# Patient Record
Sex: FEMALE | Race: WHITE | Employment: UNEMPLOYED | URBAN - METROPOLITAN AREA
[De-identification: names, ages, dates, MRNs, and addresses within clinical notes are randomized per-mention and may not be internally consistent; named-entity substitution may affect disease eponyms.]

---

## 2017-03-13 ENCOUNTER — TELEPHONE (OUTPATIENT)
Dept: CARDIOLOGY CLINIC | Age: 82
End: 2017-03-13

## 2017-03-13 DIAGNOSIS — R94.31 ABNORMAL EKG: Primary | ICD-10-CM

## 2017-03-14 ENCOUNTER — PROCEDURE VISIT (OUTPATIENT)
Dept: CARDIOLOGY CLINIC | Age: 82
End: 2017-03-14

## 2017-03-14 DIAGNOSIS — R94.31 ABNORMAL EKG: ICD-10-CM

## 2017-03-14 LAB
LV EF: 75 %
LVEF MODALITY: NORMAL

## 2017-03-14 PROCEDURE — 78452 HT MUSCLE IMAGE SPECT MULT: CPT | Performed by: INTERNAL MEDICINE

## 2017-03-14 PROCEDURE — A9500 TC99M SESTAMIBI: HCPCS | Performed by: INTERNAL MEDICINE

## 2017-03-14 PROCEDURE — 93015 CV STRESS TEST SUPVJ I&R: CPT | Performed by: INTERNAL MEDICINE

## 2017-03-15 ENCOUNTER — TELEPHONE (OUTPATIENT)
Dept: CARDIOLOGY CLINIC | Age: 82
End: 2017-03-15

## 2017-05-15 ENCOUNTER — HOSPITAL ENCOUNTER (OUTPATIENT)
Dept: INFUSION THERAPY | Age: 82
Discharge: HOME | End: 2017-05-15
Attending: FAMILY MEDICINE | Admitting: FAMILY MEDICINE

## 2017-05-15 VITALS
WEIGHT: 134 LBS | BODY MASS INDEX: 25.3 KG/M2 | OXYGEN SATURATION: 99 % | HEIGHT: 61 IN | RESPIRATION RATE: 16 BRPM | DIASTOLIC BLOOD PRESSURE: 61 MMHG | HEART RATE: 52 BPM | SYSTOLIC BLOOD PRESSURE: 139 MMHG

## 2017-05-15 LAB
CREAT SERPL-MCNC: 0.7 MG/DL (ref 0.6–1.1)
GFR AFRICAN AMERICAN: >60 ML/MIN/1.73M2
GFR NON-AFRICAN AMERICAN: >60 ML/MIN/1.73M2

## 2017-05-15 RX ORDER — ZOLEDRONIC ACID 5 MG/100ML
5 INJECTION, SOLUTION INTRAVENOUS ONCE
Status: COMPLETED | OUTPATIENT
Start: 2017-05-15 | End: 2017-05-15

## 2017-05-15 RX ORDER — SODIUM CHLORIDE 0.9 % (FLUSH) 0.9 %
10 SYRINGE (ML) INJECTION PRN
Status: DISCONTINUED | OUTPATIENT
Start: 2017-05-15 | End: 2017-05-16 | Stop reason: HOSPADM

## 2017-05-15 RX ADMIN — Medication 10 ML: at 10:45

## 2017-05-15 RX ADMIN — Medication 10 ML: at 11:36

## 2017-05-15 RX ADMIN — ZOLEDRONIC ACID 5 MG: 5 INJECTION, SOLUTION INTRAVENOUS at 11:22

## 2017-06-19 ENCOUNTER — OFFICE VISIT (OUTPATIENT)
Dept: CARDIOLOGY CLINIC | Age: 82
End: 2017-06-19

## 2017-06-19 VITALS
SYSTOLIC BLOOD PRESSURE: 116 MMHG | DIASTOLIC BLOOD PRESSURE: 74 MMHG | HEART RATE: 46 BPM | BODY MASS INDEX: 27 KG/M2 | HEIGHT: 61 IN | WEIGHT: 143 LBS

## 2017-06-19 DIAGNOSIS — R07.2 PRECORDIAL PAIN: Primary | ICD-10-CM

## 2017-06-19 PROCEDURE — 99214 OFFICE O/P EST MOD 30 MIN: CPT | Performed by: INTERNAL MEDICINE

## 2017-06-29 ENCOUNTER — PAT TELEPHONE (OUTPATIENT)
Dept: SURGERY | Age: 82
End: 2017-06-29

## 2017-06-29 VITALS — HEIGHT: 61 IN | WEIGHT: 134 LBS | BODY MASS INDEX: 25.3 KG/M2

## 2017-06-29 RX ORDER — LEVOCETIRIZINE DIHYDROCHLORIDE 5 MG/1
5 TABLET, FILM COATED ORAL NIGHTLY
COMMUNITY
End: 2018-09-24

## 2017-07-03 ENCOUNTER — HOSPITAL ENCOUNTER (OUTPATIENT)
Dept: SURGERY | Age: 82
Discharge: HOME | End: 2017-07-03
Attending: SPECIALIST | Admitting: SPECIALIST

## 2017-07-03 VITALS
BODY MASS INDEX: 26.62 KG/M2 | HEART RATE: 52 BPM | OXYGEN SATURATION: 98 % | SYSTOLIC BLOOD PRESSURE: 137 MMHG | DIASTOLIC BLOOD PRESSURE: 53 MMHG | HEIGHT: 61 IN | WEIGHT: 141 LBS | RESPIRATION RATE: 16 BRPM | TEMPERATURE: 98.2 F

## 2017-07-03 RX ORDER — SUCRALFATE 1 G/1
1 TABLET ORAL 4 TIMES DAILY
Qty: 120 TABLET | Refills: 3 | Status: SHIPPED | OUTPATIENT
Start: 2017-07-03 | End: 2018-12-12 | Stop reason: SDUPTHER

## 2017-07-03 RX ORDER — SODIUM CHLORIDE, SODIUM LACTATE, POTASSIUM CHLORIDE, CALCIUM CHLORIDE 600; 310; 30; 20 MG/100ML; MG/100ML; MG/100ML; MG/100ML
INJECTION, SOLUTION INTRAVENOUS CONTINUOUS
Status: DISCONTINUED | OUTPATIENT
Start: 2017-07-03 | End: 2017-07-04 | Stop reason: HOSPADM

## 2017-07-03 RX ADMIN — SODIUM CHLORIDE, SODIUM LACTATE, POTASSIUM CHLORIDE, CALCIUM CHLORIDE: 600; 310; 30; 20 INJECTION, SOLUTION INTRAVENOUS at 14:18

## 2017-07-03 ASSESSMENT — PAIN SCALES - GENERAL
PAINLEVEL_OUTOF10: 0
PAINLEVEL_OUTOF10: 0

## 2017-07-03 ASSESSMENT — PAIN - FUNCTIONAL ASSESSMENT: PAIN_FUNCTIONAL_ASSESSMENT: 0-10

## 2017-08-31 RX ORDER — LISINOPRIL 5 MG/1
5 TABLET ORAL DAILY
Qty: 90 TABLET | Refills: 3 | Status: SHIPPED | OUTPATIENT
Start: 2017-08-31 | End: 2018-08-06 | Stop reason: SDUPTHER

## 2017-09-12 ENCOUNTER — TELEPHONE (OUTPATIENT)
Dept: CARDIOLOGY CLINIC | Age: 82
End: 2017-09-12

## 2017-09-28 RX ORDER — LISINOPRIL 5 MG/1
5 TABLET ORAL DAILY
Qty: 90 TABLET | Refills: 3 | OUTPATIENT
Start: 2017-09-28

## 2017-10-04 ENCOUNTER — HOSPITAL ENCOUNTER (OUTPATIENT)
Dept: OTHER | Age: 82
Discharge: HOME | End: 2017-10-04
Attending: ORTHOPAEDIC SURGERY | Admitting: ORTHOPAEDIC SURGERY

## 2017-10-17 ENCOUNTER — HOSPITAL ENCOUNTER (OUTPATIENT)
Dept: ULTRASOUND IMAGING | Age: 82
Discharge: HOME | End: 2017-10-17
Attending: ORTHOPAEDIC SURGERY | Admitting: ORTHOPAEDIC SURGERY

## 2017-10-17 DIAGNOSIS — M19.012 PRIMARY OSTEOARTHRITIS OF LEFT SHOULDER: ICD-10-CM

## 2017-10-17 DIAGNOSIS — M19.012 ARTHRITIS OF LEFT SHOULDER REGION: ICD-10-CM

## 2017-10-24 LAB — TSH SERPL DL<=0.05 MIU/L-ACNC: 5.39 UIU/ML

## 2017-12-04 ENCOUNTER — OFFICE VISIT (OUTPATIENT)
Dept: CARDIOLOGY CLINIC | Age: 82
End: 2017-12-04

## 2017-12-04 VITALS
HEIGHT: 61 IN | HEART RATE: 62 BPM | BODY MASS INDEX: 25.26 KG/M2 | WEIGHT: 133.8 LBS | SYSTOLIC BLOOD PRESSURE: 114 MMHG | DIASTOLIC BLOOD PRESSURE: 70 MMHG

## 2017-12-04 DIAGNOSIS — R07.2 PRECORDIAL PAIN: Primary | ICD-10-CM

## 2017-12-04 PROCEDURE — 99213 OFFICE O/P EST LOW 20 MIN: CPT | Performed by: INTERNAL MEDICINE

## 2017-12-04 NOTE — PROGRESS NOTES
No current facility-administered medications for this visit. Allergies: Morphine; Bactrim [sulfamethoxazole-trimethoprim]; Influenza vaccines; Lactose intolerance (gi); Phenergan [promethazine hcl]; Stadol [butorphanol tartrate]; and Tape [adhesive tape]  Past Medical History:   Diagnosis Date    Arthritis     generalized    Asthma     Blood transfusion 2004    No reaction    Chronic bronchitis (HCC)     COPD (chronic obstructive pulmonary disease) ([facility] [address]     summer 2014    Fatigue     H/O cardiac catheterization 06/15/2017    mild lad and cx disease    H/O Doppler ultrasound 4/7/2016 3/19/12    carotid-4/16 WNL 3/12right mild less than 50%and left wnl    H/O Doppler ultrasound 6/14/017    carotid - mod disease EILEEN, mild disease LICA    H/O echocardiogram 7/23/10    H/O echocardiogram 4/15/2016    EF 60% sclerotic AV mildly stenosed-recommend yearly echo    H/O echocardiogram 06/13/2017    EF>55% mild-mod AS, triavial AR    H/O exercise stress test 06/14/2017    abnormal    History of nuclear stress test 03/14/2017    EF 75%. Normal study.     Holter monitor, abnormal 2/22/11    infrequent APC are seen    Normal cardiac stress test 7/23/10    EF 70%, no ischemia    On home O2     only uses as needed, nightly prn    Syncope and collapse     Tachycardia     HX of tachycardia - had a cardioablation    Thyroid disease      Past Surgical History:   Procedure Laterality Date    BREAST SURGERY  2009    bilat    CARDIAC CATHETERIZATION  3/02/11    mild to moderate disease of diagonal and proximal RCA   [address]    ablation    CARPAL TUNNEL RELEASE  1990's    bilat    CHOLECYSTECTOMY  1961    open choley    COLONOSCOPY      DILATATION, ESOPHAGUS      ENDOSCOPY, COLON, DIAGNOSTIC  07/03/2017    s/p gastric bypass otherwise normal    FINGER SURGERY      right middle, thumb and index finger (screw and pin in right index finger)    GASTRIC RESTRICTION SURGERY  1984    stapled  HERNIA REPAIR  unknown    Inc hernia hernia repair    HIP FRACTURE SURGERY Left 11/04/2015    Left hip nail    HYSTERECTOMY  1966    Luke w/ BSO    JOINT REPLACEMENT  2004    right knee    LIPECTOMY  1's    OTHER SURGICAL HISTORY  5-1-2012    Gastrojejunostomy/partial gastrectomy    TOE SURGERY  Early 2000's    hammer toes and bunions     Family History   Problem Relation Age of Onset    Diabetes Mother     Other Mother      thyroid    Heart Disease Father     Arthritis Father     High Blood Pressure Father     High Cholesterol Father     Other Father      glaucoma    Other Sister      thyroid, glaucoma    Diabetes Brother     Other Sister     Vision Loss Sister      lung problems, smoker    Cancer Sister      throat cancer    Other Son      HX of clots    Early Death Son      Hit by car and killed.  High Blood Pressure Daughter     Stroke Son      No residual    Other Son      HX myocarditis     Social History   Substance Use Topics    Smoking status: Former Smoker     Packs/day: 3.00     Years: 5.00     Quit date: 1/1/1962    Smokeless tobacco: Never Used    Alcohol use No          Review of systems:  HEENT: Neg  Card:neg   GI;Neg  : Neg  Neuro: Neg  Psych: Neg  Derm: Neg  MS; Neg  All: Documented  Constitutional: Neg    Objective:      Physical Exam:  /70   Pulse 62   Ht 5' 1\" (1.549 m)   Wt 133 lb 12.8 oz (60.7 kg)   BMI 25.28 kg/m²   Wt Readings from Last 3 Encounters:   12/04/17 133 lb 12.8 oz (60.7 kg)   07/03/17 141 lb (64 kg)   06/29/17 134 lb (60.8 kg)     Body mass index is 25.28 kg/m². GENERAL - Alert, oriented, pleasant, in no apparent distress. Head unremarkable  Eyes  Not injected conjunctiva  ENT  normal mucosa  Neck - Supple. No jugular venous distention noted. No carotid bruits. Cardiovascular  Normal S1 and S2 with 2/6 YELENA  Extremities - No cyanosis, clubbing, or significant edema. Pulmonary  No respiratory distress. No wheezes or rales. Pulses: Bilateral radial and pedal pulses normal  Abdomen  no tenderness  Musculoskeletal  normal strength  Neurologic    There are  no gross focal neurologic abnormalities. Skin-  No rash  Affect; normal mood    DATA:  Lab Results   Component Value Date    CKTOTAL 79 06/14/2017    TROPONINI 0.007 05/23/2014     BNP:    Lab Results   Component Value Date    BNP 57 05/23/2014     PT/INR:  No results found for: PTINR  No results found for: LABA1C  Lab Results   Component Value Date    CHOL 197 06/13/2017    TRIG 116 06/13/2017    HDL 97 06/13/2017    LDLCALC 62 02/05/2016    LDLDIRECT 91 06/13/2017     Lab Results   Component Value Date    ALT 15 06/14/2017    AST 20 06/14/2017     TSH:    Lab Results   Component Value Date    TSH 5.39 10/24/2017         QUALITY MEASURES:  CAD:  No   CHOL LOWERING:  No- if No Why  ANTIPLATELET:  No - if No why  BETA BLOCKER    no  IF  NO WHY  SMOKING HISTORY no COUNSELLED no  ATRIAL FIBRILLATIONno ANTICOAG: no    Assessment/ Plan:     Patient seen , interviewed and examined      -  Hypertension: Patients blood pressure is normal. Patient is advised about low sodium diet. Present medical regimen will not be changed.        - SP  Shoulder surgery    - mild AS murmur yearly echo

## 2018-06-20 ENCOUNTER — HOSPITAL ENCOUNTER (OUTPATIENT)
Dept: GENERAL RADIOLOGY | Age: 83
Discharge: HOME | End: 2018-06-20
Attending: ORTHOPAEDIC SURGERY | Admitting: ORTHOPAEDIC SURGERY

## 2018-06-20 DIAGNOSIS — Z96.612 PRESENCE OF LEFT ARTIFICIAL SHOULDER JOINT: ICD-10-CM

## 2018-06-20 DIAGNOSIS — Z96.612 S/P SHOULDER REPLACEMENT, LEFT: ICD-10-CM

## 2018-08-07 RX ORDER — LISINOPRIL 5 MG/1
TABLET ORAL
Qty: 90 TABLET | Refills: 3 | Status: ON HOLD | OUTPATIENT
Start: 2018-08-07 | End: 2019-09-21 | Stop reason: HOSPADM

## 2018-08-13 ENCOUNTER — TELEPHONE (OUTPATIENT)
Dept: CARDIOLOGY CLINIC | Age: 83
End: 2018-08-13

## 2018-09-01 PROBLEM — R42 DIZZINESS: Status: ACTIVE | Noted: 2018-09-01

## 2018-09-01 PROBLEM — D64.9 ANEMIA: Status: ACTIVE | Noted: 2018-09-01

## 2018-09-14 ENCOUNTER — PROCEDURE VISIT (OUTPATIENT)
Dept: CARDIOLOGY CLINIC | Age: 83
End: 2018-09-14

## 2018-09-14 DIAGNOSIS — R55 SYNCOPE AND COLLAPSE: ICD-10-CM

## 2018-09-14 DIAGNOSIS — R42 DIZZINESS: Primary | ICD-10-CM

## 2018-09-14 LAB
LV EF: 58 %
LVEF MODALITY: NORMAL

## 2018-09-14 PROCEDURE — 93306 TTE W/DOPPLER COMPLETE: CPT | Performed by: INTERNAL MEDICINE

## 2018-09-18 ENCOUNTER — TELEPHONE (OUTPATIENT)
Dept: CARDIOLOGY CLINIC | Age: 83
End: 2018-09-18

## 2018-09-24 ENCOUNTER — OFFICE VISIT (OUTPATIENT)
Dept: FAMILY MEDICINE CLINIC | Age: 83
End: 2018-09-24
Payer: MEDICARE

## 2018-09-24 VITALS
SYSTOLIC BLOOD PRESSURE: 132 MMHG | WEIGHT: 137.8 LBS | OXYGEN SATURATION: 98 % | DIASTOLIC BLOOD PRESSURE: 80 MMHG | BODY MASS INDEX: 26.01 KG/M2 | HEIGHT: 61 IN | TEMPERATURE: 96.8 F | HEART RATE: 54 BPM

## 2018-09-24 DIAGNOSIS — J44.9 CHRONIC OBSTRUCTIVE PULMONARY DISEASE, UNSPECIFIED COPD TYPE (HCC): ICD-10-CM

## 2018-09-24 DIAGNOSIS — E03.9 ACQUIRED HYPOTHYROIDISM: ICD-10-CM

## 2018-09-24 DIAGNOSIS — R42 DIZZINESS: ICD-10-CM

## 2018-09-24 DIAGNOSIS — E77.8 HYPOPROTEINEMIA (HCC): ICD-10-CM

## 2018-09-24 DIAGNOSIS — D64.9 ANEMIA, UNSPECIFIED TYPE: Primary | ICD-10-CM

## 2018-09-24 DIAGNOSIS — Z96.612 S/P SHOULDER REPLACEMENT, LEFT: ICD-10-CM

## 2018-09-24 DIAGNOSIS — R01.1 MURMUR: ICD-10-CM

## 2018-09-24 DIAGNOSIS — Z76.89 ESTABLISHING CARE WITH NEW DOCTOR, ENCOUNTER FOR: ICD-10-CM

## 2018-09-24 DIAGNOSIS — M81.0 AGE-RELATED OSTEOPOROSIS WITHOUT CURRENT PATHOLOGICAL FRACTURE: ICD-10-CM

## 2018-09-24 DIAGNOSIS — K14.3 BLACK TONGUE: ICD-10-CM

## 2018-09-24 LAB
A/G RATIO: 2.2 (ref 1.1–2.2)
ALBUMIN SERPL-MCNC: 4.2 G/DL (ref 3.4–5)
ALP BLD-CCNC: 52 U/L (ref 40–129)
ALT SERPL-CCNC: 12 U/L (ref 10–40)
ANION GAP SERPL CALCULATED.3IONS-SCNC: 16 MMOL/L (ref 3–16)
AST SERPL-CCNC: 17 U/L (ref 15–37)
BASOPHILS ABSOLUTE: 0 K/UL (ref 0–0.2)
BASOPHILS RELATIVE PERCENT: 0.8 %
BILIRUB SERPL-MCNC: 0.4 MG/DL (ref 0–1)
BUN BLDV-MCNC: 10 MG/DL (ref 7–20)
CALCIUM SERPL-MCNC: 8.9 MG/DL (ref 8.3–10.6)
CHLORIDE BLD-SCNC: 106 MMOL/L (ref 99–110)
CO2: 22 MMOL/L (ref 21–32)
CREAT SERPL-MCNC: <0.5 MG/DL (ref 0.6–1.2)
EOSINOPHILS ABSOLUTE: 0.6 K/UL (ref 0–0.6)
EOSINOPHILS RELATIVE PERCENT: 11.4 %
GFR AFRICAN AMERICAN: >60
GFR NON-AFRICAN AMERICAN: >60
GLOBULIN: 1.9 G/DL
GLUCOSE BLD-MCNC: 80 MG/DL (ref 70–99)
HCT VFR BLD CALC: 33 % (ref 36–48)
HEMOGLOBIN: 11.1 G/DL (ref 12–16)
LYMPHOCYTES ABSOLUTE: 1.7 K/UL (ref 1–5.1)
LYMPHOCYTES RELATIVE PERCENT: 35.6 %
MCH RBC QN AUTO: 31.3 PG (ref 26–34)
MCHC RBC AUTO-ENTMCNC: 33.6 G/DL (ref 31–36)
MCV RBC AUTO: 93.2 FL (ref 80–100)
MONOCYTES ABSOLUTE: 0.4 K/UL (ref 0–1.3)
MONOCYTES RELATIVE PERCENT: 8.9 %
NEUTROPHILS ABSOLUTE: 2.1 K/UL (ref 1.7–7.7)
NEUTROPHILS RELATIVE PERCENT: 43.3 %
PDW BLD-RTO: 20.2 % (ref 12.4–15.4)
PLATELET # BLD: 279 K/UL (ref 135–450)
PMV BLD AUTO: 7.8 FL (ref 5–10.5)
POTASSIUM SERPL-SCNC: 4.3 MMOL/L (ref 3.5–5.1)
RBC # BLD: 3.54 M/UL (ref 4–5.2)
SODIUM BLD-SCNC: 144 MMOL/L (ref 136–145)
TOTAL PROTEIN: 6.1 G/DL (ref 6.4–8.2)
WBC # BLD: 4.9 K/UL (ref 4–11)

## 2018-09-24 PROCEDURE — 36415 COLL VENOUS BLD VENIPUNCTURE: CPT | Performed by: FAMILY MEDICINE

## 2018-09-24 PROCEDURE — 99204 OFFICE O/P NEW MOD 45 MIN: CPT | Performed by: FAMILY MEDICINE

## 2018-09-24 RX ORDER — METHOCARBAMOL 500 MG/1
500 TABLET, FILM COATED ORAL 4 TIMES DAILY PRN
Status: SHIPPED | COMMUNITY
Start: 2018-09-24 | End: 2019-05-06 | Stop reason: SDUPTHER

## 2018-09-24 RX ORDER — LEVOTHYROXINE SODIUM 0.1 MG/1
100 TABLET ORAL DAILY
COMMUNITY
End: 2019-02-07

## 2018-09-24 RX ORDER — METHOCARBAMOL 500 MG/1
500 TABLET, FILM COATED ORAL 4 TIMES DAILY
COMMUNITY
End: 2018-09-24 | Stop reason: DRUGHIGH

## 2018-09-24 ASSESSMENT — ENCOUNTER SYMPTOMS
COUGH: 0
ABDOMINAL PAIN: 0
SHORTNESS OF BREATH: 1

## 2018-09-24 ASSESSMENT — PATIENT HEALTH QUESTIONNAIRE - PHQ9
SUM OF ALL RESPONSES TO PHQ QUESTIONS 1-9: 0
SUM OF ALL RESPONSES TO PHQ9 QUESTIONS 1 & 2: 0
1. LITTLE INTEREST OR PLEASURE IN DOING THINGS: 0
2. FEELING DOWN, DEPRESSED OR HOPELESS: 0
SUM OF ALL RESPONSES TO PHQ QUESTIONS 1-9: 0

## 2018-09-24 NOTE — PROGRESS NOTES
Subjective:      Patient ID: Axel Culver is a 80 y.o. female. [facility] is here to est care. She has hypothyroidism, COPD, osteoporosis, black tongue, anemia after shoulder surgery. Hypothyroidism  Sees Endocrine (Dr. Colette Celestin). She takes synthroid. Tolerates med well. No side effects. COPD  Former smoker. Uses inhalers daily and mucinex at night. She sees pulmonology as well. Osteoporosis  She was on Forteo, but stopped because of dizziness. She was on Reclast in the past as well. Black tongue   This started after her surgery while she was on pain meds. 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 That is why she left. Since stopping the pain meds the tongue has improved but not resolved. Anemia  Had a large axillary hematoma after left shoulder surgery. The beginning of Sept her Hgb was as low as 8.8 and was 9.2 when she left the hospital. It has not been checked since then. She has noticed being more short of breath with activity in the past month. She had a recent ECHO watching her heart valves which has worsened some, she is following up with the cardiologist for that. Her B12, folate and iron studies were Ok at the beginning of Sept.       Review of Systems   Constitutional: Negative for fever and unexpected weight change. HENT: Negative for hearing loss. Eyes: Negative for visual disturbance. Respiratory: Positive for shortness of breath. Negative for cough. Cardiovascular: Negative for chest pain, palpitations and leg swelling. Gastrointestinal: Negative for abdominal pain. Skin: Negative for rash and wound. Neurological: Positive for dizziness. Negative for weakness, light-headedness, numbness and headaches. Hematological: Negative for adenopathy. Does not bruise/bleed easily. Psychiatric/Behavioral: Negative for sleep disturbance.      Past Medical History:   Diagnosis Date    Arthritis     generalized    Asthma     Blood transfusion Gastrojejunostomy/partial gastrectomy    TOE SURGERY  Early 2000's    hammer toes and bunions     Social History     Social History    Marital status:      Spouse name: N/A    Number of children: N/A    Years of education: N/A     Occupational History    Not on file. Social History Main Topics    Smoking status: Former Smoker     Packs/day: 3.00     Years: 5.00     Quit date: 1/1/1962    Smokeless tobacco: Never Used    Alcohol use No    Drug use: No    Sexual activity: Not on file     Other Topics Concern    Not on file     Social History Narrative    No narrative on file       Current Outpatient Prescriptions:     levothyroxine (SYNTHROID) 100 MCG tablet, Take 100 mcg by mouth Daily, Disp: , Rfl:     methocarbamol (ROBAXIN) 500 MG tablet, Take 1 tablet by mouth 4 times daily as needed, Disp: , Rfl:     timolol (BETIMOL) 0.5 % ophthalmic solution, 1 drop 2 times daily, Disp: , Rfl:     polyethylene glycol (GLYCOLAX) packet, Take 17 g by mouth daily, Disp: 527 g, Rfl: 1    L-Arginine 500 MG TABS, Take 1 tablet by mouth 2 times daily, Disp: , Rfl:     Calcium-Magnesium-Zinc 167-83-8 MG TABS, Take 1 tablet by mouth daily, Disp: , Rfl:     Cyanocobalamin (B-12) 2500 MCG TABS, Take 1 tablet by mouth daily, Disp: , Rfl:     lisinopril (PRINIVIL;ZESTRIL) 5 MG tablet, TAKE ONE TABLET BY MOUTH ONCE DAILY, Disp: 90 tablet, Rfl: 3    sucralfate (CARAFATE) 1 GM tablet, Take 1 tablet by mouth 4 times daily, Disp: 120 tablet, Rfl: 3    Glycopyrrolate-Formoterol (BEVESPI AEROSPHERE) 9-4.8 MCG/ACT AERO, Inhale into the lungs, Disp: , Rfl:     nitroGLYCERIN (NITROSTAT) 0.4 MG SL tablet, up to max of 3 total doses.  If no relief after 1 dose, call 911., Disp: 25 tablet, Rfl: 3    ondansetron (ZOFRAN) 4 MG tablet, every 8 hours as needed , Disp: , Rfl:     OXYGEN, Inhale 2 L into the lungs nightly as needed (uses as needed nightly) , Disp: , Rfl:     timolol (TIMOPTIC-XE) 0.25 % ophthalmic gel-forming, , Disp: , Rfl:     pantoprazole (PROTONIX) 40 MG tablet, Take 40 mg by mouth nightly , Disp: , Rfl:     guaiFENesin (MUCINEX) 600 MG SR tablet, Take 1,200 mg by mouth 2 times daily, Disp: , Rfl:     magnesium (MAGNESIUM-OXIDE) 250 MG TABS tablet, Take 250 mg by mouth nightly , Disp: , Rfl:     Cholecalciferol (VITAMIN D3) 2000 UNITS CAPS, Take 1 capsule by mouth nightly , Disp: , Rfl:     albuterol (PROVENTIL HFA;VENTOLIN HFA) 108 (90 BASE) MCG/ACT inhaler, Inhale 2 puffs into the lungs every 6 hours as needed for Wheezing., Disp: , Rfl:       Objective:   Physical Exam   Constitutional: She is oriented to person, place, and time. She appears well-developed and well-nourished. No distress. HENT:   Head: Normocephalic and atraumatic. Right Ear: External ear normal.   Left Ear: External ear normal.   Mouth/Throat: Oropharynx is clear and moist.   Neck: Neck supple. No thyromegaly present. Cardiovascular: Normal rate and regular rhythm. No murmur heard. Pulmonary/Chest: Effort normal and breath sounds normal. She has no wheezes. She has no rales. Abdominal: Soft. Bowel sounds are normal. She exhibits no mass. There is no tenderness. Musculoskeletal: She exhibits no edema. Left arm in post surgical sling   Lymphadenopathy:     She has no cervical adenopathy. Neurological: She is alert and oriented to person, place, and time. Psychiatric: She has a normal mood and affect. Nursing note and vitals reviewed. Assessment:       Diagnosis Orders   1. Anemia, unspecified type  CBC WITH AUTO DIFFERENTIAL   2. Chronic obstructive pulmonary disease, unspecified COPD type ([facility] [address]     3. Dizziness  COMPREHENSIVE METABOLIC PANEL   4. Hypoproteinemia (HCC)  COMPREHENSIVE METABOLIC PANEL   5. Acquired hypothyroidism     6. Murmur     7. Age-related osteoporosis without current pathological fracture     8. S/P shoulder replacement, left     9. Black tongue     10.  Establishing care with new

## 2018-09-26 ENCOUNTER — TELEPHONE (OUTPATIENT)
Dept: FAMILY MEDICINE CLINIC | Age: 83
End: 2018-09-26

## 2018-09-26 NOTE — TELEPHONE ENCOUNTER
Pt stated the film on her tongue is back and would like some Niastatin called in to see if that will help.

## 2018-09-28 ENCOUNTER — OFFICE VISIT (OUTPATIENT)
Dept: FAMILY MEDICINE CLINIC | Age: 83
End: 2018-09-28
Payer: MEDICARE

## 2018-09-28 VITALS
HEART RATE: 55 BPM | WEIGHT: 136.4 LBS | OXYGEN SATURATION: 98 % | TEMPERATURE: 97 F | DIASTOLIC BLOOD PRESSURE: 80 MMHG | SYSTOLIC BLOOD PRESSURE: 132 MMHG | BODY MASS INDEX: 25.77 KG/M2

## 2018-09-28 DIAGNOSIS — J44.1 COPD WITH ACUTE EXACERBATION (HCC): Primary | ICD-10-CM

## 2018-09-28 PROCEDURE — 96372 THER/PROPH/DIAG INJ SC/IM: CPT | Performed by: FAMILY MEDICINE

## 2018-09-28 PROCEDURE — 99213 OFFICE O/P EST LOW 20 MIN: CPT | Performed by: FAMILY MEDICINE

## 2018-09-28 RX ORDER — AZITHROMYCIN 250 MG/1
TABLET, FILM COATED ORAL
Qty: 1 PACKET | Refills: 0 | Status: SHIPPED | OUTPATIENT
Start: 2018-09-28 | End: 2018-10-02

## 2018-09-28 RX ORDER — TRIAMCINOLONE ACETONIDE 40 MG/ML
40 INJECTION, SUSPENSION INTRA-ARTICULAR; INTRAMUSCULAR ONCE
Status: COMPLETED | OUTPATIENT
Start: 2018-09-28 | End: 2018-09-28

## 2018-09-28 RX ADMIN — TRIAMCINOLONE ACETONIDE 40 MG: 40 INJECTION, SUSPENSION INTRA-ARTICULAR; INTRAMUSCULAR at 11:15

## 2018-09-28 ASSESSMENT — ENCOUNTER SYMPTOMS
WHEEZING: 1
SORE THROAT: 0
SHORTNESS OF BREATH: 1
RHINORRHEA: 1
COUGH: 1

## 2018-09-28 NOTE — PROGRESS NOTES
0    nystatin (MYCOSTATIN) 525239 UNIT/ML suspension, Take 5 mLs by mouth 4 times daily Swish and spit, Disp: 473 mL, Rfl: 0    levothyroxine (SYNTHROID) 100 MCG tablet, Take 100 mcg by mouth Daily, Disp: , Rfl:     methocarbamol (ROBAXIN) 500 MG tablet, Take 1 tablet by mouth 4 times daily as needed, Disp: , Rfl:     timolol (BETIMOL) 0.5 % ophthalmic solution, 1 drop 2 times daily, Disp: , Rfl:     polyethylene glycol (GLYCOLAX) packet, Take 17 g by mouth daily, Disp: 527 g, Rfl: 1    L-Arginine 500 MG TABS, Take 1 tablet by mouth 2 times daily, Disp: , Rfl:     Calcium-Magnesium-Zinc 167-83-8 MG TABS, Take 1 tablet by mouth daily, Disp: , Rfl:     Cyanocobalamin (B-12) 2500 MCG TABS, Take 1 tablet by mouth daily, Disp: , Rfl:     lisinopril (PRINIVIL;ZESTRIL) 5 MG tablet, TAKE ONE TABLET BY MOUTH ONCE DAILY, Disp: 90 tablet, Rfl: 3    sucralfate (CARAFATE) 1 GM tablet, Take 1 tablet by mouth 4 times daily, Disp: 120 tablet, Rfl: 3    Glycopyrrolate-Formoterol (BEVESPI AEROSPHERE) 9-4.8 MCG/ACT AERO, Inhale into the lungs, Disp: , Rfl:     nitroGLYCERIN (NITROSTAT) 0.4 MG SL tablet, up to max of 3 total doses.  If no relief after 1 dose, call 911., Disp: 25 tablet, Rfl: 3    ondansetron (ZOFRAN) 4 MG tablet, every 8 hours as needed , Disp: , Rfl:     OXYGEN, Inhale 2 L into the lungs nightly as needed (uses as needed nightly) , Disp: , Rfl:     timolol (TIMOPTIC-XE) 0.25 % ophthalmic gel-forming, , Disp: , Rfl:     pantoprazole (PROTONIX) 40 MG tablet, Take 40 mg by mouth nightly , Disp: , Rfl:     guaiFENesin (MUCINEX) 600 MG SR tablet, Take 1,200 mg by mouth 2 times daily, Disp: , Rfl:     magnesium (MAGNESIUM-OXIDE) 250 MG TABS tablet, Take 250 mg by mouth nightly , Disp: , Rfl:     Cholecalciferol (VITAMIN D3) 2000 UNITS CAPS, Take 1 capsule by mouth nightly , Disp: , Rfl:     albuterol (PROVENTIL HFA;VENTOLIN HFA) 108 (90 BASE) MCG/ACT inhaler, Inhale 2 puffs into the lungs every 6 hours as

## 2018-10-26 LAB
T3 FREE: 3.1
T4 FREE: 1.89
TSH SERPL DL<=0.05 MIU/L-ACNC: 0.01 UIU/ML

## 2018-10-31 ENCOUNTER — OFFICE VISIT (OUTPATIENT)
Dept: FAMILY MEDICINE CLINIC | Age: 83
End: 2018-10-31
Payer: MEDICARE

## 2018-10-31 VITALS
TEMPERATURE: 97.5 F | DIASTOLIC BLOOD PRESSURE: 82 MMHG | SYSTOLIC BLOOD PRESSURE: 118 MMHG | HEART RATE: 54 BPM | WEIGHT: 135.2 LBS | OXYGEN SATURATION: 97 % | BODY MASS INDEX: 25.55 KG/M2

## 2018-10-31 DIAGNOSIS — K14.3 BLACK TONGUE: ICD-10-CM

## 2018-10-31 DIAGNOSIS — J44.9 CHRONIC OBSTRUCTIVE PULMONARY DISEASE, UNSPECIFIED COPD TYPE (HCC): Primary | ICD-10-CM

## 2018-10-31 PROCEDURE — 99213 OFFICE O/P EST LOW 20 MIN: CPT | Performed by: FAMILY MEDICINE

## 2018-10-31 RX ORDER — PREDNISONE 20 MG/1
40 TABLET ORAL DAILY
Qty: 10 TABLET | Refills: 0 | Status: SHIPPED | OUTPATIENT
Start: 2018-10-31 | End: 2018-11-05

## 2018-10-31 ASSESSMENT — ENCOUNTER SYMPTOMS: SHORTNESS OF BREATH: 1

## 2018-10-31 NOTE — PROGRESS NOTES
Subjective:      Patient ID: Bekah Caldwell is a 80 y.o. female. Lona Curling is here with shortness of breath and black tongue. Shortness of Breath   This is a chronic problem. The problem occurs daily. The problem has been gradually worsening. Pertinent negatives include no chest pain. The symptoms are aggravated by any activity. Associated symptoms comments: VELAZQUEZ, cough. She has tried beta agonist inhalers and ipratropium inhalers for the symptoms. The treatment provided mild relief. Black Tongue  This has returned. She is doing nothing new. She is convinced it is the inhaler. Review of Records shows a PFT in 2015 with moderate COPD. No bronchodilator response. Review of Systems   Respiratory: Positive for shortness of breath. Cardiovascular: Negative for chest pain. Past Medical History:   Diagnosis Date    Arthritis     generalized    Asthma     Blood transfusion 2004    No reaction    Chronic bronchitis (HCC)     COPD (chronic obstructive pulmonary disease) ([facility] [address]     summer 2014    Fatigue     H/O cardiac catheterization 06/15/2017    mild lad and cx disease    H/O Doppler ultrasound 4/7/2016 3/19/12    carotid-4/16 WNL 3/12right mild less than 50%and left wnl    H/O Doppler ultrasound 6/14/017    carotid - mod disease EILEEN, mild disease LICA    H/O echocardiogram 7/23/10    H/O echocardiogram 4/15/2016    EF 60% sclerotic AV mildly stenosed-recommend yearly echo    H/O echocardiogram 06/13/2017    EF>55% mild-mod AS, triavial AR    H/O echocardiogram 09/14/2018    EF55-60% mod AS- mean pressure gradient increased from 13-31    H/O exercise stress test 06/14/2017    abnormal    History of nuclear stress test 03/14/2017    EF 75%. Normal study.     Holter monitor, abnormal 2/22/11    infrequent APC are seen    Normal cardiac stress test 7/23/10    EF 70%, no ischemia    On home O2     only uses as needed, nightly prn    Syncope and collapse     Tachycardia     HX of Neurological: She is alert and oriented to person, place, and time. Psychiatric: She has a normal mood and affect. Nursing note and vitals reviewed. Assessment:       Diagnosis Orders   1. Chronic obstructive pulmonary disease, unspecified COPD type ([address]  Full PFT Study With Bronchodilator   2. Black tongue             Plan:      1. Steroid burst to control current flare up. Continue current meds. PFT in 3 weeks. She no longer sees pulm, and hasn't fro a couple years - doesn't want to. Thinks PCP should take care of it.      2. Should resolve with steroid burst.     Follow up prmisty Yarbrough MD

## 2018-11-15 ENCOUNTER — HOSPITAL ENCOUNTER (OUTPATIENT)
Dept: PULMONOLOGY | Age: 83
Discharge: HOME | End: 2018-11-15
Payer: MEDICARE

## 2018-11-15 DIAGNOSIS — J44.9 CHRONIC OBSTRUCTIVE PULMONARY DISEASE, UNSPECIFIED COPD TYPE (HCC): ICD-10-CM

## 2018-11-15 LAB
DLCO %PRED: 109 %
DLCO PRED: NORMAL ML/MIN/MMHG
DLCO/VA %PRED: NORMAL %
DLCO/VA PRED: NORMAL ML/MIN/MMHG
DLCO/VA: NORMAL ML/MIN/MMHG
DLCO: NORMAL ML/MIN/MMHG
EXPIRATORY TIME: NORMAL SEC
FEF 25-75% %PRED-PRE: NORMAL L/SEC
FEF 25-75% PRED: NORMAL L/SEC
FEF 25-75%-PRE: NORMAL L/SEC
FEV1 %PRED-PRE: 91 %
FEV1 PRED: NORMAL L
FEV1/FVC %PRED-PRE: NORMAL %
FEV1/FVC PRED: NORMAL %
FEV1/FVC: 60 %
FEV1: NORMAL L
FVC %PRED-PRE: NORMAL %
FVC PRED: NORMAL L
FVC: NORMAL L
GAW %PRED: NORMAL %
GAW PRED: NORMAL L/S/CMH2O
GAW: NORMAL L/S/CMH2O
IC %PRED: NORMAL %
IC PRED: NORMAL L
IC: NORMAL L
MVV %PRED-PRE: NORMAL %
MVV PRED: NORMAL L/MIN
MVV-PRE: NORMAL L/MIN
PEF %PRED-PRE: NORMAL L/SEC
PEF PRED: NORMAL L/SEC
PEF-PRE: NORMAL L/SEC
RAW %PRED: NORMAL %
RAW PRED: NORMAL CMH2O/L/S
RAW: NORMAL CMH2O/L/S
RV %PRED: NORMAL %
RV PRED: NORMAL L
RV: NORMAL L
SVC %PRED: NORMAL %
SVC PRED: NORMAL L
SVC: NORMAL L
TLC %PRED: 123 %
TLC PRED: NORMAL L
TLC: NORMAL L
VA %PRED: NORMAL %
VA PRED: NORMAL L
VA: NORMAL L
VTG %PRED: NORMAL %
VTG PRED: NORMAL L
VTG: NORMAL L

## 2018-11-15 PROCEDURE — 94060 EVALUATION OF WHEEZING: CPT

## 2018-11-15 PROCEDURE — 94729 DIFFUSING CAPACITY: CPT

## 2018-11-15 PROCEDURE — 94726 PLETHYSMOGRAPHY LUNG VOLUMES: CPT

## 2018-11-15 ASSESSMENT — PULMONARY FUNCTION TESTS
FEV1_PERCENT_PREDICTED_PRE: 91
FEV1/FVC: 60

## 2018-12-04 ENCOUNTER — OFFICE VISIT (OUTPATIENT)
Dept: CARDIOLOGY CLINIC | Age: 83
End: 2018-12-04
Payer: MEDICARE

## 2018-12-04 VITALS
WEIGHT: 133.6 LBS | HEIGHT: 61 IN | DIASTOLIC BLOOD PRESSURE: 62 MMHG | BODY MASS INDEX: 25.22 KG/M2 | SYSTOLIC BLOOD PRESSURE: 138 MMHG | HEART RATE: 80 BPM

## 2018-12-04 DIAGNOSIS — I38 VHD (VALVULAR HEART DISEASE): Primary | ICD-10-CM

## 2018-12-04 PROCEDURE — 99213 OFFICE O/P EST LOW 20 MIN: CPT | Performed by: INTERNAL MEDICINE

## 2018-12-04 NOTE — PROGRESS NOTES
CARDIOLOGY NOTE      12/4/2018    RE: Shawna Cabot  (1935)                               TO:  Dr. Bala Jin MD            C/ Mickey [address] 81 is a 80 y.o. female who was seen today for management of  htn                                    HPI:   Patient is here for    - Hypertension,is  well controlled, pt is  compliant with medicines  - VHD no SOB, CP                  The patient does not have cardiac complaints    Shawna Cabot has the following history recorded in care path:  Patient Active Problem List    Diagnosis Date Noted    Gait disturbance 11/09/2015     Priority: Low    Closed intertrochanteric fracture of left femur ([facility] [address] 11/04/2015     Priority: Low    Chronic obstructive pulmonary disease ([facility] [address] 10/31/2018    Acquired hypothyroidism 09/24/2018    Murmur 09/24/2018    Age-related osteoporosis without current pathological fracture 09/24/2018    Black tongue 09/24/2018    Anemia 09/01/2018    Dizziness 09/01/2018     Current Outpatient Prescriptions   Medication Sig Dispense Refill    glycopyrrolate-formoterol (BEVESPI AEROSPHERE) 9-4.8 MCG/ACT AERO Inhale 2 puffs into the lungs 2 times daily 3 Inhaler 3    nystatin (MYCOSTATIN) 988682 UNIT/ML suspension Take 5 mLs by mouth 4 times daily Swish and spit 473 mL 0    levothyroxine (SYNTHROID) 100 MCG tablet Take 100 mcg by mouth Daily      methocarbamol (ROBAXIN) 500 MG tablet Take 1 tablet by mouth 4 times daily as needed      timolol (BETIMOL) 0.5 % ophthalmic solution 1 drop 2 times daily      L-Arginine 500 MG TABS Take 1 tablet by mouth 2 times daily      Calcium-Magnesium-Zinc 167-83-8 MG TABS Take 1 tablet by mouth daily      Cyanocobalamin (B-12) 2500 MCG TABS Take 1 tablet by mouth daily      lisinopril (PRINIVIL;ZESTRIL) 5 MG tablet TAKE ONE TABLET BY MOUTH ONCE DAILY 90 tablet 3    sucralfate (CARAFATE) 1 GM tablet Take 1 tablet by mouth 4 times daily 120 tablet 3    nitroGLYCERIN (NITROSTAT) 0.4

## 2019-01-01 LAB
T3 FREE: 2.3
T4 FREE: 1.37
TSH SERPL DL<=0.05 MIU/L-ACNC: 0.04 UIU/ML

## 2019-01-11 RX ORDER — PANTOPRAZOLE SODIUM 40 MG/1
40 TABLET, DELAYED RELEASE ORAL NIGHTLY
Qty: 90 TABLET | Refills: 0 | Status: SHIPPED | OUTPATIENT
Start: 2019-01-11 | End: 2019-03-04 | Stop reason: SDUPTHER

## 2019-01-22 ENCOUNTER — TELEPHONE (OUTPATIENT)
Dept: FAMILY MEDICINE CLINIC | Age: 84
End: 2019-01-22

## 2019-01-22 ENCOUNTER — OFFICE VISIT (OUTPATIENT)
Dept: FAMILY MEDICINE CLINIC | Age: 84
End: 2019-01-22
Payer: MEDICARE

## 2019-01-22 VITALS
HEART RATE: 60 BPM | TEMPERATURE: 98.4 F | WEIGHT: 131.6 LBS | SYSTOLIC BLOOD PRESSURE: 118 MMHG | BODY MASS INDEX: 24.87 KG/M2 | DIASTOLIC BLOOD PRESSURE: 66 MMHG | OXYGEN SATURATION: 97 %

## 2019-01-22 DIAGNOSIS — R09.81 SINUS CONGESTION: ICD-10-CM

## 2019-01-22 DIAGNOSIS — R53.83 FATIGUE, UNSPECIFIED TYPE: Primary | ICD-10-CM

## 2019-01-22 DIAGNOSIS — I35.0 AORTIC VALVE STENOSIS, ETIOLOGY OF CARDIAC VALVE DISEASE UNSPECIFIED: ICD-10-CM

## 2019-01-22 PROBLEM — E55.9 VITAMIN D DEFICIENCY: Status: ACTIVE | Noted: 2019-01-22

## 2019-01-22 PROBLEM — I10 HTN (HYPERTENSION): Status: ACTIVE | Noted: 2019-01-22

## 2019-01-22 PROBLEM — E53.8 VITAMIN B12 DEFICIENCY: Status: ACTIVE | Noted: 2019-01-22

## 2019-01-22 LAB
A/G RATIO: 2 (ref 1.1–2.2)
ALBUMIN SERPL-MCNC: 4.3 G/DL (ref 3.4–5)
ALP BLD-CCNC: 56 U/L (ref 40–129)
ALT SERPL-CCNC: 14 U/L (ref 10–40)
ANION GAP SERPL CALCULATED.3IONS-SCNC: 16 MMOL/L (ref 3–16)
AST SERPL-CCNC: 19 U/L (ref 15–37)
BASOPHILS ABSOLUTE: 0 K/UL (ref 0–0.2)
BASOPHILS RELATIVE PERCENT: 0.4 %
BILIRUB SERPL-MCNC: 0.6 MG/DL (ref 0–1)
BILIRUBIN, POC: NORMAL
BLOOD URINE, POC: NORMAL
BUN BLDV-MCNC: 16 MG/DL (ref 7–20)
CALCIUM SERPL-MCNC: 9.3 MG/DL (ref 8.3–10.6)
CHLORIDE BLD-SCNC: 102 MMOL/L (ref 99–110)
CLARITY, POC: CLEAR
CO2: 22 MMOL/L (ref 21–32)
COLOR, POC: YELLOW
CREAT SERPL-MCNC: 0.6 MG/DL (ref 0.6–1.2)
EOSINOPHILS ABSOLUTE: 0.1 K/UL (ref 0–0.6)
EOSINOPHILS RELATIVE PERCENT: 1.1 %
GFR AFRICAN AMERICAN: >60
GFR NON-AFRICAN AMERICAN: >60
GLOBULIN: 2.1 G/DL
GLUCOSE BLD-MCNC: 95 MG/DL (ref 70–99)
GLUCOSE URINE, POC: NORMAL
HCT VFR BLD CALC: 40.7 % (ref 36–48)
HEMOGLOBIN: 13.7 G/DL (ref 12–16)
KETONES, POC: NORMAL
LEUKOCYTE EST, POC: NORMAL
LYMPHOCYTES ABSOLUTE: 2.8 K/UL (ref 1–5.1)
LYMPHOCYTES RELATIVE PERCENT: 31.7 %
MCH RBC QN AUTO: 32.2 PG (ref 26–34)
MCHC RBC AUTO-ENTMCNC: 33.6 G/DL (ref 31–36)
MCV RBC AUTO: 95.9 FL (ref 80–100)
MONOCYTES ABSOLUTE: 0.6 K/UL (ref 0–1.3)
MONOCYTES RELATIVE PERCENT: 6.9 %
NEUTROPHILS ABSOLUTE: 5.2 K/UL (ref 1.7–7.7)
NEUTROPHILS RELATIVE PERCENT: 59.9 %
NITRITE, POC: NORMAL
PDW BLD-RTO: 15.4 % (ref 12.4–15.4)
PH, POC: 5.5
PLATELET # BLD: 322 K/UL (ref 135–450)
PMV BLD AUTO: 8.9 FL (ref 5–10.5)
POTASSIUM SERPL-SCNC: 4.9 MMOL/L (ref 3.5–5.1)
PROTEIN, POC: NORMAL
RBC # BLD: 4.25 M/UL (ref 4–5.2)
SODIUM BLD-SCNC: 140 MMOL/L (ref 136–145)
SPECIFIC GRAVITY, POC: 1.01
TOTAL PROTEIN: 6.4 G/DL (ref 6.4–8.2)
UROBILINOGEN, POC: 0.2
WBC # BLD: 8.8 K/UL (ref 4–11)

## 2019-01-22 PROCEDURE — 99214 OFFICE O/P EST MOD 30 MIN: CPT | Performed by: FAMILY MEDICINE

## 2019-01-22 PROCEDURE — 81002 URINALYSIS NONAUTO W/O SCOPE: CPT | Performed by: FAMILY MEDICINE

## 2019-01-22 RX ORDER — GUAIFENESIN 600 MG/1
600 TABLET, EXTENDED RELEASE ORAL 2 TIMES DAILY
Qty: 60 TABLET | Refills: 2 | Status: SHIPPED | OUTPATIENT
Start: 2019-01-22 | End: 2019-05-06

## 2019-01-22 ASSESSMENT — ENCOUNTER SYMPTOMS
SORE THROAT: 0
COUGH: 0
VOMITING: 0
NAUSEA: 0

## 2019-02-07 ENCOUNTER — OFFICE VISIT (OUTPATIENT)
Dept: FAMILY MEDICINE CLINIC | Age: 84
End: 2019-02-07
Payer: MEDICARE

## 2019-02-07 VITALS
OXYGEN SATURATION: 96 % | SYSTOLIC BLOOD PRESSURE: 140 MMHG | WEIGHT: 134.2 LBS | DIASTOLIC BLOOD PRESSURE: 58 MMHG | TEMPERATURE: 98.7 F | HEART RATE: 63 BPM | BODY MASS INDEX: 25.34 KG/M2 | HEIGHT: 61 IN

## 2019-02-07 DIAGNOSIS — R42 DIZZINESS: ICD-10-CM

## 2019-02-07 DIAGNOSIS — K14.3 BLACK TONGUE: ICD-10-CM

## 2019-02-07 DIAGNOSIS — K14.9 TONGUE IRRITATION: Primary | ICD-10-CM

## 2019-02-07 DIAGNOSIS — H53.9 VISUAL DISTURBANCE: ICD-10-CM

## 2019-02-07 PROCEDURE — G8510 SCR DEP NEG, NO PLAN REQD: HCPCS | Performed by: NURSE PRACTITIONER

## 2019-02-07 PROCEDURE — 99213 OFFICE O/P EST LOW 20 MIN: CPT | Performed by: NURSE PRACTITIONER

## 2019-02-07 RX ORDER — AMOXICILLIN 250 MG
1 CAPSULE ORAL DAILY
COMMUNITY
End: 2022-06-15

## 2019-02-07 RX ORDER — LANOLIN ALCOHOL/MO/W.PET/CERES
1000 CREAM (GRAM) TOPICAL DAILY
COMMUNITY

## 2019-02-07 RX ORDER — LEVOTHYROXINE SODIUM 0.07 MG/1
1 TABLET ORAL DAILY
Status: ON HOLD | COMMUNITY
Start: 2019-01-02 | End: 2019-09-21 | Stop reason: HOSPADM

## 2019-02-07 ASSESSMENT — ENCOUNTER SYMPTOMS
COUGH: 0
SHORTNESS OF BREATH: 0
SORE THROAT: 0
WHEEZING: 0
NAUSEA: 0
ABDOMINAL PAIN: 0
SWOLLEN GLANDS: 0
DIARRHEA: 1

## 2019-02-07 ASSESSMENT — PATIENT HEALTH QUESTIONNAIRE - PHQ9
SUM OF ALL RESPONSES TO PHQ9 QUESTIONS 1 & 2: 0
2. FEELING DOWN, DEPRESSED OR HOPELESS: 0
SUM OF ALL RESPONSES TO PHQ QUESTIONS 1-9: 0
1. LITTLE INTEREST OR PLEASURE IN DOING THINGS: 0
SUM OF ALL RESPONSES TO PHQ QUESTIONS 1-9: 0

## 2019-02-11 ENCOUNTER — OFFICE VISIT (OUTPATIENT)
Dept: CARDIOLOGY CLINIC | Age: 84
End: 2019-02-11
Payer: MEDICARE

## 2019-02-11 VITALS
DIASTOLIC BLOOD PRESSURE: 70 MMHG | HEART RATE: 60 BPM | SYSTOLIC BLOOD PRESSURE: 120 MMHG | BODY MASS INDEX: 25.75 KG/M2 | HEIGHT: 61 IN | WEIGHT: 136.4 LBS

## 2019-02-11 DIAGNOSIS — I10 ESSENTIAL HYPERTENSION: ICD-10-CM

## 2019-02-11 DIAGNOSIS — R01.1 MURMUR: Primary | ICD-10-CM

## 2019-02-11 PROCEDURE — 99213 OFFICE O/P EST LOW 20 MIN: CPT | Performed by: INTERNAL MEDICINE

## 2019-02-13 ENCOUNTER — TELEPHONE (OUTPATIENT)
Dept: CARDIOLOGY CLINIC | Age: 84
End: 2019-02-13

## 2019-03-04 ENCOUNTER — OFFICE VISIT (OUTPATIENT)
Dept: FAMILY MEDICINE CLINIC | Age: 84
End: 2019-03-04
Payer: MEDICARE

## 2019-03-04 VITALS
SYSTOLIC BLOOD PRESSURE: 128 MMHG | WEIGHT: 136 LBS | BODY MASS INDEX: 25.7 KG/M2 | HEART RATE: 81 BPM | DIASTOLIC BLOOD PRESSURE: 82 MMHG | TEMPERATURE: 97.1 F | OXYGEN SATURATION: 96 %

## 2019-03-04 DIAGNOSIS — M81.0 AGE-RELATED OSTEOPOROSIS WITHOUT CURRENT PATHOLOGICAL FRACTURE: ICD-10-CM

## 2019-03-04 DIAGNOSIS — K21.9 GASTROESOPHAGEAL REFLUX DISEASE, ESOPHAGITIS PRESENCE NOT SPECIFIED: ICD-10-CM

## 2019-03-04 DIAGNOSIS — D64.9 ANEMIA, UNSPECIFIED TYPE: ICD-10-CM

## 2019-03-04 DIAGNOSIS — E03.9 ACQUIRED HYPOTHYROIDISM: ICD-10-CM

## 2019-03-04 DIAGNOSIS — J44.9 CHRONIC OBSTRUCTIVE PULMONARY DISEASE, UNSPECIFIED COPD TYPE (HCC): Primary | ICD-10-CM

## 2019-03-04 DIAGNOSIS — E53.8 VITAMIN B12 DEFICIENCY: ICD-10-CM

## 2019-03-04 DIAGNOSIS — E55.9 VITAMIN D DEFICIENCY: ICD-10-CM

## 2019-03-04 PROCEDURE — 36415 COLL VENOUS BLD VENIPUNCTURE: CPT | Performed by: FAMILY MEDICINE

## 2019-03-04 PROCEDURE — 99214 OFFICE O/P EST MOD 30 MIN: CPT | Performed by: FAMILY MEDICINE

## 2019-03-04 RX ORDER — PANTOPRAZOLE SODIUM 40 MG/1
40 TABLET, DELAYED RELEASE ORAL NIGHTLY
Qty: 90 TABLET | Refills: 3 | Status: ON HOLD | OUTPATIENT
Start: 2019-03-04 | End: 2019-09-21 | Stop reason: HOSPADM

## 2019-03-04 ASSESSMENT — ENCOUNTER SYMPTOMS
COUGH: 1
SHORTNESS OF BREATH: 1

## 2019-03-04 ASSESSMENT — COPD QUESTIONNAIRES: COPD: 1

## 2019-03-05 LAB — VITAMIN D 25-HYDROXY: 39 NG/ML

## 2019-03-14 ENCOUNTER — TELEPHONE (OUTPATIENT)
Dept: FAMILY MEDICINE CLINIC | Age: 84
End: 2019-03-14

## 2019-03-15 RX ORDER — ONDANSETRON 4 MG/1
4 TABLET, FILM COATED ORAL EVERY 8 HOURS PRN
Qty: 30 TABLET | Refills: 0 | Status: SHIPPED | OUTPATIENT
Start: 2019-03-15 | End: 2019-06-05 | Stop reason: SDUPTHER

## 2019-03-18 ENCOUNTER — HOSPITAL ENCOUNTER (OUTPATIENT)
Age: 84
Setting detail: SPECIMEN
Discharge: HOME | End: 2019-03-18

## 2019-03-18 LAB
ABO/RH: NORMAL
ANTIBODY SCREEN: NEGATIVE
COMMENT: NORMAL

## 2019-03-18 PROCEDURE — 86900 BLOOD TYPING SEROLOGIC ABO: CPT

## 2019-03-18 PROCEDURE — 86850 RBC ANTIBODY SCREEN: CPT

## 2019-03-18 PROCEDURE — 86901 BLOOD TYPING SEROLOGIC RH(D): CPT

## 2019-04-03 ENCOUNTER — OFFICE VISIT (OUTPATIENT)
Dept: FAMILY MEDICINE CLINIC | Age: 84
End: 2019-04-03
Payer: MEDICARE

## 2019-04-03 VITALS
TEMPERATURE: 96.8 F | SYSTOLIC BLOOD PRESSURE: 110 MMHG | DIASTOLIC BLOOD PRESSURE: 60 MMHG | OXYGEN SATURATION: 96 % | WEIGHT: 132.4 LBS | BODY MASS INDEX: 25.02 KG/M2 | HEART RATE: 80 BPM

## 2019-04-03 DIAGNOSIS — T14.8XXA HEMATOMA: ICD-10-CM

## 2019-04-03 DIAGNOSIS — R68.89 DOES NOT FEEL RIGHT: ICD-10-CM

## 2019-04-03 DIAGNOSIS — Z96.611 S/P SHOULDER REPLACEMENT, RIGHT: ICD-10-CM

## 2019-04-03 DIAGNOSIS — Z86.2 HISTORY OF ANEMIA: ICD-10-CM

## 2019-04-03 DIAGNOSIS — R42 DIZZINESS: Primary | ICD-10-CM

## 2019-04-03 LAB
BILIRUBIN, POC: NORMAL
BLOOD URINE, POC: NORMAL
CLARITY, POC: CLEAR
COLOR, POC: YELLOW
GLUCOSE URINE, POC: NORMAL
KETONES, POC: NORMAL
LEUKOCYTE EST, POC: NORMAL
NITRITE, POC: NORMAL
PH, POC: 5.5
PROTEIN, POC: NORMAL
SPECIFIC GRAVITY, POC: 1.02
UROBILINOGEN, POC: 4

## 2019-04-03 PROCEDURE — 99214 OFFICE O/P EST MOD 30 MIN: CPT | Performed by: FAMILY MEDICINE

## 2019-04-03 PROCEDURE — 36415 COLL VENOUS BLD VENIPUNCTURE: CPT | Performed by: FAMILY MEDICINE

## 2019-04-03 PROCEDURE — 81002 URINALYSIS NONAUTO W/O SCOPE: CPT | Performed by: FAMILY MEDICINE

## 2019-04-03 ASSESSMENT — ENCOUNTER SYMPTOMS
VOMITING: 0
COUGH: 0
DIARRHEA: 0
SHORTNESS OF BREATH: 0

## 2019-04-03 NOTE — PROGRESS NOTES
Subjective:      Patient ID: Oniel Vilchis is a 80 y.o. female. January Tamayo is here with not feeling well and being dizzy since her right shoulder replacement. Yesterday, she noticed increased swelling and warmth at the surgical site as well. Dizzy/not feeling well  When she went to have her staples out after her surgery and have her shoulder x-rayed, she got lightheaded and flushed. Since that time she's not felt well. The PDA of her orthopedic surgeon suggested she get her hemoglobin checked. That was on the Monday. Her surgery was 2 weeks ago. She does have a history of anemia after surgeries. Her symptoms are present most of the time. Nothing necessarily makes them better or worse. She denies fevers, chills, cough, shortness of breath, dysuria, hematuria, vomiting, diarrhea or any other signs of infection. Surgical site changes  Yesterday she noticed increased swelling and warmth of her surgical site. She's had significant bruising there is since the time of surgery but this seems to be more. There is no drainage. It is mildly tender to touch. She talk to her daughter who is a nurse who recommended she call her orthopedic surgeon. She is yet to do that. Review of Systems   Constitutional: Negative for chills and fever. Respiratory: Negative for cough and shortness of breath. Gastrointestinal: Negative for diarrhea and vomiting. Neurological: Positive for dizziness and light-headedness.      Past Medical History:   Diagnosis Date    Arthritis     generalized    Asthma     Blood transfusion 2004    No reaction    Chronic bronchitis (HCC)     [facility]     [facility]    Fatigue     H/O cardiac catheterization 06/15/2017    mild lad and cx disease    H/O Doppler ultrasound 4/7/2016 3/19/12    carotid-4/16 WNL 3/12right mild less than 50%and left wnl    H/O Doppler ultrasound 6/14/017    carotid - mod disease EILEEN, mild disease LICA    H/O echocardiogram 7/23/10    H/O echocardiogram 4/15/2016    EF 60% sclerotic AV mildly stenosed-recommend yearly echo    H/O echocardiogram 06/13/2017    EF>55% mild-mod AS, triavial AR    H/O echocardiogram 09/14/2018    EF55-60% mod AS- mean pressure gradient increased from 13-31    H/O exercise stress test 06/14/2017    abnormal    History of nuclear stress test 03/14/2017    EF 75%. Normal study.     Holter monitor, abnormal 2/22/11    infrequent APC are seen    Normal cardiac stress test 7/23/10    EF 70%, no ischemia    On home O2     only uses as needed, nightly prn    Syncope and collapse     Tachycardia     HX of tachycardia - had a cardioablation    Thyroid disease      Past Surgical History:   Procedure Laterality Date    BREAST SURGERY  2009    bilat    CARDIAC CATHETERIZATION  3/02/11    mild to moderate disease of diagonal and proximal RCA   [address]    ablation    CARPAL TUNNEL RELEASE  1990's    bilat   [facility]    open choley    COLONOSCOPY      DILATATION, ESOPHAGUS      ENDOSCOPY, COLON, DIAGNOSTIC  07/03/2017    s/p gastric bypass otherwise normal    FINGER SURGERY      right middle, thumb and index finger (screw and pin in right index finger)    GASTRIC RESTRICTION SURGERY  1984    stapled    HERNIA REPAIR  unknown    Inc hernia hernia repair    HIP FRACTURE SURGERY Left 11/04/2015    Left hip nail    HYSTERECTOMY  1966    Luke w/ BSO    JOINT REPLACEMENT  2004    right knee    LIPECTOMY  1990's    OTHER SURGICAL HISTORY  5-1-2012    Gastrojejunostomy/partial gastrectomy    SHOULDER ARTHROPLASTY Left 10/2017    TOE SURGERY  Early 2000's    hammer toes and bunions     Social History     Socioeconomic History    Marital status:      Spouse name: Not on file    Number of children: Not on file    Years of education: Not on file    Highest education level: Not on file   Occupational History    Not on file   Social Needs    Financial resource strain: Not on file    Food insecurity:     Worry: Not on file     Inability: Not on file    Transportation needs:     Medical: Not on file     Non-medical: Not on file   Tobacco Use    Smoking status: Former Smoker     Packs/day: 3.00     Years: 5.00     Pack years: 15.00     Last attempt to quit: 1962     Years since quittin.2    Smokeless tobacco: Never Used   Substance and Sexual Activity    Alcohol use: No    Drug use: No    Sexual activity: Not on file   Lifestyle    Physical activity:     Days per week: Not on file     Minutes per session: Not on file    Stress: Not on file   Relationships    Social connections:     Talks on phone: Not on file     Gets together: Not on file     Attends Baptism service: Not on file     Active member of club or organization: Not on file     Attends meetings of clubs or organizations: Not on file     Relationship status: Not on file    Intimate partner violence:     Fear of current or ex partner: Not on file     Emotionally abused: Not on file     Physically abused: Not on file     Forced sexual activity: Not on file   Other Topics Concern    Not on file   Social History Narrative    Not on file     Family History   Problem Relation Age of Onset    Diabetes Mother    [facility] Other Mother         thyroid    Heart Disease Father     Arthritis Father     High Blood Pressure Father     High Cholesterol Father     Other Father         glaucoma    Other Sister         thyroid, glaucoma    Diabetes Brother     Other Sister     Vision Loss Sister         lung problems, smoker    Cancer Sister         throat cancer    Other Son         HX of clots    Early Death Son         Hit by car and killed.  High Blood Pressure Daughter     Stroke Son         No residual    Other Son         HX myocarditis         Objective:   Physical Exam   Constitutional: She is oriented to person, place, and time. She appears well-developed and well-nourished.  No

## 2019-04-04 LAB
BASOPHILS ABSOLUTE: 0 K/UL (ref 0–0.2)
BASOPHILS RELATIVE PERCENT: 0.7 %
EOSINOPHILS ABSOLUTE: 0.3 K/UL (ref 0–0.6)
EOSINOPHILS RELATIVE PERCENT: 4.3 %
HCT VFR BLD CALC: 35 % (ref 36–48)
HEMOGLOBIN: 11.6 G/DL (ref 12–16)
LYMPHOCYTES ABSOLUTE: 2.1 K/UL (ref 1–5.1)
LYMPHOCYTES RELATIVE PERCENT: 30.4 %
MCH RBC QN AUTO: 32.5 PG (ref 26–34)
MCHC RBC AUTO-ENTMCNC: 33.1 G/DL (ref 31–36)
MCV RBC AUTO: 98.1 FL (ref 80–100)
MONOCYTES ABSOLUTE: 0.6 K/UL (ref 0–1.3)
MONOCYTES RELATIVE PERCENT: 8.2 %
NEUTROPHILS ABSOLUTE: 3.9 K/UL (ref 1.7–7.7)
NEUTROPHILS RELATIVE PERCENT: 56.4 %
PDW BLD-RTO: 14.9 % (ref 12.4–15.4)
PLATELET # BLD: 490 K/UL (ref 135–450)
PMV BLD AUTO: 7.7 FL (ref 5–10.5)
RBC # BLD: 3.57 M/UL (ref 4–5.2)
WBC # BLD: 7 K/UL (ref 4–11)

## 2019-04-10 ENCOUNTER — TELEPHONE (OUTPATIENT)
Dept: PHARMACY | Facility: CLINIC | Age: 84
End: 2019-04-10

## 2019-04-10 DIAGNOSIS — Z79.899 ENCOUNTER FOR MEDICATION REVIEW: Primary | ICD-10-CM

## 2019-04-10 PROCEDURE — 1111F DSCHRG MED/CURRENT MED MERGE: CPT | Performed by: FAMILY MEDICINE

## 2019-04-11 RX ORDER — TRAMADOL HYDROCHLORIDE 50 MG/1
1 TABLET ORAL 4 TIMES DAILY PRN
COMMUNITY
Start: 2019-04-10 | End: 2019-06-05

## 2019-04-11 NOTE — TELEPHONE ENCOUNTER
CLINICAL PHARMACY NOTE  Post-Discharge Transitions of Care (JIN)    Subjective/Objective:  Patient outreach to review discharge medications and provide medication review and management. Spoke with patient. States hematoma slowly improving. Allergies   Allergen Reactions    Morphine Anaphylaxis     Dilaudid OK    Bactrim [Sulfamethoxazole-Trimethoprim]     Butorphanol      Other reaction(s): Other - comment required  \"out of body experience\"    Influenza Vaccines      Ended in hospital stay for 3 days told by md not to get it anymore     Lactose Intolerance (Gi) Nausea Only    Phenergan [Promethazine Hcl] Other (See Comments)     Makes me jerk all over. Zofran OK    Promethazine     Stadol [Butorphanol Tartrate] Other (See Comments)     Out of body experience. Was told it was a near death experience and was placed in ICU. Dilaudid OK    Tape Rubina Barry Tape] Other (See Comments)     Plastic cause itching and rash. Paper tape causes my skin to blister.  (Tega-derm and Coban OK to use.)     Current Outpatient Medications   Medication Sig Dispense Refill    ondansetron (ZOFRAN) 4 MG tablet Take 1 tablet by mouth every 8 hours as needed for Nausea 30 tablet 0    pantoprazole (PROTONIX) 40 MG tablet Take 1 tablet by mouth nightly 90 tablet 3    levothyroxine (SYNTHROID) 75 MCG tablet Take 1 tablet by mouth daily      vitamin B-12 (CYANOCOBALAMIN) 1000 MCG tablet Take 1,000 mcg by mouth daily      senna-docusate (PERICOLACE) 8.6-50 MG per tablet Take 1 tablet by mouth daily      guaiFENesin (MUCINEX) 600 MG extended release tablet Take 1 tablet by mouth 2 times daily 60 tablet 2    sucralfate (CARAFATE) 1 GM tablet Take 1 tablet by mouth 4 times daily 120 tablet 5    glycopyrrolate-formoterol (BEVESPI AEROSPHERE) 9-4.8 MCG/ACT AERO Inhale 2 puffs into the lungs 2 times daily 3 Inhaler 3    methocarbamol (ROBAXIN) 500 MG tablet Take 1 tablet by mouth 4 times daily as needed      timolol (BETIMOL)

## 2019-05-06 ENCOUNTER — OFFICE VISIT (OUTPATIENT)
Dept: FAMILY MEDICINE CLINIC | Age: 84
End: 2019-05-06
Payer: MEDICARE

## 2019-05-06 VITALS
TEMPERATURE: 98.5 F | SYSTOLIC BLOOD PRESSURE: 130 MMHG | OXYGEN SATURATION: 96 % | BODY MASS INDEX: 24.6 KG/M2 | HEART RATE: 63 BPM | DIASTOLIC BLOOD PRESSURE: 72 MMHG | WEIGHT: 130.2 LBS

## 2019-05-06 DIAGNOSIS — R45.89 DEPRESSED MOOD: Primary | ICD-10-CM

## 2019-05-06 PROCEDURE — 99213 OFFICE O/P EST LOW 20 MIN: CPT | Performed by: FAMILY MEDICINE

## 2019-05-06 RX ORDER — METHOCARBAMOL 500 MG/1
500 TABLET, FILM COATED ORAL 4 TIMES DAILY PRN
Qty: 60 TABLET | Refills: 0 | Status: SHIPPED | OUTPATIENT
Start: 2019-05-06 | End: 2019-06-05 | Stop reason: SDUPTHER

## 2019-05-06 RX ORDER — ESCITALOPRAM OXALATE 10 MG/1
10 TABLET ORAL DAILY
Qty: 30 TABLET | Refills: 0 | Status: SHIPPED | OUTPATIENT
Start: 2019-05-06 | End: 2019-06-05 | Stop reason: SDUPTHER

## 2019-05-06 NOTE — PROGRESS NOTES
use: No    Sexual activity: Not on file   Lifestyle    Physical activity:     Days per week: Not on file     Minutes per session: Not on file    Stress: Not on file   Relationships    Social connections:     Talks on phone: Not on file     Gets together: Not on file     Attends Worship service: Not on file     Active member of club or organization: Not on file     Attends meetings of clubs or organizations: Not on file     Relationship status: Not on file    Intimate partner violence:     Fear of current or ex partner: Not on file     Emotionally abused: Not on file     Physically abused: Not on file     Forced sexual activity: Not on file   Other Topics Concern    Not on file   Social History Narrative    Not on file         Objective:   Physical Exam   Constitutional: She is oriented to person, place, and time. She appears well-developed and well-nourished. No distress. Neurological: She is alert and oriented to person, place, and time. Psychiatric: Her speech is normal and behavior is normal. She exhibits a depressed mood. Nursing note and vitals reviewed. Assessment:       Diagnosis Orders   1. Depressed mood             Plan:      1. Start Lexapro 10 mg daily. She was advised to allow for 6 weeks to reach maximal benefit. Rest, take care of herself. Refill baclofen for her leg pains/spasm    Follow-up one month: Depression/anxiety      Current Outpatient Medications:     methocarbamol (ROBAXIN) 500 MG tablet, Take 1 tablet by mouth 4 times daily as needed (muscle spasm), Disp: 60 tablet, Rfl: 0    escitalopram (LEXAPRO) 10 MG tablet, Take 1 tablet by mouth daily, Disp: 30 tablet, Rfl: 0    traMADol (ULTRAM) 50 MG tablet, Take 1 tablet by mouth 4 times daily as needed. , Disp: , Rfl:     ondansetron (ZOFRAN) 4 MG tablet, Take 1 tablet by mouth every 8 hours as needed for Nausea, Disp: 30 tablet, Rfl: 0    pantoprazole (PROTONIX) 40 MG tablet, Take 1 tablet by mouth nightly, Disp: 90 tablet, Rfl: 3    levothyroxine (SYNTHROID) 75 MCG tablet, Take 1 tablet by mouth daily, Disp: , Rfl:     vitamin B-12 (CYANOCOBALAMIN) 1000 MCG tablet, Take 1,000 mcg by mouth daily, Disp: , Rfl:     senna-docusate (PERICOLACE) 8.6-50 MG per tablet, Take 1 tablet by mouth daily as needed , Disp: , Rfl:     sucralfate (CARAFATE) 1 GM tablet, Take 1 tablet by mouth 4 times daily, Disp: 120 tablet, Rfl: 5    glycopyrrolate-formoterol (BEVESPI AEROSPHERE) 9-4.8 MCG/ACT AERO, Inhale 2 puffs into the lungs 2 times daily, Disp: 3 Inhaler, Rfl: 3    timolol (BETIMOL) 0.5 % ophthalmic solution, 1 drop 2 times daily, Disp: , Rfl:     L-Arginine 500 MG TABS, Take 1 tablet by mouth 2 times daily, Disp: , Rfl:     Calcium-Magnesium-Zinc 167-83-8 MG TABS, Take 1 tablet by mouth daily, Disp: , Rfl:     lisinopril (PRINIVIL;ZESTRIL) 5 MG tablet, TAKE ONE TABLET BY MOUTH ONCE DAILY, Disp: 90 tablet, Rfl: 3    OXYGEN, Inhale 2 L into the lungs nightly as needed (uses as needed nightly) , Disp: , Rfl:     Cholecalciferol (VITAMIN D3) 2000 UNITS CAPS, Take 1 capsule by mouth nightly , Disp: , Rfl:     albuterol (PROVENTIL HFA;VENTOLIN HFA) 108 (90 BASE) MCG/ACT inhaler, Inhale 2 puffs into the lungs every 6 hours as needed for Wheezing., Disp: , Rfl:           James Montes MD

## 2019-06-05 ENCOUNTER — OFFICE VISIT (OUTPATIENT)
Dept: FAMILY MEDICINE CLINIC | Age: 84
End: 2019-06-05
Payer: MEDICARE

## 2019-06-05 VITALS
OXYGEN SATURATION: 98 % | HEART RATE: 54 BPM | BODY MASS INDEX: 25.47 KG/M2 | TEMPERATURE: 96.2 F | SYSTOLIC BLOOD PRESSURE: 118 MMHG | DIASTOLIC BLOOD PRESSURE: 68 MMHG | WEIGHT: 134.8 LBS

## 2019-06-05 DIAGNOSIS — R45.89 DEPRESSED MOOD: Primary | ICD-10-CM

## 2019-06-05 PROCEDURE — 99213 OFFICE O/P EST LOW 20 MIN: CPT | Performed by: FAMILY MEDICINE

## 2019-06-05 RX ORDER — ESCITALOPRAM OXALATE 10 MG/1
10 TABLET ORAL DAILY
Qty: 90 TABLET | Refills: 0 | Status: SHIPPED | OUTPATIENT
Start: 2019-06-05 | End: 2019-08-23 | Stop reason: SDUPTHER

## 2019-06-05 RX ORDER — METHOCARBAMOL 500 MG/1
500 TABLET, FILM COATED ORAL 4 TIMES DAILY PRN
Qty: 60 TABLET | Refills: 2 | Status: SHIPPED | OUTPATIENT
Start: 2019-06-05 | End: 2019-11-05 | Stop reason: SDUPTHER

## 2019-06-05 RX ORDER — ONDANSETRON 4 MG/1
4 TABLET, FILM COATED ORAL EVERY 8 HOURS PRN
Qty: 30 TABLET | Refills: 0 | Status: SHIPPED | OUTPATIENT
Start: 2019-06-05 | End: 2019-11-05

## 2019-06-05 ASSESSMENT — ENCOUNTER SYMPTOMS: SHORTNESS OF BREATH: 0

## 2019-06-05 NOTE — PROGRESS NOTES
Subjective:      Patient ID: Gypsy Conte is a 80 y.o. female. Olga Dunbar is here to follow up on her D&A. D&A  She was started on Lexapro at last visit. She has tolerated this quite well without side effect. She reports having significant improvement since her last visit. She feels that medication is doing its job and is at the right dose. Her life stressors mostly her 's health has not significantly changed. Review of Systems   Respiratory: Negative for shortness of breath. Cardiovascular: Negative for chest pain. Past Medical History:   Diagnosis Date    Arthritis     generalized    Asthma     Blood transfusion 2004    No reaction    Chronic bronchitis (HCC)     COPD (chronic obstructive pulmonary disease) ([facility] [address]     summer 2014    Fatigue     H/O cardiac catheterization 06/15/2017    mild lad and cx disease    H/O Doppler ultrasound 4/7/2016 3/19/12    carotid-4/16 WNL 3/12right mild less than 50%and left wnl    H/O Doppler ultrasound 6/14/017    carotid - mod disease EILEEN, mild disease LICA    H/O echocardiogram 7/23/10    H/O echocardiogram 4/15/2016    EF 60% sclerotic AV mildly stenosed-recommend yearly echo    H/O echocardiogram 06/13/2017    EF>55% mild-mod AS, triavial AR    H/O echocardiogram 09/14/2018    EF55-60% mod AS- mean pressure gradient increased from 13-31    H/O exercise stress test 06/14/2017    abnormal    History of nuclear stress test 03/14/2017    EF 75%. Normal study.     Holter monitor, abnormal 2/22/11    infrequent APC are seen    Normal cardiac stress test 7/23/10    EF 70%, no ischemia    On home O2     only uses as needed, nightly prn    Syncope and collapse     Tachycardia     HX of tachycardia - had a cardioablation    Thyroid disease      Past Surgical History:   Procedure Laterality Date    BREAST SURGERY  2009    bilat    CARDIAC CATHETERIZATION  3/02/11    mild to moderate disease of diagonal and proximal RCA    CARDIAC SURGERY     ablation    CARPAL TUNNEL RELEASE  1's    bilat    CHOLECYSTECTOMY      open choley    COLONOSCOPY      DILATATION, ESOPHAGUS      ENDOSCOPY, COLON, DIAGNOSTIC  2017    s/p gastric bypass otherwise normal    FINGER SURGERY      right middle, thumb and index finger (screw and pin in right index finger)    GASTRIC RESTRICTION SURGERY  1984    stapled    HERNIA REPAIR  unknown    Inc hernia hernia repair    HIP FRACTURE SURGERY Left 2015    Left hip nail    HYSTERECTOMY  1966    Luke w/ BSO    JOINT REPLACEMENT  2004    right knee    LIPECTOMY  's    OTHER SURGICAL HISTORY  2012    Gastrojejunostomy/partial gastrectomy    SHOULDER ARTHROPLASTY Left 10/2017    TOE SURGERY  Early 's    hammer toes and bunions     Social History     Socioeconomic History    Marital status:      Spouse name: Not on file    Number of children: Not on file    Years of education: Not on file    Highest education level: Not on file   Occupational History    Not on file   Social Needs    Financial resource strain: Not on file    Food insecurity:     Worry: Not on file     Inability: Not on file    Transportation needs:     Medical: Not on file     Non-medical: Not on file   Tobacco Use    Smoking status: Former Smoker     Packs/day: 3.00     Years: 5.00     Pack years: 15.00     Last attempt to quit: 1962     Years since quittin.4    Smokeless tobacco: Never Used   Substance and Sexual Activity    Alcohol use: No    Drug use: No    Sexual activity: Not on file   Lifestyle    Physical activity:     Days per week: Not on file     Minutes per session: Not on file    Stress: Not on file   Relationships    Social connections:     Talks on phone: Not on file     Gets together: Not on file     Attends Protestant service: Not on file     Active member of club or organization: Not on file     Attends meetings of clubs or organizations: Not on file     Relationship status: Not on file    Intimate partner violence:     Fear of current or ex partner: Not on file     Emotionally abused: Not on file     Physically abused: Not on file     Forced sexual activity: Not on file   Other Topics Concern    Not on file   Social History Narrative    Not on file     Family History   Problem Relation Age of Onset    Diabetes Mother     Other Mother         thyroid    Heart Disease Father     Arthritis Father     High Blood Pressure Father     High Cholesterol Father     Other Father         glaucoma    Other Sister         thyroid, glaucoma    Diabetes Brother     Other Sister     Vision Loss Sister         lung problems, smoker    Cancer Sister         throat cancer    Other Son         HX of clots    Early Death Son         Hit by car and killed.  High Blood Pressure Daughter     Stroke Son         No residual    Other Son         HX myocarditis         Objective:   Physical Exam   Constitutional: She is oriented to person, place, and time. She appears well-developed and well-nourished. No distress. Cardiovascular: Normal rate and regular rhythm. Murmur heard. Pulmonary/Chest: Effort normal and breath sounds normal. She has no wheezes. She has no rales. Neurological: She is alert and oriented to person, place, and time. Psychiatric: She has a normal mood and affect. Nursing note and vitals reviewed. Assessment:       Diagnosis Orders   1. Depressed mood             Plan:      1. Good response to medication. Continue current dose. Reevaluate in 2 months to see if we need to continue therapy or not. Call with any problems or side effects.     Follow-up 2 months: Depression and anxiety      Current Outpatient Medications:     ondansetron (ZOFRAN) 4 MG tablet, Take 1 tablet by mouth every 8 hours as needed for Nausea, Disp: 30 tablet, Rfl: 0    methocarbamol (ROBAXIN) 500 MG tablet, Take 1 tablet by mouth 4 times daily as needed (muscle spasm), Disp: 60 tablet, Rfl: 2    escitalopram (LEXAPRO) 10 MG tablet, Take 1 tablet by mouth daily, Disp: 90 tablet, Rfl: 0    pantoprazole (PROTONIX) 40 MG tablet, Take 1 tablet by mouth nightly, Disp: 90 tablet, Rfl: 3    levothyroxine (SYNTHROID) 75 MCG tablet, Take 1 tablet by mouth daily, Disp: , Rfl:     vitamin B-12 (CYANOCOBALAMIN) 1000 MCG tablet, Take 1,000 mcg by mouth daily, Disp: , Rfl:     senna-docusate (PERICOLACE) 8.6-50 MG per tablet, Take 1 tablet by mouth daily as needed , Disp: , Rfl:     sucralfate (CARAFATE) 1 GM tablet, Take 1 tablet by mouth 4 times daily, Disp: 120 tablet, Rfl: 5    timolol (BETIMOL) 0.5 % ophthalmic solution, 1 drop 2 times daily, Disp: , Rfl:     Calcium-Magnesium-Zinc 167-83-8 MG TABS, Take 1 tablet by mouth daily, Disp: , Rfl:     lisinopril (PRINIVIL;ZESTRIL) 5 MG tablet, TAKE ONE TABLET BY MOUTH ONCE DAILY, Disp: 90 tablet, Rfl: 3    OXYGEN, Inhale 2 L into the lungs nightly as needed (uses as needed nightly) , Disp: , Rfl:     Cholecalciferol (VITAMIN D3) 2000 UNITS CAPS, Take 1 capsule by mouth nightly , Disp: , Rfl:     albuterol (PROVENTIL HFA;VENTOLIN HFA) 108 (90 BASE) MCG/ACT inhaler, Inhale 2 puffs into the lungs every 6 hours as needed for Wheezing., Disp: , Rfl:     glycopyrrolate-formoterol (BEVESPI AEROSPHERE) 9-4.8 MCG/ACT AERO, Inhale 2 puffs into the lungs 2 times daily, Disp: 3 Inhaler, Rfl: 3    L-Arginine 500 MG TABS, Take 1 tablet by mouth 2 times daily, Disp: , Rfl:           Jessica Eisenberg MD

## 2019-07-23 LAB
ANION GAP SERPL CALCULATED.3IONS-SCNC: NORMAL MMOL/L
CHLORIDE BLD-SCNC: 106 MMOL/L
CO2: 25 MMOL/L
HEMOGLOBIN: 10.3 G/DL (ref 12–16)
POTASSIUM SERPL-SCNC: 4.9 MMOL/L
SODIUM BLD-SCNC: 143 MMOL/L

## 2019-08-05 ENCOUNTER — OFFICE VISIT (OUTPATIENT)
Dept: FAMILY MEDICINE CLINIC | Age: 84
End: 2019-08-05
Payer: MEDICARE

## 2019-08-05 VITALS
HEART RATE: 52 BPM | WEIGHT: 130 LBS | OXYGEN SATURATION: 97 % | DIASTOLIC BLOOD PRESSURE: 66 MMHG | BODY MASS INDEX: 24.56 KG/M2 | SYSTOLIC BLOOD PRESSURE: 128 MMHG | TEMPERATURE: 96.9 F

## 2019-08-05 DIAGNOSIS — R45.89 DEPRESSED MOOD: ICD-10-CM

## 2019-08-05 DIAGNOSIS — R68.83 CHILLS: ICD-10-CM

## 2019-08-05 DIAGNOSIS — R01.1 MURMUR: ICD-10-CM

## 2019-08-05 DIAGNOSIS — R53.83 FATIGUE, UNSPECIFIED TYPE: Primary | ICD-10-CM

## 2019-08-05 PROCEDURE — 36415 COLL VENOUS BLD VENIPUNCTURE: CPT | Performed by: FAMILY MEDICINE

## 2019-08-05 PROCEDURE — 99214 OFFICE O/P EST MOD 30 MIN: CPT | Performed by: FAMILY MEDICINE

## 2019-08-05 ASSESSMENT — ENCOUNTER SYMPTOMS: SHORTNESS OF BREATH: 1

## 2019-08-05 NOTE — PROGRESS NOTES
Subjective:      Patient ID: Garth Campbell is a 80 y.o. female. Oriana Diego is here to follow up on her D&A. She is also concerned with chills and fatigue/not feeling right. D&A  Lexapro is working well. No side effects. She would like to stay on the medication at this point. Chills  Started over the past 1-2 weeks. No fevers noted. She is cold all the time as well. She developed what she thought was a blood blister in her left buccal membrane - gets these from time to time. But she couldn't pop it like normal. So she looked at it and appeared to be puss filled. It popped on its own after 5 days. Not sure it is related to the chills or nor. Fatigue/not feeling right  Reports feeling tired all the time. She admits that her activity is less than in the past, but she also just doesn't feel well. Review of Systems   Respiratory: Positive for shortness of breath (at baseline). Cardiovascular: Negative for chest pain. Past Medical History:   Diagnosis Date    Arthritis     generalized    Asthma     Blood transfusion 2004    No reaction    Chronic bronchitis (HCC)     COPD (chronic obstructive pulmonary disease) ([facility] [address]     summer 2014    Fatigue     H/O cardiac catheterization 06/15/2017    mild lad and cx disease    H/O Doppler ultrasound 4/7/2016 3/19/12    carotid-4/16 WNL 3/12right mild less than 50%and left wnl    H/O Doppler ultrasound 6/14/017    carotid - mod disease EILEEN, mild disease LICA    H/O echocardiogram 7/23/10    H/O echocardiogram 4/15/2016    EF 60% sclerotic AV mildly stenosed-recommend yearly echo    H/O echocardiogram 06/13/2017    EF>55% mild-mod AS, triavial AR    H/O echocardiogram 09/14/2018    EF55-60% mod AS- mean pressure gradient increased from 13-31    H/O exercise stress test 06/14/2017    abnormal    History of nuclear stress test 03/14/2017    EF 75%. Normal study.     Holter monitor, abnormal 2/22/11    infrequent APC are seen    Normal cardiac stress test Physical activity:     Days per week: Not on file     Minutes per session: Not on file    Stress: Not on file   Relationships    Social connections:     Talks on phone: Not on file     Gets together: Not on file     Attends Mandaeism service: Not on file     Active member of club or organization: Not on file     Attends meetings of clubs or organizations: Not on file     Relationship status: Not on file    Intimate partner violence:     Fear of current or ex partner: Not on file     Emotionally abused: Not on file     Physically abused: Not on file     Forced sexual activity: Not on file   Other Topics Concern    Not on file   Social History Narrative    Not on file     Family History   Problem Relation Age of Onset    Diabetes Mother    Isabel Other Mother         thyroid    Heart Disease Father     Arthritis Father     High Blood Pressure Father     High Cholesterol Father     Other Father         glaucoma    Other Sister         thyroid, glaucoma    Diabetes Brother     Other Sister     Vision Loss Sister         lung problems, smoker    Cancer Sister         throat cancer    Other Son         HX of clots    Early Death Son         Hit by car and killed.  High Blood Pressure Daughter     Stroke Son         No residual    Other Son         HX myocarditis         Objective:   Physical Exam   Constitutional: She is oriented to person, place, and time. She appears well-developed and well-nourished. No distress. Cardiovascular: Normal rate and regular rhythm. Murmur (3/6) heard. Pulmonary/Chest: Effort normal and breath sounds normal. She has no wheezes. She has no rales. Abdominal: Bowel sounds are normal. She exhibits no mass. There is no tenderness. Musculoskeletal: She exhibits no edema. Neurological: She is alert and oriented to person, place, and time. Psychiatric: She has a normal mood and affect. Nursing note and vitals reviewed. Assessment:       Diagnosis Orders   1.

## 2019-08-06 LAB
BASOPHILS ABSOLUTE: 0 K/UL (ref 0–0.2)
BASOPHILS RELATIVE PERCENT: 0.5 %
EOSINOPHILS ABSOLUTE: 0.1 K/UL (ref 0–0.6)
EOSINOPHILS RELATIVE PERCENT: 2 %
HCT VFR BLD CALC: 36.4 % (ref 36–48)
HEMOGLOBIN: 11.9 G/DL (ref 12–16)
LYMPHOCYTES ABSOLUTE: 2.3 K/UL (ref 1–5.1)
LYMPHOCYTES RELATIVE PERCENT: 37.4 %
MCH RBC QN AUTO: 29.6 PG (ref 26–34)
MCHC RBC AUTO-ENTMCNC: 32.6 G/DL (ref 31–36)
MCV RBC AUTO: 90.7 FL (ref 80–100)
MONOCYTES ABSOLUTE: 0.4 K/UL (ref 0–1.3)
MONOCYTES RELATIVE PERCENT: 7.3 %
NEUTROPHILS ABSOLUTE: 3.2 K/UL (ref 1.7–7.7)
NEUTROPHILS RELATIVE PERCENT: 52.8 %
PDW BLD-RTO: 16.9 % (ref 12.4–15.4)
PLATELET # BLD: 296 K/UL (ref 135–450)
PMV BLD AUTO: 8.1 FL (ref 5–10.5)
RBC # BLD: 4.01 M/UL (ref 4–5.2)
WBC # BLD: 6.1 K/UL (ref 4–11)

## 2019-08-22 ENCOUNTER — OFFICE VISIT (OUTPATIENT)
Dept: CARDIOLOGY CLINIC | Age: 84
End: 2019-08-22
Payer: MEDICARE

## 2019-08-22 ENCOUNTER — PROCEDURE VISIT (OUTPATIENT)
Dept: CARDIOLOGY CLINIC | Age: 84
End: 2019-08-22
Payer: MEDICARE

## 2019-08-22 VITALS
HEIGHT: 61 IN | HEART RATE: 56 BPM | BODY MASS INDEX: 24.24 KG/M2 | SYSTOLIC BLOOD PRESSURE: 122 MMHG | DIASTOLIC BLOOD PRESSURE: 56 MMHG | WEIGHT: 128.4 LBS

## 2019-08-22 DIAGNOSIS — R07.9 CHEST PAIN, UNSPECIFIED TYPE: Primary | ICD-10-CM

## 2019-08-22 LAB
LV EF: 58 %
LVEF MODALITY: NORMAL

## 2019-08-22 PROCEDURE — 93306 TTE W/DOPPLER COMPLETE: CPT | Performed by: INTERNAL MEDICINE

## 2019-08-22 PROCEDURE — 99214 OFFICE O/P EST MOD 30 MIN: CPT | Performed by: INTERNAL MEDICINE

## 2019-08-22 PROCEDURE — 93000 ELECTROCARDIOGRAM COMPLETE: CPT | Performed by: INTERNAL MEDICINE

## 2019-08-22 NOTE — LETTER
[facility]     Dr. Castellano WITH POSSIBLE PERCUTANEOUS CORONARY INTERVENTION     Patient Name: Kanika Murillo   : 1935   MRN# [id number]    Date of Procedure: 19 Time: 12pm Arrival Time: 10am    The catheterization and angiogram are usually outpatient procedures, however if stenting is needed you will stay overnight. You will need to be at the hospital two hours before the procedure. You will need to arrange for someone to drive you home. You will go to registration in the main lobby. HOSPITAL:  [facility])  Call to Pre-Doyle at: [phone] 1-2 days before your procedure. Please have blood work and chest-x-ray done 1 to 2 days before procedure at    [facility]. X Please do not have anything by mouth after midnight prior to or 8 hours before the procedure. X You may take your medications with a sip of water in the morning before your procedure or  take them with you. .          Patient Signature:  _________________________ Staff Signature:  [facility]         Staff Given Instructions:_______________________________                                            [facility]    Dr. Raman Dempsey     Patient Name: Kanika Murillo   : 1935   MRN# [id number]    Date of Procedure: 19 Time: 12pm     DIAGNOSIS:   Z01.810    LEFT HEART CATHETERIZATION & RIGHT HEART CATHETERIZATION WITH POSSIBLE PERCUTANEOUS CORONARY INTERVENTION       X Chest x-Ray PA & Lateral View        X Type & Screen     X CBC  X BMP  X PT  X PTT            ? PLEASE CALL ABNORMAL RESULTS TO THE  PHYSICIAN? ATTENTION PATIENT: Pretesting is to be done before the cath. You do not have to fast for the lab work. You must go to the [facility] behind [facility] at [address] Karne Mcdonough. to have this lab work done. Full-code and receive CPR, intubation, chest compressions, medications, and/or other life saving measures, if I have a cardiac or respiratory arrest.    ___ I WANT to keep my DNR in effect during my procedure(s) and immediate post-operative recovery period through Phase 2 recovery. (Complete separate refusal form)     This form has been fully explained to me. I understand its contents. Patients Signature: ___________________________Date: ________  Time: ________    If patient unable to sign, has engaged the [address] [place] [address], is a minor, or has a court-appointed Guardian:  [facility] Representative Name (Print):  ____________________________________      Relationship (Cahto one):    Guardian   Parent    Spouse    HCPOA   Child   Sibling  Next-of-Kin Friend    Patients Representative Signature: _______________________________________              Date: ______________  Time: __________    An  was used.  name/ID: _________________________________      [facility] Witness________________________  Date: ________   Time: _________    Physician/Practitioner _______________________  Date: ________   Time: _________           Revision 2017                                              [facility]    Dr. Rich Lynn    PROCEDURE TO SCHEDULE:    LEFT HEART CATHETERIZATION & RIGHT HEART CATHETERIZATION WITH POSSIBLE PERCUTANEOUS CORONARY INTERVENTION     Patient Name: Vanessa Acosta   : 1935   MRN# [id number]    Home Phone Number: [phone]   Weight:    Wt Readings from Last 3 Encounters:   19 128 lb 6.4 oz (58.2 kg)   19 130 lb (59 kg)   19 134 lb 12.8 oz (61.1 kg)        Insurance: Payor: [employer] / Plan: [employer] PPO / Product Type: Medicare /     Date of Procedure: 19 Time: 12pm Arrival Time: 10am    Diagnosis:        Allergies:    Allergies   Allergen Reactions    Morphine Anaphylaxis     Dilaudid OK  Bactrim [Sulfamethoxazole-Trimethoprim]     Butorphanol      Other reaction(s): Other - comment required  \"out of body experience\"    Influenza Vaccines      Ended in hospital stay for 3 days told by md not to get it anymore     Lactose Intolerance (Gi) Nausea Only    Phenergan [Promethazine Hcl] Other (See Comments)     Makes me jerk all over. Zofran OK    Promethazine     Stadol [Butorphanol Tartrate] Other (See Comments)     Out of body experience. Was told it was a near death experience and was placed in ICU. Dilaudid OK    Tape [place] Tape] Other (See Comments)     Plastic cause itching and rash. Paper tape causes my skin to blister.  (Tega-derm and Coban OK to use.)        1) Call [address] [place] [address] scheduling ([phone]) or [address] [facility]     PHONE OR   INSTANT MESSAGE  2) PREAUTHORIZATION NUMBER:    Spoke to:      From date:     expiration date:        [employer]

## 2019-08-23 RX ORDER — ESCITALOPRAM OXALATE 10 MG/1
10 TABLET ORAL DAILY
Qty: 90 TABLET | Refills: 0 | Status: SHIPPED | OUTPATIENT
Start: 2019-08-23 | End: 2019-11-06 | Stop reason: SDUPTHER

## 2019-08-26 ENCOUNTER — HOSPITAL ENCOUNTER (OUTPATIENT)
Dept: GENERAL RADIOLOGY | Age: 84
Discharge: HOME | End: 2019-08-26
Payer: MEDICARE

## 2019-08-26 ENCOUNTER — HOSPITAL ENCOUNTER (OUTPATIENT)
Age: 84
Discharge: HOME | End: 2019-08-26
Payer: MEDICARE

## 2019-08-26 ENCOUNTER — TELEPHONE (OUTPATIENT)
Dept: CARDIOLOGY CLINIC | Age: 84
End: 2019-08-26

## 2019-08-26 DIAGNOSIS — Z01.811 PRE-OP CHEST EXAM: ICD-10-CM

## 2019-08-26 LAB
ABO/RH: NORMAL
ANION GAP SERPL CALCULATED.3IONS-SCNC: 10 MMOL/L (ref 4–16)
ANTIBODY SCREEN: NEGATIVE
APTT: 29.7 SECONDS (ref 21.2–33)
BASOPHILS ABSOLUTE: 0 K/CU MM
BASOPHILS RELATIVE PERCENT: 0.8 % (ref 0–1)
BUN BLDV-MCNC: 10 MG/DL (ref 6–23)
CALCIUM SERPL-MCNC: 9.4 MG/DL (ref 8.3–10.6)
CHLORIDE BLD-SCNC: 102 MMOL/L (ref 99–110)
CO2: 29 MMOL/L (ref 21–32)
COMMENT: NORMAL
CREAT SERPL-MCNC: 0.7 MG/DL (ref 0.6–1.1)
DIFFERENTIAL TYPE: ABNORMAL
EOSINOPHILS ABSOLUTE: 0.2 K/CU MM
EOSINOPHILS RELATIVE PERCENT: 4.1 % (ref 0–3)
GFR AFRICAN AMERICAN: >60 ML/MIN/1.73M2
GFR NON-AFRICAN AMERICAN: >60 ML/MIN/1.73M2
GLUCOSE BLD-MCNC: 94 MG/DL (ref 70–99)
HCT VFR BLD CALC: 37.9 % (ref 37–47)
HEMOGLOBIN: 11.5 GM/DL (ref 12.5–16)
IMMATURE NEUTROPHIL %: 0.2 % (ref 0–0.43)
INR BLD: 0.95 INDEX
LYMPHOCYTES ABSOLUTE: 2.3 K/CU MM
LYMPHOCYTES RELATIVE PERCENT: 44.4 % (ref 24–44)
MCH RBC QN AUTO: 28.5 PG (ref 27–31)
MCHC RBC AUTO-ENTMCNC: 30.3 % (ref 32–36)
MCV RBC AUTO: 93.8 FL (ref 78–100)
MONOCYTES ABSOLUTE: 0.5 K/CU MM
MONOCYTES RELATIVE PERCENT: 8.9 % (ref 0–4)
NUCLEATED RBC %: 0 %
PDW BLD-RTO: 15.9 % (ref 11.7–14.9)
PLATELET # BLD: 285 K/CU MM (ref 140–440)
PMV BLD AUTO: 9.9 FL (ref 7.5–11.1)
POTASSIUM SERPL-SCNC: 5 MMOL/L (ref 3.5–5.1)
PROTHROMBIN TIME: 10.8 SECONDS (ref 9.12–12.5)
RBC # BLD: 4.04 M/CU MM (ref 4.2–5.4)
SEGMENTED NEUTROPHILS ABSOLUTE COUNT: 2.1 K/CU MM
SEGMENTED NEUTROPHILS RELATIVE PERCENT: 41.6 % (ref 36–66)
SODIUM BLD-SCNC: 141 MMOL/L (ref 135–145)
TOTAL IMMATURE NEUTOROPHIL: 0.01 K/CU MM
TOTAL NUCLEATED RBC: 0 K/CU MM
WBC # BLD: 5.1 K/CU MM (ref 4–10.5)

## 2019-08-26 PROCEDURE — 85025 COMPLETE CBC W/AUTO DIFF WBC: CPT

## 2019-08-26 PROCEDURE — 85730 THROMBOPLASTIN TIME PARTIAL: CPT

## 2019-08-26 PROCEDURE — 71046 X-RAY EXAM CHEST 2 VIEWS: CPT

## 2019-08-26 PROCEDURE — 80048 BASIC METABOLIC PNL TOTAL CA: CPT

## 2019-08-26 PROCEDURE — 36415 COLL VENOUS BLD VENIPUNCTURE: CPT

## 2019-08-26 PROCEDURE — 86900 BLOOD TYPING SEROLOGIC ABO: CPT

## 2019-08-26 PROCEDURE — 85610 PROTHROMBIN TIME: CPT

## 2019-08-26 PROCEDURE — 86901 BLOOD TYPING SEROLOGIC RH(D): CPT

## 2019-08-26 PROCEDURE — 86850 RBC ANTIBODY SCREEN: CPT

## 2019-08-27 ENCOUNTER — TELEPHONE (OUTPATIENT)
Dept: CARDIOLOGY CLINIC | Age: 84
End: 2019-08-27

## 2019-08-28 ENCOUNTER — HOSPITAL ENCOUNTER (OUTPATIENT)
Dept: CARDIAC CATH/INVASIVE PROCEDURES | Age: 84
Discharge: HOME | End: 2019-08-29
Attending: INTERNAL MEDICINE | Admitting: INTERNAL MEDICINE
Payer: MEDICARE

## 2019-08-28 ENCOUNTER — APPOINTMENT (OUTPATIENT)
Dept: GENERAL RADIOLOGY | Age: 84
End: 2019-08-28
Attending: INTERNAL MEDICINE
Payer: MEDICARE

## 2019-08-28 PROBLEM — I35.0 AORTIC STENOSIS, SEVERE: Status: ACTIVE | Noted: 2019-08-28

## 2019-08-28 LAB
BASE EXCESS MIXED: ABNORMAL (ref 0–2.3)
CARBON MONOXIDE, BLOOD: 2 % (ref 0–5)
CO2 CONTENT: 30.5 MMOL/L (ref 19–24)
COMMENT: ABNORMAL
ESTIMATED AVERAGE GLUCOSE: 105 MG/DL
HBA1C MFR BLD: 5.3 % (ref 4.2–6.3)
HCO3 ARTERIAL: 29.1 MMOL/L (ref 18–23)
METHEMOGLOBIN ARTERIAL: 1 %
O2 SATURATION: 92.9 % (ref 96–97)
PCO2 ARTERIAL: 47 MMHG (ref 32–45)
PH BLOOD: 7.4 (ref 7.34–7.45)
PO2 ARTERIAL: 68 MMHG (ref 75–100)

## 2019-08-28 PROCEDURE — 6370000000 HC RX 637 (ALT 250 FOR IP): Performed by: INTERNAL MEDICINE

## 2019-08-28 PROCEDURE — 83036 HEMOGLOBIN GLYCOSYLATED A1C: CPT

## 2019-08-28 PROCEDURE — 2709999900 HC NON-CHARGEABLE SUPPLY

## 2019-08-28 PROCEDURE — C1894 INTRO/SHEATH, NON-LASER: HCPCS

## 2019-08-28 PROCEDURE — 93460 R&L HRT ART/VENTRICLE ANGIO: CPT | Performed by: INTERNAL MEDICINE

## 2019-08-28 PROCEDURE — 82803 BLOOD GASES ANY COMBINATION: CPT

## 2019-08-28 PROCEDURE — 6360000004 HC RX CONTRAST MEDICATION

## 2019-08-28 PROCEDURE — 6360000002 HC RX W HCPCS

## 2019-08-28 PROCEDURE — 71045 X-RAY EXAM CHEST 1 VIEW: CPT

## 2019-08-28 PROCEDURE — C1769 GUIDE WIRE: HCPCS

## 2019-08-28 PROCEDURE — 2500000003 HC RX 250 WO HCPCS

## 2019-08-28 PROCEDURE — 93460 R&L HRT ART/VENTRICLE ANGIO: CPT

## 2019-08-28 PROCEDURE — C1751 CATH, INF, PER/CENT/MIDLINE: HCPCS

## 2019-08-28 PROCEDURE — 94640 AIRWAY INHALATION TREATMENT: CPT

## 2019-08-28 RX ORDER — LANOLIN ALCOHOL/MO/W.PET/CERES
1000 CREAM (GRAM) TOPICAL DAILY
Status: DISCONTINUED | OUTPATIENT
Start: 2019-08-28 | End: 2019-08-29 | Stop reason: HOSPADM

## 2019-08-28 RX ORDER — ACETAMINOPHEN 325 MG/1
650 TABLET ORAL EVERY 4 HOURS PRN
Status: DISCONTINUED | OUTPATIENT
Start: 2019-08-28 | End: 2019-08-29 | Stop reason: HOSPADM

## 2019-08-28 RX ORDER — DIAZEPAM 5 MG/1
5 TABLET ORAL ONCE
Status: DISCONTINUED | OUTPATIENT
Start: 2019-08-28 | End: 2019-08-29 | Stop reason: HOSPADM

## 2019-08-28 RX ORDER — SODIUM CHLORIDE 0.9 % (FLUSH) 0.9 %
10 SYRINGE (ML) INJECTION PRN
Status: DISCONTINUED | OUTPATIENT
Start: 2019-08-28 | End: 2019-08-29 | Stop reason: HOSPADM

## 2019-08-28 RX ORDER — LEVOTHYROXINE SODIUM 0.07 MG/1
75 TABLET ORAL DAILY
Status: DISCONTINUED | OUTPATIENT
Start: 2019-08-28 | End: 2019-08-29 | Stop reason: HOSPADM

## 2019-08-28 RX ORDER — METHOCARBAMOL 500 MG/1
500 TABLET, FILM COATED ORAL 4 TIMES DAILY PRN
Status: DISCONTINUED | OUTPATIENT
Start: 2019-08-28 | End: 2019-08-29 | Stop reason: HOSPADM

## 2019-08-28 RX ORDER — IBUPROFEN 400 MG/1
400 TABLET ORAL EVERY 6 HOURS PRN
Status: DISCONTINUED | OUTPATIENT
Start: 2019-08-28 | End: 2019-08-29 | Stop reason: HOSPADM

## 2019-08-28 RX ORDER — LISINOPRIL 5 MG/1
5 TABLET ORAL DAILY
Status: DISCONTINUED | OUTPATIENT
Start: 2019-08-28 | End: 2019-08-29 | Stop reason: HOSPADM

## 2019-08-28 RX ORDER — DIPHENHYDRAMINE HCL 25 MG
25 TABLET ORAL ONCE
Status: DISCONTINUED | OUTPATIENT
Start: 2019-08-28 | End: 2019-08-29 | Stop reason: HOSPADM

## 2019-08-28 RX ORDER — PANTOPRAZOLE SODIUM 40 MG/1
40 TABLET, DELAYED RELEASE ORAL NIGHTLY
Status: DISCONTINUED | OUTPATIENT
Start: 2019-08-28 | End: 2019-08-29 | Stop reason: HOSPADM

## 2019-08-28 RX ORDER — SODIUM CHLORIDE 0.9 % (FLUSH) 0.9 %
10 SYRINGE (ML) INJECTION EVERY 12 HOURS SCHEDULED
Status: DISCONTINUED | OUTPATIENT
Start: 2019-08-28 | End: 2019-08-29 | Stop reason: HOSPADM

## 2019-08-28 RX ORDER — ESCITALOPRAM OXALATE 10 MG/1
10 TABLET ORAL DAILY
Status: DISCONTINUED | OUTPATIENT
Start: 2019-08-28 | End: 2019-08-29 | Stop reason: HOSPADM

## 2019-08-28 RX ORDER — ONDANSETRON 4 MG/1
4 TABLET, ORALLY DISINTEGRATING ORAL EVERY 8 HOURS PRN
Status: DISCONTINUED | OUTPATIENT
Start: 2019-08-28 | End: 2019-08-29 | Stop reason: HOSPADM

## 2019-08-28 RX ORDER — SUCRALFATE 1 G/1
1 TABLET ORAL 4 TIMES DAILY
Status: DISCONTINUED | OUTPATIENT
Start: 2019-08-28 | End: 2019-08-29 | Stop reason: HOSPADM

## 2019-08-28 RX ORDER — SODIUM CHLORIDE 9 MG/ML
INJECTION, SOLUTION INTRAVENOUS CONTINUOUS
Status: DISCONTINUED | OUTPATIENT
Start: 2019-08-28 | End: 2019-08-29 | Stop reason: HOSPADM

## 2019-08-28 RX ORDER — ALBUTEROL SULFATE 90 UG/1
2 AEROSOL, METERED RESPIRATORY (INHALATION) EVERY 6 HOURS PRN
Status: DISCONTINUED | OUTPATIENT
Start: 2019-08-28 | End: 2019-08-29 | Stop reason: HOSPADM

## 2019-08-28 RX ORDER — TIMOLOL MALEATE 5 MG/ML
1 SOLUTION/ DROPS OPHTHALMIC 2 TIMES DAILY
Status: DISCONTINUED | OUTPATIENT
Start: 2019-08-28 | End: 2019-08-29 | Stop reason: HOSPADM

## 2019-08-28 RX ADMIN — GLYCOPYRROLATE AND FORMOTEROL FUMARATE 2 PUFF: 9; 4.8 AEROSOL, METERED RESPIRATORY (INHALATION) at 22:18

## 2019-08-28 RX ADMIN — Medication 1000 UNITS: at 21:03

## 2019-08-28 RX ADMIN — SUCRALFATE 1 G: 1 TABLET ORAL at 17:57

## 2019-08-28 RX ADMIN — SUCRALFATE 1 G: 1 TABLET ORAL at 21:03

## 2019-08-28 RX ADMIN — PANTOPRAZOLE SODIUM 40 MG: 40 TABLET, DELAYED RELEASE ORAL at 21:03

## 2019-08-28 RX ADMIN — ACETAMINOPHEN 650 MG: 325 TABLET ORAL at 21:03

## 2019-08-28 ASSESSMENT — PAIN SCALES - GENERAL: PAINLEVEL_OUTOF10: 4

## 2019-08-28 NOTE — CONSULTS
[address]     Hospitalist Consult Note      Name:  Karsten Horvath /Age/Sex: 1935  (80 y.o. female)   MRN & CSN:  [id number] & [id number] Admission Date/Time: 2019 10:00 AM   Location:  [address] [place] [address] PCP: Jael Angel MD       Hospital Day: 1    Assessment and Plan:   Karsten Horvath is a 80 y.o.  female with past medical history of aortic stenosis, COPD, hypothyroidism, arthritis who was admitted by cardiology group for observation after a cardiac cath and the Hospitalist Team consulted for: Medical Management     # Chest pain -pleuritic     Associated with deep breathing, no shortness of breath at this point-not requiring any oxygen  And febrile, no cough or phlegm production  Likely noncardiac chest pain  We will continue to observe, if chest pain worsens we will do chest CT without contrast    #Severe aortic stenosis    Had ischemia work-up with left heart cath and right heart cath  Cardiology planning for TAVR    Chronic conditions  COPD with no exacerbation  Hypothyroidism  Arthritis    Diet DIET CARDIAC;   DVT Prophylaxis [] Lovenox, []  Heparin, [] SCDs, [] No VTE prophylaxis, patient ambulating   GI Prophylaxis [] PPI, [] H2 Blocker, [] No GI prophylaxis, patient is receiving diet/Tube Feeds   Code Status Full Code   Disposition Patient required Consultation due to Medical management for chest pain   MDM [] Low, [x] Moderate,[]  High  Patient's risk as above due to      History of Present Illness:     Patient seen and examined at the bedside    Patient is an 80-year-old lady with past medical history of aortic stenosis, COPD, hypothyroidism, arthritis who was admitted by cardiology group for observation after a cardiac cath and the Hospitalist Team consulted for: Medical Management. Chest pain is associated with deep breathing and located right posterior chest, which has been decreased in intensity since her admission from the Cath Lab. She denies any shortness of breath.   She has no cough or

## 2019-08-29 ENCOUNTER — TELEPHONE (OUTPATIENT)
Dept: CARDIOLOGY CLINIC | Age: 84
End: 2019-08-29

## 2019-08-29 ENCOUNTER — OFFICE VISIT (OUTPATIENT)
Dept: CARDIOLOGY CLINIC | Age: 84
End: 2019-08-29
Payer: MEDICARE

## 2019-08-29 ENCOUNTER — TELEPHONE (OUTPATIENT)
Dept: FAMILY MEDICINE CLINIC | Age: 84
End: 2019-08-29

## 2019-08-29 VITALS
SYSTOLIC BLOOD PRESSURE: 129 MMHG | HEART RATE: 62 BPM | OXYGEN SATURATION: 96 % | BODY MASS INDEX: 24.17 KG/M2 | RESPIRATION RATE: 21 BRPM | DIASTOLIC BLOOD PRESSURE: 66 MMHG | TEMPERATURE: 97.6 F | WEIGHT: 128 LBS | HEIGHT: 61 IN

## 2019-08-29 VITALS
WEIGHT: 130 LBS | SYSTOLIC BLOOD PRESSURE: 140 MMHG | HEIGHT: 61 IN | DIASTOLIC BLOOD PRESSURE: 60 MMHG | BODY MASS INDEX: 24.55 KG/M2 | HEART RATE: 60 BPM

## 2019-08-29 DIAGNOSIS — R42 DIZZINESS: Primary | ICD-10-CM

## 2019-08-29 PROCEDURE — 99024 POSTOP FOLLOW-UP VISIT: CPT | Performed by: INTERNAL MEDICINE

## 2019-08-29 PROCEDURE — 6370000000 HC RX 637 (ALT 250 FOR IP): Performed by: INTERNAL MEDICINE

## 2019-08-29 PROCEDURE — 2580000003 HC RX 258: Performed by: INTERNAL MEDICINE

## 2019-08-29 PROCEDURE — 99214 OFFICE O/P EST MOD 30 MIN: CPT | Performed by: INTERNAL MEDICINE

## 2019-08-29 RX ADMIN — ESCITALOPRAM OXALATE 10 MG: 10 TABLET ORAL at 08:31

## 2019-08-29 RX ADMIN — CYANOCOBALAMIN TAB 1000 MCG 1000 MCG: 1000 TAB at 08:31

## 2019-08-29 RX ADMIN — LISINOPRIL 5 MG: 5 TABLET ORAL at 08:31

## 2019-08-29 RX ADMIN — GLYCOPYRROLATE AND FORMOTEROL FUMARATE 2 PUFF: 9; 4.8 AEROSOL, METERED RESPIRATORY (INHALATION) at 08:25

## 2019-08-29 RX ADMIN — LEVOTHYROXINE SODIUM 75 MCG: 75 TABLET ORAL at 08:31

## 2019-08-29 RX ADMIN — SODIUM CHLORIDE: 9 INJECTION, SOLUTION INTRAVENOUS at 00:29

## 2019-08-29 RX ADMIN — SUCRALFATE 1 G: 1 TABLET ORAL at 08:31

## 2019-08-29 RX ADMIN — Medication 10 ML: at 08:31

## 2019-08-29 NOTE — PROGRESS NOTES
Chronic obstructive pulmonary disease (HCC) J44.9    HTN (hypertension) I10    Vitamin D deficiency E55.9    Vitamin B12 deficiency E53.8    Gastroesophageal reflux disease K21.9    VHD (valvular heart disease) I38    Aortic stenosis, severe I35.0       Assessment & Plan:    - dizzy: LLE weakness; Gte CT head  - Severe AS for TAVR        Aristides Pyle MA  [facility] [place]

## 2019-08-29 NOTE — PLAN OF CARE
Problem: Pain:  Goal: Pain level will decrease  Outcome: Ongoing  Goal: Control of acute pain  Outcome: Ongoing  Goal: Control of chronic pain  Outcome: Ongoing
No

## 2019-08-29 NOTE — PROGRESS NOTES
PT HAD [facility] AND [facility]  WAS IN HOLDING AREA   HAS [address] CP  ADMIT FOR OBSERVATION
Pt c/o sharp pain when she takes a deep breath. Dr Aury Tovar notified, verbal orders received for chest x-ray.
without appreciable murmurs, rubs, or gallops. Peripheral pulses equal bilaterally and palpable. No peripheral edema. GI Abdomen is soft without significant tenderness, masses, or guarding. Bowel sounds are normoactive. Rectal exam deferred.  Macedo catheter is not present. MSK No gross joint deformities. Spontaneous movement of all extremities  SKIN Normal coloration, warm, dry. NEURO Cranial nerves appear grossly intact, normal speech, no lateralizing weakness. PSYCH Awake, alert, oriented x 4. Affect appropriate.     Medications:   Medications:    diphenhydrAMINE  25 mg Oral Once    diazepam  5 mg Oral Once    sodium chloride flush  10 mL Intravenous 2 times per day    vitamin D   Oral Nightly    lisinopril  5 mg Oral Daily    L-Arginine  1 tablet Oral BID    Calcium-Magnesium-Zinc  1 tablet Oral Daily    timolol  1 drop Both Eyes BID    glycopyrrolate-formoterol  2 puff Inhalation BID    sucralfate  1 g Oral 4x Daily    levothyroxine  75 mcg Oral Daily    vitamin B-12  1,000 mcg Oral Daily    pantoprazole  40 mg Oral Nightly    escitalopram  10 mg Oral Daily      Infusions:    sodium chloride 75 mL/hr at 08/29/19 0029    sodium chloride 75 mL/hr at 08/28/19 1345     PRN Meds:   sodium chloride flush 10 mL PRN   acetaminophen 650 mg Q4H PRN   magnesium hydroxide 30 mL Daily PRN   albuterol sulfate HFA 2 puff Q6H PRN   ondansetron 4 mg Q8H PRN   methocarbamol 500 mg 4x Daily PRN   ibuprofen 400 mg Q6H PRN         Electronically signed by Nilda Hernandez MD on 8/29/2019 at 10:57 AM

## 2019-08-29 NOTE — TELEPHONE ENCOUNTER
The patient called in she just had a heart cath done and she got up to walk and she is having problems with her left leg and she is light headed and dizzy too .  Per doctor Marie Scott bring pt in for a b/p check

## 2019-08-30 ENCOUNTER — TELEPHONE (OUTPATIENT)
Dept: CARDIOLOGY CLINIC | Age: 84
End: 2019-08-30

## 2019-08-30 NOTE — TELEPHONE ENCOUNTER
Called patient to let her know that the CT Dr Cheri Garcia ordered is still pending with the insurance in a Physician review. Advised patient once we hear from insurance we will let her know. Also asked patient how she is feeling today. Patient states she has not been dizzy today and is actually having a great day. Patient states she did feel short of breath last night, so much so that she had to use her oxygen over night. Advised patient to call with any concerns or go to the ED if our office is closed. Patient voiced understanding.

## 2019-09-03 ENCOUNTER — HOSPITAL ENCOUNTER (OUTPATIENT)
Dept: CT IMAGING | Age: 84
Discharge: HOME | DRG: 190 | End: 2019-09-03
Payer: MEDICARE

## 2019-09-03 PROCEDURE — 6360000004 HC RX CONTRAST MEDICATION: Performed by: INTERNAL MEDICINE

## 2019-09-03 PROCEDURE — 74174 CTA ABD&PLVS W/CONTRAST: CPT

## 2019-09-03 PROCEDURE — 2580000003 HC RX 258: Performed by: INTERNAL MEDICINE

## 2019-09-03 RX ORDER — 0.9 % SODIUM CHLORIDE 0.9 %
10 VIAL (ML) INJECTION
Status: COMPLETED | OUTPATIENT
Start: 2019-09-03 | End: 2019-09-03

## 2019-09-03 RX ADMIN — Medication 10 ML: at 13:19

## 2019-09-03 RX ADMIN — IOPAMIDOL 120 ML: 755 INJECTION, SOLUTION INTRAVENOUS at 13:18

## 2019-09-05 ENCOUNTER — HOSPITAL ENCOUNTER (OUTPATIENT)
Dept: ULTRASOUND IMAGING | Age: 84
Discharge: HOME | DRG: 190 | End: 2019-09-05
Payer: MEDICARE

## 2019-09-05 ENCOUNTER — APPOINTMENT (OUTPATIENT)
Dept: GENERAL RADIOLOGY | Age: 84
DRG: 190 | End: 2019-09-05
Attending: INTERNAL MEDICINE
Payer: MEDICARE

## 2019-09-05 ENCOUNTER — HOSPITAL ENCOUNTER (OUTPATIENT)
Dept: PULMONOLOGY | Age: 84
Discharge: HOME | DRG: 190 | End: 2019-09-05
Payer: MEDICARE

## 2019-09-05 ENCOUNTER — ANESTHESIA EVENT (OUTPATIENT)
Dept: CARDIAC CATH/INVASIVE PROCEDURES | Age: 84
End: 2019-09-05

## 2019-09-05 PROCEDURE — 93880 EXTRACRANIAL BILAT STUDY: CPT

## 2019-09-05 PROCEDURE — 94010 BREATHING CAPACITY TEST: CPT

## 2019-09-05 PROCEDURE — 71048 X-RAY EXAM CHEST 4+ VIEWS: CPT

## 2019-09-06 ENCOUNTER — APPOINTMENT (OUTPATIENT)
Dept: GENERAL RADIOLOGY | Age: 84
DRG: 190 | End: 2019-09-06
Payer: MEDICARE

## 2019-09-06 ENCOUNTER — HOSPITAL ENCOUNTER (INPATIENT)
Age: 84
LOS: 3 days | Discharge: HOME | DRG: 190 | End: 2019-09-09
Attending: EMERGENCY MEDICINE | Admitting: INTERNAL MEDICINE
Payer: MEDICARE

## 2019-09-06 ENCOUNTER — TELEPHONE (OUTPATIENT)
Dept: FAMILY MEDICINE CLINIC | Age: 84
End: 2019-09-06

## 2019-09-06 ENCOUNTER — APPOINTMENT (OUTPATIENT)
Dept: CT IMAGING | Age: 84
DRG: 190 | End: 2019-09-06
Payer: MEDICARE

## 2019-09-06 DIAGNOSIS — R91.1 PULMONARY NODULE: ICD-10-CM

## 2019-09-06 DIAGNOSIS — R06.03 ACUTE RESPIRATORY DISTRESS: ICD-10-CM

## 2019-09-06 DIAGNOSIS — J44.1 COPD EXACERBATION (HCC): Primary | ICD-10-CM

## 2019-09-06 DIAGNOSIS — J18.9 PNEUMONIA DUE TO ORGANISM: ICD-10-CM

## 2019-09-06 LAB
ALBUMIN SERPL-MCNC: 3.5 GM/DL (ref 3.4–5)
ALP BLD-CCNC: 47 IU/L (ref 40–128)
ALT SERPL-CCNC: 15 U/L (ref 10–40)
ANION GAP SERPL CALCULATED.3IONS-SCNC: 12 MMOL/L (ref 4–16)
AST SERPL-CCNC: 23 IU/L (ref 15–37)
BASE EXCESS MIXED: ABNORMAL (ref 0–2.3)
BASOPHILS ABSOLUTE: 0 K/CU MM
BASOPHILS RELATIVE PERCENT: 0.2 % (ref 0–1)
BILIRUB SERPL-MCNC: 0.7 MG/DL (ref 0–1)
BUN BLDV-MCNC: 7 MG/DL (ref 6–23)
CALCIUM SERPL-MCNC: 8.6 MG/DL (ref 8.3–10.6)
CHLORIDE BLD-SCNC: 99 MMOL/L (ref 99–110)
CO2: 22 MMOL/L (ref 21–32)
COMMENT: ABNORMAL
CREAT SERPL-MCNC: 0.5 MG/DL (ref 0.6–1.1)
DIFFERENTIAL TYPE: ABNORMAL
EKG ATRIAL RATE: 75 BPM
EKG DIAGNOSIS: NORMAL
EKG P AXIS: 88 DEGREES
EKG P-R INTERVAL: 174 MS
EKG Q-T INTERVAL: 382 MS
EKG QRS DURATION: 74 MS
EKG QTC CALCULATION (BAZETT): 426 MS
EKG R AXIS: 64 DEGREES
EKG T AXIS: 79 DEGREES
EKG VENTRICULAR RATE: 75 BPM
EOSINOPHILS ABSOLUTE: 0.1 K/CU MM
EOSINOPHILS RELATIVE PERCENT: 1 % (ref 0–3)
GFR AFRICAN AMERICAN: >60 ML/MIN/1.73M2
GFR NON-AFRICAN AMERICAN: >60 ML/MIN/1.73M2
GLUCOSE BLD-MCNC: 113 MG/DL (ref 70–99)
HCO3 VENOUS: 24.7 MMOL/L (ref 19–25)
HCT VFR BLD CALC: 37.3 % (ref 37–47)
HEMOGLOBIN: 11.6 GM/DL (ref 12.5–16)
IMMATURE NEUTROPHIL %: 0.3 % (ref 0–0.43)
LACTATE: 1.5 MMOL/L (ref 0.4–2)
LYMPHOCYTES ABSOLUTE: 1.5 K/CU MM
LYMPHOCYTES RELATIVE PERCENT: 13.6 % (ref 24–44)
MCH RBC QN AUTO: 29 PG (ref 27–31)
MCHC RBC AUTO-ENTMCNC: 31.1 % (ref 32–36)
MCV RBC AUTO: 93.3 FL (ref 78–100)
MONOCYTES ABSOLUTE: 0.6 K/CU MM
MONOCYTES RELATIVE PERCENT: 5.3 % (ref 0–4)
NUCLEATED RBC %: 0 %
O2 SAT, VEN: 90.7 % (ref 50–70)
PCO2, VEN: 34 MMHG (ref 38–52)
PDW BLD-RTO: 16.1 % (ref 11.7–14.9)
PH VENOUS: 7.47 (ref 7.32–7.42)
PLATELET # BLD: 284 K/CU MM (ref 140–440)
PMV BLD AUTO: 9.5 FL (ref 7.5–11.1)
PO2, VEN: 158 MMHG (ref 28–48)
POTASSIUM SERPL-SCNC: 3.9 MMOL/L (ref 3.5–5.1)
PRO-BNP: 779 PG/ML
PROCALCITONIN: 0.02
RBC # BLD: 4 M/CU MM (ref 4.2–5.4)
SEGMENTED NEUTROPHILS ABSOLUTE COUNT: 8.6 K/CU MM
SEGMENTED NEUTROPHILS RELATIVE PERCENT: 79.6 % (ref 36–66)
SODIUM BLD-SCNC: 133 MMOL/L (ref 135–145)
TOTAL IMMATURE NEUTOROPHIL: 0.03 K/CU MM
TOTAL NUCLEATED RBC: 0 K/CU MM
TOTAL PROTEIN: 6.1 GM/DL (ref 6.4–8.2)
TROPONIN T: <0.01 NG/ML
WBC # BLD: 10.8 K/CU MM (ref 4–10.5)

## 2019-09-06 PROCEDURE — 93005 ELECTROCARDIOGRAM TRACING: CPT | Performed by: EMERGENCY MEDICINE

## 2019-09-06 PROCEDURE — 36415 COLL VENOUS BLD VENIPUNCTURE: CPT

## 2019-09-06 PROCEDURE — 80053 COMPREHEN METABOLIC PANEL: CPT

## 2019-09-06 PROCEDURE — 94640 AIRWAY INHALATION TREATMENT: CPT

## 2019-09-06 PROCEDURE — 99285 EMERGENCY DEPT VISIT HI MDM: CPT

## 2019-09-06 PROCEDURE — 96367 TX/PROPH/DG ADDL SEQ IV INF: CPT

## 2019-09-06 PROCEDURE — 87449 NOS EACH ORGANISM AG IA: CPT

## 2019-09-06 PROCEDURE — 6370000000 HC RX 637 (ALT 250 FOR IP): Performed by: EMERGENCY MEDICINE

## 2019-09-06 PROCEDURE — 96375 TX/PRO/DX INJ NEW DRUG ADDON: CPT

## 2019-09-06 PROCEDURE — 83880 ASSAY OF NATRIURETIC PEPTIDE: CPT

## 2019-09-06 PROCEDURE — 96365 THER/PROPH/DIAG IV INF INIT: CPT

## 2019-09-06 PROCEDURE — 82805 BLOOD GASES W/O2 SATURATION: CPT

## 2019-09-06 PROCEDURE — 6360000002 HC RX W HCPCS: Performed by: EMERGENCY MEDICINE

## 2019-09-06 PROCEDURE — 83605 ASSAY OF LACTIC ACID: CPT

## 2019-09-06 PROCEDURE — 87899 AGENT NOS ASSAY W/OPTIC: CPT

## 2019-09-06 PROCEDURE — 6360000002 HC RX W HCPCS: Performed by: NURSE PRACTITIONER

## 2019-09-06 PROCEDURE — 2580000003 HC RX 258: Performed by: NURSE PRACTITIONER

## 2019-09-06 PROCEDURE — 71275 CT ANGIOGRAPHY CHEST: CPT

## 2019-09-06 PROCEDURE — 2580000003 HC RX 258: Performed by: EMERGENCY MEDICINE

## 2019-09-06 PROCEDURE — 71045 X-RAY EXAM CHEST 1 VIEW: CPT

## 2019-09-06 PROCEDURE — 2140000000 HC CCU INTERMEDIATE R&B

## 2019-09-06 PROCEDURE — 6360000004 HC RX CONTRAST MEDICATION: Performed by: EMERGENCY MEDICINE

## 2019-09-06 PROCEDURE — 6370000000 HC RX 637 (ALT 250 FOR IP): Performed by: NURSE PRACTITIONER

## 2019-09-06 PROCEDURE — 85025 COMPLETE CBC W/AUTO DIFF WBC: CPT

## 2019-09-06 PROCEDURE — 99222 1ST HOSP IP/OBS MODERATE 55: CPT | Performed by: INTERNAL MEDICINE

## 2019-09-06 PROCEDURE — 96366 THER/PROPH/DIAG IV INF ADDON: CPT

## 2019-09-06 PROCEDURE — 84484 ASSAY OF TROPONIN QUANT: CPT

## 2019-09-06 PROCEDURE — 84145 PROCALCITONIN (PCT): CPT

## 2019-09-06 PROCEDURE — 87040 BLOOD CULTURE FOR BACTERIA: CPT

## 2019-09-06 PROCEDURE — 93010 ELECTROCARDIOGRAM REPORT: CPT | Performed by: INTERNAL MEDICINE

## 2019-09-06 PROCEDURE — C9113 INJ PANTOPRAZOLE SODIUM, VIA: HCPCS | Performed by: EMERGENCY MEDICINE

## 2019-09-06 RX ORDER — PANTOPRAZOLE SODIUM 40 MG/10ML
40 INJECTION, POWDER, LYOPHILIZED, FOR SOLUTION INTRAVENOUS ONCE
Status: COMPLETED | OUTPATIENT
Start: 2019-09-06 | End: 2019-09-06

## 2019-09-06 RX ORDER — ALBUTEROL SULFATE 90 UG/1
2 AEROSOL, METERED RESPIRATORY (INHALATION) EVERY 6 HOURS PRN
Status: DISCONTINUED | OUTPATIENT
Start: 2019-09-06 | End: 2019-09-09 | Stop reason: HOSPADM

## 2019-09-06 RX ORDER — LISINOPRIL 5 MG/1
5 TABLET ORAL DAILY
Status: DISCONTINUED | OUTPATIENT
Start: 2019-09-07 | End: 2019-09-09 | Stop reason: HOSPADM

## 2019-09-06 RX ORDER — IPRATROPIUM BROMIDE AND ALBUTEROL SULFATE 2.5; .5 MG/3ML; MG/3ML
1 SOLUTION RESPIRATORY (INHALATION)
Status: DISCONTINUED | OUTPATIENT
Start: 2019-09-06 | End: 2019-09-09 | Stop reason: HOSPADM

## 2019-09-06 RX ORDER — PREDNISONE 20 MG/1
40 TABLET ORAL DAILY
Status: DISCONTINUED | OUTPATIENT
Start: 2019-09-09 | End: 2019-09-07

## 2019-09-06 RX ORDER — TRAMADOL HYDROCHLORIDE 50 MG/1
50 TABLET ORAL ONCE
Status: COMPLETED | OUTPATIENT
Start: 2019-09-06 | End: 2019-09-06

## 2019-09-06 RX ORDER — ESCITALOPRAM OXALATE 10 MG/1
10 TABLET ORAL DAILY
Status: DISCONTINUED | OUTPATIENT
Start: 2019-09-07 | End: 2019-09-09 | Stop reason: HOSPADM

## 2019-09-06 RX ORDER — METHYLPREDNISOLONE SODIUM SUCCINATE 40 MG/ML
40 INJECTION, POWDER, LYOPHILIZED, FOR SOLUTION INTRAMUSCULAR; INTRAVENOUS EVERY 6 HOURS
Status: DISCONTINUED | OUTPATIENT
Start: 2019-09-06 | End: 2019-09-07

## 2019-09-06 RX ORDER — LANOLIN ALCOHOL/MO/W.PET/CERES
1000 CREAM (GRAM) TOPICAL DAILY
Status: DISCONTINUED | OUTPATIENT
Start: 2019-09-07 | End: 2019-09-09 | Stop reason: HOSPADM

## 2019-09-06 RX ORDER — SODIUM CHLORIDE 0.9 % (FLUSH) 0.9 %
10 SYRINGE (ML) INJECTION EVERY 12 HOURS SCHEDULED
Status: DISCONTINUED | OUTPATIENT
Start: 2019-09-06 | End: 2019-09-09 | Stop reason: HOSPADM

## 2019-09-06 RX ORDER — IPRATROPIUM BROMIDE AND ALBUTEROL SULFATE 2.5; .5 MG/3ML; MG/3ML
1 SOLUTION RESPIRATORY (INHALATION) ONCE
Status: COMPLETED | OUTPATIENT
Start: 2019-09-06 | End: 2019-09-06

## 2019-09-06 RX ORDER — ONDANSETRON 2 MG/ML
4 INJECTION INTRAMUSCULAR; INTRAVENOUS EVERY 6 HOURS PRN
Status: DISCONTINUED | OUTPATIENT
Start: 2019-09-06 | End: 2019-09-09 | Stop reason: HOSPADM

## 2019-09-06 RX ORDER — PANTOPRAZOLE SODIUM 40 MG/1
40 TABLET, DELAYED RELEASE ORAL NIGHTLY
Status: DISCONTINUED | OUTPATIENT
Start: 2019-09-06 | End: 2019-09-09 | Stop reason: HOSPADM

## 2019-09-06 RX ORDER — SENNA AND DOCUSATE SODIUM 50; 8.6 MG/1; MG/1
1 TABLET, FILM COATED ORAL DAILY PRN
Status: DISCONTINUED | OUTPATIENT
Start: 2019-09-06 | End: 2019-09-08

## 2019-09-06 RX ORDER — MAGNESIUM SULFATE IN WATER 40 MG/ML
2 INJECTION, SOLUTION INTRAVENOUS ONCE
Status: COMPLETED | OUTPATIENT
Start: 2019-09-06 | End: 2019-09-06

## 2019-09-06 RX ORDER — TIMOLOL MALEATE 5 MG/ML
1 SOLUTION/ DROPS OPHTHALMIC 2 TIMES DAILY
Status: DISCONTINUED | OUTPATIENT
Start: 2019-09-06 | End: 2019-09-09 | Stop reason: HOSPADM

## 2019-09-06 RX ORDER — LEVOTHYROXINE SODIUM 0.07 MG/1
75 TABLET ORAL
Status: DISCONTINUED | OUTPATIENT
Start: 2019-09-07 | End: 2019-09-09 | Stop reason: HOSPADM

## 2019-09-06 RX ORDER — SODIUM CHLORIDE 0.9 % (FLUSH) 0.9 %
10 SYRINGE (ML) INJECTION PRN
Status: DISCONTINUED | OUTPATIENT
Start: 2019-09-06 | End: 2019-09-09 | Stop reason: HOSPADM

## 2019-09-06 RX ORDER — SODIUM CHLORIDE 0.9 % (FLUSH) 0.9 %
10 SYRINGE (ML) INJECTION
Status: COMPLETED | OUTPATIENT
Start: 2019-09-06 | End: 2019-09-06

## 2019-09-06 RX ORDER — SUCRALFATE 1 G/1
1 TABLET ORAL
Status: DISCONTINUED | OUTPATIENT
Start: 2019-09-06 | End: 2019-09-09 | Stop reason: HOSPADM

## 2019-09-06 RX ORDER — METHYLPREDNISOLONE SODIUM SUCCINATE 125 MG/2ML
125 INJECTION, POWDER, LYOPHILIZED, FOR SOLUTION INTRAMUSCULAR; INTRAVENOUS ONCE
Status: COMPLETED | OUTPATIENT
Start: 2019-09-06 | End: 2019-09-06

## 2019-09-06 RX ORDER — METHOCARBAMOL 500 MG/1
500 TABLET, FILM COATED ORAL 4 TIMES DAILY PRN
Status: DISCONTINUED | OUTPATIENT
Start: 2019-09-06 | End: 2019-09-09 | Stop reason: HOSPADM

## 2019-09-06 RX ORDER — ACETAMINOPHEN 500 MG
1000 TABLET ORAL ONCE
Status: COMPLETED | OUTPATIENT
Start: 2019-09-06 | End: 2019-09-06

## 2019-09-06 RX ADMIN — Medication 10 ML: at 15:08

## 2019-09-06 RX ADMIN — ENOXAPARIN SODIUM 40 MG: 40 INJECTION SUBCUTANEOUS at 20:49

## 2019-09-06 RX ADMIN — PANTOPRAZOLE SODIUM 40 MG: 40 TABLET, DELAYED RELEASE ORAL at 20:49

## 2019-09-06 RX ADMIN — Medication 10 ML: at 20:49

## 2019-09-06 RX ADMIN — IPRATROPIUM BROMIDE AND ALBUTEROL SULFATE 1 AMPULE: .5; 3 SOLUTION RESPIRATORY (INHALATION) at 16:02

## 2019-09-06 RX ADMIN — ACETAMINOPHEN 1000 MG: 500 TABLET ORAL at 16:22

## 2019-09-06 RX ADMIN — SUCRALFATE 1 G: 1 TABLET ORAL at 20:49

## 2019-09-06 RX ADMIN — AZITHROMYCIN MONOHYDRATE 500 MG: 500 INJECTION, POWDER, LYOPHILIZED, FOR SOLUTION INTRAVENOUS at 17:09

## 2019-09-06 RX ADMIN — TRAMADOL HYDROCHLORIDE 50 MG: 50 TABLET, FILM COATED ORAL at 13:30

## 2019-09-06 RX ADMIN — Medication 2000 UNITS: at 20:48

## 2019-09-06 RX ADMIN — CEFTRIAXONE SODIUM 1 G: 1 INJECTION, POWDER, FOR SOLUTION INTRAMUSCULAR; INTRAVENOUS at 16:21

## 2019-09-06 RX ADMIN — IOPAMIDOL 75 ML: 755 INJECTION, SOLUTION INTRAVENOUS at 15:08

## 2019-09-06 RX ADMIN — IPRATROPIUM BROMIDE AND ALBUTEROL SULFATE 1 AMPULE: .5; 3 SOLUTION RESPIRATORY (INHALATION) at 19:27

## 2019-09-06 RX ADMIN — METHYLPREDNISOLONE SODIUM SUCCINATE 40 MG: 40 INJECTION, POWDER, FOR SOLUTION INTRAMUSCULAR; INTRAVENOUS at 18:52

## 2019-09-06 RX ADMIN — PANTOPRAZOLE SODIUM 40 MG: 40 INJECTION, POWDER, FOR SOLUTION INTRAVENOUS at 16:22

## 2019-09-06 RX ADMIN — IPRATROPIUM BROMIDE AND ALBUTEROL SULFATE 1 AMPULE: .5; 3 SOLUTION RESPIRATORY (INHALATION) at 12:27

## 2019-09-06 RX ADMIN — METHYLPREDNISOLONE SODIUM SUCCINATE 125 MG: 125 INJECTION, POWDER, LYOPHILIZED, FOR SOLUTION INTRAMUSCULAR; INTRAVENOUS at 13:07

## 2019-09-06 RX ADMIN — MAGNESIUM SULFATE HEPTAHYDRATE 2 G: 40 INJECTION, SOLUTION INTRAVENOUS at 13:57

## 2019-09-06 RX ADMIN — IPRATROPIUM BROMIDE AND ALBUTEROL SULFATE 1 AMPULE: .5; 3 SOLUTION RESPIRATORY (INHALATION) at 14:17

## 2019-09-06 ASSESSMENT — PAIN SCALES - GENERAL
PAINLEVEL_OUTOF10: 3
PAINLEVEL_OUTOF10: 0
PAINLEVEL_OUTOF10: 5
PAINLEVEL_OUTOF10: 4
PAINLEVEL_OUTOF10: 5

## 2019-09-06 ASSESSMENT — PAIN DESCRIPTION - LOCATION
LOCATION: GENERALIZED
LOCATION: GENERALIZED

## 2019-09-06 ASSESSMENT — PAIN DESCRIPTION - PAIN TYPE
TYPE: ACUTE PAIN
TYPE: ACUTE PAIN

## 2019-09-06 ASSESSMENT — PAIN DESCRIPTION - DESCRIPTORS
DESCRIPTORS: ACHING
DESCRIPTORS: ACHING

## 2019-09-06 NOTE — ED NOTES
Patient family member at desk. Patient informed Nelli Dixon RN that she fell like she was going to die. Nelli Dixno RN notified this nurse. This nurse and Anthony RN to bedside, along with Dr Justin Hatch. Patient repositioned to facilitate breathing. Patient denies being able to tolerate a mask. RT called for heated high flow oxygen. [facility]  09/06/19 112

## 2019-09-06 NOTE — ED NOTES
Patient showing frequent PVC's on the monitor. Patient complains of chest pain \"a little bit\". Repeat EKG obtained and Dr Trish Worthington updated. Yoly Jiménez RN  09/06/19 [id number]

## 2019-09-06 NOTE — PROGRESS NOTES
Medication History  [facility]    Patient Name: Lazaro Barrientos 1935     Medication history has been completed by: Nancy Zamarripa CPhT    Source(s) of information: Patient and Insurance claims    Primary Care Physician: Kari Rodriguez MD     Pharmacy: [facility]    Allergies as of 09/06/2019 - Review Complete 09/06/2019   Allergen Reaction Noted    Morphine Anaphylaxis 04/03/2012    Bactrim [sulfamethoxazole-trimethoprim]  06/29/2017    Butorphanol  01/01/2012    Influenza vaccines  03/15/2015    Lactose intolerance (gi) Nausea Only 11/17/2015    Phenergan [promethazine hcl] Other (See Comments) 04/03/2012    Promethazine  05/29/2015    Stadol [butorphanol tartrate] Other (See Comments) 04/25/2012    Tape [adhesive tape] Other (See Comments) 04/03/2012        Prior to Admission medications    Medication Sig Start Date End Date Taking?  Authorizing Provider   escitalopram (LEXAPRO) 10 MG tablet Take 1 tablet by mouth daily 8/23/19   Kari Rodriguez MD   COLLAGEN PO Take by mouth    Historical Provider, MD   ondansetron [facility]) 4 MG tablet Take 1 tablet by mouth every 8 hours as needed for Nausea 6/5/19   Kari Rodriguez MD   methocarbamol (ROBAXIN) 500 MG tablet Take 1 tablet by mouth 4 times daily as needed (muscle spasm) 6/5/19   Kari Rodriguez MD   pantoprazole (PROTONIX) 40 MG tablet Take 1 tablet by mouth nightly 3/4/19   Kari Rodriguez MD   levothyroxine (SYNTHROID) 75 MCG tablet Take 1 tablet by mouth daily 1/2/19   Historical Provider, MD   vitamin B-12 (CYANOCOBALAMIN) 1000 MCG tablet Take 1,000 mcg by mouth daily    Historical Provider, MD   senna-docusate (PERICOLACE) 8.6-50 MG per tablet Take 1 tablet by mouth daily as needed     Historical Provider, MD   glycopyrrolate-formoterol (BEVESPI AEROSPHERE) 9-4.8 MCG/ACT AERO Inhale 2 puffs into the lungs 2 times daily 11/20/18   Kari Rodriguez MD   timolol (BETIMOL) 0.5 % ophthalmic solution 1 drop 2

## 2019-09-06 NOTE — ED PROVIDER NOTES
EF 70%, no ischemia    On home O2     only uses as needed, nightly prn    Syncope and collapse     Tachycardia     HX of tachycardia - had a cardioablation    Thyroid disease      Past Surgical History:   Procedure Laterality Date    BREAST SURGERY  2009    bilat    CARDIAC CATHETERIZATION  3/02/11    mild to moderate disease of diagonal and proximal RCA    CARDIAC SURGERY  1989    ablation    CARPAL TUNNEL RELEASE  1990's    bilat   [facility]    open choley    COLONOSCOPY      DILATATION, ESOPHAGUS      ENDOSCOPY, COLON, DIAGNOSTIC  07/03/2017    s/p gastric bypass otherwise normal    FINGER SURGERY      right middle, thumb and index finger (screw and pin in right index finger)    GASTRIC RESTRICTION SURGERY  1984    stapled    HERNIA REPAIR  unknown    Inc hernia hernia repair    HIP FRACTURE SURGERY Left 11/04/2015    Left hip nail    HYSTERECTOMY  1966    Luke w/ BSO    JOINT REPLACEMENT  2004    right knee    LIPECTOMY  1's    OTHER SURGICAL HISTORY  5-1-2012    Gastrojejunostomy/partial gastrectomy    SHOULDER ARTHROPLASTY Left 10/2017    TOE SURGERY  Early 2000's    hammer toes and bunions     Family History   Problem Relation Age of Onset    Diabetes Mother     Other Mother         thyroid    Heart Disease Father     Arthritis Father     High Blood Pressure Father     High Cholesterol Father     Other Father         glaucoma    Other Sister         thyroid, glaucoma    Diabetes Brother     Other Sister     Vision Loss Sister         lung problems, smoker    Cancer Sister         throat cancer    Other Son         HX of clots    Early Death Son         Hit by car and killed.     High Blood Pressure Daughter     Stroke Son         No residual    Other Son         HX myocarditis     Social History     Socioeconomic History    Marital status:      Spouse name: Not on file    Number of children: Not on file    Years of education: Not on file   [employer] Highest education level: Not on file   Occupational History    Not on file   Social Needs    Financial resource strain: Not on file    Food insecurity:     Worry: Not on file     Inability: Not on file    Transportation needs:     Medical: Not on file     Non-medical: Not on file   Tobacco Use    Smoking status: Former Smoker     Packs/day: 3.00     Years: 5.00     Pack years: 15.00     Last attempt to quit: 1962     Years since quittin.7    Smokeless tobacco: Never Used   Substance and Sexual Activity    Alcohol use: No    Drug use: No    Sexual activity: Not on file   Lifestyle    Physical activity:     Days per week: Not on file     Minutes per session: Not on file    Stress: Not on file   Relationships    Social connections:     Talks on phone: Not on file     Gets together: Not on file     Attends [employer] service: Not on file     Active member of club or organization: Not on file     Attends meetings of clubs or organizations: Not on file     Relationship status: Not on file    Intimate partner violence:     Fear of current or ex partner: Not on file     Emotionally abused: Not on file     Physically abused: Not on file     Forced sexual activity: Not on file   Other Topics Concern    Not on file   Social History Narrative    Not on file     Current Facility-Administered Medications   Medication Dose Route Frequency Provider Last Rate Last Dose    magnesium sulfate 2 g in 50 mL IVPB premix  2 g Intravenous Once Redia Brunner, MD 25 mL/hr at 19 1357 2 g at 19 1357    pantoprazole (PROTONIX) injection 40 mg  40 mg Intravenous Once Redia Brunner, MD        cefTRIAXone (ROCEPHIN) 1 g IVPB in 50 mL D5W minibag  1 g Intravenous Once Redia Brunner, MD        And    azithromycin (ZITHROMAX) 500 mg in dextrose 5 % 250 mL IVPB  500 mg Intravenous Once Redia Brunner, MD         Current Outpatient Medications   Medication Sig Dispense Refill    glycopyrrolate (ROBINUL) 0.2 Comments)     Makes me jerk all over. Zofran OK    Promethazine     Stadol [Butorphanol Tartrate] Other (See Comments)     Out of body experience. Was told it was a near death experience and was placed in ICU. Dilaudid OK    Tape Harvis Cornfield Tape] Other (See Comments)     Plastic cause itching and rash. Paper tape causes my skin to blister. (Tega-derm and Coban OK to use.)     Nursing Notes Reviewed    ROS:  At least 10 systems reviewed and otherwise negative except as in the [address] Physical Exam:  ED Triage Vitals   Enc Vitals Group      BP 09/06/19 1153 127/62      Pulse 09/06/19 1153 72      Resp 09/06/19 1153 18      Temp 09/06/19 1153 98.3 °F (36.8 °C)      Temp src --       SpO2 09/06/19 1153 96 %      Weight 09/06/19 1149 127 lb (57.6 kg)      Height 09/06/19 1149 5' 1\" (1.549 m)      Head Circumference --       Peak Flow --       Pain Score --       Pain Loc --       Pain Edu? --       Excl. in [address]? --      My pulse oximetry interpretation is which is within the normal range    GENERAL APPEARANCE: Awake and alert. Cooperative. Moderate respiratory distress. HEAD: Normocephalic. Atraumatic. EYES: EOM's grossly intact. Sclera anicteric. ENT: Mucous membranes are moist. Tolerates saliva. No trismus. NECK: Supple. No meningismus. Trachea midline. HEART: RRR. Radial pulses 2+. LUNGS: Respirations labored. Expiratory wheezing bilaterally. ABDOMEN: Soft. Non-tender. No guarding or rebound. EXTREMITIES: No acute deformities. No pitting edema. SKIN: Warm and dry. NEUROLOGICAL: No gross facial drooping. Moves all 4 extremities spontaneously. PSYCHIATRIC: Normal mood.     I have reviewed and interpreted all of the currently available lab results from this visit (if applicable):  Results for orders placed or performed during the hospital encounter of 09/06/19   CBC with Auto Diff   Result Value Ref Range    WBC 10.8 (H) 4.0 - 10.5 K/CU MM    RBC 4.00 (L) 4.2 - 5.4 M/CU MM    Hemoglobin 11.6 (L) 12.5 - 16.0 GM/DL    Hematocrit 37.3 37 - 47 %    MCV 93.3 78 - 100 FL    MCH 29.0 27 - 31 PG    MCHC 31.1 (L) 32.0 - 36.0 %    RDW 16.1 (H) 11.7 - 14.9 %    Platelets 718 342 - 300 K/CU MM    MPV 9.5 7.5 - 11.1 FL    Differential Type AUTOMATED DIFFERENTIAL     Segs Relative 79.6 (H) 36 - 66 %    Lymphocytes % 13.6 (L) 24 - 44 %    Monocytes % 5.3 (H) 0 - 4 %    Eosinophils % 1.0 0 - 3 %    Basophils % 0.2 0 - 1 %    Segs Absolute 8.6 K/CU MM    Lymphocytes Absolute 1.5 K/CU MM    Monocytes Absolute 0.6 K/CU MM    Eosinophils Absolute 0.1 K/CU MM    Basophils Absolute 0.0 K/CU MM    Nucleated RBC % 0.0 %    Total Nucleated RBC 0.0 K/CU MM    Total Immature Neutrophil 0.03 K/CU MM    Immature Neutrophil % 0.3 0 - 0.43 %   CMP   Result Value Ref Range    Sodium 133 (L) 135 - 145 MMOL/L    Potassium 3.9 3.5 - 5.1 MMOL/L    Chloride 99 99 - 110 mMol/L    CO2 22 21 - 32 MMOL/L    BUN 7 6 - 23 MG/DL    CREATININE 0.5 (L) 0.6 - 1.1 MG/DL    Glucose 113 (H) 70 - 99 MG/DL    Calcium 8.6 8.3 - 10.6 MG/DL    Alb 3.5 3.4 - 5.0 GM/DL    Total Protein 6.1 (L) 6.4 - 8.2 GM/DL    Total Bilirubin 0.7 0.0 - 1.0 MG/DL    ALT 15 10 - 40 U/L    AST 23 15 - 37 IU/L    Alkaline Phosphatase 47 40 - 128 IU/L    GFR Non-African American >60 >60 mL/min/1.73m2    GFR African American >60 >60 mL/min/1.73m2    Anion Gap 12 4 - 16   Brain Natriuretic Peptide   Result Value Ref Range    Pro-.0 (H) <300 PG/ML   Troponin   Result Value Ref Range    Troponin T <0.010 <0.01 NG/ML   Blood Gas, Venous   Result Value Ref Range    pH, Hank 7.47 (H) 7.32 - 7.42    pCO2, Hank 34 (L) 38 - 52 mmHG    pO2, Hank 158 (H) 28 - 48 mmHG    Base Exc, Mixed 1.5  PLUS   0 - 2.3    HCO3, Venous 24.7 19 - 25 MMOL/L    O2 Sat, Hank 90.7 (H) 50 - 70 %    Comment VBG    Lactic Acid, Plasma   Result Value Ref Range    Lactate 1.5 0.4 - 2.0 mMOL/L   EKG 12 Lead   Result Value Ref Range    Ventricular Rate 75 BPM    Atrial Rate 75 BPM    P-R Interval 174 ms    QRS Duration 74 ms

## 2019-09-06 NOTE — CONSULTS
Name:  Edil Martinez /Age/Sex: 1935  (80 y.o. female)   MRN & CSN:  [id number] & [id number] Admission Date/Time: 2019 11:48 AM   Location:  ED16/ED-16 PCP: Danielle Fernandez MD       Hospital Day: 1          Referring physician:  No admitting provider for patient encounter. Reason for consultation:  SOB        Thanks for referral.    Information source: patient    CC;  SOB      HPI:   Thank you for involving me in taking  care of dEil Martinez who  is a 80 y. o.year  Old female  Presents with  Worsening severe SOB with severe wheezing started demetra was seen in valve clinic yesterday was OK, says was in [place] ? Has allergic reaction to perfuse. Feels better after bronchodil treatment                     Past medical history:    has a past medical history of Arthritis, Asthma, Blood transfusion, Chronic bronchitis ([address] COPD (chronic obstructive pulmonary disease) ([address] Fatigue, H/O cardiac catheterization, H/O cardiovascular stress test, H/O Doppler ultrasound, H/O Doppler ultrasound, H/O echocardiogram, H/O echocardiogram, H/O echocardiogram, H/O echocardiogram, H/O echocardiogram, H/O exercise stress test, History of nuclear stress test, Holter monitor, abnormal, Normal cardiac stress test, On home O2, Syncope and collapse, Tachycardia, and Thyroid disease. Past surgical history:   has a past surgical history that includes joint replacement (); Hysterectomy (); Cholecystectomy (); Breast surgery (); Toe Surgery (Early s); Carpal tunnel release (s); Finger surgery; Gastric restriction surgery (); Cardiac surgery (); hernia repair (unknown); lipectomy (s); Dilatation, esophagus; other surgical history (2012); Cardiac catheterization (3/02/11); Hip fracture surgery (Left, 2015); Colonoscopy; Endoscopy, colon, diagnostic (2017); and Total shoulder arthroplasty (Left, 10/2017).   Social History:   reports that she quit

## 2019-09-06 NOTE — H&P
History and Physical  DERIK Michel-BC   Internal Medicine Hospitalist      Name:  Janina Mao /Age/Sex: 1935  (80 y.o. female)   MRN & CSN:  [id number] & [id number] Admission Date/Time: 2019 11:48 AM   Location:  ED16/ED-16 PCP: Tammi Phillips MD       Hospital Day: 1      Supervising Physician: Dr. Zacarias Stanley     Chief Complaint: Shortness of Breath     Assessment and Plan:   Janina Mao is a 80 y.o.  female who presents with Acute respiratory distress     Acute respiratory distress, 2/2 COPD exacerbation vs Suspected CAP vs lung nodule.  Acute COPD exacerbation - could be triggered by CAP   Suspected, Community acquired pneumonia of left lower lobe of lung - as per CTA.  Nodule of lower lobe of right lung - as per CTA Pulmo. - admit inpatient, telemetry monitoring          - consult Pulmonology         - ABx: Rocephin, Zithromax         - duoneb/HHN         - Mucinex         - Solumedrol         - continuous pulse oximetry monitoring         - pending Urine Legionella, Strep antigen, Resp PCR         - check lab works in AM          - pending cultures, Procalcitonin           H/o VHD - Severe AS per cardiac cath last 2019.         - cardiology, Dr. Jessica Brink consulted in ED         - plan to do TAVR next week      Thyroid disease - c/w synthroid.  HTN - BP stable, c/w Lisinopril, monitor BP trends. Current diagnosis and plan of management discussed with the patient and family at the time of admission in lay language who agree to the above plan and disposition of admission for further care. All concerns and questions addressed.       Patient assessment and plan in conjunction with supervising physician - Dr. John Villarreal Cardiac   DVT Prophylaxis [x] Lovenox, []  Heparin, [] SCDs, [] Ambulation  [] Long term AC   GI Prophylaxis [x] PPI,  [] H2 Blocker,  [x] Carafate,  [] Diet,  [] No GI prophylaxis, N/A: patient is not under significant

## 2019-09-07 LAB
ADENOVIRUS DETECTION BY PCR: NOT DETECTED
ANION GAP SERPL CALCULATED.3IONS-SCNC: 13 MMOL/L (ref 4–16)
BASOPHILS ABSOLUTE: 0 K/CU MM
BASOPHILS RELATIVE PERCENT: 0.1 % (ref 0–1)
BORDETELLA PERTUSSIS PCR: NOT DETECTED
BUN BLDV-MCNC: 8 MG/DL (ref 6–23)
CALCIUM SERPL-MCNC: 8.8 MG/DL (ref 8.3–10.6)
CHLAMYDOPHILA PNEUMONIA PCR: NOT DETECTED
CHLORIDE BLD-SCNC: 97 MMOL/L (ref 99–110)
CO2: 23 MMOL/L (ref 21–32)
CORONAVIRUS 229E PCR: NOT DETECTED
CORONAVIRUS HKU1 PCR: NOT DETECTED
CORONAVIRUS NL63 PCR: NOT DETECTED
CORONAVIRUS OC43 PCR: NOT DETECTED
CREAT SERPL-MCNC: 0.6 MG/DL (ref 0.6–1.1)
DIFFERENTIAL TYPE: ABNORMAL
EKG ATRIAL RATE: 88 BPM
EKG DIAGNOSIS: NORMAL
EKG P-R INTERVAL: 220 MS
EKG Q-T INTERVAL: 366 MS
EKG QRS DURATION: 74 MS
EKG QTC CALCULATION (BAZETT): 442 MS
EKG R AXIS: 51 DEGREES
EKG T AXIS: 78 DEGREES
EKG VENTRICULAR RATE: 88 BPM
EOSINOPHILS ABSOLUTE: 0 K/CU MM
EOSINOPHILS RELATIVE PERCENT: 0 % (ref 0–3)
GFR AFRICAN AMERICAN: >60 ML/MIN/1.73M2
GFR NON-AFRICAN AMERICAN: >60 ML/MIN/1.73M2
GLUCOSE BLD-MCNC: 187 MG/DL (ref 70–99)
HCT VFR BLD CALC: 33.7 % (ref 37–47)
HEMOGLOBIN: 10.5 GM/DL (ref 12.5–16)
HUMAN METAPNEUMOVIRUS PCR: NOT DETECTED
IMMATURE NEUTROPHIL %: 0.6 % (ref 0–0.43)
INFLUENZA A BY PCR: NOT DETECTED
INFLUENZA A H1 (2009) PCR: NOT DETECTED
INFLUENZA A H1 PANDEMIC PCR: NOT DETECTED
INFLUENZA A H3 PCR: NOT DETECTED
INFLUENZA B BY PCR: NOT DETECTED
LEGIONELLA URINARY AG: NEGATIVE
LYMPHOCYTES ABSOLUTE: 1.5 K/CU MM
LYMPHOCYTES RELATIVE PERCENT: 10.2 % (ref 24–44)
MCH RBC QN AUTO: 28.8 PG (ref 27–31)
MCHC RBC AUTO-ENTMCNC: 31.2 % (ref 32–36)
MCV RBC AUTO: 92.3 FL (ref 78–100)
MONOCYTES ABSOLUTE: 0.3 K/CU MM
MONOCYTES RELATIVE PERCENT: 1.9 % (ref 0–4)
MYCOPLASMA PNEUMONIAE PCR: NOT DETECTED
NUCLEATED RBC %: 0 %
PARAINFLUENZA 2 PCR: NOT DETECTED
PARAINFLUENZA 3 PCR: NOT DETECTED
PARAINFLUENZA 4 PCR: NOT DETECTED
PDW BLD-RTO: 15.9 % (ref 11.7–14.9)
PLATELET # BLD: 268 K/CU MM (ref 140–440)
PMV BLD AUTO: 9.9 FL (ref 7.5–11.1)
POTASSIUM SERPL-SCNC: 4 MMOL/L (ref 3.5–5.1)
PRO-BNP: 1032 PG/ML
RBC # BLD: 3.65 M/CU MM (ref 4.2–5.4)
RHINOVIRUS ENTEROVIRUS PCR: ABNORMAL
RSV PCR: NOT DETECTED
SEGMENTED NEUTROPHILS ABSOLUTE COUNT: 12.5 K/CU MM
SEGMENTED NEUTROPHILS RELATIVE PERCENT: 87.2 % (ref 36–66)
SODIUM BLD-SCNC: 133 MMOL/L (ref 135–145)
STREP PNEUMONIAE ANTIGEN: NORMAL
TOTAL IMMATURE NEUTOROPHIL: 0.09 K/CU MM
TOTAL NUCLEATED RBC: 0 K/CU MM
WBC # BLD: 14.3 K/CU MM (ref 4–10.5)

## 2019-09-07 PROCEDURE — 87486 CHLMYD PNEUM DNA AMP PROBE: CPT

## 2019-09-07 PROCEDURE — 6360000002 HC RX W HCPCS: Performed by: NURSE PRACTITIONER

## 2019-09-07 PROCEDURE — 2700000000 HC OXYGEN THERAPY PER DAY

## 2019-09-07 PROCEDURE — 6360000002 HC RX W HCPCS: Performed by: INTERNAL MEDICINE

## 2019-09-07 PROCEDURE — 83880 ASSAY OF NATRIURETIC PEPTIDE: CPT

## 2019-09-07 PROCEDURE — 80048 BASIC METABOLIC PNL TOTAL CA: CPT

## 2019-09-07 PROCEDURE — 87581 M.PNEUMON DNA AMP PROBE: CPT

## 2019-09-07 PROCEDURE — 6370000000 HC RX 637 (ALT 250 FOR IP): Performed by: NURSE PRACTITIONER

## 2019-09-07 PROCEDURE — 94640 AIRWAY INHALATION TREATMENT: CPT

## 2019-09-07 PROCEDURE — 87798 DETECT AGENT NOS DNA AMP: CPT

## 2019-09-07 PROCEDURE — 36415 COLL VENOUS BLD VENIPUNCTURE: CPT

## 2019-09-07 PROCEDURE — 85025 COMPLETE CBC W/AUTO DIFF WBC: CPT

## 2019-09-07 PROCEDURE — 94761 N-INVAS EAR/PLS OXIMETRY MLT: CPT

## 2019-09-07 PROCEDURE — 2140000000 HC CCU INTERMEDIATE R&B

## 2019-09-07 PROCEDURE — 6370000000 HC RX 637 (ALT 250 FOR IP): Performed by: PHYSICIAN ASSISTANT

## 2019-09-07 PROCEDURE — 93010 ELECTROCARDIOGRAM REPORT: CPT | Performed by: INTERNAL MEDICINE

## 2019-09-07 PROCEDURE — 99233 SBSQ HOSP IP/OBS HIGH 50: CPT | Performed by: INTERNAL MEDICINE

## 2019-09-07 PROCEDURE — 99221 1ST HOSP IP/OBS SF/LOW 40: CPT | Performed by: INTERNAL MEDICINE

## 2019-09-07 PROCEDURE — 2580000003 HC RX 258: Performed by: NURSE PRACTITIONER

## 2019-09-07 PROCEDURE — 87633 RESP VIRUS 12-25 TARGETS: CPT

## 2019-09-07 RX ORDER — METHYLPREDNISOLONE SODIUM SUCCINATE 40 MG/ML
40 INJECTION, POWDER, LYOPHILIZED, FOR SOLUTION INTRAMUSCULAR; INTRAVENOUS EVERY 12 HOURS
Status: DISCONTINUED | OUTPATIENT
Start: 2019-09-07 | End: 2019-09-09 | Stop reason: HOSPADM

## 2019-09-07 RX ORDER — METHYLPREDNISOLONE SODIUM SUCCINATE 40 MG/ML
40 INJECTION, POWDER, LYOPHILIZED, FOR SOLUTION INTRAMUSCULAR; INTRAVENOUS EVERY 12 HOURS
Status: DISCONTINUED | OUTPATIENT
Start: 2019-09-07 | End: 2019-09-07

## 2019-09-07 RX ORDER — DIPHENHYDRAMINE HCL 25 MG
50 TABLET ORAL NIGHTLY PRN
Status: DISCONTINUED | OUTPATIENT
Start: 2019-09-07 | End: 2019-09-09 | Stop reason: HOSPADM

## 2019-09-07 RX ORDER — LANOLIN ALCOHOL/MO/W.PET/CERES
3 CREAM (GRAM) TOPICAL NIGHTLY PRN
Status: DISCONTINUED | OUTPATIENT
Start: 2019-09-07 | End: 2019-09-09 | Stop reason: HOSPADM

## 2019-09-07 RX ORDER — FUROSEMIDE 10 MG/ML
40 INJECTION INTRAMUSCULAR; INTRAVENOUS ONCE
Status: COMPLETED | OUTPATIENT
Start: 2019-09-07 | End: 2019-09-07

## 2019-09-07 RX ADMIN — TIMOLOL MALEATE 1 DROP: 5 SOLUTION OPHTHALMIC at 08:47

## 2019-09-07 RX ADMIN — METHOCARBAMOL TABLETS 500 MG: 500 TABLET, COATED ORAL at 23:16

## 2019-09-07 RX ADMIN — IPRATROPIUM BROMIDE AND ALBUTEROL SULFATE 1 AMPULE: .5; 3 SOLUTION RESPIRATORY (INHALATION) at 19:13

## 2019-09-07 RX ADMIN — LISINOPRIL 5 MG: 5 TABLET ORAL at 08:43

## 2019-09-07 RX ADMIN — METHOCARBAMOL TABLETS 500 MG: 500 TABLET, COATED ORAL at 02:59

## 2019-09-07 RX ADMIN — DIPHENHYDRAMINE HYDROCHLORIDE 50 MG: 25 TABLET ORAL at 21:17

## 2019-09-07 RX ADMIN — METHYLPREDNISOLONE SODIUM SUCCINATE 40 MG: 40 INJECTION, POWDER, FOR SOLUTION INTRAMUSCULAR; INTRAVENOUS at 06:12

## 2019-09-07 RX ADMIN — AZITHROMYCIN MONOHYDRATE 500 MG: 500 INJECTION, POWDER, LYOPHILIZED, FOR SOLUTION INTRAVENOUS at 15:16

## 2019-09-07 RX ADMIN — ESCITALOPRAM OXALATE 10 MG: 10 TABLET ORAL at 08:43

## 2019-09-07 RX ADMIN — CEFTRIAXONE 1 G: 1 INJECTION, POWDER, FOR SOLUTION INTRAMUSCULAR; INTRAVENOUS at 14:29

## 2019-09-07 RX ADMIN — Medication 2000 UNITS: at 21:12

## 2019-09-07 RX ADMIN — Medication 10 ML: at 21:13

## 2019-09-07 RX ADMIN — SUCRALFATE 1 G: 1 TABLET ORAL at 18:22

## 2019-09-07 RX ADMIN — LEVOTHYROXINE SODIUM 75 MCG: 75 TABLET ORAL at 06:12

## 2019-09-07 RX ADMIN — ENOXAPARIN SODIUM 40 MG: 40 INJECTION SUBCUTANEOUS at 21:18

## 2019-09-07 RX ADMIN — IPRATROPIUM BROMIDE AND ALBUTEROL SULFATE 1 AMPULE: .5; 3 SOLUTION RESPIRATORY (INHALATION) at 14:44

## 2019-09-07 RX ADMIN — SENNOSIDES, DOCUSATE SODIUM 1 TABLET: 50; 8.6 TABLET, FILM COATED ORAL at 23:17

## 2019-09-07 RX ADMIN — CYANOCOBALAMIN TAB 1000 MCG 1000 MCG: 1000 TAB at 08:43

## 2019-09-07 RX ADMIN — IPRATROPIUM BROMIDE AND ALBUTEROL SULFATE 1 AMPULE: .5; 3 SOLUTION RESPIRATORY (INHALATION) at 00:07

## 2019-09-07 RX ADMIN — SUCRALFATE 1 G: 1 TABLET ORAL at 06:12

## 2019-09-07 RX ADMIN — IPRATROPIUM BROMIDE AND ALBUTEROL SULFATE 1 AMPULE: .5; 3 SOLUTION RESPIRATORY (INHALATION) at 10:46

## 2019-09-07 RX ADMIN — METHYLPREDNISOLONE SODIUM SUCCINATE 40 MG: 40 INJECTION, POWDER, FOR SOLUTION INTRAMUSCULAR; INTRAVENOUS at 21:13

## 2019-09-07 RX ADMIN — METHYLPREDNISOLONE SODIUM SUCCINATE 40 MG: 40 INJECTION, POWDER, FOR SOLUTION INTRAMUSCULAR; INTRAVENOUS at 00:23

## 2019-09-07 RX ADMIN — SENNOSIDES, DOCUSATE SODIUM 1 TABLET: 50; 8.6 TABLET, FILM COATED ORAL at 14:28

## 2019-09-07 RX ADMIN — SUCRALFATE 1 G: 1 TABLET ORAL at 12:06

## 2019-09-07 RX ADMIN — IPRATROPIUM BROMIDE AND ALBUTEROL SULFATE 1 AMPULE: .5; 3 SOLUTION RESPIRATORY (INHALATION) at 07:15

## 2019-09-07 RX ADMIN — SUCRALFATE 1 G: 1 TABLET ORAL at 21:13

## 2019-09-07 RX ADMIN — DIPHENHYDRAMINE HYDROCHLORIDE 50 MG: 25 TABLET ORAL at 01:38

## 2019-09-07 RX ADMIN — METHYLPREDNISOLONE SODIUM SUCCINATE 40 MG: 40 INJECTION, POWDER, FOR SOLUTION INTRAMUSCULAR; INTRAVENOUS at 12:06

## 2019-09-07 RX ADMIN — FUROSEMIDE 40 MG: 10 INJECTION, SOLUTION INTRAMUSCULAR; INTRAVENOUS at 18:22

## 2019-09-07 RX ADMIN — PANTOPRAZOLE SODIUM 40 MG: 40 TABLET, DELAYED RELEASE ORAL at 21:13

## 2019-09-07 ASSESSMENT — PAIN SCALES - GENERAL
PAINLEVEL_OUTOF10: 0

## 2019-09-07 NOTE — CONSULTS
Normal apical impulse, regular rate and rhythm, normal S1 and S2, no S3 or S4, and no murmur noted  CHEST/BREASTS:  symmetric  ABDOMEN: soft nt nd, no pulsitile masses  MUSCULOSKELETAL:  There is no redness, warmth, or swelling of the joints. Full range of motion noted. Motor strength is 5 out of 5 all extremities bilaterally. Tone is normal.  NEUROLOGIC:  Awake, alert, oriented to name, place and time. Cranial nerves II-XII are grossly intact. Motor is 5 out of 5 bilaterally. SKIN:  no bruising or bleeding and normal skin color, texture, turgor  VASC: 1+ femoral pulses        Data Review  CBC:   Lab Results   Component Value Date    WBC 14.3 09/07/2019    RBC 3.65 09/07/2019     BMP:   Lab Results   Component Value Date    GLUCOSE 187 09/07/2019    CO2 23 09/07/2019    BUN 8 09/07/2019    CREATININE 0.6 09/07/2019    CALCIUM 8.8 09/07/2019     Coagulation:   Lab Results   Component Value Date    INR 0.95 08/26/2019    APTT 29.7 08/26/2019       ECG: normal sinus rhythm, no blocks or conduction defects, no ischemic changes    CT chest reviewed, no significant pneumonia      Assessment:     Probable COPD exacerbation    Plan:     Patient has been improving and requiring less oxygen. She is currently being treated with both antibiotics and steroids. We will continue to follow and hope she is stable for surgery Wednesday.

## 2019-09-07 NOTE — CONSULTS
cardiovascular stress test 08/22/2019    H/O Doppler ultrasound 4/7/2016 3/19/12    carotid-4/16 WNL 3/12right mild less than 50%and left wnl    H/O Doppler ultrasound 6/14/017    carotid - mod disease EILEEN, mild disease LICA    H/O echocardiogram 7/23/10    H/O echocardiogram 4/15/2016    EF 60% sclerotic AV mildly stenosed-recommend yearly echo    H/O echocardiogram 06/13/2017    EF>55% mild-mod AS, triavial AR    H/O echocardiogram 09/14/2018    EF55-60% mod AS- mean pressure gradient increased from 13-31    H/O echocardiogram 08/22/2019    EF 55-60%, Minimal concentric left ventricular hypertrophy, left atrium is mildly dilated, severe aortic stenosis, Mitral annular calcification is present, Mild AR, Mild-Mod MR, Mild TR, No pericardial effusion     H/O exercise stress test 06/14/2017    abnormal    History of nuclear stress test 03/14/2017    EF 75%. Normal study.     Holter monitor, abnormal 2/22/11    infrequent APC are seen    Normal cardiac stress test 7/23/10    EF 70%, no ischemia    On home O2     only uses as needed, nightly prn    Syncope and collapse     Tachycardia     HX of tachycardia - had a cardioablation    Thyroid disease       Past Surgical History:        Procedure Laterality Date    BREAST SURGERY  2009    bilat    CARDIAC CATHETERIZATION  3/02/11    mild to moderate disease of diagonal and proximal RCA   [address]    ablation    CARPAL TUNNEL RELEASE  1990's    bilat    CHOLECYSTECTOMY  1961    open choley    COLONOSCOPY      DILATATION, ESOPHAGUS      ENDOSCOPY, COLON, DIAGNOSTIC  07/03/2017    s/p gastric bypass otherwise normal    FINGER SURGERY      right middle, thumb and index finger (screw and pin in right index finger)    GASTRIC RESTRICTION SURGERY  1984    stapled    HERNIA REPAIR  unknown    Inc hernia hernia repair    HIP FRACTURE SURGERY Left 11/04/2015    Left hip nail    HYSTERECTOMY  1966    Luke w/ BSO    JOINT REPLACEMENT  2004 negative for fevers, chills, diaphoresis, activity change, appetite change, fatigue, night sweats and unexpected weight change. EYES:  negative for blurred vision, eye discharge, visual disturbance and icterus  HEENT:  negative for hearing loss, tinnitus, ear drainage, sinus pressure, nasal congestion, epistaxis and snoring  RESPIRATORY:  See HPI  CARDIOVASCULAR:  negative for chest pain, palpitations, exertional chest pressure/discomfort, edema, syncope  GASTROINTESTINAL:  negative for nausea, vomiting, diarrhea, constipation, blood in stool and abdominal pain  GENITOURINARY:  negative for frequency, dysuria, urinary incontinence, decreased urine volume, and hematuria  HEMATOLOGIC/LYMPHATIC:  negative for easy bruising, bleeding and lymphadenopathy  ALLERGIC/IMMUNOLOGIC:  negative for recurrent infections, angioedema, anaphylaxis and drug reactions  ENDOCRINE:  negative for weight changes and diabetic symptoms including polyuria, polydipsia and polyphagia  MUSCULOSKELETAL:  negative for  pain, joint swelling, decreased range of motion and muscle weakness  NEUROLOGICAL:  negative for headaches, slurred speech, unilateral weakness  PSYCHIATRIC/BEHAVIORAL: negative for hallucinations, behavioral problems, confusion and agitation.      Objective:   PHYSICAL EXAM:      VITALS:  BP (!) 107/43   Pulse 61   Temp 98.1 °F (36.7 °C) (Oral)   Resp 17   Ht 5' 1\" (1.549 m)   Wt 127 lb (57.6 kg)   SpO2 96% Comment: placed on 2 lpm  BMI 24.00 kg/m²   24HR INTAKE/OUTPUT:      Intake/Output Summary (Last 24 hours) at 2019 1243  Last data filed at 2019 1206  Gross per 24 hour   Intake 370 ml   Output 400 ml   Net -30 ml     CURRENT PULSE OXIMETRY:  SpO2: 96 %(placed on 2 lpm)  24HR PULSE OXIMETRY RANGE:  SpO2  Av.3 %  Min: 91 %  Max: 96 %    CONSTITUTIONAL:  awake, alert, cooperative, no apparent distress, and appears stated age  NECK:  Supple, symmetrical, trachea midline, no adenopathy, thyroid symmetric, not

## 2019-09-07 NOTE — PROGRESS NOTES
mg in dextrose 5 % 250 mL IVPB  500 mg Intravenous Q24H Vicky Lyndon APRN - CNP        And    cefTRIAXone (ROCEPHIN) 1 g in dextrose 5 % 50 mL IVPB  1 g Intravenous Q24H Sonido Memos, APRN -  mL/hr at 09/07/19 1429 1 g at 09/07/19 1429    albuterol sulfate  (90 Base) MCG/ACT inhaler 2 puff  2 puff Inhalation Q6H PRN Sonido Memos, APRN - CNP        sucralfate (CARAFATE) tablet 1 g  1 g Oral 4x Daily AC & HS Sonido Memos, APRN - CNP   1 g at 09/07/19 1206    Calcium-Magnesium-Zinc 167-83-8 MG TABS 1 tablet  1 tablet Oral Daily Sonido Memos, APRN - CNP   Stopped at 09/07/19 0850    vitamin D CAPS 2,000 Units  2,000 Units Oral Nightly Sonido Memos, APRN - CNP   2,000 Units at 09/06/19 2048    Collagen CAPS 1 tablet  1 tablet Oral Daily Sonido Memos, APRN - CNP   Stopped at 09/07/19 0850    escitalopram (LEXAPRO) tablet 10 mg  10 mg Oral Daily Sonido Memos, APRN - CNP   10 mg at 09/07/19 0843    L-Arginine TABS 1 tablet  1 tablet Oral BID Sonido Memos, APRN - CNP   Stopped at 09/07/19 0849    levothyroxine (SYNTHROID) tablet 75 mcg  75 mcg Oral QAM AC Sonido Memos, APRN - CNP   75 mcg at 09/07/19 0612    lisinopril (PRINIVIL;ZESTRIL) tablet 5 mg  5 mg Oral Daily Sonido Memos, APRN - CNP   5 mg at 09/07/19 0843    methocarbamol (ROBAXIN) tablet 500 mg  500 mg Oral 4x Daily PRN Sonido Memos, APRN - CNP   500 mg at 09/07/19 0259    pantoprazole (PROTONIX) tablet 40 mg  40 mg Oral Nightly Vicky Haney, APRN - CNP   40 mg at 09/06/19 2049    sennosides-docusate sodium (SENOKOT-S) 8.6-50 MG tablet 1 tablet  1 tablet Oral Daily PRN Sonido Memos, APRN - CNP   1 tablet at 09/07/19 1428    timolol (TIMOPTIC) 0.5 % ophthalmic solution 1 drop  1 drop Both Eyes BID Sonido Cortez, APRN - CNP   1 drop at 09/07/19 0847    vitamin B-12 (CYANOCOBALAMIN) tablet 1,000 mcg  1,000 mcg Oral Daily Sonido Cortez, APRN - CNP   1,000 mcg at 09/07/19 0843       Subjective:     Patient 04/03/2019    CLARITYU CLEAR 02/27/2014    SPECGRAV 1.020 04/03/2019    SPECGRAV 1.009 02/27/2014    LEUKOCYTESUR neg 04/03/2019    LEUKOCYTESUR NEGATIVE 02/27/2014    BLOODU neg 04/03/2019    BLOODU NEGATIVE 02/27/2014    GLUCOSEU neg 04/03/2019       Xr Chest Standard (2 Vw)    Result Date: 8/26/2019  EXAMINATION: TWO XRAY VIEWS OF THE CHEST 8/26/2019 11:00 am COMPARISON: Priors dating back to at least 2017 HISTORY: [address]: Pre-op chest exam TECHNOLOGIST PROVIDED HISTORY: Reason for Exam: Pre-op for heart cath, pt states trouble with heart valves, prior heart caths. as well. FINDINGS: Bilateral shoulder prostheses are noted. Lungs are clear. No pneumothorax or consolidation. Cardiac silhouette is unremarkable. Mild hyperinflation which may represent good inspiratory effort versus air trapping physiology. No acute findings. As above. Xr Chest Pa Lateral W Both Oblique Projections    Result Date: 9/5/2019  EXAMINATION: CHEST XRAY SPECIAL VIEWS 9/5/2019 10:46 am COMPARISON: 08/28/2019. HISTORY: ORDERING SYSTEM PROVIDED HISTORY: pre op TECHNOLOGIST PROVIDED HISTORY: Wheezing and SOB noted with assessment Reason for exam:->pre op Reason for Exam: SOB and wheezing noticed on asessment Acuity: Unknown Type of Exam: Unknown Initial exam. FINDINGS: The cardiac silhouette mediastinal contours are normal.  The lungs are clear. No focal lung infiltrate. No pleural effusion or pneumothorax. Degenerative changes are noted along the spine. Shoulder arthroplasties are noted bilaterally. 1. No acute cardiopulmonary disease.      Xr Chest Portable    Result Date: 9/6/2019  EXAMINATION: ONE XRAY VIEW OF THE CHEST 9/6/2019 12:18 pm COMPARISON: 09/05/2019 HISTORY: ORDERING SYSTEM PROVIDED HISTORY: chest pain TECHNOLOGIST PROVIDED HISTORY: Reason for exam:->chest pain Reason for Exam: chest pain Acuity: Unknown Type of Exam: Unknown Relevant Medical/Surgical History: asthma & chronic

## 2019-09-07 NOTE — PROGRESS NOTES
Recent Labs     09/06/19  1224 09/07/19  0439   WBC 10.8* 14.3*   HGB 11.6* 10.5*   HCT 37.3 33.7*   MCV 93.3 92.3    268     BMP:   Recent Labs     09/06/19  1224 09/07/19  0439   * 133*   K 3.9 4.0   CL 99 97*   CO2 22 23   BUN 7 8   CREATININE 0.5* 0.6     PT/INR: No results for input(s): PROTIME, INR in the last 72 hours. BNP:    Recent Labs     09/05/19  0910 09/06/19  1224   PROBNP 700.0* 779.0*     TROPONIN:   Recent Labs     09/06/19  1224   TROPONINT <0.010        ECHO :   echocardiogram    Assessment:  80 y. o.year old who is admitted for  Chief Complaint   Patient presents with    Shortness of Breath    , active issues as noted below:  Impression:  Principal Problem:    Acute respiratory distress  Active Problems:    COPD exacerbation (Vincent June)    COPD with exacerbation (Vincent June)    Community acquired pneumonia of left lower lobe of lung ([facility]incent June)    Nodule of lower lobe of right lung  Resolved Problems:    * No resolved hospital problems. *            All labs, medications and tests reviewed by myself , continue all other medications of all above medical condition listed as is except for changes mentioned above. Thank you very much for consult , please call with questions.     Eleanor Capone MD 9/7/2019 5:13 PM

## 2019-09-08 ENCOUNTER — APPOINTMENT (OUTPATIENT)
Dept: GENERAL RADIOLOGY | Age: 84
DRG: 190 | End: 2019-09-08
Payer: MEDICARE

## 2019-09-08 PROCEDURE — 6370000000 HC RX 637 (ALT 250 FOR IP): Performed by: INTERNAL MEDICINE

## 2019-09-08 PROCEDURE — 94640 AIRWAY INHALATION TREATMENT: CPT

## 2019-09-08 PROCEDURE — 2700000000 HC OXYGEN THERAPY PER DAY

## 2019-09-08 PROCEDURE — 6370000000 HC RX 637 (ALT 250 FOR IP): Performed by: PHYSICIAN ASSISTANT

## 2019-09-08 PROCEDURE — 6370000000 HC RX 637 (ALT 250 FOR IP): Performed by: NURSE PRACTITIONER

## 2019-09-08 PROCEDURE — 2140000000 HC CCU INTERMEDIATE R&B

## 2019-09-08 PROCEDURE — 6360000002 HC RX W HCPCS: Performed by: INTERNAL MEDICINE

## 2019-09-08 PROCEDURE — 99233 SBSQ HOSP IP/OBS HIGH 50: CPT | Performed by: INTERNAL MEDICINE

## 2019-09-08 PROCEDURE — 94761 N-INVAS EAR/PLS OXIMETRY MLT: CPT

## 2019-09-08 PROCEDURE — 99232 SBSQ HOSP IP/OBS MODERATE 35: CPT | Performed by: INTERNAL MEDICINE

## 2019-09-08 PROCEDURE — 6360000002 HC RX W HCPCS: Performed by: NURSE PRACTITIONER

## 2019-09-08 PROCEDURE — 71045 X-RAY EXAM CHEST 1 VIEW: CPT

## 2019-09-08 PROCEDURE — 2580000003 HC RX 258: Performed by: NURSE PRACTITIONER

## 2019-09-08 RX ORDER — SENNA AND DOCUSATE SODIUM 50; 8.6 MG/1; MG/1
1 TABLET, FILM COATED ORAL 2 TIMES DAILY PRN
Status: DISCONTINUED | OUTPATIENT
Start: 2019-09-08 | End: 2019-09-09 | Stop reason: HOSPADM

## 2019-09-08 RX ORDER — FUROSEMIDE 20 MG/1
20 TABLET ORAL DAILY
Status: DISCONTINUED | OUTPATIENT
Start: 2019-09-08 | End: 2019-09-09 | Stop reason: HOSPADM

## 2019-09-08 RX ADMIN — SUCRALFATE 1 G: 1 TABLET ORAL at 21:42

## 2019-09-08 RX ADMIN — SUCRALFATE 1 G: 1 TABLET ORAL at 05:05

## 2019-09-08 RX ADMIN — SUCRALFATE 1 G: 1 TABLET ORAL at 10:03

## 2019-09-08 RX ADMIN — FUROSEMIDE 20 MG: 20 TABLET ORAL at 14:05

## 2019-09-08 RX ADMIN — IPRATROPIUM BROMIDE AND ALBUTEROL SULFATE 1 AMPULE: .5; 3 SOLUTION RESPIRATORY (INHALATION) at 14:33

## 2019-09-08 RX ADMIN — IPRATROPIUM BROMIDE AND ALBUTEROL SULFATE 1 AMPULE: .5; 3 SOLUTION RESPIRATORY (INHALATION) at 10:45

## 2019-09-08 RX ADMIN — TIMOLOL MALEATE 1 DROP: 5 SOLUTION OPHTHALMIC at 10:03

## 2019-09-08 RX ADMIN — ENOXAPARIN SODIUM 40 MG: 40 INJECTION SUBCUTANEOUS at 21:43

## 2019-09-08 RX ADMIN — Medication 10 ML: at 21:43

## 2019-09-08 RX ADMIN — CYANOCOBALAMIN TAB 1000 MCG 1000 MCG: 1000 TAB at 10:03

## 2019-09-08 RX ADMIN — LISINOPRIL 5 MG: 5 TABLET ORAL at 10:03

## 2019-09-08 RX ADMIN — IPRATROPIUM BROMIDE AND ALBUTEROL SULFATE 1 AMPULE: .5; 3 SOLUTION RESPIRATORY (INHALATION) at 20:13

## 2019-09-08 RX ADMIN — METHYLPREDNISOLONE SODIUM SUCCINATE 40 MG: 40 INJECTION, POWDER, FOR SOLUTION INTRAMUSCULAR; INTRAVENOUS at 22:43

## 2019-09-08 RX ADMIN — DIPHENHYDRAMINE HYDROCHLORIDE 50 MG: 25 TABLET ORAL at 21:41

## 2019-09-08 RX ADMIN — LEVOTHYROXINE SODIUM 75 MCG: 75 TABLET ORAL at 05:05

## 2019-09-08 RX ADMIN — Medication 2000 UNITS: at 21:42

## 2019-09-08 RX ADMIN — CEFTRIAXONE 1 G: 1 INJECTION, POWDER, FOR SOLUTION INTRAMUSCULAR; INTRAVENOUS at 14:05

## 2019-09-08 RX ADMIN — SENNOSIDES, DOCUSATE SODIUM 1 TABLET: 50; 8.6 TABLET, FILM COATED ORAL at 10:02

## 2019-09-08 RX ADMIN — METHYLPREDNISOLONE SODIUM SUCCINATE 40 MG: 40 INJECTION, POWDER, FOR SOLUTION INTRAMUSCULAR; INTRAVENOUS at 10:02

## 2019-09-08 RX ADMIN — PANTOPRAZOLE SODIUM 40 MG: 40 TABLET, DELAYED RELEASE ORAL at 21:42

## 2019-09-08 RX ADMIN — Medication 10 ML: at 10:02

## 2019-09-08 RX ADMIN — ESCITALOPRAM OXALATE 10 MG: 10 TABLET ORAL at 10:03

## 2019-09-08 RX ADMIN — SENNOSIDES, DOCUSATE SODIUM 1 TABLET: 50; 8.6 TABLET, FILM COATED ORAL at 21:42

## 2019-09-08 RX ADMIN — IPRATROPIUM BROMIDE AND ALBUTEROL SULFATE 1 AMPULE: .5; 3 SOLUTION RESPIRATORY (INHALATION) at 07:18

## 2019-09-08 RX ADMIN — METHOCARBAMOL TABLETS 500 MG: 500 TABLET, COATED ORAL at 21:41

## 2019-09-08 RX ADMIN — SUCRALFATE 1 G: 1 TABLET ORAL at 17:55

## 2019-09-08 RX ADMIN — AZITHROMYCIN MONOHYDRATE 500 MG: 500 INJECTION, POWDER, LYOPHILIZED, FOR SOLUTION INTRAVENOUS at 14:05

## 2019-09-08 ASSESSMENT — PAIN SCALES - GENERAL
PAINLEVEL_OUTOF10: 0
PAINLEVEL_OUTOF10: 0

## 2019-09-08 NOTE — PROGRESS NOTES
disturbance    Anemia    Dizziness    Acquired hypothyroidism    Murmur    Age-related osteoporosis without current pathological fracture    Black tongue    COPD exacerbation (HCC)    HTN (hypertension)    Vitamin D deficiency    Vitamin B12 deficiency    Gastroesophageal reflux disease    VHD (valvular heart disease)    Aortic stenosis, severe    Acute respiratory distress    COPD with exacerbation [address] [address]    Community acquired pneumonia of left lower lobe of lung (HCC)    Nodule of lower lobe of right lung       Pt is back to 2 L NC which is her home dose    Ok for discharge when able to ambulate and feeling stable. Pt is discussing wanting to stay until procedure.     Plan for TAVR Wednesday  Electronically signed by Bennett Jett MD on 9/8/2019 at 10:44 AM

## 2019-09-08 NOTE — PROGRESS NOTES
ophthalmic solution 1 drop  1 drop Both Eyes BID DERIK Negron CNP   1 drop at 09/08/19 1003    vitamin B-12 (CYANOCOBALAMIN) tablet 1,000 mcg  1,000 mcg Oral Daily DERIK Negron CNP   1,000 mcg at 09/08/19 1003       Subjective:     Patient states shortness of breath is better. No acute events since admission. Objective: Intake/Output Summary (Last 24 hours) at 9/8/2019 1331  Last data filed at 9/8/2019 1227  Gross per 24 hour   Intake 1140 ml   Output 4500 ml   Net -3360 ml      Vitals:   Vitals:    09/08/19 1047   BP:    Pulse:    Resp:    Temp:    SpO2: 100%     Physical Exam:  General Appearance:    Alert, cooperative, no distress  Head:      Normocephalic, without obvious abnormality, atraumatic  Eyes:       Conjunctiva/corneas clear, EOM's intact  Lungs:  Stable B/L intermittent wheezing+  Heart:                Regular rate and rhythm, S1 and loud S2.  ESM+  Abdomen:     Soft, non-tender, bowel sounds active, no masses, no organomegaly  Extremities:   Extremities normal, atraumatic, no cyanosis or edema  Neurological:   Grossly Intact. Significant Diagnostic Studies:   DATA:    CBC   Recent Labs     09/06/19  1224 09/07/19  0439   WBC 10.8* 14.3*   HGB 11.6* 10.5*   HCT 37.3 33.7*    268      BMP   Recent Labs     09/06/19  1224 09/07/19  0439   * 133*   K 3.9 4.0   CL 99 97*   CO2 22 23   BUN 7 8   CREATININE 0.5* 0.6     LFT'S   Recent Labs     09/06/19  1224   AST 23   ALT 15   BILITOT 0.7   ALKPHOS 47     COAG No results for input(s): INR in the last 72 hours. POC:   Lab Results   Component Value Date    POCGLU 145 11/17/2015    POCGLU 75 11/16/2015     [id number]:  Lab Results   Component Value Date    LABA1C 5.3 08/28/2019     CARDIAC ENZYMES  No results for input(s): CKTOTAL, CKMB, CKMBINDEX, TROPONINI in the last 72 hours.   Troponin:   Recent Labs     09/06/19  1224   TROPONINT <0.010     BNP:   Recent Labs     09/06/19  1224 09/07/19  1758   PROBNP 779.0* 1,032*     U/A:    Lab Results   Component Value Date    NITRITE neg 04/03/2019    COLORU yellow 04/03/2019    COLORU YELLOW 02/27/2014    WBCUA <1 02/27/2014    RBCUA 3 02/27/2014    MUCUS NEGATIVE 02/27/2014    BACTERIA NEGATIVE 02/27/2014    CLARITYU clear 04/03/2019    CLARITYU CLEAR 02/27/2014    SPECGRAV 1.020 04/03/2019    SPECGRAV 1.009 02/27/2014    LEUKOCYTESUR neg 04/03/2019    LEUKOCYTESUR NEGATIVE 02/27/2014    BLOODU neg 04/03/2019    BLOODU NEGATIVE 02/27/2014    GLUCOSEU neg 04/03/2019       Xr Chest Standard (2 Vw)    Result Date: 8/26/2019  EXAMINATION: TWO XRAY VIEWS OF THE CHEST 8/26/2019 11:00 am COMPARISON: Priors dating back to at least 2017 HISTORY: [address] [facility]: Pre-op chest exam TECHNOLOGIST PROVIDED HISTORY: Reason for Exam: Pre-op for heart cath, pt states trouble with heart valves, prior heart caths. as well. FINDINGS: Bilateral shoulder prostheses are noted. Lungs are clear. No pneumothorax or consolidation. Cardiac silhouette is unremarkable. Mild hyperinflation which may represent good inspiratory effort versus air trapping physiology. No acute findings. As above. Xr Chest Pa Lateral W Both Oblique Projections    Result Date: 9/5/2019  EXAMINATION: CHEST XRAY SPECIAL VIEWS 9/5/2019 10:46 am COMPARISON: 08/28/2019. HISTORY: ORDERING SYSTEM PROVIDED HISTORY: pre op TECHNOLOGIST PROVIDED HISTORY: Wheezing and SOB noted with assessment Reason for exam:->pre op Reason for Exam: SOB and wheezing noticed on asessment Acuity: Unknown Type of Exam: Unknown Initial exam. FINDINGS: The cardiac silhouette mediastinal contours are normal.  The lungs are clear. No focal lung infiltrate. No pleural effusion or pneumothorax. Degenerative changes are noted along the spine. Shoulder arthroplasties are noted bilaterally. 1. No acute cardiopulmonary disease.      Xr Chest Portable    Result Date: 9/6/2019  EXAMINATION: ONE XRAY VIEW OF THE CHEST 9/6/2019 12:18 thoracic adenopathy. No suspicious pulmonary nodule. Pleural surfaces are clear. Central airways are patent. Postoperative changes from bilateral shoulder arthroplasty. No lytic or blastic osseous lesion. CTA ABDOMEN: Moderate focal stenosis at the celiac trunk origin. Moderate stenosis at the SMA origin. Mild stenosis of the left renal artery origin. No abdominal aortic aneurysm or dissection. The liver, spleen, pancreas, adrenal glands and kidneys demonstrate no acute abnormality. Status post cholecystectomy. Status post bariatric surgery with Kathy-en-Y gastric bypass. No dilated or inflamed loops of bowel. No abdominal adenopathy. No ascites. CTA PELVIS: No pelvic mass, adenopathy or fluid collection. Postoperative changes in the left hip. No osseous abnormality. TAVR MEASUREMENTS: Aortic sinus measurement:  30 mm. Sinotubular junction measurement:  22 mm. Ascending thoracic aorta measurement at the right pulmonary artery:  30 x 31 mm. Proximal aortic arch measurement:  26 mm. Distal arch measurement:  22 mm. Descending thoracic aorta measurement at right pulmonary artery:  23 x 23 mm. Aortic measurements at diaphragmatic hiatus:  21 x 22 mm. Minimal thoracic aortic measurement: 21 x 22 mm. Maximum thoracic aortic measurement: 30 mm. Minimum right subclavian artery measurement: 6 mm. Minimum left subclavian artery measurement: 7 mm. Maximum abdominal aortic measurement: 16 mm. Minimal abdominal aortic measurement: 14 mm. Minimum left common iliac artery measurement: 10 mm. Minimum left external iliac measurement: 6 mm. Minimal left femoral artery measurement: 6 mm. Minimum right common iliac artery measurement: 8 mm. Minimum right external iliac measurement: 6 mm. Minimal right femoral artery measurement: 6 mm. 1. Preoperative planning CTA of the chest, abdomen and pelvis. 2. Circumferential wall thickening in the distal esophagus. Findings could relate to esophagitis or neoplasm.   Endoscopy can

## 2019-09-09 VITALS
RESPIRATION RATE: 18 BRPM | DIASTOLIC BLOOD PRESSURE: 64 MMHG | TEMPERATURE: 98.6 F | BODY MASS INDEX: 23.56 KG/M2 | OXYGEN SATURATION: 100 % | HEART RATE: 55 BPM | HEIGHT: 61 IN | SYSTOLIC BLOOD PRESSURE: 113 MMHG | WEIGHT: 124.8 LBS

## 2019-09-09 PROCEDURE — 94761 N-INVAS EAR/PLS OXIMETRY MLT: CPT

## 2019-09-09 PROCEDURE — 6370000000 HC RX 637 (ALT 250 FOR IP): Performed by: INTERNAL MEDICINE

## 2019-09-09 PROCEDURE — 6370000000 HC RX 637 (ALT 250 FOR IP): Performed by: NURSE PRACTITIONER

## 2019-09-09 PROCEDURE — 94640 AIRWAY INHALATION TREATMENT: CPT

## 2019-09-09 PROCEDURE — 2580000003 HC RX 258: Performed by: NURSE PRACTITIONER

## 2019-09-09 PROCEDURE — 99232 SBSQ HOSP IP/OBS MODERATE 35: CPT | Performed by: INTERNAL MEDICINE

## 2019-09-09 RX ORDER — METHYLPREDNISOLONE 4 MG/1
TABLET ORAL
Qty: 1 KIT | Refills: 0 | Status: SHIPPED | OUTPATIENT
Start: 2019-09-09 | End: 2019-11-05

## 2019-09-09 RX ORDER — FUROSEMIDE 20 MG/1
20 TABLET ORAL DAILY
Qty: 10 TABLET | Refills: 0 | Status: SHIPPED | OUTPATIENT
Start: 2019-09-10 | End: 2020-02-03

## 2019-09-09 RX ORDER — AMOXICILLIN AND CLAVULANATE POTASSIUM 875; 125 MG/1; MG/1
1 TABLET, FILM COATED ORAL 2 TIMES DAILY
Qty: 8 TABLET | Refills: 0 | Status: SHIPPED | OUTPATIENT
Start: 2019-09-09 | End: 2019-09-13

## 2019-09-09 RX ADMIN — FUROSEMIDE 20 MG: 20 TABLET ORAL at 09:34

## 2019-09-09 RX ADMIN — ESCITALOPRAM OXALATE 10 MG: 10 TABLET ORAL at 09:33

## 2019-09-09 RX ADMIN — LEVOTHYROXINE SODIUM 75 MCG: 75 TABLET ORAL at 05:55

## 2019-09-09 RX ADMIN — SUCRALFATE 1 G: 1 TABLET ORAL at 05:55

## 2019-09-09 RX ADMIN — SUCRALFATE 1 G: 1 TABLET ORAL at 09:33

## 2019-09-09 RX ADMIN — LISINOPRIL 5 MG: 5 TABLET ORAL at 09:33

## 2019-09-09 RX ADMIN — IPRATROPIUM BROMIDE AND ALBUTEROL SULFATE 1 AMPULE: .5; 3 SOLUTION RESPIRATORY (INHALATION) at 08:49

## 2019-09-09 RX ADMIN — Medication 10 ML: at 09:34

## 2019-09-09 RX ADMIN — CYANOCOBALAMIN TAB 1000 MCG 1000 MCG: 1000 TAB at 09:33

## 2019-09-09 ASSESSMENT — PAIN SCALES - GENERAL: PAINLEVEL_OUTOF10: 0

## 2019-09-09 NOTE — DISCHARGE SUMMARY
albuterol (PROVENTIL HFA;VENTOLIN HFA) 108 (90 BASE) MCG/ACT inhaler  Inhale 2 puffs into the lungs every 6 hours as needed for Wheezing. amoxicillin-clavulanate (AUGMENTIN) 875-125 MG per tablet  Take 1 tablet by mouth 2 times daily for 4 days             Calcium-Magnesium-Zinc 167-83-8 MG TABS  Take 1 tablet by mouth daily             Cholecalciferol (VITAMIN D3) 2000 UNITS CAPS  Take 1 capsule by mouth nightly              COLLAGEN PO  Take by mouth             escitalopram (LEXAPRO) 10 MG tablet  Take 1 tablet by mouth daily             furosemide (LASIX) 20 MG tablet  Take 1 tablet by mouth daily for 10 days             glycopyrrolate-formoterol (BEVESPI AEROSPHERE) 9-4.8 MCG/ACT AERO  Inhale 2 puffs into the lungs 2 times daily             L-Arginine 500 MG TABS  Take 1 tablet by mouth 2 times daily             levothyroxine (SYNTHROID) 75 MCG tablet  Take 1 tablet by mouth daily             lisinopril (PRINIVIL;ZESTRIL) 5 MG tablet  TAKE ONE TABLET BY MOUTH ONCE DAILY             methocarbamol (ROBAXIN) 500 MG tablet  Take 1 tablet by mouth 4 times daily as needed (muscle spasm)             methylPREDNISolone (MEDROL DOSEPACK) 4 MG tablet  Take by mouth. ondansetron (ZOFRAN) 4 MG tablet  Take 1 tablet by mouth every 8 hours as needed for Nausea             OXYGEN  Inhale 2 L into the lungs nightly as needed (uses as needed nightly)              pantoprazole (PROTONIX) 40 MG tablet  Take 1 tablet by mouth nightly             senna-docusate (PERICOLACE) 8.6-50 MG per tablet  Take 1 tablet by mouth 2 times daily              sucralfate (CARAFATE) 1 GM tablet  Take 1 tablet by mouth 4 times daily             timolol (BETIMOL) 0.5 % ophthalmic solution  Place 1 drop into both eyes daily              vitamin B-12 (CYANOCOBALAMIN) 1000 MCG tablet  Take 1,000 mcg by mouth daily                   Diet:  DIET CARDIAC;     Activity:   activity as tolerated     Discharge Condition: good    Disposition:   home    Follow-up    Roel Brown MD  Schedule an appointment as soon as possible for a visit  Medical Management  Repeat CT for pulmonary nodule  [address] [facility]   Devaughn Raya MD  Go on 9/11/2019  TAVR procedure in hospital on Wednesday  [address] [address]  [phone]          Discharge Physician Signed: Shawn White

## 2019-09-09 NOTE — DISCHARGE INSTR - COC
Risk of Unplanned Readmission:        15           Discharging to Facility/ Agency   · Name:   · Address:  · Phone:  · Fax:    Dialysis Facility (if applicable)   · Name:  · Address:  · Dialysis Schedule:  · Phone:  · Fax:    / signature: {Esignature:[id number]}    PHYSICIAN SECTION    Prognosis: {Prognosis:4245932392}    Condition at Discharge: Manuel Hope Patient Condition:[id number]}    Rehab Potential (if transferring to Rehab): {Prognosis:4129806118}    Recommended Labs or Other Treatments After Discharge: ***    Physician Certification: I certify the above information and transfer of Royer Stallings  is necessary for the continuing treatment of the diagnosis listed and that she requires {Admit to Appropriate Level of Care:30801} for {GREATER/LESS:977959940} 30 days.      Update Admission H&P: {CHP DME Changes in PLQQD:421919973}    PHYSICIAN SIGNATURE:  {Esignature:[id number]}

## 2019-09-09 NOTE — PROGRESS NOTES
(5-1-2012); Cardiac catheterization (3/02/11); Hip fracture surgery (Left, 11/04/2015); Colonoscopy; Endoscopy, colon, diagnostic (07/03/2017); and Total shoulder arthroplasty (Left, 10/2017). Social History:   reports that she quit smoking about 57 years ago. She has a 15.00 pack-year smoking history. She has never used smokeless tobacco. She reports that she does not drink alcohol or use drugs. Family history:  family history includes Arthritis in her father; Cancer in her sister; Diabetes in her brother and mother; Early Death in her son; Heart Disease in her father; High Blood Pressure in her daughter and father; High Cholesterol in her father; Other in her father, mother, sister, sister, son, and son; Stroke in her son; Vision Loss in her sister. Allergies   Allergen Reactions    Morphine Anaphylaxis     Dilaudid OK    Bactrim [Sulfamethoxazole-Trimethoprim]     Butorphanol      Other reaction(s): Other - comment required  \"out of body experience\"    Influenza Vaccines      Ended in hospital stay for 3 days told by md not to get it anymore     Lactose Intolerance (Gi) Nausea Only    Phenergan [Promethazine Hcl] Other (See Comments)     Makes me jerk all over. Zofran OK    Promethazine     Stadol [Butorphanol Tartrate] Other (See Comments)     Out of body experience. Was told it was a near death experience and was placed in ICU. Dilaudid OK    Tape Arun Ditto Tape] Other (See Comments)     Plastic cause itching and rash. Paper tape causes my skin to blister.  (Tega-derm and Coban OK to use.)       Review of Systems:    All 14 systems were reviewed and are negative  Except for the positive findings  which as documented     /64   Pulse 55   Temp 98.6 °F (37 °C) (Oral)   Resp 18   Ht 5' 1\" (1.549 m)   Wt 124 lb 12.8 oz (56.6 kg)   SpO2 100%   BMI 23.58 kg/m²       Intake/Output Summary (Last 24 hours) at 9/9/2019 1114  Last data filed at 9/9/2019 0805  Gross per 24 hour   Intake 480 ml

## 2019-09-10 ENCOUNTER — CARE COORDINATION (OUTPATIENT)
Dept: CASE MANAGEMENT | Age: 84
End: 2019-09-10

## 2019-09-10 DIAGNOSIS — J18.9 COMMUNITY ACQUIRED PNEUMONIA OF LEFT LOWER LOBE OF LUNG: Primary | ICD-10-CM

## 2019-09-10 LAB
CULTURE: ABNORMAL
CULTURE: ABNORMAL
Lab: ABNORMAL
SPECIMEN: ABNORMAL

## 2019-09-10 PROCEDURE — 1111F DSCHRG MED/CURRENT MED MERGE: CPT | Performed by: INTERNAL MEDICINE

## 2019-09-10 NOTE — ANESTHESIA PRE PROCEDURE
Department of Anesthesiology  Preprocedure Note       Name:  Charlie Balderas   Age:  80 y.o.  :  1935                                          MRN:  [id number]         Date:  9/10/2019      Surgeon: * Surgery not found *    Procedure: TAVR W/ ANESTHESIA    Medications prior to admission:   Prior to Admission medications    Medication Sig Start Date End Date Taking? Authorizing Provider   furosemide (LASIX) 20 MG tablet Take 1 tablet by mouth daily for 10 days 9/10/19 9/20/19  Nathan Jacinto MD   methylPREDNISolone (MEDROL DOSEPACK) 4 MG tablet Take by mouth.  19   Nathan Jacinto MD   amoxicillin-clavulanate (AUGMENTIN) 638-636 MG per tablet Take 1 tablet by mouth 2 times daily for 4 days 19  Nathan Jacinto MD   escitalopram (LEXAPRO) 10 MG tablet Take 1 tablet by mouth daily 19   Hugo Moreland MD   COLLAGEN PO Take by mouth    Historical Provider, MD   ondansetron [facility]) 4 MG tablet Take 1 tablet by mouth every 8 hours as needed for Nausea 19   Hugo Moreland MD   methocarbamol (ROBAXIN) 500 MG tablet Take 1 tablet by mouth 4 times daily as needed (muscle spasm) 19   Hugo Moreland MD   pantoprazole (PROTONIX) 40 MG tablet Take 1 tablet by mouth nightly 3/4/19   Hugo Moreland MD   levothyroxine (SYNTHROID) 75 MCG tablet Take 1 tablet by mouth daily 19   Historical Provider, MD   vitamin B-12 (CYANOCOBALAMIN) 1000 MCG tablet Take 1,000 mcg by mouth daily    Historical Provider, MD   senna-docusate (PERICOLACE) 8.6-50 MG per tablet Take 1 tablet by mouth 2 times daily     Historical Provider, MD   sucralfate (CARAFATE) 1 GM tablet Take 1 tablet by mouth 4 times daily 18   Fartun Alejandre MD   glycopyrrolate-formoterol (BEVESPI AEROSPHERE) 9-4.8 MCG/ACT AERO Inhale 2 puffs into the lungs 2 times daily 18   Hugo Moreland MD   timolol (BETIMOL) 0.5 % ophthalmic solution Place 1 drop into both eyes daily     Historical Provider, MD   L-Arginine ophthalmic solution Place 1 drop into both eyes daily       L-Arginine 500 MG TABS Take 1 tablet by mouth 2 times daily      Calcium-Magnesium-Zinc 167-83-8 MG TABS Take 1 tablet by mouth daily      lisinopril (PRINIVIL;ZESTRIL) 5 MG tablet TAKE ONE TABLET BY MOUTH ONCE DAILY 90 tablet 3    OXYGEN Inhale 2 L into the lungs nightly as needed (uses as needed nightly)       Cholecalciferol (VITAMIN D3) 2000 UNITS CAPS Take 1 capsule by mouth nightly       albuterol (PROVENTIL HFA;VENTOLIN HFA) 108 (90 BASE) MCG/ACT inhaler Inhale 2 puffs into the lungs every 6 hours as needed for Wheezing. No current facility-administered medications for this encounter. Allergies: Allergies   Allergen Reactions    Morphine Anaphylaxis     Dilaudid OK    Bactrim [Sulfamethoxazole-Trimethoprim]     Butorphanol      Other reaction(s): Other - comment required  \"out of body experience\"    Influenza Vaccines      Ended in hospital stay for 3 days told by md not to get it anymore     Lactose Intolerance (Gi) Nausea Only    Phenergan [Promethazine Hcl] Other (See Comments)     Makes me jerk all over. Zofran OK    Promethazine     Stadol [Butorphanol Tartrate] Other (See Comments)     Out of body experience. Was told it was a near death experience and was placed in ICU. Dilaudid OK    Tape [place] Rise Tape] Other (See Comments)     Plastic cause itching and rash. Paper tape causes my skin to blister.  (Tega-derm and Coban OK to use.)       Problem List:    Patient Active Problem List   Diagnosis Code    Closed intertrochanteric fracture of left femur ([facility] [address] S72.142A    Gait disturbance R26.9    Anemia D64.9    Dizziness R42    Acquired hypothyroidism E03.9    Murmur R01.1    Age-related osteoporosis without current pathological fracture M81.0    Black tongue K14.3    COPD exacerbation ([address] J44.1    HTN (hypertension) I10    Vitamin D deficiency E55.9    Vitamin B12 deficiency E53.8    Gastroesophageal reflux disease K21.9    VHD (valvular heart disease) I38    Aortic stenosis, severe I35.0    Acute respiratory distress R06.03    COPD with exacerbation ([facility] [address] J44.1    Community acquired pneumonia of left lower lobe of lung ([facility] [address] J18.1    Nodule of lower lobe of right lung R91.1       Past Medical History:        Diagnosis Date    Arthritis     generalized    Asthma     Blood transfusion 2004    No reaction    Chronic bronchitis (HCC)     COPD (chronic obstructive pulmonary disease) ([facility] [address]     summer 2014    Fatigue     H/O cardiac catheterization 06/15/2017    mild lad and cx disease    H/O cardiovascular stress test 08/22/2019    H/O Doppler ultrasound 4/7/2016 3/19/12    carotid-4/16 WNL 3/12right mild less than 50%and left wnl    H/O Doppler ultrasound 6/14/017    carotid - mod disease EILEEN, mild disease LICA    H/O echocardiogram 7/23/10    H/O echocardiogram 4/15/2016    EF 60% sclerotic AV mildly stenosed-recommend yearly echo    H/O echocardiogram 06/13/2017    EF>55% mild-mod AS, triavial AR    H/O echocardiogram 09/14/2018    EF55-60% mod AS- mean pressure gradient increased from 13-31    H/O echocardiogram 08/22/2019    EF 55-60%, Minimal concentric left ventricular hypertrophy, left atrium is mildly dilated, severe aortic stenosis, Mitral annular calcification is present, Mild AR, Mild-Mod MR, Mild TR, No pericardial effusion     H/O exercise stress test 06/14/2017    abnormal    History of nuclear stress test 03/14/2017    EF 75%. Normal study.     Holter monitor, abnormal 2/22/11    infrequent APC are seen    Normal cardiac stress test 7/23/10    EF 70%, no ischemia    On home O2     only uses as needed, nightly prn    Syncope and collapse     Tachycardia     HX of tachycardia - had a cardioablation    Thyroid disease        Past Surgical History:        Procedure Laterality Date    BREAST SURGERY  2009    bilat    CARDIAC CATHETERIZATION  3/02/11    mild to moderate

## 2019-09-11 ENCOUNTER — ANESTHESIA (OUTPATIENT)
Dept: CARDIAC CATH/INVASIVE PROCEDURES | Age: 84
End: 2019-09-11

## 2019-09-11 ENCOUNTER — HOSPITAL ENCOUNTER (OUTPATIENT)
Dept: CARDIAC CATH/INVASIVE PROCEDURES | Age: 84
Discharge: HOME | End: 2019-09-11
Attending: INTERNAL MEDICINE | Admitting: SURGERY
Payer: MEDICARE

## 2019-09-11 ENCOUNTER — APPOINTMENT (OUTPATIENT)
Dept: GENERAL RADIOLOGY | Age: 84
End: 2019-09-11
Attending: INTERNAL MEDICINE
Payer: MEDICARE

## 2019-09-11 VITALS
OXYGEN SATURATION: 98 % | DIASTOLIC BLOOD PRESSURE: 65 MMHG | HEIGHT: 61 IN | SYSTOLIC BLOOD PRESSURE: 175 MMHG | WEIGHT: 124 LBS | HEART RATE: 56 BPM | RESPIRATION RATE: 15 BRPM | BODY MASS INDEX: 23.41 KG/M2 | TEMPERATURE: 98.6 F

## 2019-09-11 LAB
APTT: 27.6 SECONDS (ref 21.2–33)
CULTURE: NORMAL
EKG ATRIAL RATE: 52 BPM
EKG DIAGNOSIS: NORMAL
EKG P AXIS: 39 DEGREES
EKG P-R INTERVAL: 158 MS
EKG Q-T INTERVAL: 464 MS
EKG QRS DURATION: 70 MS
EKG QTC CALCULATION (BAZETT): 431 MS
EKG R AXIS: 40 DEGREES
EKG T AXIS: 66 DEGREES
EKG VENTRICULAR RATE: 52 BPM
HCT VFR BLD CALC: 36.2 % (ref 37–47)
HEMOGLOBIN: 11.5 GM/DL (ref 12.5–16)
INR BLD: 0.86 INDEX
Lab: NORMAL
MCH RBC QN AUTO: 28.8 PG (ref 27–31)
MCHC RBC AUTO-ENTMCNC: 31.8 % (ref 32–36)
MCV RBC AUTO: 90.5 FL (ref 78–100)
PDW BLD-RTO: 15.8 % (ref 11.7–14.9)
PLATELET # BLD: 348 K/CU MM (ref 140–440)
PMV BLD AUTO: 9.7 FL (ref 7.5–11.1)
PROTHROMBIN TIME: 9.8 SECONDS (ref 9.12–12.5)
RBC # BLD: 4 M/CU MM (ref 4.2–5.4)
REASON FOR REJECTION: NORMAL
REASON FOR REJECTION: NORMAL
REJECTED TEST: NORMAL
SPECIMEN: NORMAL
WBC # BLD: 8.1 K/CU MM (ref 4–10.5)

## 2019-09-11 PROCEDURE — 2500000003 HC RX 250 WO HCPCS

## 2019-09-11 PROCEDURE — 93010 ELECTROCARDIOGRAM REPORT: CPT | Performed by: INTERNAL MEDICINE

## 2019-09-11 PROCEDURE — 6360000004 HC RX CONTRAST MEDICATION

## 2019-09-11 PROCEDURE — 2709999900 HC NON-CHARGEABLE SUPPLY

## 2019-09-11 PROCEDURE — C1769 GUIDE WIRE: HCPCS

## 2019-09-11 PROCEDURE — C1894 INTRO/SHEATH, NON-LASER: HCPCS

## 2019-09-11 PROCEDURE — 85027 COMPLETE CBC AUTOMATED: CPT

## 2019-09-11 PROCEDURE — 85730 THROMBOPLASTIN TIME PARTIAL: CPT

## 2019-09-11 PROCEDURE — C1760 CLOSURE DEV, VASC: HCPCS

## 2019-09-11 PROCEDURE — 6360000002 HC RX W HCPCS

## 2019-09-11 PROCEDURE — 85610 PROTHROMBIN TIME: CPT

## 2019-09-11 PROCEDURE — 71045 X-RAY EXAM CHEST 1 VIEW: CPT

## 2019-09-11 PROCEDURE — 93005 ELECTROCARDIOGRAM TRACING: CPT | Performed by: INTERNAL MEDICINE

## 2019-09-11 RX ORDER — 0.9 % SODIUM CHLORIDE 0.9 %
250 INTRAVENOUS SOLUTION INTRAVENOUS ONCE
Status: DISCONTINUED | OUTPATIENT
Start: 2019-09-11 | End: 2019-09-11 | Stop reason: HOSPADM

## 2019-09-11 RX ORDER — CEFAZOLIN SODIUM 1 G/50ML
1 INJECTION, SOLUTION INTRAVENOUS ONCE
Status: DISCONTINUED | OUTPATIENT
Start: 2019-09-11 | End: 2019-09-11 | Stop reason: HOSPADM

## 2019-09-12 ENCOUNTER — ANESTHESIA EVENT (OUTPATIENT)
Dept: CARDIAC CATH/INVASIVE PROCEDURES | Age: 84
DRG: 267 | End: 2019-09-12
Payer: MEDICARE

## 2019-09-13 ENCOUNTER — CARE COORDINATION (OUTPATIENT)
Dept: CASE MANAGEMENT | Age: 84
End: 2019-09-13

## 2019-09-13 NOTE — CARE COORDINATION
Roni 45 Transitions Follow Up Call    2019    Patient: Mango Regan  Patient : 1935   MRN: [id number]  Reason for Admission:   COPD  Discharge Date: 19 RARS: Readmission Risk Score: 12         Spoke with:   patient    Care Transitions Subsequent and Final Call    Schedule Follow Up Appointment with PCP:  Declined  Subsequent and Final Calls  Have your medications changed?:  No  Do you have any questions related to your medications?:  No  Do you currently have any active services?:  No  Do you have any needs or concerns that I can assist you with?:  No  Identified Barriers:  None  Care Transitions Interventions  Other Interventions: Follow Up:  Spoke with patient. Reports that she is feeling better. Coughing less; dry tickle to her throat. Confirms that she is drinking plenty of fluids and her appetite is good. Patient is using her oxygen as needed and is taking her medications as directed. Reports that her symptoms are well managed at this time. Reviewed COPD zone management and s/s to report to MD.  Patient verbalized understanding. Confirmed patient procedure . Patient denies any questions. No patient needs voiced. Agreeable to continued Care Transition. CTN contact information given. Encouraged call back if needs arise.    Future Appointments   Date Time Provider Wero Rowe   2019 11:00 AM Jade Martinez 39 [facility]   2019  7:00 AM [facility]   2019 11:00 AM Jade Martinez 39 [facility]   10/17/2019 12:00 PM 2837 Jose BarnesMartin A 1200 [facility]   2020 11:00 AM Rick Jacobson MD [facility]   10/26/2020 11:00 AM [address]Manish BarnesMartin A [address] [place] [address]       Valentin Blake RN

## 2019-09-17 ENCOUNTER — HOSPITAL ENCOUNTER (OUTPATIENT)
Age: 84
Discharge: HOME | DRG: 267 | End: 2019-09-17
Payer: MEDICARE

## 2019-09-17 ENCOUNTER — HOSPITAL ENCOUNTER (OUTPATIENT)
Dept: GENERAL RADIOLOGY | Age: 84
Discharge: HOME | DRG: 267 | End: 2019-09-17
Payer: MEDICARE

## 2019-09-17 DIAGNOSIS — J18.9 UNRESOLVED PNEUMONIA: ICD-10-CM

## 2019-09-17 LAB
BASOPHILS ABSOLUTE: 0 K/CU MM
BASOPHILS RELATIVE PERCENT: 0.4 % (ref 0–1)
DIFFERENTIAL TYPE: ABNORMAL
EOSINOPHILS ABSOLUTE: 0.4 K/CU MM
EOSINOPHILS RELATIVE PERCENT: 5.2 % (ref 0–3)
HCT VFR BLD CALC: 42.4 % (ref 37–47)
HEMOGLOBIN: 13.4 GM/DL (ref 12.5–16)
IMMATURE NEUTROPHIL %: 0.5 % (ref 0–0.43)
LYMPHOCYTES ABSOLUTE: 3.1 K/CU MM
LYMPHOCYTES RELATIVE PERCENT: 37 % (ref 24–44)
MCH RBC QN AUTO: 28.6 PG (ref 27–31)
MCHC RBC AUTO-ENTMCNC: 31.6 % (ref 32–36)
MCV RBC AUTO: 90.4 FL (ref 78–100)
MONOCYTES ABSOLUTE: 0.7 K/CU MM
MONOCYTES RELATIVE PERCENT: 8.2 % (ref 0–4)
NUCLEATED RBC %: 0 %
PDW BLD-RTO: 15.6 % (ref 11.7–14.9)
PLATELET # BLD: 351 K/CU MM (ref 140–440)
PMV BLD AUTO: 9 FL (ref 7.5–11.1)
RBC # BLD: 4.69 M/CU MM (ref 4.2–5.4)
SEGMENTED NEUTROPHILS ABSOLUTE COUNT: 4 K/CU MM
SEGMENTED NEUTROPHILS RELATIVE PERCENT: 48.7 % (ref 36–66)
TOTAL IMMATURE NEUTOROPHIL: 0.04 K/CU MM
TOTAL NUCLEATED RBC: 0 K/CU MM
WBC # BLD: 8.3 K/CU MM (ref 4–10.5)

## 2019-09-17 PROCEDURE — 71048 X-RAY EXAM CHEST 4+ VIEWS: CPT

## 2019-09-17 PROCEDURE — 85025 COMPLETE CBC W/AUTO DIFF WBC: CPT

## 2019-09-17 PROCEDURE — 36415 COLL VENOUS BLD VENIPUNCTURE: CPT

## 2019-09-18 ENCOUNTER — CARE COORDINATION (OUTPATIENT)
Dept: CASE MANAGEMENT | Age: 84
End: 2019-09-18

## 2019-09-19 ENCOUNTER — HOSPITAL ENCOUNTER (INPATIENT)
Dept: CARDIAC CATH/INVASIVE PROCEDURES | Age: 84
LOS: 2 days | Discharge: HOME | DRG: 267 | End: 2019-09-21
Attending: INTERNAL MEDICINE | Admitting: INTERNAL MEDICINE
Payer: MEDICARE

## 2019-09-19 ENCOUNTER — ANESTHESIA (OUTPATIENT)
Dept: CARDIAC CATH/INVASIVE PROCEDURES | Age: 84
DRG: 267 | End: 2019-09-19
Payer: MEDICARE

## 2019-09-19 ENCOUNTER — APPOINTMENT (OUTPATIENT)
Dept: ULTRASOUND IMAGING | Age: 84
DRG: 267 | End: 2019-09-19
Attending: INTERNAL MEDICINE
Payer: MEDICARE

## 2019-09-19 VITALS
RESPIRATION RATE: 1 BRPM | DIASTOLIC BLOOD PRESSURE: 61 MMHG | TEMPERATURE: 99 F | OXYGEN SATURATION: 100 % | SYSTOLIC BLOOD PRESSURE: 172 MMHG

## 2019-09-19 DIAGNOSIS — Z01.811 PRE-OP CHEST EXAM: ICD-10-CM

## 2019-09-19 LAB
ACTIVATED CLOTTING TIME, LOW RANGE: 133 SEC
ACTIVATED CLOTTING TIME, LOW RANGE: 245 SEC
ACTIVATED CLOTTING TIME, LOW RANGE: 260 SEC
ANION GAP SERPL CALCULATED.3IONS-SCNC: 9 MMOL/L (ref 4–16)
ANION GAP SERPL CALCULATED.3IONS-SCNC: 9 MMOL/L (ref 4–16)
APTT: 28.7 SECONDS (ref 21.2–33)
BASE EXCESS MIXED: 5.4 (ref 0–2.3)
BASE EXCESS: ABNORMAL (ref 0–2.4)
BUN BLDV-MCNC: 10 MG/DL (ref 6–23)
BUN BLDV-MCNC: 9 MG/DL (ref 6–23)
CALCIUM SERPL-MCNC: 7.3 MG/DL (ref 8.3–10.6)
CALCIUM SERPL-MCNC: 8 MG/DL (ref 8.3–10.6)
CHLORIDE BLD-SCNC: 101 MMOL/L (ref 99–110)
CHLORIDE BLD-SCNC: 107 MMOL/L (ref 99–110)
CO2: 26 MMOL/L (ref 21–32)
CO2: 29 MMOL/L (ref 21–32)
CO2: 30 MMOL/L (ref 21–32)
CREAT SERPL-MCNC: 0.5 MG/DL (ref 0.6–1.1)
CREAT SERPL-MCNC: 0.7 MG/DL (ref 0.6–1.1)
EKG ATRIAL RATE: 51 BPM
EKG DIAGNOSIS: NORMAL
EKG P AXIS: 89 DEGREES
EKG P-R INTERVAL: 192 MS
EKG Q-T INTERVAL: 482 MS
EKG QRS DURATION: 74 MS
EKG QTC CALCULATION (BAZETT): 444 MS
EKG R AXIS: 37 DEGREES
EKG T AXIS: 72 DEGREES
EKG VENTRICULAR RATE: 51 BPM
GFR AFRICAN AMERICAN: >60 ML/MIN/1.73M2
GFR AFRICAN AMERICAN: >60 ML/MIN/1.73M2
GFR NON-AFRICAN AMERICAN: >60 ML/MIN/1.73M2
GFR NON-AFRICAN AMERICAN: >60 ML/MIN/1.73M2
GLUCOSE BLD-MCNC: 138 MG/DL (ref 70–99)
GLUCOSE BLD-MCNC: 143 MG/DL (ref 70–99)
GLUCOSE BLD-MCNC: 94 MG/DL (ref 70–99)
HCO3 ARTERIAL: 28.8 MMOL/L (ref 18–23)
HCT VFR BLD CALC: 32 % (ref 37–47)
HCT VFR BLD CALC: 36.7 % (ref 37–47)
HEMOGLOBIN: 10.7 GM/DL (ref 12.5–16)
HEMOGLOBIN: 11.7 GM/DL (ref 12.5–16)
INR BLD: 0.89 INDEX
LV EF: 53 %
LVEF MODALITY: NORMAL
MCH RBC QN AUTO: 29 PG (ref 27–31)
MCHC RBC AUTO-ENTMCNC: 31.9 % (ref 32–36)
MCV RBC AUTO: 90.8 FL (ref 78–100)
O2 SATURATION: 100 % (ref 96–97)
PCO2 ARTERIAL: 37.1 MMHG (ref 32–45)
PDW BLD-RTO: 15.8 % (ref 11.7–14.9)
PH BLOOD: 7.5 (ref 7.34–7.45)
PLATELET # BLD: 306 K/CU MM (ref 140–440)
PMV BLD AUTO: 9.4 FL (ref 7.5–11.1)
PO2 ARTERIAL: 591 MMHG (ref 75–100)
POC CALCIUM: 1.07 MMOL/L (ref 1.12–1.32)
POC CREATININE: 0.8 MG/DL (ref 0.6–1.1)
POTASSIUM SERPL-SCNC: 3.2 MMOL/L (ref 3.5–4.5)
POTASSIUM SERPL-SCNC: 3.5 MMOL/L (ref 3.5–5.1)
POTASSIUM SERPL-SCNC: 3.7 MMOL/L (ref 3.5–5.1)
PROTHROMBIN TIME: 10.1 SECONDS (ref 9.12–12.5)
RBC # BLD: 4.04 M/CU MM (ref 4.2–5.4)
SODIUM BLD-SCNC: 139 MMOL/L (ref 135–145)
SODIUM BLD-SCNC: 142 MMOL/L (ref 135–145)
SODIUM BLD-SCNC: 143 MMOL/L (ref 138–146)
SOURCE, BLOOD GAS: ABNORMAL
WBC # BLD: 6.6 K/CU MM (ref 4–10.5)

## 2019-09-19 PROCEDURE — 6360000002 HC RX W HCPCS: Performed by: INTERNAL MEDICINE

## 2019-09-19 PROCEDURE — P9045 ALBUMIN (HUMAN), 5%, 250 ML: HCPCS

## 2019-09-19 PROCEDURE — 6370000000 HC RX 637 (ALT 250 FOR IP): Performed by: INTERNAL MEDICINE

## 2019-09-19 PROCEDURE — 85610 PROTHROMBIN TIME: CPT

## 2019-09-19 PROCEDURE — 94640 AIRWAY INHALATION TREATMENT: CPT

## 2019-09-19 PROCEDURE — 86850 RBC ANTIBODY SCREEN: CPT

## 2019-09-19 PROCEDURE — B24BZZ4 ULTRASONOGRAPHY OF HEART WITH AORTA, TRANSESOPHAGEAL: ICD-10-PCS | Performed by: SURGERY

## 2019-09-19 PROCEDURE — 2709999900 HC NON-CHARGEABLE SUPPLY

## 2019-09-19 PROCEDURE — 2100000000 HC CCU R&B

## 2019-09-19 PROCEDURE — 80048 BASIC METABOLIC PNL TOTAL CA: CPT

## 2019-09-19 PROCEDURE — 85027 COMPLETE CBC AUTOMATED: CPT

## 2019-09-19 PROCEDURE — 86900 BLOOD TYPING SEROLOGIC ABO: CPT

## 2019-09-19 PROCEDURE — 93926 LOWER EXTREMITY STUDY: CPT

## 2019-09-19 PROCEDURE — 6370000000 HC RX 637 (ALT 250 FOR IP): Performed by: ANESTHESIOLOGY

## 2019-09-19 PROCEDURE — 6360000002 HC RX W HCPCS: Performed by: NURSE ANESTHETIST, CERTIFIED REGISTERED

## 2019-09-19 PROCEDURE — 2500000003 HC RX 250 WO HCPCS

## 2019-09-19 PROCEDURE — 93312 ECHO TRANSESOPHAGEAL: CPT

## 2019-09-19 PROCEDURE — C1894 INTRO/SHEATH, NON-LASER: HCPCS

## 2019-09-19 PROCEDURE — 2500000003 HC RX 250 WO HCPCS: Performed by: NURSE ANESTHETIST, CERTIFIED REGISTERED

## 2019-09-19 PROCEDURE — 86922 COMPATIBILITY TEST ANTIGLOB: CPT

## 2019-09-19 PROCEDURE — P9016 RBC LEUKOCYTES REDUCED: HCPCS

## 2019-09-19 PROCEDURE — 6360000002 HC RX W HCPCS: Performed by: SURGERY

## 2019-09-19 PROCEDURE — 02RF38Z REPLACEMENT OF AORTIC VALVE WITH ZOOPLASTIC TISSUE, PERCUTANEOUS APPROACH: ICD-10-PCS | Performed by: SURGERY

## 2019-09-19 PROCEDURE — 6360000002 HC RX W HCPCS

## 2019-09-19 PROCEDURE — 2580000003 HC RX 258: Performed by: INTERNAL MEDICINE

## 2019-09-19 PROCEDURE — 2580000003 HC RX 258: Performed by: NURSE ANESTHETIST, CERTIFIED REGISTERED

## 2019-09-19 PROCEDURE — 2780000006 HC MISC HEART VALVE

## 2019-09-19 PROCEDURE — 93005 ELECTROCARDIOGRAM TRACING: CPT | Performed by: INTERNAL MEDICINE

## 2019-09-19 PROCEDURE — 33361 REPLACE AORTIC VALVE PERQ: CPT | Performed by: INTERNAL MEDICINE

## 2019-09-19 PROCEDURE — 85730 THROMBOPLASTIN TIME PARTIAL: CPT

## 2019-09-19 PROCEDURE — C1760 CLOSURE DEV, VASC: HCPCS

## 2019-09-19 PROCEDURE — 2580000003 HC RX 258

## 2019-09-19 PROCEDURE — 93010 ELECTROCARDIOGRAM REPORT: CPT | Performed by: INTERNAL MEDICINE

## 2019-09-19 PROCEDURE — 33361 REPLACE AORTIC VALVE PERQ: CPT

## 2019-09-19 PROCEDURE — C1769 GUIDE WIRE: HCPCS

## 2019-09-19 PROCEDURE — 3700000001 HC ADD 15 MINUTES (ANESTHESIA)

## 2019-09-19 PROCEDURE — 85347 COAGULATION TIME ACTIVATED: CPT

## 2019-09-19 PROCEDURE — 3700000000 HC ANESTHESIA ATTENDED CARE

## 2019-09-19 PROCEDURE — 86901 BLOOD TYPING SEROLOGIC RH(D): CPT

## 2019-09-19 PROCEDURE — C2628 CATHETER, OCCLUSION: HCPCS

## 2019-09-19 PROCEDURE — 6360000004 HC RX CONTRAST MEDICATION

## 2019-09-19 PROCEDURE — P9045 ALBUMIN (HUMAN), 5%, 250 ML: HCPCS | Performed by: NURSE ANESTHETIST, CERTIFIED REGISTERED

## 2019-09-19 RX ORDER — SENNA AND DOCUSATE SODIUM 50; 8.6 MG/1; MG/1
1 TABLET, FILM COATED ORAL 2 TIMES DAILY
Status: DISCONTINUED | OUTPATIENT
Start: 2019-09-19 | End: 2019-09-21 | Stop reason: HOSPADM

## 2019-09-19 RX ORDER — METHOCARBAMOL 500 MG/1
500 TABLET, FILM COATED ORAL 4 TIMES DAILY PRN
Status: DISCONTINUED | OUTPATIENT
Start: 2019-09-19 | End: 2019-09-21 | Stop reason: HOSPADM

## 2019-09-19 RX ORDER — ESCITALOPRAM OXALATE 10 MG/1
10 TABLET ORAL DAILY
Status: DISCONTINUED | OUTPATIENT
Start: 2019-09-20 | End: 2019-09-21 | Stop reason: HOSPADM

## 2019-09-19 RX ORDER — SODIUM CHLORIDE 0.9 % (FLUSH) 0.9 %
10 SYRINGE (ML) INJECTION PRN
Status: DISCONTINUED | OUTPATIENT
Start: 2019-09-19 | End: 2019-09-21 | Stop reason: HOSPADM

## 2019-09-19 RX ORDER — LANOLIN ALCOHOL/MO/W.PET/CERES
1000 CREAM (GRAM) TOPICAL DAILY
Status: DISCONTINUED | OUTPATIENT
Start: 2019-09-19 | End: 2019-09-20 | Stop reason: ALTCHOICE

## 2019-09-19 RX ORDER — KETOROLAC TROMETHAMINE 30 MG/ML
15 INJECTION, SOLUTION INTRAMUSCULAR; INTRAVENOUS ONCE
Status: COMPLETED | OUTPATIENT
Start: 2019-09-19 | End: 2019-09-19

## 2019-09-19 RX ORDER — LEVOTHYROXINE SODIUM 0.07 MG/1
75 TABLET ORAL DAILY
Status: DISCONTINUED | OUTPATIENT
Start: 2019-09-20 | End: 2019-09-21 | Stop reason: HOSPADM

## 2019-09-19 RX ORDER — ALBUTEROL SULFATE 90 UG/1
2 AEROSOL, METERED RESPIRATORY (INHALATION) EVERY 6 HOURS PRN
Status: DISCONTINUED | OUTPATIENT
Start: 2019-09-19 | End: 2019-09-21 | Stop reason: HOSPADM

## 2019-09-19 RX ORDER — ASPIRIN 81 MG/1
81 TABLET ORAL DAILY
Status: DISCONTINUED | OUTPATIENT
Start: 2019-09-20 | End: 2019-09-21 | Stop reason: HOSPADM

## 2019-09-19 RX ORDER — ALBUMIN, HUMAN INJ 5% 5 %
SOLUTION INTRAVENOUS PRN
Status: DISCONTINUED | OUTPATIENT
Start: 2019-09-19 | End: 2019-09-19 | Stop reason: SDUPTHER

## 2019-09-19 RX ORDER — ONDANSETRON 2 MG/ML
INJECTION INTRAMUSCULAR; INTRAVENOUS PRN
Status: DISCONTINUED | OUTPATIENT
Start: 2019-09-19 | End: 2019-09-19 | Stop reason: SDUPTHER

## 2019-09-19 RX ORDER — FENTANYL CITRATE 50 UG/ML
INJECTION, SOLUTION INTRAMUSCULAR; INTRAVENOUS PRN
Status: DISCONTINUED | OUTPATIENT
Start: 2019-09-19 | End: 2019-09-19 | Stop reason: SDUPTHER

## 2019-09-19 RX ORDER — DEXAMETHASONE SODIUM PHOSPHATE 4 MG/ML
INJECTION, SOLUTION INTRA-ARTICULAR; INTRALESIONAL; INTRAMUSCULAR; INTRAVENOUS; SOFT TISSUE PRN
Status: DISCONTINUED | OUTPATIENT
Start: 2019-09-19 | End: 2019-09-19 | Stop reason: SDUPTHER

## 2019-09-19 RX ORDER — ONDANSETRON 4 MG/1
4 TABLET, ORALLY DISINTEGRATING ORAL EVERY 8 HOURS PRN
Status: DISCONTINUED | OUTPATIENT
Start: 2019-09-19 | End: 2019-09-21 | Stop reason: HOSPADM

## 2019-09-19 RX ORDER — DOCUSATE SODIUM 100 MG/1
100 CAPSULE, LIQUID FILLED ORAL 2 TIMES DAILY
Status: DISCONTINUED | OUTPATIENT
Start: 2019-09-19 | End: 2019-09-19 | Stop reason: SDUPTHER

## 2019-09-19 RX ORDER — SCOLOPAMINE TRANSDERMAL SYSTEM 1 MG/1
1 PATCH, EXTENDED RELEASE TRANSDERMAL ONCE
Status: DISCONTINUED | OUTPATIENT
Start: 2019-09-19 | End: 2019-09-21 | Stop reason: HOSPADM

## 2019-09-19 RX ORDER — SUCCINYLCHOLINE/SOD CL,ISO/PF 100 MG/5ML
SYRINGE (ML) INTRAVENOUS PRN
Status: DISCONTINUED | OUTPATIENT
Start: 2019-09-19 | End: 2019-09-19 | Stop reason: SDUPTHER

## 2019-09-19 RX ORDER — PROPOFOL 10 MG/ML
INJECTION, EMULSION INTRAVENOUS PRN
Status: DISCONTINUED | OUTPATIENT
Start: 2019-09-19 | End: 2019-09-19 | Stop reason: SDUPTHER

## 2019-09-19 RX ORDER — 0.9 % SODIUM CHLORIDE 0.9 %
250 INTRAVENOUS SOLUTION INTRAVENOUS ONCE
Status: DISCONTINUED | OUTPATIENT
Start: 2019-09-19 | End: 2019-09-21 | Stop reason: HOSPADM

## 2019-09-19 RX ORDER — SCOLOPAMINE TRANSDERMAL SYSTEM 1 MG/1
1 PATCH, EXTENDED RELEASE TRANSDERMAL
Status: DISCONTINUED | OUTPATIENT
Start: 2019-09-22 | End: 2019-09-21 | Stop reason: HOSPADM

## 2019-09-19 RX ORDER — CEFAZOLIN SODIUM 2 G/100ML
2 INJECTION, SOLUTION INTRAVENOUS ONCE
Status: COMPLETED | OUTPATIENT
Start: 2019-09-19 | End: 2019-09-19

## 2019-09-19 RX ORDER — LISINOPRIL 5 MG/1
5 TABLET ORAL DAILY
Status: DISCONTINUED | OUTPATIENT
Start: 2019-09-19 | End: 2019-09-21 | Stop reason: HOSPADM

## 2019-09-19 RX ORDER — ROCURONIUM BROMIDE 10 MG/ML
INJECTION, SOLUTION INTRAVENOUS PRN
Status: DISCONTINUED | OUTPATIENT
Start: 2019-09-19 | End: 2019-09-19 | Stop reason: SDUPTHER

## 2019-09-19 RX ORDER — PANTOPRAZOLE SODIUM 40 MG/1
40 TABLET, DELAYED RELEASE ORAL NIGHTLY
Status: DISCONTINUED | OUTPATIENT
Start: 2019-09-19 | End: 2019-09-21 | Stop reason: HOSPADM

## 2019-09-19 RX ORDER — SODIUM CHLORIDE, SODIUM LACTATE, POTASSIUM CHLORIDE, CALCIUM CHLORIDE 600; 310; 30; 20 MG/100ML; MG/100ML; MG/100ML; MG/100ML
INJECTION, SOLUTION INTRAVENOUS CONTINUOUS PRN
Status: DISCONTINUED | OUTPATIENT
Start: 2019-09-19 | End: 2019-09-19 | Stop reason: SDUPTHER

## 2019-09-19 RX ORDER — HEPARIN SODIUM 1000 [USP'U]/ML
INJECTION, SOLUTION INTRAVENOUS; SUBCUTANEOUS PRN
Status: DISCONTINUED | OUTPATIENT
Start: 2019-09-19 | End: 2019-09-19 | Stop reason: SDUPTHER

## 2019-09-19 RX ORDER — PROTAMINE SULFATE 10 MG/ML
INJECTION, SOLUTION INTRAVENOUS PRN
Status: DISCONTINUED | OUTPATIENT
Start: 2019-09-19 | End: 2019-09-19 | Stop reason: SDUPTHER

## 2019-09-19 RX ORDER — SIMVASTATIN 20 MG
20 TABLET ORAL NIGHTLY
Status: DISCONTINUED | OUTPATIENT
Start: 2019-09-19 | End: 2019-09-21 | Stop reason: HOSPADM

## 2019-09-19 RX ORDER — SODIUM CHLORIDE 9 MG/ML
INJECTION, SOLUTION INTRAVENOUS CONTINUOUS PRN
Status: DISCONTINUED | OUTPATIENT
Start: 2019-09-19 | End: 2019-09-19 | Stop reason: SDUPTHER

## 2019-09-19 RX ORDER — SODIUM CHLORIDE 0.9 % (FLUSH) 0.9 %
10 SYRINGE (ML) INJECTION EVERY 12 HOURS SCHEDULED
Status: DISCONTINUED | OUTPATIENT
Start: 2019-09-19 | End: 2019-09-21 | Stop reason: HOSPADM

## 2019-09-19 RX ORDER — CLOPIDOGREL BISULFATE 75 MG/1
75 TABLET ORAL DAILY
Status: DISCONTINUED | OUTPATIENT
Start: 2019-09-20 | End: 2019-09-21 | Stop reason: HOSPADM

## 2019-09-19 RX ORDER — BISACODYL 10 MG
10 SUPPOSITORY, RECTAL RECTAL DAILY PRN
Status: DISCONTINUED | OUTPATIENT
Start: 2019-09-19 | End: 2019-09-21 | Stop reason: HOSPADM

## 2019-09-19 RX ORDER — CEFAZOLIN SODIUM 2 G/100ML
2 INJECTION, SOLUTION INTRAVENOUS EVERY 8 HOURS
Status: COMPLETED | OUTPATIENT
Start: 2019-09-19 | End: 2019-09-20

## 2019-09-19 RX ORDER — MIDAZOLAM HYDROCHLORIDE 1 MG/ML
INJECTION INTRAMUSCULAR; INTRAVENOUS PRN
Status: DISCONTINUED | OUTPATIENT
Start: 2019-09-19 | End: 2019-09-19 | Stop reason: SDUPTHER

## 2019-09-19 RX ORDER — ACETAMINOPHEN 325 MG/1
650 TABLET ORAL EVERY 4 HOURS PRN
Status: DISCONTINUED | OUTPATIENT
Start: 2019-09-19 | End: 2019-09-21 | Stop reason: HOSPADM

## 2019-09-19 RX ORDER — EPHEDRINE SULFATE 50 MG/ML
INJECTION INTRAVENOUS PRN
Status: DISCONTINUED | OUTPATIENT
Start: 2019-09-19 | End: 2019-09-19 | Stop reason: SDUPTHER

## 2019-09-19 RX ORDER — TIMOLOL MALEATE 5 MG/ML
1 SOLUTION/ DROPS OPHTHALMIC DAILY
Status: DISCONTINUED | OUTPATIENT
Start: 2019-09-19 | End: 2019-09-21 | Stop reason: HOSPADM

## 2019-09-19 RX ORDER — FUROSEMIDE 20 MG/1
20 TABLET ORAL DAILY
Status: DISCONTINUED | OUTPATIENT
Start: 2019-09-20 | End: 2019-09-21 | Stop reason: HOSPADM

## 2019-09-19 RX ORDER — SUCRALFATE 1 G/1
1 TABLET ORAL 4 TIMES DAILY
Status: DISCONTINUED | OUTPATIENT
Start: 2019-09-19 | End: 2019-09-21 | Stop reason: HOSPADM

## 2019-09-19 RX ORDER — ONDANSETRON 2 MG/ML
4 INJECTION INTRAMUSCULAR; INTRAVENOUS EVERY 6 HOURS PRN
Status: DISCONTINUED | OUTPATIENT
Start: 2019-09-19 | End: 2019-09-21 | Stop reason: HOSPADM

## 2019-09-19 RX ORDER — HYDRALAZINE HYDROCHLORIDE 20 MG/ML
10 INJECTION INTRAMUSCULAR; INTRAVENOUS EVERY 10 MIN PRN
Status: DISCONTINUED | OUTPATIENT
Start: 2019-09-19 | End: 2019-09-21 | Stop reason: HOSPADM

## 2019-09-19 RX ADMIN — PANTOPRAZOLE SODIUM 40 MG: 40 TABLET, DELAYED RELEASE ORAL at 21:15

## 2019-09-19 RX ADMIN — Medication 10 ML: at 17:35

## 2019-09-19 RX ADMIN — PROPOFOL 20 MG: 10 INJECTION, EMULSION INTRAVENOUS at 08:40

## 2019-09-19 RX ADMIN — MIDAZOLAM HYDROCHLORIDE 1 MG: 1 INJECTION, SOLUTION INTRAMUSCULAR; INTRAVENOUS at 07:36

## 2019-09-19 RX ADMIN — DEXAMETHASONE SODIUM PHOSPHATE 8 MG: 4 INJECTION, SOLUTION INTRAMUSCULAR; INTRAVENOUS at 09:15

## 2019-09-19 RX ADMIN — PROPOFOL 20 MG: 10 INJECTION, EMULSION INTRAVENOUS at 07:58

## 2019-09-19 RX ADMIN — FENTANYL CITRATE 100 MCG: 50 INJECTION INTRAMUSCULAR; INTRAVENOUS at 07:56

## 2019-09-19 RX ADMIN — Medication 2000 UNITS: at 21:15

## 2019-09-19 RX ADMIN — EPHEDRINE SULFATE 10 MG: 50 INJECTION, SOLUTION INTRAVENOUS at 08:19

## 2019-09-19 RX ADMIN — SENNOSIDES, DOCUSATE SODIUM 1 TABLET: 50; 8.6 TABLET, FILM COATED ORAL at 21:15

## 2019-09-19 RX ADMIN — FENTANYL CITRATE 100 MCG: 50 INJECTION INTRAMUSCULAR; INTRAVENOUS at 07:59

## 2019-09-19 RX ADMIN — SUGAMMADEX 200 MG: 100 INJECTION, SOLUTION INTRAVENOUS at 09:35

## 2019-09-19 RX ADMIN — SODIUM CHLORIDE: 9 INJECTION, SOLUTION INTRAVENOUS at 08:15

## 2019-09-19 RX ADMIN — EPHEDRINE SULFATE 10 MG: 50 INJECTION, SOLUTION INTRAVENOUS at 08:07

## 2019-09-19 RX ADMIN — Medication 100 MG: at 07:59

## 2019-09-19 RX ADMIN — HEPARIN SODIUM 1000 UNITS: 1000 INJECTION, SOLUTION INTRAVENOUS; SUBCUTANEOUS at 08:51

## 2019-09-19 RX ADMIN — Medication 10 ML: at 21:15

## 2019-09-19 RX ADMIN — ACETAMINOPHEN 650 MG: 325 TABLET ORAL at 21:58

## 2019-09-19 RX ADMIN — SUCRALFATE 1 G: 1 TABLET ORAL at 21:15

## 2019-09-19 RX ADMIN — SODIUM CHLORIDE, POTASSIUM CHLORIDE, SODIUM LACTATE AND CALCIUM CHLORIDE: 600; 310; 30; 20 INJECTION, SOLUTION INTRAVENOUS at 07:38

## 2019-09-19 RX ADMIN — SUCRALFATE 1 G: 1 TABLET ORAL at 17:39

## 2019-09-19 RX ADMIN — ALBUMIN (HUMAN) 250 ML: 12.5 INJECTION, SOLUTION INTRAVENOUS at 08:09

## 2019-09-19 RX ADMIN — ROCURONIUM BROMIDE 5 MG: 10 INJECTION INTRAVENOUS at 07:56

## 2019-09-19 RX ADMIN — FENTANYL CITRATE 50 MCG: 50 INJECTION INTRAMUSCULAR; INTRAVENOUS at 10:15

## 2019-09-19 RX ADMIN — EPHEDRINE SULFATE 5 MG: 50 INJECTION, SOLUTION INTRAVENOUS at 08:59

## 2019-09-19 RX ADMIN — ONDANSETRON 4 MG: 2 INJECTION INTRAMUSCULAR; INTRAVENOUS at 17:35

## 2019-09-19 RX ADMIN — CEFAZOLIN SODIUM 2 G: 2 INJECTION, SOLUTION INTRAVENOUS at 07:59

## 2019-09-19 RX ADMIN — EPHEDRINE SULFATE 10 MG: 50 INJECTION, SOLUTION INTRAVENOUS at 09:03

## 2019-09-19 RX ADMIN — PROPOFOL 15 MG: 10 INJECTION, EMULSION INTRAVENOUS at 08:25

## 2019-09-19 RX ADMIN — KETOROLAC TROMETHAMINE 15 MG: 30 INJECTION, SOLUTION INTRAMUSCULAR; INTRAVENOUS at 14:43

## 2019-09-19 RX ADMIN — EPHEDRINE SULFATE 15 MG: 50 INJECTION, SOLUTION INTRAVENOUS at 07:57

## 2019-09-19 RX ADMIN — METHOCARBAMOL TABLETS 500 MG: 500 TABLET, COATED ORAL at 11:34

## 2019-09-19 RX ADMIN — ONDANSETRON 4 MG: 2 INJECTION INTRAMUSCULAR; INTRAVENOUS at 09:15

## 2019-09-19 RX ADMIN — MIDAZOLAM HYDROCHLORIDE 1 MG: 1 INJECTION, SOLUTION INTRAMUSCULAR; INTRAVENOUS at 07:53

## 2019-09-19 RX ADMIN — ROCURONIUM BROMIDE 45 MG: 10 INJECTION INTRAVENOUS at 08:07

## 2019-09-19 RX ADMIN — GLYCOPYRROLATE AND FORMOTEROL FUMARATE 2 PUFF: 9; 4.8 AEROSOL, METERED RESPIRATORY (INHALATION) at 21:23

## 2019-09-19 RX ADMIN — PROPOFOL 20 MG: 10 INJECTION, EMULSION INTRAVENOUS at 07:56

## 2019-09-19 RX ADMIN — SIMVASTATIN 20 MG: 20 TABLET, FILM COATED ORAL at 21:15

## 2019-09-19 RX ADMIN — CEFAZOLIN SODIUM 2 G: 2 INJECTION, SOLUTION INTRAVENOUS at 17:39

## 2019-09-19 RX ADMIN — ACETAMINOPHEN 650 MG: 325 TABLET ORAL at 11:20

## 2019-09-19 RX ADMIN — PROTAMINE SULFATE 50 MG: 10 INJECTION, SOLUTION INTRAVENOUS at 09:23

## 2019-09-19 RX ADMIN — HEPARIN SODIUM 5000 UNITS: 1000 INJECTION, SOLUTION INTRAVENOUS; SUBCUTANEOUS at 08:37

## 2019-09-19 ASSESSMENT — PULMONARY FUNCTION TESTS
PIF_VALUE: 13
PIF_VALUE: 15
PIF_VALUE: 14
PIF_VALUE: 15
PIF_VALUE: 14
PIF_VALUE: 11
PIF_VALUE: 16
PIF_VALUE: 16
PIF_VALUE: 14
PIF_VALUE: 15
PIF_VALUE: 0
PIF_VALUE: 15
PIF_VALUE: 14
PIF_VALUE: 21
PIF_VALUE: 14
PIF_VALUE: 15
PIF_VALUE: 15
PIF_VALUE: 13
PIF_VALUE: 1
PIF_VALUE: 15
PIF_VALUE: 14
PIF_VALUE: 14
PIF_VALUE: 1
PIF_VALUE: 15
PIF_VALUE: 1
PIF_VALUE: 0
PIF_VALUE: 14
PIF_VALUE: 14
PIF_VALUE: 15
PIF_VALUE: 15
PIF_VALUE: 1
PIF_VALUE: 13
PIF_VALUE: 8
PIF_VALUE: 15
PIF_VALUE: 15
PIF_VALUE: 13
PIF_VALUE: 15
PIF_VALUE: 0
PIF_VALUE: 1
PIF_VALUE: 15
PIF_VALUE: 15
PIF_VALUE: 14
PIF_VALUE: 13
PIF_VALUE: 15
PIF_VALUE: 15
PIF_VALUE: 1
PIF_VALUE: 15
PIF_VALUE: 14
PIF_VALUE: 10
PIF_VALUE: 15
PIF_VALUE: 14
PIF_VALUE: 14
PIF_VALUE: 8
PIF_VALUE: 2
PIF_VALUE: 2
PIF_VALUE: 14
PIF_VALUE: 15
PIF_VALUE: 14
PIF_VALUE: 15
PIF_VALUE: 1
PIF_VALUE: 14
PIF_VALUE: 14
PIF_VALUE: 15
PIF_VALUE: 2
PIF_VALUE: 14
PIF_VALUE: 15
PIF_VALUE: 18
PIF_VALUE: 1
PIF_VALUE: 14
PIF_VALUE: 1
PIF_VALUE: 15
PIF_VALUE: 14
PIF_VALUE: 15
PIF_VALUE: 14
PIF_VALUE: 1
PIF_VALUE: 14
PIF_VALUE: 1
PIF_VALUE: 14
PIF_VALUE: 14
PIF_VALUE: 15
PIF_VALUE: 15
PIF_VALUE: 1
PIF_VALUE: 15
PIF_VALUE: 2
PIF_VALUE: 14
PIF_VALUE: 14
PIF_VALUE: 6
PIF_VALUE: 15
PIF_VALUE: 14
PIF_VALUE: 14
PIF_VALUE: 15
PIF_VALUE: 5
PIF_VALUE: 1
PIF_VALUE: 15
PIF_VALUE: 14
PIF_VALUE: 2
PIF_VALUE: 14
PIF_VALUE: 15
PIF_VALUE: 1
PIF_VALUE: 14
PIF_VALUE: 1
PIF_VALUE: 15
PIF_VALUE: 1
PIF_VALUE: 1
PIF_VALUE: 15
PIF_VALUE: 15
PIF_VALUE: 14
PIF_VALUE: 5
PIF_VALUE: 15
PIF_VALUE: 15
PIF_VALUE: 14
PIF_VALUE: 1
PIF_VALUE: 15

## 2019-09-19 ASSESSMENT — PAIN DESCRIPTION - ORIENTATION
ORIENTATION: RIGHT
ORIENTATION: MID
ORIENTATION: RIGHT

## 2019-09-19 ASSESSMENT — PAIN SCALES - GENERAL
PAINLEVEL_OUTOF10: 0
PAINLEVEL_OUTOF10: 0
PAINLEVEL_OUTOF10: 8
PAINLEVEL_OUTOF10: 2
PAINLEVEL_OUTOF10: 10
PAINLEVEL_OUTOF10: 5
PAINLEVEL_OUTOF10: 0
PAINLEVEL_OUTOF10: 3
PAINLEVEL_OUTOF10: 0
PAINLEVEL_OUTOF10: 6
PAINLEVEL_OUTOF10: 5
PAINLEVEL_OUTOF10: 8
PAINLEVEL_OUTOF10: 5
PAINLEVEL_OUTOF10: 0
PAINLEVEL_OUTOF10: 0

## 2019-09-19 ASSESSMENT — PAIN DESCRIPTION - PROGRESSION
CLINICAL_PROGRESSION: GRADUALLY WORSENING
CLINICAL_PROGRESSION: GRADUALLY WORSENING
CLINICAL_PROGRESSION: NOT CHANGED
CLINICAL_PROGRESSION: GRADUALLY WORSENING
CLINICAL_PROGRESSION: NOT CHANGED
CLINICAL_PROGRESSION: GRADUALLY WORSENING
CLINICAL_PROGRESSION: GRADUALLY WORSENING
CLINICAL_PROGRESSION: NOT CHANGED

## 2019-09-19 ASSESSMENT — PAIN DESCRIPTION - ONSET
ONSET: ON-GOING
ONSET: ON-GOING
ONSET: GRADUAL
ONSET: ON-GOING
ONSET: GRADUAL
ONSET: ON-GOING

## 2019-09-19 ASSESSMENT — PAIN DESCRIPTION - LOCATION
LOCATION: NECK
LOCATION: CHEST
LOCATION: NECK
LOCATION: NECK;SHOULDER
LOCATION: NECK
LOCATION: NECK

## 2019-09-19 ASSESSMENT — PAIN - FUNCTIONAL ASSESSMENT
PAIN_FUNCTIONAL_ASSESSMENT: PREVENTS OR INTERFERES WITH ALL ACTIVE AND SOME PASSIVE ACTIVITIES
PAIN_FUNCTIONAL_ASSESSMENT: ACTIVITIES ARE NOT PREVENTED
PAIN_FUNCTIONAL_ASSESSMENT: PREVENTS OR INTERFERES WITH ALL ACTIVE AND SOME PASSIVE ACTIVITIES
PAIN_FUNCTIONAL_ASSESSMENT: PREVENTS OR INTERFERES WITH ALL ACTIVE AND SOME PASSIVE ACTIVITIES
PAIN_FUNCTIONAL_ASSESSMENT: PREVENTS OR INTERFERES WITH MANY ACTIVE NOT PASSIVE ACTIVITIES
PAIN_FUNCTIONAL_ASSESSMENT: PREVENTS OR INTERFERES SOME ACTIVE ACTIVITIES AND ADLS

## 2019-09-19 ASSESSMENT — PAIN DESCRIPTION - DIRECTION
RADIATING_TOWARDS: BACK

## 2019-09-19 ASSESSMENT — PAIN DESCRIPTION - PAIN TYPE
TYPE: ACUTE PAIN

## 2019-09-19 ASSESSMENT — PAIN DESCRIPTION - FREQUENCY
FREQUENCY: CONTINUOUS
FREQUENCY: INTERMITTENT
FREQUENCY: CONTINUOUS
FREQUENCY: CONTINUOUS
FREQUENCY: INTERMITTENT

## 2019-09-19 ASSESSMENT — PAIN DESCRIPTION - DESCRIPTORS
DESCRIPTORS: DISCOMFORT
DESCRIPTORS: ACHING
DESCRIPTORS: DISCOMFORT
DESCRIPTORS: ACHING;SORE
DESCRIPTORS: DISCOMFORT
DESCRIPTORS: DISCOMFORT;SPASM;SORE

## 2019-09-20 ENCOUNTER — APPOINTMENT (OUTPATIENT)
Dept: GENERAL RADIOLOGY | Age: 84
DRG: 267 | End: 2019-09-20
Attending: INTERNAL MEDICINE
Payer: MEDICARE

## 2019-09-20 LAB
ANION GAP SERPL CALCULATED.3IONS-SCNC: 9 MMOL/L (ref 4–16)
BUN BLDV-MCNC: 9 MG/DL (ref 6–23)
CALCIUM SERPL-MCNC: 7.5 MG/DL (ref 8.3–10.6)
CHLORIDE BLD-SCNC: 101 MMOL/L (ref 99–110)
CO2: 26 MMOL/L (ref 21–32)
CREAT SERPL-MCNC: 0.6 MG/DL (ref 0.6–1.1)
EKG ATRIAL RATE: 65 BPM
EKG DIAGNOSIS: NORMAL
EKG P AXIS: 90 DEGREES
EKG P-R INTERVAL: 166 MS
EKG Q-T INTERVAL: 454 MS
EKG QRS DURATION: 86 MS
EKG QTC CALCULATION (BAZETT): 472 MS
EKG R AXIS: 57 DEGREES
EKG T AXIS: 76 DEGREES
EKG VENTRICULAR RATE: 65 BPM
GFR AFRICAN AMERICAN: >60 ML/MIN/1.73M2
GFR NON-AFRICAN AMERICAN: >60 ML/MIN/1.73M2
GLUCOSE BLD-MCNC: 95 MG/DL (ref 70–99)
HCT VFR BLD CALC: 25.6 % (ref 37–47)
HCT VFR BLD CALC: 27.4 % (ref 37–47)
HCT VFR BLD CALC: 28.3 % (ref 37–47)
HEMOGLOBIN: 8.4 GM/DL (ref 12.5–16)
HEMOGLOBIN: 8.5 GM/DL (ref 12.5–16)
HEMOGLOBIN: 8.7 GM/DL (ref 12.5–16)
LV EF: 58 %
LVEF MODALITY: NORMAL
MCH RBC QN AUTO: 28.8 PG (ref 27–31)
MCH RBC QN AUTO: 29 PG (ref 27–31)
MCH RBC QN AUTO: 29.7 PG (ref 27–31)
MCHC RBC AUTO-ENTMCNC: 30.7 % (ref 32–36)
MCHC RBC AUTO-ENTMCNC: 31 % (ref 32–36)
MCHC RBC AUTO-ENTMCNC: 32.8 % (ref 32–36)
MCV RBC AUTO: 90.5 FL (ref 78–100)
MCV RBC AUTO: 92.9 FL (ref 78–100)
MCV RBC AUTO: 94.3 FL (ref 78–100)
PDW BLD-RTO: 15.9 % (ref 11.7–14.9)
PDW BLD-RTO: 15.9 % (ref 11.7–14.9)
PDW BLD-RTO: 16 % (ref 11.7–14.9)
PLATELET # BLD: 202 K/CU MM (ref 140–440)
PLATELET # BLD: 218 K/CU MM (ref 140–440)
PLATELET # BLD: 225 K/CU MM (ref 140–440)
PMV BLD AUTO: 9.6 FL (ref 7.5–11.1)
PMV BLD AUTO: 9.7 FL (ref 7.5–11.1)
PMV BLD AUTO: 9.7 FL (ref 7.5–11.1)
POTASSIUM SERPL-SCNC: 3.7 MMOL/L (ref 3.5–5.1)
RBC # BLD: 2.83 M/CU MM (ref 4.2–5.4)
RBC # BLD: 2.95 M/CU MM (ref 4.2–5.4)
RBC # BLD: 3 M/CU MM (ref 4.2–5.4)
SODIUM BLD-SCNC: 136 MMOL/L (ref 135–145)
WBC # BLD: 12.3 K/CU MM (ref 4–10.5)
WBC # BLD: 12.4 K/CU MM (ref 4–10.5)
WBC # BLD: 9.2 K/CU MM (ref 4–10.5)

## 2019-09-20 PROCEDURE — 36415 COLL VENOUS BLD VENIPUNCTURE: CPT

## 2019-09-20 PROCEDURE — 6370000000 HC RX 637 (ALT 250 FOR IP): Performed by: INTERNAL MEDICINE

## 2019-09-20 PROCEDURE — 80048 BASIC METABOLIC PNL TOTAL CA: CPT

## 2019-09-20 PROCEDURE — 71045 X-RAY EXAM CHEST 1 VIEW: CPT

## 2019-09-20 PROCEDURE — 6360000002 HC RX W HCPCS: Performed by: INTERNAL MEDICINE

## 2019-09-20 PROCEDURE — 99232 SBSQ HOSP IP/OBS MODERATE 35: CPT | Performed by: INTERNAL MEDICINE

## 2019-09-20 PROCEDURE — 37799 UNLISTED PX VASCULAR SURGERY: CPT

## 2019-09-20 PROCEDURE — 94761 N-INVAS EAR/PLS OXIMETRY MLT: CPT

## 2019-09-20 PROCEDURE — 2100000000 HC CCU R&B

## 2019-09-20 PROCEDURE — 94640 AIRWAY INHALATION TREATMENT: CPT

## 2019-09-20 PROCEDURE — 6370000000 HC RX 637 (ALT 250 FOR IP): Performed by: SURGERY

## 2019-09-20 PROCEDURE — 2580000003 HC RX 258: Performed by: INTERNAL MEDICINE

## 2019-09-20 PROCEDURE — 93005 ELECTROCARDIOGRAM TRACING: CPT | Performed by: INTERNAL MEDICINE

## 2019-09-20 PROCEDURE — 93306 TTE W/DOPPLER COMPLETE: CPT

## 2019-09-20 PROCEDURE — 93010 ELECTROCARDIOGRAM REPORT: CPT | Performed by: INTERNAL MEDICINE

## 2019-09-20 PROCEDURE — 85027 COMPLETE CBC AUTOMATED: CPT

## 2019-09-20 RX ORDER — LANOLIN ALCOHOL/MO/W.PET/CERES
1000 CREAM (GRAM) TOPICAL DAILY
Status: DISCONTINUED | OUTPATIENT
Start: 2019-09-20 | End: 2019-09-21 | Stop reason: HOSPADM

## 2019-09-20 RX ADMIN — FUROSEMIDE 20 MG: 20 TABLET ORAL at 09:32

## 2019-09-20 RX ADMIN — Medication 2000 UNITS: at 20:30

## 2019-09-20 RX ADMIN — CEFAZOLIN SODIUM 2 G: 2 INJECTION, SOLUTION INTRAVENOUS at 00:04

## 2019-09-20 RX ADMIN — CYANOCOBALAMIN TAB 1000 MCG 1000 MCG: 1000 TAB at 14:28

## 2019-09-20 RX ADMIN — METHOCARBAMOL TABLETS 500 MG: 500 TABLET, COATED ORAL at 00:04

## 2019-09-20 RX ADMIN — ACETAMINOPHEN 650 MG: 325 TABLET ORAL at 22:08

## 2019-09-20 RX ADMIN — SUCRALFATE 1 G: 1 TABLET ORAL at 17:23

## 2019-09-20 RX ADMIN — GLYCOPYRROLATE AND FORMOTEROL FUMARATE 2 PUFF: 9; 4.8 AEROSOL, METERED RESPIRATORY (INHALATION) at 19:30

## 2019-09-20 RX ADMIN — ACETAMINOPHEN 650 MG: 325 TABLET ORAL at 04:27

## 2019-09-20 RX ADMIN — ESCITALOPRAM OXALATE 10 MG: 10 TABLET ORAL at 10:11

## 2019-09-20 RX ADMIN — Medication 10 ML: at 20:30

## 2019-09-20 RX ADMIN — Medication 2000 UNITS: at 14:27

## 2019-09-20 RX ADMIN — CEFAZOLIN SODIUM 2 G: 2 INJECTION, SOLUTION INTRAVENOUS at 08:00

## 2019-09-20 RX ADMIN — SENNOSIDES, DOCUSATE SODIUM 1 TABLET: 50; 8.6 TABLET, FILM COATED ORAL at 09:32

## 2019-09-20 RX ADMIN — Medication 10 ML: at 14:29

## 2019-09-20 RX ADMIN — PANTOPRAZOLE SODIUM 40 MG: 40 TABLET, DELAYED RELEASE ORAL at 20:30

## 2019-09-20 RX ADMIN — ONDANSETRON 4 MG: 2 INJECTION INTRAMUSCULAR; INTRAVENOUS at 10:09

## 2019-09-20 RX ADMIN — SUCRALFATE 1 G: 1 TABLET ORAL at 09:32

## 2019-09-20 RX ADMIN — LEVOTHYROXINE SODIUM 75 MCG: 75 TABLET ORAL at 09:32

## 2019-09-20 RX ADMIN — METHOCARBAMOL TABLETS 500 MG: 500 TABLET, COATED ORAL at 22:08

## 2019-09-20 RX ADMIN — SUCRALFATE 1 G: 1 TABLET ORAL at 20:30

## 2019-09-20 RX ADMIN — ASPIRIN 81 MG: 81 TABLET, COATED ORAL at 09:55

## 2019-09-20 RX ADMIN — SUCRALFATE 1 G: 1 TABLET ORAL at 14:27

## 2019-09-20 RX ADMIN — GLYCOPYRROLATE AND FORMOTEROL FUMARATE 2 PUFF: 9; 4.8 AEROSOL, METERED RESPIRATORY (INHALATION) at 08:04

## 2019-09-20 RX ADMIN — TIMOLOL MALEATE 1 DROP: 5 SOLUTION OPHTHALMIC at 11:42

## 2019-09-20 RX ADMIN — CLOPIDOGREL BISULFATE 75 MG: 75 TABLET ORAL at 09:55

## 2019-09-20 RX ADMIN — ACETAMINOPHEN 650 MG: 325 TABLET ORAL at 09:32

## 2019-09-20 RX ADMIN — SIMVASTATIN 20 MG: 20 TABLET, FILM COATED ORAL at 20:30

## 2019-09-20 RX ADMIN — SENNOSIDES, DOCUSATE SODIUM 1 TABLET: 50; 8.6 TABLET, FILM COATED ORAL at 20:30

## 2019-09-20 ASSESSMENT — PAIN - FUNCTIONAL ASSESSMENT
PAIN_FUNCTIONAL_ASSESSMENT: ACTIVITIES ARE NOT PREVENTED

## 2019-09-20 ASSESSMENT — PAIN SCALES - GENERAL
PAINLEVEL_OUTOF10: 0
PAINLEVEL_OUTOF10: 2
PAINLEVEL_OUTOF10: 0
PAINLEVEL_OUTOF10: 0
PAINLEVEL_OUTOF10: 1
PAINLEVEL_OUTOF10: 0
PAINLEVEL_OUTOF10: 4
PAINLEVEL_OUTOF10: 1
PAINLEVEL_OUTOF10: 0
PAINLEVEL_OUTOF10: 1
PAINLEVEL_OUTOF10: 1
PAINLEVEL_OUTOF10: 3
PAINLEVEL_OUTOF10: 6
PAINLEVEL_OUTOF10: 0

## 2019-09-20 ASSESSMENT — PAIN DESCRIPTION - DESCRIPTORS
DESCRIPTORS: DISCOMFORT
DESCRIPTORS: DISCOMFORT
DESCRIPTORS: SORE
DESCRIPTORS: DISCOMFORT

## 2019-09-20 ASSESSMENT — PAIN DESCRIPTION - FREQUENCY
FREQUENCY: CONTINUOUS
FREQUENCY: INTERMITTENT
FREQUENCY: INTERMITTENT
FREQUENCY: CONTINUOUS

## 2019-09-20 ASSESSMENT — PAIN DESCRIPTION - PAIN TYPE
TYPE: ACUTE PAIN
TYPE: SURGICAL PAIN

## 2019-09-20 ASSESSMENT — PAIN DESCRIPTION - ORIENTATION
ORIENTATION: RIGHT;OTHER (COMMENT)
ORIENTATION: LOWER
ORIENTATION: LOWER

## 2019-09-20 ASSESSMENT — PAIN DESCRIPTION - ONSET
ONSET: GRADUAL
ONSET: ON-GOING

## 2019-09-20 ASSESSMENT — PAIN DESCRIPTION - LOCATION
LOCATION: ABDOMEN
LOCATION: OTHER (COMMENT)
LOCATION: THROAT
LOCATION: ABDOMEN

## 2019-09-20 ASSESSMENT — PAIN DESCRIPTION - PROGRESSION
CLINICAL_PROGRESSION: GRADUALLY WORSENING

## 2019-09-21 ENCOUNTER — APPOINTMENT (OUTPATIENT)
Dept: ULTRASOUND IMAGING | Age: 84
DRG: 267 | End: 2019-09-21
Attending: INTERNAL MEDICINE
Payer: MEDICARE

## 2019-09-21 VITALS
DIASTOLIC BLOOD PRESSURE: 55 MMHG | RESPIRATION RATE: 14 BRPM | WEIGHT: 138.01 LBS | HEIGHT: 61 IN | TEMPERATURE: 98.2 F | SYSTOLIC BLOOD PRESSURE: 104 MMHG | BODY MASS INDEX: 26.06 KG/M2 | OXYGEN SATURATION: 97 % | HEART RATE: 59 BPM

## 2019-09-21 LAB
ANION GAP SERPL CALCULATED.3IONS-SCNC: 6 MMOL/L (ref 4–16)
BUN BLDV-MCNC: 7 MG/DL (ref 6–23)
CALCIUM SERPL-MCNC: 8 MG/DL (ref 8.3–10.6)
CHLORIDE BLD-SCNC: 104 MMOL/L (ref 99–110)
CO2: 33 MMOL/L (ref 21–32)
CREAT SERPL-MCNC: 0.7 MG/DL (ref 0.6–1.1)
GFR AFRICAN AMERICAN: >60 ML/MIN/1.73M2
GFR NON-AFRICAN AMERICAN: >60 ML/MIN/1.73M2
GLUCOSE BLD-MCNC: 95 MG/DL (ref 70–99)
HCT VFR BLD CALC: 27.2 % (ref 37–47)
HEMOGLOBIN: 8.5 GM/DL (ref 12.5–16)
IRON: 23 UG/DL (ref 37–145)
MCH RBC QN AUTO: 28.8 PG (ref 27–31)
MCHC RBC AUTO-ENTMCNC: 31.3 % (ref 32–36)
MCV RBC AUTO: 92.2 FL (ref 78–100)
PCT TRANSFERRIN: 8 % (ref 10–44)
PDW BLD-RTO: 16.2 % (ref 11.7–14.9)
PLATELET # BLD: 190 K/CU MM (ref 140–440)
PMV BLD AUTO: 9.6 FL (ref 7.5–11.1)
POTASSIUM SERPL-SCNC: 3.7 MMOL/L (ref 3.5–5.1)
RBC # BLD: 2.95 M/CU MM (ref 4.2–5.4)
SODIUM BLD-SCNC: 143 MMOL/L (ref 135–145)
TOTAL IRON BINDING CAPACITY: 284 UG/DL (ref 250–450)
UNSATURATED IRON BINDING CAPACITY: 261 UG/DL (ref 110–370)
WBC # BLD: 8.1 K/CU MM (ref 4–10.5)

## 2019-09-21 PROCEDURE — 94761 N-INVAS EAR/PLS OXIMETRY MLT: CPT

## 2019-09-21 PROCEDURE — 83550 IRON BINDING TEST: CPT

## 2019-09-21 PROCEDURE — 80048 BASIC METABOLIC PNL TOTAL CA: CPT

## 2019-09-21 PROCEDURE — 99239 HOSP IP/OBS DSCHRG MGMT >30: CPT | Performed by: INTERNAL MEDICINE

## 2019-09-21 PROCEDURE — 85027 COMPLETE CBC AUTOMATED: CPT

## 2019-09-21 PROCEDURE — 93925 LOWER EXTREMITY STUDY: CPT

## 2019-09-21 PROCEDURE — 2580000003 HC RX 258: Performed by: INTERNAL MEDICINE

## 2019-09-21 PROCEDURE — 6370000000 HC RX 637 (ALT 250 FOR IP): Performed by: SURGERY

## 2019-09-21 PROCEDURE — 6370000000 HC RX 637 (ALT 250 FOR IP): Performed by: INTERNAL MEDICINE

## 2019-09-21 PROCEDURE — 94640 AIRWAY INHALATION TREATMENT: CPT

## 2019-09-21 PROCEDURE — 83540 ASSAY OF IRON: CPT

## 2019-09-21 RX ORDER — ASPIRIN 81 MG/1
81 TABLET ORAL DAILY
Qty: 30 TABLET | Refills: 3 | Status: SHIPPED | OUTPATIENT
Start: 2019-09-22 | End: 2020-04-22

## 2019-09-21 RX ORDER — CLOPIDOGREL BISULFATE 75 MG/1
75 TABLET ORAL DAILY
Qty: 30 TABLET | Refills: 3 | Status: SHIPPED | OUTPATIENT
Start: 2019-09-22 | End: 2020-01-28

## 2019-09-21 RX ORDER — LEVOTHYROXINE SODIUM 0.07 MG/1
75 TABLET ORAL DAILY
Qty: 30 TABLET | Refills: 3 | Status: SHIPPED | OUTPATIENT
Start: 2019-09-22 | End: 2020-08-10 | Stop reason: SDUPTHER

## 2019-09-21 RX ORDER — TIMOLOL MALEATE 5 MG/ML
1 SOLUTION/ DROPS OPHTHALMIC DAILY
Qty: 1 BOTTLE | Refills: 0 | Status: SHIPPED | OUTPATIENT
Start: 2019-09-22 | End: 2019-10-22

## 2019-09-21 RX ORDER — SIMVASTATIN 20 MG
20 TABLET ORAL NIGHTLY
Qty: 30 TABLET | Refills: 3 | Status: SHIPPED | OUTPATIENT
Start: 2019-09-21 | End: 2020-01-28

## 2019-09-21 RX ORDER — PANTOPRAZOLE SODIUM 40 MG/1
40 TABLET, DELAYED RELEASE ORAL NIGHTLY
Qty: 30 TABLET | Refills: 3 | Status: SHIPPED | OUTPATIENT
Start: 2019-09-21 | End: 2020-03-13

## 2019-09-21 RX ADMIN — FUROSEMIDE 20 MG: 20 TABLET ORAL at 08:39

## 2019-09-21 RX ADMIN — SUCRALFATE 1 G: 1 TABLET ORAL at 08:39

## 2019-09-21 RX ADMIN — CYANOCOBALAMIN TAB 1000 MCG 1000 MCG: 1000 TAB at 08:40

## 2019-09-21 RX ADMIN — LEVOTHYROXINE SODIUM 75 MCG: 75 TABLET ORAL at 08:39

## 2019-09-21 RX ADMIN — Medication 10 ML: at 08:39

## 2019-09-21 RX ADMIN — BISACODYL 10 MG: 10 SUPPOSITORY RECTAL at 11:05

## 2019-09-21 RX ADMIN — ESCITALOPRAM OXALATE 10 MG: 10 TABLET ORAL at 08:39

## 2019-09-21 RX ADMIN — SENNOSIDES, DOCUSATE SODIUM 1 TABLET: 50; 8.6 TABLET, FILM COATED ORAL at 08:38

## 2019-09-21 RX ADMIN — CLOPIDOGREL BISULFATE 75 MG: 75 TABLET ORAL at 08:38

## 2019-09-21 RX ADMIN — TIMOLOL MALEATE 1 DROP: 5 SOLUTION OPHTHALMIC at 08:40

## 2019-09-21 RX ADMIN — GLYCOPYRROLATE AND FORMOTEROL FUMARATE 2 PUFF: 9; 4.8 AEROSOL, METERED RESPIRATORY (INHALATION) at 07:52

## 2019-09-21 RX ADMIN — ASPIRIN 81 MG: 81 TABLET, COATED ORAL at 08:38

## 2019-09-21 ASSESSMENT — PAIN SCALES - GENERAL: PAINLEVEL_OUTOF10: 0

## 2019-09-22 ENCOUNTER — CARE COORDINATION (OUTPATIENT)
Dept: CASE MANAGEMENT | Age: 84
End: 2019-09-22

## 2019-09-22 DIAGNOSIS — I38 VHD (VALVULAR HEART DISEASE): Primary | ICD-10-CM

## 2019-09-22 LAB
ABO/RH: NORMAL
ANTIBODY SCREEN: NEGATIVE
COMPONENT: NORMAL
CROSSMATCH RESULT: NORMAL
STATUS: NORMAL
TRANSFUSION STATUS: NORMAL
UNIT DIVISION: 0
UNIT NUMBER: NORMAL

## 2019-09-22 PROCEDURE — 1111F DSCHRG MED/CURRENT MED MERGE: CPT | Performed by: FAMILY MEDICINE

## 2019-09-23 ENCOUNTER — HOSPITAL ENCOUNTER (OUTPATIENT)
Dept: CT IMAGING | Age: 84
Discharge: HOME | End: 2019-09-23
Payer: MEDICARE

## 2019-09-23 ENCOUNTER — NURSE ONLY (OUTPATIENT)
Dept: CARDIOLOGY CLINIC | Age: 84
End: 2019-09-23
Payer: MEDICARE

## 2019-09-23 ENCOUNTER — HOSPITAL ENCOUNTER (OUTPATIENT)
Age: 84
Discharge: HOME | End: 2019-09-23
Payer: MEDICARE

## 2019-09-23 ENCOUNTER — PROCEDURE VISIT (OUTPATIENT)
Dept: CARDIOLOGY CLINIC | Age: 84
End: 2019-09-23
Payer: MEDICARE

## 2019-09-23 ENCOUNTER — TELEPHONE (OUTPATIENT)
Dept: CARDIOLOGY CLINIC | Age: 84
End: 2019-09-23

## 2019-09-23 ENCOUNTER — NURSE ONLY (OUTPATIENT)
Dept: CARDIOLOGY CLINIC | Age: 84
End: 2019-09-23

## 2019-09-23 VITALS
DIASTOLIC BLOOD PRESSURE: 78 MMHG | SYSTOLIC BLOOD PRESSURE: 122 MMHG | HEIGHT: 61 IN | HEART RATE: 61 BPM | BODY MASS INDEX: 23.71 KG/M2 | WEIGHT: 125.6 LBS

## 2019-09-23 DIAGNOSIS — Z95.2 S/P TAVR (TRANSCATHETER AORTIC VALVE REPLACEMENT): Primary | ICD-10-CM

## 2019-09-23 DIAGNOSIS — R42 DIZZINESS: ICD-10-CM

## 2019-09-23 DIAGNOSIS — R07.9 CHEST PAIN, UNSPECIFIED TYPE: ICD-10-CM

## 2019-09-23 DIAGNOSIS — I48.91 ATRIAL FIBRILLATION, UNSPECIFIED TYPE (HCC): Primary | ICD-10-CM

## 2019-09-23 DIAGNOSIS — G45.9 TIA (TRANSIENT ISCHEMIC ATTACK): ICD-10-CM

## 2019-09-23 DIAGNOSIS — H53.8 BLURRED VISION: ICD-10-CM

## 2019-09-23 DIAGNOSIS — R42 DIZZINESS: Primary | ICD-10-CM

## 2019-09-23 DIAGNOSIS — I38 VHD (VALVULAR HEART DISEASE): ICD-10-CM

## 2019-09-23 DIAGNOSIS — Z95.3 STATUS POST TRANSCATHETER AORTIC VALVE REPLACEMENT (TAVR) USING BIOPROSTHESIS: ICD-10-CM

## 2019-09-23 DIAGNOSIS — I35.0 AORTIC STENOSIS, SEVERE: ICD-10-CM

## 2019-09-23 LAB
HCT VFR BLD CALC: 28.6 % (ref 37–47)
HEMOGLOBIN: 9 GM/DL (ref 12.5–16)
LV EF: 58 %
LVEF MODALITY: NORMAL
MCH RBC QN AUTO: 29 PG (ref 27–31)
MCHC RBC AUTO-ENTMCNC: 31.5 % (ref 32–36)
MCV RBC AUTO: 92.3 FL (ref 78–100)
PDW BLD-RTO: 16.3 % (ref 11.7–14.9)
PLATELET # BLD: 241 K/CU MM (ref 140–440)
PMV BLD AUTO: 9.9 FL (ref 7.5–11.1)
RBC # BLD: 3.1 M/CU MM (ref 4.2–5.4)
WBC # BLD: 6.5 K/CU MM (ref 4–10.5)

## 2019-09-23 PROCEDURE — 70450 CT HEAD/BRAIN W/O DYE: CPT

## 2019-09-23 PROCEDURE — 99214 OFFICE O/P EST MOD 30 MIN: CPT | Performed by: INTERNAL MEDICINE

## 2019-09-23 PROCEDURE — 36415 COLL VENOUS BLD VENIPUNCTURE: CPT

## 2019-09-23 PROCEDURE — 93308 TTE F-UP OR LMTD: CPT | Performed by: INTERNAL MEDICINE

## 2019-09-23 PROCEDURE — 93000 ELECTROCARDIOGRAM COMPLETE: CPT | Performed by: INTERNAL MEDICINE

## 2019-09-23 PROCEDURE — 85027 COMPLETE CBC AUTOMATED: CPT

## 2019-09-23 NOTE — PROGRESS NOTES
 Other Son         HX of clots    Early Death Son         Hit by car and killed.  High Blood Pressure Daughter     Stroke Son         No residual    Other Son         HX myocarditis     Social History     Tobacco Use    Smoking status: Former Smoker     Packs/day: 3.00     Years: 5.00     Pack years: 15.00     Last attempt to quit: 1962     Years since quittin.7    Smokeless tobacco: Never Used   Substance Use Topics    Alcohol use: No        Review of Systems:   · Constitutional: No Fever or Weight Loss   · Eyes: No Decreased Vision  · ENT: No Headaches, Hearing Loss or Vertigo  · Cardiovascular: as per note above   · Respiratory: No cough or wheezing and as per note above. · Gastrointestinal: No abdominal pain, appetite loss, blood in stools, constipation, diarrhea or heartburn  · Genitourinary: No dysuria, trouble voiding, or hematuria  · Musculoskeletal:  denies any new  joint aches , swelling  or pain   · Integumentary: No rash or pruritis  · Neurological: No TIA or stroke symptoms  · Psychiatric: No anxiety or depression  · Endocrine: No malaise, fatigue or temperature intolerance  · Hematologic/Lymphatic: No bleeding problems, blood clots or swollen lymph nodes  · Allergic/Immunologic: No nasal congestion or hives    Objective:      Physical Exam:  /78   Pulse 61   Ht 5' 1\" (1.549 m)   Wt 125 lb 9.6 oz (57 kg)   BMI 23.73 kg/m²   Wt Readings from Last 3 Encounters:   19 125 lb 9.6 oz (57 kg)   19 138 lb 0.1 oz (62.6 kg)   19 124 lb (56.2 kg)     Body mass index is 23.73 kg/m². Vitals:    19 1109   BP: 122/78   Pulse: 61        General Appearance:  No distress, conversant  Constitutional:  Well developed, Well nourished, No acute distress, Non-toxic appearance.    HENT:  Normocephalic, Atraumatic, Bilateral external ears normal, Oropharynx moist, No oral exudates, Nose normal. Neck- Normal range of motion, No tenderness, Supple, No stridor,no

## 2019-09-23 NOTE — PROGRESS NOTES
48 hour holter monitor applied [email or web address] for atrial fib Serial # [id number] . Instructed patient on monitor and proper use. Instructed on diary. When to remove and bring it back. Must leave the holter monitor on  without removing for the duration of time ordered. Answered all questions the patient had. Instructed patient to call [facility] at [phone] with any questions or concerns with the monitor.

## 2019-09-24 ENCOUNTER — TELEPHONE (OUTPATIENT)
Dept: CARDIOLOGY CLINIC | Age: 84
End: 2019-09-24

## 2019-09-26 ENCOUNTER — HOSPITAL ENCOUNTER (OUTPATIENT)
Age: 84
Discharge: HOME | End: 2019-09-26
Attending: INTERNAL MEDICINE
Payer: MEDICARE

## 2019-09-26 ENCOUNTER — HOSPITAL ENCOUNTER (OUTPATIENT)
Dept: CARDIAC CATH/INVASIVE PROCEDURES | Age: 84
Discharge: HOME | End: 2019-09-26
Attending: INTERNAL MEDICINE | Admitting: SURGERY
Payer: MEDICARE

## 2019-09-26 VITALS — HEART RATE: 50 BPM | DIASTOLIC BLOOD PRESSURE: 62 MMHG | RESPIRATION RATE: 14 BRPM | SYSTOLIC BLOOD PRESSURE: 156 MMHG

## 2019-09-26 LAB
ALBUMIN SERPL-MCNC: 4 GM/DL (ref 3.4–5)
ALP BLD-CCNC: 49 IU/L (ref 40–128)
ALT SERPL-CCNC: 13 U/L (ref 10–40)
ANION GAP SERPL CALCULATED.3IONS-SCNC: 10 MMOL/L (ref 4–16)
APTT: 32.4 SECONDS (ref 21.2–33)
AST SERPL-CCNC: 22 IU/L (ref 15–37)
BILIRUB SERPL-MCNC: 0.6 MG/DL (ref 0–1)
BUN BLDV-MCNC: 11 MG/DL (ref 6–23)
CALCIUM SERPL-MCNC: 8.7 MG/DL (ref 8.3–10.6)
CHLORIDE BLD-SCNC: 104 MMOL/L (ref 99–110)
CO2: 26 MMOL/L (ref 21–32)
CREAT SERPL-MCNC: 0.6 MG/DL (ref 0.6–1.1)
GFR AFRICAN AMERICAN: >60 ML/MIN/1.73M2
GFR NON-AFRICAN AMERICAN: >60 ML/MIN/1.73M2
GLUCOSE BLD-MCNC: 93 MG/DL (ref 70–99)
HCT VFR BLD CALC: 30.9 % (ref 37–47)
HEMOGLOBIN: 9.5 GM/DL (ref 12.5–16)
INR BLD: 0.93 INDEX
MCH RBC QN AUTO: 29.4 PG (ref 27–31)
MCHC RBC AUTO-ENTMCNC: 30.7 % (ref 32–36)
MCV RBC AUTO: 95.7 FL (ref 78–100)
PDW BLD-RTO: 16.9 % (ref 11.7–14.9)
PLATELET # BLD: 281 K/CU MM (ref 140–440)
PMV BLD AUTO: 9.9 FL (ref 7.5–11.1)
POTASSIUM SERPL-SCNC: 4.6 MMOL/L (ref 3.5–5.1)
PRO-BNP: 854.2 PG/ML
PROTHROMBIN TIME: 10.6 SECONDS (ref 9.12–12.5)
RBC # BLD: 3.23 M/CU MM (ref 4.2–5.4)
SODIUM BLD-SCNC: 140 MMOL/L (ref 135–145)
TOTAL PROTEIN: 5.8 GM/DL (ref 6.4–8.2)
WBC # BLD: 5.8 K/CU MM (ref 4–10.5)

## 2019-09-26 PROCEDURE — 80053 COMPREHEN METABOLIC PANEL: CPT

## 2019-09-26 PROCEDURE — 85027 COMPLETE CBC AUTOMATED: CPT

## 2019-09-26 PROCEDURE — 93010 ELECTROCARDIOGRAM REPORT: CPT | Performed by: INTERNAL MEDICINE

## 2019-09-26 PROCEDURE — 99213 OFFICE O/P EST LOW 20 MIN: CPT | Performed by: INTERNAL MEDICINE

## 2019-09-26 PROCEDURE — 85730 THROMBOPLASTIN TIME PARTIAL: CPT

## 2019-09-26 PROCEDURE — 85610 PROTHROMBIN TIME: CPT

## 2019-09-26 PROCEDURE — 93005 ELECTROCARDIOGRAM TRACING: CPT | Performed by: INTERNAL MEDICINE

## 2019-09-26 PROCEDURE — 83880 ASSAY OF NATRIURETIC PEPTIDE: CPT

## 2019-09-26 PROCEDURE — 36415 COLL VENOUS BLD VENIPUNCTURE: CPT

## 2019-09-26 RX ORDER — FUROSEMIDE 20 MG/1
20 TABLET ORAL PRN
Status: DISCONTINUED | OUTPATIENT
Start: 2019-09-26 | End: 2019-09-26

## 2019-09-26 RX ORDER — FUROSEMIDE 20 MG/1
20 TABLET ORAL PRN
Status: DISCONTINUED | OUTPATIENT
Start: 2019-09-26 | End: 2019-09-26 | Stop reason: HOSPADM

## 2019-09-26 NOTE — PROGRESS NOTES
Dr. Harley Roche will call Lasix 20 mg tablets into the [facility]. Continue to monitor your weight, if you gain any more weight, or notice any increase of shortness of breathing, Call the Clinic at [phone] and take lasix 20 mg once a day. Let us know that you are starting the medication.

## 2019-09-26 NOTE — ADDENDUM NOTE
Addendum  created 09/26/19 0944 by DERIK Martinez - CRNA    Child order released for a procedure order, Order Canceled from Note

## 2019-09-26 NOTE — PROGRESS NOTES
CARDIOLOGY NOTE      9/26/2019    RE: Jose Alberto Owens  (1935)                               TO:  Dr. Joy Sanchez MD            Sherri Julien is a 80 y.o. female who was seen today for management of  Post TAVR                                    HPI:   Patient is here for  Fu post TAVR, had visual issues and weakness, had CT head and CBC done, both symptoms resolvede now. 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 09/23/2019    Pre-op chest exam     COPD with exacerbation ([address] 09/06/2019    Community acquired pneumonia of left lower lobe of lung ([address] 09/06/2019    Nodule of lower lobe of right lung 09/06/2019    Acute respiratory distress     Aortic stenosis, severe 08/28/2019    VHD (valvular heart disease)     Gastroesophageal reflux disease 03/04/2019    HTN (hypertension) 01/22/2019    Vitamin D deficiency 01/22/2019    Vitamin B12 deficiency 01/22/2019    COPD exacerbation ([address] 10/31/2018    Acquired hypothyroidism 09/24/2018    Murmur 09/24/2018    Age-related osteoporosis without current pathological fracture 09/24/2018    Black tongue 09/24/2018    Anemia 09/01/2018    Dizziness 09/01/2018     Current Facility-Administered Medications   Medication Dose Route Frequency Provider Last Rate Last Dose    furosemide (LASIX) tablet 20 mg  20 mg Oral PRN Tj Coleman MD         Allergies: Morphine; Bactrim [sulfamethoxazole-trimethoprim]; Butorphanol; Influenza vaccines; Lactose intolerance (gi); Phenergan [promethazine hcl];  Promethazine; Stadol [butorphanol tartrate]; and Tape [adhesive tape]  Past Medical History:   Diagnosis Date    Arthritis     generalized    Asthma     Blood transfusion 2004    No reaction    Chronic bronchitis (HCC)     COPD (chronic obstructive pulmonary disease) [facility]     summer 2014    Fatigue     H/O cardiac catheterization 06/15/2017    mild lad and cx disease    H/O cardiovascular stress test 08/22/2019    H/O Doppler ultrasound 4/7/2016 3/19/12    carotid-4/16 WNL 3/12right mild less than 50%and left wnl    H/O Doppler ultrasound 6/14/017    carotid - mod disease EILEEN, mild disease LICA    H/O echocardiogram 7/23/10    H/O echocardiogram 4/15/2016    EF 60% sclerotic AV mildly stenosed-recommend yearly echo    H/O echocardiogram 06/13/2017    EF>55% mild-mod AS, triavial AR    H/O echocardiogram 09/14/2018    EF55-60% mod AS- mean pressure gradient increased from 13-31    H/O echocardiogram 08/22/2019    EF 55-60%, Minimal concentric left ventricular hypertrophy, left atrium is mildly dilated, severe aortic stenosis, Mitral annular calcification is present, Mild AR, Mild-Mod MR, Mild TR, No pericardial effusion     H/O echocardiogram 09/23/2019    H/O exercise stress test 06/14/2017    abnormal    History of cardiac cath 08/28/2019    Severe AS,   TAVR??  History of nuclear stress test 03/14/2017    EF 75%. Normal study.     Holter monitor, abnormal 2/22/11    infrequent APC are seen    Normal cardiac stress test 7/23/10    EF 70%, no ischemia    On home O2     only uses as needed, nightly prn    Syncope and collapse     Tachycardia     HX of tachycardia - had a cardioablation    Thyroid disease      Past Surgical History:   Procedure Laterality Date    BREAST SURGERY  2009    bilat    CARDIAC CATHETERIZATION  3/02/11    mild to moderate disease of diagonal and proximal RCA   [address]    ablation    CARPAL TUNNEL RELEASE  1990's    bilat    CHOLECYSTECTOMY  1961    open choley    COLONOSCOPY      DILATATION, ESOPHAGUS      ENDOSCOPY, COLON, DIAGNOSTIC  07/03/2017    s/p

## 2019-09-26 NOTE — CONSULTS
Department of Cardiovascular & Thoracic Surgery   Consult Note        Chief Complaint:  S/P TAVR  Date of Consult: 09/26/19      History Obtained From:  patient, family member - daughter    HISTORY OF PRESENT ILLNESS:    The patient is a  80 y.o. female with AS. Patient denies any dizziness or diplopia. She's getting around a little more. Admits that she still gets some dyspnea with exertion. No edema. Her daily weight has been slowy creeping up. Past Medical History:        Diagnosis Date    Arthritis     generalized    Asthma     Blood transfusion 2004    No reaction    Chronic bronchitis (HCC)     COPD (chronic obstructive pulmonary disease) ([address]     summer 2014    Fatigue     H/O cardiac catheterization 06/15/2017    mild lad and cx disease    H/O cardiovascular stress test 08/22/2019    H/O Doppler ultrasound 4/7/2016 3/19/12    carotid-4/16 WNL 3/12right mild less than 50%and left wnl    H/O Doppler ultrasound 6/14/017    carotid - mod disease EILEEN, mild disease LICA    H/O echocardiogram 7/23/10    H/O echocardiogram 4/15/2016    EF 60% sclerotic AV mildly stenosed-recommend yearly echo    H/O echocardiogram 06/13/2017    EF>55% mild-mod AS, triavial AR    H/O echocardiogram 09/14/2018    EF55-60% mod AS- mean pressure gradient increased from 13-31    H/O echocardiogram 08/22/2019    EF 55-60%, Minimal concentric left ventricular hypertrophy, left atrium is mildly dilated, severe aortic stenosis, Mitral annular calcification is present, Mild AR, Mild-Mod MR, Mild TR, No pericardial effusion     H/O echocardiogram 09/23/2019    H/O exercise stress test 06/14/2017    abnormal    History of cardiac cath 08/28/2019    Severe AS,   TAVR??  History of nuclear stress test 03/14/2017    EF 75%. Normal study.     Holter monitor, abnormal 2/22/11    infrequent APC are seen    Normal cardiac stress test 7/23/10    EF 70%, no ischemia    On home O2     only uses as needed, nightly prn   

## 2019-09-27 ENCOUNTER — CARE COORDINATION (OUTPATIENT)
Dept: CASE MANAGEMENT | Age: 84
End: 2019-09-27

## 2019-09-27 RX ORDER — FUROSEMIDE 20 MG/1
20 TABLET ORAL DAILY PRN
Qty: 30 TABLET | Refills: 1 | Status: SHIPPED | OUTPATIENT
Start: 2019-09-27 | End: 2019-11-05

## 2019-09-27 NOTE — CARE COORDINATION
Roni 45 Transitions Follow Up Call    2019    Patient: Philomena Humphrey  Patient : 1935   MRN: [id number]  Reason for Admission: Copd; Pna; RSV Infection; S/P TAVR on 19  Discharge Date: 19 RARS: Readmission Risk Score: 25    Spoke with: Patient    Care Transitions Subsequent and Final Call    Schedule Follow Up Appointment with PCP:  Declined  Subsequent and Final Calls  Do you have any ongoing symptoms?:  Yes  Onset of Patient-reported symptoms: In the past 7 days  Patient-reported symptoms:  Fatigue, Shortness of Breath, Other  Interventions for patient-reported symptoms:  Other  Have your medications changed?:  Yes  Patient Reports:  + lasix prn  Do you have any questions related to your medications?:  No  Do you currently have any active services?:  No  Do you have any needs or concerns that I can assist you with?:  No  Identified Barriers: Other  Care Transitions Interventions  No Identified Needs  Other Interventions: Follow Up: Spoke w/ Patient for Care Transition. Reports she went w/  and DIL to appt and lunch today, now feeling fatigued and resting. Reports mild sob on exertion which resolves quickly w/ rest and deep breathing. Denies acute distress, chest pain, palpitations, dizziness, diaphoresis, cough, wheezing. Reports post op visual disturbance and weakness now completely resolved. Reports incision sites bruised but improving; denies open areas, drainage, erythema, knots, edema. Maintaining post-op restrictions. Noted to have weight gain past week; Dr Daryl Martinez office phoned in Lasix 20 mg prn 2+ wt gain. Awaiting call from pharmacy for rx . Patient denies edema, is weighing daily and keeping log. Todays wt 127.4# and 125# as of 19 per Dr Coffey Portal. Denies acute distress or need for PCP notification. Instructed Patient to contact PCP/Dr Cottrell/[facility]  should she experience above sx or change in condition.  Confirmed she has

## 2019-10-01 LAB
EKG ATRIAL RATE: 53 BPM
EKG DIAGNOSIS: NORMAL
EKG P AXIS: 72 DEGREES
EKG P-R INTERVAL: 188 MS
EKG Q-T INTERVAL: 450 MS
EKG QRS DURATION: 84 MS
EKG QTC CALCULATION (BAZETT): 422 MS
EKG R AXIS: 49 DEGREES
EKG T AXIS: 69 DEGREES
EKG VENTRICULAR RATE: 53 BPM

## 2019-10-02 ENCOUNTER — CARE COORDINATION (OUTPATIENT)
Dept: CASE MANAGEMENT | Age: 84
End: 2019-10-02

## 2019-10-08 ENCOUNTER — CARE COORDINATION (OUTPATIENT)
Dept: CASE MANAGEMENT | Age: 84
End: 2019-10-08

## 2019-10-17 ENCOUNTER — TELEPHONE (OUTPATIENT)
Dept: CARDIOLOGY CLINIC | Age: 84
End: 2019-10-17

## 2019-10-17 ENCOUNTER — HOSPITAL ENCOUNTER (OUTPATIENT)
Age: 84
Discharge: HOME | End: 2019-10-17
Payer: MEDICARE

## 2019-10-17 ENCOUNTER — HOSPITAL ENCOUNTER (OUTPATIENT)
Dept: CARDIAC CATH/INVASIVE PROCEDURES | Age: 84
Discharge: HOME | End: 2019-10-17
Payer: MEDICARE

## 2019-10-17 VITALS
BODY MASS INDEX: 24.17 KG/M2 | DIASTOLIC BLOOD PRESSURE: 45 MMHG | HEART RATE: 48 BPM | SYSTOLIC BLOOD PRESSURE: 173 MMHG | RESPIRATION RATE: 14 BRPM | HEIGHT: 61 IN | WEIGHT: 128 LBS

## 2019-10-17 PROBLEM — Z95.2 S/P TAVR (TRANSCATHETER AORTIC VALVE REPLACEMENT): Status: ACTIVE | Noted: 2019-09-23

## 2019-10-17 LAB
ALBUMIN SERPL-MCNC: 4.2 GM/DL (ref 3.4–5)
ALP BLD-CCNC: 50 IU/L (ref 40–128)
ALT SERPL-CCNC: 15 U/L (ref 10–40)
ANION GAP SERPL CALCULATED.3IONS-SCNC: 14 MMOL/L (ref 4–16)
APTT: 29.3 SECONDS (ref 21.2–33)
AST SERPL-CCNC: 21 IU/L (ref 15–37)
BILIRUB SERPL-MCNC: 0.4 MG/DL (ref 0–1)
BUN BLDV-MCNC: 12 MG/DL (ref 6–23)
CALCIUM SERPL-MCNC: 9.1 MG/DL (ref 8.3–10.6)
CHLORIDE BLD-SCNC: 103 MMOL/L (ref 99–110)
CO2: 25 MMOL/L (ref 21–32)
CREAT SERPL-MCNC: 0.7 MG/DL (ref 0.6–1.1)
EKG ATRIAL RATE: 48 BPM
EKG DIAGNOSIS: NORMAL
EKG P AXIS: 104 DEGREES
EKG P-R INTERVAL: 180 MS
EKG Q-T INTERVAL: 502 MS
EKG QRS DURATION: 82 MS
EKG QTC CALCULATION (BAZETT): 448 MS
EKG R AXIS: 56 DEGREES
EKG T AXIS: 62 DEGREES
EKG VENTRICULAR RATE: 48 BPM
GFR AFRICAN AMERICAN: >60 ML/MIN/1.73M2
GFR NON-AFRICAN AMERICAN: >60 ML/MIN/1.73M2
GLUCOSE BLD-MCNC: 126 MG/DL (ref 70–99)
HCT VFR BLD CALC: 32.2 % (ref 37–47)
HEMOGLOBIN: 9.6 GM/DL (ref 12.5–16)
INR BLD: 0.95 INDEX
LV EF: 53 %
LVEF MODALITY: NORMAL
MCH RBC QN AUTO: 28.4 PG (ref 27–31)
MCHC RBC AUTO-ENTMCNC: 29.8 % (ref 32–36)
MCV RBC AUTO: 95.3 FL (ref 78–100)
PDW BLD-RTO: 15.9 % (ref 11.7–14.9)
PLATELET # BLD: 276 K/CU MM (ref 140–440)
PMV BLD AUTO: 9.9 FL (ref 7.5–11.1)
POTASSIUM SERPL-SCNC: 3.9 MMOL/L (ref 3.5–5.1)
PRO-BNP: 1327 PG/ML
PROTHROMBIN TIME: 10.8 SECONDS (ref 11.7–14.5)
RBC # BLD: 3.38 M/CU MM (ref 4.2–5.4)
SODIUM BLD-SCNC: 142 MMOL/L (ref 135–145)
TOTAL PROTEIN: 6.1 GM/DL (ref 6.4–8.2)
WBC # BLD: 8.7 K/CU MM (ref 4–10.5)

## 2019-10-17 PROCEDURE — 36415 COLL VENOUS BLD VENIPUNCTURE: CPT

## 2019-10-17 PROCEDURE — 85610 PROTHROMBIN TIME: CPT

## 2019-10-17 PROCEDURE — 85730 THROMBOPLASTIN TIME PARTIAL: CPT

## 2019-10-17 PROCEDURE — 99213 OFFICE O/P EST LOW 20 MIN: CPT | Performed by: INTERNAL MEDICINE

## 2019-10-17 PROCEDURE — 93005 ELECTROCARDIOGRAM TRACING: CPT | Performed by: INTERNAL MEDICINE

## 2019-10-17 PROCEDURE — 80053 COMPREHEN METABOLIC PANEL: CPT

## 2019-10-17 PROCEDURE — 93010 ELECTROCARDIOGRAM REPORT: CPT | Performed by: INTERNAL MEDICINE

## 2019-10-17 PROCEDURE — 85027 COMPLETE CBC AUTOMATED: CPT

## 2019-10-17 PROCEDURE — 93306 TTE W/DOPPLER COMPLETE: CPT

## 2019-10-17 PROCEDURE — 83880 ASSAY OF NATRIURETIC PEPTIDE: CPT

## 2019-11-05 ENCOUNTER — OFFICE VISIT (OUTPATIENT)
Dept: FAMILY MEDICINE CLINIC | Age: 84
End: 2019-11-05
Payer: MEDICARE

## 2019-11-05 VITALS
SYSTOLIC BLOOD PRESSURE: 128 MMHG | TEMPERATURE: 97.2 F | DIASTOLIC BLOOD PRESSURE: 64 MMHG | OXYGEN SATURATION: 99 % | WEIGHT: 132.8 LBS | BODY MASS INDEX: 25.09 KG/M2 | HEART RATE: 67 BPM

## 2019-11-05 DIAGNOSIS — Z95.2 S/P TAVR (TRANSCATHETER AORTIC VALVE REPLACEMENT): Primary | ICD-10-CM

## 2019-11-05 PROBLEM — J18.9 COMMUNITY ACQUIRED PNEUMONIA OF LEFT LOWER LOBE OF LUNG: Status: RESOLVED | Noted: 2019-09-06 | Resolved: 2019-11-05

## 2019-11-05 PROCEDURE — 99213 OFFICE O/P EST LOW 20 MIN: CPT | Performed by: FAMILY MEDICINE

## 2019-11-05 RX ORDER — METHOCARBAMOL 500 MG/1
500 TABLET, FILM COATED ORAL 4 TIMES DAILY PRN
Qty: 60 TABLET | Refills: 2 | Status: SHIPPED | OUTPATIENT
Start: 2019-11-05 | End: 2020-04-27 | Stop reason: SDUPTHER

## 2019-11-05 ASSESSMENT — ENCOUNTER SYMPTOMS: SHORTNESS OF BREATH: 0

## 2019-11-06 RX ORDER — ESCITALOPRAM OXALATE 10 MG/1
10 TABLET ORAL DAILY
Qty: 90 TABLET | Refills: 0 | Status: SHIPPED | OUTPATIENT
Start: 2019-11-06 | End: 2020-02-03 | Stop reason: SDUPTHER

## 2019-12-13 ENCOUNTER — HOSPITAL ENCOUNTER (OUTPATIENT)
Dept: GENERAL RADIOLOGY | Age: 84
Discharge: HOME | End: 2019-12-13
Payer: MEDICARE

## 2019-12-13 DIAGNOSIS — M75.51 ACUTE BURSITIS OF RIGHT SHOULDER: ICD-10-CM

## 2019-12-13 PROCEDURE — 73040 CONTRAST X-RAY OF SHOULDER: CPT

## 2019-12-13 PROCEDURE — 6360000004 HC RX CONTRAST MEDICATION: Performed by: ORTHOPAEDIC SURGERY

## 2019-12-13 PROCEDURE — 23350 INJECTION FOR SHOULDER X-RAY: CPT

## 2019-12-13 RX ADMIN — IOPAMIDOL 10 ML: 755 INJECTION, SOLUTION INTRAVENOUS at 12:40

## 2020-01-28 RX ORDER — SIMVASTATIN 20 MG
20 TABLET ORAL NIGHTLY
Qty: 30 TABLET | Refills: 5 | Status: SHIPPED | OUTPATIENT
Start: 2020-01-28 | End: 2020-02-20 | Stop reason: ALTCHOICE

## 2020-01-28 RX ORDER — CLOPIDOGREL BISULFATE 75 MG/1
75 TABLET ORAL DAILY
Qty: 90 TABLET | Refills: 3 | Status: SHIPPED | OUTPATIENT
Start: 2020-01-28 | End: 2020-02-03

## 2020-01-28 RX ORDER — SIMVASTATIN 20 MG
20 TABLET ORAL NIGHTLY
Qty: 90 TABLET | Refills: 3 | Status: SHIPPED | OUTPATIENT
Start: 2020-01-28 | End: 2020-02-03

## 2020-01-28 RX ORDER — CLOPIDOGREL BISULFATE 75 MG/1
TABLET ORAL
Qty: 30 TABLET | Refills: 5 | Status: SHIPPED | OUTPATIENT
Start: 2020-01-28 | End: 2020-02-20 | Stop reason: ALTCHOICE

## 2020-01-30 ENCOUNTER — OFFICE VISIT (OUTPATIENT)
Dept: CARDIOLOGY CLINIC | Age: 85
End: 2020-01-30
Payer: MEDICARE

## 2020-01-30 VITALS
SYSTOLIC BLOOD PRESSURE: 120 MMHG | DIASTOLIC BLOOD PRESSURE: 70 MMHG | HEIGHT: 61 IN | HEART RATE: 60 BPM | WEIGHT: 133.8 LBS | BODY MASS INDEX: 25.26 KG/M2

## 2020-01-30 PROCEDURE — 99213 OFFICE O/P EST LOW 20 MIN: CPT | Performed by: INTERNAL MEDICINE

## 2020-01-30 NOTE — LETTER
[id number]  1935  [id number]    Have you had any Chest Pain - No    Have you had any Shortness of Breath - Yes  If Yes - When on exertion    Have you had any dizziness - No    Have you had any palpitations - No    Do you have any edema - No    Ask patient if they want to sign up for MyChart if they are not already signed up    Check to see if we have an E-MAIL on file for the patient    Check medication list thoroughly!!!  BE SURE TO ASK PATIENT IF THEY NEED MEDICATION REFILLS

## 2020-01-30 NOTE — PROGRESS NOTES
CARDIOLOGY NOTE      1/30/2020    RE: Lashonda Harden  (1935)                               TO:  Dr. Omayra Tristan MD            Yumiko Morton is a 80 y.o. female who was seen today for management of  dizzy                Here post TAVR                    HPI:   Patient is here for      -  VHD  SP  TAVR                The patient does not have  cardiac complaints     Lashonda Harden has the following history recorded in care path:  Patient Active Problem List    Diagnosis Date Noted    Gait disturbance 11/09/2015     Priority: Low    Closed intertrochanteric fracture of left femur ([address] 11/04/2015     Priority: Low    S/P TAVR (transcatheter aortic valve replacement) 09/23/2019    COPD with exacerbation ([address] 09/06/2019    Nodule of lower lobe of right lung 09/06/2019    Acute respiratory distress     Aortic stenosis, severe 08/28/2019    VHD (valvular heart disease)     Gastroesophageal reflux disease 03/04/2019    HTN (hypertension) 01/22/2019    Vitamin D deficiency 01/22/2019    Vitamin B12 deficiency 01/22/2019    COPD exacerbation ([facility] 10/31/2018    Acquired hypothyroidism 09/24/2018    Murmur 09/24/2018    Age-related osteoporosis without current pathological fracture 09/24/2018    Black tongue 09/24/2018    Anemia 09/01/2018    Dizziness 09/01/2018     Current Outpatient Medications   Medication Sig Dispense Refill    simvastatin (ZOCOR) 20 MG tablet TAKE 1 TABLET BY MOUTH NIGHTLY 30 tablet 5    clopidogrel (PLAVIX) 75 MG tablet TAKE 1 TABLET BY MOUTH ONCE DAILY 30 tablet 5    clopidogrel (PLAVIX) 75 MG tablet Take 1 tablet by mouth daily 90 tablet 3    simvastatin (ZOCOR) 20 MG tablet Take 1 tablet by mouth nightly 90 tablet 3    escitalopram (LEXAPRO) 10 MG tablet Take 1 tablet by mouth daily 90 tablet 0    methocarbamol (ROBAXIN) 500 MG tablet Take 1 tablet by mouth 4 times daily as needed (muscle spasm) 60 tablet 2    aspirin 81 MG EC tablet Take 1 SHOULDER ARTHROPLASTY Left 10/2017    TOE SURGERY  Early     hammer toes and bunions      As reviewed   Family History   Problem Relation Age of Onset    Diabetes Mother     Other Mother         thyroid    Heart Disease Father     Arthritis Father     High Blood Pressure Father     High Cholesterol Father     Other Father         glaucoma    Other Sister         thyroid, glaucoma    Diabetes Brother     Other Sister     Vision Loss Sister         lung problems, smoker    Cancer Sister         throat cancer    Other Son         HX of clots    Early Death Son         Hit by car and killed.  High Blood Pressure Daughter     Stroke Son         No residual    Other Son         HX myocarditis     Social History     Tobacco Use    Smoking status: Former Smoker     Packs/day: 3.00     Years: 5.00     Pack years: 15.00     Last attempt to quit: 1962     Years since quittin.1    Smokeless tobacco: Never Used   Substance Use Topics    Alcohol use: No      Review of Systems:    Constitutional: Negative for diaphoresis and fatigue  Psychological:Negative for anxiety or depression  HENT: Negative for headaches, nasal congestion, sinus pain or vertigo  Eyes: Negative for visual disturbance.    Endocrine: Negative for polydipsia/polyuria  Respiratory: Negative for shortness of breath  Cardiovascular: Negative for chest pain, dyspnea on exertion, claudication, edema, irregular heartbeat, murmur, palpitations or shortness of breath  Gastrointestinal: Negative for abdominal pain or heartburn  Genito-Urinary: Negative for urinary frequency/urgency  Musculoskeletal: Negative for muscle pain, muscular weakness, negative for pain in arm and leg or swelling in foot and leg  Neurological: no dizzy  Dermatological: Negative for rash    Objective:  /70 (Site: Left Upper Arm, Position: Standing, Cuff Size: Medium Adult)   Pulse 60   Ht 5' 1\" (1.549 m)   Wt 133 lb 12.8 oz (60.7 kg)   BMI 25.28

## 2020-02-03 ENCOUNTER — OFFICE VISIT (OUTPATIENT)
Dept: FAMILY MEDICINE CLINIC | Age: 85
End: 2020-02-03
Payer: MEDICARE

## 2020-02-03 VITALS
WEIGHT: 131.8 LBS | SYSTOLIC BLOOD PRESSURE: 122 MMHG | TEMPERATURE: 97 F | HEART RATE: 52 BPM | OXYGEN SATURATION: 97 % | BODY MASS INDEX: 24.9 KG/M2 | DIASTOLIC BLOOD PRESSURE: 64 MMHG

## 2020-02-03 PROCEDURE — 99214 OFFICE O/P EST MOD 30 MIN: CPT | Performed by: FAMILY MEDICINE

## 2020-02-03 RX ORDER — ESCITALOPRAM OXALATE 10 MG/1
10 TABLET ORAL DAILY
Qty: 90 TABLET | Refills: 1 | Status: SHIPPED | OUTPATIENT
Start: 2020-02-03 | End: 2020-08-10 | Stop reason: SDUPTHER

## 2020-02-03 ASSESSMENT — ENCOUNTER SYMPTOMS
SHORTNESS OF BREATH: 1
COUGH: 1

## 2020-02-03 ASSESSMENT — COPD QUESTIONNAIRES: COPD: 1

## 2020-02-03 NOTE — PROGRESS NOTES
pantoprazole (PROTONIX) 40 MG tablet, Take 1 tablet by mouth nightly, Disp: 30 tablet, Rfl: 3    vitamin B-12 (CYANOCOBALAMIN) 1000 MCG tablet, Take 1,000 mcg by mouth daily, Disp: , Rfl:     senna-docusate (PERICOLACE) 8.6-50 MG per tablet, Take 1 tablet by mouth 2 times daily , Disp: , Rfl:     sucralfate (CARAFATE) 1 GM tablet, Take 1 tablet by mouth 4 times daily, Disp: 120 tablet, Rfl: 5    glycopyrrolate-formoterol (BEVESPI AEROSPHERE) 9-4.8 MCG/ACT AERO, Inhale 2 puffs into the lungs 2 times daily, Disp: 3 Inhaler, Rfl: 3    L-Arginine 500 MG TABS, Take 1 tablet by mouth 2 times daily, Disp: , Rfl:     Calcium-Magnesium-Zinc 167-83-8 MG TABS, Take 1 tablet by mouth daily, Disp: , Rfl:     OXYGEN, Inhale 2 L into the lungs nightly as needed (uses as needed nightly) , Disp: , Rfl:     Cholecalciferol (VITAMIN D3) 2000 UNITS CAPS, Take 1 capsule by mouth nightly , Disp: , Rfl:     albuterol (PROVENTIL HFA;VENTOLIN HFA) 108 (90 BASE) MCG/ACT inhaler, Inhale 2 puffs into the lungs every 6 hours as needed for Wheezing., Disp: , Rfl:          Josue Eli MD

## 2020-02-07 LAB
BASOPHILS ABSOLUTE: 0 /ΜL
BASOPHILS RELATIVE PERCENT: 0 %
BUN BLDV-MCNC: 8 MG/DL
CALCIUM SERPL-MCNC: 9.3 MG/DL
CHLORIDE BLD-SCNC: 9.3 MMOL/L
CO2: 26 MMOL/L
CREAT SERPL-MCNC: 0.69 MG/DL
EOSINOPHILS ABSOLUTE: 0.7 /ΜL
EOSINOPHILS RELATIVE PERCENT: 14 %
FOLATE: NORMAL
GFR CALCULATED: 80
GLUCOSE BLD-MCNC: 81 MG/DL
HCT VFR BLD CALC: 36.2 % (ref 36–46)
HEMOGLOBIN: 11.9 G/DL (ref 12–16)
LYMPHOCYTES ABSOLUTE: 1.8 /ΜL
LYMPHOCYTES RELATIVE PERCENT: 38 %
MCH RBC QN AUTO: 30 PG
MCHC RBC AUTO-ENTMCNC: 32.9 G/DL
MCV RBC AUTO: 91 FL
MONOCYTES ABSOLUTE: 0.6 /ΜL
MONOCYTES RELATIVE PERCENT: 12 %
NEUTROPHILS ABSOLUTE: 1.7 /ΜL
NEUTROPHILS RELATIVE PERCENT: 36 %
PDW BLD-RTO: ABNORMAL %
PLATELET # BLD: 280 K/ΜL
PMV BLD AUTO: ABNORMAL FL
POTASSIUM SERPL-SCNC: 4.4 MMOL/L
RBC # BLD: 3.97 10^6/ΜL
SODIUM BLD-SCNC: 143 MMOL/L
T4 FREE: 1.41
TSH SERPL DL<=0.05 MIU/L-ACNC: 0.06 UIU/ML
VITAMIN B-12: 1624
VITAMIN D 25-HYDROXY: 43.1
VITAMIN D2, 25 HYDROXY: NORMAL
VITAMIN D3,25 HYDROXY: NORMAL
WBC # BLD: 4.7 10^3/ML

## 2020-02-13 ENCOUNTER — OFFICE VISIT (OUTPATIENT)
Dept: FAMILY MEDICINE CLINIC | Age: 85
End: 2020-02-13
Payer: MEDICARE

## 2020-02-13 VITALS
DIASTOLIC BLOOD PRESSURE: 72 MMHG | WEIGHT: 133.4 LBS | OXYGEN SATURATION: 97 % | TEMPERATURE: 98.3 F | SYSTOLIC BLOOD PRESSURE: 160 MMHG | HEART RATE: 56 BPM | BODY MASS INDEX: 25.21 KG/M2

## 2020-02-13 PROCEDURE — G8510 SCR DEP NEG, NO PLAN REQD: HCPCS | Performed by: FAMILY MEDICINE

## 2020-02-13 PROCEDURE — 99213 OFFICE O/P EST LOW 20 MIN: CPT | Performed by: FAMILY MEDICINE

## 2020-02-13 PROCEDURE — 3288F FALL RISK ASSESSMENT DOCD: CPT | Performed by: FAMILY MEDICINE

## 2020-02-13 RX ORDER — PREDNISONE 20 MG/1
40 TABLET ORAL DAILY
Qty: 10 TABLET | Refills: 0 | Status: SHIPPED | OUTPATIENT
Start: 2020-02-13 | End: 2020-02-26 | Stop reason: ALTCHOICE

## 2020-02-13 RX ORDER — CLINDAMYCIN HYDROCHLORIDE 300 MG/1
300 CAPSULE ORAL 3 TIMES DAILY
Qty: 21 CAPSULE | Refills: 0 | Status: SHIPPED | OUTPATIENT
Start: 2020-02-13 | End: 2020-02-20

## 2020-02-13 ASSESSMENT — PATIENT HEALTH QUESTIONNAIRE - PHQ9
SUM OF ALL RESPONSES TO PHQ9 QUESTIONS 1 & 2: 0
2. FEELING DOWN, DEPRESSED OR HOPELESS: 0
SUM OF ALL RESPONSES TO PHQ QUESTIONS 1-9: 0
SUM OF ALL RESPONSES TO PHQ QUESTIONS 1-9: 0
1. LITTLE INTEREST OR PLEASURE IN DOING THINGS: 0

## 2020-02-13 NOTE — PATIENT INSTRUCTIONS
Patient Education        Chronic Obstructive Pulmonary Disease (COPD) Flare-Ups: Care Instructions  Your Care Instructions    Chronic obstructive pulmonary disease (COPD) is a lung disease that makes it hard to breathe. It is caused by damage to the lungs over many years, usually from smoking. COPD is often a mix of two diseases:  · Chronic bronchitis: The airways that carry air to the lungs (bronchial tubes) get inflamed and make a lot of mucus. This can narrow or block the airways. · Emphysema: In a healthy person, the tiny air sacs in the lungs are like balloons. As you breathe in and out, they get bigger and smaller to move air through your lungs. But with emphysema, these air sacs are damaged and lose their stretch. Less air gets in and out of the lungs. Many people with COPD have attacks called flare-ups or exacerbations. This is when your usual symptoms quickly get worse and stay worse. The doctor has checked you carefully. But problems can develop later. If you notice any problems or new symptoms, get medical treatment right away. Follow-up care is a key part of your treatment and safety. Be sure to make and go to all appointments, and call your doctor if you are having problems. It's also a good idea to know your test results and keep a list of the medicines you take. How can you care for yourself at home? · Be safe with medicines. Take your medicines exactly as prescribed. Call your doctor if you think you are having a problem with your medicine. You may be taking medicines such as:  ? Bronchodilators. These help open your airways and make breathing easier. ? Corticosteroids. These reduce airway inflammation. They may be given as pills, in a vein, or in an inhaled form. You may go home with pills in addition to an inhaler that you already use. · A spacer may help you get more inhaled medicine to your lungs. Ask your doctor or pharmacist if a spacer is right for you.  If it is, ask how to use it properly. · If your doctor prescribed antibiotics, take them as directed. Do not stop taking them just because you feel better. You need to take the full course of antibiotics. · If your doctor prescribed oxygen, use the flow rate your doctor has recommended. Do not change it without talking to your doctor first.  · Do not smoke. Smoking makes COPD worse. If you need help quitting, talk to your doctor about stop-smoking programs and medicines. These can increase your chances of quitting for good. When should you call for help? Call 911 anytime you think you may need emergency care. For example, call if:    · You have severe trouble breathing.    Call your doctor now or seek immediate medical care if:    · You have new or worse trouble breathing.     · Your coughing or wheezing gets worse.     · You cough up dark brown or bloody mucus (sputum).     · You have a new or higher fever.    Watch closely for changes in your health, and be sure to contact your doctor if:    · You notice more mucus or a change in the color of your mucus.     · You need to use your antibiotic or steroid pills.     · You do not get better as expected. Where can you learn more? Go to https://[employer]. org and sign in to your [employer] account. Enter M404 in the [employer] box to learn more about \"Chronic Obstructive Pulmonary Disease (COPD) Flare-Ups: Care Instructions. \"     If you do not have an account, please click on the \"Sign Up Now\" link. Current as of: June 9, 2019  Content Version: 12.3  © 1819-1388 [employer]. Care instructions adapted under license by [facility] ([facility]). If you have questions about a medical condition or this instruction, always ask your healthcare professional. [place] [address] any warranty or liability for your use of this information.

## 2020-02-13 NOTE — PROGRESS NOTES
Tommy Drake is a 80y.o. year old female who complains of moderate chills, headache, nasal blockage, yellow nasal discharge, post nasal drip, productive cough, sinus and nasal congestion and wheezing for 14 days. Symptoms show no change over that time. She denies a history of fevers and chest pain and has a history of COPD. She has taken nothing for this. ROS: No TIA's or dysphagia. No prolonged cough. No dyspnea or chest pain on exertion. No abdominal pain, change in bowel habits, black or bloody stools. No urinary tract symptoms. She is post hysterectomy. No abnormal vaginal bleeding, discharge or unexpected pelvic pain. No new breast lumps, breast pain or nipple discharge.         Social History     Tobacco Use    Smoking status: Former Smoker     Packs/day: 3.00     Years: 5.00     Pack years: 15.00     Last attempt to quit: 1962     Years since quittin.1    Smokeless tobacco: Never Used   Substance Use Topics    Alcohol use: No        Current Outpatient Medications   Medication Sig Dispense Refill    escitalopram (LEXAPRO) 10 MG tablet Take 1 tablet by mouth daily 90 tablet 1    simvastatin (ZOCOR) 20 MG tablet TAKE 1 TABLET BY MOUTH NIGHTLY 30 tablet 5    clopidogrel (PLAVIX) 75 MG tablet TAKE 1 TABLET BY MOUTH ONCE DAILY 30 tablet 5    methocarbamol (ROBAXIN) 500 MG tablet Take 1 tablet by mouth 4 times daily as needed (muscle spasm) 60 tablet 2    aspirin 81 MG EC tablet Take 1 tablet by mouth daily 30 tablet 3    levothyroxine (SYNTHROID) 75 MCG tablet Take 1 tablet by mouth daily 30 tablet 3    pantoprazole (PROTONIX) 40 MG tablet Take 1 tablet by mouth nightly 30 tablet 3    vitamin B-12 (CYANOCOBALAMIN) 1000 MCG tablet Take 1,000 mcg by mouth daily      senna-docusate (PERICOLACE) 8.6-50 MG per tablet Take 1 tablet by mouth 2 times daily       sucralfate (CARAFATE) 1 GM tablet Take 1 tablet by mouth 4 times daily 120 tablet 5    glycopyrrolate-formoterol (BEVESPI No retractions, or use of accessory muscles of respiration noted. Assessment:       Diagnosis Orders   1. COPD exacerbation (HCC)  predniSONE (DELTASONE) 20 MG tablet    clindamycin (CLEOCIN) 300 MG capsule            Plan:       per orders  Symptomatic therapy also suggested: push fluids, rest and ROV prn if symptoms persist or worsen. Call or return to office prn if these symptoms worsen or fail to improve.

## 2020-02-18 ENCOUNTER — TELEPHONE (OUTPATIENT)
Dept: FAMILY MEDICINE CLINIC | Age: 85
End: 2020-02-18

## 2020-02-18 NOTE — TELEPHONE ENCOUNTER
Patient states that the medication was given prednisone and clindamycin didn't seemed to help the patient. Patient is out of the anabiotic and still feel sick. Please Advise what the next step is for the patient.

## 2020-02-20 ENCOUNTER — TELEPHONE (OUTPATIENT)
Dept: CARDIOLOGY CLINIC | Age: 85
End: 2020-02-20

## 2020-02-20 NOTE — TELEPHONE ENCOUNTER
Patient states she was started on simvastatin and plavix at time of tavr. She thinks she is suppose to stop them now but not sure.  I will check with Dr Alta Mccain

## 2020-02-20 NOTE — TELEPHONE ENCOUNTER
Patient called with questions regarding medications: Dr. Rick Severs had told her to quit taking Plavix and she wants to know if she is also supposed to quit simvastatin. Please return her call at ph# [phone].

## 2020-02-26 ENCOUNTER — APPOINTMENT (OUTPATIENT)
Dept: GENERAL RADIOLOGY | Age: 85
DRG: 190 | End: 2020-02-26
Payer: MEDICARE

## 2020-02-26 ENCOUNTER — HOSPITAL ENCOUNTER (INPATIENT)
Age: 85
LOS: 3 days | Discharge: HOME | DRG: 190 | End: 2020-02-29
Attending: EMERGENCY MEDICINE | Admitting: HOSPITALIST
Payer: MEDICARE

## 2020-02-26 PROBLEM — I10 HYPERTENSION, UNCONTROLLED: Status: ACTIVE | Noted: 2020-02-26

## 2020-02-26 LAB
ALBUMIN SERPL-MCNC: 4.1 GM/DL (ref 3.4–5)
ALP BLD-CCNC: 52 IU/L (ref 40–129)
ALT SERPL-CCNC: 27 U/L (ref 10–40)
ANION GAP SERPL CALCULATED.3IONS-SCNC: 11 MMOL/L (ref 4–16)
AST SERPL-CCNC: 29 IU/L (ref 15–37)
BASE EXCESS MIXED: ABNORMAL (ref 0–2.3)
BASOPHILS ABSOLUTE: 0 K/CU MM
BASOPHILS RELATIVE PERCENT: 0.3 % (ref 0–1)
BILIRUB SERPL-MCNC: 0.7 MG/DL (ref 0–1)
BUN BLDV-MCNC: 10 MG/DL (ref 6–23)
CALCIUM SERPL-MCNC: 8.9 MG/DL (ref 8.3–10.6)
CARBON MONOXIDE, BLOOD: 2.2 % (ref 0–5)
CHLORIDE BLD-SCNC: 99 MMOL/L (ref 99–110)
CO2 CONTENT: 28.7 MMOL/L (ref 19–24)
CO2: 26 MMOL/L (ref 21–32)
COMMENT: ABNORMAL
CREAT SERPL-MCNC: 0.7 MG/DL (ref 0.6–1.1)
DIFFERENTIAL TYPE: ABNORMAL
EOSINOPHILS ABSOLUTE: 0.7 K/CU MM
EOSINOPHILS RELATIVE PERCENT: 5.8 % (ref 0–3)
GFR AFRICAN AMERICAN: >60 ML/MIN/1.73M2
GFR NON-AFRICAN AMERICAN: >60 ML/MIN/1.73M2
GLUCOSE BLD-MCNC: 98 MG/DL (ref 70–99)
HCO3 ARTERIAL: 27.6 MMOL/L (ref 18–23)
HCT VFR BLD CALC: 42.7 % (ref 37–47)
HEMOGLOBIN: 13.2 GM/DL (ref 12.5–16)
IMMATURE NEUTROPHIL %: 0.2 % (ref 0–0.43)
LACTATE: 1 MMOL/L (ref 0.4–2)
LYMPHOCYTES ABSOLUTE: 2.3 K/CU MM
LYMPHOCYTES RELATIVE PERCENT: 18.7 % (ref 24–44)
MCH RBC QN AUTO: 30.7 PG (ref 27–31)
MCHC RBC AUTO-ENTMCNC: 30.9 % (ref 32–36)
MCV RBC AUTO: 99.3 FL (ref 78–100)
METHEMOGLOBIN ARTERIAL: 0.8 %
MONOCYTES ABSOLUTE: 1 K/CU MM
MONOCYTES RELATIVE PERCENT: 8 % (ref 0–4)
NUCLEATED RBC %: 0 %
O2 SATURATION: 92.2 % (ref 96–97)
PCO2 ARTERIAL: 37 MMHG (ref 32–45)
PDW BLD-RTO: 15.2 % (ref 11.7–14.9)
PH BLOOD: 7.48 (ref 7.34–7.45)
PLATELET # BLD: 251 K/CU MM (ref 140–440)
PMV BLD AUTO: 9.8 FL (ref 7.5–11.1)
PO2 ARTERIAL: 59 MMHG (ref 75–100)
POTASSIUM SERPL-SCNC: 4 MMOL/L (ref 3.5–5.1)
RAPID INFLUENZA  B AGN: NEGATIVE
RAPID INFLUENZA A AGN: NEGATIVE
RBC # BLD: 4.3 M/CU MM (ref 4.2–5.4)
SEGMENTED NEUTROPHILS ABSOLUTE COUNT: 8.2 K/CU MM
SEGMENTED NEUTROPHILS RELATIVE PERCENT: 67 % (ref 36–66)
SODIUM BLD-SCNC: 136 MMOL/L (ref 135–145)
TOTAL IMMATURE NEUTOROPHIL: 0.03 K/CU MM
TOTAL NUCLEATED RBC: 0 K/CU MM
TOTAL PROTEIN: 6.6 GM/DL (ref 6.4–8.2)
WBC # BLD: 12.2 K/CU MM (ref 4–10.5)

## 2020-02-26 PROCEDURE — 2700000000 HC OXYGEN THERAPY PER DAY

## 2020-02-26 PROCEDURE — 36600 WITHDRAWAL OF ARTERIAL BLOOD: CPT

## 2020-02-26 PROCEDURE — 99285 EMERGENCY DEPT VISIT HI MDM: CPT

## 2020-02-26 PROCEDURE — 71046 X-RAY EXAM CHEST 2 VIEWS: CPT

## 2020-02-26 PROCEDURE — 6370000000 HC RX 637 (ALT 250 FOR IP): Performed by: NURSE PRACTITIONER

## 2020-02-26 PROCEDURE — 6360000002 HC RX W HCPCS: Performed by: EMERGENCY MEDICINE

## 2020-02-26 PROCEDURE — 96376 TX/PRO/DX INJ SAME DRUG ADON: CPT

## 2020-02-26 PROCEDURE — 87633 RESP VIRUS 12-25 TARGETS: CPT

## 2020-02-26 PROCEDURE — G0378 HOSPITAL OBSERVATION PER HR: HCPCS

## 2020-02-26 PROCEDURE — 93005 ELECTROCARDIOGRAM TRACING: CPT | Performed by: EMERGENCY MEDICINE

## 2020-02-26 PROCEDURE — 87040 BLOOD CULTURE FOR BACTERIA: CPT

## 2020-02-26 PROCEDURE — 6360000002 HC RX W HCPCS: Performed by: NURSE PRACTITIONER

## 2020-02-26 PROCEDURE — 87581 M.PNEUMON DNA AMP PROBE: CPT

## 2020-02-26 PROCEDURE — 82803 BLOOD GASES ANY COMBINATION: CPT

## 2020-02-26 PROCEDURE — 87804 INFLUENZA ASSAY W/OPTIC: CPT

## 2020-02-26 PROCEDURE — 94761 N-INVAS EAR/PLS OXIMETRY MLT: CPT

## 2020-02-26 PROCEDURE — 36415 COLL VENOUS BLD VENIPUNCTURE: CPT

## 2020-02-26 PROCEDURE — 85025 COMPLETE CBC W/AUTO DIFF WBC: CPT

## 2020-02-26 PROCEDURE — 96372 THER/PROPH/DIAG INJ SC/IM: CPT

## 2020-02-26 PROCEDURE — 80053 COMPREHEN METABOLIC PANEL: CPT

## 2020-02-26 PROCEDURE — 2580000003 HC RX 258: Performed by: NURSE PRACTITIONER

## 2020-02-26 PROCEDURE — 6370000000 HC RX 637 (ALT 250 FOR IP): Performed by: PHYSICIAN ASSISTANT

## 2020-02-26 PROCEDURE — 94640 AIRWAY INHALATION TREATMENT: CPT

## 2020-02-26 PROCEDURE — 87486 CHLMYD PNEUM DNA AMP PROBE: CPT

## 2020-02-26 PROCEDURE — 1200000000 HC SEMI PRIVATE

## 2020-02-26 PROCEDURE — 96374 THER/PROPH/DIAG INJ IV PUSH: CPT

## 2020-02-26 PROCEDURE — 87798 DETECT AGENT NOS DNA AMP: CPT

## 2020-02-26 PROCEDURE — 83605 ASSAY OF LACTIC ACID: CPT

## 2020-02-26 PROCEDURE — 96365 THER/PROPH/DIAG IV INF INIT: CPT

## 2020-02-26 RX ORDER — ASPIRIN 81 MG/1
81 TABLET ORAL DAILY
Status: DISCONTINUED | OUTPATIENT
Start: 2020-02-27 | End: 2020-02-29 | Stop reason: HOSPADM

## 2020-02-26 RX ORDER — SODIUM CHLORIDE 0.9 % (FLUSH) 0.9 %
10 SYRINGE (ML) INJECTION EVERY 12 HOURS SCHEDULED
Status: DISCONTINUED | OUTPATIENT
Start: 2020-02-26 | End: 2020-02-29 | Stop reason: HOSPADM

## 2020-02-26 RX ORDER — LEVOTHYROXINE SODIUM 0.07 MG/1
75 TABLET ORAL
Status: DISCONTINUED | OUTPATIENT
Start: 2020-02-27 | End: 2020-02-29 | Stop reason: HOSPADM

## 2020-02-26 RX ORDER — IPRATROPIUM BROMIDE AND ALBUTEROL SULFATE 2.5; .5 MG/3ML; MG/3ML
2 SOLUTION RESPIRATORY (INHALATION) ONCE
Status: COMPLETED | OUTPATIENT
Start: 2020-02-26 | End: 2020-02-26

## 2020-02-26 RX ORDER — IPRATROPIUM BROMIDE AND ALBUTEROL SULFATE 2.5; .5 MG/3ML; MG/3ML
1 SOLUTION RESPIRATORY (INHALATION)
Status: DISCONTINUED | OUTPATIENT
Start: 2020-02-27 | End: 2020-02-29 | Stop reason: HOSPADM

## 2020-02-26 RX ORDER — LANOLIN ALCOHOL/MO/W.PET/CERES
1000 CREAM (GRAM) TOPICAL DAILY
Status: DISCONTINUED | OUTPATIENT
Start: 2020-02-27 | End: 2020-02-29 | Stop reason: HOSPADM

## 2020-02-26 RX ORDER — SENNA AND DOCUSATE SODIUM 50; 8.6 MG/1; MG/1
1 TABLET, FILM COATED ORAL 2 TIMES DAILY
Status: DISCONTINUED | OUTPATIENT
Start: 2020-02-26 | End: 2020-02-29 | Stop reason: HOSPADM

## 2020-02-26 RX ORDER — TIMOLOL MALEATE 5 MG/ML
1 SOLUTION OPHTHALMIC DAILY
Status: DISCONTINUED | OUTPATIENT
Start: 2020-02-27 | End: 2020-02-29 | Stop reason: HOSPADM

## 2020-02-26 RX ORDER — ONDANSETRON 2 MG/ML
4 INJECTION INTRAMUSCULAR; INTRAVENOUS EVERY 6 HOURS PRN
Status: DISCONTINUED | OUTPATIENT
Start: 2020-02-26 | End: 2020-02-29 | Stop reason: HOSPADM

## 2020-02-26 RX ORDER — PANTOPRAZOLE SODIUM 40 MG/1
40 TABLET, DELAYED RELEASE ORAL NIGHTLY
Status: DISCONTINUED | OUTPATIENT
Start: 2020-02-26 | End: 2020-02-29 | Stop reason: HOSPADM

## 2020-02-26 RX ORDER — TIMOLOL MALEATE 6.8 MG/ML
1 SOLUTION/ DROPS OPHTHALMIC DAILY
COMMUNITY

## 2020-02-26 RX ORDER — SODIUM CHLORIDE 0.9 % (FLUSH) 0.9 %
10 SYRINGE (ML) INJECTION PRN
Status: DISCONTINUED | OUTPATIENT
Start: 2020-02-26 | End: 2020-02-29 | Stop reason: HOSPADM

## 2020-02-26 RX ORDER — ACETAMINOPHEN 650 MG/1
650 SUPPOSITORY RECTAL EVERY 6 HOURS PRN
Status: DISCONTINUED | OUTPATIENT
Start: 2020-02-26 | End: 2020-02-29 | Stop reason: HOSPADM

## 2020-02-26 RX ORDER — METHYLPREDNISOLONE SODIUM SUCCINATE 125 MG/2ML
125 INJECTION, POWDER, LYOPHILIZED, FOR SOLUTION INTRAMUSCULAR; INTRAVENOUS ONCE
Status: COMPLETED | OUTPATIENT
Start: 2020-02-26 | End: 2020-02-26

## 2020-02-26 RX ORDER — PREDNISONE 20 MG/1
40 TABLET ORAL DAILY
Status: DISCONTINUED | OUTPATIENT
Start: 2020-02-29 | End: 2020-02-29 | Stop reason: HOSPADM

## 2020-02-26 RX ORDER — METHOCARBAMOL 500 MG/1
500 TABLET, FILM COATED ORAL 4 TIMES DAILY PRN
Status: DISCONTINUED | OUTPATIENT
Start: 2020-02-26 | End: 2020-02-29 | Stop reason: HOSPADM

## 2020-02-26 RX ORDER — PREDNISONE 20 MG/1
60 TABLET ORAL ONCE
Status: DISCONTINUED | OUTPATIENT
Start: 2020-02-26 | End: 2020-02-26

## 2020-02-26 RX ORDER — ACETAMINOPHEN 325 MG/1
650 TABLET ORAL EVERY 6 HOURS PRN
Status: DISCONTINUED | OUTPATIENT
Start: 2020-02-26 | End: 2020-02-29 | Stop reason: HOSPADM

## 2020-02-26 RX ORDER — SUCRALFATE 1 G/1
1 TABLET ORAL DAILY PRN
Status: DISCONTINUED | OUTPATIENT
Start: 2020-02-26 | End: 2020-02-29 | Stop reason: HOSPADM

## 2020-02-26 RX ORDER — AMLODIPINE BESYLATE 5 MG/1
5 TABLET ORAL NIGHTLY
Status: DISCONTINUED | OUTPATIENT
Start: 2020-02-26 | End: 2020-02-29 | Stop reason: HOSPADM

## 2020-02-26 RX ORDER — POLYETHYLENE GLYCOL 3350 17 G/17G
17 POWDER, FOR SOLUTION ORAL DAILY PRN
Status: DISCONTINUED | OUTPATIENT
Start: 2020-02-26 | End: 2020-02-29 | Stop reason: HOSPADM

## 2020-02-26 RX ORDER — METHYLPREDNISOLONE SODIUM SUCCINATE 40 MG/ML
40 INJECTION, POWDER, LYOPHILIZED, FOR SOLUTION INTRAMUSCULAR; INTRAVENOUS EVERY 6 HOURS
Status: COMPLETED | OUTPATIENT
Start: 2020-02-26 | End: 2020-02-28

## 2020-02-26 RX ORDER — ESCITALOPRAM OXALATE 10 MG/1
10 TABLET ORAL DAILY
Status: DISCONTINUED | OUTPATIENT
Start: 2020-02-27 | End: 2020-02-29 | Stop reason: HOSPADM

## 2020-02-26 RX ADMIN — METHOCARBAMOL TABLETS 500 MG: 500 TABLET, COATED ORAL at 20:44

## 2020-02-26 RX ADMIN — METHYLPREDNISOLONE SODIUM SUCCINATE 40 MG: 40 INJECTION, POWDER, FOR SOLUTION INTRAMUSCULAR; INTRAVENOUS at 20:44

## 2020-02-26 RX ADMIN — METHYLPREDNISOLONE SODIUM SUCCINATE 125 MG: 125 INJECTION, POWDER, FOR SOLUTION INTRAMUSCULAR; INTRAVENOUS at 16:30

## 2020-02-26 RX ADMIN — SODIUM CHLORIDE, PRESERVATIVE FREE 10 ML: 5 INJECTION INTRAVENOUS at 20:45

## 2020-02-26 RX ADMIN — AMLODIPINE BESYLATE 5 MG: 5 TABLET ORAL at 20:44

## 2020-02-26 RX ADMIN — AZITHROMYCIN MONOHYDRATE 500 MG: 500 INJECTION, POWDER, LYOPHILIZED, FOR SOLUTION INTRAVENOUS at 21:00

## 2020-02-26 RX ADMIN — IPRATROPIUM BROMIDE AND ALBUTEROL SULFATE 2 AMPULE: .5; 3 SOLUTION RESPIRATORY (INHALATION) at 15:44

## 2020-02-26 RX ADMIN — PANTOPRAZOLE SODIUM 40 MG: 40 TABLET, DELAYED RELEASE ORAL at 20:44

## 2020-02-26 RX ADMIN — ENOXAPARIN SODIUM 40 MG: 40 INJECTION SUBCUTANEOUS at 20:44

## 2020-02-26 RX ADMIN — Medication 2000 UNITS: at 20:44

## 2020-02-26 RX ADMIN — IPRATROPIUM BROMIDE AND ALBUTEROL SULFATE 1 AMPULE: .5; 3 SOLUTION RESPIRATORY (INHALATION) at 22:10

## 2020-02-26 ASSESSMENT — PAIN SCALES - GENERAL: PAINLEVEL_OUTOF10: 0

## 2020-02-26 NOTE — ED NOTES
[place],lactic acid,venous ph and first set was drawn     [facility] GROVER Jasso  02/26/20 3926

## 2020-02-26 NOTE — ED PROVIDER NOTES
eMERGENCY dEPARTMENT eNCOUnter      CHIEF COMPLAINT:   Shortness of breath  Cough    HPI: Marino Ravi is a 80 y.o. female with a past medical history of COPD who presents to the Emergency Department complaining of increased shortness of breath, wheezing and a cough. The patient states that the cough started a few days ago. It is productive for yellow sputum. It is associated with increased shortness of breath and wheezing that started yesterday. Shortness of breath has been constant. The cough is intermittent. She denies any associated chest pain, palpitations or hemoptysis. The symptoms are worse with exertion and better with rest. The patient denies current tobacco use. She uses 2 L of oxygen at home as needed, but has been needing to wear it all day and all night for the past few days. There is no known history of DVT or PE. The patient denies leg pain or leg swelling. The patient denies fevers, chills, neck pain, chest pain, hemoptysis, abdominal pain, nausea, vomiting, numbness, tingling, weakness, or any other complaints. REVIEW OF SYSTEMS:  CONSTITUTIONAL:  Denies fever, chills, weight loss or weakness  EYES:  Denies photophobia or discharge  ENT:  Denies sore throat or ear pain  CARDIOVASCULAR: Denies chest pain or palpitations  RESPIRATORY:  Complains of cough and shortness of breath  GI:  Denies abdominal pain, nausea, vomiting, or diarrhea  MUSCULOSKELETAL:  Denies back pain  SKIN:  No rash  NEUROLOGIC:  Denies headache, focal weakness or sensory changes  All systems negative except as marked. \"Remaining review of systems reviewed and negative. I have reviewed the nursing triage documentation and agree unless otherwise noted below. \"      PAST MEDICAL HISTORY:   Past Medical History:   Diagnosis Date    Arthritis     generalized    Asthma     Blood transfusion 2004    No reaction    Chronic bronchitis (HCC)     COPD (chronic obstructive pulmonary disease) ([facility] [address]     summer 2014    Fatigue     H/O cardiac catheterization 06/15/2017    mild lad and cx disease    H/O cardiovascular stress test 08/22/2019    H/O Doppler ultrasound 4/7/2016 3/19/12    carotid-4/16 WNL 3/12right mild less than 50%and left wnl    H/O Doppler ultrasound 6/14/017    carotid - mod disease EILEEN, mild disease LICA    H/O echocardiogram 7/23/10    H/O echocardiogram 4/15/2016    EF 60% sclerotic AV mildly stenosed-recommend yearly echo    H/O echocardiogram 06/13/2017    EF>55% mild-mod AS, triavial AR    H/O echocardiogram 09/14/2018    EF55-60% mod AS- mean pressure gradient increased from 13-31    H/O echocardiogram 08/22/2019    EF 55-60%, Minimal concentric left ventricular hypertrophy, left atrium is mildly dilated, severe aortic stenosis, Mitral annular calcification is present, Mild AR, Mild-Mod MR, Mild TR, No pericardial effusion     H/O echocardiogram 09/23/2019    H/O exercise stress test 06/14/2017    abnormal    History of cardiac cath 08/28/2019    Severe AS,   TAVR??  History of nuclear stress test 03/14/2017    EF 75%. Normal study.  Holter monitor, abnormal 2/22/11    infrequent APC are seen    Normal cardiac stress test 7/23/10    EF 70%, no ischemia    On home O2     only uses as needed, nightly prn    Syncope and collapse     Tachycardia     HX of tachycardia - had a cardioablation    Thyroid disease        CURRENT MEDICATIONS:   Home medications reviewed.     SURGICAL HISTORY:   Past Surgical History:   Procedure Laterality Date    BREAST SURGERY  2009    bilat    CARDIAC CATHETERIZATION  3/02/11    mild to moderate disease of diagonal and proximal RCA    CARDIAC SURGERY  1989    ablation    CARPAL TUNNEL RELEASE  1990's    bilat    CHOLECYSTECTOMY  1961    open choley    COLONOSCOPY      DILATATION, ESOPHAGUS      ENDOSCOPY, COLON, DIAGNOSTIC  07/03/2017    s/p gastric bypass otherwise normal    FINGER SURGERY      right middle, thumb and index finger (screw and pin in right index finger)    GASTRIC RESTRICTION SURGERY  1984    stapled    HERNIA REPAIR  unknown    Inc hernia hernia repair    HIP FRACTURE SURGERY Left 2015    Left hip nail    HYSTERECTOMY  1966    Luke w/ BSO    JOINT REPLACEMENT  2004    right knee    LIPECTOMY  1's    OTHER SURGICAL HISTORY  2012    Gastrojejunostomy/partial gastrectomy    SHOULDER ARTHROPLASTY Left 10/2017    TOE SURGERY  Early 's    hammer toes and bunions       FAMILY HISTORY:   Family History   Problem Relation Age of Onset    Diabetes Mother     Other Mother         thyroid    Heart Disease Father     Arthritis Father     High Blood Pressure Father     High Cholesterol Father     Other Father         glaucoma    Other Sister         thyroid, glaucoma    Diabetes Brother     Other Sister     Vision Loss Sister         lung problems, smoker    Cancer Sister         throat cancer    Other Son         HX of clots    Early Death Son         Hit by car and killed.     High Blood Pressure Daughter     Stroke Son         No residual    Other Son         HX myocarditis       SOCIAL HISTORY:   Social History     Socioeconomic History    Marital status:      Spouse name: Not on file    Number of children: Not on file    Years of education: Not on file    Highest education level: Not on file   Occupational History    Not on file   Social Needs    Financial resource strain: Not on file    Food insecurity:     Worry: Not on file     Inability: Not on file    Transportation needs:     Medical: Not on file     Non-medical: Not on file   Tobacco Use    Smoking status: Former Smoker     Packs/day: 3.00     Years: 5.00     Pack years: 15.00     Last attempt to quit: 1962     Years since quittin.1    Smokeless tobacco: Never Used   Substance and Sexual Activity    Alcohol use: No    Drug use: No    Sexual activity: Not on file   Lifestyle    Physical activity:     Days per week: Not on file     Minutes per session: Not on file    Stress: Not on file   Relationships    Social connections:     Talks on phone: Not on file     Gets together: Not on file     Attends Rastafari service: Not on file     Active member of club or organization: Not on file     Attends meetings of clubs or organizations: Not on file     Relationship status: Not on file    Intimate partner violence:     Fear of current or ex partner: Not on file     Emotionally abused: Not on file     Physically abused: Not on file     Forced sexual activity: Not on file   Other Topics Concern    Not on file   Social History Narrative    Not on file       ALLERGIES: Morphine; Bactrim [sulfamethoxazole-trimethoprim]; Butorphanol; Influenza vaccines; Lactose intolerance (gi); Phenergan [promethazine hcl]; Promethazine; Stadol [butorphanol tartrate]; and Tape [adhesive tape]    PHYSICAL EXAM:  VITAL SIGNS:   ED Triage Vitals [02/26/20 1427]   Enc Vitals Group      BP (!) 197/60      Pulse 65      Resp 19      Temp 98 °F (36.7 °C)      Temp Source Oral      SpO2 94 %      Weight 130 lb (59 kg)      Height 5' 1\" (1.549 m)      Head Circumference       Peak Flow       Pain Score       Pain Loc       Pain Edu? Excl. in [address]? Constitutional:  Non-toxic appearance  HENT: Normocephalic, Atraumatic, Bilateral external ears normal, Oropharynx moist, No oral exudates, Nose normal.  Eyes:  PERRL, Conjunctiva normal, No discharge. Neck: Normal range of motion, No tenderness, Supple, No stridor, No lymphadenopathy. Cardiovascular:  Normal heart rate, Normal rhythm  Pulmonary/Chest: Dyspneic, mild respiratory distress with diffuse wheezing bilaterally  Abdomen:   Bowel sounds normal, Soft, No tenderness, No masses, No pulsatile masses  Back:  No tenderness, No CVA tenderness  Extremities:  Normal range of motion, Intact distal pulses, No edema, No tenderness  Skin:  Warm, Dry, No erythema, No rash    EKG Interpretation  Interpreted by me  Compared to

## 2020-02-26 NOTE — ED NOTES
[facility] NP with [facility] returned call      Stefano Deshpande  02/26/20 1828

## 2020-02-26 NOTE — H&P
History and Physical  DERIK Atkins-BC   Internal Medicine Hospitalist      Name:  Tanesha Gutierrez /Age/Sex: 1935  (80 y.o. female)   MRN & CSN:  [id number] & [id number] Admission Date/Time: 2020  2:52 PM   Location:  ED22/ED-22 PCP: Anastasia Prince MD       Hospital Day: 1      Supervising Physician: Dr. Gabbi Eid     Chief Complaint: Shortness of Breath (reports sob and productive cough starting yesterda) and Cough     Assessment and Plan:   Tanesha Gutierrez is a 80 y.o.  female who presents with Acute respiratory distress     Acute respiratory distress, 2/2 COPD exacerbation - placed on 3 L oxygen therapy with improvement.  Acute COPD exacerbation - could be triggered by weather, dry cough, exp wheezing throughout.         - admit inpatient, telemetry monitoring          - ABx: Zithromax         - duoneb/HHN         - Mucinex         - Solumedrol         - continuous pulse oximetry monitoring         - pending Resp PCR         - check lab works in AM          - pending cx, ABG                  Hypertension, uncontrolled - initial BP in /60.         - not on any BP meds per pt         - start Norvasc + prn Hydralazine         - monitor BP trends.  Thyroid disease - c/w synthroid.     Current diagnosis and plan of management discussed with the patient at the time of admission in lay language who agree to the above plan and disposition of admission for further care. All concerns and questions addressed.       Patient assessment and plan in conjunction with supervising physician - Dr. Abimael Turner    DVT Prophylaxis [x] Lovenox, []  Heparin, [] SCDs, [] Ambulation  [] Long term AC   GI Prophylaxis [x] PPI,  [] H2 Blocker,  [x] Carafate,  [] Diet,  [] No GI prophylaxis, N/A: patient is not under significant medical stress, non-ICU or is receiving a diet/tube feeds   Code Status  Full CODE   Disposition Patient requires continued admission due to Acute respiratory distress. Discharge Plan: Patient plans to return home upon discharge. MDM [] Low, [x] Moderate,[]  High  Patient's risk as above due to:      [x] One or more chronic illnesses with mild exacerbation progression      [] Two or more stable chronic illnesses      [] Undiagnosed new problem with uncertain prognosis      [] Elective major surgery      []Prescription drug management     History of Present Illness:     Principal Problem: Acute respiratory distress  Ramón Michaels is a 80 y.o. female who presents to the ED with complaint of worsening shortness of breath and nonproductive cough. Patient has PMH of COPD/Asthma, chronic bronchitis, and thyroid disease. Patient denied history of hypertension but with elevated BP in ED. Patient reports that she usually wear 2 L oxygen at home only as needed but recently wearing oxygen all day for the past couple of days. Stated that she has been having dry cough for several day, then worsening shortness of breath that started 2 days ago. Patient denies CP, palpitation, fever, chills or diaphoresis. She also denies any other symptoms including HA, paresthesias, abdominal pain, N/V/D, constipation, changes in bowels, dysuria, hematuria, frequency or urgency, and B/L weakness. Upon interview, the patient provided the history as above. ED Course: Discussed case with ED physician prior to admission. ROS: Ten point ROS reviewed and negative, unless as noted above per HPI. Objective:   No intake or output data in the 24 hours ending 02/26/20 1857     Vitals:   Vitals:    02/26/20 1427 02/26/20 1431 02/26/20 1544   BP: (!) 197/60 (!) 183/62    Pulse: 65     Resp: 19  18   Temp: 98 °F (36.7 °C)     TempSrc: Oral     SpO2: 94%  95%   Weight: 130 lb (59 kg)     Height: 5' 1\" (1.549 m)       Physical Exam: 02/26/20    GEN  -Awake, alert, appearing Frail, elderly female, lying in bed, cooperative, able to give adequate history. Appears given age.   EYES -Pupils are equally round. No vision changes. No scleral erythema, discharge, or conjunctivitis. HENT -MM are moist. Oral pharynx without exudates, no evidence of thrush. NECK -Supple, no apparent thyromegaly or masses. RESP -LS CTA equal bilat, no wheezes, rales or rhonchi. Symmetric chest movement. No respiratory distress noted. C/V  -S1/S2 auscultated. RRR without appreciable M/R/G. No JVD or carotid bruits. Peripheral pulses equal bilaterally and palpable. Peripheral pulses equal bilaterally and palpable. No peripheral edema. GI  -Abdomen is soft, round, non-distended, no significant tenderness. No masses or guarding. + BS in all quadrants. Rectal exam deferred.   -Macedo catheter is not present. LYMPH  -No palpable cervical lymphadenopathy and no hepatosplenomegaly. No petechiae or ecchymoses. MS  -B/L extremities moderate muscles strength. Full movements. No gross joint deformities. No swelling, intact sensation symmetrical.   SKIN  -Normal coloration, warm, dry. No open wounds or ulcers. NEURO  -CN 2-12 appear grossly intact, normal speech, no lateralizing weakness. PSYC  -Awake, alert, oriented x 4. Appropriate affect.      Past Medical History:      Past Medical History:   Diagnosis Date    Arthritis     generalized    Asthma     Blood transfusion 2004    No reaction    Chronic bronchitis (HCC)     COPD (chronic obstructive pulmonary disease) ([facility] [address]     summer 2014    Fatigue     H/O cardiac catheterization 06/15/2017    mild lad and cx disease    H/O cardiovascular stress test 08/22/2019    H/O Doppler ultrasound 4/7/2016 3/19/12    carotid-4/16 WNL 3/12right mild less than 50%and left wnl    H/O Doppler ultrasound 6/14/017    carotid - mod disease EILEEN, mild disease LICA    H/O echocardiogram 7/23/10    H/O echocardiogram 4/15/2016    EF 60% sclerotic AV mildly stenosed-recommend yearly echo    H/O echocardiogram 06/13/2017    EF>55% mild-mod AS, triavial AR    H/O echocardiogram education: None    Highest education level: None   Occupational History    None   Social Needs    Financial resource strain: None    Food insecurity:     Worry: None     Inability: None    Transportation needs:     Medical: None     Non-medical: None   Tobacco Use    Smoking status: Former Smoker     Packs/day: 3.00     Years: 5.00     Pack years: 15.00     Last attempt to quit: 1962     Years since quittin.1    Smokeless tobacco: Never Used   Substance and Sexual Activity    Alcohol use: No    Drug use: No    Sexual activity: None   Lifestyle    Physical activity:     Days per week: None     Minutes per session: None    Stress: None   Relationships    Social connections:     Talks on phone: None     Gets together: None     Attends Yazidism service: None     Active member of club or organization: None     Attends meetings of clubs or organizations: None     Relationship status: None    Intimate partner violence:     Fear of current or ex partner: None     Emotionally abused: None     Physically abused: None     Forced sexual activity: None   Other Topics Concern    None   Social History Narrative    None     TOBACCO:   reports that she quit smoking about 58 years ago. She has a 15.00 pack-year smoking history. She has never used smokeless tobacco.  ETOH:   reports no history of alcohol use. Drugs:  reports no history of drug use. Allergies: Allergies   Allergen Reactions    Morphine Anaphylaxis     Dilaudid OK    Bactrim [Sulfamethoxazole-Trimethoprim]     Butorphanol      Other reaction(s): Other - comment required  \"out of body experience\"    Influenza Vaccines      Ended in hospital stay for 3 days told by md not to get it anymore     Lactose Intolerance (Gi) Nausea Only    Phenergan [Promethazine Hcl] Other (See Comments)     Makes me jerk all over. Zofran OK    Promethazine     Stadol [Butorphanol Tartrate] Other (See Comments)     Out of body experience.  Was told it was a near death experience and was placed in ICU. Dilaudid OK    Tape Edith Bury Tape] Other (See Comments)     Plastic cause itching and rash. Paper tape causes my skin to blister. (Tega-derm and Coban OK to use.)     Medications:   Medications:    Infusions:   PRN Meds:   Prior to Admission Meds:  Prior to Admission medications    Medication Sig Start Date End Date Taking?  Authorizing Provider   Timolol (TIMOPTIC) 0.5 % (DAILY) SOLN ophthalmic solution Place 1 drop into both eyes daily   Yes Historical Provider, MD   escitalopram (LEXAPRO) 10 MG tablet Take 1 tablet by mouth daily 2/3/20  Yes Abiola Mario MD   methocarbamol (ROBAXIN) 500 MG tablet Take 1 tablet by mouth 4 times daily as needed (muscle spasm)  Patient taking differently: Take 500 mg by mouth nightly  11/5/19  Yes Abiola Mario MD   aspirin 81 MG EC tablet Take 1 tablet by mouth daily 9/22/19  Yes Yamile Mariscal MD   levothyroxine (SYNTHROID) 75 MCG tablet Take 1 tablet by mouth daily 9/22/19  Yes Yamile Mariscal MD   pantoprazole (PROTONIX) 40 MG tablet Take 1 tablet by mouth nightly 9/21/19  Yes Yamile Mariscal MD   vitamin B-12 (CYANOCOBALAMIN) 1000 MCG tablet Take 1,000 mcg by mouth daily OTC   Yes Historical Provider, MD   sucralfate (CARAFATE) 1 GM tablet Take 1 tablet by mouth 4 times daily  Patient taking differently: Take 1 g by mouth daily as needed 02/26/2020 Patient states she only takes this if needed 12/12/18  Yes Gali Hernandez MD   glycopyrrolate-formoterol (BEVESPI AEROSPHERE) 9-4.8 MCG/ACT AERO Inhale 2 puffs into the lungs 2 times daily 11/20/18  Yes Abiola Mario MD   L-Arginine 500 MG TABS Take 1 tablet by mouth 2 times daily OTC   Yes Historical Provider, MD   Calcium-Magnesium-Zinc 434-14-9 MG TABS Take 1 tablet by mouth daily OTC   Yes Historical Provider, MD   OXYGEN Inhale 2 L into the lungs nightly as needed (uses as needed nightly)    Yes Historical Provider, MD   Cholecalciferol (VITAMIN D3) RIGO   [facility]  2/26/2020 6:57 PM      Electronically signed by DERIK Fried CNP on 2/26/2020 at 6:57 PM

## 2020-02-27 LAB
ADENOVIRUS DETECTION BY PCR: NOT DETECTED
ANION GAP SERPL CALCULATED.3IONS-SCNC: 15 MMOL/L (ref 4–16)
BASOPHILS ABSOLUTE: 0 K/CU MM
BASOPHILS RELATIVE PERCENT: 0.1 % (ref 0–1)
BORDETELLA PERTUSSIS PCR: NOT DETECTED
BUN BLDV-MCNC: 7 MG/DL (ref 6–23)
CALCIUM SERPL-MCNC: 9 MG/DL (ref 8.3–10.6)
CHLAMYDOPHILA PNEUMONIA PCR: NOT DETECTED
CHLORIDE BLD-SCNC: 99 MMOL/L (ref 99–110)
CO2: 24 MMOL/L (ref 21–32)
CORONAVIRUS 229E PCR: NOT DETECTED
CORONAVIRUS HKU1 PCR: NOT DETECTED
CORONAVIRUS NL63 PCR: NOT DETECTED
CORONAVIRUS OC43 PCR: NOT DETECTED
CREAT SERPL-MCNC: 0.6 MG/DL (ref 0.6–1.1)
DIFFERENTIAL TYPE: ABNORMAL
EOSINOPHILS ABSOLUTE: 0 K/CU MM
EOSINOPHILS RELATIVE PERCENT: 0 % (ref 0–3)
GFR AFRICAN AMERICAN: >60 ML/MIN/1.73M2
GFR NON-AFRICAN AMERICAN: >60 ML/MIN/1.73M2
GLUCOSE BLD-MCNC: 167 MG/DL (ref 70–99)
HCT VFR BLD CALC: 37.9 % (ref 37–47)
HEMOGLOBIN: 12.3 GM/DL (ref 12.5–16)
HUMAN METAPNEUMOVIRUS PCR: NOT DETECTED
IMMATURE NEUTROPHIL %: 0.7 % (ref 0–0.43)
INFLUENZA A BY PCR: NOT DETECTED
INFLUENZA A H1 (2009) PCR: NOT DETECTED
INFLUENZA A H1 PANDEMIC PCR: NOT DETECTED
INFLUENZA A H3 PCR: NOT DETECTED
INFLUENZA B BY PCR: NOT DETECTED
LYMPHOCYTES ABSOLUTE: 2 K/CU MM
LYMPHOCYTES RELATIVE PERCENT: 17.8 % (ref 24–44)
MCH RBC QN AUTO: 30.9 PG (ref 27–31)
MCHC RBC AUTO-ENTMCNC: 32.5 % (ref 32–36)
MCV RBC AUTO: 95.2 FL (ref 78–100)
MONOCYTES ABSOLUTE: 0.2 K/CU MM
MONOCYTES RELATIVE PERCENT: 1.3 % (ref 0–4)
MYCOPLASMA PNEUMONIAE PCR: NOT DETECTED
NUCLEATED RBC %: 0 %
PARAINFLUENZA 1 PCR: NOT DETECTED
PARAINFLUENZA 2 PCR: NOT DETECTED
PARAINFLUENZA 3 PCR: NOT DETECTED
PARAINFLUENZA 4 PCR: NOT DETECTED
PDW BLD-RTO: 14.4 % (ref 11.7–14.9)
PLATELET # BLD: 267 K/CU MM (ref 140–440)
PMV BLD AUTO: 10.1 FL (ref 7.5–11.1)
POTASSIUM SERPL-SCNC: 4.2 MMOL/L (ref 3.5–5.1)
RBC # BLD: 3.98 M/CU MM (ref 4.2–5.4)
RHINOVIRUS ENTEROVIRUS PCR: NOT DETECTED
RSV PCR: NOT DETECTED
SEGMENTED NEUTROPHILS ABSOLUTE COUNT: 9.1 K/CU MM
SEGMENTED NEUTROPHILS RELATIVE PERCENT: 80.1 % (ref 36–66)
SODIUM BLD-SCNC: 138 MMOL/L (ref 135–145)
T4 FREE: 1.27
TOTAL IMMATURE NEUTOROPHIL: 0.08 K/CU MM
TOTAL NUCLEATED RBC: 0 K/CU MM
TSH SERPL DL<=0.05 MIU/L-ACNC: 0.13 UIU/ML
WBC # BLD: 11.3 K/CU MM (ref 4–10.5)

## 2020-02-27 PROCEDURE — 99221 1ST HOSP IP/OBS SF/LOW 40: CPT | Performed by: INTERNAL MEDICINE

## 2020-02-27 PROCEDURE — 6370000000 HC RX 637 (ALT 250 FOR IP): Performed by: NURSE PRACTITIONER

## 2020-02-27 PROCEDURE — G0378 HOSPITAL OBSERVATION PER HR: HCPCS

## 2020-02-27 PROCEDURE — 96376 TX/PRO/DX INJ SAME DRUG ADON: CPT

## 2020-02-27 PROCEDURE — 6360000002 HC RX W HCPCS: Performed by: NURSE PRACTITIONER

## 2020-02-27 PROCEDURE — 80048 BASIC METABOLIC PNL TOTAL CA: CPT

## 2020-02-27 PROCEDURE — 36415 COLL VENOUS BLD VENIPUNCTURE: CPT

## 2020-02-27 PROCEDURE — 96375 TX/PRO/DX INJ NEW DRUG ADDON: CPT

## 2020-02-27 PROCEDURE — 2700000000 HC OXYGEN THERAPY PER DAY

## 2020-02-27 PROCEDURE — 93010 ELECTROCARDIOGRAM REPORT: CPT | Performed by: INTERNAL MEDICINE

## 2020-02-27 PROCEDURE — 85025 COMPLETE CBC W/AUTO DIFF WBC: CPT

## 2020-02-27 PROCEDURE — 96366 THER/PROPH/DIAG IV INF ADDON: CPT

## 2020-02-27 PROCEDURE — 94761 N-INVAS EAR/PLS OXIMETRY MLT: CPT

## 2020-02-27 PROCEDURE — 96372 THER/PROPH/DIAG INJ SC/IM: CPT

## 2020-02-27 PROCEDURE — 94640 AIRWAY INHALATION TREATMENT: CPT

## 2020-02-27 PROCEDURE — 2580000003 HC RX 258: Performed by: NURSE PRACTITIONER

## 2020-02-27 PROCEDURE — 1200000000 HC SEMI PRIVATE

## 2020-02-27 RX ADMIN — METHYLPREDNISOLONE SODIUM SUCCINATE 40 MG: 40 INJECTION, POWDER, FOR SOLUTION INTRAMUSCULAR; INTRAVENOUS at 14:29

## 2020-02-27 RX ADMIN — AMLODIPINE BESYLATE 5 MG: 5 TABLET ORAL at 20:26

## 2020-02-27 RX ADMIN — SODIUM CHLORIDE, PRESERVATIVE FREE 10 ML: 5 INJECTION INTRAVENOUS at 20:19

## 2020-02-27 RX ADMIN — IPRATROPIUM BROMIDE AND ALBUTEROL SULFATE 1 AMPULE: .5; 3 SOLUTION RESPIRATORY (INHALATION) at 11:10

## 2020-02-27 RX ADMIN — ASPIRIN 81 MG: 81 TABLET, COATED ORAL at 09:45

## 2020-02-27 RX ADMIN — Medication 2000 UNITS: at 20:19

## 2020-02-27 RX ADMIN — METHYLPREDNISOLONE SODIUM SUCCINATE 40 MG: 40 INJECTION, POWDER, FOR SOLUTION INTRAMUSCULAR; INTRAVENOUS at 20:19

## 2020-02-27 RX ADMIN — AZITHROMYCIN MONOHYDRATE 500 MG: 500 INJECTION, POWDER, LYOPHILIZED, FOR SOLUTION INTRAVENOUS at 20:21

## 2020-02-27 RX ADMIN — SODIUM CHLORIDE, PRESERVATIVE FREE 10 ML: 5 INJECTION INTRAVENOUS at 09:48

## 2020-02-27 RX ADMIN — ACETAMINOPHEN 650 MG: 325 TABLET ORAL at 07:51

## 2020-02-27 RX ADMIN — ONDANSETRON 4 MG: 2 INJECTION INTRAMUSCULAR; INTRAVENOUS at 18:38

## 2020-02-27 RX ADMIN — ACETAMINOPHEN 650 MG: 325 TABLET ORAL at 17:55

## 2020-02-27 RX ADMIN — ESCITALOPRAM OXALATE 10 MG: 10 TABLET ORAL at 09:44

## 2020-02-27 RX ADMIN — PANTOPRAZOLE SODIUM 40 MG: 40 TABLET, DELAYED RELEASE ORAL at 20:19

## 2020-02-27 RX ADMIN — SENNOSIDES, DOCUSATE SODIUM 1 TABLET: 50; 8.6 TABLET, FILM COATED ORAL at 20:19

## 2020-02-27 RX ADMIN — CYANOCOBALAMIN TAB 1000 MCG 1000 MCG: 1000 TAB at 09:44

## 2020-02-27 RX ADMIN — IPRATROPIUM BROMIDE AND ALBUTEROL SULFATE 1 AMPULE: .5; 3 SOLUTION RESPIRATORY (INHALATION) at 19:14

## 2020-02-27 RX ADMIN — METHYLPREDNISOLONE SODIUM SUCCINATE 40 MG: 40 INJECTION, POWDER, FOR SOLUTION INTRAMUSCULAR; INTRAVENOUS at 09:45

## 2020-02-27 RX ADMIN — ENOXAPARIN SODIUM 40 MG: 40 INJECTION SUBCUTANEOUS at 20:18

## 2020-02-27 RX ADMIN — TIMOLOL MALEATE 1 DROP: 5 SOLUTION, GEL FORMING, EXTENDED RELEASE OPHTHALMIC at 09:56

## 2020-02-27 RX ADMIN — LEVOTHYROXINE SODIUM 75 MCG: 0.07 TABLET ORAL at 05:48

## 2020-02-27 RX ADMIN — IPRATROPIUM BROMIDE AND ALBUTEROL SULFATE 1 AMPULE: .5; 3 SOLUTION RESPIRATORY (INHALATION) at 07:01

## 2020-02-27 RX ADMIN — METHYLPREDNISOLONE SODIUM SUCCINATE 40 MG: 40 INJECTION, POWDER, FOR SOLUTION INTRAMUSCULAR; INTRAVENOUS at 02:35

## 2020-02-27 RX ADMIN — IPRATROPIUM BROMIDE AND ALBUTEROL SULFATE 1 AMPULE: .5; 3 SOLUTION RESPIRATORY (INHALATION) at 15:07

## 2020-02-27 RX ADMIN — SENNOSIDES, DOCUSATE SODIUM 1 TABLET: 50; 8.6 TABLET, FILM COATED ORAL at 09:45

## 2020-02-27 ASSESSMENT — PAIN SCALES - GENERAL
PAINLEVEL_OUTOF10: 0
PAINLEVEL_OUTOF10: 3
PAINLEVEL_OUTOF10: 2
PAINLEVEL_OUTOF10: 3
PAINLEVEL_OUTOF10: 0

## 2020-02-27 ASSESSMENT — PAIN DESCRIPTION - LOCATION
LOCATION: HEAD
LOCATION: HEAD

## 2020-02-27 ASSESSMENT — PAIN DESCRIPTION - PAIN TYPE
TYPE: ACUTE PAIN
TYPE: ACUTE PAIN

## 2020-02-27 NOTE — PROGRESS NOTES
My name is Steve Morales,  I am a student nurse from [employer], I will be caring for Alyssa Reyal today.

## 2020-02-27 NOTE — PROGRESS NOTES
Hospitalist Progress Note      Name:  Owen Hussein /Age/Sex: 1935  (80 y.o. female)   MRN & CSN:  [id number] & [id number] Admission Date/Time: 2020  2:52 PM   Location:  [address] [place] [address] PCP: Venkat Perales MD         Hospital Day: 2    Assessment and Plan:   Owen Hussein is a 80 y.o.  female  who presents with the following. Subjective: Seen and examined, reports  no further complaints. Asessment:   COPD exacerbation  Hypertension uncontrolled  Hypothyroidism    Plan:  Continue with steroids treatment, consult pulmonary service, continue with duo nebs, monitor blood pressure and adjust medications as needed, adjust blood pressure medications as needed which better controlled today, continue with Synthroid treatment    Diet DIET CARDIAC;   DVT Prophylaxis [] Lovenox, []  Heparin, [] SCDs, [] Ambulation   GI Prophylaxis [] PPI,  [] H2 Blocker,  [] Carafate,  [] Diet/Tube Feeds   Code Status Full Code       Objective: Intake/Output Summary (Last 24 hours) at 2020 1137  Last data filed at 2020 1537  Gross per 24 hour   Intake 970 ml   Output --   Net 970 ml      Vitals:   Vitals:    20 0822   BP:    Pulse: 72   Resp: 19   Temp:    SpO2:      Physical Exam:   GEN Awake female, sitting upright in bed in no apparent distress. Appears given age. EYES Pupils are equally round. No scleral erythema, discharge, or conjunctivitis. HENT Mucous membranes are moist. Oral pharynx without exudates, no evidence of thrush. NECK Supple, no apparent thyromegaly or masses. RESP Clear to auscultation, no wheezes, rales or rhonchi. Symmetric chest movement while on room air. CARDIO/VASC S1/S2 auscultated. Regular rate without appreciable murmurs, rubs, or gallops. No JVD or carotid bruits. Peripheral pulses equal bilaterally and palpable. No peripheral edema. GI Abdomen is soft without significant tenderness, masses, or guarding. Bowel sounds are normoactive. Rectal exam deferred.

## 2020-02-27 NOTE — CONSULTS
 Other Son         HX myocarditis       REVIEW OF SYSTEMS:    CONSTITUTIONAL:  negative for fevers, chills, diaphoresis, activity change, appetite change, fatigue, night sweats and unexpected weight change. EYES:  negative for blurred vision, eye discharge, visual disturbance and icterus  HEENT:  negative for hearing loss, tinnitus, ear drainage, sinus pressure, nasal congestion, epistaxis and snoring  RESPIRATORY:  See HPI  CARDIOVASCULAR:  negative for chest pain, palpitations, exertional chest pressure/discomfort, edema, syncope  GASTROINTESTINAL:  negative for nausea, vomiting, diarrhea, constipation, blood in stool and abdominal pain  GENITOURINARY:  negative for frequency, dysuria, urinary incontinence, decreased urine volume, and hematuria  HEMATOLOGIC/LYMPHATIC:  negative for easy bruising, bleeding and lymphadenopathy  ALLERGIC/IMMUNOLOGIC:  negative for recurrent infections, angioedema, anaphylaxis and drug reactions  ENDOCRINE:  negative for weight changes and diabetic symptoms including polyuria, polydipsia and polyphagia  MUSCULOSKELETAL:  negative for  pain, joint swelling, decreased range of motion and muscle weakness  NEUROLOGICAL:  negative for headaches, slurred speech, unilateral weakness  PSYCHIATRIC/BEHAVIORAL: negative for hallucinations, behavioral problems, confusion and agitation.      Objective:   PHYSICAL EXAM:      VITALS:  BP (!) 142/61   Pulse 72   Temp 98.3 °F (36.8 °C) (Oral)   Resp 19   Ht 5' 1\" (1.549 m)   Wt 129 lb 12.8 oz (58.9 kg)   SpO2 94%   BMI 24.53 kg/m²   24HR INTAKE/OUTPUT:      Intake/Output Summary (Last 24 hours) at 2020 1228  Last data filed at 2020 5736  Gross per 24 hour   Intake 970 ml   Output --   Net 970 ml     CURRENT PULSE OXIMETRY:  SpO2: 94 %  24HR PULSE OXIMETRY RANGE:  SpO2  Av.5 %  Min: 94 %  Max: 96 %    CONSTITUTIONAL:  awake, alert, cooperative, no apparent distress, and appears stated age  NECK:  Supple, symmetrical, trachea Assessment/Plan       Patient Active Problem List    Diagnosis Date Noted    Hypertension, uncontrolled 02/26/2020    S/P TAVR (transcatheter aortic valve replacement) 09/23/2019    COPD with exacerbation ([facility] 09/06/2019    Nodule of lower lobe of right lung 09/06/2019    Acute respiratory distress     Aortic stenosis, severe 08/28/2019    VHD (valvular heart disease)     Gastroesophageal reflux disease 03/04/2019    HTN (hypertension) 01/22/2019    Vitamin D deficiency 01/22/2019    Vitamin B12 deficiency 01/22/2019    COPD exacerbation ([facility] 10/31/2018    Acquired hypothyroidism 09/24/2018    Murmur 09/24/2018    Age-related osteoporosis without current pathological fracture 09/24/2018     Overview Note:     DEXA 8/2017 T scores  Lumbar spine -2.1  Right Femoral Neck -3.1  Right Radius -3.3    FRAX score : 10 year major Fx 25.7%. Hip Fx 11.2%      Black tongue 09/24/2018    Anemia 09/01/2018    Dizziness 09/01/2018    Gait disturbance 11/09/2015    Closed intertrochanteric fracture of left femur ([facility] 11/04/2015     AECOPD  Acute on chronic hypoxic resp failure  ? RLL Pulmonary nodule  Debility  Hypothyrodism      PLAN  1. Abx  2. F/u C&S  3. Nebs  4. Keep sats > 92%  5. Wean Steroids  6.  DVT and GI prophylaxis  7. c/w present management        Electronically signed by Paul Gonzalez MD on 2/27/2020 at 12:28 PM

## 2020-02-28 PROCEDURE — 94761 N-INVAS EAR/PLS OXIMETRY MLT: CPT

## 2020-02-28 PROCEDURE — 6360000002 HC RX W HCPCS: Performed by: NURSE PRACTITIONER

## 2020-02-28 PROCEDURE — 94640 AIRWAY INHALATION TREATMENT: CPT

## 2020-02-28 PROCEDURE — 1200000000 HC SEMI PRIVATE

## 2020-02-28 PROCEDURE — 96376 TX/PRO/DX INJ SAME DRUG ADON: CPT

## 2020-02-28 PROCEDURE — 6370000000 HC RX 637 (ALT 250 FOR IP): Performed by: NURSE PRACTITIONER

## 2020-02-28 PROCEDURE — G0378 HOSPITAL OBSERVATION PER HR: HCPCS

## 2020-02-28 PROCEDURE — 96366 THER/PROPH/DIAG IV INF ADDON: CPT

## 2020-02-28 PROCEDURE — 2580000003 HC RX 258: Performed by: NURSE PRACTITIONER

## 2020-02-28 PROCEDURE — 96372 THER/PROPH/DIAG INJ SC/IM: CPT

## 2020-02-28 PROCEDURE — 99232 SBSQ HOSP IP/OBS MODERATE 35: CPT | Performed by: INTERNAL MEDICINE

## 2020-02-28 RX ORDER — LANOLIN ALCOHOL/MO/W.PET/CERES
3 CREAM (GRAM) TOPICAL NIGHTLY PRN
Status: DISCONTINUED | OUTPATIENT
Start: 2020-02-28 | End: 2020-02-28

## 2020-02-28 RX ORDER — LANOLIN ALCOHOL/MO/W.PET/CERES
3 CREAM (GRAM) TOPICAL NIGHTLY PRN
Status: DISCONTINUED | OUTPATIENT
Start: 2020-02-28 | End: 2020-02-29 | Stop reason: HOSPADM

## 2020-02-28 RX ADMIN — ASPIRIN 81 MG: 81 TABLET, COATED ORAL at 08:15

## 2020-02-28 RX ADMIN — METHYLPREDNISOLONE SODIUM SUCCINATE 40 MG: 40 INJECTION, POWDER, FOR SOLUTION INTRAMUSCULAR; INTRAVENOUS at 14:34

## 2020-02-28 RX ADMIN — MELATONIN 3 MG: at 03:06

## 2020-02-28 RX ADMIN — ESCITALOPRAM OXALATE 10 MG: 10 TABLET ORAL at 08:15

## 2020-02-28 RX ADMIN — IPRATROPIUM BROMIDE AND ALBUTEROL SULFATE 1 AMPULE: .5; 3 SOLUTION RESPIRATORY (INHALATION) at 07:30

## 2020-02-28 RX ADMIN — Medication 2000 UNITS: at 20:00

## 2020-02-28 RX ADMIN — PANTOPRAZOLE SODIUM 40 MG: 40 TABLET, DELAYED RELEASE ORAL at 20:00

## 2020-02-28 RX ADMIN — SODIUM CHLORIDE, PRESERVATIVE FREE 10 ML: 5 INJECTION INTRAVENOUS at 08:15

## 2020-02-28 RX ADMIN — TIMOLOL MALEATE 1 DROP: 5 SOLUTION, GEL FORMING, EXTENDED RELEASE OPHTHALMIC at 08:16

## 2020-02-28 RX ADMIN — IPRATROPIUM BROMIDE AND ALBUTEROL SULFATE 1 AMPULE: .5; 3 SOLUTION RESPIRATORY (INHALATION) at 15:36

## 2020-02-28 RX ADMIN — SENNOSIDES, DOCUSATE SODIUM 1 TABLET: 50; 8.6 TABLET, FILM COATED ORAL at 08:15

## 2020-02-28 RX ADMIN — CYANOCOBALAMIN TAB 1000 MCG 1000 MCG: 1000 TAB at 08:15

## 2020-02-28 RX ADMIN — ACETAMINOPHEN 650 MG: 325 TABLET ORAL at 23:40

## 2020-02-28 RX ADMIN — METHYLPREDNISOLONE SODIUM SUCCINATE 40 MG: 40 INJECTION, POWDER, FOR SOLUTION INTRAMUSCULAR; INTRAVENOUS at 08:15

## 2020-02-28 RX ADMIN — IPRATROPIUM BROMIDE AND ALBUTEROL SULFATE 1 AMPULE: .5; 3 SOLUTION RESPIRATORY (INHALATION) at 20:24

## 2020-02-28 RX ADMIN — LEVOTHYROXINE SODIUM 75 MCG: 0.07 TABLET ORAL at 05:16

## 2020-02-28 RX ADMIN — SODIUM CHLORIDE, PRESERVATIVE FREE 10 ML: 5 INJECTION INTRAVENOUS at 20:00

## 2020-02-28 RX ADMIN — AZITHROMYCIN MONOHYDRATE 500 MG: 500 INJECTION, POWDER, LYOPHILIZED, FOR SOLUTION INTRAVENOUS at 20:00

## 2020-02-28 RX ADMIN — MELATONIN 3 MG: at 23:40

## 2020-02-28 RX ADMIN — ENOXAPARIN SODIUM 40 MG: 40 INJECTION SUBCUTANEOUS at 20:00

## 2020-02-28 RX ADMIN — IPRATROPIUM BROMIDE AND ALBUTEROL SULFATE 1 AMPULE: .5; 3 SOLUTION RESPIRATORY (INHALATION) at 11:37

## 2020-02-28 RX ADMIN — METHYLPREDNISOLONE SODIUM SUCCINATE 40 MG: 40 INJECTION, POWDER, FOR SOLUTION INTRAMUSCULAR; INTRAVENOUS at 02:30

## 2020-02-28 RX ADMIN — AMLODIPINE BESYLATE 5 MG: 5 TABLET ORAL at 20:00

## 2020-02-28 RX ADMIN — SENNOSIDES, DOCUSATE SODIUM 1 TABLET: 50; 8.6 TABLET, FILM COATED ORAL at 20:00

## 2020-02-28 ASSESSMENT — PAIN SCALES - GENERAL
PAINLEVEL_OUTOF10: 0
PAINLEVEL_OUTOF10: 3
PAINLEVEL_OUTOF10: 0

## 2020-02-28 NOTE — CARE COORDINATION
Chart reviewed, in to see pt for dc planning. Introduced self and board updated. Pt was admitted for SOB and cough. States she lives at home with her /Bulmaro and uses home O2 per [place] in addition to a walker, cane, shower chair and BSC. States she spoke with [place]/public benefit counselor and is hopeful her cost will decrease. Explained she follows with PCP, has insurance with affordable RX co-pays and reliable transportation per herself or her . Discussed HHC and pt declined [facility] need nor any other discharge needs. States her daughter/Yvonne is a nurse and checks in on them daily. CM remains available if needs arise.
[] Assisted Living    [] [address] [facility]               [] [address]       [] Homeless/Homeless Shelter              Pulmonary Physician:    Consulted:  [] Sabi Hanson     [x] Yes   [] Laura     [] No  [] Javier  [x] Edu Andrade  [] Other:     Pharmacy:      Meds to Beds:  [] [facility]                               [] Yes   [] [address]       [x] No   [] [facility]   [] [employer]      [] [facility]    [] [facility]   [] Micki   [x] [facility]   [] Other:    Home Care:       SNF:  [] Yes, Name:                  [] Yes, Name:  [x] No                     [x] No     Do you have            DME Company:  [x] Home O2      [] CM DME    [] BIPAP/CPAP          [] [address]  [] Nebulizer      [x] [place]  [] None of the above     [] Other    Home COPD Medications:  Inhalers: Albuterol, Bevespi       Nebulizers:  N/A       Other: oxygen HS only    Do you have all your home medications? Can you pay for medication?   [x] Yes         [x] Yes   []  No [] No     What time do you prefer your appointments:  Do you have transportation:    [x] AM                                                                          [x] Yes  [] PM                                                                          [] No  [] [employer]

## 2020-02-28 NOTE — PROGRESS NOTES
Pulmonary and Critical Care  Progress Note      VITALS:  BP (!) 145/64   Pulse 75   Temp 97.1 °F (36.2 °C) (Oral)   Resp 19   Ht 5' 1\" (1.549 m)   Wt 129 lb 4.8 oz (58.7 kg)   SpO2 96%   BMI 24.43 kg/m²     Subjective:   CHIEF COMPLAINT :SOB and COUGH     HPI:                The patient is a 80 y.o. female with significant past medical history of COPD, Hypothyrodism, asthma, home O2 2 L/min  presents with complaints of SOB x and cough x 2 days. She has no fever, phlegm - clear to yellow,no sick exposure,no recent travel , no chest pain, no headache, no n/v, no abdominal pain, no diarrhea, no dysuria. Her Resp PCR is negative and her CXR is normal. She is being treated with the Abx, Nebs and Steroids. At this time she is lying in the bed. She is in mild resp distress    Objective:   PHYSICAL EXAM:    LUNGS:Occasional basal crackles  Abd-soft, BS+, NT  Ext- no pedal edema  CVS-s1s2, no murmurs      DATA:    CBC:  Recent Labs     02/26/20  1507 02/27/20  0743   WBC 12.2* 11.3*   RBC 4.30 3.98*   HGB 13.2 12.3*   HCT 42.7 37.9    267   MCV 99.3 95.2   MCH 30.7 30.9   MCHC 30.9* 32.5   RDW 15.2* 14.4   SEGSPCT 67.0* 80.1*      BMP:  Recent Labs     02/26/20  1507 02/27/20  0743    138   K 4.0 4.2   CL 99 99   CO2 26 24   BUN 10 7   CREATININE 0.7 0.6   CALCIUM 8.9 9.0   GLUCOSE 98 167*      ABG:  Recent Labs     02/26/20 2015   PH 7.48*   PO2ART 59*   IFC8GSC 37.0   O2SAT 92.2*     BNP  Lab Results   Component Value Date    BNP 57 05/23/2014      D-Dimer:  No results found for: DDIMER   1.  Radiology: None      Assessment/Plan     Patient Active Problem List    Diagnosis Date Noted    Hypertension, uncontrolled 02/26/2020    S/P TAVR (transcatheter aortic valve replacement) 09/23/2019    COPD with exacerbation ([facility] 09/06/2019    Nodule of lower lobe of right lung 09/06/2019    Acute respiratory distress     Aortic stenosis, severe 08/28/2019    VHD (valvular heart disease)     Gastroesophageal reflux disease 03/04/2019    HTN (hypertension) 01/22/2019    Vitamin D deficiency 01/22/2019    Vitamin B12 deficiency 01/22/2019    COPD exacerbation ([address] 10/31/2018    Acquired hypothyroidism 09/24/2018    Murmur 09/24/2018    Age-related osteoporosis without current pathological fracture 09/24/2018     Overview Note:     DEXA 8/2017 T scores  Lumbar spine -2.1  Right Femoral Neck -3.1  Right Radius -3.3    FRAX score : 10 year major Fx 25.7%. Hip Fx 11.2%      Black tongue 09/24/2018    Anemia 09/01/2018    Dizziness 09/01/2018    Gait disturbance 11/09/2015    Closed intertrochanteric fracture of left femur ([facility] 11/04/2015   Patient is sitting in the bed. She is  In mild reps distress    AECOPD- improving  Acute on chronic hypoxic resp failure  ? RLL Pulmonary nodule  Debility  Hypothyrodism       1. PO Abx  2. Nebs  3. F/u C&S  4. Wean off Steroids  5. Await placement  6. C/w DVT and GI prophylaxis  No follow-ups on file.     Electronically signed by Brandon Prado MD on 2/28/2020 at 9:03 AM

## 2020-02-28 NOTE — PROGRESS NOTES
Normal appearing external genitalia. Macedo catheter is not present. HEME/LYMPH No palpable cervical lymphadenopathy and no hepatosplenomegaly. No petechiae or ecchymoses. MSK No gross joint deformities. SKIN Normal coloration, warm, dry. NEURO Cranial nerves appear grossly intact, normal speech, no lateralizing weakness. PSYCH Awake, alert, oriented x 4. Affect appropriate.     Medications:   Medications:    sodium chloride flush  10 mL Intravenous 2 times per day    enoxaparin  40 mg Subcutaneous Nightly    ipratropium-albuterol  1 ampule Inhalation Q4H WA    methylPREDNISolone  40 mg Intravenous Q6H    Followed by   Rex Oiler ON 2/29/2020] predniSONE  40 mg Oral Daily    azithromycin  500 mg Intravenous Q24H    aspirin  81 mg Oral Daily    Calcium-Magnesium-Zinc  1 tablet Oral Daily    vitamin D  2,000 Units Oral Nightly    escitalopram  10 mg Oral Daily    levothyroxine  75 mcg Oral QAM AC    pantoprazole  40 mg Oral Nightly    sennosides-docusate sodium  1 tablet Oral BID    vitamin B-12  1,000 mcg Oral Daily    amLODIPine  5 mg Oral Nightly    timolol  1 drop Both Eyes Daily      Infusions:   PRN Meds: melatonin, 3 mg, Nightly PRN  sodium chloride flush, 10 mL, PRN  acetaminophen, 650 mg, Q6H PRN  acetaminophen, 650 mg, Q6H PRN  polyethylene glycol, 17 g, Daily PRN  ondansetron, 4 mg, Q6H PRN  methocarbamol, 500 mg, 4x Daily PRN  sucralfate, 1 g, Daily PRN  hydrALAZINE (APRESOLINE) ivpb, 10 mg, Q4H PRN          Electronically signed by Sergio Jarrett MD on 2/28/2020 at 12:19 PM

## 2020-02-29 VITALS
SYSTOLIC BLOOD PRESSURE: 144 MMHG | BODY MASS INDEX: 24.84 KG/M2 | WEIGHT: 131.6 LBS | OXYGEN SATURATION: 98 % | HEART RATE: 80 BPM | RESPIRATION RATE: 23 BRPM | HEIGHT: 61 IN | DIASTOLIC BLOOD PRESSURE: 73 MMHG | TEMPERATURE: 97.2 F

## 2020-02-29 PROCEDURE — G0378 HOSPITAL OBSERVATION PER HR: HCPCS

## 2020-02-29 PROCEDURE — 94640 AIRWAY INHALATION TREATMENT: CPT

## 2020-02-29 PROCEDURE — 6370000000 HC RX 637 (ALT 250 FOR IP): Performed by: NURSE PRACTITIONER

## 2020-02-29 PROCEDURE — 94761 N-INVAS EAR/PLS OXIMETRY MLT: CPT

## 2020-02-29 PROCEDURE — 2700000000 HC OXYGEN THERAPY PER DAY

## 2020-02-29 PROCEDURE — 2580000003 HC RX 258: Performed by: NURSE PRACTITIONER

## 2020-02-29 PROCEDURE — 99232 SBSQ HOSP IP/OBS MODERATE 35: CPT | Performed by: INTERNAL MEDICINE

## 2020-02-29 RX ORDER — AZITHROMYCIN 250 MG/1
250 TABLET, FILM COATED ORAL DAILY
Qty: 4 TABLET | Refills: 0 | Status: SHIPPED | OUTPATIENT
Start: 2020-02-29 | End: 2020-03-04

## 2020-02-29 RX ORDER — BUDESONIDE AND FORMOTEROL FUMARATE DIHYDRATE 160; 4.5 UG/1; UG/1
2 AEROSOL RESPIRATORY (INHALATION) 2 TIMES DAILY
Qty: 3 INHALER | Refills: 0 | Status: SHIPPED | OUTPATIENT
Start: 2020-02-29 | End: 2020-03-09

## 2020-02-29 RX ORDER — PREDNISONE 20 MG/1
TABLET ORAL
Qty: 7 TABLET | Refills: 0 | Status: SHIPPED | OUTPATIENT
Start: 2020-03-01 | End: 2020-03-07

## 2020-02-29 RX ORDER — IPRATROPIUM BROMIDE AND ALBUTEROL SULFATE 2.5; .5 MG/3ML; MG/3ML
3 SOLUTION RESPIRATORY (INHALATION)
Qty: 360 ML | Refills: 1 | Status: SHIPPED | OUTPATIENT
Start: 2020-02-29 | End: 2020-08-10 | Stop reason: SDUPTHER

## 2020-02-29 RX ORDER — AMLODIPINE BESYLATE 5 MG/1
5 TABLET ORAL NIGHTLY
Qty: 30 TABLET | Refills: 3 | Status: SHIPPED | OUTPATIENT
Start: 2020-02-29 | End: 2020-06-29 | Stop reason: SDUPTHER

## 2020-02-29 RX ADMIN — IPRATROPIUM BROMIDE AND ALBUTEROL SULFATE 1 AMPULE: .5; 3 SOLUTION RESPIRATORY (INHALATION) at 08:05

## 2020-02-29 RX ADMIN — TIMOLOL MALEATE 1 DROP: 5 SOLUTION, GEL FORMING, EXTENDED RELEASE OPHTHALMIC at 08:06

## 2020-02-29 RX ADMIN — SENNOSIDES, DOCUSATE SODIUM 1 TABLET: 50; 8.6 TABLET, FILM COATED ORAL at 08:07

## 2020-02-29 RX ADMIN — PREDNISONE 40 MG: 20 TABLET ORAL at 08:07

## 2020-02-29 RX ADMIN — ESCITALOPRAM OXALATE 10 MG: 10 TABLET ORAL at 08:06

## 2020-02-29 RX ADMIN — LEVOTHYROXINE SODIUM 75 MCG: 0.07 TABLET ORAL at 05:22

## 2020-02-29 RX ADMIN — SODIUM CHLORIDE, PRESERVATIVE FREE 10 ML: 5 INJECTION INTRAVENOUS at 08:07

## 2020-02-29 RX ADMIN — ASPIRIN 81 MG: 81 TABLET, COATED ORAL at 08:07

## 2020-02-29 RX ADMIN — CYANOCOBALAMIN TAB 1000 MCG 1000 MCG: 1000 TAB at 08:07

## 2020-02-29 ASSESSMENT — PAIN SCALES - GENERAL
PAINLEVEL_OUTOF10: 0
PAINLEVEL_OUTOF10: 0

## 2020-02-29 NOTE — DISCHARGE SUMMARY
Discharge Summary    Name:  Frantz Chavez /Age/Sex: 1935  (80 y.o. female)   MRN & CSN:  [id number] & [id number] Admission Date/Time: 2020  2:52 PM   Attending:  Yeny Stephen MD Discharging Physician: Florentino Moeller MD     Hospital Course: Frantz Chavez is a 80 y.o.  female  who presents     Asessment:   COPD exacerbation  Hypertension uncontrolled  Hypothyroidism     Plan:  Continue with steroids treatment, consult pulmonary service, continue with duo nebs, monitor blood pressure and adjust medications as needed, adjust blood pressure medications as needed which better controlled today, continue with Synthroid treatment, DC today    The patient expressed appropriate understanding of and agreement with the discharge recommendations, medications, and plan. Discharge Instruction:     Primary care physician:  within 2-3 days    Disposition: Discharged to:   []Home, [][facility], []SNF, []Acute Rehab, []Hospice   Condition on discharge: Stable    Discharge Medications:      Maya Moreno Medication Instructions IQS:[id number]    Printed on:20 1977   Medication Information                      albuterol (PROVENTIL HFA;VENTOLIN HFA) 108 (90 BASE) MCG/ACT inhaler  Inhale 2 puffs into the lungs every 6 hours as needed for Wheezing.              amLODIPine (NORVASC) 5 MG tablet  Take 1 tablet by mouth nightly             aspirin 81 MG EC tablet  Take 1 tablet by mouth daily             azithromycin (ZITHROMAX) 250 MG tablet  Take 1 tablet by mouth daily for 4 days             Calcium-Magnesium-Zinc 167-83-8 MG TABS  Take 1 tablet by mouth daily OTC             Cholecalciferol (VITAMIN D3) 2000 UNITS CAPS  Take 1 capsule by mouth nightly OTC             escitalopram (LEXAPRO) 10 MG tablet  Take 1 tablet by mouth daily             ipratropium-albuterol (DUONEB) 0.5-2.5 (3) MG/3ML SOLN nebulizer solution  Inhale 3 mLs into the lungs every 4 hours (while awake)             levothyroxine

## 2020-02-29 NOTE — PROGRESS NOTES
Gastroesophageal reflux disease 03/04/2019    HTN (hypertension) 01/22/2019    Vitamin D deficiency 01/22/2019    Vitamin B12 deficiency 01/22/2019    COPD exacerbation ([facility] 10/31/2018    Acquired hypothyroidism 09/24/2018    Murmur 09/24/2018    Age-related osteoporosis without current pathological fracture 09/24/2018     Overview Note:     DEXA 8/2017 T scores  Lumbar spine -2.1  Right Femoral Neck -3.1  Right Radius -3.3    FRAX score : 10 year major Fx 25.7%. Hip Fx 11.2%      Black tongue 09/24/2018    Anemia 09/01/2018    Dizziness 09/01/2018    Gait disturbance 11/09/2015    Closed intertrochanteric fracture of left femur ([facility] 11/04/2015   Patient is sitting in the chair. She is not in acute reps distress    AECOPD- improving  Acute on chronic hypoxic resp failure  ? RLL Pulmonary nodule  Debility  Hypothyrodism       1. Abx  2. F/u C&s  3. Nebs  4. Wean off steroids  5. DVT and GI prophylaxis  6. Await placement  7. C/w present management  No follow-ups on file.     Electronically signed by Faisal Enciso MD on 2/29/2020 at 11:46 AM

## 2020-02-29 NOTE — PLAN OF CARE
Problem: Falls - Risk of:  Goal: Will remain free from falls  Description  Will remain free from falls  2/29/2020 1139 by Bernardo Molina RN  Outcome: Ongoing  2/28/2020 2357 by Gisell Torres RN  Outcome: Ongoing  2/28/2020 2357 by Gisell Torres RN  Outcome: Ongoing  Goal: Absence of physical injury  Description  Absence of physical injury  2/29/2020 1139 by Bernardo Molina RN  Outcome: Ongoing  2/28/2020 2357 by Gisell Torres RN  Outcome: Ongoing  2/28/2020 2357 by Gisell Torres RN  Outcome: Ongoing     Problem: Discharge Planning:  Goal: Discharged to appropriate level of care  Description  Discharged to appropriate level of care  2/29/2020 1139 by Bernardo Molina RN  Outcome: Ongoing  2/28/2020 2357 by Gisell Torres RN  Outcome: Ongoing  2/28/2020 2357 by Gisell Torres RN  Outcome: Ongoing     Problem:  Activity Intolerance:  Goal: Ability to tolerate increased activity will improve  Description  Ability to tolerate increased activity will improve  2/29/2020 1139 by Bernardo Molina RN  Outcome: Ongoing  2/28/2020 2357 by Gisell Torres RN  Outcome: Ongoing  2/28/2020 2357 by Gisell Torres RN  Outcome: Ongoing     Problem: Breathing Pattern - Ineffective:  Goal: Ability to achieve and maintain a regular respiratory rate will improve  Description  Ability to achieve and maintain a regular respiratory rate will improve  2/29/2020 1139 by Bernardo Molina RN  Outcome: Ongoing  2/28/2020 2357 by Gisell Torres RN  Outcome: Ongoing  2/28/2020 2357 by Gisell Torres RN  Outcome: Ongoing     Problem: Gas Exchange - Impaired:  Goal: Levels of oxygenation will improve  Description  Levels of oxygenation will improve  2/29/2020 1139 by Bernardo Molina RN  Outcome: Ongoing  2/28/2020 2357 by Gisell Torres RN  Outcome: Ongoing  2/28/2020 2357 by Gisell Torres RN  Outcome: Ongoing     Problem: Pain:  Goal: Pain level will decrease  Description  Pain level will decrease  2/29/2020 1139 by Sunny Romo RN  Outcome: Ongoing  2/28/2020 2357 by Vangie Knutson RN  Outcome: Ongoing  2/28/2020 2357 by Vangie Knutson RN  Outcome: Ongoing  Goal: Control of acute pain  Description  Control of acute pain  2/29/2020 1139 by Sunny Romo RN  Outcome: Ongoing  2/28/2020 2357 by Vangie Knutson RN  Outcome: Ongoing  2/28/2020 2357 by Vangie Knutson RN  Outcome: Ongoing  Goal: Control of chronic pain  Description  Control of chronic pain  2/29/2020 1139 by Sunny Romo RN  Outcome: Ongoing  2/28/2020 2357 by Vangie Knutson RN  Outcome: Ongoing  2/28/2020 2357 by Vangie Knutson RN  Outcome: Ongoing

## 2020-03-02 ENCOUNTER — CARE COORDINATION (OUTPATIENT)
Dept: CASE MANAGEMENT | Age: 85
End: 2020-03-02

## 2020-03-02 ENCOUNTER — TELEPHONE (OUTPATIENT)
Dept: PHARMACY | Facility: CLINIC | Age: 85
End: 2020-03-02

## 2020-03-02 LAB
CULTURE: NORMAL
CULTURE: NORMAL
Lab: NORMAL
Lab: NORMAL
SPECIMEN: NORMAL
SPECIMEN: NORMAL

## 2020-03-02 PROCEDURE — 1111F DSCHRG MED/CURRENT MED MERGE: CPT | Performed by: FAMILY MEDICINE

## 2020-03-02 NOTE — CARE COORDINATION
[address] Transitions Interview     3/2/2020    Patient: Maurilio Parikh Patient : 1935   MRN: [id number]  Reason for Admission:   SOB  RARS: Readmission Risk Score: 23         Spoke with:   patient      Readmission Risk  Patient Active Problem List   Diagnosis    Closed intertrochanteric fracture of left femur ([address]    Gait disturbance    Anemia    Dizziness    Acquired hypothyroidism    Murmur    Age-related osteoporosis without current pathological fracture    Black tongue    COPD exacerbation (HCC)    HTN (hypertension)    Vitamin D deficiency    Vitamin B12 deficiency    Gastroesophageal reflux disease    VHD (valvular heart disease)    Aortic stenosis, severe    Acute respiratory distress    COPD with exacerbation (HCC)    Nodule of lower lobe of right lung    S/P TAVR (transcatheter aortic valve replacement)    Hypertension, uncontrolled       Inpatient Assessment  Care Transitions Summary    Care Transitions Inpatient Review  Medication Review  Do you have all of your prescriptions and are they filled?:  Yes   Housing Review  Social Support  Durable Medical Equipment  Patient DME:  Marleny machado, Shower chair, Other  Other Patient DME:  [facility]  Functional Review  Ability to seek help/take action for Emergent/Urgent situations i.e. fire, crime, inclement weather or health crisis. :  Independent  Ability handle personal hygiene needs (bathing/dressing/grooming): Independent  Ability to manage medications: Independent  Ability to prepare food:  Independent  Ability to maintain home (clean home, laundry): Independent  Ability to drive and/or has transportation:  Independent  Ability to do shopping:  Independent  Ability to manage finances: Independent  Is patient able to live independently?:  Yes  Hearing and Vision  Visual Impairment:  Reading glasses  Hearing Impairment:  None  Care Transitions Interventions                                 Follow Up:   Attempted

## 2020-03-02 NOTE — TELEPHONE ENCOUNTER
CLINICAL PHARMACY NOTE  Post-Discharge Transitions of Care (JIN)    Patient was discharged from [facility] on 2/29/20. Attempted to reach patient to review medications; left message on home # asking for return call. Will continue to attempt to contact as appropriate.       Ge Alcantar PharmD, [address]  Direct: [phone]  Department, toll free: [phone], option 7    =======================================================   For Pharmacy Admin Tracking Only    TCM Call Made?: Yes

## 2020-03-03 LAB
EKG ATRIAL RATE: 64 BPM
EKG DIAGNOSIS: NORMAL
EKG P AXIS: 98 DEGREES
EKG P-R INTERVAL: 176 MS
EKG Q-T INTERVAL: 418 MS
EKG QRS DURATION: 64 MS
EKG QTC CALCULATION (BAZETT): 431 MS
EKG R AXIS: 55 DEGREES
EKG T AXIS: 76 DEGREES
EKG VENTRICULAR RATE: 64 BPM

## 2020-03-04 ENCOUNTER — CARE COORDINATION (OUTPATIENT)
Dept: CASE MANAGEMENT | Age: 85
End: 2020-03-04

## 2020-03-04 NOTE — CARE COORDINATION
[facility] Transitions Follow Up Call    3/4/2020    Patient: Migue Osorio  Patient : 1935   MRN: [id number]  Reason for Admission:   SOB  Discharge Date: 20 RARS: Readmission Risk Score: 23         Spoke with:   patient    Care Transitions Subsequent and Final Call    Subsequent and Final Calls  Have your medications changed?:  No  Do you have any questions related to your medications?:  No  Do you currently have any active services?:  No  Do you have any needs or concerns that I can assist you with?:  No  Identified Barriers:  None  Care Transitions Interventions                          Other Interventions: Follow Up:  Spoke with patient. Reports that she is feeling well. Denies increased SOB, cough, mucous , wheezing ,or CP. Reviewed COPD zone management tool and s/s to report to MD.    Patient reports that she is taking her medications as directed and her symptoms are well managed at this time. Confirms that she is independent with ADL's and denies the need for HHC. Denies any questions, equipment or resource needs. Patient is agreeable to continued Care Transition. Confirmed CTN contact information. Encouraged call back if needs arise.   Future Appointments   Date Time Provider Wero Rowe   3/9/2020  8:30 AM Ibrahima Justice MD [place] [facility]   3/23/2020  1:30 PM Jay Oliver [facility] [place] [facility]   8/3/2020 10:15 AM Ibrahima Justice MD [facility] [place] [facility]   10/22/2020  3:40 PM Sofie Antunez MD Counts include 234 beds at the [facility]   10/26/2020 11:00 AM [address] [facility]       Cleve Polanco RN

## 2020-03-09 ENCOUNTER — OFFICE VISIT (OUTPATIENT)
Dept: FAMILY MEDICINE CLINIC | Age: 85
End: 2020-03-09
Payer: MEDICARE

## 2020-03-09 VITALS
SYSTOLIC BLOOD PRESSURE: 120 MMHG | BODY MASS INDEX: 24.98 KG/M2 | WEIGHT: 132.2 LBS | TEMPERATURE: 96.8 F | DIASTOLIC BLOOD PRESSURE: 68 MMHG | HEART RATE: 51 BPM | OXYGEN SATURATION: 94 %

## 2020-03-09 PROCEDURE — 1111F DSCHRG MED/CURRENT MED MERGE: CPT | Performed by: FAMILY MEDICINE

## 2020-03-09 PROCEDURE — 99495 TRANSJ CARE MGMT MOD F2F 14D: CPT | Performed by: FAMILY MEDICINE

## 2020-03-09 NOTE — PROGRESS NOTES
edema  Musculoskeletal: normal range of motion, no joint swelling, deformity or tenderness  Neurologic: reflexes normal and symmetric, no cranial nerve deficit, gait, coordination and speech normal    Assessment:   Diagnosis Orders   1. COPD with exacerbation ([address]  OR DISCHARGE MEDS RECONCILED W/ CURRENT OUTPATIENT MED LIST   2. S/P TAVR (transcatheter aortic valve replacement)  OR DISCHARGE MEDS RECONCILED W/ CURRENT OUTPATIENT MED LIST   3. Essential hypertension  OR DISCHARGE MEDS RECONCILED W/ CURRENT OUTPATIENT MED LIST   4. Acquired hypothyroidism  OR DISCHARGE MEDS RECONCILED W/ CURRENT OUTPATIENT MED LIST        Plan:  1. Resume Bevespi since symbicort is too expensive, and she tolerated and did well on Bevespi. Use albuterol prn.     2. Care per cardiology. Doing well. 3. BP is well controlled. Continue norvasc. 4. Stable. Continue meds.      Follow up prn  Medical Decision Making: moderate complexity

## 2020-03-10 ENCOUNTER — CARE COORDINATION (OUTPATIENT)
Dept: CASE MANAGEMENT | Age: 85
End: 2020-03-10

## 2020-03-10 NOTE — CARE COORDINATION
[facility] Transitions Follow Up Call    3/10/2020    Patient: Hanna Dowling  Patient : 1935   MRN: [id number]  Reason for Admission: SOB  Discharge Date: 20 RARS: Readmission Risk Score: 19             Follow Up: Attempted to reach patient for Care Transition follow up. No answer to phone. Message left with CTN contact information and request for call back.    Future Appointments   Date Time Provider Wero Rowe   3/23/2020  1:30 PM Calvin [facility] [place] [facility]   8/3/2020 10:15 AM Abiola Mario MD [facility] [facility]   10/22/2020  3:40 PM Heidi Wells MD [facility]   10/26/2020 11:00 AM [address] [facility]       Shari Baez RN

## 2020-03-11 ENCOUNTER — CARE COORDINATION (OUTPATIENT)
Dept: CASE MANAGEMENT | Age: 85
End: 2020-03-11

## 2020-03-11 NOTE — CARE COORDINATION
[facility] Transitions Follow Up Call    3/11/2020    Patient: Doris George  Patient : 1935   MRN: [id number]  Reason for Admission:   COPD  Discharge Date: 20 RARS: Readmission Risk Score: 23         Spoke with:   patient    Care Transitions Subsequent and Final Call    Schedule Follow Up Appointment with PCP:  Declined  Subsequent and Final Calls  Have your medications changed?:  No  Do you have any questions related to your medications?:  No  Do you currently have any active services?:  No  Do you have any needs or concerns that I can assist you with?:  No  Identified Barriers:  None  Care Transitions Interventions  Other Interventions: Follow Up:  Spoke with patient. Reports that she is feeling ok. Denies increased SOB, CP, wheezing, cough or mucous. Reviewed COPD zone management tool and s/s to report to MD.    Patient reports that she was at a  yesterday and was around a large crowd of people. Reports that her stomach is \"kind of upset\" and she is [place] some diarrhea\". Denies nausea, vomiting, fever or chills. Patient declined CTN offer to schedule follow up, reporting that \"it is likely her nerves\". Support given. Instructed on the BRAT diet/ worsening s/s to report to MD.    Patient reports that she is taking her medications as directed. Denies any questions, equipment or resource needs. Patient is agreeable to continued Care Transition. Confirmed CTN contact information. Encouraged call back if needs arise.    Future Appointments   Date Time Provider Wero Rowe   3/23/2020  1:30 PM Rolf Willson [facility] [place] [facility]   8/3/2020 10:15 AM Kayy Willson MD [facility] [place] [facility]   10/22/2020  3:40 PM Myrna Grant MD [facility]   10/26/2020 11:00 AM [address] [facility] Oskar Poole RN

## 2020-03-12 ENCOUNTER — CARE COORDINATION (OUTPATIENT)
Dept: CASE MANAGEMENT | Age: 85
End: 2020-03-12

## 2020-03-12 NOTE — CARE COORDINATION
Roni 45 Transitions Follow Up Call    3/12/2020    Patient: Salbador Mtz  Patient : 1935   MRN: [id number]  Reason for Admission:   SOB  Discharge Date: 20 RARS: Readmission Risk Score: 23         Spoke with:   patient    Care Transitions Subsequent and Final Call    Subsequent and Final Calls  Have your medications changed?:  No  Do you have any questions related to your medications?:  No  Do you currently have any active services?:  No  Do you have any needs or concerns that I can assist you with?:  No  Identified Barriers:  None  Care Transitions Interventions  Other Interventions: Follow Up:  Spoke with patient. Reports that she is feeling better. Denies stomach pain, nausea, cramping, fever or chills. Reports that diarrhea has resolved. Instructed on the importance of proper hand hygiene and s/s of infection to report to MD.    Patient denies increased SOB, CP , wheezing, cough, mucous production or fatigue. Reviewed COPD zone management tool and s/s to report to MD.    Patient confirms that she is taking her medications as directed and her symptoms are well managed at this time. Denies any questions, equipment or resource needs. Patient is agreeable to continued Care Transition. Confirmed CTN contact information. Encouraged call back if needs arise.    Future Appointments   Date Time Provider Wero Rowe   3/23/2020  1:30 PM Hernando Peach [facility] [place] [facility]   8/3/2020 10:15 AM Damian Prakash MD [place] [facility]   10/22/2020  3:40 PM Shari Shin MD [facility]   10/26/2020 11:00 [facility] [address] [facility]

## 2020-03-13 RX ORDER — PANTOPRAZOLE SODIUM 40 MG/1
TABLET, DELAYED RELEASE ORAL
Qty: 90 TABLET | Refills: 3 | Status: SHIPPED | OUTPATIENT
Start: 2020-03-13 | End: 2021-01-29 | Stop reason: SDUPTHER

## 2020-03-20 ENCOUNTER — TELEPHONE (OUTPATIENT)
Dept: FAMILY MEDICINE CLINIC | Age: 85
End: 2020-03-20

## 2020-03-20 NOTE — TELEPHONE ENCOUNTER
Called patient to cancel AWV for next week, also got patient signed up for mychart. Patient states that she does not know how to use it, and has no family to show her. I advised patient to ask a staff member when she comes into the office next week to show her. Patient voiced understanding, and was signed up for mychart.

## 2020-03-23 ENCOUNTER — TELEPHONE (OUTPATIENT)
Dept: FAMILY MEDICINE CLINIC | Age: 85
End: 2020-03-23

## 2020-03-30 ENCOUNTER — CARE COORDINATION (OUTPATIENT)
Dept: CASE MANAGEMENT | Age: 85
End: 2020-03-30

## 2020-03-30 NOTE — CARE COORDINATION
Roni 45 Transitions Follow Up Call    3/30/2020    Patient: Dajuan Friend  Patient : 1935   MRN: [id number]  Reason for Admission: COPD Exac  Discharge Date: 20 RARS: Readmission Risk Score: 19    Care Transitions Subsequent and Final Call    Subsequent and Final Calls  Care Transitions Interventions  Other Interventions:          Spoke w/ Patient for final Care Transition call. Reports doing well other than \"dry, raspy throat\". Denies cough, wheezing, sob, fever, malaise. Reviewed the following :    From CDC: Are you at higher risk for severe illness?  Wash your hands often.  Avoid close contact (6 feet, which is about two arm lengths) with people who are sick.  Put distance between yourself and other people if COVID-19 is spreading in your community.  Clean and disinfect frequently touched surfaces.  Avoid all cruise travel and non-essential air travel.  Call your healthcare professional if you have concerns about COVID-19 and your underlying condition or if you are sick. For more information on steps you can take to protect yourself, see CDC's How to Protect Yourself      Patient voices understanding and is adhering to above. Reports taking all meds as directed; denies rx needs. Denies resource needs. Encouraged to contact CTN should resource needs arise; Patient has CTN number. Agreeable to final call. No further CT outreach planned.      Yeny Moya RN

## 2020-04-01 LAB
T4 FREE: 1.25
TSH SERPL DL<=0.05 MIU/L-ACNC: 0.27 UIU/ML

## 2020-04-22 RX ORDER — HYDROGEN PEROXIDE 2.65 ML/100ML
LIQUID ORAL; TOPICAL
Qty: 30 TABLET | Refills: 5 | Status: SHIPPED | OUTPATIENT
Start: 2020-04-22 | End: 2021-08-09

## 2020-04-27 RX ORDER — METHOCARBAMOL 500 MG/1
500 TABLET, FILM COATED ORAL 4 TIMES DAILY PRN
Qty: 60 TABLET | Refills: 2 | Status: SHIPPED | OUTPATIENT
Start: 2020-04-27 | End: 2020-08-10 | Stop reason: SDUPTHER

## 2020-04-27 RX ORDER — ONDANSETRON 4 MG/1
4 TABLET, FILM COATED ORAL EVERY 8 HOURS PRN
Qty: 30 TABLET | Refills: 0 | Status: SHIPPED | OUTPATIENT
Start: 2020-04-27 | End: 2020-08-10 | Stop reason: SDUPTHER

## 2020-05-06 ENCOUNTER — TELEPHONE (OUTPATIENT)
Dept: FAMILY MEDICINE CLINIC | Age: 85
End: 2020-05-06

## 2020-06-15 ENCOUNTER — TELEPHONE (OUTPATIENT)
Dept: FAMILY MEDICINE CLINIC | Age: 85
End: 2020-06-15

## 2020-06-15 NOTE — TELEPHONE ENCOUNTER
Patient scheduled for office visit on 06/16/2020. Patient is refusing virtual visit. Patient state  she fell and hit her heard  in April. dizziness has been there since but has gotten worse in last 2 weeks. she was advised to go to ED but is refusing.   Per Dr. Jorge Jeronimo she needs to be in office

## 2020-06-16 ENCOUNTER — OFFICE VISIT (OUTPATIENT)
Dept: FAMILY MEDICINE CLINIC | Age: 85
End: 2020-06-16
Payer: MEDICARE

## 2020-06-16 VITALS
DIASTOLIC BLOOD PRESSURE: 84 MMHG | BODY MASS INDEX: 24.34 KG/M2 | OXYGEN SATURATION: 96 % | WEIGHT: 128.8 LBS | HEART RATE: 56 BPM | TEMPERATURE: 97.9 F | SYSTOLIC BLOOD PRESSURE: 136 MMHG

## 2020-06-16 PROCEDURE — 99214 OFFICE O/P EST MOD 30 MIN: CPT | Performed by: FAMILY MEDICINE

## 2020-06-16 ASSESSMENT — ENCOUNTER SYMPTOMS
SINUS PRESSURE: 0
RHINORRHEA: 0
SHORTNESS OF BREATH: 0
EYE DISCHARGE: 0

## 2020-06-16 NOTE — PROGRESS NOTES
suggest brain injury. Since her symptoms are progressing without further injury, will check a CT of her brain, and go from there. If she falls again, or starts with N/V, etc, she will call.      Follow up prn      Current Outpatient Medications:     methocarbamol (ROBAXIN) 500 MG tablet, Take 1 tablet by mouth 4 times daily as needed (muscle spasm), Disp: 60 tablet, Rfl: 2    ondansetron (ZOFRAN) 4 MG tablet, Take 1 tablet by mouth every 8 hours as needed for Nausea or Vomiting, Disp: 30 tablet, Rfl: 0    EQ ASPIRIN ADULT LOW DOSE 81 MG EC tablet, Take 1 tablet by mouth once daily, Disp: 30 tablet, Rfl: 5    pantoprazole (PROTONIX) 40 MG tablet, TAKE 1 TABLET NIGHTLY, Disp: 90 tablet, Rfl: 3    amLODIPine (NORVASC) 5 MG tablet, Take 1 tablet by mouth nightly, Disp: 30 tablet, Rfl: 3    Timolol (TIMOPTIC) 0.5 % (DAILY) SOLN ophthalmic solution, Place 1 drop into both eyes daily, Disp: , Rfl:     escitalopram (LEXAPRO) 10 MG tablet, Take 1 tablet by mouth daily, Disp: 90 tablet, Rfl: 1    vitamin B-12 (CYANOCOBALAMIN) 1000 MCG tablet, Take 1,000 mcg by mouth daily OTC, Disp: , Rfl:     senna-docusate (PERICOLACE) 8.6-50 MG per tablet, Take 1 tablet by mouth 2 times daily , Disp: , Rfl:     sucralfate (CARAFATE) 1 GM tablet, Take 1 tablet by mouth 4 times daily (Patient taking differently: Take 1 g by mouth daily as needed 02/26/2020 Patient states she only takes this if needed), Disp: 120 tablet, Rfl: 5    Calcium-Magnesium-Zinc 167-83-8 MG TABS, Take 1 tablet by mouth daily OTC, Disp: , Rfl:     Cholecalciferol (VITAMIN D3) 2000 UNITS CAPS, Take 1 capsule by mouth nightly OTC, Disp: , Rfl:     glycopyrrolate-formoterol (BEVESPI AEROSPHERE) 9-4.8 MCG/ACT AERO, Inhale 2 puffs into the lungs 2 times daily (Patient not taking: Reported on 6/16/2020), Disp: 4 Inhaler, Rfl: 0    ipratropium-albuterol (DUONEB) 0.5-2.5 (3) MG/3ML SOLN nebulizer solution, Inhale 3 mLs into the lungs every 4 hours (while awake)

## 2020-06-22 ENCOUNTER — TELEPHONE (OUTPATIENT)
Dept: FAMILY MEDICINE CLINIC | Age: 85
End: 2020-06-22

## 2020-06-22 NOTE — TELEPHONE ENCOUNTER
CT of head showed no major brain abnormalities. No fluid or blood appreciated. Her symptoms could be post concussive. I recommend trying some brain rest for  Few days to see if that makes a difference. No reading, watching TV, or looking at screens for more than 15 minutes every few hours. Update me at the end of the week on her symptoms.

## 2020-06-29 RX ORDER — AMLODIPINE BESYLATE 5 MG/1
5 TABLET ORAL DAILY
Qty: 90 TABLET | Refills: 3 | Status: SHIPPED | OUTPATIENT
Start: 2020-06-29 | End: 2021-07-01 | Stop reason: SDUPTHER

## 2020-08-10 ENCOUNTER — OFFICE VISIT (OUTPATIENT)
Dept: FAMILY MEDICINE CLINIC | Age: 85
End: 2020-08-10
Payer: MEDICARE

## 2020-08-10 VITALS
WEIGHT: 131.2 LBS | DIASTOLIC BLOOD PRESSURE: 68 MMHG | HEART RATE: 50 BPM | SYSTOLIC BLOOD PRESSURE: 110 MMHG | TEMPERATURE: 97.2 F | BODY MASS INDEX: 24.79 KG/M2 | OXYGEN SATURATION: 98 %

## 2020-08-10 PROBLEM — E77.8 HYPOPROTEINEMIA (HCC): Status: ACTIVE | Noted: 2020-08-10

## 2020-08-10 PROCEDURE — 99214 OFFICE O/P EST MOD 30 MIN: CPT | Performed by: FAMILY MEDICINE

## 2020-08-10 RX ORDER — ALBUTEROL SULFATE 90 UG/1
2 AEROSOL, METERED RESPIRATORY (INHALATION) EVERY 6 HOURS PRN
Qty: 1 INHALER | Refills: 5 | Status: SHIPPED | OUTPATIENT
Start: 2020-08-10

## 2020-08-10 RX ORDER — IPRATROPIUM BROMIDE AND ALBUTEROL SULFATE 2.5; .5 MG/3ML; MG/3ML
3 SOLUTION RESPIRATORY (INHALATION)
Qty: 360 ML | Refills: 1 | Status: SHIPPED | OUTPATIENT
Start: 2020-08-10 | End: 2021-01-29 | Stop reason: SDUPTHER

## 2020-08-10 RX ORDER — METHOCARBAMOL 500 MG/1
500 TABLET, FILM COATED ORAL 4 TIMES DAILY PRN
Qty: 60 TABLET | Refills: 2 | Status: SHIPPED | OUTPATIENT
Start: 2020-08-10 | End: 2021-03-19

## 2020-08-10 RX ORDER — PANTOPRAZOLE SODIUM 40 MG/1
TABLET, DELAYED RELEASE ORAL
Qty: 90 TABLET | Refills: 3 | Status: CANCELLED | OUTPATIENT
Start: 2020-08-10

## 2020-08-10 RX ORDER — LEVOTHYROXINE SODIUM 0.07 MG/1
75 TABLET ORAL DAILY
Qty: 90 TABLET | Refills: 3 | Status: ON HOLD | OUTPATIENT
Start: 2020-08-10 | End: 2021-12-02 | Stop reason: HOSPADM

## 2020-08-10 RX ORDER — ESCITALOPRAM OXALATE 10 MG/1
10 TABLET ORAL DAILY
Qty: 90 TABLET | Refills: 1 | Status: SHIPPED | OUTPATIENT
Start: 2020-08-10 | End: 2021-01-29 | Stop reason: SDUPTHER

## 2020-08-10 RX ORDER — ASPIRIN 81 MG/1
TABLET ORAL
Qty: 30 TABLET | Refills: 5 | Status: CANCELLED | OUTPATIENT
Start: 2020-08-10

## 2020-08-10 RX ORDER — ONDANSETRON 4 MG/1
4 TABLET, FILM COATED ORAL EVERY 8 HOURS PRN
Qty: 30 TABLET | Refills: 0 | Status: SHIPPED | OUTPATIENT
Start: 2020-08-10 | End: 2020-09-21

## 2020-08-10 RX ORDER — AMLODIPINE BESYLATE 5 MG/1
5 TABLET ORAL DAILY
Qty: 90 TABLET | Refills: 3 | Status: CANCELLED | OUTPATIENT
Start: 2020-08-10

## 2020-08-10 ASSESSMENT — ENCOUNTER SYMPTOMS: SHORTNESS OF BREATH: 1

## 2020-08-10 NOTE — PROGRESS NOTES
Subjective:      Patient ID: Hung Hahn is a 80 y.o. female. Heide Azul is here to follow up on her chronic conditions. Hypothyroid  Has been on current dose of  levothyroxine (Synthroid) for several years. Pt reports symptoms have been stable. Reports residual symptoms to include none. Pt denies weight changes. History of high blood pressure? no  History of diabetes? no    Lab Results   Component Value Date    TSH 0.272 04/01/2020    TSH 0.130 02/27/2020    TSH 0.056 02/04/2020     Lab Results   Component Value Date    TSH 0.272 04/01/2020    G6QXWLS 0.73 09/30/2015       No components found for: CHLPL  Lab Results   Component Value Date    TRIG 116 06/13/2017    TRIG 77 02/05/2016     Lab Results   Component Value Date    HDL 97 06/13/2017    HDL 77 (A) 02/05/2016     Lab Results   Component Value Date    LDLCALC 62 02/05/2016     No results found for: LABVLDL  GERD  Chronic  Med: Pantoprazole, Carafate  Specialist: GI  Compliance: takes as prescribed. Side Effects: none. Efficacy: Using carafate a lot. Vit B12  Chronic  Med: Vit B12  Side effects: none. Compliance: takes as prescribed. Lab: 2/20 - 1624  Vit D Def  Chronic. Med: Vit D   Side effects: none. Compliance: takes as directed. Labs: 2/20 - 43  D&A  Chronic. Doing well. Med: Lexapro  Compliance: takes daily  Side effects: None. COPD  Chronic. Meds: albuterol HFA, Bevespi, Duoneb,   Compliance: take as prescribed. Side effects: None. S/P Valvular replacement  Seeing cardiology, doing well. Meds: amlodipine    Review of Systems   Respiratory: Positive for shortness of breath. Cardiovascular: Negative for chest pain.      Past Medical History:   Diagnosis Date    Arthritis     generalized    Asthma     Blood transfusion 2004    No reaction    Chronic bronchitis (HCC)     COPD [facility]chronic obstructive pulmonary disease) ([facility] [address]     summer 2014    Fatigue     H/O cardiac catheterization 06/15/2017    mild lad and cx HERNIA REPAIR  unknown    Inc hernia hernia repair    HIP FRACTURE SURGERY Left 2015    Left hip nail    HYSTERECTOMY  1966    Luke w/ BSO    JOINT REPLACEMENT  2004    right knee    LIPECTOMY  's    OTHER SURGICAL HISTORY  2012    Gastrojejunostomy/partial gastrectomy    SHOULDER ARTHROPLASTY Left 10/2017    TOE SURGERY  Early     hammer toes and bunions     Social History     Socioeconomic History    Marital status:      Spouse name: Not on file    Number of children: Not on file    Years of education: Not on file    Highest education level: Not on file   Occupational History    Not on file   Social Needs    Financial resource strain: Not on file    Food insecurity     Worry: Not on file     Inability: Not on file    Transportation needs     Medical: Not on file     Non-medical: Not on file   Tobacco Use    Smoking status: Former Smoker     Packs/day: 3.00     Years: 5.00     Pack years: 15.00     Last attempt to quit: 1962     Years since quittin.6    Smokeless tobacco: Never Used   Substance and Sexual Activity    Alcohol use: No    Drug use: No    Sexual activity: Not on file   Lifestyle    Physical activity     Days per week: Not on file     Minutes per session: Not on file    Stress: Not on file   Relationships    Social connections     Talks on phone: Not on file     Gets together: Not on file     Attends [employer] service: Not on file     Active member of club or organization: Not on file     Attends meetings of clubs or organizations: Not on file     Relationship status: Not on file    Intimate partner violence     Fear of current or ex partner: Not on file     Emotionally abused: Not on file     Physically abused: Not on file     Forced sexual activity: Not on file   Other Topics Concern    Not on file   Social History Narrative    Not on file     Family History   Problem Relation Age of Onset    Diabetes Mother    Lisa Rutherford Other Mother Continue current care. Labs due in spring. 2. Stable. Continue care with cardiology. 3. Continue vit B12 supplementation - controlled. 4. Controlled on current dose - continue. 5. Continue pantoprazole and prn carafate. Follow up with GI as planned. 6. Stable on lexapro, continue. 7. Stable. Continue current care. Follow up 6 months: Chronic conditions.        Current Outpatient Medications:     escitalopram (LEXAPRO) 10 MG tablet, Take 1 tablet by mouth daily, Disp: 90 tablet, Rfl: 1    glycopyrrolate-formoterol (BEVESPI AEROSPHERE) 9-4.8 MCG/ACT AERO, Inhale 2 puffs into the lungs 2 times daily, Disp: 4 Inhaler, Rfl: 0    ipratropium-albuterol (DUONEB) 0.5-2.5 (3) MG/3ML SOLN nebulizer solution, Inhale 3 mLs into the lungs every 4 hours (while awake), Disp: 360 mL, Rfl: 1    methocarbamol (ROBAXIN) 500 MG tablet, Take 1 tablet by mouth 4 times daily as needed (muscle spasm), Disp: 60 tablet, Rfl: 2    ondansetron (ZOFRAN) 4 MG tablet, Take 1 tablet by mouth every 8 hours as needed for Nausea or Vomiting, Disp: 30 tablet, Rfl: 0    albuterol sulfate  (90 Base) MCG/ACT inhaler, Inhale 2 puffs into the lungs every 6 hours as needed for Wheezing, Disp: 1 Inhaler, Rfl: 5    levothyroxine (SYNTHROID) 75 MCG tablet, Take 1 tablet by mouth daily, Disp: 90 tablet, Rfl: 3    amLODIPine (NORVASC) 5 MG tablet, Take 1 tablet by mouth daily, Disp: 90 tablet, Rfl: 3    EQ ASPIRIN ADULT LOW DOSE 81 MG EC tablet, Take 1 tablet by mouth once daily, Disp: 30 tablet, Rfl: 5    pantoprazole (PROTONIX) 40 MG tablet, TAKE 1 TABLET NIGHTLY, Disp: 90 tablet, Rfl: 3    Timolol (TIMOPTIC) 0.5 % (DAILY) SOLN ophthalmic solution, Place 1 drop into both eyes daily, Disp: , Rfl:     vitamin B-12 (CYANOCOBALAMIN) 1000 MCG tablet, Take 1,000 mcg by mouth daily OTC, Disp: , Rfl:     senna-docusate (PERICOLACE) 8.6-50 MG per tablet, Take 1 tablet by mouth 2 times daily , Disp: , Rfl:     sucralfate (CARAFATE) 1 GM tablet, Take 1 tablet by mouth 4 times daily (Patient taking differently: Take 1 g by mouth daily as needed 02/26/2020 Patient states she only takes this if needed), Disp: 120 tablet, Rfl: 5    Calcium-Magnesium-Zinc 167-83-8 MG TABS, Take 1 tablet by mouth daily OTC, Disp: , Rfl:     OXYGEN, Inhale 2 L into the lungs nightly as needed (uses as needed nightly) , Disp: , Rfl:     Cholecalciferol (VITAMIN D3) 2000 UNITS CAPS, Take 1 capsule by mouth nightly OTC, Disp: , Rfl:          Ezequiel Pepe MD

## 2020-09-21 ENCOUNTER — OFFICE VISIT (OUTPATIENT)
Dept: FAMILY MEDICINE CLINIC | Age: 85
End: 2020-09-21
Payer: MEDICARE

## 2020-09-21 VITALS
SYSTOLIC BLOOD PRESSURE: 160 MMHG | BODY MASS INDEX: 25.77 KG/M2 | DIASTOLIC BLOOD PRESSURE: 80 MMHG | TEMPERATURE: 97.1 F | HEART RATE: 57 BPM | OXYGEN SATURATION: 97 % | WEIGHT: 136.4 LBS

## 2020-09-21 LAB
BASOPHILS ABSOLUTE: 0 K/UL (ref 0–0.2)
BASOPHILS RELATIVE PERCENT: 0.7 %
EOSINOPHILS ABSOLUTE: 0.8 K/UL (ref 0–0.6)
EOSINOPHILS RELATIVE PERCENT: 11.7 %
HCT VFR BLD CALC: 26.8 % (ref 36–48)
HEMOGLOBIN: 9.1 G/DL (ref 12–16)
LYMPHOCYTES ABSOLUTE: 2.5 K/UL (ref 1–5.1)
LYMPHOCYTES RELATIVE PERCENT: 35.3 %
MCH RBC QN AUTO: 33.2 PG (ref 26–34)
MCHC RBC AUTO-ENTMCNC: 34 G/DL (ref 31–36)
MCV RBC AUTO: 97.5 FL (ref 80–100)
MONOCYTES ABSOLUTE: 0.6 K/UL (ref 0–1.3)
MONOCYTES RELATIVE PERCENT: 8.2 %
NEUTROPHILS ABSOLUTE: 3.1 K/UL (ref 1.7–7.7)
NEUTROPHILS RELATIVE PERCENT: 44.1 %
PDW BLD-RTO: 16 % (ref 12.4–15.4)
PLATELET # BLD: 446 K/UL (ref 135–450)
PMV BLD AUTO: 7.7 FL (ref 5–10.5)
RBC # BLD: 2.75 M/UL (ref 4–5.2)
WBC # BLD: 7 K/UL (ref 4–11)

## 2020-09-21 PROCEDURE — 1111F DSCHRG MED/CURRENT MED MERGE: CPT | Performed by: FAMILY MEDICINE

## 2020-09-21 PROCEDURE — 99214 OFFICE O/P EST MOD 30 MIN: CPT | Performed by: FAMILY MEDICINE

## 2020-09-21 RX ORDER — ALBUTEROL SULFATE 90 UG/1
2 AEROSOL, METERED RESPIRATORY (INHALATION) EVERY 6 HOURS PRN
Qty: 3 INHALER | Refills: 1 | Status: SHIPPED | OUTPATIENT
Start: 2020-09-21 | End: 2021-01-29 | Stop reason: SDUPTHER

## 2020-09-21 RX ORDER — MELOXICAM 15 MG/1
15 TABLET ORAL DAILY
COMMUNITY
Start: 2020-09-10 | End: 2020-10-13 | Stop reason: ALTCHOICE

## 2020-09-21 RX ORDER — ROPINIROLE 0.25 MG/1
0.25 TABLET, FILM COATED ORAL NIGHTLY
Qty: 90 TABLET | Refills: 1 | Status: SHIPPED | OUTPATIENT
Start: 2020-09-21 | End: 2020-11-02 | Stop reason: SDUPTHER

## 2020-09-21 NOTE — PROGRESS NOTES
Post-Discharge Transitional Care Management Services or Hospital Follow Up      Caitlin De Souza   YOB: 1935    Date of Office Visit:  9/21/2020  Date of Hospital Admission: 9/10/2020  Date of Hospital Discharge: 9/12/2020  Risk of hospital readmission (high >=14%. Medium >=10%) :Readmission Risk Score: 19      Care management risk score Rising risk (score 2-5) and Complex Care (Scores >=6): 9     Non face to face  following discharge, date last encounter closed (first attempt may have been earlier): *No documented post hospital discharge outreach found in the last 14 days    Call initiated 2 business days of discharge: *No response recorded in the last 14 days    Patient Active Problem List   Diagnosis    Closed intertrochanteric fracture of left femur ([facility] [address]    Gait disturbance    Anemia    Dizziness    Acquired hypothyroidism    Murmur    Age-related osteoporosis without current pathological fracture    Black tongue    COPD exacerbation ([facility] [address]    HTN (hypertension)    Vitamin D deficiency    Vitamin B12 deficiency    Gastroesophageal reflux disease    VHD (valvular heart disease)    Aortic stenosis, severe    Acute respiratory distress    COPD with exacerbation (HCC)    Nodule of lower lobe of right lung    S/P TAVR (transcatheter aortic valve replacement)    Hypertension, uncontrolled       Allergies   Allergen Reactions    Morphine Anaphylaxis     Dilaudid OK    Bactrim [Sulfamethoxazole-Trimethoprim]     Butorphanol      Other reaction(s): Other - comment required  \"out of body experience\"    Influenza Vaccines      Ended in hospital stay for 3 days told by md not to get it anymore     Lactose Intolerance (Gi) Nausea Only    Phenergan [Promethazine Hcl] Other (See Comments)     Makes me jerk all over. Zofran OK    Promethazine     Stadol [Butorphanol Tartrate] Other (See Comments)     Out of body experience.  Was told it was a near death experience and was placed in ICU.  Dilaudid OK    Tape Sandre Starr Tape] Other (See Comments)     Plastic cause itching and rash. Paper tape causes my skin to blister.  (Tega-derm and Coban OK to use.)       Medications listed as ordered at the time of discharge from hospital   Maya Moreno Medication Instructions EVAN:    Printed on:09/21/20 3010   Medication Information                      albuterol sulfate HFA (PROVENTIL HFA) 108 (90 Base) MCG/ACT inhaler  Inhale 2 puffs into the lungs every 6 hours as needed for Wheezing             albuterol sulfate  (90 Base) MCG/ACT inhaler  Inhale 2 puffs into the lungs every 6 hours as needed for Wheezing             amLODIPine (NORVASC) 5 MG tablet  Take 1 tablet by mouth daily             Calcium-Magnesium-Zinc 167-83-8 MG TABS  Take 1 tablet by mouth daily OTC             Cholecalciferol (VITAMIN D3) 2000 UNITS CAPS  Take 1 capsule by mouth nightly OTC             EQ ASPIRIN ADULT LOW DOSE 81 MG EC tablet  Take 1 tablet by mouth once daily             escitalopram (LEXAPRO) 10 MG tablet  Take 1 tablet by mouth daily             glycopyrrolate-formoterol (BEVESPI AEROSPHERE) 9-4.8 MCG/ACT AERO  Inhale 2 puffs into the lungs 2 times daily             ipratropium-albuterol (DUONEB) 0.5-2.5 (3) MG/3ML SOLN nebulizer solution  Inhale 3 mLs into the lungs every 4 hours (while awake)             levothyroxine (SYNTHROID) 75 MCG tablet  Take 1 tablet by mouth daily             meloxicam (MOBIC) 15 MG tablet  Take 15 mg by mouth daily             methocarbamol (ROBAXIN) 500 MG tablet  Take 1 tablet by mouth 4 times daily as needed (muscle spasm)             OXYGEN  Inhale 2 L into the lungs nightly as needed (uses as needed nightly)              pantoprazole (PROTONIX) 40 MG tablet  TAKE 1 TABLET NIGHTLY             rOPINIRole (REQUIP) 0.25 MG tablet  Take 1 tablet by mouth nightly             senna-docusate (PERICOLACE) 8.6-50 MG per tablet  Take 1 tablet by mouth 2 times daily sucralfate (CARAFATE) 1 GM tablet  Take 1 tablet by mouth 4 times daily             Timolol (TIMOPTIC) 0.5 % (DAILY) SOLN ophthalmic solution  Place 1 drop into both eyes daily             vitamin B-12 (CYANOCOBALAMIN) 1000 MCG tablet  Take 1,000 mcg by mouth daily OTC                   Medications marked \"taking\" at this time  Outpatient Medications Marked as Taking for the 9/21/20 encounter (Office Visit) with Kaz Banegas MD   Medication Sig Dispense Refill    meloxicam (MOBIC) 15 MG tablet Take 15 mg by mouth daily      rOPINIRole (REQUIP) 0.25 MG tablet Take 1 tablet by mouth nightly 90 tablet 1    albuterol sulfate HFA (PROVENTIL HFA) 108 (90 Base) MCG/ACT inhaler Inhale 2 puffs into the lungs every 6 hours as needed for Wheezing 3 Inhaler 1        Medications patient taking as of now reconciled against medications ordered at time of hospital discharge: Yes    Chief Complaint   Patient presents with    Follow-Up from Hospital       History of Present illness - Follow up of Hospital diagnosis(es): Left hip pain, left gluteus medius tear status post left gluteal repair with allograft, leukocytosis, anemia, postop urinary retention, hyponatremia, hypertension    Inpatient course: Discharge summary reviewed- see chart. Interval history/Current status: Improved. Patient states she is pain-free. Patient has not been taking her amlodipine per her surgeon to avoid hypotension. Denies any chest pain or shortness of breath. Patient states that she has having terrible restless leg syndrome. Patient was told she could not take Robaxin. A comprehensive review of systems was negative except for what was noted in the HPI. Vitals:    09/21/20 1458   BP: (!) 166/70   Site: Left Upper Arm   Position: Sitting   Cuff Size: Medium Adult   Pulse: 57   Temp: 97.1 °F (36.2 °C)   TempSrc: Infrared   SpO2: 97%   Weight: 136 lb 6.4 oz (61.9 kg)     Body mass index is 25.77 kg/m².    Wt Readings from Last 3 Encounters:   09/21/20 136 lb 6.4 oz (61.9 kg)   08/10/20 131 lb 3.2 oz (59.5 kg)   06/16/20 128 lb 12.8 oz (58.4 kg)     BP Readings from Last 3 Encounters:   09/21/20 (!) 166/70   08/10/20 110/68   06/16/20 136/84        Physical Exam:  General Appearance: alert and oriented to person, place and time, well developed and well- nourished, in no acute distress  Skin: warm and dry, no rash or erythema  Head: normocephalic and atraumatic  Eyes: pupils equal, round, and reactive to light, extraocular eye movements intact, conjunctivae normal  ENT: tympanic membrane, external ear and ear canal normal bilaterally, nose without deformity, nasal mucosa and turbinates normal without polyps  Neck: supple and non-tender without mass, no thyromegaly or thyroid nodules, no cervical lymphadenopathy  Pulmonary/Chest: clear to auscultation bilaterally- no wheezes, rales or rhonchi, normal air movement, no respiratory distress  Cardiovascular: normal rate, regular rhythm, normal S1 and S2, no murmurs, rubs, clicks, or gallops, distal pulses intact, no carotid bruits  Abdomen: soft, non-tender, non-distended, normal bowel sounds, no masses or organomegaly  Extremities: no cyanosis, clubbing or edema  Musculoskeletal: normal range of motion, no joint swelling, deformity or tenderness  Neurologic: reflexes normal and symmetric, no cranial nerve deficit, gait, coordination and speech normal    Assessment/Plan:  1. Tear of left gluteus medius tendon  Resolved    2. Left hip pain  Resolved    3. Essential hypertension  Needs improvement. Patient to restart amlodipine and call back in 2 weeks with blood pressure    4. Anemia, unspecified type    - CBC    5. RLS (restless legs syndrome)  Trial of Requip  - rOPINIRole (REQUIP) 0.25 MG tablet; Take 1 tablet by mouth nightly  Dispense: 90 tablet; Refill: 1    6.  Hospital discharge follow-up          Medical Decision Making: moderate complexity

## 2020-09-21 NOTE — PATIENT INSTRUCTIONS
A serving is 1 slice of bread, 1 ounce of dry cereal, or ½ cup of cooked rice, pasta, or cooked cereal. Try to choose whole-grain products as much as possible. · Limit lean meat, poultry, and fish to 2 servings each day. A serving is 3 ounces, about the size of a deck of cards. · Eat 4 to 5 servings of nuts, seeds, and legumes (cooked dried beans, lentils, and split peas) each week. A serving is 1/3 cup of nuts, 2 tablespoons of seeds, or ½ cup of cooked beans or peas. · Limit fats and oils to 2 to 3 servings each day. A serving is 1 teaspoon of vegetable oil or 2 tablespoons of salad dressing. · Limit sweets and added sugars to 5 servings or less a week. A serving is 1 tablespoon jelly or jam, ½ cup sorbet, or 1 cup of lemonade. · Eat less than 2,300 milligrams (mg) of sodium a day. If you limit your sodium to 1,500 mg a day, you can lower your blood pressure even more. Tips for success  · Start small. Do not try to make dramatic changes to your diet all at once. You might feel that you are missing out on your favorite foods and then be more likely to not follow the plan. Make small changes, and stick with them. Once those changes become habit, add a few more changes. · Try some of the following:  ? Make it a goal to eat a fruit or vegetable at every meal and at snacks. This will make it easy to get the recommended amount of fruits and vegetables each day. ? Try yogurt topped with fruit and nuts for a snack or healthy dessert. ? Add lettuce, tomato, cucumber, and onion to sandwiches. ? Combine a ready-made pizza crust with low-fat mozzarella cheese and lots of vegetable toppings. Try using tomatoes, squash, spinach, broccoli, carrots, cauliflower, and onions. ? Have a variety of cut-up vegetables with a low-fat dip as an appetizer instead of chips and dip. ? Sprinkle sunflower seeds or chopped almonds over salads. Or try adding chopped walnuts or almonds to cooked vegetables.   ? Try some vegetarian meals using beans and peas. Add garbanzo or kidney beans to salads. Make burritos and tacos with mashed sheth beans or black beans. Where can you learn more? Go to [email or web address]://[email or web address]. org and sign in to your DIY account. Enter H121 in the [employer] box to learn more about \"DASH Diet: Care Instructions. \"     If you do not have an account, please click on the \"Sign Up Now\" link. Current as of: December 16, 2019               Content Version: 12.5  © 4994-4063 [employer]. Care instructions adapted under license by [facility] ([facility]). If you have questions about a medical condition or this instruction, always ask your healthcare professional. [address] any warranty or liability for your use of this information.

## 2020-09-24 ENCOUNTER — TELEPHONE (OUTPATIENT)
Dept: FAMILY MEDICINE CLINIC | Age: 85
End: 2020-09-24

## 2020-09-24 NOTE — TELEPHONE ENCOUNTER
Patient would like to know if she can take 2 tablets of the Requip because she doesn't feel like the 1 is helping and she is still having some jerking.

## 2020-10-13 ENCOUNTER — HOSPITAL ENCOUNTER (OUTPATIENT)
Dept: NON INVASIVE DIAGNOSTICS | Age: 85
Discharge: HOME | End: 2020-10-13
Payer: MEDICARE

## 2020-10-13 ENCOUNTER — PROCEDURE VISIT (OUTPATIENT)
Dept: CARDIOLOGY | Age: 85
End: 2020-10-13

## 2020-10-13 ENCOUNTER — HOSPITAL ENCOUNTER (OUTPATIENT)
Dept: CARDIAC CATH/INVASIVE PROCEDURES | Age: 85
Discharge: HOME | End: 2020-10-13
Payer: MEDICARE

## 2020-10-13 LAB
ALBUMIN SERPL-MCNC: 3.9 GM/DL (ref 3.4–5)
ALP BLD-CCNC: 42 IU/L (ref 40–128)
ALT SERPL-CCNC: 10 U/L (ref 10–40)
ANION GAP SERPL CALCULATED.3IONS-SCNC: 8 MMOL/L (ref 4–16)
APTT: 29.1 SECONDS (ref 25.1–37.1)
AST SERPL-CCNC: 19 IU/L (ref 15–37)
BILIRUB SERPL-MCNC: 0.6 MG/DL (ref 0–1)
BUN BLDV-MCNC: 13 MG/DL (ref 6–23)
CALCIUM SERPL-MCNC: 8.6 MG/DL (ref 8.3–10.6)
CHLORIDE BLD-SCNC: 103 MMOL/L (ref 99–110)
CO2: 29 MMOL/L (ref 21–32)
CREAT SERPL-MCNC: 0.6 MG/DL (ref 0.6–1.1)
GFR AFRICAN AMERICAN: >60 ML/MIN/1.73M2
GFR NON-AFRICAN AMERICAN: >60 ML/MIN/1.73M2
GLUCOSE BLD-MCNC: 90 MG/DL (ref 70–99)
HCT VFR BLD CALC: 31.9 % (ref 37–47)
HEMOGLOBIN: 9.7 GM/DL (ref 12.5–16)
INR BLD: 0.9 INDEX
LV EF: 53 %
LVEF MODALITY: NORMAL
MCH RBC QN AUTO: 29.8 PG (ref 27–31)
MCHC RBC AUTO-ENTMCNC: 30.4 % (ref 32–36)
MCV RBC AUTO: 97.9 FL (ref 78–100)
PDW BLD-RTO: 14.5 % (ref 11.7–14.9)
PLATELET # BLD: 290 K/CU MM (ref 140–440)
PMV BLD AUTO: 9.4 FL (ref 7.5–11.1)
POTASSIUM SERPL-SCNC: 4.5 MMOL/L (ref 3.5–5.1)
PRO-BNP: 550.9 PG/ML
PROTHROMBIN TIME: 10.9 SECONDS (ref 11.7–14.5)
RBC # BLD: 3.26 M/CU MM (ref 4.2–5.4)
SODIUM BLD-SCNC: 140 MMOL/L (ref 135–145)
TOTAL PROTEIN: 5.5 GM/DL (ref 6.4–8.2)
WBC # BLD: 5 K/CU MM (ref 4–10.5)

## 2020-10-13 PROCEDURE — 93005 ELECTROCARDIOGRAM TRACING: CPT | Performed by: INTERNAL MEDICINE

## 2020-10-13 PROCEDURE — 36415 COLL VENOUS BLD VENIPUNCTURE: CPT

## 2020-10-13 PROCEDURE — 93306 TTE W/DOPPLER COMPLETE: CPT

## 2020-10-13 PROCEDURE — 80053 COMPREHEN METABOLIC PANEL: CPT

## 2020-10-13 PROCEDURE — 83880 ASSAY OF NATRIURETIC PEPTIDE: CPT

## 2020-10-13 PROCEDURE — 99214 OFFICE O/P EST MOD 30 MIN: CPT | Performed by: INTERNAL MEDICINE

## 2020-10-13 PROCEDURE — 85610 PROTHROMBIN TIME: CPT

## 2020-10-13 PROCEDURE — 85027 COMPLETE CBC AUTOMATED: CPT

## 2020-10-13 PROCEDURE — 93010 ELECTROCARDIOGRAM REPORT: CPT | Performed by: INTERNAL MEDICINE

## 2020-10-13 PROCEDURE — 85730 THROMBOPLASTIN TIME PARTIAL: CPT

## 2020-10-13 NOTE — PROGRESS NOTES
is mildly dilated, severe aortic stenosis, Mitral annular calcification is present, Mild AR, Mild-Mod MR, Mild TR, No pericardial effusion     H/O echocardiogram 09/23/2019    H/O exercise stress test 06/14/2017    abnormal    History of cardiac cath 08/28/2019    Severe AS,   TAVR??  History of nuclear stress test 03/14/2017    EF 75%. Normal study.     Holter monitor, abnormal 2/22/11    infrequent APC are seen    Normal cardiac stress test 7/23/10    EF 70%, no ischemia    On home O2     only uses as needed, nightly prn    Syncope and collapse     Tachycardia     HX of tachycardia - had a cardioablation    Thyroid disease      Past Surgical History:        Procedure Laterality Date    AORTIC VALVE REPLACEMENT N/A 09/11/2019    TAVR; [employer]    BREAST SURGERY  2009    bilat    CARDIAC CATHETERIZATION  3/02/11    mild to moderate disease of diagonal and proximal RCA   [address]    ablation    CARPAL TUNNEL RELEASE  1990's    bilat    CHOLECYSTECTOMY  1961    open choley    COLONOSCOPY      DILATATION, ESOPHAGUS      ENDOSCOPY, COLON, DIAGNOSTIC  07/03/2017    s/p gastric bypass otherwise normal    FINGER SURGERY      right middle, thumb and index finger (screw and pin in right index finger)    GASTRIC RESTRICTION SURGERY  1984    stapled    HERNIA REPAIR  unknown    Inc hernia hernia repair    HIP FRACTURE SURGERY Left 11/04/2015    Left hip nail    HYSTERECTOMY  1966    Luke w/ BSO    JOINT REPLACEMENT  2004    right knee    LIPECTOMY  1990's    OTHER SURGICAL HISTORY  5-1-2012    Gastrojejunostomy/partial gastrectomy    SHOULDER ARTHROPLASTY Left 10/2017    TOE SURGERY  Early 2000's    hammer toes and bunions     Current Medications:   Current Outpatient Medications on File Prior to Encounter   Medication Sig Dispense Refill    rOPINIRole (REQUIP) 0.25 MG tablet Take 1 tablet by mouth nightly 90 tablet 1    escitalopram (LEXAPRO) 10 MG tablet Take 1 tablet by mouth daily 90 tablet 1    levothyroxine (SYNTHROID) 75 MCG tablet Take 1 tablet by mouth daily 90 tablet 3    amLODIPine (NORVASC) 5 MG tablet Take 1 tablet by mouth daily 90 tablet 3    EQ ASPIRIN ADULT LOW DOSE 81 MG EC tablet Take 1 tablet by mouth once daily 30 tablet 5    pantoprazole (PROTONIX) 40 MG tablet TAKE 1 TABLET NIGHTLY 90 tablet 3    Timolol (TIMOPTIC) 0.5 % (DAILY) SOLN ophthalmic solution Place 1 drop into both eyes daily      vitamin B-12 (CYANOCOBALAMIN) 1000 MCG tablet Take 1,000 mcg by mouth daily OTC      senna-docusate (PERICOLACE) 8.6-50 MG per tablet Take 1 tablet by mouth 2 times daily       Calcium-Magnesium-Zinc 167-83-8 MG TABS Take 1 tablet by mouth daily OTC      OXYGEN Inhale 2 L into the lungs nightly as needed (uses as needed nightly)       Cholecalciferol (VITAMIN D3) 2000 UNITS CAPS Take 1 capsule by mouth nightly OTC      albuterol sulfate HFA (PROVENTIL HFA) 108 (90 Base) MCG/ACT inhaler Inhale 2 puffs into the lungs every 6 hours as needed for Wheezing 3 Inhaler 1    glycopyrrolate-formoterol (BEVESPI AEROSPHERE) 9-4.8 MCG/ACT AERO Inhale 2 puffs into the lungs 2 times daily 4 Inhaler 0    ipratropium-albuterol (DUONEB) 0.5-2.5 (3) MG/3ML SOLN nebulizer solution Inhale 3 mLs into the lungs every 4 hours (while awake) 360 mL 1    methocarbamol (ROBAXIN) 500 MG tablet Take 1 tablet by mouth 4 times daily as needed (muscle spasm) 60 tablet 2    albuterol sulfate  (90 Base) MCG/ACT inhaler Inhale 2 puffs into the lungs every 6 hours as needed for Wheezing 1 Inhaler 5     No current facility-administered medications on file prior to encounter. Allergies: Allergies   Allergen Reactions    Morphine Anaphylaxis     Dilaudid OK    Bactrim [Sulfamethoxazole-Trimethoprim]     Butorphanol      Other reaction(s):  Other - comment required  \"out of body experience\"    Influenza Vaccines      Ended in hospital stay for 3 days told by md not to get it anymore     Lactose Intolerance (Gi) Nausea Only    Phenergan [Promethazine Hcl] Other (See Comments)     Makes me jerk all over. Zofran OK    Promethazine     Stadol [Butorphanol Tartrate] Other (See Comments)     Out of body experience. Was told it was a near death experience and was placed in ICU. Dilaudid OK    Tape Katie [place]leaf Tape] Other (See Comments)     Plastic cause itching and rash. Paper tape causes my skin to blister.  (Tega-derm and Coban OK to use.)         Social History:   Social History     Socioeconomic History    Marital status:      Spouse name: Not on file    Number of children: Not on file    Years of education: Not on file    Highest education level: Not on file   Occupational History    Not on file   Social Needs    Financial resource strain: Not on file    Food insecurity     Worry: Not on file     Inability: Not on file   [employer] needs     Medical: Not on file     Non-medical: Not on file   Tobacco Use    Smoking status: Former Smoker     Packs/day: 3.00     Years: 5.00     Pack years: 15.00     Last attempt to quit: 1962     Years since quittin.8    Smokeless tobacco: Never Used   Substance and Sexual Activity    Alcohol use: No    Drug use: No    Sexual activity: Not on file   Lifestyle    Physical activity     Days per week: Not on file     Minutes per session: Not on file    Stress: Not on file   Relationships    Social connections     Talks on phone: Not on file     Gets together: Not on file     Attends [employer] service: Not on file     Active member of club or organization: Not on file     Attends meetings of clubs or organizations: Not on file     Relationship status: Not on file    Intimate partner violence     Fear of current or ex partner: Not on file     Emotionally abused: Not on file     Physically abused: Not on file     Forced sexual activity: Not on file   Other Topics Concern    Not on file   Social History Narrative    Not on file       Family History:  History reviewed. No pertinent family history. REVIEW OF SYSTEMS:   Complete ROS performed and negative except as noted in HPI. PHYSICAL EXAM:  Physical Exam  VITALS:  55 152/58 98%  24HR INTAKE/OUTPUT:  No intake or output data in the 24 hours ending 10/13/20 0951    General appearance: No apparent distress, appears stated age and cooperative. Skin: unremarkable, warm and dry  HEENT Normocephalic, atraumatic without obvious deformity. Conjunctiva normal, EOMI, hearing intact  Neck: Supple, Trachea midline   Lungs: Good respiratory effort. Clear to auscultation, bilaterally  Heart: Regular rate/ rhythm, -murmur   Abdomen: Soft, non-tender or non-distended   Extremities: noedema warm well perfused, No groin site hematoma is noticed  Neurologic: Alert, grossly intact, no sensory deficit  Psych/Mental status: normal affect        DATA:  BNP--551  Crt 0.6    IMPRESSION  Patient Active Problem List   Diagnosis    Closed intertrochanteric fracture of left femur (HCC)    Gait disturbance    Anemia    Dizziness    Acquired hypothyroidism    Murmur    Age-related osteoporosis without current pathological fracture    Black tongue    COPD exacerbation (HCC)    HTN (hypertension)    Vitamin D deficiency    Vitamin B12 deficiency    Gastroesophageal reflux disease    VHD (valvular heart disease)    Aortic stenosis, severe    Acute respiratory distress    COPD with exacerbation (HCC)    Nodule of lower lobe of right lung    S/P TAVR (transcatheter aortic valve replacement)    Hypertension, uncontrolled             RECOMMENDATIONS:    Doing well S/P TAVR. Continue ASA. Ok to be off Plavix. Will review Echo. Continue follow up with cardiology. F/U PRN. Thank you for the opportunity to assist in the care of this patient.

## 2020-10-13 NOTE — PROGRESS NOTES
CARDIOLOGY TAVR CLINIC  NOTE    Chief Complaint: Status post TAVR    HPI:   Osman Keenan is a 80y.o. year old who has Past medical history as noted below. She is doing very well denies any shortness of breath she feels a little more alarmingly but better including noted improved and a good night to function. Unfortunately she did have a fall leading to left hip injury from which she is still recuperating but walking with a walker. Denies any ankle swelling no shortness of breath.   Denies any admissions for congestive heart failure over the she is in good spirits      Current Outpatient Medications   Medication Sig Dispense Refill    rOPINIRole (REQUIP) 0.25 MG tablet Take 1 tablet by mouth nightly 90 tablet 1    escitalopram (LEXAPRO) 10 MG tablet Take 1 tablet by mouth daily 90 tablet 1    levothyroxine (SYNTHROID) 75 MCG tablet Take 1 tablet by mouth daily 90 tablet 3    amLODIPine (NORVASC) 5 MG tablet Take 1 tablet by mouth daily 90 tablet 3    EQ ASPIRIN ADULT LOW DOSE 81 MG EC tablet Take 1 tablet by mouth once daily 30 tablet 5    pantoprazole (PROTONIX) 40 MG tablet TAKE 1 TABLET NIGHTLY 90 tablet 3    Timolol (TIMOPTIC) 0.5 % (DAILY) SOLN ophthalmic solution Place 1 drop into both eyes daily      vitamin B-12 (CYANOCOBALAMIN) 1000 MCG tablet Take 1,000 mcg by mouth daily OTC      senna-docusate (PERICOLACE) 8.6-50 MG per tablet Take 1 tablet by mouth 2 times daily       Calcium-Magnesium-Zinc 167-83-8 MG TABS Take 1 tablet by mouth daily OTC      OXYGEN Inhale 2 L into the lungs nightly as needed (uses as needed nightly)       Cholecalciferol (VITAMIN D3) 2000 UNITS CAPS Take 1 capsule by mouth nightly OTC      albuterol sulfate HFA (PROVENTIL HFA) 108 (90 Base) MCG/ACT inhaler Inhale 2 puffs into the lungs every 6 hours as needed for Wheezing 3 Inhaler 1    glycopyrrolate-formoterol (BEVESPI AEROSPHERE) 9-4.8 MCG/ACT AERO Inhale 2 puffs into the lungs 2 times daily 4 Inhaler 0    ipratropium-albuterol (DUONEB) 0.5-2.5 (3) MG/3ML SOLN nebulizer solution Inhale 3 mLs into the lungs every 4 hours (while awake) 360 mL 1    methocarbamol (ROBAXIN) 500 MG tablet Take 1 tablet by mouth 4 times daily as needed (muscle spasm) 60 tablet 2    albuterol sulfate  (90 Base) MCG/ACT inhaler Inhale 2 puffs into the lungs every 6 hours as needed for Wheezing 1 Inhaler 5     No current facility-administered medications for this encounter. Allergies:   Morphine; Bactrim [sulfamethoxazole-trimethoprim]; Butorphanol; Influenza vaccines; Lactose intolerance (gi); Phenergan [promethazine hcl];  Promethazine; Stadol [butorphanol tartrate]; and Tape [adhesive tape]    Patient History:  Past Medical History:   Diagnosis Date    Arthritis     generalized    Asthma     Blood transfusion 2004    No reaction    Chronic bronchitis (HCC)     COPD (chronic obstructive pulmonary disease) ([facility] [address]     summer 2014    Fatigue     H/O cardiac catheterization 06/15/2017    mild lad and cx disease    H/O cardiovascular stress test 08/22/2019    H/O Doppler ultrasound 4/7/2016 3/19/12    carotid-4/16 WNL 3/12right mild less than 50%and left wnl    H/O Doppler ultrasound 6/14/017    carotid - mod disease EILEEN, mild disease LICA    H/O echocardiogram 7/23/10    H/O echocardiogram 4/15/2016    EF 60% sclerotic AV mildly stenosed-recommend yearly echo    H/O echocardiogram 06/13/2017    EF>55% mild-mod AS, triavial AR    H/O echocardiogram 09/14/2018    EF55-60% mod AS- mean pressure gradient increased from 13-31    H/O echocardiogram 08/22/2019    EF 55-60%, Minimal concentric left ventricular hypertrophy, left atrium is mildly dilated, severe aortic stenosis, Mitral annular calcification is present, Mild AR, Mild-Mod MR, Mild TR, No pericardial effusion     H/O echocardiogram 09/23/2019    H/O exercise stress test 06/14/2017    abnormal    History of cardiac cath 08/28/2019 Severe AS,   TAVR??  History of nuclear stress test 03/14/2017    EF 75%. Normal study.  Holter monitor, abnormal 2/22/11    infrequent APC are seen    Normal cardiac stress test 7/23/10    EF 70%, no ischemia    On home O2     only uses as needed, nightly prn    Syncope and collapse     Tachycardia     HX of tachycardia - had a cardioablation    Thyroid disease      Past Surgical History:   Procedure Laterality Date    AORTIC VALVE REPLACEMENT N/A 09/11/2019    TAVR; [employer]    BREAST SURGERY  2009    bilat    CARDIAC CATHETERIZATION  3/02/11    mild to moderate disease of diagonal and proximal RCA    CARDIAC SURGERY  1989    ablation    CARPAL TUNNEL RELEASE  1990's    bilat    CHOLECYSTECTOMY  1961    open choley    COLONOSCOPY      DILATATION, ESOPHAGUS      ENDOSCOPY, COLON, DIAGNOSTIC  07/03/2017    s/p gastric bypass otherwise normal    FINGER SURGERY      right middle, thumb and index finger (screw and pin in right index finger)    GASTRIC RESTRICTION SURGERY  1984    stapled    HERNIA REPAIR  unknown    Inc hernia hernia repair    HIP FRACTURE SURGERY Left 11/04/2015    Left hip nail    HYSTERECTOMY  1966    Luke w/ BSO    JOINT REPLACEMENT  2004    right knee    LIPECTOMY  1's    OTHER SURGICAL HISTORY  5-1-2012    Gastrojejunostomy/partial gastrectomy    SHOULDER ARTHROPLASTY Left 10/2017    TOE SURGERY  Early 2000's    hammer toes and bunions     Family History   Problem Relation Age of Onset    Diabetes Mother     Other Mother         thyroid    Heart Disease Father     Arthritis Father     High Blood Pressure Father     High Cholesterol Father     Other Father         glaucoma    Other Sister         thyroid, glaucoma    Diabetes Brother     Other Sister     Vision Loss Sister         lung problems, smoker    Cancer Sister         throat cancer    Other Son         HX of clots    Early Death Son         Hit by car and killed.     High Blood Pressure Daughter     Stroke Son         No residual    Other Son         HX myocarditis     Social History     Tobacco Use    Smoking status: Former Smoker     Packs/day: 3.00     Years: 5.00     Pack years: 15.00     Last attempt to quit: 1962     Years since quittin.8    Smokeless tobacco: Never Used   Substance Use Topics    Alcohol use: No        Review of Systems:   · Constitutional: No Fever or Weight Loss   · Eyes: No Decreased Vision  · ENT: No Headaches, Hearing Loss or Vertigo  · Cardiovascular: as per note above   · Respiratory: No cough or wheezing and as per note above. · Gastrointestinal: No abdominal pain, appetite loss, blood in stools, constipation, diarrhea or heartburn  · Genitourinary: No dysuria, trouble voiding, or hematuria  · Musculoskeletal:  denies any new  joint aches , swelling  or pain   · Integumentary: No rash or pruritis  · Neurological: No TIA or stroke symptoms  · Psychiatric: No anxiety or depression  · Endocrine: No malaise, fatigue or temperature intolerance  · Hematologic/Lymphatic: No bleeding problems, blood clots or swollen lymph nodes  · Allergic/Immunologic: No nasal congestion or hives    Objective:      Physical Exam:  There were no vitals taken for this visit. Wt Readings from Last 3 Encounters:   20 136 lb 6.4 oz (61.9 kg)   08/10/20 131 lb 3.2 oz (59.5 kg)   20 128 lb 12.8 oz (58.4 kg)     There is no height or weight on file to calculate BMI. There were no vitals filed for this visit. General Appearance:  No distress, conversant  Constitutional:  Well developed, Well nourished, No acute distress, Non-toxic appearance. HENT:  Normocephalic, Atraumatic, Bilateral external ears normal, Oropharynx moist, No oral exudates, Nose normal. Neck- Normal range of motion, No tenderness, Supple, No stridor,no apical-carotid delay  Eyes:  PERRL, EOMI, Conjunctiva normal, No discharge.    Respiratory:  Normal breath sounds, No respiratory distress, No wheezing, No chest tenderness. ,no use of accessory muscles, NO crackles  Cardiovascular: (PMI) apex non displaced,no lifts no thrills,S1 and S2 audible, systolic 2 / 6 murmur, No signs of ankle edema, or volume overload, No evidence of JVD, No crackles  GI:  Bowel sounds normal, Soft, No tenderness, No masses, No gross visceromegaly   :  No costovertebral angle tenderness   Musculoskeletal:  No edema, no tenderness, no deformities.  Back- no tenderness  Integument:  Well hydrated, no rash   Lymphatic:  No lymphadenopathy noted   Neurologic:  Alert & oriented x 3, CN 2-12 normal, normal motor function, normal sensory function, no focal deficits noted   Psychiatric:  Speech and behavior appropriate       Medical decision making and Data review:  DATA:  Lab Results   Component Value Date    TROPONINT <0.010 09/06/2019     BNP:    Lab Results   Component Value Date    PROBNP 550.9 (H) 10/13/2020     PT/INR:  No results found for: PTINR  Lab Results   Component Value Date    LABA1C 5.3 08/28/2019     Lab Results   Component Value Date    CHOL 197 06/13/2017    TRIG 116 06/13/2017    HDL 97 06/13/2017    LDLCALC 62 02/05/2016    LDLDIRECT 91 06/13/2017     Lab Results   Component Value Date    ALT 10 10/13/2020    AST 19 10/13/2020     Recent Labs     10/13/20  0832   WBC 5.0   HGB 9.7*   HCT 31.9*   MCV 97.9        TSH:   Lab Results   Component Value Date    TSH 0.272 04/01/2020     Lab Results   Component Value Date    AST 19 10/13/2020    ALT 10 10/13/2020    BILITOT 0.6 10/13/2020    ALKPHOS 42 10/13/2020     Lab Results   Component Value Date    PROBNP 550.9 (H) 10/13/2020    PROBNP 1,327 (H) 10/17/2019    PROBNP 854.2 (H) 09/26/2019     Lab Results   Component Value Date    LABA1C 5.3 08/28/2019     Lab Results   Component Value Date    WBC 5.0 10/13/2020    HGB 9.7 (L) 10/13/2020    HCT 31.9 (L) 10/13/2020     10/13/2020     All labs, medications and tests reviewed by myself including data and history from outside source , patient and available family . Assessment & Plan:      No diagnosis found. No problem-specific Assessment & Plan notes found for this encounter. Severe aortic stenosis status post TAVR  Echo today shows no paravalvular leak she is doing very well  Continue aspirin 81 mg daily  Blood pressures well controlled she is not on diuretics  Follow-up with primary cardiologist      Counseled extensively and medication compliance urged. Various goals were discussed and questions answered. Continue current medications. Appropriate prescriptions are addressed and refills ordered. Questions answered and patient verbalizes understanding. Call for any problems, questions, or concerns. Continue all other medications of all above medical condition listed as is. No follow-ups on file. Please note this report has been partially produced using speech recognition software and may contain errors related to that system including errors in grammar, punctuation, and spelling, as well as words and phrases that may be inappropriate. If there are any questions or concerns please feel free to contact the dictating provider for clarification.

## 2020-10-14 LAB
EKG ATRIAL RATE: 48 BPM
EKG DIAGNOSIS: NORMAL
EKG P AXIS: 92 DEGREES
EKG P-R INTERVAL: 190 MS
EKG Q-T INTERVAL: 478 MS
EKG QRS DURATION: 72 MS
EKG QTC CALCULATION (BAZETT): 427 MS
EKG R AXIS: 60 DEGREES
EKG T AXIS: 66 DEGREES
EKG VENTRICULAR RATE: 48 BPM

## 2020-11-02 ENCOUNTER — TELEPHONE (OUTPATIENT)
Dept: FAMILY MEDICINE CLINIC | Age: 85
End: 2020-11-02

## 2020-11-02 RX ORDER — ROPINIROLE 0.5 MG/1
0.5 TABLET, FILM COATED ORAL NIGHTLY
Qty: 90 TABLET | Refills: 1 | Status: SHIPPED | OUTPATIENT
Start: 2020-11-02 | End: 2021-03-19 | Stop reason: SDUPTHER

## 2020-11-03 PROBLEM — I10 HTN (HYPERTENSION): Status: RESOLVED | Noted: 2019-01-22 | Resolved: 2020-11-03

## 2020-11-20 ENCOUNTER — VIRTUAL VISIT (OUTPATIENT)
Dept: FAMILY MEDICINE CLINIC | Age: 85
End: 2020-11-20
Payer: MEDICARE

## 2020-11-20 ENCOUNTER — TELEPHONE (OUTPATIENT)
Dept: FAMILY MEDICINE CLINIC | Age: 85
End: 2020-11-20

## 2020-11-20 PROCEDURE — G8510 SCR DEP NEG, NO PLAN REQD: HCPCS | Performed by: FAMILY MEDICINE

## 2020-11-20 PROCEDURE — 99214 OFFICE O/P EST MOD 30 MIN: CPT | Performed by: FAMILY MEDICINE

## 2020-11-20 PROCEDURE — 3288F FALL RISK ASSESSMENT DOCD: CPT | Performed by: FAMILY MEDICINE

## 2020-11-20 RX ORDER — DOXYCYCLINE HYCLATE 100 MG
100 TABLET ORAL 2 TIMES DAILY
Qty: 20 TABLET | Refills: 0 | Status: SHIPPED | OUTPATIENT
Start: 2020-11-20 | End: 2020-11-30

## 2020-11-20 ASSESSMENT — PATIENT HEALTH QUESTIONNAIRE - PHQ9
SUM OF ALL RESPONSES TO PHQ9 QUESTIONS 1 & 2: 0
SUM OF ALL RESPONSES TO PHQ QUESTIONS 1-9: 0
SUM OF ALL RESPONSES TO PHQ QUESTIONS 1-9: 0
2. FEELING DOWN, DEPRESSED OR HOPELESS: 0
1. LITTLE INTEREST OR PLEASURE IN DOING THINGS: 0
SUM OF ALL RESPONSES TO PHQ QUESTIONS 1-9: 0

## 2020-11-20 ASSESSMENT — ENCOUNTER SYMPTOMS
ALLERGIC/IMMUNOLOGIC NEGATIVE: 1
SHORTNESS OF BREATH: 1
COUGH: 1
GASTROINTESTINAL NEGATIVE: 1
EYES NEGATIVE: 1

## 2020-11-20 NOTE — TELEPHONE ENCOUNTER
I do not feel that prescribed medication such as steroids without an office visit is appropriate. She needs to be seen to be evaluated to see what the appropriate treatment is. Has she had hip surgery since 9/10/2020?

## 2020-11-20 NOTE — PROGRESS NOTES
daily  Jesus Diggs MD   ipratropium-albuterol (DUONEB) 0.5-2.5 (3) MG/3ML SOLN nebulizer solution Inhale 3 mLs into the lungs every 4 hours (while awake)  Jesus Diggs MD   methocarbamol (ROBAXIN) 500 MG tablet Take 1 tablet by mouth 4 times daily as needed (muscle spasm)  Jesus Diggs MD   albuterol sulfate  (90 Base) MCG/ACT inhaler Inhale 2 puffs into the lungs every 6 hours as needed for Wheezing  Jesus Diggs MD   levothyroxine (SYNTHROID) 75 MCG tablet Take 1 tablet by mouth daily  Jesus Diggs MD   amLODIPine (NORVASC) 5 MG tablet Take 1 tablet by mouth daily  Minna Grimes MD   EQ ASPIRIN ADULT LOW DOSE 81 MG EC tablet Take 1 tablet by mouth once daily  DERIK Howe - CNP   pantoprazole (PROTONIX) 40 MG tablet TAKE 1 TABLET NIGHTLY  Jesus Diggs MD   Timolol (TIMOPTIC) 0.5 % (DAILY) SOLN ophthalmic solution Place 1 drop into both eyes daily  Historical Provider, MD   vitamin B-12 (CYANOCOBALAMIN) 1000 MCG tablet Take 1,000 mcg by mouth daily OTC  Historical Provider, MD   senna-docusate (Maritza Goldmann) 8.6-50 MG per tablet Take 1 tablet by mouth 2 times daily   Historical Provider, MD   Calcium-Magnesium-Zinc 155-75-0 MG TABS Take 1 tablet by mouth daily OTC  Historical Provider, MD   OXYGEN Inhale 2 L into the lungs nightly as needed (uses as needed nightly)   Historical Provider, MD   Cholecalciferol (VITAMIN D3) 2000 UNITS CAPS Take 1 capsule by mouth nightly OTC  Historical Provider, MD       Social History     Tobacco Use    Smoking status: Former Smoker     Packs/day: 3.00     Years: 5.00     Pack years: 15.00     Last attempt to quit: 1962     Years since quittin.9    Smokeless tobacco: Never Used   Substance Use Topics    Alcohol use: No    Drug use: No        Allergies   Allergen Reactions    Morphine Anaphylaxis     Dilaudid OK    Bactrim [Sulfamethoxazole-Trimethoprim]     Butorphanol      Other reaction(s):  Other - comment required  \"out of body experience\"    Influenza Vaccines      Ended in hospital stay for 3 days told by md not to get it anymore     Lactose Intolerance (Gi) Nausea Only    Phenergan [Promethazine Hcl] Other (See Comments)     Makes me jerk all over. Zofran OK    Promethazine     Stadol [Butorphanol Tartrate] Other (See Comments)     Out of body experience. Was told it was a near death experience and was placed in ICU. Dilaudid OK    Tape Lisa Colder Tape] Other (See Comments)     Plastic cause itching and rash. Paper tape causes my skin to blister. (Tega-derm and Coban OK to use.)   ,   Past Medical History:   Diagnosis Date    Arthritis     generalized    Asthma     Blood transfusion 2004    No reaction    Chronic bronchitis (HCC)     COPD (chronic obstructive pulmonary disease) ([facility] [address]     summer 2014    Fatigue     H/O cardiac catheterization 06/15/2017    mild lad and cx disease    H/O cardiovascular stress test 08/22/2019    H/O Doppler ultrasound 4/7/2016 3/19/12    carotid-4/16 WNL 3/12right mild less than 50%and left wnl    H/O Doppler ultrasound 6/14/017    carotid - mod disease EILEEN, mild disease LICA    H/O echocardiogram 7/23/10    H/O echocardiogram 4/15/2016    EF 60% sclerotic AV mildly stenosed-recommend yearly echo    H/O echocardiogram 06/13/2017    EF>55% mild-mod AS, triavial AR    H/O echocardiogram 09/14/2018    EF55-60% mod AS- mean pressure gradient increased from 13-31    H/O echocardiogram 08/22/2019    EF 55-60%, Minimal concentric left ventricular hypertrophy, left atrium is mildly dilated, severe aortic stenosis, Mitral annular calcification is present, Mild AR, Mild-Mod MR, Mild TR, No pericardial effusion     H/O echocardiogram 09/23/2019    H/O exercise stress test 06/14/2017    abnormal    History of cardiac cath 08/28/2019    Severe AS,   TAVR??  History of nuclear stress test 03/14/2017    EF 75%. Normal study.     Holter monitor, abnormal 2/22/11    infrequent APC are seen    Normal cardiac stress test 7/23/10    EF 70%, no ischemia    On home O2     only uses as needed, nightly prn    Syncope and collapse     Tachycardia     HX of tachycardia - had a cardioablation    Thyroid disease    ,   Past Surgical History:   Procedure Laterality Date    AORTIC VALVE REPLACEMENT N/A 2019    TAVR; [employer]    BREAST SURGERY      bilat    CARDIAC CATHETERIZATION  3/02/11    mild to moderate disease of diagonal and proximal RCA   [address]    ablation    CARPAL TUNNEL RELEASE  's    bilat    CHOLECYSTECTOMY      open choley    COLONOSCOPY      DILATATION, ESOPHAGUS      ENDOSCOPY, COLON, DIAGNOSTIC  2017    s/p gastric bypass otherwise normal    FINGER SURGERY      right middle, thumb and index finger (screw and pin in right index finger)    GASTRIC RESTRICTION SURGERY      stapled    HERNIA REPAIR  unknown    Inc hernia hernia repair    HIP FRACTURE SURGERY Left 2015    Left hip nail    HYSTERECTOMY  1966    Luke w/ BSO    JOINT REPLACEMENT  2004    right knee    LIPECTOMY  's    OTHER SURGICAL HISTORY  2012    Gastrojejunostomy/partial gastrectomy    SHOULDER ARTHROPLASTY Left 10/2017    TOE SURGERY  Early 's    hammer toes and bunions   ,   Social History     Tobacco Use    Smoking status: Former Smoker     Packs/day: 3.00     Years: 5.00     Pack years: 15.00     Last attempt to quit: 1962     Years since quittin.9    Smokeless tobacco: Never Used   Substance Use Topics    Alcohol use: No    Drug use: No       PHYSICAL EXAMINATION:  [ INSTRUCTIONS:  \"[x]\" Indicates a positive item  \"[]\" Indicates a negative item  -- DELETE ALL ITEMS NOT EXAMINED]  Vital Signs: (As obtained by patient/caregiver or practitioner observation)    Not availalble    Constitutional: [x] Appears well-developed and well-nourished [x] No apparent distress      [] Abnormal-   Mental status  [x] Alert and awake [x] Oriented to person/place/time [x]Able to follow commands      Eyes:  EOM    [x]  Normal  [] Abnormal-  Sclera  [x]  Normal  [] Abnormal -         Discharge [x]  None visible  [] Abnormal -    HENT:   [x] Normocephalic, atraumatic. [] Abnormal   [x] Mouth/Throat: Mucous membranes are moist.     External Ears [x] Normal  [] Abnormal-     Neck: [x] No visualized mass     Pulmonary/Chest: [x] Respiratory effort normal.  [x] No visualized signs of difficulty breathing or respiratory distress        [] Abnormal-      Musculoskeletal:            [x] Normal range of motion of neck        [] Abnormal-       Neurological:        [x] No Facial Asymmetry (Cranial nerve 7 motor function) (limited exam to video visit)          [x] No gaze palsy        [] Abnormal-         Skin:        [x] No significant exanthematous lesions or discoloration noted on facial skin         [] Abnormal-            Psychiatric:       [x] Normal Affect        [] Abnormal-       ASSESSMENT/PLAN:  1. COPD exacerbation (HCC)  Continue all normal meds  Use albuterol 4 times a day as long as she is on antibiotics  - doxycycline hyclate (VIBRA-TABS) 100 MG tablet; Take 1 tablet by mouth 2 times daily for 10 days  Dispense: 20 tablet; Refill: 0    2. Chronic left hip pain  Follow-up with orthopedic      Return if symptoms worsen or fail to improve, for COPD. Robert Quinonez is a 80 y.o. female being evaluated by a Virtual Visit (video visit) encounter to address concerns as mentioned above. A caregiver was present when appropriate. Due to this being a TeleHealth encounter (During SUEKX-09 public health emergency), evaluation of the following organ systems was limited: Vitals/Constitutional/EENT/Resp/CV/GI//MS/Neuro/Skin/Heme-Lymph-Imm.   Pursuant to the emergency declaration under the [address] [place] [address], [address] [place] [address] and the [employer] and [employer] Act, this Virtual Visit was conducted with patient's (and/or legal guardian's) consent, to reduce the patient's risk of exposure to COVID-19 and provide necessary medical care. The patient (and/or legal guardian) has also been advised to contact this office for worsening conditions or problems, and seek emergency medical treatment and/or call 911 if deemed necessary. Patient identification was verified at the start of the visit: Yes    Total time spent on this encounter: Not billed by time    Services were provided through a video synchronous discussion virtually to substitute for in-person clinic visit. Patient and provider were located at their individual homes. --Vargas Hernandez MD on 11/20/2020 at 2:40 PM    An electronic signature was used to authenticate this note. \

## 2020-11-20 NOTE — TELEPHONE ENCOUNTER
Patient states Juanjose Chakraborty thank you. \" Asked again if patient would like to be evaluated at the [facility] or schedule a virtual. Patient states \"No I will not schedule for a virtual because I do not know how to do it. \" Advised patient to call back if she changes her mind and would like to schedule or would like information for the [facility]. Patient hung up.

## 2020-11-20 NOTE — PATIENT INSTRUCTIONS
Patient Education        Chronic Obstructive Pulmonary Disease (COPD) Flare-Ups: Care Instructions  Your Care Instructions     Chronic obstructive pulmonary disease (COPD) is a lung disease that makes it hard to breathe. It is caused by damage to the lungs over many years, usually from smoking. COPD is often a mix of two diseases:  · Chronic bronchitis: The airways that carry air to the lungs (bronchial tubes) get inflamed and make a lot of mucus. This can narrow or block the airways. · Emphysema: In a healthy person, the tiny air sacs in the lungs are like balloons. As you breathe in and out, they get bigger and smaller to move air through your lungs. But with emphysema, these air sacs are damaged and lose their stretch. Less air gets in and out of the lungs. Many people with COPD have attacks called flare-ups or exacerbations. This is when your usual symptoms quickly get worse and stay worse. The doctor has checked you carefully. But problems can develop later. If you notice any problems or new symptoms, get medical treatment right away. Follow-up care is a key part of your treatment and safety. Be sure to make and go to all appointments, and call your doctor if you are having problems. It's also a good idea to know your test results and keep a list of the medicines you take. How can you care for yourself at home? · Be safe with medicines. Take your medicines exactly as prescribed. Call your doctor if you think you are having a problem with your medicine. You may be taking medicines such as:  ? Bronchodilators. These help open your airways and make breathing easier. ? Corticosteroids. These reduce airway inflammation. They may be given as pills, in a vein, or in an inhaled form. You may go home with pills in addition to an inhaler that you already use. · A spacer may help you get more inhaled medicine to your lungs. Ask your doctor or pharmacist if a spacer is right for you.  If it is, ask how to use it properly. · If your doctor prescribed antibiotics, take them as directed. Do not stop taking them just because you feel better. You need to take the full course of antibiotics. · If your doctor prescribed oxygen, use the flow rate your doctor has recommended. Do not change it without talking to your doctor first.  · Do not smoke. Smoking makes COPD worse. If you need help quitting, talk to your doctor about stop-smoking programs and medicines. These can increase your chances of quitting for good. When should you call for help? Call 911 anytime you think you may need emergency care. For example, call if:    · You have severe trouble breathing. Call your doctor now or seek immediate medical care if:    · You have new or worse trouble breathing.     · Your coughing or wheezing gets worse.     · You cough up dark brown or bloody mucus (sputum).     · You have a new or higher fever. Watch closely for changes in your health, and be sure to contact your doctor if:    · You notice more mucus or a change in the color of your mucus.     · You need to use your antibiotic or steroid pills.     · You do not get better as expected. Where can you learn more? Go to https://GlobalMotionpeDry Lube.[employer]. org and sign in to your LifePics account. Enter F426 in the Idle Gaming box to learn more about \"Chronic Obstructive Pulmonary Disease (COPD) Flare-Ups: Care Instructions. \"     If you do not have an account, please click on the \"Sign Up Now\" link. Current as of: February 24, 2020               Content Version: 12.6  © 2006-2020 [employer]. Care instructions adapted under license by [facility] ([facility]). If you have questions about a medical condition or this instruction, always ask your healthcare professional. [place] [address] any warranty or liability for your use of this information.

## 2020-12-02 ENCOUNTER — TELEPHONE (OUTPATIENT)
Dept: FAMILY MEDICINE CLINIC | Age: 85
End: 2020-12-02

## 2020-12-02 NOTE — TELEPHONE ENCOUNTER
Patient called state she finished up her antibiotic yesterday but she still has a cough. Cough is productive yellow in color she has also had a headache today. Patient wants to know what should she do.   Please advise

## 2020-12-03 NOTE — TELEPHONE ENCOUNTER
I recommend that she continue to hydrate for now. It can still take a while for the symptoms resolve even after the antibiotics have completed. If her symptoms begin to worsen, she should call back.

## 2020-12-03 NOTE — TELEPHONE ENCOUNTER
[facility] calling to report patients heart rate is out of parameters, running around 52-56.      They can be reached at [phone] with any concerns

## 2020-12-08 NOTE — TELEPHONE ENCOUNTER
Continue using inhalers as needed. I do not think antibiotics are appropriate since she just had antibiotics less than 2 weeks ago. Handicap placard prescription written.

## 2020-12-18 ENCOUNTER — TELEPHONE (OUTPATIENT)
Dept: FAMILY MEDICINE CLINIC | Age: 85
End: 2020-12-18

## 2020-12-18 NOTE — TELEPHONE ENCOUNTER
Patient states she requested a handicap placard to be mailed to her over two weeks ago and she has still not received it. Would like to know if you can re-write this script so she can pick it up.

## 2020-12-18 NOTE — TELEPHONE ENCOUNTER
The prescription was written on 12/8/2020. I am not sure where it is at this point. If it can be found, please mail it.   If it was mailed, please inform patient

## 2020-12-29 NOTE — TELEPHONE ENCOUNTER
Patient left message stating she has not received her order for the handicap placard. She would like to know if a new one can be printed and she will pick it up.

## 2021-01-07 ENCOUNTER — TELEPHONE (OUTPATIENT)
Dept: FAMILY MEDICINE CLINIC | Age: 86
End: 2021-01-07

## 2021-01-07 NOTE — TELEPHONE ENCOUNTER
She does not know she does not think it is enough. State she has a lot of drainage and still coughing up stuff and she has been using her oxygen every night.

## 2021-01-07 NOTE — TELEPHONE ENCOUNTER
Patient states for the past 3 days she has been dizzy. Describes that the room is not spinning but when she moves around she is disoriented. She went to bend over to pick something up yesterday and fell backwards into her chair. Would like to know what you recommend she do.

## 2021-01-07 NOTE — TELEPHONE ENCOUNTER
She should increase her water intake to at least 60 ounces daily. That should help with her dizziness.

## 2021-01-14 ENCOUNTER — TELEPHONE (OUTPATIENT)
Dept: FAMILY MEDICINE CLINIC | Age: 86
End: 2021-01-14

## 2021-01-14 NOTE — TELEPHONE ENCOUNTER
Patient state she was administered the influenza vaccine about 12 years ago and had a bad reaction that landed her in the hospital for 3 days. States at that time she was advised to not get anymore vaccinations like that. Patient would like to know if you suggest that she gets the covid vaccine.

## 2021-01-29 ENCOUNTER — OFFICE VISIT (OUTPATIENT)
Dept: FAMILY MEDICINE CLINIC | Age: 86
End: 2021-01-29
Payer: MEDICARE

## 2021-01-29 VITALS
OXYGEN SATURATION: 98 % | WEIGHT: 130.6 LBS | BODY MASS INDEX: 24.68 KG/M2 | SYSTOLIC BLOOD PRESSURE: 138 MMHG | HEART RATE: 60 BPM | DIASTOLIC BLOOD PRESSURE: 60 MMHG | TEMPERATURE: 96.8 F

## 2021-01-29 DIAGNOSIS — J44.9 CHRONIC OBSTRUCTIVE PULMONARY DISEASE, UNSPECIFIED COPD TYPE (HCC): ICD-10-CM

## 2021-01-29 DIAGNOSIS — F32.0 CURRENT MILD EPISODE OF MAJOR DEPRESSIVE DISORDER WITHOUT PRIOR EPISODE (HCC): ICD-10-CM

## 2021-01-29 DIAGNOSIS — K21.9 GASTROESOPHAGEAL REFLUX DISEASE WITHOUT ESOPHAGITIS: ICD-10-CM

## 2021-01-29 DIAGNOSIS — D64.9 ANEMIA, UNSPECIFIED TYPE: ICD-10-CM

## 2021-01-29 DIAGNOSIS — J44.1 COPD EXACERBATION (HCC): Primary | ICD-10-CM

## 2021-01-29 PROCEDURE — 94640 AIRWAY INHALATION TREATMENT: CPT | Performed by: FAMILY MEDICINE

## 2021-01-29 PROCEDURE — 99215 OFFICE O/P EST HI 40 MIN: CPT | Performed by: FAMILY MEDICINE

## 2021-01-29 PROCEDURE — 36415 COLL VENOUS BLD VENIPUNCTURE: CPT | Performed by: FAMILY MEDICINE

## 2021-01-29 RX ORDER — IPRATROPIUM BROMIDE AND ALBUTEROL SULFATE 2.5; .5 MG/3ML; MG/3ML
3 SOLUTION RESPIRATORY (INHALATION)
Qty: 360 ML | Refills: 1 | Status: SHIPPED | OUTPATIENT
Start: 2021-01-29 | End: 2021-08-06 | Stop reason: SDUPTHER

## 2021-01-29 RX ORDER — PANTOPRAZOLE SODIUM 40 MG/1
TABLET, DELAYED RELEASE ORAL
Qty: 90 TABLET | Refills: 1 | Status: SHIPPED | OUTPATIENT
Start: 2021-01-29 | End: 2021-08-06 | Stop reason: SDUPTHER

## 2021-01-29 RX ORDER — PREDNISONE 20 MG/1
40 TABLET ORAL DAILY
Qty: 10 TABLET | Refills: 0 | Status: SHIPPED | OUTPATIENT
Start: 2021-01-29 | End: 2021-03-19

## 2021-01-29 RX ORDER — ALBUTEROL SULFATE 90 UG/1
2 AEROSOL, METERED RESPIRATORY (INHALATION) EVERY 6 HOURS PRN
Qty: 3 INHALER | Refills: 1 | Status: SHIPPED | OUTPATIENT
Start: 2021-01-29 | End: 2021-08-06 | Stop reason: SDUPTHER

## 2021-01-29 RX ORDER — ESCITALOPRAM OXALATE 20 MG/1
20 TABLET ORAL DAILY
Qty: 90 TABLET | Refills: 1 | Status: SHIPPED | OUTPATIENT
Start: 2021-01-29 | End: 2021-08-06 | Stop reason: SDUPTHER

## 2021-01-29 NOTE — PATIENT INSTRUCTIONS
Patient Education        Chronic Obstructive Pulmonary Disease (COPD) Flare-Ups: Care Instructions  Your Care Instructions     Chronic obstructive pulmonary disease (COPD) is a lung disease that makes it hard to breathe. It is caused by damage to the lungs over many years, usually from smoking. COPD is often a mix of two diseases:  · Chronic bronchitis: The airways that carry air to the lungs (bronchial tubes) get inflamed and make a lot of mucus. This can narrow or block the airways. · Emphysema: In a healthy person, the tiny air sacs in the lungs are like balloons. As you breathe in and out, they get bigger and smaller to move air through your lungs. But with emphysema, these air sacs are damaged and lose their stretch. Less air gets in and out of the lungs. Many people with COPD have attacks called flare-ups or exacerbations. This is when your usual symptoms quickly get worse and stay worse. The doctor has checked you carefully. But problems can develop later. If you notice any problems or new symptoms, get medical treatment right away. Follow-up care is a key part of your treatment and safety. Be sure to make and go to all appointments, and call your doctor if you are having problems. It's also a good idea to know your test results and keep a list of the medicines you take. How can you care for yourself at home? · Be safe with medicines. Take your medicines exactly as prescribed. Call your doctor if you think you are having a problem with your medicine. You may be taking medicines such as:  ? Bronchodilators. These help open your airways and make breathing easier. ? Corticosteroids. These reduce airway inflammation. They may be given as pills, in a vein, or in an inhaled form. You may go home with pills in addition to an inhaler that you already use. · A spacer may help you get more inhaled medicine to your lungs. Ask your doctor or pharmacist if a spacer is right for you.  If it is, ask how to use it properly. · If your doctor prescribed antibiotics, take them as directed. Do not stop taking them just because you feel better. You need to take the full course of antibiotics. · If your doctor prescribed oxygen, use the flow rate your doctor has recommended. Do not change it without talking to your doctor first.  · Do not smoke. Smoking makes COPD worse. If you need help quitting, talk to your doctor about stop-smoking programs and medicines. These can increase your chances of quitting for good. When should you call for help? Call 911 anytime you think you may need emergency care. For example, call if:    · You have severe trouble breathing. Call your doctor now or seek immediate medical care if:    · You have new or worse trouble breathing.     · Your coughing or wheezing gets worse.     · You cough up dark brown or bloody mucus (sputum).     · You have a new or higher fever. Watch closely for changes in your health, and be sure to contact your doctor if:    · You notice more mucus or a change in the color of your mucus.     · You need to use your antibiotic or steroid pills.     · You do not get better as expected. Where can you learn more? Go to https://WiseryoupeXcedex.[employer]. org and sign in to your PeerMe account. Enter N471 in the PurpleBricks box to learn more about \"Chronic Obstructive Pulmonary Disease (COPD) Flare-Ups: Care Instructions. \"     If you do not have an account, please click on the \"Sign Up Now\" link. Current as of: February 24, 2020               Content Version: 12.6  © 2006-2020 [employer]. Care instructions adapted under license by [facility] ([facility]). If you have questions about a medical condition or this instruction, always ask your healthcare professional. [place] [address] any warranty or liability for your use of this information.

## 2021-01-29 NOTE — PROGRESS NOTES
COPD: Patient complains of dyspnea. Symptoms began several years ago. Symptoms chronic dyspnea does worsen with exertion. Sputum is clear  in small amounts. Fever has been absent. Patient uses 2 pillows at night. Patient can walk several hundred feet before resting. Patient currently is on oxygen at 2 L/min per nasal cannula. only at night. Respiratory history: COPD. \" I've been getting more short of breath over the past 2-3 weeks. \"   I have been feeling tired all the time. History of normocytic anemia. Last H/H 9.7/31.9 on 10/13/20. Labs have never drawn since that time. Depression: Patient complains of depression. She complains of depressed mood and fatigue. Onset was approximately several years ago, gradually worsening since that time. She denies current suicidal and homicidal plan or intent. Family history significant for no psychiatric illness. Possible organic causes contributing are: none. Risk factors: negative life event Coronavirus pandemic,  with dementia Previous treatment includes Lexapro 10 mg daily . She complains of the following side effects from the treatment: none. O:   Vitals:    01/29/21 0924   BP: 138/60   Pulse:    Temp:    SpO2:      No acute distress. Alert and Oriented x 3  HEENT: Atraumatic. Normocephalic. PERRLA, EOMI, Conjunctiva clear  NECK: without thyromegaly, lymphadenopathy, JVD  LUNGS: End strain expiratory wheezing throughout. CARDIOVASCULAR:  Regular rate and rhythm, no murmurs, rubs, or gallops  EXTREMITY: Full range of motion. No clubbing/cyanosis/edema  NEURO: Cranial nerves II-XII grossly intact. Strength 5/5, DTR 2/4. SKIN: Warm, Dry, No rash. PSYCH: Mood and Affect normal.    A:    Diagnosis Orders   1. COPD exacerbation (HCC)  predniSONE (DELTASONE) 20 MG tablet    76746 - [place] Pressurized/nonpressurized inhalation treatment   2.  Current mild episode of major depressive disorder without prior episode ([address]   Needs improvement escitalopram (LEXAPRO) 20 MG tablet   3. Anemia, unspecified type  CBC   4. Chronic obstructive pulmonary disease, unspecified COPD type ([facility] [address]  glycopyrrolate-formoterol (BEVESPI AEROSPHERE) 9-4.8 MCG/ACT AERO   Usually well controlled albuterol sulfate HFA (PROVENTIL HFA) 108 (90 Base) MCG/ACT inhaler    ipratropium-albuterol (DUONEB) 0.5-2.5 (3) MG/3ML SOLN nebulizer solution   5. Gastroesophageal reflux disease without esophagitis  pantoprazole (PROTONIX) 40 MG tablet   Controlled    P: Lungs improved with nebulizer treatment. We will treat with 5 days of prednisone. If symptoms worsen, patient will call back. We will increase Lexapro to 20 mg daily. Labs ordered.   Follow-up 6 months, sooner if needed    Total time spent on this visit including writing note was 45 minutes

## 2021-01-30 LAB
HCT VFR BLD CALC: 32.2 % (ref 35–46)
HEMOGLOBIN: 10.2 G/DL (ref 12–15.6)
MCH RBC QN AUTO: 27 PG (ref 27–33)
MCHC RBC AUTO-ENTMCNC: 31.8 G/DL (ref 32–36)
MCV RBC AUTO: 85.1 FL (ref 80–100)
PDW BLD-RTO: 18.7 % (ref 9–15)
PLATELET # BLD: 335 K/MM3 (ref 130–400)
RBC # BLD: 3.78 M/MM3 (ref 3.9–5.2)
WBC: 5.2 K/MM3 (ref 3.8–10.8)

## 2021-02-02 DIAGNOSIS — J44.9 CHRONIC OBSTRUCTIVE PULMONARY DISEASE, UNSPECIFIED COPD TYPE (HCC): Primary | ICD-10-CM

## 2021-02-02 RX ORDER — UMECLIDINIUM BROMIDE AND VILANTEROL TRIFENATATE 62.5; 25 UG/1; UG/1
1 POWDER RESPIRATORY (INHALATION) DAILY
Qty: 60 EACH | Refills: 5 | Status: SHIPPED | OUTPATIENT
Start: 2021-02-02 | End: 2021-03-19

## 2021-02-15 ENCOUNTER — OFFICE VISIT (OUTPATIENT)
Dept: FAMILY MEDICINE CLINIC | Age: 86
End: 2021-02-15
Payer: MEDICARE

## 2021-02-15 VITALS
BODY MASS INDEX: 25.09 KG/M2 | TEMPERATURE: 96.6 F | WEIGHT: 132.8 LBS | DIASTOLIC BLOOD PRESSURE: 66 MMHG | OXYGEN SATURATION: 97 % | SYSTOLIC BLOOD PRESSURE: 138 MMHG | HEART RATE: 57 BPM

## 2021-02-15 DIAGNOSIS — M25.559 HIP PAIN: ICD-10-CM

## 2021-02-15 DIAGNOSIS — R42 LIGHTHEADEDNESS: Primary | ICD-10-CM

## 2021-02-15 DIAGNOSIS — R42 ORTHOSTATIC DIZZINESS: ICD-10-CM

## 2021-02-15 DIAGNOSIS — Z91.81 AT HIGH RISK FOR FALLS: ICD-10-CM

## 2021-02-15 PROCEDURE — 99214 OFFICE O/P EST MOD 30 MIN: CPT | Performed by: FAMILY MEDICINE

## 2021-02-15 PROCEDURE — 3288F FALL RISK ASSESSMENT DOCD: CPT | Performed by: FAMILY MEDICINE

## 2021-02-15 NOTE — PROGRESS NOTES
Patient complains of several falls recently. Patient states whenever she bends forward like when she is putting something on the floor or picking something up, she has lost her balance, felt lightheaded/dizzy and fallen. Most recently was 1 week ago where she hit her face on the floor. Denies any loss of consciousness. Patient states she also has dizziness just walking around. Denies any vision changes. Any movement causes symptoms. She states she drinks about 24 ounces of water a day. Patient with continued left hip pain. Patient had surgery in her left hip to repair a gluteal tear several months ago. She still has some pain. O:   Vitals:    02/15/21 0916   BP: 138/66   Pulse:    Temp:    SpO2:      No acute distress. Alert and Oriented x 3  HEENT: Atraumatic. Normocephalic. PERRLA, EOMI, no nystagmus, conjunctiva clear  Oropharynx clear, mucosa moist.  Nasal mucosa normal.  TMs pearly gray. NECK: without thyromegaly, lymphadenopathy, JVD  LUNGS:Clear to ascultation bilaterally. Breathing comfortably  CARDIOVASCULAR:  Regular rate and rhythm, no murmurs, rubs, or gallops  EXTREMITY: Full range of motion. No clubbing/cyanosis/edema  NEURO: Cranial nerves II-XII grossly intact. Strength 5/5, DTR 2/4. Normal gait. Normal finger-to-nose, rapid alternating movements, negative Romberg. SKIN: Warm, Dry, No rash. A:    Diagnosis Orders   1. Lightheadedness     2. Orthostatic dizziness     3. At high risk for falls     4. Hip pain       P: Patient increase water intake to include at least 60 ounces daily of noncaffeinated nonalcoholic beverages. Exercises given for vertigo. Continue all medications as prescribed. A total of 31 minutes spent on this visit. On the basis of positive falls risk screening, assessment and plan is as follows: home safety tips provided.

## 2021-02-15 NOTE — PATIENT INSTRUCTIONS
Patient Education        Dizziness: Care Instructions  Your Care Instructions  Dizziness is the feeling of unsteadiness or fuzziness in your head. It is different than having vertigo, which is a feeling that the room is spinning or that you are moving or falling. It is also different from lightheadedness, which is the feeling that you are about to faint. It can be hard to know what causes dizziness. Some people feel dizzy when they have migraine headaches. Sometimes bouts of flu can make you feel dizzy. Some medical conditions, such as heart problems or high blood pressure, can make you feel dizzy. Many medicines can cause dizziness, including medicines for high blood pressure, pain, or anxiety. If a medicine causes your symptoms, your doctor may recommend that you stop or change the medicine. If it is a problem with your heart, you may need medicine to help your heart work better. If there is no clear reason for your symptoms, your doctor may suggest watching and waiting for a while to see if the dizziness goes away on its own. Follow-up care is a key part of your treatment and safety. Be sure to make and go to all appointments, and call your doctor if you are having problems. It's also a good idea to know your test results and keep a list of the medicines you take. How can you care for yourself at home? · If your doctor recommends or prescribes medicine, take it exactly as directed. Call your doctor if you think you are having a problem with your medicine. · Do not drive while you feel dizzy. · Try to prevent falls. Steps you can take include:  ? Using nonskid mats, adding grab bars near the tub, and using night-lights. ? Clearing your home so that walkways are free of anything you might trip on.  ? Letting family and friends know that you have been feeling dizzy. This will help them know how to help you. When should you call for help? Call 911 anytime you think you may need emergency care.  For example, call if:    · You passed out (lost consciousness).     · You have dizziness along with symptoms of a heart attack. These may include:  ? Chest pain or pressure, or a strange feeling in the chest.  ? Sweating. ? Shortness of breath. ? Nausea or vomiting. ? Pain, pressure, or a strange feeling in the back, neck, jaw, or upper belly or in one or both shoulders or arms. ? Lightheadedness or sudden weakness. ? A fast or irregular heartbeat.     · You have symptoms of a stroke. These may include:  ? Sudden numbness, tingling, weakness, or loss of movement in your face, arm, or leg, especially on only one side of your body. ? Sudden vision changes. ? Sudden trouble speaking. ? Sudden confusion or trouble understanding simple statements. ? Sudden problems with walking or balance. ? A sudden, severe headache that is different from past headaches. Call your doctor now or seek immediate medical care if:    · You feel dizzy and have a fever, headache, or ringing in your ears.     · You have new or increased nausea and vomiting.     · Your dizziness does not go away or comes back. Watch closely for changes in your health, and be sure to contact your doctor if:    · You do not get better as expected. Where can you learn more? Go to [email or web address] and sign in to your [email or web address] account. Enter K297 in the [facility] box to learn more about \"Dizziness: Care Instructions. \"     If you do not have an account, please click on the \"Sign Up Now\" link. Current as of: June 26, 2019               Content Version: 12.6  © 6905-9424 [employer]. Care instructions adapted under license by [facility] ([facility]). If you have questions about a medical condition or this instruction, always ask your healthcare professional. [place] [address] any warranty or liability for your use of this information.

## 2021-02-22 ENCOUNTER — TELEPHONE (OUTPATIENT)
Dept: FAMILY MEDICINE CLINIC | Age: 86
End: 2021-02-22

## 2021-02-22 DIAGNOSIS — J44.9 CHRONIC OBSTRUCTIVE PULMONARY DISEASE, UNSPECIFIED COPD TYPE (HCC): Primary | ICD-10-CM

## 2021-02-22 NOTE — TELEPHONE ENCOUNTER
Patient called state she can not use the Anoro inhaler. State the powder just chokes her. Is there another inhaler that can be called in.   Please advise

## 2021-03-19 ENCOUNTER — OFFICE VISIT (OUTPATIENT)
Dept: FAMILY MEDICINE CLINIC | Age: 86
End: 2021-03-19
Payer: MEDICARE

## 2021-03-19 VITALS
OXYGEN SATURATION: 97 % | BODY MASS INDEX: 24.4 KG/M2 | WEIGHT: 132.6 LBS | DIASTOLIC BLOOD PRESSURE: 60 MMHG | HEART RATE: 68 BPM | SYSTOLIC BLOOD PRESSURE: 138 MMHG | HEIGHT: 62 IN | TEMPERATURE: 98.1 F

## 2021-03-19 DIAGNOSIS — Z00.00 ROUTINE GENERAL MEDICAL EXAMINATION AT A HEALTH CARE FACILITY: ICD-10-CM

## 2021-03-19 DIAGNOSIS — R11.0 NAUSEA: ICD-10-CM

## 2021-03-19 DIAGNOSIS — G25.81 RLS (RESTLESS LEGS SYNDROME): ICD-10-CM

## 2021-03-19 DIAGNOSIS — J44.9 CHRONIC OBSTRUCTIVE PULMONARY DISEASE, UNSPECIFIED COPD TYPE (HCC): Primary | ICD-10-CM

## 2021-03-19 PROCEDURE — G0439 PPPS, SUBSEQ VISIT: HCPCS | Performed by: FAMILY MEDICINE

## 2021-03-19 PROCEDURE — 99214 OFFICE O/P EST MOD 30 MIN: CPT | Performed by: FAMILY MEDICINE

## 2021-03-19 RX ORDER — TIMOLOL MALEATE 5 MG/ML
SOLUTION/ DROPS OPHTHALMIC
Status: ON HOLD | COMMUNITY
Start: 2021-03-06 | End: 2021-11-26 | Stop reason: SDUPTHER

## 2021-03-19 RX ORDER — ROPINIROLE 1 MG/1
1 TABLET, FILM COATED ORAL NIGHTLY
Qty: 90 TABLET | Refills: 1 | Status: SHIPPED | OUTPATIENT
Start: 2021-03-19 | End: 2021-05-18 | Stop reason: SDUPTHER

## 2021-03-19 RX ORDER — ONDANSETRON 4 MG/1
4 TABLET, FILM COATED ORAL EVERY 8 HOURS PRN
Qty: 30 TABLET | Refills: 1 | Status: SHIPPED | OUTPATIENT
Start: 2021-03-19 | End: 2021-08-06 | Stop reason: SDUPTHER

## 2021-03-19 ASSESSMENT — PATIENT HEALTH QUESTIONNAIRE - PHQ9
SUM OF ALL RESPONSES TO PHQ QUESTIONS 1-9: 0
2. FEELING DOWN, DEPRESSED OR HOPELESS: 0
SUM OF ALL RESPONSES TO PHQ QUESTIONS 1-9: 0
SUM OF ALL RESPONSES TO PHQ9 QUESTIONS 1 & 2: 0

## 2021-03-19 ASSESSMENT — VISUAL ACUITY
OD_CC: 20/40
OS_CC: 20/40

## 2021-03-19 ASSESSMENT — LIFESTYLE VARIABLES: HOW OFTEN DO YOU HAVE A DRINK CONTAINING ALCOHOL: 0

## 2021-03-19 NOTE — PROGRESS NOTES
Patient with COPD. Patient is currently using Stiolto Respimat. She states she does not think it is sprain like it did when she first started it. Does not taste the medication anymore. She states that since starting the medication though her breathing is much better. She would like to try a different medication, Breztri. Patient with restless leg syndrome. She states on the Requip 0.5 mg at night she still has some symptoms. She would like something stronger. Patient states she has nausea 3-4 nights per week. She is used Zofran in the past with good control. She would like a refill. O:   Vitals:    03/19/21 1150   BP: 138/60   Pulse:    Temp:    SpO2:      Lungs clear to auscultation bilaterally. Breathing comfortably. Abdomen soft, nontender, nondistended, normal active bowel sounds    A: COPDwell-controlled  Restless leg syndrome needs improvement  Nocturnal nausea    P: Prescription sent for Breztri  Increase Requip to 1 mg nightly  Prescription for Zofran 4 mg as needed sent to the pharmacy. Follow-up as needed.

## 2021-03-19 NOTE — PROGRESS NOTES
Medicare Annual Wellness Visit  Name: Odella Kayser Date: 3/19/2021   MRN: [id number] Sex: Female   Age: 80 y.o. Ethnicity: Non-/Non    : 1935 Race: Rina Shearer is here for Medicare AWV    Screenings for behavioral, psychosocial and functional/safety risks, and cognitive dysfunction are all negative except as indicated below. These results, as well as other patient data from the [address] form, are documented in Flowsheets linked to this Encounter. Allergies   Allergen Reactions    Morphine Anaphylaxis     Dilaudid OK    Bactrim [Sulfamethoxazole-Trimethoprim]     Butorphanol      Other reaction(s): Other - comment required  \"out of body experience\"    Influenza Vaccines      Ended in hospital stay for 3 days told by md not to get it anymore     Lactose Intolerance (Gi) Nausea Only    Phenergan [Promethazine Hcl] Other (See Comments)     Makes me jerk all over. Zofran OK    Promethazine     Stadol [Butorphanol Tartrate] Other (See Comments)     Out of body experience. Was told it was a near death experience and was placed in ICU. Dilaudid OK    Tape Viona Chasten Tape] Other (See Comments)     Plastic cause itching and rash. Paper tape causes my skin to blister. (Tega-derm and Coban OK to use.)       Prior to Visit Medications    Medication Sig Taking?  Authorizing Provider   Budeson-Glycopyrrol-Formoterol 160-9-4.8 MCG/ACT AERO Inhale 2 puffs into the lungs 2 times daily Yes Alexis Hatch MD   Tiotropium Bromide-Olodaterol 2.5-2.5 MCG/ACT AERS Inhale 2 puffs into the lungs daily  Alexis Hatch MD   predniSONE (DELTASONE) 20 MG tablet Take 2 tablets by mouth daily  Alexis Hatch MD   escitalopram (LEXAPRO) 20 MG tablet Take 1 tablet by mouth daily  Alexis Hatch MD   albuterol sulfate HFA (PROVENTIL HFA) 108 (90 Base) MCG/ACT inhaler Inhale 2 puffs into the lungs every 6 hours as needed for Wheezing  Alexis Hatch MD   ipratropium-albuterol (DUONEB) 0.5-2.5 (3) MG/3ML SOLN nebulizer solution Inhale 3 mLs into the lungs every 4 hours (while awake)  Marielena Nino MD   pantoprazole (PROTONIX) 40 MG tablet TAKE 1 TABLET NIGHTLY  Marielena Nino MD   Handicap Placard MISC by Does not apply route Please provide handicap placard for 5 years.     Diagnosis: Respiratory condition  Marielena Nino MD   rOPINIRole (REQUIP) 0.5 MG tablet Take 1 tablet by mouth nightly  Marielena Nino MD   methocarbamol (ROBAXIN) 500 MG tablet Take 1 tablet by mouth 4 times daily as needed (muscle spasm)  Kobe Hagen MD   albuterol sulfate  (90 Base) MCG/ACT inhaler Inhale 2 puffs into the lungs every 6 hours as needed for Wheezing  Kobe Hagen MD   levothyroxine (SYNTHROID) 75 MCG tablet Take 1 tablet by mouth daily  Kobe Hagen MD   amLODIPine (NORVASC) 5 MG tablet Take 1 tablet by mouth daily  Alli Siu MD   EQ ASPIRIN ADULT LOW DOSE 81 MG EC tablet Take 1 tablet by mouth once daily  Milena Mtz, APRN - CNP   Timolol (TIMOPTIC) 0.5 % (DAILY) SOLN ophthalmic solution Place 1 drop into both eyes daily  Historical Provider, MD   vitamin B-12 (CYANOCOBALAMIN) 1000 MCG tablet Take 1,000 mcg by mouth daily OTC  Historical Provider, MD   senna-docusate (Jeraldene Records) 8.6-50 MG per tablet Take 1 tablet by mouth 2 times daily   Historical Provider, MD   Calcium-Magnesium-Zinc 191-83-8 MG TABS Take 1 tablet by mouth daily OTC  Historical Provider, MD   OXYGEN Inhale 2 L into the lungs nightly as needed (uses as needed nightly)   Historical Provider, MD   Cholecalciferol (VITAMIN D3) 2000 UNITS CAPS Take 1 capsule by mouth nightly OTC  Historical Provider, MD       Past Medical History:   Diagnosis Date    Arthritis     generalized    Asthma     Blood transfusion     No reaction    [facility]     COPD (chronic obstructive pulmonary disease) ([facility]     summer     Fatigue     H/O cardiac catheterization 06/15/2017    mild lad and cx disease    H/O cardiovascular stress test 08/22/2019    H/O Doppler ultrasound 4/7/2016 3/19/12    carotid-4/16 WNL 3/12right mild less than 50%and left wnl    H/O Doppler ultrasound 6/14/017    carotid - mod disease EILEEN, mild disease LICA    H/O echocardiogram 7/23/10    H/O echocardiogram 4/15/2016    EF 60% sclerotic AV mildly stenosed-recommend yearly echo    H/O echocardiogram 06/13/2017    EF>55% mild-mod AS, triavial AR    H/O echocardiogram 09/14/2018    EF55-60% mod AS- mean pressure gradient increased from 13-31    H/O echocardiogram 08/22/2019    EF 55-60%, Minimal concentric left ventricular hypertrophy, left atrium is mildly dilated, severe aortic stenosis, Mitral annular calcification is present, Mild AR, Mild-Mod MR, Mild TR, No pericardial effusion     H/O echocardiogram 09/23/2019    H/O exercise stress test 06/14/2017    abnormal    History of cardiac cath 08/28/2019    Severe AS,   TAVR??  History of nuclear stress test 03/14/2017    EF 75%. Normal study.     Holter monitor, abnormal 2/22/11    infrequent APC are seen    Normal cardiac stress test 7/23/10    EF 70%, no ischemia    On home O2     only uses as needed, nightly prn    Syncope and collapse     Tachycardia     HX of tachycardia - had a cardioablation    Thyroid disease        Past Surgical History:   Procedure Laterality Date    AORTIC VALVE REPAIR N/A 09/09/2019    Core Valve EVOLUT 26mm [id number]    AORTIC VALVE REPLACEMENT N/A 09/11/2019    TAVR; [employer] Evlout 26    BREAST SURGERY  2009    bilat    CARDIAC CATHETERIZATION  03/02/2011    mild to moderate disease of diagonal and proximal RCA   [address]    ablation    CARPAL TUNNEL RELEASE  1990's    bilat    CHOLECYSTECTOMY  1961    open choley    COLONOSCOPY      DILATATION, ESOPHAGUS      ENDOSCOPY, COLON, DIAGNOSTIC  07/03/2017    s/p gastric bypass otherwise normal    FINGER SURGERY      right middle, thumb and index finger (screw and pin in right index finger)    GASTRIC RESTRICTION SURGERY  1984    stapled    HERNIA REPAIR  unknown    Inc hernia hernia repair    HIP FRACTURE SURGERY Left 11/04/2015    Left hip nail    HYSTERECTOMY  1966    Luke w/ BSO    JOINT REPLACEMENT  2004    right knee    LIPECTOMY  1990's    OTHER SURGICAL HISTORY  05/01/2012    Gastrojejunostomy/partial gastrectomy    SHOULDER ARTHROPLASTY Left 10/2017    TOE SURGERY  Early 2000's    hammer toes and bunions       Family History   Problem Relation Age of Onset    Diabetes Mother     Other Mother         thyroid    Heart Disease Father     Arthritis Father     High Blood Pressure Father     High Cholesterol Father     Other Father         glaucoma    Other Sister         thyroid, glaucoma    Diabetes Brother     Other Sister     Vision Loss Sister         lung problems, smoker    Cancer Sister         throat cancer    Other Son         HX of clots    Early Death Son         Hit by car and killed.  High Blood Pressure Daughter     Stroke Son         No residual    Other Son         HX myocarditis       CareTeam (Including outside providers/suppliers regularly involved in providing care):   Patient Care Team:  Christina Becerra MD as PCP - General (Family Medicine)  Christina Becerra MD as PCP - [facility] Empaneled Provider  Sheldon De La Cruz MD as Consulting Physician (Cardiology)    Wt Readings from Last 3 Encounters:   03/19/21 132 lb 9.6 oz (60.1 kg)   02/15/21 132 lb 12.8 oz (60.2 kg)   01/29/21 130 lb 9.6 oz (59.2 kg)     Vitals:    03/19/21 1102   BP: (!) 172/60   Site: Left Upper Arm   Position: Sitting   Cuff Size: Medium Adult   Pulse: 68   Temp: 98.1 °F (36.7 °C)   TempSrc: Infrared   SpO2: 97%   Weight: 132 lb 9.6 oz (60.1 kg)   Height: 5' 1.5\" (1.562 m)     Body mass index is 24.65 kg/m². Based upon direct observation of the patient, evaluation of cognition reveals recent and remote memory intact.     General Appearance: alert and oriented to person, place and time, well developed and well- nourished, in no acute distress  Skin: warm and dry, no rash or erythema  Head: normocephalic and atraumatic  Eyes: pupils equal, round, and reactive to light, extraocular eye movements intact, conjunctivae normal  ENT: tympanic membrane, external ear and ear canal normal bilaterally, nose without deformity, nasal mucosa and turbinates normal without polyps  Neck: supple and non-tender without mass, no thyromegaly or thyroid nodules, no cervical lymphadenopathy  Pulmonary/Chest: clear to auscultation bilaterally- no wheezes, rales or rhonchi, normal air movement, no respiratory distress  Cardiovascular: normal rate, regular rhythm, normal S1 and S2, no murmurs, rubs, clicks, or gallops, distal pulses intact, no carotid bruits  Abdomen: soft, non-tender, non-distended, normal bowel sounds, no masses or organomegaly  Extremities: no cyanosis, clubbing or edema  Musculoskeletal: normal range of motion, no joint swelling, deformity or tenderness  Neurologic: reflexes normal and symmetric, no cranial nerve deficit, gait, coordination and speech normal    Patient's complete Health Risk Assessment and screening values have been reviewed and are found in Flowsheets. The following problems were reviewed today and where indicated follow up appointments were made and/or referrals ordered. Positive Risk Factor Screenings with Interventions:     Fall Risk:  2 or more falls in past year?: (!) yes  Fall with injury in past year?: no  Fall Risk Interventions:    · Home safety tips provided        General Health and ACP:  General  In general, how would you say your health is?: Very Good  In the past 7 days, have you experienced any of the following?  New or Increased Pain, New or Increased Fatigue, Loneliness, Social Isolation, Stress or Anger?: (!) Anger  Do you get the social and emotional support that you need?: Yes  Do you have a Living Will?: (!) No  Advance Directives Power of  Living Will ACP-Advance Directive ACP-Power of     Not on File Not on File Not on File Not on File      General Health Risk Interventions:  · Anger: patient declines any further evaluation/treatment for this issue  · No Living Will: Advance Care Planning addressed with patient today     Hearing/Vision:   Hearing Screening    125Hz 250Hz 500Hz 1000Hz 2000Hz 3000Hz 4000Hz 6000Hz 8000Hz   Right ear:            Left ear:               Visual Acuity Screening    Right eye Left eye Both eyes   Without correction:      With correction: 20/40 20/40 20/30     Hearing/Vision  Do you or your family notice any trouble with your hearing that hasn't been managed with hearing aids?: No  Do you have difficulty driving, watching TV, or doing any of your daily activities because of your eyesight?: No  Have you had an eye exam within the past year?: Yes  Hearing/Vision Interventions:  · none      Personalized Preventive Plan   Current Health Maintenance Status  Immunization History   Administered Date(s) Administered    COVID-19, Baez Peter, PF, 30mcg/0.3mL 01/20/2021, 02/11/2021    Pneumococcal Conjugate 13-valent ([id number]) 07/26/2016    Pneumococcal Polysaccharide ([id number]) 04/06/2015, 11/28/2017    Tdap (Boostrix, Adacel) 07/26/2017    Zoster Recombinant (Shingrix) 05/05/2019, 07/06/2019        Health Maintenance   Topic Date Due    Annual Wellness Visit (AWV)  Never done    TSH testing  04/01/2021    DTaP/Tdap/Td vaccine (2 - Td) 07/26/2027    DEXA (modify frequency per FRAX score)  Completed    Shingles Vaccine  Completed    Pneumococcal 65+ years Vaccine  Completed    COVID-19 Vaccine  Completed    Hepatitis A vaccine  Aged Out    Hepatitis B vaccine  Aged Out    Hib vaccine  Aged Out    Meningococcal (ACWY) vaccine  Aged Out     Recommendations for [employer] Due: see orders and patient instructions/AVS.  .   Recommended screening schedule for the next 5-10 years is provided to the patient in written form: see Patient Tatum Cartagena was seen today for medicare awv. Diagnoses and all orders for this visit:    Chronic obstructive pulmonary disease, unspecified [facility] type [facility]  -     Budeson-Glycopyrrol-Formoterol 160-9-4.8 MCG/ACT AERO; Inhale 2 puffs into the lungs 2 times daily    Routine general medical examination at a health care facility                 Advance Care Planning   Advanced Care Planning: Discussed the patients choices for care and treatment in case of a health event that adversely affects decision-making abilities. Also discussed the patients long-term treatment options. Reviewed with the patient the [address] [place] [address] Declaration forms  Reviewed the process of designating a competent adult as an Agent (or -in-fact) that could take make health care decisions for the patient if incompetent. Patient was asked to complete the declaration forms, either acknowledge the forms by a public notary or an eligible witness and provide a signed copy to the practice office.   Time spent (minutes): 5

## 2021-03-19 NOTE — PATIENT INSTRUCTIONS
Advance Directives: Care Instructions  Overview  An advance directive is a legal way to state your wishes at the end of your life. It tells your family and your doctor what to do if you can't say what you want. There are two main types of advance directives. You can change them any time your wishes change. Living will. This form tells your family and your doctor your wishes about life support and other treatment. The form is also called a declaration. Medical power of . This form lets you name a person to make treatment decisions for you when you can't speak for yourself. This person is called a health care agent (health care proxy, health care surrogate). The form is also called a durable power of  for health care. If you do not have an advance directive, decisions about your medical care may be made by a family member, or by a doctor or a  who doesn't know you. It may help to think of an advance directive as a gift to the people who care for you. If you have one, they won't have to make tough decisions by themselves. Follow-up care is a key part of your treatment and safety. Be sure to make and go to all appointments, and call your doctor if you are having problems. It's also a good idea to know your test results and keep a list of the medicines you take. What should you include in an advance directive? Many states have a unique advance directive form. (It may ask you to address specific issues.) Or you might use a universal form that's approved by many states. If your form doesn't tell you what to address, it may be hard to know what to include in your advance directive. Use the questions below to help you get started. · Who do you want to make decisions about your medical care if you are not able to? · What life-support measures do you want if you have a serious illness that gets worse over time or can't be cured? · What are you most afraid of that might happen? (Maybe you're afraid of having pain, losing your independence, or being kept alive by machines.)  · Where would you prefer to die? (Your home? A hospital? A nursing home?)  · Do you want to donate your organs when you die? · Do you want certain Mu-ism practices performed before you die? When should you call for help? Be sure to contact your doctor if you have any questions. Where can you learn more? Go to [email or web address] and sign in to your [employer] account. Enter R264 in the [employer] box to learn more about \"Advance Directives: Care Instructions. \"     If you do not have an account, please click on the \"Sign Up Now\" link. Current as of: July 17, 2020               Content Version: 12.8  © 3351-4243 [employer], [employer]. Care instructions adapted under license by [facility] ([facility]). If you have questions about a medical condition or this instruction, always ask your healthcare professional. [place] [address] any warranty or liability for your use of this information. Personalized Preventive Plan for Xu Jensen - 3/19/2021  Medicare offers a range of preventive health benefits. Some of the tests and screenings are paid in full while other may be subject to a deductible, co-insurance, and/or copay. Some of these benefits include a comprehensive review of your medical history including lifestyle, illnesses that may run in your family, and various assessments and screenings as appropriate. After reviewing your medical record and screening and assessments performed today your provider may have ordered immunizations, labs, imaging, and/or referrals for you. A list of these orders (if applicable) as well as your Preventive Care list are included within your After Visit Summary for your review.     Other Preventive Recommendations:    · A preventive eye exam performed by an eye specialist is recommended every 1-2 years to screen for glaucoma; cataracts, macular degeneration, and other eye disorders. · A preventive dental visit is recommended every 6 months. · Try to get at least 150 minutes of exercise per week or 10,000 steps per day on a pedometer . · Order or download the FREE \"Exercise & Physical Activity: Your Everyday Guide\" from [employer]. Call 0-752.263.6250 or search [employer] online. · You need 0289-3685 mg of calcium and 1418-6383 IU of vitamin D per day. It is possible to meet your calcium requirement with diet alone, but a vitamin D supplement is usually necessary to meet this goal.  · When exposed to the sun, use a sunscreen that protects against both UVA and UVB radiation with an SPF of 30 or greater. Reapply every 2 to 3 hours or after sweating, drying off with a towel, or swimming. · Always wear a seat belt when traveling in a car. Always wear a helmet when riding a bicycle or motorcycle.

## 2021-04-08 ENCOUNTER — OFFICE VISIT (OUTPATIENT)
Dept: CARDIOLOGY CLINIC | Age: 86
End: 2021-04-08
Payer: MEDICARE

## 2021-04-08 VITALS
DIASTOLIC BLOOD PRESSURE: 80 MMHG | HEART RATE: 51 BPM | SYSTOLIC BLOOD PRESSURE: 130 MMHG | BODY MASS INDEX: 24.88 KG/M2 | HEIGHT: 61 IN | OXYGEN SATURATION: 98 % | WEIGHT: 131.8 LBS

## 2021-04-08 DIAGNOSIS — I38 VALVULAR HEART DISEASE: Primary | ICD-10-CM

## 2021-04-08 DIAGNOSIS — I38 VHD (VALVULAR HEART DISEASE): Primary | ICD-10-CM

## 2021-04-08 PROCEDURE — 93000 ELECTROCARDIOGRAM COMPLETE: CPT | Performed by: INTERNAL MEDICINE

## 2021-04-08 PROCEDURE — 99213 OFFICE O/P EST LOW 20 MIN: CPT | Performed by: INTERNAL MEDICINE

## 2021-04-08 NOTE — LETTER
Devota Frankel [address] [place]  1935  [id number]    Have you had any Chest Pain that is not new? - Yes  If Yes DO EKG - How does it feel - Dull Ache   How long does the pain last -  seconds    How long have you been having the pain - Day's      DO EKG IF: Patient has a Heart Rate above 100 or below 40     CAD (Coronary Artery Disease) patient should have one on file every 6 months        Have you had any Shortness of Breath - No      Have you had any dizziness - No       Sitting wait 5 minutes do supine (laying down) wait 5 minutes then do standing - log each in \"vitals\" area in Epic   Be sure to ask what symptoms they are having if they get dizzy while completing ortho stats such as: room spinning, nausea, etc.    Have you had any palpitations that are not new? - No    Do you have any edema - swelling in No        Vein \"LEG PROBLEM Questionnaire\"  1. Do you have prominent leg veins? No   2. Do you have any skin discoloration? No  3. Do you have any healed or active sores? No  4. Do you have swelling of the legs? How Long  5. Do you have a family history of varicose veins? No  6. Does your profession involve pro-longed        standing or heavy lifting? No  7. Have you been fighting overweight problems? No  8. Do you have restless legs? No  9. Do you have any night time cramps? No  10. Do you have any of the following in your legs:        NONE      When did you have your last labs drawn Where did you have them done   What doctor ordered     If we do not have these labs you are retrieve these labs for these providers!     Do you have a surgery or procedure scheduled in the near future - No

## 2021-04-08 NOTE — PROGRESS NOTES
Lucas Dominguez  is a  Established patient  ,80 y.o.   female here for evaluation of the following chief complaint(s):    vhd        SUBJECTIVE/OBJECTIVE:  HPI : h/o  Vhd, TAVR now here  Has been feeling fatigued, has mild mid sternal recurrent CP as well. Review of Systems    fatigue    Vitals:    04/08/21 1131   BP: 130/80   Pulse: 51   SpO2: 98%   Weight: 131 lb 12.8 oz (59.8 kg)   Height: 5' 1\" (1.549 m)     /80   Pulse 51   Ht 5' 1\" (1.549 m)   Wt 131 lb 12.8 oz (59.8 kg)   SpO2 98%   BMI 24.90 kg/m²   No flowsheet data found. Wt Readings from Last 3 Encounters:   04/08/21 131 lb 12.8 oz (59.8 kg)   03/19/21 132 lb 9.6 oz (60.1 kg)   02/15/21 132 lb 12.8 oz (60.2 kg)     Body mass index is 24.9 kg/m². Physical Exam     Neck: JVD      Lungs : clear    Cardio : Si and S2 audilble      Ext: edema      All pertinent data reviewed      Meds : reviewed         Tests ordered    echo    ASSESSMENT/PLAN:    - VHD had TAVR  Check echo    - fatigue check echo for EF    - has a rough year    An electronic signature was used to authenticate this note.     --Cynthia Nur MD

## 2021-04-09 ENCOUNTER — PROCEDURE VISIT (OUTPATIENT)
Dept: CARDIOLOGY CLINIC | Age: 86
End: 2021-04-09
Payer: MEDICARE

## 2021-04-09 DIAGNOSIS — I38 VHD (VALVULAR HEART DISEASE): ICD-10-CM

## 2021-04-09 LAB
LV EF: 58 %
LVEF MODALITY: NORMAL

## 2021-04-09 PROCEDURE — 93306 TTE W/DOPPLER COMPLETE: CPT | Performed by: INTERNAL MEDICINE

## 2021-04-12 ENCOUNTER — TELEPHONE (OUTPATIENT)
Dept: CARDIOLOGY CLINIC | Age: 86
End: 2021-04-12

## 2021-04-12 NOTE — TELEPHONE ENCOUNTER
Summary   Left ventricular function is normal, EF is estimated at 55-60%. No evidence of diastolic dysfunction. No regional wall motion abnormalities were detected. Mildly dilated left atrium. Normally functioning prosthetic valve in aortic position with a mean   gradient of 4 mmHg. Mitral annular calcification is present. Mild mitral stenosis with a mean gradient of 3mmHg. Mild mitral and tricuspid regurgitation is present. RVSP is 29 mmHg. No evidence of pericardial effusion. Signature      ------------------------------------------------------------------   Electronically signed by Jose Beebe MD   (Interpreting physician) on 04/10/2021 at 12:25 PM   ------------------------------------------------------------------       Advised pt of results and she verbalized understanding.

## 2021-05-18 ENCOUNTER — TELEPHONE (OUTPATIENT)
Dept: FAMILY MEDICINE CLINIC | Age: 86
End: 2021-05-18

## 2021-05-18 DIAGNOSIS — G25.81 RLS (RESTLESS LEGS SYNDROME): ICD-10-CM

## 2021-05-18 RX ORDER — ROPINIROLE 0.25 MG/1
0.25 TABLET, FILM COATED ORAL NIGHTLY
Qty: 90 TABLET | Refills: 1 | Status: SHIPPED | OUTPATIENT
Start: 2021-05-18 | End: 2021-08-06 | Stop reason: SDUPTHER

## 2021-05-18 NOTE — TELEPHONE ENCOUNTER
Patient calling state she was previously prescribed 0.25mg of Ropinirole but was still having restless legs. At last appt patients dose was upped to 0.5mg (patient confirmed she has been splitting the 1mg tabs in half) but the dosage is too much for her and she feels ill after taking the medication. Patient would like to know if she is able to return to the 0.25mg dose.  She is still using [facility] [address]

## 2021-06-21 ENCOUNTER — TELEPHONE (OUTPATIENT)
Dept: GASTROENTEROLOGY | Age: 86
End: 2021-06-21

## 2021-06-21 RX ORDER — SUCRALFATE 1 G/1
1 TABLET ORAL 4 TIMES DAILY
Qty: 120 TABLET | Refills: 3 | Status: SHIPPED | OUTPATIENT
Start: 2021-06-21

## 2021-07-01 RX ORDER — AMLODIPINE BESYLATE 5 MG/1
5 TABLET ORAL DAILY
Qty: 90 TABLET | Refills: 3 | Status: ON HOLD | OUTPATIENT
Start: 2021-07-01 | End: 2021-12-02 | Stop reason: HOSPADM

## 2021-08-06 ENCOUNTER — OFFICE VISIT (OUTPATIENT)
Dept: FAMILY MEDICINE CLINIC | Age: 86
End: 2021-08-06
Payer: MEDICARE

## 2021-08-06 VITALS
BODY MASS INDEX: 23.96 KG/M2 | TEMPERATURE: 97.3 F | HEART RATE: 51 BPM | OXYGEN SATURATION: 99 % | SYSTOLIC BLOOD PRESSURE: 112 MMHG | DIASTOLIC BLOOD PRESSURE: 62 MMHG | WEIGHT: 126.8 LBS

## 2021-08-06 DIAGNOSIS — T21.22XA PARTIAL THICKNESS BURN OF ABDOMEN, INITIAL ENCOUNTER: ICD-10-CM

## 2021-08-06 DIAGNOSIS — G25.81 RLS (RESTLESS LEGS SYNDROME): ICD-10-CM

## 2021-08-06 DIAGNOSIS — Z13.0 SCREENING, ANEMIA, DEFICIENCY, IRON: ICD-10-CM

## 2021-08-06 DIAGNOSIS — J44.9 CHRONIC OBSTRUCTIVE PULMONARY DISEASE, UNSPECIFIED COPD TYPE (HCC): ICD-10-CM

## 2021-08-06 DIAGNOSIS — F32.0 CURRENT MILD EPISODE OF MAJOR DEPRESSIVE DISORDER WITHOUT PRIOR EPISODE (HCC): ICD-10-CM

## 2021-08-06 DIAGNOSIS — Z13.1 SCREENING FOR DIABETES MELLITUS: ICD-10-CM

## 2021-08-06 DIAGNOSIS — R53.1 WEAKNESS: ICD-10-CM

## 2021-08-06 DIAGNOSIS — K21.9 GASTROESOPHAGEAL REFLUX DISEASE WITHOUT ESOPHAGITIS: ICD-10-CM

## 2021-08-06 DIAGNOSIS — R53.82 CHRONIC FATIGUE: Primary | ICD-10-CM

## 2021-08-06 DIAGNOSIS — R11.0 NAUSEA: ICD-10-CM

## 2021-08-06 DIAGNOSIS — Z13.6 SCREENING FOR CARDIOVASCULAR CONDITION: ICD-10-CM

## 2021-08-06 LAB
ALBUMIN/GLOBULIN RATIO: 2.2 RATIO (ref 0.8–2.6)
ALBUMIN: 3.9 G/DL (ref 3.5–5.2)
ALP BLD-CCNC: 45 U/L (ref 23–144)
ALT SERPL-CCNC: 29 U/L (ref 0–60)
AST SERPL-CCNC: 26 U/L (ref 0–55)
BILIRUB SERPL-MCNC: 0.9 MG/DL (ref 0–1.2)
BUN BLDV-MCNC: 13 MG/DL (ref 3–29)
BUN/CREAT BLD: 19 (ref 7–25)
CALCIUM SERPL-MCNC: 8.7 MG/DL (ref 8.5–10.5)
CHLORIDE BLD-SCNC: 102 MEQ/L (ref 96–110)
CHOLESTEROL: 180 MG/DL
CO2: 23 MEQ/L (ref 19–32)
CREAT SERPL-MCNC: 0.7 MG/DL (ref 0.5–1.2)
GFR AFRICAN AMERICAN: 92 MLS/MIN/1.73M2
GFR NON-AFRICAN AMERICAN: 79 MLS/MIN/1.73M2
GLOBULIN: 1.8 G/DL (ref 1.9–3.6)
GLUCOSE BLD-MCNC: 86 MG/DL (ref 70–99)
HCT VFR BLD CALC: 38.1 % (ref 34–49)
HDLC SERPL-MCNC: 88 MG/DL
HEMOGLOBIN: 12.8 G/DL (ref 11.2–15.7)
LDL CHOLESTEROL CALCULATED: 77 MG/DL
MCH RBC QN AUTO: 32.7 PG (ref 26–34)
MCHC RBC AUTO-ENTMCNC: 33.6 G/DL (ref 30.7–35.5)
MCV RBC AUTO: 97.4 FL (ref 80–100)
PDW BLD-RTO: 15.6 %
PLATELET # BLD: 282 K/UL (ref 140–400)
PMV BLD AUTO: 9.7 FL (ref 7.2–11.7)
POTASSIUM SERPL-SCNC: 4.5 MEQ/L (ref 3.4–5.3)
RBC # BLD: 3.91 M/UL (ref 3.95–5.26)
SODIUM BLD-SCNC: 136 MEQ/L (ref 135–148)
STATUS: ABNORMAL
TOTAL PROTEIN: 5.7 G/DL (ref 6–8.3)
TRIGL SERPL-MCNC: 74 MG/DL
TSH SERPL DL<=0.05 MIU/L-ACNC: 2.71 MCIU/ML (ref 0.4–4.5)
VITAMIN B-12: 1003 PG/ML (ref 200–1100)
VLDLC SERPL CALC-MCNC: 15 MG/DL (ref 4–38)
WBC: 7.5 K/UL (ref 3.5–10.9)

## 2021-08-06 PROCEDURE — 99215 OFFICE O/P EST HI 40 MIN: CPT | Performed by: FAMILY MEDICINE

## 2021-08-06 RX ORDER — PANTOPRAZOLE SODIUM 40 MG/1
TABLET, DELAYED RELEASE ORAL
Qty: 90 TABLET | Refills: 1 | Status: SHIPPED | OUTPATIENT
Start: 2021-08-06 | End: 2022-02-22 | Stop reason: SDUPTHER

## 2021-08-06 RX ORDER — IPRATROPIUM BROMIDE AND ALBUTEROL SULFATE 2.5; .5 MG/3ML; MG/3ML
3 SOLUTION RESPIRATORY (INHALATION)
Qty: 360 ML | Refills: 1 | Status: SHIPPED | OUTPATIENT
Start: 2021-08-06

## 2021-08-06 RX ORDER — ESCITALOPRAM OXALATE 20 MG/1
20 TABLET ORAL DAILY
Qty: 90 TABLET | Refills: 1 | Status: SHIPPED | OUTPATIENT
Start: 2021-08-06 | End: 2022-02-22 | Stop reason: SDUPTHER

## 2021-08-06 RX ORDER — ROPINIROLE 0.25 MG/1
0.25 TABLET, FILM COATED ORAL NIGHTLY
Qty: 90 TABLET | Refills: 1 | Status: ON HOLD | OUTPATIENT
Start: 2021-08-06 | End: 2021-11-26

## 2021-08-06 RX ORDER — ONDANSETRON 4 MG/1
4 TABLET, FILM COATED ORAL EVERY 8 HOURS PRN
Qty: 30 TABLET | Refills: 5 | Status: SHIPPED | OUTPATIENT
Start: 2021-08-06 | End: 2022-02-22 | Stop reason: SDUPTHER

## 2021-08-06 RX ORDER — ALBUTEROL SULFATE 90 UG/1
2 AEROSOL, METERED RESPIRATORY (INHALATION) EVERY 6 HOURS PRN
Qty: 3 INHALER | Refills: 1 | Status: ON HOLD | OUTPATIENT
Start: 2021-08-06 | End: 2021-12-02 | Stop reason: HOSPADM
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
## 2021-08-10 RX ORDER — ASPIRIN 81 MG/1
81 TABLET ORAL DAILY
Qty: 30 TABLET | Refills: 5 | Status: ON HOLD | OUTPATIENT
Start: 2021-08-10 | End: 2021-12-02 | Stop reason: HOSPADM

## 2021-08-12 ENCOUNTER — TELEPHONE (OUTPATIENT)
Dept: FAMILY MEDICINE CLINIC | Age: 86
End: 2021-08-12

## 2021-08-12 NOTE — TELEPHONE ENCOUNTER
Patient called about her recent lab results. Patient stated she does not use MyChart. Patient was notified the labs were all normal. Patient was wondering why she is so tired.

## 2021-08-12 NOTE — TELEPHONE ENCOUNTER
I am not sure why she is tired. If she is not already taking a multivitamin, she could add a multivitamin.

## 2021-09-16 ENCOUNTER — TELEPHONE (OUTPATIENT)
Dept: GASTROENTEROLOGY | Age: 86
End: 2021-09-16

## 2021-09-16 NOTE — TELEPHONE ENCOUNTER
Taking Protonix at hs- also on cararfate  S/P gastric bypass  Denies taking NSAID's  Suggest:   1) make office appt at next pening   2) change Protonix to ac BID   3) small frequent meals

## 2021-09-16 NOTE — TELEPHONE ENCOUNTER
Patient experiencing heaving after eating and is taking sucralfate. Her back gets really warm after heaving. Feels nauseated for about an hour. What should she do?     Santiago Silverman

## 2021-09-27 ENCOUNTER — OFFICE VISIT (OUTPATIENT)
Dept: FAMILY MEDICINE CLINIC | Age: 86
End: 2021-09-27
Payer: MEDICARE

## 2021-09-27 VITALS
BODY MASS INDEX: 23.35 KG/M2 | WEIGHT: 123.6 LBS | OXYGEN SATURATION: 95 % | TEMPERATURE: 96.4 F | HEART RATE: 52 BPM | DIASTOLIC BLOOD PRESSURE: 60 MMHG | SYSTOLIC BLOOD PRESSURE: 118 MMHG

## 2021-09-27 DIAGNOSIS — L08.9 WOUND INFECTION: Primary | ICD-10-CM

## 2021-09-27 DIAGNOSIS — R11.0 NAUSEA: ICD-10-CM

## 2021-09-27 DIAGNOSIS — T14.8XXA WOUND INFECTION: Primary | ICD-10-CM

## 2021-09-27 PROCEDURE — 3288F FALL RISK ASSESSMENT DOCD: CPT | Performed by: FAMILY MEDICINE

## 2021-09-27 PROCEDURE — 99214 OFFICE O/P EST MOD 30 MIN: CPT | Performed by: FAMILY MEDICINE

## 2021-09-27 RX ORDER — CLINDAMYCIN HYDROCHLORIDE 300 MG/1
300 CAPSULE ORAL 2 TIMES DAILY
Qty: 14 CAPSULE | Refills: 0 | Status: SHIPPED | OUTPATIENT
Start: 2021-09-27 | End: 2021-10-04

## 2021-09-27 ASSESSMENT — PATIENT HEALTH QUESTIONNAIRE - PHQ9
2. FEELING DOWN, DEPRESSED OR HOPELESS: 0
SUM OF ALL RESPONSES TO PHQ QUESTIONS 1-9: 0
1. LITTLE INTEREST OR PLEASURE IN DOING THINGS: 0
SUM OF ALL RESPONSES TO PHQ QUESTIONS 1-9: 0
SUM OF ALL RESPONSES TO PHQ9 QUESTIONS 1 & 2: 0
SUM OF ALL RESPONSES TO PHQ QUESTIONS 1-9: 0

## 2021-09-27 NOTE — PATIENT INSTRUCTIONS
Patient Education        Scrapes (Abrasions): Care Instructions  Your Care Instructions  Scrapes (abrasions) are wounds where your skin has been rubbed or torn off. Most scrapes do not go deep into the skin, but some may remove several layers of skin. Scrapes usually don't bleed much, but they may ooze pinkish fluid. Scrapes on the head or face may appear worse than they are. They may bleed a lot because of the good blood supply to this area. Most scrapes heal well and may not need a bandage. They usually heal within 3 to 7 days. A large, deep scrape may take 1 to 2 weeks or longer to heal. A scab may form on some scrapes. Follow-up care is a key part of your treatment and safety. Be sure to make and go to all appointments, and call your doctor if you are having problems. It's also a good idea to know your test results and keep a list of the medicines you take. How can you care for yourself at home? · If your doctor told you how to care for your wound, follow your doctor's instructions. If you did not get instructions, follow this general advice:  ? Wash the scrape with clean water 2 times a day. Don't use hydrogen peroxide or alcohol, which can slow healing. ? You may cover the scrape with a thin layer of petroleum jelly, such as Vaseline, and a nonstick bandage. ? Apply more petroleum jelly and replace the bandage as needed. · Prop up the injured area on a pillow anytime you sit or lie down during the next 3 days. Try to keep it above the level of your heart. This will help reduce swelling. · Be safe with medicines. Take pain medicines exactly as directed. ? If the doctor gave you a prescription medicine for pain, take it as prescribed. ? If you are not taking a prescription pain medicine, ask your doctor if you can take an over-the-counter medicine. When should you call for help?    Call your doctor now or seek immediate medical care if:    · You have signs of infection, such as:  ? Increased pain, swelling, warmth, or redness around the scrape. ? Red streaks leading from the scrape. ? Pus draining from the scrape. ? A fever.     · The scrape starts to bleed, and blood soaks through the bandage. Oozing small amounts of blood is normal.   Watch closely for changes in your health, and be sure to contact your doctor if the scrape is not getting better each day. Where can you learn more? Go to https://[employer]. org and sign in to your [employer] account. Enter A374 in the [employer] box to learn more about \"Scrapes (Abrasions): Care Instructions. \"     If you do not have an account, please click on the \"Sign Up Now\" link. Current as of: July 1, 2021               Content Version: 13.0  © 9817-8974 Healthwise, Incorporated. Care instructions adapted under license by [facility] ([facility]). If you have questions about a medical condition or this instruction, always ask your healthcare professional. [place] [address] any warranty or liability for your use of this information.

## 2021-09-27 NOTE — PROGRESS NOTES
Left lower leg wound with redness and drainage. Injured while mowing lawn 1 week ago. Denies any fevers or chills. Has been using a topical antibiotic but seems to be getting worse. Daughter is afraid she has cellulitis. Nausea after eating    O:   Vitals:    09/27/21 1123   BP: 118/60   Pulse: 52   Temp: 96.4 °F (35.8 °C)   SpO2: 95%     No acute distress. Alert and Oriented x 3  HEENT: Atraumatic. Normocephalic. PERRLA, EOMI, Conjunctiva clear  NECK: without thyromegaly, lymphadenopathy, JVD  LUNGS:Clear to ascultation bilaterally. Breathing comfortably  CARDIOVASCULAR:  Regular rate and rhythm, no murmurs, rubs, or gallops  EXTREMITY: Full range of motion. No clubbing/cyanosis/edema  SKIN: Warm, Dry, No rash. Left lower leg with abrasion anteriorly with 2 cm of surrounding erythema. Mild warmth. Slight tenderness. A:    Diagnosis Orders   1. Wound infection  clindamycin (CLEOCIN) 300 MG capsule   2. Nausea       P:    Trial of clindamycin for infection. Wound dressed with Telfa, bacitracin, sterile gauze. Patient should take her Zofran for her nausea  Follow-up GI    Follow-up as needed.

## 2021-10-13 ENCOUNTER — OFFICE VISIT (OUTPATIENT)
Dept: CARDIOLOGY CLINIC | Age: 86
End: 2021-10-13
Payer: MEDICARE

## 2021-10-13 VITALS
BODY MASS INDEX: 23.53 KG/M2 | SYSTOLIC BLOOD PRESSURE: 116 MMHG | HEIGHT: 61 IN | HEART RATE: 50 BPM | DIASTOLIC BLOOD PRESSURE: 72 MMHG | WEIGHT: 124.6 LBS

## 2021-10-13 DIAGNOSIS — Z71.89 SURGICAL COUNSELING VISIT: ICD-10-CM

## 2021-10-13 DIAGNOSIS — I10 PRIMARY HYPERTENSION: ICD-10-CM

## 2021-10-13 DIAGNOSIS — I38 VHD (VALVULAR HEART DISEASE): Primary | ICD-10-CM

## 2021-10-13 PROCEDURE — 93000 ELECTROCARDIOGRAM COMPLETE: CPT | Performed by: NURSE PRACTITIONER

## 2021-10-13 PROCEDURE — 99214 OFFICE O/P EST MOD 30 MIN: CPT | Performed by: NURSE PRACTITIONER

## 2021-10-13 ASSESSMENT — ENCOUNTER SYMPTOMS
ORTHOPNEA: 0
SHORTNESS OF BREATH: 0

## 2021-10-13 NOTE — LETTER
Tracie Glasgow  1935  [id number]    Have you had any Chest Pain that is not new? - No       DO EKG IF: Patient has a Heart Rate above 100 or below 40     CAD (Coronary Artery Disease) patient should have one on file every 6 months        Have you had any Shortness of Breath - No      Have you had any dizziness - No       Sitting wait 5 minutes do supine (laying down) wait 5 minutes then do standing - log each in \"vitals\" area in Epic   Be sure to ask what symptoms they are having if they get dizzy while completing ortho stats such as: room spinning, nausea, etc.    Have you had any palpitations that are not new? - No      Is the patient on any of the following medications - No  If Yes DO EKG - Needs done every 6 months    Do you have any edema - swelling in No        Vein \"LEG PROBLEM Questionnaire\"  1. Do you have prominent leg veins? No   2. Do you have any skin discoloration? No  3. Do you have any healed or active sores? Yes  4. Do you have swelling of the legs? No  5. Do you have a family history of varicose veins? No  6. Does your profession involve pro-longed        standing or heavy lifting? No  7. Have you been fighting overweight problems? No  8. Do you have restless legs? Yes  9. Do you have any night time cramps? No  10. Do you have any of the following in your legs:        Weakness     When did you have your last labs drawn 08/06/2021  Where did you have them done   What doctor ordered Tammi Rees    If we do not have these labs you are retrieve these labs for these providers! Do you have a surgery or procedure scheduled in the near future - Yes  If Yes- DO EKG  If Yes - Who is the surgery with?  Tammi Rees  Phone number of physician   Fax number of physician   Type of surgery Arthrogram   GIVE THIS INFORMATION TO SERGEI DE PAZ     Ask patient if they want to sign up for [facility] if they are not already signed up    [address] [facility] to see if we have an E-MAIL on file for the patient     Check medication list thoroughly!!! AND RECONCILE OUTSIDE MEDICATIONS  If dose has changed change the entire order not just the MG  BE SURE TO ASK PATIENT IF THEY NEED MEDICATION REFILLS     At check out add to every patient's \"wrap up\" the following dot phrase AFTERHOURSEDUCATION and ensure we explain this to our patients

## 2021-10-13 NOTE — PATIENT INSTRUCTIONS
**It is YOUR responsibilty to bring medication bottles and/or updated medication list to [address] [place] [address]. This will allow us to better serve you and all your healthcare needs**  Please be informed that if you contact our office outside of normal business hours the physician on call cannot help with any scheduling or rescheduling issues, procedure instruction questions or any type of medication issue. We advise you for any urgent/emergency that you go to the nearest emergency room!     PLEASE CALL OUR OFFICE DURING NORMAL BUSINESS HOURS    Monday - Friday   8 am to 5 pm    [place]: [address]: [phone]    [place]:  [phone]

## 2021-10-13 NOTE — PROGRESS NOTES
10/13/2021  Primary cardiologist: Dr. Adrian Garner  is an established 80 y.o.  female here for follow-up on VHD- Hypertension      SUBJECTIVE/OBJECTIVE:    HPI : Justin Berry is an 79-year-old female patient with a history of valvular heart disease status post TAVR, hypertension, and COPD  In September 2019 she underwent aortic valve replacement (26mm Medtronic Evolut PRO) . Echocardiogram April 2021 demonstrates normal functioning prosthetic aortic valve with a mean gradient of 4 mmHg. Justin Berry reports overall she is feeling fair. She denies chest pain or shortness of breath. There have been no episodes of dizziness, lightheadedness or near syncope. Review of Systems   Constitutional: Negative for diaphoresis and malaise/fatigue. Cardiovascular: Negative for chest pain, claudication, dyspnea on exertion, irregular heartbeat, leg swelling, near-syncope, orthopnea, palpitations and paroxysmal nocturnal dyspnea. Respiratory: Negative for shortness of breath. Musculoskeletal: Positive for arthritis. Neurological: Positive for dizziness and light-headedness. Vitals:    10/13/21 1301   BP: 116/72   Pulse: 50   Weight: 124 lb 9.6 oz (56.5 kg)   Height: 5' 1\" (1.549 m)     No flowsheet data found. Wt Readings from Last 3 Encounters:   10/13/21 124 lb 9.6 oz (56.5 kg)   09/27/21 123 lb 9.6 oz (56.1 kg)   08/06/21 126 lb 12.8 oz (57.5 kg)     Body mass index is 23.54 kg/m². Physical Exam  Constitutional:       Appearance: Normal appearance. HENT:      Head: Normocephalic and atraumatic. Eyes:      Conjunctiva/sclera: Conjunctivae normal.      Pupils: Pupils are equal, round, and reactive to light. Cardiovascular:      Rate and Rhythm: Normal rate and regular rhythm. Pulses: Normal pulses. Heart sounds: Normal heart sounds. Pulmonary:      Effort: Pulmonary effort is normal.      Breath sounds: Normal breath sounds. No rales. Chest:      Chest wall: No tenderness. Abdominal:      General: There is no distension. Palpations: Abdomen is soft. Tenderness: There is no abdominal tenderness. Musculoskeletal:         General: No tenderness. Cervical back: Normal range of motion and neck supple. Right lower leg: No edema. Left lower leg: No edema. Skin:     General: Skin is warm and dry. Capillary Refill: Capillary refill takes less than 2 seconds. Neurological:      General: No focal deficit present. Mental Status: She is alert and oriented to person, place, and time. Psychiatric:         Mood and Affect: Mood normal.         Behavior: Behavior normal.                Current Outpatient Medications   Medication Sig Dispense Refill    aspirin (EQ ASPIRIN ADULT LOW DOSE) 81 MG EC tablet Take 1 tablet by mouth daily 30 tablet 5    silver sulfADIAZINE (SILVADENE) 1 % cream Apply topically daily.  25 g 0    escitalopram (LEXAPRO) 20 MG tablet Take 1 tablet by mouth daily 90 tablet 1    albuterol sulfate HFA (PROVENTIL HFA) 108 (90 Base) MCG/ACT inhaler Inhale 2 puffs into the lungs every 6 hours as needed for Wheezing 3 Inhaler 1    ipratropium-albuterol (DUONEB) 0.5-2.5 (3) MG/3ML SOLN nebulizer solution Inhale 3 mLs into the lungs every 4 hours (while awake) 360 mL 1    pantoprazole (PROTONIX) 40 MG tablet TAKE 1 TABLET NIGHTLY 90 tablet 1    rOPINIRole (REQUIP) 0.25 MG tablet Take 1 tablet by mouth nightly 90 tablet 1    ondansetron (ZOFRAN) 4 MG tablet Take 1 tablet by mouth every 8 hours as needed for Nausea or Vomiting 30 tablet 5    amLODIPine (NORVASC) 5 MG tablet Take 1 tablet by mouth daily 90 tablet 3    sucralfate (CARAFATE) 1 GM tablet Take 1 tablet by mouth 4 times daily 120 tablet 3    Budeson-Glycopyrrol-Formoterol 160-9-4.8 MCG/ACT AERO Inhale 2 puffs into the lungs 2 times daily 1 Inhaler 5    timolol (TIMOPTIC) 0.5 % ophthalmic solution INSTILL 1 DROP INTO AFFECTED EYE(S) ONCE DAILY IN THE MORNING      Tiotropium Bromide-Olodaterol 2.5-2.5 MCG/ACT AERS Inhale 2 puffs into the lungs daily 1 Inhaler 5    Handicap Placard MISC by Does not apply route Please provide handicap placard for 5 years. Diagnosis: Respiratory condition 1 each 0    albuterol sulfate  (90 Base) MCG/ACT inhaler Inhale 2 puffs into the lungs every 6 hours as needed for Wheezing 1 Inhaler 5    levothyroxine (SYNTHROID) 75 MCG tablet Take 1 tablet by mouth daily 90 tablet 3    Timolol (TIMOPTIC) 0.5 % (DAILY) SOLN ophthalmic solution Place 1 drop into both eyes daily      vitamin B-12 (CYANOCOBALAMIN) 1000 MCG tablet Take 1,000 mcg by mouth daily OTC      senna-docusate (PERICOLACE) 8.6-50 MG per tablet Take 1 tablet by mouth 2 times daily       Calcium-Magnesium-Zinc 167-83-8 MG TABS Take 1 tablet by mouth daily OTC      OXYGEN Inhale 2 L into the lungs nightly as needed (uses as needed nightly)       Cholecalciferol (VITAMIN D3) 2000 UNITS CAPS Take 1 capsule by mouth nightly OTC       No current facility-administered medications for this visit. All pertinent data reviewed and discussed with patient  Echocardiogram April 2021  Left ventricular function is normal, EF is estimated at 55-60%. No evidence of diastolic dysfunction. No regional wall motion abnormalities were detected. Mildly dilated left atrium. Normally functioning prosthetic valve in aortic position with a mean   gradient of 4 mmHg. Mitral annular calcification is present. Mild mitral stenosis with a mean gradient of 3mmHg. Mild mitral and tricuspid regurgitation is present. RVSP is 29 mmHg. No evidence of pericardial effusion. ASSESSMENT/PLAN:    D  H/o TAVR  Echocardiogram April 2021 shows normal functioning prosthetic valve in aortic position with a mean gradient of 4 mmHg.   Stable   Continue to follow       Hypertension   Blood pressure is Controlled  Encouraged a low sodium diet  Recommend to continue with amlodipine       Bradycardia  EKG

## 2021-10-25 ENCOUNTER — HOSPITAL ENCOUNTER (OUTPATIENT)
Dept: GENERAL RADIOLOGY | Age: 86
Discharge: HOME | End: 2021-10-25
Payer: MEDICARE

## 2021-10-25 DIAGNOSIS — M75.111 NONTRAUMATIC INCOMPLETE TEAR OF RIGHT ROTATOR CUFF: ICD-10-CM

## 2021-10-25 DIAGNOSIS — Z96.611 PRESENCE OF RIGHT ARTIFICIAL SHOULDER JOINT: ICD-10-CM

## 2021-10-25 DIAGNOSIS — M25.111 FISTULA OF RIGHT SHOULDER: ICD-10-CM

## 2021-10-25 PROCEDURE — 6360000004 HC RX CONTRAST MEDICATION: Performed by: ORTHOPAEDIC SURGERY

## 2021-10-25 PROCEDURE — 77002 NEEDLE LOCALIZATION BY XRAY: CPT

## 2021-10-25 PROCEDURE — 23350 INJECTION FOR SHOULDER X-RAY: CPT

## 2021-10-25 RX ADMIN — IOPAMIDOL 10 ML: 755 INJECTION, SOLUTION INTRAVENOUS at 12:50

## 2021-11-01 ENCOUNTER — OFFICE VISIT (OUTPATIENT)
Dept: GASTROENTEROLOGY | Age: 86
End: 2021-11-01
Payer: MEDICARE

## 2021-11-01 VITALS
WEIGHT: 121.8 LBS | BODY MASS INDEX: 23 KG/M2 | SYSTOLIC BLOOD PRESSURE: 116 MMHG | TEMPERATURE: 97.5 F | DIASTOLIC BLOOD PRESSURE: 60 MMHG | OXYGEN SATURATION: 99 % | HEIGHT: 61 IN | HEART RATE: 46 BPM

## 2021-11-01 DIAGNOSIS — K21.9 GASTROESOPHAGEAL REFLUX DISEASE WITHOUT ESOPHAGITIS: Primary | ICD-10-CM

## 2021-11-01 PROCEDURE — 99213 OFFICE O/P EST LOW 20 MIN: CPT | Performed by: SPECIALIST

## 2021-11-01 NOTE — PROGRESS NOTES
Gastroenterology Office Note            Khalida Rausch. Katia Molina MD      Subjective:      Patient ID:      Nelda Sheriff                 1935    HPI:   Doing well- taking Protonix every morning and Carafate as needed- doing better.  No vomiting or dysphagia      Review of Systems:    Pertinent items included in the HPI      Objective:   PHYSICAL EXAM:    Vitals:  /60 (Site: Left Upper Arm, Position: Sitting, Cuff Size: Small Adult)   Pulse (!) 46   Temp 97.5 °F (36.4 °C) (Infrared)   Ht 5' 1\" (1.549 m)   Wt 121 lb 12.8 oz (55.2 kg)   SpO2 99%   BMI 23.01 kg/m²   CONSTITUTIONAL: alert    LUNGS:  clear to auscultation  CARDIOVASCULAR:  regular rate and rhythm and no murmur noted  ABDOMEN:  normal bowel sounds, non-distended, non-tender and no masses palpated, no hepatosplenomegaly  EXTREMITIES: no clubbing, cyanosis, or edema    Assessment:      1) S/P gastric bypass  2) GERD      Plan:      Continue Protonix every morning and Carafate prn  Return as needed  Call if any problems

## 2021-11-11 ENCOUNTER — HOSPITAL ENCOUNTER (EMERGENCY)
Age: 86
Discharge: HOME | DRG: 871 | End: 2021-11-11
Attending: EMERGENCY MEDICINE
Payer: MEDICARE

## 2021-11-11 VITALS
WEIGHT: 121 LBS | SYSTOLIC BLOOD PRESSURE: 129 MMHG | HEART RATE: 60 BPM | DIASTOLIC BLOOD PRESSURE: 57 MMHG | HEIGHT: 61 IN | BODY MASS INDEX: 22.84 KG/M2 | RESPIRATION RATE: 16 BRPM | TEMPERATURE: 98 F | OXYGEN SATURATION: 98 %

## 2021-11-11 DIAGNOSIS — R42 VERTIGO: Primary | ICD-10-CM

## 2021-11-11 PROCEDURE — 99284 EMERGENCY DEPT VISIT MOD MDM: CPT

## 2021-11-11 PROCEDURE — 96374 THER/PROPH/DIAG INJ IV PUSH: CPT

## 2021-11-11 PROCEDURE — 6360000002 HC RX W HCPCS: Performed by: EMERGENCY MEDICINE

## 2021-11-11 PROCEDURE — 6370000000 HC RX 637 (ALT 250 FOR IP): Performed by: EMERGENCY MEDICINE

## 2021-11-11 RX ORDER — MECLIZINE HYDROCHLORIDE 25 MG/1
25 TABLET ORAL ONCE
Status: COMPLETED | OUTPATIENT
Start: 2021-11-11 | End: 2021-11-11

## 2021-11-11 RX ORDER — MECLIZINE HYDROCHLORIDE 25 MG/1
25 TABLET ORAL 3 TIMES DAILY PRN
Qty: 15 TABLET | Refills: 0 | Status: SHIPPED | OUTPATIENT
Start: 2021-11-11 | End: 2021-11-21

## 2021-11-11 RX ORDER — ONDANSETRON 2 MG/ML
8 INJECTION INTRAMUSCULAR; INTRAVENOUS ONCE
Status: COMPLETED | OUTPATIENT
Start: 2021-11-11 | End: 2021-11-11

## 2021-11-11 RX ADMIN — MECLIZINE HYDROCHLORIDE 25 MG: 25 TABLET ORAL at 04:09

## 2021-11-11 RX ADMIN — ONDANSETRON 8 MG: 2 INJECTION INTRAMUSCULAR; INTRAVENOUS at 04:09

## 2021-11-11 NOTE — ED PROVIDER NOTES
Triage Chief Complaint:   Nausea    [place]:  Han Crawford is a 80 y.o. female that presents with dizziness and nausea. The patient states that she was lying in bed about an hour prior to arrival when she had the onset of room spinning sensation associated with nausea, vomiting and sweating. States that symptoms are worse with head movement and better when she lies still with her eyes closed. States that she has had episodes like this before in the past but this is more severe. Denies any chest pain, difficulty breathing, fevers, cough, abdominal pain, headache, vision changes, motor or sensory deficits. States that she was in her normal state of health until symptoms began. ROS:  At least 10 systems reviewed and otherwise acutely negative except as in the [address]     Past Medical History:   Diagnosis Date    Arthritis     generalized    Asthma     Blood transfusion 2004    No reaction    Chronic bronchitis (HCC)     COPD (chronic obstructive pulmonary disease) ([facility] [address]     summer 2014    Echocardiogram 04/09/2021    EF 55-60%, Mild dilated LA, Mild annular calcification present, Mild mitral stenosis, Mild MR & TR.    Fatigue     H/O cardiac catheterization 06/15/2017    mild lad and cx disease    H/O cardiovascular stress test 08/22/2019    H/O Doppler ultrasound 4/7/2016 3/19/12    carotid-4/16 WNL 3/12right mild less than 50%and left wnl    H/O Doppler ultrasound 6/14/017    carotid - mod disease EILEEN, mild disease LICA    H/O echocardiogram 07/23/2010    H/O echocardiogram 04/15/2016    EF 60% sclerotic AV mildly stenosed-recommend yearly echo    H/O echocardiogram 08/22/2019    EF 55-60%, Minimal concentric left ventricular hypertrophy, left atrium is mildly dilated, severe aortic stenosis, Mitral annular calcification is present, Mild AR, Mild-Mod MR, Mild TR, No pericardial effusion     H/O echocardiogram 09/23/2019    H/O exercise stress test 06/14/2017    abnormal    History of cardiac cath 08/28/2019    Severe AS,   TAVR??  History of nuclear stress test 03/14/2017    EF 75%. Normal study.     Holter monitor, abnormal 02/22/2011    infrequent APC are seen    Normal cardiac stress test 07/23/2010    EF 70%, no ischemia    On home O2     only uses as needed, nightly prn    Syncope and collapse     Tachycardia     HX of tachycardia - had a cardioablation    Thyroid disease      Past Surgical History:   Procedure Laterality Date    AORTIC VALVE REPAIR N/A 09/09/2019    Core Valve EVOLUT 26mm [id number]    AORTIC VALVE REPLACEMENT N/A 09/11/2019    TAVR; Medtronic Evlout 26    BREAST SURGERY  2009    bilat    CARDIAC CATHETERIZATION  03/02/2011    mild to moderate disease of diagonal and proximal RCA   [address]    ablation    CARPAL TUNNEL RELEASE  1990's    bilat    CHOLECYSTECTOMY  1961    open choley    COLONOSCOPY      DILATATION, ESOPHAGUS      ENDOSCOPY, COLON, DIAGNOSTIC  07/03/2017    s/p gastric bypass otherwise normal    FINGER SURGERY      right middle, thumb and index finger (screw and pin in right index finger)    GASTRIC RESTRICTION SURGERY  1984    stapled    HERNIA REPAIR  unknown    Inc hernia hernia repair    HIP FRACTURE SURGERY Left 11/04/2015    Left hip nail    HYSTERECTOMY  1966    Luke w/ BSO    JOINT REPLACEMENT  2004    right knee    LIPECTOMY  1990's    OTHER SURGICAL HISTORY  05/01/2012    Gastrojejunostomy/partial gastrectomy    SHOULDER ARTHROPLASTY Left 10/2017    TOE SURGERY  Early 2000's    hammer toes and bunions     Family History   Problem Relation Age of Onset    Diabetes Mother     Other Mother         thyroid    Heart Disease Father     Arthritis Father     High Blood Pressure Father     High Cholesterol Father     Other Father         glaucoma    Other Sister         thyroid, glaucoma    Diabetes Brother     Other Sister     Vision Loss Sister         lung problems, smoker    Cancer Sister         throat cancer  Other Son         HX of clots    Early Death Son         Hit by car and killed.  High Blood Pressure Daughter     Stroke Son         No residual    Other Son         HX myocarditis     Social History     Socioeconomic History    Marital status:      Spouse name: Not on file    Number of children: Not on file    Years of education: Not on file    Highest education level: Not on file   Occupational History    Not on file   Tobacco Use    Smoking status: Former Smoker     Packs/day: 3.00     Years: 5.00     Pack years: 15.00     Quit date: 1962     Years since quittin.9    Smokeless tobacco: Never Used   Substance and Sexual Activity    Alcohol use: No    Drug use: No    Sexual activity: Not on file   Other Topics Concern    Not on file   Social History Narrative    Not on file     Social Determinants of Health     Financial Resource Strain:     Difficulty of Paying Living Expenses: Not on file   Food Insecurity:     Worried About Running Out of Food in the Last Year: Not on file    Vicki of Food in the Last Year: Not on file   Transportation Needs:     Lack of Transportation (Medical): Not on file    Lack of Transportation (Non-Medical):  Not on file   Physical Activity:     Days of Exercise per Week: Not on file    Minutes of Exercise per Session: Not on file   Stress:     Feeling of Stress : Not on file   Social Connections:     Frequency of Communication with Friends and Family: Not on file    Frequency of Social Gatherings with Friends and Family: Not on file    Attends Spiritism Services: Not on file    Active Member of Clubs or Organizations: Not on file    Attends Club or Organization Meetings: Not on file    Marital Status: Not on file   Intimate Partner Violence:     Fear of Current or Ex-Partner: Not on file    Emotionally Abused: Not on file    Physically Abused: Not on file    Sexually Abused: Not on file   Housing Stability:     Unable to Pay for Housing in the Last Year: Not on file    Number of Places Lived in the Last Year: Not on file    Unstable Housing in the Last Year: Not on file     No current facility-administered medications for this encounter. Current Outpatient Medications   Medication Sig Dispense Refill    meclizine (ANTIVERT) 25 MG tablet Take 1 tablet by mouth 3 times daily as needed for Dizziness 15 tablet 0    UNABLE TO FIND [employer] angel luis mushroom      UNABLE TO FIND Cannabis oil cooked down prn for pain      aspirin (EQ ASPIRIN ADULT LOW DOSE) 81 MG EC tablet Take 1 tablet by mouth daily (Patient not taking: Reported on 11/1/2021) 30 tablet 5    silver sulfADIAZINE (SILVADENE) 1 % cream Apply topically daily.  25 g 0    escitalopram (LEXAPRO) 20 MG tablet Take 1 tablet by mouth daily 90 tablet 1    albuterol sulfate HFA (PROVENTIL HFA) 108 (90 Base) MCG/ACT inhaler Inhale 2 puffs into the lungs every 6 hours as needed for Wheezing 3 Inhaler 1    ipratropium-albuterol (DUONEB) 0.5-2.5 (3) MG/3ML SOLN nebulizer solution Inhale 3 mLs into the lungs every 4 hours (while awake) 360 mL 1    pantoprazole (PROTONIX) 40 MG tablet TAKE 1 TABLET NIGHTLY 90 tablet 1    rOPINIRole (REQUIP) 0.25 MG tablet Take 1 tablet by mouth nightly (Patient not taking: Reported on 11/1/2021) 90 tablet 1    ondansetron (ZOFRAN) 4 MG tablet Take 1 tablet by mouth every 8 hours as needed for Nausea or Vomiting 30 tablet 5    amLODIPine (NORVASC) 5 MG tablet Take 1 tablet by mouth daily 90 tablet 3    sucralfate (CARAFATE) 1 GM tablet Take 1 tablet by mouth 4 times daily 120 tablet 3    Budeson-Glycopyrrol-Formoterol 160-9-4.8 MCG/ACT AERO Inhale 2 puffs into the lungs 2 times daily 1 Inhaler 5    timolol (TIMOPTIC) 0.5 % ophthalmic solution INSTILL 1 DROP INTO AFFECTED EYE(S) ONCE DAILY IN THE MORNING      Tiotropium Bromide-Olodaterol 2.5-2.5 MCG/ACT AERS Inhale 2 puffs into the lungs daily 1 Inhaler 5    Handicap Placard MISC by Does not apply route Please provide handicap placard for 5 years. Diagnosis: Respiratory condition 1 each 0    albuterol sulfate  (90 Base) MCG/ACT inhaler Inhale 2 puffs into the lungs every 6 hours as needed for Wheezing 1 Inhaler 5    levothyroxine (SYNTHROID) 75 MCG tablet Take 1 tablet by mouth daily (Patient taking differently: Take 50 mcg by mouth daily ) 90 tablet 3    Timolol (TIMOPTIC) 0.5 % (DAILY) SOLN ophthalmic solution Place 1 drop into both eyes daily      vitamin B-12 (CYANOCOBALAMIN) 1000 MCG tablet Take 1,000 mcg by mouth daily OTC      senna-docusate (PERICOLACE) 8.6-50 MG per tablet Take 1 tablet by mouth 2 times daily       Calcium-Magnesium-Zinc 167-83-8 MG TABS Take 1 tablet by mouth daily OTC      OXYGEN Inhale 2 L into the lungs nightly as needed (uses as needed nightly)       Cholecalciferol (VITAMIN D3) 2000 UNITS CAPS Take 1 capsule by mouth nightly OTC       Allergies   Allergen Reactions    Morphine Anaphylaxis     Dilaudid OK    Bactrim [Sulfamethoxazole-Trimethoprim]     Butorphanol      Other reaction(s): Other - comment required  \"out of body experience\"    Influenza Vaccines      Ended in hospital stay for 3 days told by md not to get it anymore     Lactose Intolerance (Gi) Nausea Only    Phenergan [Promethazine Hcl] Other (See Comments)     Makes me jerk all over. Zofran OK    Promethazine     Stadol [Butorphanol Tartrate] Other (See Comments)     Out of body experience. Was told it was a near death experience and was placed in ICU. Dilaudid OK    Tape [place] [employer] Tape] Other (See Comments)     Plastic cause itching and rash. Paper tape causes my skin to blister.  (Tega-derm and Coban OK to use.)       Nursing Notes Reviewed    Physical Exam:  ED Triage Vitals   Enc Vitals Group      BP 11/11/21 0308 (!) 182/77      Pulse 11/11/21 0307 61      Resp 11/11/21 0307 16      Temp --       Temp src --       SpO2 11/11/21 0307 98 %      Weight 11/11/21 0305 121 lb (54.9 kg) Height 11/11/21 0305 5' 1\" (1.549 m)      Head Circumference --       Peak Flow --       Pain Score --       Pain Loc --       Pain Edu? --       Excl. in [address]? --      GENERAL APPEARANCE: Awake and alert. Cooperative. No acute distress. HEAD: Normocephalic. Atraumatic. EYES: EOM's grossly intact. Sclera anicteric. ENT: Mucous membranes are moist. Tolerates saliva. No trismus. NECK: Supple. No meningismus. Trachea midline. HEART: RRR. Radial pulses 2+. LUNGS: Respirations unlabored. CTAB  ABDOMEN: Soft. Non-tender. No guarding or rebound. EXTREMITIES: No acute deformities. SKIN: Warm and dry. NEUROLOGICAL: EOMI, PERRL, symmetric eyebrow raise and nasolabial folds, tongue midline, normal sensation in all branches of trigeminal nerve. Full strength and sensation in the bilateral upper and lower extremities. DTRs 2+ in bilateral patella distributions. No dysmetria. No slurred speech. No aphasia. Bilateral horizontal nystagmus  PSYCHIATRIC: Normal mood. I have reviewed and interpreted all of the currently available lab results from this visit (if applicable):  No results found for this visit on 11/11/21. Radiographs (if obtained):  [] The following radiograph was interpreted by myself in the absence of a radiologist:  [] Radiologist's Report Reviewed:    EKG (if obtained): (All EKG's are interpreted by myself in the absence of a cardiologist)    MDM:  Plan of care is discussed thoroughly with the patient and family if present. If performed, all imaging and lab work also discussed with patient. All relevant prior results and chart reviewed if available. Delmy Gomez as above. She presents with recurrent vertiginous symptoms associated with head movement. She has a nonfocal neurologic exam except for some horizontal nystagmus. I believe that given the patient's history and current presentation that this is consistent with peripheral vertigo, particularly BPPV.   Low suspicion for other acute emergent process that requires further advanced imaging at this time. I do not suspect stroke or TIA. The patient is given Zofran and meclizine here with significant improvement in symptoms on reevaluation. She will be discharged with meclizine and encouraged to follow-up closely with her PCP or return if she has any worsening symptoms. Patient is agreeable with this plan of care. Clinical Impression:  1.  Vertigo      (Please note that portions of this note may have been completed with a voice recognition program. Efforts were made to edit the dictations but occasionally words are mis-transcribed.)    MD Leanna Ruggiero MD  11/11/21 3134

## 2021-11-14 ENCOUNTER — APPOINTMENT (OUTPATIENT)
Dept: CT IMAGING | Age: 86
DRG: 871 | End: 2021-11-14
Payer: MEDICARE

## 2021-11-14 ENCOUNTER — APPOINTMENT (OUTPATIENT)
Dept: GENERAL RADIOLOGY | Age: 86
DRG: 871 | End: 2021-11-14
Payer: MEDICARE

## 2021-11-14 ENCOUNTER — HOSPITAL ENCOUNTER (INPATIENT)
Age: 86
LOS: 12 days | Discharge: NURSING, REHAB OR LONG-TERM CARE | DRG: 871 | End: 2021-11-26
Attending: STUDENT IN AN ORGANIZED HEALTH CARE EDUCATION/TRAINING PROGRAM | Admitting: INTERNAL MEDICINE
Payer: MEDICARE

## 2021-11-14 DIAGNOSIS — A41.9 SEPTICEMIA (HCC): ICD-10-CM

## 2021-11-14 DIAGNOSIS — J96.02 ACUTE RESPIRATORY FAILURE WITH HYPOXIA AND HYPERCAPNIA (HCC): Primary | ICD-10-CM

## 2021-11-14 DIAGNOSIS — J96.01 ACUTE RESPIRATORY FAILURE WITH HYPOXIA AND HYPERCAPNIA (HCC): Primary | ICD-10-CM

## 2021-11-14 PROBLEM — J96.00 ACUTE RESPIRATORY FAILURE (HCC): Status: ACTIVE | Noted: 2021-11-14

## 2021-11-14 LAB
ABO/RH: NORMAL
ACETAMINOPHEN LEVEL: <5 UG/ML (ref 15–30)
ALBUMIN SERPL-MCNC: 4.4 GM/DL (ref 3.4–5)
ALCOHOL SCREEN SERUM: <0.01 %WT/VOL
ALP BLD-CCNC: 77 IU/L (ref 40–129)
ALT SERPL-CCNC: 43 U/L (ref 10–40)
AMPHETAMINES: NEGATIVE
ANION GAP SERPL CALCULATED.3IONS-SCNC: 24 MMOL/L (ref 4–16)
ANTIBODY SCREEN: NEGATIVE
AST SERPL-CCNC: 77 IU/L (ref 15–37)
ATYPICAL LYMPHOCYTE ABSOLUTE COUNT: ABNORMAL
BACTERIA: NEGATIVE /HPF
BARBITURATE SCREEN URINE: NEGATIVE
BASE EXCESS MIXED: 1.6 (ref 0–2.3)
BASE EXCESS: 10 (ref 0–2.4)
BASE EXCESS: 17 (ref 0–2.4)
BENZODIAZEPINE SCREEN, URINE: NEGATIVE
BILIRUB SERPL-MCNC: 0.6 MG/DL (ref 0–1)
BILIRUBIN URINE: NEGATIVE MG/DL
BLOOD, URINE: NEGATIVE
BUN BLDV-MCNC: 11 MG/DL (ref 6–23)
CALCIUM SERPL-MCNC: 9.1 MG/DL (ref 8.3–10.6)
CANNABINOID SCREEN URINE: ABNORMAL
CARBON MONOXIDE, BLOOD: 0 % (ref 0–5)
CARBON MONOXIDE, BLOOD: 0.5 % (ref 0–5)
CHLORIDE BLD-SCNC: 98 MMOL/L (ref 99–110)
CLARITY: CLEAR
CO2 CONTENT: 17.3 MMOL/L (ref 19–24)
CO2 CONTENT: 23.2 MMOL/L (ref 19–24)
CO2: 15 MMOL/L (ref 21–32)
COCAINE METABOLITE: NEGATIVE
COLOR: YELLOW
COMMENT: ABNORMAL
CREAT SERPL-MCNC: 0.7 MG/DL (ref 0.6–1.1)
DIFFERENTIAL TYPE: ABNORMAL
DOSE AMOUNT: ABNORMAL
DOSE AMOUNT: ABNORMAL
DOSE TIME: ABNORMAL
DOSE TIME: ABNORMAL
EOSINOPHILS ABSOLUTE: 0.8 K/CU MM
EOSINOPHILS RELATIVE PERCENT: 6 % (ref 0–3)
GFR AFRICAN AMERICAN: >60 ML/MIN/1.73M2
GFR NON-AFRICAN AMERICAN: >60 ML/MIN/1.73M2
GLUCOSE BLD-MCNC: 125 MG/DL (ref 70–99)
GLUCOSE BLD-MCNC: 158 MG/DL (ref 70–99)
GLUCOSE, URINE: NEGATIVE MG/DL
HCO3 ARTERIAL: 16.2 MMOL/L (ref 18–23)
HCO3 ARTERIAL: 22.4 MMOL/L (ref 18–23)
HCO3 VENOUS: 17 MMOL/L (ref 19–25)
HCT VFR BLD CALC: 50.6 % (ref 37–47)
HEMOGLOBIN: 15.6 GM/DL (ref 12.5–16)
INR BLD: 0.82 INDEX
KETONES, URINE: NEGATIVE MG/DL
LACTATE: 13.1 MMOL/L (ref 0.4–2)
LEUKOCYTE ESTERASE, URINE: ABNORMAL
LIPASE: 13 IU/L (ref 13–60)
LIPASE: 45 IU/L (ref 13–60)
LYMPHOCYTES ABSOLUTE: 9.5 K/CU MM
LYMPHOCYTES RELATIVE PERCENT: 71 % (ref 24–44)
MCH RBC QN AUTO: 33.3 PG (ref 27–31)
MCHC RBC AUTO-ENTMCNC: 30.8 % (ref 32–36)
MCV RBC AUTO: 107.9 FL (ref 78–100)
METHEMOGLOBIN ARTERIAL: 0 %
METHEMOGLOBIN ARTERIAL: 0 %
MONOCYTES ABSOLUTE: 0.3 K/CU MM
MONOCYTES RELATIVE PERCENT: 2 % (ref 0–4)
MUCUS: ABNORMAL HPF
NITRITE URINE, QUANTITATIVE: NEGATIVE
O2 SAT, VEN: 49.1 % (ref 50–70)
O2 SATURATION: 95.6 % (ref 96–97)
O2 SATURATION: 98.6 % (ref 96–97)
OPIATES, URINE: NEGATIVE
OXYCODONE: NEGATIVE
PCO2 ARTERIAL: 25 MMHG (ref 32–45)
PCO2 ARTERIAL: 37 MMHG (ref 32–45)
PCO2, VEN: 81 MMHG (ref 38–52)
PDW BLD-RTO: 12.7 % (ref 11.7–14.9)
PH BLOOD: 7.25 (ref 7.34–7.45)
PH BLOOD: 7.56 (ref 7.34–7.45)
PH VENOUS: 6.93 (ref 7.32–7.42)
PH, URINE: 5 (ref 5–8)
PHENCYCLIDINE, URINE: NEGATIVE
PLATELET # BLD: 244 K/CU MM (ref 140–440)
PMV BLD AUTO: 10.2 FL (ref 7.5–11.1)
PO2 ARTERIAL: 170 MMHG (ref 75–100)
PO2 ARTERIAL: 241 MMHG (ref 75–100)
PO2, VEN: 35 MMHG (ref 28–48)
POTASSIUM SERPL-SCNC: 5.6 MMOL/L (ref 3.5–5.1)
PRO-BNP: 888.5 PG/ML
PROTEIN UA: NEGATIVE MG/DL
PROTHROMBIN TIME: 10.5 SECONDS (ref 11.7–14.5)
RBC # BLD: 4.69 M/CU MM (ref 4.2–5.4)
RBC URINE: 2 /HPF (ref 0–6)
SALICYLATE LEVEL: <0.3 MG/DL (ref 15–30)
SARS-COV-2, NAAT: NOT DETECTED
SEGMENTED NEUTROPHILS ABSOLUTE COUNT: 2.8 K/CU MM
SEGMENTED NEUTROPHILS RELATIVE PERCENT: 21 % (ref 36–66)
SODIUM BLD-SCNC: 137 MMOL/L (ref 135–145)
SOURCE: NORMAL
SPECIFIC GRAVITY UA: 1.01 (ref 1–1.03)
SQUAMOUS EPITHELIAL: <1 /HPF
TOTAL CK: 187 IU/L (ref 26–140)
TOTAL PROTEIN: 7 GM/DL (ref 6.4–8.2)
TRICHOMONAS: ABNORMAL /HPF
TROPONIN T: <0.01 NG/ML
UROBILINOGEN, URINE: NEGATIVE MG/DL (ref 0.2–1)
WBC # BLD: 13.4 K/CU MM (ref 4–10.5)
WBC UA: 2 /HPF (ref 0–5)

## 2021-11-14 PROCEDURE — 6360000002 HC RX W HCPCS: Performed by: STUDENT IN AN ORGANIZED HEALTH CARE EDUCATION/TRAINING PROGRAM

## 2021-11-14 PROCEDURE — 71045 X-RAY EXAM CHEST 1 VIEW: CPT

## 2021-11-14 PROCEDURE — 86141 C-REACTIVE PROTEIN HS: CPT

## 2021-11-14 PROCEDURE — G0480 DRUG TEST DEF 1-7 CLASSES: HCPCS

## 2021-11-14 PROCEDURE — 70496 CT ANGIOGRAPHY HEAD: CPT

## 2021-11-14 PROCEDURE — 96375 TX/PRO/DX INJ NEW DRUG ADDON: CPT

## 2021-11-14 PROCEDURE — 83690 ASSAY OF LIPASE: CPT

## 2021-11-14 PROCEDURE — 93005 ELECTROCARDIOGRAM TRACING: CPT | Performed by: STUDENT IN AN ORGANIZED HEALTH CARE EDUCATION/TRAINING PROGRAM

## 2021-11-14 PROCEDURE — 6370000000 HC RX 637 (ALT 250 FOR IP): Performed by: STUDENT IN AN ORGANIZED HEALTH CARE EDUCATION/TRAINING PROGRAM

## 2021-11-14 PROCEDURE — 6360000004 HC RX CONTRAST MEDICATION: Performed by: STUDENT IN AN ORGANIZED HEALTH CARE EDUCATION/TRAINING PROGRAM

## 2021-11-14 PROCEDURE — 82962 GLUCOSE BLOOD TEST: CPT

## 2021-11-14 PROCEDURE — 96365 THER/PROPH/DIAG IV INF INIT: CPT

## 2021-11-14 PROCEDURE — 83605 ASSAY OF LACTIC ACID: CPT

## 2021-11-14 PROCEDURE — 94002 VENT MGMT INPAT INIT DAY: CPT

## 2021-11-14 PROCEDURE — 87040 BLOOD CULTURE FOR BACTERIA: CPT

## 2021-11-14 PROCEDURE — 70450 CT HEAD/BRAIN W/O DYE: CPT

## 2021-11-14 PROCEDURE — 87635 SARS-COV-2 COVID-19 AMP PRB: CPT

## 2021-11-14 PROCEDURE — 82803 BLOOD GASES ANY COMBINATION: CPT

## 2021-11-14 PROCEDURE — 86850 RBC ANTIBODY SCREEN: CPT

## 2021-11-14 PROCEDURE — 74174 CTA ABD&PLVS W/CONTRAST: CPT

## 2021-11-14 PROCEDURE — 31500 INSERT EMERGENCY AIRWAY: CPT

## 2021-11-14 PROCEDURE — 6360000002 HC RX W HCPCS

## 2021-11-14 PROCEDURE — 2580000003 HC RX 258: Performed by: STUDENT IN AN ORGANIZED HEALTH CARE EDUCATION/TRAINING PROGRAM

## 2021-11-14 PROCEDURE — 85610 PROTHROMBIN TIME: CPT

## 2021-11-14 PROCEDURE — 80307 DRUG TEST PRSMV CHEM ANLYZR: CPT

## 2021-11-14 PROCEDURE — 99285 EMERGENCY DEPT VISIT HI MDM: CPT

## 2021-11-14 PROCEDURE — 2500000003 HC RX 250 WO HCPCS: Performed by: STUDENT IN AN ORGANIZED HEALTH CARE EDUCATION/TRAINING PROGRAM

## 2021-11-14 PROCEDURE — 36556 INSERT NON-TUNNEL CV CATH: CPT

## 2021-11-14 PROCEDURE — 2000000000 HC ICU R&B

## 2021-11-14 PROCEDURE — 94761 N-INVAS EAR/PLS OXIMETRY MLT: CPT

## 2021-11-14 PROCEDURE — 2500000003 HC RX 250 WO HCPCS: Performed by: INTERNAL MEDICINE

## 2021-11-14 PROCEDURE — 86900 BLOOD TYPING SEROLOGIC ABO: CPT

## 2021-11-14 PROCEDURE — 6360000002 HC RX W HCPCS: Performed by: INTERNAL MEDICINE

## 2021-11-14 PROCEDURE — 82805 BLOOD GASES W/O2 SATURATION: CPT

## 2021-11-14 PROCEDURE — 36600 WITHDRAWAL OF ARTERIAL BLOOD: CPT

## 2021-11-14 PROCEDURE — 81001 URINALYSIS AUTO W/SCOPE: CPT

## 2021-11-14 PROCEDURE — 2580000003 HC RX 258: Performed by: INTERNAL MEDICINE

## 2021-11-14 PROCEDURE — 83880 ASSAY OF NATRIURETIC PEPTIDE: CPT

## 2021-11-14 PROCEDURE — 94640 AIRWAY INHALATION TREATMENT: CPT

## 2021-11-14 PROCEDURE — 85027 COMPLETE CBC AUTOMATED: CPT

## 2021-11-14 PROCEDURE — 2500000003 HC RX 250 WO HCPCS: Performed by: NURSE PRACTITIONER

## 2021-11-14 PROCEDURE — 84145 PROCALCITONIN (PCT): CPT

## 2021-11-14 PROCEDURE — 96366 THER/PROPH/DIAG IV INF ADDON: CPT

## 2021-11-14 PROCEDURE — 74018 RADEX ABDOMEN 1 VIEW: CPT

## 2021-11-14 PROCEDURE — 80053 COMPREHEN METABOLIC PANEL: CPT

## 2021-11-14 PROCEDURE — 84484 ASSAY OF TROPONIN QUANT: CPT

## 2021-11-14 PROCEDURE — 86901 BLOOD TYPING SEROLOGIC RH(D): CPT

## 2021-11-14 PROCEDURE — 71275 CT ANGIOGRAPHY CHEST: CPT

## 2021-11-14 PROCEDURE — 85007 BL SMEAR W/DIFF WBC COUNT: CPT

## 2021-11-14 PROCEDURE — 2700000000 HC OXYGEN THERAPY PER DAY

## 2021-11-14 PROCEDURE — 82550 ASSAY OF CK (CPK): CPT

## 2021-11-14 RX ORDER — ASPIRIN 81 MG/1
324 TABLET, CHEWABLE ORAL ONCE
Status: COMPLETED | OUTPATIENT
Start: 2021-11-14 | End: 2021-11-14

## 2021-11-14 RX ORDER — ONDANSETRON 2 MG/ML
4 INJECTION INTRAMUSCULAR; INTRAVENOUS EVERY 6 HOURS PRN
Status: DISCONTINUED | OUTPATIENT
Start: 2021-11-14 | End: 2021-11-26 | Stop reason: HOSPADM

## 2021-11-14 RX ORDER — FENTANYL CITRATE 50 UG/ML
INJECTION, SOLUTION INTRAMUSCULAR; INTRAVENOUS
Status: COMPLETED
Start: 2021-11-14 | End: 2021-11-14

## 2021-11-14 RX ORDER — VANCOMYCIN 1.75 G/350ML
25 INJECTION, SOLUTION INTRAVENOUS ONCE
Status: COMPLETED | OUTPATIENT
Start: 2021-11-14 | End: 2021-11-15

## 2021-11-14 RX ORDER — DEXTROSE MONOHYDRATE 50 MG/ML
100 INJECTION, SOLUTION INTRAVENOUS PRN
Status: DISCONTINUED | OUTPATIENT
Start: 2021-11-14 | End: 2021-11-26 | Stop reason: HOSPADM

## 2021-11-14 RX ORDER — DEXTROSE MONOHYDRATE 25 G/50ML
12.5 INJECTION, SOLUTION INTRAVENOUS PRN
Status: DISCONTINUED | OUTPATIENT
Start: 2021-11-14 | End: 2021-11-26 | Stop reason: HOSPADM

## 2021-11-14 RX ORDER — FENTANYL CITRATE 50 UG/ML
25 INJECTION, SOLUTION INTRAMUSCULAR; INTRAVENOUS
Status: DISCONTINUED | OUTPATIENT
Start: 2021-11-14 | End: 2021-11-20

## 2021-11-14 RX ORDER — ACETAMINOPHEN 325 MG/1
650 TABLET ORAL EVERY 6 HOURS PRN
Status: DISCONTINUED | OUTPATIENT
Start: 2021-11-14 | End: 2021-11-26 | Stop reason: HOSPADM

## 2021-11-14 RX ORDER — CALCIUM GLUCONATE 94 MG/ML
1000 INJECTION, SOLUTION INTRAVENOUS ONCE
Status: DISCONTINUED | OUTPATIENT
Start: 2021-11-14 | End: 2021-11-14

## 2021-11-14 RX ORDER — SODIUM CHLORIDE 9 MG/ML
25 INJECTION, SOLUTION INTRAVENOUS PRN
Status: DISCONTINUED | OUTPATIENT
Start: 2021-11-14 | End: 2021-11-26 | Stop reason: HOSPADM

## 2021-11-14 RX ORDER — POLYETHYLENE GLYCOL 3350 17 G/17G
17 POWDER, FOR SOLUTION ORAL DAILY PRN
Status: DISCONTINUED | OUTPATIENT
Start: 2021-11-14 | End: 2021-11-26 | Stop reason: HOSPADM

## 2021-11-14 RX ORDER — METHYLPREDNISOLONE SODIUM SUCCINATE 40 MG/ML
40 INJECTION, POWDER, LYOPHILIZED, FOR SOLUTION INTRAMUSCULAR; INTRAVENOUS EVERY 12 HOURS
Status: DISCONTINUED | OUTPATIENT
Start: 2021-11-14 | End: 2021-11-21

## 2021-11-14 RX ORDER — NICOTINE POLACRILEX 4 MG
15 LOZENGE BUCCAL PRN
Status: DISCONTINUED | OUTPATIENT
Start: 2021-11-14 | End: 2021-11-26 | Stop reason: HOSPADM

## 2021-11-14 RX ORDER — NOREPINEPHRINE BIT/0.9 % NACL 16MG/250ML
2-100 INFUSION BOTTLE (ML) INTRAVENOUS CONTINUOUS
Status: DISCONTINUED | OUTPATIENT
Start: 2021-11-14 | End: 2021-11-18

## 2021-11-14 RX ORDER — ETOMIDATE 2 MG/ML
INJECTION INTRAVENOUS DAILY PRN
Status: COMPLETED | OUTPATIENT
Start: 2021-11-14 | End: 2021-11-14

## 2021-11-14 RX ORDER — TIMOLOL MALEATE 5 MG/ML
1 SOLUTION/ DROPS OPHTHALMIC DAILY
Status: DISCONTINUED | OUTPATIENT
Start: 2021-11-14 | End: 2021-11-26 | Stop reason: HOSPADM

## 2021-11-14 RX ORDER — ACETAMINOPHEN 650 MG/1
650 SUPPOSITORY RECTAL EVERY 6 HOURS PRN
Status: DISCONTINUED | OUTPATIENT
Start: 2021-11-14 | End: 2021-11-26 | Stop reason: HOSPADM

## 2021-11-14 RX ORDER — ESCITALOPRAM OXALATE 10 MG/1
20 TABLET ORAL DAILY
Status: DISCONTINUED | OUTPATIENT
Start: 2021-11-14 | End: 2021-11-26 | Stop reason: HOSPADM

## 2021-11-14 RX ORDER — ONDANSETRON 4 MG/1
4 TABLET, ORALLY DISINTEGRATING ORAL EVERY 8 HOURS PRN
Status: DISCONTINUED | OUTPATIENT
Start: 2021-11-14 | End: 2021-11-26 | Stop reason: HOSPADM

## 2021-11-14 RX ORDER — ASPIRIN 81 MG/1
81 TABLET ORAL DAILY
Status: DISCONTINUED | OUTPATIENT
Start: 2021-11-14 | End: 2021-11-26 | Stop reason: HOSPADM

## 2021-11-14 RX ORDER — MAGNESIUM SULFATE IN WATER 40 MG/ML
2000 INJECTION, SOLUTION INTRAVENOUS PRN
Status: DISCONTINUED | OUTPATIENT
Start: 2021-11-14 | End: 2021-11-26 | Stop reason: HOSPADM

## 2021-11-14 RX ORDER — DEXMEDETOMIDINE HYDROCHLORIDE 4 UG/ML
.2-1.4 INJECTION, SOLUTION INTRAVENOUS CONTINUOUS
Status: DISCONTINUED | OUTPATIENT
Start: 2021-11-14 | End: 2021-11-14 | Stop reason: CLARIF

## 2021-11-14 RX ORDER — SUCRALFATE 1 G/1
1 TABLET ORAL 4 TIMES DAILY
Status: DISCONTINUED | OUTPATIENT
Start: 2021-11-14 | End: 2021-11-26 | Stop reason: HOSPADM

## 2021-11-14 RX ORDER — DEXAMETHASONE SODIUM PHOSPHATE 10 MG/ML
10 INJECTION, SOLUTION INTRAMUSCULAR; INTRAVENOUS ONCE
Status: COMPLETED | OUTPATIENT
Start: 2021-11-14 | End: 2021-11-14

## 2021-11-14 RX ORDER — SODIUM CHLORIDE 0.9 % (FLUSH) 0.9 %
5-40 SYRINGE (ML) INJECTION EVERY 12 HOURS SCHEDULED
Status: DISCONTINUED | OUTPATIENT
Start: 2021-11-14 | End: 2021-11-26 | Stop reason: HOSPADM

## 2021-11-14 RX ORDER — SODIUM CHLORIDE 0.9 % (FLUSH) 0.9 %
5-40 SYRINGE (ML) INJECTION PRN
Status: DISCONTINUED | OUTPATIENT
Start: 2021-11-14 | End: 2021-11-26 | Stop reason: HOSPADM

## 2021-11-14 RX ORDER — CHLORHEXIDINE GLUCONATE 0.12 MG/ML
15 RINSE ORAL 2 TIMES DAILY
Status: DISCONTINUED | OUTPATIENT
Start: 2021-11-14 | End: 2021-11-19

## 2021-11-14 RX ORDER — SODIUM CHLORIDE 9 MG/ML
INJECTION, SOLUTION INTRAVENOUS CONTINUOUS
Status: DISCONTINUED | OUTPATIENT
Start: 2021-11-14 | End: 2021-11-18

## 2021-11-14 RX ORDER — ROCURONIUM BROMIDE 10 MG/ML
INJECTION, SOLUTION INTRAVENOUS DAILY PRN
Status: COMPLETED | OUTPATIENT
Start: 2021-11-14 | End: 2021-11-14

## 2021-11-14 RX ORDER — PROPOFOL 10 MG/ML
5-50 INJECTION, EMULSION INTRAVENOUS
Status: DISCONTINUED | OUTPATIENT
Start: 2021-11-14 | End: 2021-11-16

## 2021-11-14 RX ORDER — ALBUTEROL SULFATE 2.5 MG/3ML
2.5 SOLUTION RESPIRATORY (INHALATION) 4 TIMES DAILY
Status: DISCONTINUED | OUTPATIENT
Start: 2021-11-14 | End: 2021-11-15

## 2021-11-14 RX ORDER — SODIUM CHLORIDE, SODIUM LACTATE, POTASSIUM CHLORIDE, AND CALCIUM CHLORIDE .6; .31; .03; .02 G/100ML; G/100ML; G/100ML; G/100ML
30 INJECTION, SOLUTION INTRAVENOUS ONCE
Status: COMPLETED | OUTPATIENT
Start: 2021-11-14 | End: 2021-11-14

## 2021-11-14 RX ORDER — PANTOPRAZOLE SODIUM 40 MG/1
40 TABLET, DELAYED RELEASE ORAL
Status: DISCONTINUED | OUTPATIENT
Start: 2021-11-15 | End: 2021-11-26 | Stop reason: HOSPADM

## 2021-11-14 RX ORDER — DEXTROSE MONOHYDRATE 25 G/50ML
25 INJECTION, SOLUTION INTRAVENOUS ONCE
Status: DISCONTINUED | OUTPATIENT
Start: 2021-11-14 | End: 2021-11-14

## 2021-11-14 RX ORDER — LEVOTHYROXINE SODIUM 0.03 MG/1
50 TABLET ORAL DAILY
Status: DISCONTINUED | OUTPATIENT
Start: 2021-11-14 | End: 2021-11-26 | Stop reason: HOSPADM

## 2021-11-14 RX ADMIN — FENTANYL CITRATE 25 MCG: 50 INJECTION, SOLUTION INTRAMUSCULAR; INTRAVENOUS at 20:38

## 2021-11-14 RX ADMIN — ETOMIDATE 15 MG: 2 INJECTION, SOLUTION INTRAVENOUS at 14:11

## 2021-11-14 RX ADMIN — Medication 50 MEQ: at 18:35

## 2021-11-14 RX ADMIN — ASPIRIN 324 MG: 81 TABLET, CHEWABLE ORAL at 14:50

## 2021-11-14 RX ADMIN — SODIUM CHLORIDE, POTASSIUM CHLORIDE, SODIUM LACTATE AND CALCIUM CHLORIDE 1647 ML: 600; 310; 30; 20 INJECTION, SOLUTION INTRAVENOUS at 18:35

## 2021-11-14 RX ADMIN — ROCURONIUM BROMIDE 100 MG: 10 SOLUTION INTRAVENOUS at 14:12

## 2021-11-14 RX ADMIN — SODIUM CHLORIDE: 9 INJECTION, SOLUTION INTRAVENOUS at 21:19

## 2021-11-14 RX ADMIN — DEXAMETHASONE SODIUM PHOSPHATE 10 MG: 10 INJECTION INTRAMUSCULAR; INTRAVENOUS at 15:51

## 2021-11-14 RX ADMIN — IOPAMIDOL 65 ML: 755 INJECTION, SOLUTION INTRAVENOUS at 15:18

## 2021-11-14 RX ADMIN — SODIUM CHLORIDE, PRESERVATIVE FREE 10 ML: 5 INJECTION INTRAVENOUS at 21:51

## 2021-11-14 RX ADMIN — METHYLPREDNISOLONE SODIUM SUCCINATE 40 MG: 40 INJECTION, POWDER, FOR SOLUTION INTRAMUSCULAR; INTRAVENOUS at 21:31

## 2021-11-14 RX ADMIN — Medication 2 MCG/MIN: at 21:23

## 2021-11-14 RX ADMIN — ALBUTEROL SULFATE 10 MG: 2.5 SOLUTION RESPIRATORY (INHALATION) at 15:37

## 2021-11-14 RX ADMIN — SODIUM ZIRCONIUM CYCLOSILICATE 10 G: 5 POWDER, FOR SUSPENSION ORAL at 18:35

## 2021-11-14 RX ADMIN — IOPAMIDOL 75 ML: 755 INJECTION, SOLUTION INTRAVENOUS at 14:54

## 2021-11-14 RX ADMIN — DEXMEDETOMIDINE 1 MCG/KG/HR: 100 INJECTION, SOLUTION, CONCENTRATE INTRAVENOUS at 19:13

## 2021-11-14 RX ADMIN — VANCOMYCIN 1250 MG: 1.75 INJECTION, SOLUTION INTRAVENOUS at 23:06

## 2021-11-14 RX ADMIN — DOXYCYCLINE 100 MG: 100 INJECTION, POWDER, LYOPHILIZED, FOR SOLUTION INTRAVENOUS at 21:47

## 2021-11-14 RX ADMIN — CEFEPIME HYDROCHLORIDE 2000 MG: 2 INJECTION, POWDER, FOR SOLUTION INTRAVENOUS at 15:51

## 2021-11-14 RX ADMIN — IOPAMIDOL 70 ML: 755 INJECTION, SOLUTION INTRAVENOUS at 16:44

## 2021-11-14 RX ADMIN — PROPOFOL 10 MCG/KG/MIN: 10 INJECTION, EMULSION INTRAVENOUS at 21:38

## 2021-11-14 ASSESSMENT — PULMONARY FUNCTION TESTS
PIF_VALUE: 16
PIF_VALUE: 0
PIF_VALUE: 16
PIF_VALUE: 17
PIF_VALUE: 16

## 2021-11-14 NOTE — ED NOTES
[address] paged hospitalist     Davi Maria  11/14/21 1724  [address] Dr Марина Massey returned call      Davi Maria  11/14/21 1739

## 2021-11-14 NOTE — ED PROVIDER NOTES
EMERGENCY DEPARTMENT ENCOUNTER      CHIEF COMPLAINT    Chief Complaint   Patient presents with    Other     unresponsive    Irregular Heart Beat     abnormal EKG       HPI    Talita Quintana is a 80 y.o. female with history significant for asthma/COPD, thyroid disease who presents with minimal responsiveness.     Patient was found to be in respiratory distress and unresponsiveness at [facility]'s parking lot apparently but was apparently okay earlier this morning, EMS found her a agonal breathing, attempted to intubate her however was unsuccessful given her muscle retraction therefore LMA was placed and patient arrived here continue to be in agonal breathing, with GCS of 3 no additional history was able to be obtained glucose was normal prior to arrival.       REVIEW OF SYSTEMS unable to obtain due to patient's mental status  Pertinent positives and negatives are delineated in HPI and ROS above, all other systems are reviewed and are negative    PAST MEDICAL HISTORY    Past Medical History:   Diagnosis Date    Arthritis     generalized    Asthma     Blood transfusion 2004    No reaction    [facility] [address]     COPD (chronic obstructive pulmonary disease) ([facility] [address]     summer 2014    Echocardiogram 04/09/2021    EF 55-60%, Mild dilated LA, Mild annular calcification present, Mild mitral stenosis, Mild MR & TR.    Fatigue     H/O cardiac catheterization 06/15/2017    mild lad and cx disease    H/O cardiovascular stress test 08/22/2019    H/O Doppler ultrasound 4/7/2016 3/19/12    carotid-4/16 WNL 3/12right mild less than 50%and left wnl    H/O Doppler ultrasound 6/14/017    carotid - mod disease EILEEN, mild disease LICA    H/O echocardiogram 07/23/2010    H/O echocardiogram 04/15/2016    EF 60% sclerotic AV mildly stenosed-recommend yearly echo    H/O echocardiogram 08/22/2019    EF 55-60%, Minimal concentric left ventricular hypertrophy, left atrium is mildly dilated, severe aortic stenosis, Mitral annular calcification is present, Mild AR, Mild-Mod MR, Mild TR, No pericardial effusion     H/O echocardiogram 09/23/2019    H/O exercise stress test 06/14/2017    abnormal    History of cardiac cath 08/28/2019    Severe AS,   TAVR??  History of nuclear stress test 03/14/2017    EF 75%. Normal study.     Holter monitor, abnormal 02/22/2011    infrequent APC are seen    Normal cardiac stress test 07/23/2010    EF 70%, no ischemia    On home O2     only uses as needed, nightly prn    Syncope and collapse     Tachycardia     HX of tachycardia - had a cardioablation    Thyroid disease      Medical history reviewed and no pertinent past medical history other than the ones mentioned above    SURGICAL HISTORY    Past Surgical History:   Procedure Laterality Date    AORTIC VALVE REPAIR N/A 09/09/2019    Core Valve EVOLUT 26mm [id number]    AORTIC VALVE REPLACEMENT N/A 09/11/2019    TAVR; [employer] Evlout 26    BREAST SURGERY  2009    bilat    CARDIAC CATHETERIZATION  03/02/2011    mild to moderate disease of diagonal and proximal RCA   [address]    ablation    CARPAL TUNNEL RELEASE  1990's    bilat    CHOLECYSTECTOMY  1961    open choley    COLONOSCOPY      DILATATION, ESOPHAGUS      ENDOSCOPY, COLON, DIAGNOSTIC  07/03/2017    s/p gastric bypass otherwise normal    FINGER SURGERY      right middle, thumb and index finger (screw and pin in right index finger)    GASTRIC RESTRICTION SURGERY  1984    stapled    HERNIA REPAIR  unknown    Inc hernia hernia repair    HIP FRACTURE SURGERY Left 11/04/2015    Left hip nail    HYSTERECTOMY  1966    Luke w/ BSO    JOINT REPLACEMENT  2004    right knee    LIPECTOMY  1990's    OTHER SURGICAL HISTORY  05/01/2012    Gastrojejunostomy/partial gastrectomy    SHOULDER ARTHROPLASTY Left 10/2017    TOE SURGERY  Early 2000's    hammer toes and bunions     Surgical history reviewed and no pertinent surgical history other than the ones mentioned above    CURRENT MEDICATIONS    Current Outpatient Rx   Medication Sig Dispense Refill    meclizine (ANTIVERT) 25 MG tablet Take 1 tablet by mouth 3 times daily as needed for Dizziness 15 tablet 0    UNABLE TO FIND Lions angel luis mushroom      UNABLE TO FIND Cannabis oil cooked down prn for pain      aspirin (EQ ASPIRIN ADULT LOW DOSE) 81 MG EC tablet Take 1 tablet by mouth daily (Patient not taking: Reported on 11/1/2021) 30 tablet 5    silver sulfADIAZINE (SILVADENE) 1 % cream Apply topically daily. 25 g 0    escitalopram (LEXAPRO) 20 MG tablet Take 1 tablet by mouth daily 90 tablet 1    albuterol sulfate HFA (PROVENTIL HFA) 108 (90 Base) MCG/ACT inhaler Inhale 2 puffs into the lungs every 6 hours as needed for Wheezing 3 Inhaler 1    ipratropium-albuterol (DUONEB) 0.5-2.5 (3) MG/3ML SOLN nebulizer solution Inhale 3 mLs into the lungs every 4 hours (while awake) 360 mL 1    pantoprazole (PROTONIX) 40 MG tablet TAKE 1 TABLET NIGHTLY 90 tablet 1    rOPINIRole (REQUIP) 0.25 MG tablet Take 1 tablet by mouth nightly (Patient not taking: Reported on 11/1/2021) 90 tablet 1    ondansetron (ZOFRAN) 4 MG tablet Take 1 tablet by mouth every 8 hours as needed for Nausea or Vomiting 30 tablet 5    amLODIPine (NORVASC) 5 MG tablet Take 1 tablet by mouth daily 90 tablet 3    sucralfate (CARAFATE) 1 GM tablet Take 1 tablet by mouth 4 times daily 120 tablet 3    Budeson-Glycopyrrol-Formoterol 160-9-4.8 MCG/ACT AERO Inhale 2 puffs into the lungs 2 times daily 1 Inhaler 5    timolol (TIMOPTIC) 0.5 % ophthalmic solution INSTILL 1 DROP INTO AFFECTED EYE(S) ONCE DAILY IN THE MORNING      Tiotropium Bromide-Olodaterol 2.5-2.5 MCG/ACT AERS Inhale 2 puffs into the lungs daily 1 Inhaler 5    Handicap Placard MISC by Does not apply route Please provide handicap placard for 5 years.     Diagnosis: Respiratory condition 1 each 0    albuterol sulfate  (90 Base) MCG/ACT inhaler Inhale 2 puffs into the lungs every 6 hours as needed for Wheezing 1 Inhaler 5    levothyroxine (SYNTHROID) 75 MCG tablet Take 1 tablet by mouth daily (Patient taking differently: Take 50 mcg by mouth daily ) 90 tablet 3    Timolol (TIMOPTIC) 0.5 % (DAILY) SOLN ophthalmic solution Place 1 drop into both eyes daily      vitamin B-12 (CYANOCOBALAMIN) 1000 MCG tablet Take 1,000 mcg by mouth daily OTC      senna-docusate (PERICOLACE) 8.6-50 MG per tablet Take 1 tablet by mouth 2 times daily       Calcium-Magnesium-Zinc 167-83-8 MG TABS Take 1 tablet by mouth daily OTC      OXYGEN Inhale 2 L into the lungs nightly as needed (uses as needed nightly)       Cholecalciferol (VITAMIN D3) 2000 UNITS CAPS Take 1 capsule by mouth nightly OTC       Medication is reviewed    ALLERGIES    Allergies   Allergen Reactions    Morphine Anaphylaxis     Dilaudid OK    Bactrim [Sulfamethoxazole-Trimethoprim]     Butorphanol      Other reaction(s): Other - comment required  \"out of body experience\"    Influenza Vaccines      Ended in hospital stay for 3 days told by md not to get it anymore     Lactose Intolerance (Gi) Nausea Only    Phenergan [Promethazine Hcl] Other (See Comments)     Makes me jerk all over. Zofran OK    Promethazine     Stadol [Butorphanol Tartrate] Other (See Comments)     Out of body experience. Was told it was a near death experience and was placed in ICU. Dilaudid OK    Tape Marcia Fortis Tape] Other (See Comments)     Plastic cause itching and rash. Paper tape causes my skin to blister.  (Tega-derm and Coban OK to use.)     Allergy is reviewed    FAMILY HISTORY    Family History   Problem Relation Age of Onset    Diabetes Mother     Other Mother         thyroid    Heart Disease Father     Arthritis Father     High Blood Pressure Father     High Cholesterol Father     Other Father         glaucoma    Other Sister         thyroid, glaucoma    Diabetes Brother     Other Sister     Vision Loss Sister         lung problems, smoker    Cancer Sister throat cancer    Other Son         HX of clots    Early Death Son         Hit by car and killed.  High Blood Pressure Daughter     Stroke Son         No residual    Other Son         HX myocarditis     Family history reviewed and no pertinent family history other than the ones mentioned above    SOCIAL HISTORY    Social History     Socioeconomic History    Marital status:      Spouse name: Not on file    Number of children: Not on file    Years of education: Not on file    Highest education level: Not on file   Occupational History    Not on file   Tobacco Use    Smoking status: Former Smoker     Packs/day: 3.00     Years: 5.00     Pack years: 15.00     Quit date: 1962     Years since quittin.9    Smokeless tobacco: Never Used   Substance and Sexual Activity    Alcohol use: No    Drug use: No    Sexual activity: Not on file   Other Topics Concern    Not on file   Social History Narrative    Not on file     Social Determinants of Health     Financial Resource Strain:     Difficulty of Paying Living Expenses: Not on file   Food Insecurity:     Worried About [address] in the Last Year: Not on file    [address] in the Last Year: Not on file   Transportation Needs:     Lack of Transportation (Medical): Not on file    Lack of Transportation (Non-Medical):  Not on file   Physical Activity:     Days of Exercise per Week: Not on file    Minutes of Exercise per Session: Not on file   Stress:     Feeling of Stress : Not on file   Social Connections:     Frequency of Communication with Friends and Family: Not on file    Frequency of Social Gatherings with Friends and Family: Not on file    Attends Hoahaoism Services: Not on file    Active Member of Clubs or Organizations: Not on file    Attends Club or Organization Meetings: Not on file    Marital Status: Not on file   Intimate Partner Violence:     Fear of Current or Ex-Partner: Not on file   [employer] Abused: Not on file    Physically Abused: Not on file    Sexually Abused: Not on file   Housing Stability:     Unable to Pay for Housing in the Last Year: Not on file    Number of Places Lived in the Last Year: Not on file    Unstable Housing in the Last Year: Not on file     Live with daughter  Alcohol and recreational drug use: Unknown  Social history reviewed and no pertinent social history other than the ones mentioned above    PHYSICAL EXAM    Vital Signs:BP 95/61   Pulse 69   Temp 96.6 °F (35.9 °C)   Resp 15   Ht 5' 1\" (1.549 m)   SpO2 100%   BMI 22.86 kg/m²   I have reviewed the triage vital signs. Constitutional: Toxic appearing agonal breathing  Eyes: PERRL, no conjunctival injection  HENT: NCAT, Neck supple without meningismus   CV: Tachycardic, cool extremities  RESP: Agonal breathing normal lung sounds, tight aeration, wheezing  GI: soft, non-distended, nontender  MSK: No gross deformities  Skin: Warm, dry.  No rashes  Neuro: Unresponsive GCS of 3      Labs:   Labs Reviewed   CBC WITH AUTO DIFFERENTIAL - Abnormal; Notable for the following components:       Result Value    WBC 13.4 (*)     Hematocrit 50.6 (*)     .9 (*)     MCH 33.3 (*)     MCHC 30.8 (*)     Segs Relative 21.0 (*)     Eosinophils % 6.0 (*)     Lymphocytes % 71.0 (*)     All other components within normal limits   COMPREHENSIVE METABOLIC PANEL W/ REFLEX TO MG FOR LOW K - Abnormal; Notable for the following components:    Potassium 5.6 (*)     Chloride 98 (*)     CO2 15 (*)     Glucose 158 (*)     ALT 43 (*)     AST 77 (*)     Anion Gap 24 (*)     All other components within normal limits   BRAIN NATRIURETIC PEPTIDE - Abnormal; Notable for the following components:    Pro-.5 (*)     All other components within normal limits   PROTIME-INR - Abnormal; Notable for the following components:    Protime 10.5 (*)     All other components within normal limits   LACTIC ACID, PLASMA - Abnormal; Notable for the following components:    Lactate 13.1 (*)     All other components within normal limits   URINE RT REFLEX TO CULTURE - Abnormal; Notable for the following components:    Leukocyte Esterase, Urine MODERATE (*)     Mucus, UA RARE (*)     All other components within normal limits   BLOOD GAS, VENOUS - Abnormal; Notable for the following components:    pH, Hank 6.93 (*)     pCO2, Hank 81 (*)     Base Excess 17 (*)     HCO3, Venous 17.0 (*)     O2 Sat, Hank 49.1 (*)     All other components within normal limits   SALICYLATE LEVEL - Abnormal; Notable for the following components:    Salicylate Lvl <8.1 (*)     All other components within normal limits   ACETAMINOPHEN LEVEL - Abnormal; Notable for the following components:    Acetaminophen Level <5.0 (*)     All other components within normal limits   BLOOD GAS, ARTERIAL - Abnormal; Notable for the following components:    pH, Bld 7.25 (*)     pO2, Arterial 241 (*)     Base Excess 10 (*)     HCO3, Arterial 16.2 (*)     CO2 Content 17.3 (*)     O2 Sat 95.6 (*)     All other components within normal limits   POCT GLUCOSE - Abnormal; Notable for the following components:    POC Glucose 125 (*)     All other components within normal limits   COVID-19, RAPID   CULTURE, BLOOD 1   CULTURE, BLOOD 2   LIPASE   TROPONIN   ETHANOL   LACTIC ACID, PLASMA   URINE DRUG SCREEN   POCT GLUCOSE   POCT GLUCOSE   TYPE AND SCREEN       Radiology:  XR ABDOMEN FOR NG/OG/NE TUBE PLACEMENT   Final Result   Enteric tube tip projects over the gastric body with the side port in the   region of the gastroesophageal junction. Consider advancement. XR CHEST PORTABLE   Final Result   Endotracheal tube in satisfactory position above the shanique      Enteric catheter courses into the abdomen      No acute cardiopulmonary process         CTA PULMONARY W CONTRAST   Final Result   No evidence of pulmonary embolism or acute pulmonary abnormality. Extensive atherosclerotic disease. Changes of TAVR. Cardiomegaly. Incidental upper abdominal findings as above. CT HEAD WO CONTRAST   Final Result   No acute intracranial abnormality. CTA HEAD NECK W CONTRAST   Final Result   No large vessel occlusion visualized within the head or neck. CTA ABDOMEN PELVIS W CONTRAST    (Results Pending)       I directly reviewed the images and radiology interpretation    EKG interpreted by myself: See below    ED COURSE  Assessment & Medical Decision Making:  Joanne Gan is a 80 y.o. female who presents with altered mental status agonal breathing. Arrival patient was not protecting her airway though oxygen saturation was okay with LMA, patient was subsequently intubated for airway protection and also abnormal breathing patterns. Patient's initial EKG transmitted by EMS with concern for possible STEMI however repeat EKG here in the emergency department was just left bundle branch block pattern and does not have any STEMI equivalents on that, I repeat EKG after was normal, initial potassium was slightly elevated but with return to normal EKG without any intervention, we did not give patient any calcium. Patient was given albuterol given tight aeration's on exam, and Decadron was given as well, patient was seen mixed metabolic and respiratory acidosis, initially given toxic appearance of the patient I did give the patient amp of bicarb, repeat ABG patient has resolution of respiratory acidosis continue to have a metabolic acidosis a lactate of 13 we will treat as sepsis with broad-spectrum antibiotics, CT head CT angio of the head and CT PE was unremarkable CT angio of the abdomen still pending at this point.      Throughout patient's emergency department stay, she has improvement of her mental status, patient was able to blink her eyes and nod yes and no to most of the questions and she denied any intoxication and doing any drugs and she nodded yes to being okay earlier this morning she nodded yes to procedures. Impression:   1. Hypoxic respiratory failure  2. Hypercarbic respiratory failure  3. Arrhythmic breathing  4. Lactic acidosis  5. Metabolic acidosis  6. Hyperkalemia  7. Possible sepsis    Disposition: ICU    This note dictated using Dragon medical voice recognition software. Attempts at proofreading were made, but errors may occasionally still occur. Procedure Note - Intubation:    The consent was not able to be obtained due to emergent nature. Pre-oxygenation was administered and the appropriate equipment and staff were made available at the bedside. Patient was sedated/paralyzed; please see the chart for the drugs and dosages administered. A Poncho 3 laryngoscope blade was used for a grade 1 view and a 7.5mm endotracheal tube was viewed to pass through the cords on the first attempt. The tube was secured at 22cm to the lip. Tracheal position was confirmed using a colorimetric end-tidal CO2 detector, tube condensation and chest auscultation/visualization. Respiratory therapy is at the bedside and is assisting with ventilatory management. CXR looks good. Procedure Note:  [facility]  The consent was not to be obtained. Timeout performed prior to procedure. Patient was prepped and draped in standard bedside fashion. Physical landmarks without ultrasound guidance was used to locate the central vein. 1% Lidocaine was NOT used for local anesthesia. Seldinger technique was used for placement of the CVC in a non-pulsatile vasculature. All ports diya and flushed. The patient tolerated the procedure well and no acute complications occurred. Xray is done no complications. Final type: 20cm triple lumen  Final location: L subclavian vein     Comment: Please note this report has been produced using speech recognition software and may contain errors related to that system including errors in grammar, punctuation, and spelling, as well as words and phrases that may be inappropriate.  If there are any questions or concerns please feel free to contact the dictating provider for clarification.                 Rudolph Serrano MD  11/14/21 [id number]       Rudolph Serrano MD  11/14/21 [id number]

## 2021-11-14 NOTE — H&P
Hospitalist H&P Note:   Date of Service: 11/14/2021   ? CHIEF COMPLAINT:   Found unresponsive with agonal breathing    HISTORY OF PRESENT ILLNESS (HPI):   Ms. Walter Garcia, a 80y.o. year old female who  has a past medical history of Arthritis, Asthma, Blood transfusion, Chronic bronchitis ([facility] [address] COPD (chronic obstructive pulmonary disease) ([facility] Echocardiogram, Fatigue, H/O cardiac catheterization, H/O cardiovascular stress test, H/O Doppler ultrasound, H/O Doppler ultrasound, H/O echocardiogram, H/O echocardiogram, H/O echocardiogram, H/O echocardiogram, H/O exercise stress test, History of cardiac cath, History of nuclear stress test, Holter monitor, abnormal, Normal cardiac stress test, On home O2, Syncope and collapse, Tachycardia, and Thyroid disease. Patient was intubated during my evaluation. Daughter at bedside says that both of them had a lunch at [employer] today afternoon and saw her mother get into the car and she closed the door. Daughter left immediately in her car but not sure her mother left. Later she was found in the car with agonal breathing and unresponsiveness. Eventually brought by EMS to the ED and got intubated. Daughter says that patient is in very good health until 2 days afternoon and no signs of any kind of health issues and in good health. On presentation, Blood pressure 95/61, pulse 69, temperature 96.6 °F (35.9 °C), resp. rate 15, height 5' 1\" (1.549 m), SpO2 100 %.      PAST MEDICAL HISTORY   Past Medical History:   Diagnosis Date    Arthritis     generalized    Asthma     Blood transfusion 2004    No reaction    Chronic bronchitis (HCC)     COPD (chronic obstructive pulmonary disease) ([facility] [address]     summer 2014    Echocardiogram 04/09/2021    EF 55-60%, Mild dilated LA, Mild annular calcification present, Mild mitral stenosis, Mild MR & TR.    Fatigue     H/O cardiac catheterization 06/15/2017    mild lad and cx disease    H/O cardiovascular stress test 08/22/2019  H/O Doppler ultrasound 4/7/2016 3/19/12    carotid-4/16 WNL 3/12right mild less than 50%and left wnl    H/O Doppler ultrasound 6/14/017    carotid - mod disease EILEEN, mild disease LICA    H/O echocardiogram 07/23/2010    H/O echocardiogram 04/15/2016    EF 60% sclerotic AV mildly stenosed-recommend yearly echo    H/O echocardiogram 08/22/2019    EF 55-60%, Minimal concentric left ventricular hypertrophy, left atrium is mildly dilated, severe aortic stenosis, Mitral annular calcification is present, Mild AR, Mild-Mod MR, Mild TR, No pericardial effusion     H/O echocardiogram 09/23/2019    H/O exercise stress test 06/14/2017    abnormal    History of cardiac cath 08/28/2019    Severe AS,   TAVR??  History of nuclear stress test 03/14/2017    EF 75%. Normal study.     Holter monitor, abnormal 02/22/2011    infrequent APC are seen    Normal cardiac stress test 07/23/2010    EF 70%, no ischemia    On home O2     only uses as needed, nightly prn    Syncope and collapse     Tachycardia     HX of tachycardia - had a cardioablation    Thyroid disease           SURGICAL HISTORY:   Past Surgical History:   Procedure Laterality Date    AORTIC VALVE REPAIR N/A 09/09/2019    Core Valve EVOLUT 26mm [id number]    AORTIC VALVE REPLACEMENT N/A 09/11/2019    TAVR; [employer] Evlout 26    BREAST SURGERY  2009    bilat    CARDIAC CATHETERIZATION  03/02/2011    mild to moderate disease of diagonal and proximal RCA   [address]    ablation    CARPAL TUNNEL RELEASE  1990's    bilat    CHOLECYSTECTOMY  1961    open choley    COLONOSCOPY      DILATATION, ESOPHAGUS      ENDOSCOPY, COLON, DIAGNOSTIC  07/03/2017    s/p gastric bypass otherwise normal    FINGER SURGERY      right middle, thumb and index finger (screw and pin in right index finger)    GASTRIC RESTRICTION SURGERY  1984    stapled    HERNIA REPAIR  unknown    Inc hernia hernia repair    HIP FRACTURE SURGERY Left 11/04/2015    Left hip nail mg  1 mg IntraMUSCular PRN Aileen Delgado MD        dextrose 5 % solution  100 mL/hr IntraVENous PRN Aileen Delgado MD        sodium bicarbonate 8.4 % injection 50 mEq  50 mEq IntraVENous Once Aileen Delgado MD        sodium zirconium cyclosilicate (LOKELMA) oral suspension 10 g  10 g Oral Once Aileen Delgado MD         Current Outpatient Medications   Medication Sig Dispense Refill    meclizine (ANTIVERT) 25 MG tablet Take 1 tablet by mouth 3 times daily as needed for Dizziness 15 tablet 0    UNABLE TO FIND [place] hein mushroom      UNABLE TO FIND Cannabis oil cooked down prn for pain      aspirin (EQ ASPIRIN ADULT LOW DOSE) 81 MG EC tablet Take 1 tablet by mouth daily (Patient not taking: Reported on 11/1/2021) 30 tablet 5    silver sulfADIAZINE (SILVADENE) 1 % cream Apply topically daily.  25 g 0    escitalopram (LEXAPRO) 20 MG tablet Take 1 tablet by mouth daily 90 tablet 1    albuterol sulfate HFA (PROVENTIL HFA) 108 (90 Base) MCG/ACT inhaler Inhale 2 puffs into the lungs every 6 hours as needed for Wheezing 3 Inhaler 1    ipratropium-albuterol (DUONEB) 0.5-2.5 (3) MG/3ML SOLN nebulizer solution Inhale 3 mLs into the lungs every 4 hours (while awake) 360 mL 1    pantoprazole (PROTONIX) 40 MG tablet TAKE 1 TABLET NIGHTLY 90 tablet 1    rOPINIRole (REQUIP) 0.25 MG tablet Take 1 tablet by mouth nightly (Patient not taking: Reported on 11/1/2021) 90 tablet 1    ondansetron (ZOFRAN) 4 MG tablet Take 1 tablet by mouth every 8 hours as needed for Nausea or Vomiting 30 tablet 5    amLODIPine (NORVASC) 5 MG tablet Take 1 tablet by mouth daily 90 tablet 3    sucralfate (CARAFATE) 1 GM tablet Take 1 tablet by mouth 4 times daily 120 tablet 3    Budeson-Glycopyrrol-Formoterol 160-9-4.8 MCG/ACT AERO Inhale 2 puffs into the lungs 2 times daily 1 Inhaler 5    timolol (TIMOPTIC) 0.5 % ophthalmic solution INSTILL 1 DROP INTO AFFECTED EYE(S) ONCE DAILY IN THE MORNING      Tiotropium Bromide-Olodaterol 2.5-2.5 MCG/ACT AERS Inhale 2 puffs into the lungs daily 1 Inhaler 5    Handicap Placard MISC by Does not apply route Please provide handicap placard for 5 years. Diagnosis: Respiratory condition 1 each 0    albuterol sulfate  (90 Base) MCG/ACT inhaler Inhale 2 puffs into the lungs every 6 hours as needed for Wheezing 1 Inhaler 5    levothyroxine (SYNTHROID) 75 MCG tablet Take 1 tablet by mouth daily (Patient taking differently: Take 50 mcg by mouth daily ) 90 tablet 3    Timolol (TIMOPTIC) 0.5 % (DAILY) SOLN ophthalmic solution Place 1 drop into both eyes daily      vitamin B-12 (CYANOCOBALAMIN) 1000 MCG tablet Take 1,000 mcg by mouth daily OTC      senna-docusate (PERICOLACE) 8.6-50 MG per tablet Take 1 tablet by mouth 2 times daily       Calcium-Magnesium-Zinc 167-83-8 MG TABS Take 1 tablet by mouth daily OTC      OXYGEN Inhale 2 L into the lungs nightly as needed (uses as needed nightly)       Cholecalciferol (VITAMIN D3) 2000 UNITS CAPS Take 1 capsule by mouth nightly OTC        ?   ? REVIEW OF SYSTEMS: Unable to obtain while intubated    PHYSICAL EXAM:   Blood pressure 95/61, pulse 69, temperature 96.6 °F (35.9 °C), resp. rate 15, height 5' 1\" (1.549 m), SpO2 100 %. . Body mass index is 22.86 kg/m². CONSTITUTIONAL: Intubated and sedated  HENT: NC/AT Ear: normal, patent without effusion Nose: no deformities, nares patent   EYES:Conjunctiva normal. No discharge. NECK: Neck supple,No JVD /Thyromegaly/LAD   RESP: B/L diminished BS with occasional wheeze +  CVS: Regular rate and rhythm. S1 and S2 normal, no M/R/G  GI: Soft, ND/NT,No guarding/rebound/mass/organomegaly.  Bowel sounds are normal.    MUSCULAR/EXT:  no pedal edema, no clubbing or cyanosis,Pulses 2+ B/L  CNS: Unable to assess while on sedation  MOOD/PSYCH: Unable to assess  SKIN: Warm and dry,No rashes    Lab results:   Results for orders placed or performed during the hospital encounter of 11/14/21   COVID-19, Rapid    Specimen: Nasopharyngeal Result Value Ref Range    Source THROAT     SARS-CoV-2, NAAT NOT DETECTED NOT DETECTED   CBC Auto Differential   Result Value Ref Range    WBC 13.4 (H) 4.0 - 10.5 K/CU MM    RBC 4.69 4.2 - 5.4 M/CU MM    Hemoglobin 15.6 12.5 - 16.0 GM/DL    Hematocrit 50.6 (H) 37 - 47 %    .9 (H) 78 - 100 FL    MCH 33.3 (H) 27 - 31 PG    MCHC 30.8 (L) 32.0 - 36.0 %    RDW 12.7 11.7 - 14.9 %    Platelets 697 355 - 295 K/CU MM    MPV 10.2 7.5 - 11.1 FL    Segs Relative 21.0 (L) 36 - 66 %    Eosinophils % 6.0 (H) 0 - 3 %    Lymphocytes % 71.0 (H) 24 - 44 %    Monocytes % 2.0 0 - 4 %    Segs Absolute 2.8 K/CU MM    Eosinophils Absolute 0.8 K/CU MM    Lymphocytes Absolute 9.5 K/CU MM    Monocytes Absolute 0.3 K/CU MM    Differential Type MANUAL DIFFERENTIAL     Atypical Lymphocytes Absolute 1+    Comprehensive Metabolic Panel w/ Reflex to MG   Result Value Ref Range    Sodium 137 135 - 145 MMOL/L    Potassium 5.6 (HH) 3.5 - 5.1 MMOL/L    Chloride 98 (L) 99 - 110 mMol/L    CO2 15 (L) 21 - 32 MMOL/L    BUN 11 6 - 23 MG/DL    CREATININE 0.7 0.6 - 1.1 MG/DL    Glucose 158 (H) 70 - 99 MG/DL    Calcium 9.1 8.3 - 10.6 MG/DL    Albumin 4.4 3.4 - 5.0 GM/DL    Total Protein 7.0 6.4 - 8.2 GM/DL    Total Bilirubin 0.6 0.0 - 1.0 MG/DL    ALT 43 (H) 10 - 40 U/L    AST 77 (H) 15 - 37 IU/L    Alkaline Phosphatase 77 40 - 129 IU/L    GFR Non-African American >60 >60 mL/min/1.73m2    GFR African American >60 >60 mL/min/1.73m2    Anion Gap 24 (H) 4 - 16   Lipase   Result Value Ref Range    Lipase 45 13 - 60 IU/L   Troponin   Result Value Ref Range    Troponin T <0.010 <0.01 NG/ML   Brain Natriuretic Peptide   Result Value Ref Range    Pro-.5 (H) <300 PG/ML   Protime-INR   Result Value Ref Range    Protime 10.5 (L) 11.7 - 14.5 SECONDS    INR 0.82 INDEX   Lactic Acid, Plasma   Result Value Ref Range    Lactate 13.1 (HH) 0.4 - 2.0 mMOL/L   Urinalysis Reflex to Culture    Specimen: Urine   Result Value Ref Range    Color, UA YELLOW YELLOW Clarity, UA CLEAR CLEAR    Glucose, Urine NEGATIVE NEGATIVE MG/DL    Bilirubin Urine NEGATIVE NEGATIVE MG/DL    Ketones, Urine NEGATIVE NEGATIVE MG/DL    Specific Gravity, UA 1.013 1.001 - 1.035    Blood, Urine NEGATIVE NEGATIVE    pH, Urine 5.0 5.0 - 8.0    Protein, UA NEGATIVE NEGATIVE MG/DL    Urobilinogen, Urine NEGATIVE 0.2 - 1.0 MG/DL    Nitrite Urine, Quantitative NEGATIVE NEGATIVE    Leukocyte Esterase, Urine MODERATE (A) NEGATIVE    RBC, UA 2 0 - 6 /HPF    WBC, UA 2 0 - 5 /HPF    Bacteria, UA NEGATIVE NEGATIVE /HPF    Squam Epithel, UA <1 /HPF    Mucus, UA RARE (A) NEGATIVE HPF    Trichomonas, UA NONE SEEN NONE SEEN /HPF   Blood Gas, Venous   Result Value Ref Range    pH, Hank 6.93 (L) 7.32 - 7.42    pCO2, Hank 81 (H) 38 - 52 mmHG    pO2, Hank 35 28 - 48 mmHG    Base Excess 17 (H) 0 - 2.4    HCO3, Venous 17.0 (L) 19 - 25 MMOL/L    O2 Sat, Hank 49.1 (L) 50 - 70 %    Comment VBG    ETOH Blood   Result Value Ref Range    Alcohol Scrn <0.01 <3.69 %WT/VOL   Salicylate   Result Value Ref Range    Salicylate Lvl <5.8 (L) 15 - 30 MG/DL    DOSE AMOUNT DOSE AMT. GIVEN - UNKNOWN     DOSE TIME DOSE TIME GIVEN - UNKNOWN    Acetaminophen Level   Result Value Ref Range    Acetaminophen Level <5.0 (L) 15 - 30 ug/ml    DOSE AMOUNT DOSE AMT.  GIVEN - UNKNOWN     DOSE TIME DOSE TIME GIVEN - UNKNOWN    Blood gas, arterial   Result Value Ref Range    pH, Bld 7.25 (L) 7.34 - 7.45    pCO2, Arterial 37.0 32 - 45 MMHG    pO2, Arterial 241 (H) 75 - 100 MMHG    Base Excess 10 (H) 0 - 2.4    HCO3, Arterial 16.2 (L) 18 - 23 MMOL/L    CO2 Content 17.3 (L) 19 - 24 MMOL/L    O2 Sat 95.6 (L) 96 - 97 %    Carbon Monoxide, Blood 0.0 0 - 5 %    Methemoglobin, Arterial 0.0 <1.5 %    Comment AC 16 450 55% +5    POCT Glucose   Result Value Ref Range    POC Glucose 125 (H) 70 - 99 MG/DL   EKG 12 Lead   Result Value Ref Range    Ventricular Rate 131 BPM    Atrial Rate 120 BPM    QRS Duration 122 ms    Q-T Interval 348 ms    QTc Calculation (Bazett) 513 acute hypoxic respiratory failure. Again no evidence of infection. Started on gentle IVF while keeping NPO. Continue on home Protonix, sucralfate. DVT prophylaxis: Lovenox  Diet n.p.o. status  CODE STATUS full code    No family available bedside. Patient is critically ill with acute hypoxic and hypercapnic respiratory failure. Additional recommendations as per hospital course. Total critical care time spent >34 minutes.     MD Max   Hospitalist at [facility]

## 2021-11-15 LAB
ANION GAP SERPL CALCULATED.3IONS-SCNC: 14 MMOL/L (ref 4–16)
BASE EXCESS MIXED: 0.4 (ref 0–2.3)
BASOPHILS ABSOLUTE: 0 K/CU MM
BASOPHILS RELATIVE PERCENT: 0.1 % (ref 0–1)
BUN BLDV-MCNC: 14 MG/DL (ref 6–23)
CALCIUM SERPL-MCNC: 8.1 MG/DL (ref 8.3–10.6)
CARBON MONOXIDE, BLOOD: 0.2 % (ref 0–5)
CHLORIDE BLD-SCNC: 103 MMOL/L (ref 99–110)
CO2 CONTENT: 23.5 MMOL/L (ref 19–24)
CO2: 20 MMOL/L (ref 21–32)
COMMENT: ABNORMAL
CREAT SERPL-MCNC: 0.6 MG/DL (ref 0.6–1.1)
DIFFERENTIAL TYPE: ABNORMAL
EOSINOPHILS ABSOLUTE: 0 K/CU MM
EOSINOPHILS RELATIVE PERCENT: 0.1 % (ref 0–3)
GFR AFRICAN AMERICAN: >60 ML/MIN/1.73M2
GFR NON-AFRICAN AMERICAN: >60 ML/MIN/1.73M2
GLUCOSE BLD-MCNC: 168 MG/DL (ref 70–99)
HCO3 ARTERIAL: 22.6 MMOL/L (ref 18–23)
HCT VFR BLD CALC: 42.1 % (ref 37–47)
HEMOGLOBIN: 13.8 GM/DL (ref 12.5–16)
HIGH SENSITIVE C-REACTIVE PROTEIN: 0.7 MG/L
IMMATURE NEUTROPHIL %: 0.3 % (ref 0–0.43)
LACTATE: 2.2 MMOL/L (ref 0.4–2)
LACTATE: 2.7 MMOL/L (ref 0.4–2)
LYMPHOCYTES ABSOLUTE: 2.8 K/CU MM
LYMPHOCYTES RELATIVE PERCENT: 19.8 % (ref 24–44)
MAGNESIUM: 2.1 MG/DL (ref 1.8–2.4)
MCH RBC QN AUTO: 33.2 PG (ref 27–31)
MCHC RBC AUTO-ENTMCNC: 32.8 % (ref 32–36)
MCV RBC AUTO: 101.2 FL (ref 78–100)
METHEMOGLOBIN ARTERIAL: 0 %
MONOCYTES ABSOLUTE: 0.7 K/CU MM
MONOCYTES RELATIVE PERCENT: 4.7 % (ref 0–4)
NUCLEATED RBC %: 0 %
O2 SATURATION: 98.5 % (ref 96–97)
PCO2 ARTERIAL: 29 MMHG (ref 32–45)
PDW BLD-RTO: 12.6 % (ref 11.7–14.9)
PH BLOOD: 7.5 (ref 7.34–7.45)
PLATELET # BLD: 214 K/CU MM (ref 140–440)
PMV BLD AUTO: 10.1 FL (ref 7.5–11.1)
PO2 ARTERIAL: 137 MMHG (ref 75–100)
POTASSIUM SERPL-SCNC: 4 MMOL/L (ref 3.5–5.1)
PROCALCITONIN: 0.12
RBC # BLD: 4.16 M/CU MM (ref 4.2–5.4)
SEGMENTED NEUTROPHILS ABSOLUTE COUNT: 10.7 K/CU MM
SEGMENTED NEUTROPHILS RELATIVE PERCENT: 75 % (ref 36–66)
SODIUM BLD-SCNC: 137 MMOL/L (ref 135–145)
TOTAL CK: 236 IU/L (ref 26–140)
TOTAL IMMATURE NEUTOROPHIL: 0.04 K/CU MM
TOTAL NUCLEATED RBC: 0 K/CU MM
TROPONIN T: 0.39 NG/ML
TROPONIN T: 0.44 NG/ML
TSH HIGH SENSITIVITY: 0.76 UIU/ML (ref 0.27–4.2)
WBC # BLD: 14.2 K/CU MM (ref 4–10.5)

## 2021-11-15 PROCEDURE — 5A1945Z RESPIRATORY VENTILATION, 24-96 CONSECUTIVE HOURS: ICD-10-PCS | Performed by: STUDENT IN AN ORGANIZED HEALTH CARE EDUCATION/TRAINING PROGRAM

## 2021-11-15 PROCEDURE — 94761 N-INVAS EAR/PLS OXIMETRY MLT: CPT

## 2021-11-15 PROCEDURE — 36415 COLL VENOUS BLD VENIPUNCTURE: CPT

## 2021-11-15 PROCEDURE — 99222 1ST HOSP IP/OBS MODERATE 55: CPT | Performed by: INTERNAL MEDICINE

## 2021-11-15 PROCEDURE — 31720 CLEARANCE OF AIRWAYS: CPT

## 2021-11-15 PROCEDURE — 02HV33Z INSERTION OF INFUSION DEVICE INTO SUPERIOR VENA CAVA, PERCUTANEOUS APPROACH: ICD-10-PCS | Performed by: STUDENT IN AN ORGANIZED HEALTH CARE EDUCATION/TRAINING PROGRAM

## 2021-11-15 PROCEDURE — 2000000000 HC ICU R&B

## 2021-11-15 PROCEDURE — 6370000000 HC RX 637 (ALT 250 FOR IP): Performed by: INTERNAL MEDICINE

## 2021-11-15 PROCEDURE — 2500000003 HC RX 250 WO HCPCS: Performed by: INTERNAL MEDICINE

## 2021-11-15 PROCEDURE — 6360000002 HC RX W HCPCS: Performed by: INTERNAL MEDICINE

## 2021-11-15 PROCEDURE — 2580000003 HC RX 258: Performed by: INTERNAL MEDICINE

## 2021-11-15 PROCEDURE — 84443 ASSAY THYROID STIM HORMONE: CPT

## 2021-11-15 PROCEDURE — 82803 BLOOD GASES ANY COMBINATION: CPT

## 2021-11-15 PROCEDURE — 84484 ASSAY OF TROPONIN QUANT: CPT

## 2021-11-15 PROCEDURE — 89220 SPUTUM SPECIMEN COLLECTION: CPT

## 2021-11-15 PROCEDURE — 2700000000 HC OXYGEN THERAPY PER DAY

## 2021-11-15 PROCEDURE — 80048 BASIC METABOLIC PNL TOTAL CA: CPT

## 2021-11-15 PROCEDURE — 0BH17EZ INSERTION OF ENDOTRACHEAL AIRWAY INTO TRACHEA, VIA NATURAL OR ARTIFICIAL OPENING: ICD-10-PCS | Performed by: STUDENT IN AN ORGANIZED HEALTH CARE EDUCATION/TRAINING PROGRAM

## 2021-11-15 PROCEDURE — 82550 ASSAY OF CK (CPK): CPT

## 2021-11-15 PROCEDURE — 99223 1ST HOSP IP/OBS HIGH 75: CPT | Performed by: PSYCHIATRY & NEUROLOGY

## 2021-11-15 PROCEDURE — 94003 VENT MGMT INPAT SUBQ DAY: CPT

## 2021-11-15 PROCEDURE — 95822 EEG COMA OR SLEEP ONLY: CPT | Performed by: STUDENT IN AN ORGANIZED HEALTH CARE EDUCATION/TRAINING PROGRAM

## 2021-11-15 PROCEDURE — 94640 AIRWAY INHALATION TREATMENT: CPT

## 2021-11-15 PROCEDURE — 36600 WITHDRAWAL OF ARTERIAL BLOOD: CPT

## 2021-11-15 PROCEDURE — 83735 ASSAY OF MAGNESIUM: CPT

## 2021-11-15 PROCEDURE — 6360000002 HC RX W HCPCS: Performed by: NURSE PRACTITIONER

## 2021-11-15 PROCEDURE — 83605 ASSAY OF LACTIC ACID: CPT

## 2021-11-15 PROCEDURE — 85025 COMPLETE CBC W/AUTO DIFF WBC: CPT

## 2021-11-15 PROCEDURE — 95819 EEG AWAKE AND ASLEEP: CPT

## 2021-11-15 RX ORDER — BUDESONIDE AND FORMOTEROL FUMARATE DIHYDRATE 160; 4.5 UG/1; UG/1
2 AEROSOL RESPIRATORY (INHALATION) 2 TIMES DAILY
Status: DISCONTINUED | OUTPATIENT
Start: 2021-11-15 | End: 2021-11-26 | Stop reason: HOSPADM

## 2021-11-15 RX ORDER — ALBUTEROL SULFATE 2.5 MG/3ML
2.5 SOLUTION RESPIRATORY (INHALATION) EVERY 4 HOURS PRN
Status: DISCONTINUED | OUTPATIENT
Start: 2021-11-15 | End: 2021-11-26 | Stop reason: HOSPADM

## 2021-11-15 RX ORDER — MONTELUKAST SODIUM 10 MG/1
10 TABLET ORAL NIGHTLY
Status: DISCONTINUED | OUTPATIENT
Start: 2021-11-15 | End: 2021-11-26 | Stop reason: HOSPADM

## 2021-11-15 RX ORDER — ALBUTEROL SULFATE 90 UG/1
4 AEROSOL, METERED RESPIRATORY (INHALATION) EVERY 4 HOURS
Status: DISCONTINUED | OUTPATIENT
Start: 2021-11-15 | End: 2021-11-18

## 2021-11-15 RX ORDER — ALBUTEROL SULFATE 90 UG/1
2 AEROSOL, METERED RESPIRATORY (INHALATION) 4 TIMES DAILY
Status: DISCONTINUED | OUTPATIENT
Start: 2021-11-15 | End: 2021-11-15

## 2021-11-15 RX ADMIN — ALBUTEROL SULFATE 4 PUFF: 90 AEROSOL, METERED RESPIRATORY (INHALATION) at 23:37

## 2021-11-15 RX ADMIN — ENOXAPARIN SODIUM 50 MG: 60 INJECTION SUBCUTANEOUS at 08:19

## 2021-11-15 RX ADMIN — MONTELUKAST 10 MG: 10 TABLET, FILM COATED ORAL at 20:20

## 2021-11-15 RX ADMIN — BUDESONIDE AND FORMOTEROL FUMARATE DIHYDRATE 2 PUFF: 160; 4.5 AEROSOL RESPIRATORY (INHALATION) at 20:36

## 2021-11-15 RX ADMIN — METHYLPREDNISOLONE SODIUM SUCCINATE 40 MG: 40 INJECTION, POWDER, FOR SOLUTION INTRAMUSCULAR; INTRAVENOUS at 20:20

## 2021-11-15 RX ADMIN — ENOXAPARIN SODIUM 50 MG: 60 INJECTION SUBCUTANEOUS at 20:19

## 2021-11-15 RX ADMIN — SODIUM CHLORIDE, PRESERVATIVE FREE 10 ML: 5 INJECTION INTRAVENOUS at 08:19

## 2021-11-15 RX ADMIN — DEXMEDETOMIDINE 0.8 MCG/KG/HR: 100 INJECTION, SOLUTION, CONCENTRATE INTRAVENOUS at 02:13

## 2021-11-15 RX ADMIN — CEFEPIME HYDROCHLORIDE 1000 MG: 1 INJECTION, POWDER, FOR SOLUTION INTRAMUSCULAR; INTRAVENOUS at 11:20

## 2021-11-15 RX ADMIN — ALBUTEROL SULFATE 4 PUFF: 90 AEROSOL, METERED RESPIRATORY (INHALATION) at 20:35

## 2021-11-15 RX ADMIN — ACETAMINOPHEN 650 MG: 325 TABLET ORAL at 22:58

## 2021-11-15 RX ADMIN — 0.12% CHLORHEXIDINE GLUCONATE 15 ML: 1.2 RINSE ORAL at 08:05

## 2021-11-15 RX ADMIN — PROPOFOL 20 MCG/KG/MIN: 10 INJECTION, EMULSION INTRAVENOUS at 13:23

## 2021-11-15 RX ADMIN — ESCITALOPRAM OXALATE 20 MG: 10 TABLET ORAL at 08:18

## 2021-11-15 RX ADMIN — DEXMEDETOMIDINE 0.8 MCG/KG/HR: 100 INJECTION, SOLUTION, CONCENTRATE INTRAVENOUS at 14:15

## 2021-11-15 RX ADMIN — BUDESONIDE AND FORMOTEROL FUMARATE DIHYDRATE 2 PUFF: 160; 4.5 AEROSOL RESPIRATORY (INHALATION) at 07:32

## 2021-11-15 RX ADMIN — SODIUM CHLORIDE, PRESERVATIVE FREE 10 ML: 5 INJECTION INTRAVENOUS at 20:20

## 2021-11-15 RX ADMIN — ALBUTEROL SULFATE 4 PUFF: 90 AEROSOL, METERED RESPIRATORY (INHALATION) at 15:40

## 2021-11-15 RX ADMIN — DOXYCYCLINE 100 MG: 100 INJECTION, POWDER, LYOPHILIZED, FOR SOLUTION INTRAVENOUS at 20:21

## 2021-11-15 RX ADMIN — PROPOFOL 35 MCG/KG/MIN: 10 INJECTION, EMULSION INTRAVENOUS at 20:25

## 2021-11-15 RX ADMIN — METHYLPREDNISOLONE SODIUM SUCCINATE 40 MG: 40 INJECTION, POWDER, FOR SOLUTION INTRAMUSCULAR; INTRAVENOUS at 08:18

## 2021-11-15 RX ADMIN — DOXYCYCLINE 100 MG: 100 INJECTION, POWDER, LYOPHILIZED, FOR SOLUTION INTRAVENOUS at 08:27

## 2021-11-15 RX ADMIN — ALBUTEROL SULFATE 4 PUFF: 90 AEROSOL, METERED RESPIRATORY (INHALATION) at 11:32

## 2021-11-15 RX ADMIN — SUCRALFATE 1 G: 1 TABLET ORAL at 16:52

## 2021-11-15 RX ADMIN — DEXMEDETOMIDINE 0.9 MCG/KG/HR: 100 INJECTION, SOLUTION, CONCENTRATE INTRAVENOUS at 23:19

## 2021-11-15 RX ADMIN — ALBUTEROL SULFATE 2 PUFF: 90 AEROSOL, METERED RESPIRATORY (INHALATION) at 07:31

## 2021-11-15 RX ADMIN — ACETAMINOPHEN 650 MG: 325 TABLET ORAL at 16:52

## 2021-11-15 RX ADMIN — ACETAMINOPHEN 650 MG: 325 TABLET ORAL at 08:18

## 2021-11-15 RX ADMIN — SUCRALFATE 1 G: 1 TABLET ORAL at 08:19

## 2021-11-15 RX ADMIN — CEFEPIME HYDROCHLORIDE 1000 MG: 1 INJECTION, POWDER, FOR SOLUTION INTRAMUSCULAR; INTRAVENOUS at 22:51

## 2021-11-15 RX ADMIN — SODIUM CHLORIDE: 9 INJECTION, SOLUTION INTRAVENOUS at 11:18

## 2021-11-15 RX ADMIN — 0.12% CHLORHEXIDINE GLUCONATE 15 ML: 1.2 RINSE ORAL at 20:19

## 2021-11-15 RX ADMIN — SUCRALFATE 1 G: 1 TABLET ORAL at 13:37

## 2021-11-15 RX ADMIN — SUCRALFATE 1 G: 1 TABLET ORAL at 20:19

## 2021-11-15 RX ADMIN — LEVOTHYROXINE SODIUM 50 MCG: 25 TABLET ORAL at 08:18

## 2021-11-15 ASSESSMENT — PULMONARY FUNCTION TESTS
PIF_VALUE: 15
PIF_VALUE: 16
PIF_VALUE: 15
PIF_VALUE: 15
PIF_VALUE: 17
PIF_VALUE: 16
PIF_VALUE: 15
PIF_VALUE: 14

## 2021-11-15 ASSESSMENT — PAIN SCALES - GENERAL
PAINLEVEL_OUTOF10: 0

## 2021-11-15 NOTE — PROGRESS NOTES
Received call from Dr. Diana Cordero, hold off on SAT/SBT in the AM until he rounds to decide to extubate pt.

## 2021-11-15 NOTE — CONSULTS
tolerate EN initiation within the next 24 hr       Nutrition Monitoring and Evaluation:   Behavioral-Environmental Outcomes:  None Identified   Food/Nutrient Intake Outcomes:  Enteral Nutrition Intake/Tolerance  Physical Signs/Symptoms Outcomes:  Biochemical Data, GI Status, Hemodynamic Status, Fluid Status or Edema, Weight     Discharge Planning:     Too soon to determine     Electronically signed by Marlo Fitzgerald MS, RD, LD on 11/15/21 at 3:07 PM EST    Contact: [phone]

## 2021-11-15 NOTE — PLAN OF CARE
Nutrition Problem #1: Inadequate oral intake  Intervention: Food and/or Nutrient Delivery: Start Tube Feeding  Nutritional Goals: Pt will tolerate EN initiation within the next 24 hr

## 2021-11-15 NOTE — CONSULTS
[address] [place] [address], [address] [place] [address]                                  CONSULTATION    PATIENT NAME: Vu Abarca                     :        1935  MED REC NO:   [id number]                          ROOM:  ACCOUNT NO:   [de-identified]                           ADMIT DATE: 2021  PROVIDER:     Marisol Prince MD    CONSULT DATE:  11/15/2021    HISTORY OF PRESENT ILLNESS:  The patient is an 43-year-old lady with  multiple medical problems including COPD, chronic bronchitis, and  chronic respiratory failure on home oxygen, who was brought to the  emergency room after she was found in the car unresponsive with agonal  respirations. The patient was acidotic on venous blood gases. She was  intubated and was subsequently admitted to the intensive care unit. At  the present time, the patient is sedated on the vent, in no acute  respiratory distress. The patient had been in good health two days  prior to admission. There was no history of hemoptysis, hematemesis,  nausea, or vomiting. No history of chest pains. She had a CT of the  chest which showed no evidence of PE. She had CT of the head which  showed no acute intracranial abnormality. PAST MEDICAL HISTORY:  Significant for COPD, chronic respiratory failure  on home oxygen, bronchial asthma, thyroid disease, and arthritis. PAST SURGICAL HISTORY:  Remarkable for aortic valve repair,  cholecystectomy, colonoscopy, dilatation of the esophagus, hernia  repair, and hysterectomy. FAMILY HISTORY:  Reveals that her mother had diabetes. Father had  hypercholesterolemia, heart disease, arthritis, and hypertension. SOCIAL HISTORY:  Reveals that she quit smoking in , but used to  smoke three packs per day for 15 years prior to that. No history of  alcohol or drug abuse. MEDICATIONS:  Reviewed.     ALLERGIES:  She is allergic to MORPHINE, BACTRIM, PROMETHAZINE,  PHENERGAN, STADOL, and ADHESIVE TAPE. REVIEW OF SYSTEMS:  Unobtainable at this time. PHYSICAL EXAMINATION:  GENERAL:  The patient is sedated on the vent, in no acute respiratory  distress. VITAL SIGNS:  Her blood pressure is 127/89 mmHg, pulse of 58 per minute,  and respiratory rate of 12 per minute. Her temperature is 102 degrees  Fahrenheit. Her saturation is 100% on 40% FiO2. HEENT:  Exam reveals presence of orotracheal tube. There is no JVD, no  lymphadenopathy. NECK:  Supple. LUNGS:  Exam revealed diminished breath sounds. HEART:  Exam showed normal S1, S2. There was no S3, S4 noted. ABDOMEN:  Exam is benign. There is no evidence of any organomegaly. The bowel sounds are present. NEUROLOGIC:  Exam reveals that she is sedated on the vent. LABORATORY DATA:  Her electrolytes were unremarkable. Her CBC showed a  white count of 14.2, hemoglobin 13.8, hematocrit 42.1. Her initial  venous blood gases showed a pH of 6.93, pCO2 of 81, pO2 of 35 with 49%  saturation. Her ABGs this morning showed a pH of 7.50, pCO2 of 29, pO2  of 137 with 98% saturation. Her drug screen was positive for  cannabinoids. Her chest x-ray showed ET tube in good place, no acute  infiltrate. Her COVID-19 test was negative. IMPRESSION:  1. Acute-on-chronic respiratory failure secondary to acute exacerbation  of COPD. 2.  Acute exacerbation of COPD. 3.  Sepsis syndrome. 4.  Rule out UTI. PLAN:  1. Continue broad-spectrum antibiotic coverage. 2.  Continue present bronchodilator regimen. 3.  Sputum for culture and sensitivity. 4.  Solu-Medrol 40 every 12 hours. 5.  Blood cultures. 6.  Check mag and phos. 7.  Discussed with RN and RT. 8.  As per orders.         Luisana Ram MD    D: 11/15/2021 10:31:02       T: 11/15/2021 10:34:46     /[email or web address]  Job#: [id number]     Doc#: [id number]    CC:

## 2021-11-15 NOTE — ED NOTES
Patient blinking eyes awake but not moving any extremities. Dr. Eugenio Harley notified.       Nehal Shaw RN  11/14/21 1925

## 2021-11-15 NOTE — ED NOTES
Patient awake and answering yes and no questions by shaking head. Per Dr. Eugenio Harley still does not want to start sedation. Patient able to move arms and hands.       Nehal Shaw RN  11/14/21 1926

## 2021-11-15 NOTE — CONSULTS
Neurology Service Consult Note  [facility]  Patient Name: Joie Caballero  : 1935        Subjective:   Reason for consult: Possible seizure  Patient seen and examined. Chart reviewed in detail. 80 y.o. -female with PMH COPD, asthma, and thyroid disease presenting to [facility] for respiratory distress and unresponsiveness. Patient was found in car in restaurant parking lot by EMS. Apparently, patient had just finished eating with granddaughter, in which the granddaughter left after seeing patient get into her vehicle. Information obtained per chart review and nursing staff. No family at bedside at time of exam.      Patient withdrawals to pain in all extremities. Per nursing staff, patient attempt to pull at tubes,and moves all extremities without difficulty, did mouth to ask for a drink of water this am per nursing staff, however this was prior to sedation being increased. On presentation, LA 13.1, K+ 5.6, WBC 13.4, UDS pos for cannabis, and     Full neurological examination limited due to patient being on sedation, Recommendations to decrease sedation as this will provide opportunity to further examine patient's current neurological status.            Past Medical History:   Diagnosis Date    Arthritis     generalized    Asthma     Blood transfusion     No reaction    Chronic bronchitis (HCC)     COPD (chronic obstructive pulmonary disease) ([facility] [place] [address]     summer 2014    Echocardiogram 2021    EF 55-60%, Mild dilated LA, Mild annular calcification present, Mild mitral stenosis, Mild MR & TR.    Fatigue     H/O cardiac catheterization 06/15/2017    mild lad and cx disease    H/O cardiovascular stress test 2019    H/O Doppler ultrasound 2016 3/19/12    carotid- WNL 3/12right mild less than 50%and left wnl    H/O Doppler ultrasound     carotid - mod disease EILEEN, mild disease LICA    H/O echocardiogram 2010  H/O echocardiogram 04/15/2016    EF 60% sclerotic AV mildly stenosed-recommend yearly echo    H/O echocardiogram 08/22/2019    EF 55-60%, Minimal concentric left ventricular hypertrophy, left atrium is mildly dilated, severe aortic stenosis, Mitral annular calcification is present, Mild AR, Mild-Mod MR, Mild TR, No pericardial effusion     H/O echocardiogram 09/23/2019    H/O exercise stress test 06/14/2017    abnormal    History of cardiac cath 08/28/2019    Severe AS,   TAVR??  History of nuclear stress test 03/14/2017    EF 75%. Normal study.     Holter monitor, abnormal 02/22/2011    infrequent APC are seen    Normal cardiac stress test 07/23/2010    EF 70%, no ischemia    On home O2     only uses as needed, nightly prn    Syncope and collapse     Tachycardia     HX of tachycardia - had a cardioablation    Thyroid disease     :   Past Surgical History:   Procedure Laterality Date    AORTIC VALVE REPAIR N/A 09/09/2019    Core Valve EVOLUT 26mm [id number]    AORTIC VALVE REPLACEMENT N/A 09/11/2019    TAVR; [employer] Evlout 26    BREAST SURGERY  2009    bilat    CARDIAC CATHETERIZATION  03/02/2011    mild to moderate disease of diagonal and proximal RCA   [address]    ablation    CARPAL TUNNEL RELEASE  1990's    bilat    CHOLECYSTECTOMY  1961    open choley    COLONOSCOPY      DILATATION, ESOPHAGUS      ENDOSCOPY, COLON, DIAGNOSTIC  07/03/2017    s/p gastric bypass otherwise normal    FINGER SURGERY      right middle, thumb and index finger (screw and pin in right index finger)    GASTRIC RESTRICTION SURGERY  1984    stapled    HERNIA REPAIR  unknown    Inc hernia hernia repair    HIP FRACTURE SURGERY Left 11/04/2015    Left hip nail    HYSTERECTOMY  1966    Luke w/ BSO    JOINT REPLACEMENT  2004    right knee    LIPECTOMY  1990's    OTHER SURGICAL HISTORY  05/01/2012    Gastrojejunostomy/partial gastrectomy    SHOULDER ARTHROPLASTY Left 10/2017    TOE SURGERY  Early 2000's    hammer toes and bunions     Medications:  Scheduled Meds:   enoxaparin  1 mg/kg SubCUTAneous BID    budesonide-formoterol  2 puff Inhalation BID    And    tiotropium  2 puff Inhalation Daily    albuterol sulfate HFA  2 puff Inhalation 4x daily    aspirin  81 mg Oral Daily    escitalopram  20 mg Oral Daily    levothyroxine  50 mcg Oral Daily    sucralfate  1 g Oral 4x Daily    pantoprazole  40 mg Oral QAM AC    timolol  1 drop Both Eyes Daily    chlorhexidine  15 mL Mouth/Throat BID    sodium chloride flush  5-40 mL IntraVENous 2 times per day    methylPREDNISolone  40 mg IntraVENous Q12H    doxycycline (VIBRAMYCIN) IV  100 mg IntraVENous Q12H     Continuous Infusions:   propofol 25 mcg/kg/min (11/15/21 0800)    dextrose      sodium chloride      sodium chloride Stopped (11/14/21 2120)    dexmedetomidine (PRECEDEX) IV infusion 0.8 mcg/kg/hr (11/15/21 0213)    norepinephrine Stopped (11/15/21 0831)     PRN Meds:.albuterol, glucose, dextrose, glucagon (rDNA), dextrose, sodium chloride flush, sodium chloride, ondansetron **OR** ondansetron, polyethylene glycol, acetaminophen **OR** acetaminophen, magnesium sulfate, fentanNYL    Allergies   Allergen Reactions    Morphine Anaphylaxis     Dilaudid OK    Bactrim [Sulfamethoxazole-Trimethoprim]     Butorphanol      Other reaction(s): Other - comment required  \"out of body experience\"    Influenza Vaccines      Ended in hospital stay for 3 days told by md not to get it anymore     Lactose Intolerance (Gi) Nausea Only    Phenergan [Promethazine Hcl] Other (See Comments)     Makes me jerk all over. Zofran OK    Promethazine     Stadol [Butorphanol Tartrate] Other (See Comments)     Out of body experience. Was told it was a near death experience and was placed in ICU. Dilaudid OK    Tape Karene Vijay Tape] Other (See Comments)     Plastic cause itching and rash. Paper tape causes my skin to blister.  (Tega-derm and Coban OK to use.) Social History     Socioeconomic History    Marital status:      Spouse name: Not on file    Number of children: Not on file    Years of education: Not on file    Highest education level: Not on file   Occupational History    Not on file   Tobacco Use    Smoking status: Former Smoker     Packs/day: 3.00     Years: 5.00     Pack years: 15.00     Quit date: 1962     Years since quittin.9    Smokeless tobacco: Never Used   Substance and Sexual Activity    Alcohol use: No    Drug use: No    Sexual activity: Not on file   Other Topics Concern    Not on file   Social History Narrative    Not on file     Social Determinants of Health     Financial Resource Strain:     Difficulty of Paying Living Expenses: Not on file   Food Insecurity:     Worried About Running Out of Food in the Last Year: Not on file    Vicki of Food in the Last Year: Not on file   Transportation Needs:     Lack of Transportation (Medical): Not on file    Lack of Transportation (Non-Medical):  Not on file   Physical Activity:     Days of Exercise per Week: Not on file    Minutes of Exercise per Session: Not on file   Stress:     Feeling of Stress : Not on file   Social Connections:     Frequency of Communication with Friends and Family: Not on file    Frequency of Social Gatherings with Friends and Family: Not on file    Attends Mandaeism Services: Not on file    Active Member of [address] [place] [address] or Organizations: Not on file    Attends Club or Organization Meetings: Not on file    Marital Status: Not on file   Intimate Partner Violence:     Fear of Current or Ex-Partner: Not on file    Emotionally Abused: Not on file    Physically Abused: Not on file    Sexually Abused: Not on file   Housing Stability:     Unable to Pay for Housing in the Last Year: Not on file    Number of [place] in the Last Year: Not on file    Unstable Housing in the Last Year: Not on file      Family History   Problem Relation Age of Onset    Diabetes Mother    Ghazala Coral Other Mother         thyroid    Heart Disease Father     Arthritis Father     High Blood Pressure Father     High Cholesterol Father     Other Father         glaucoma    Other Sister         thyroid, glaucoma    Diabetes Brother     Other Sister     Vision Loss Sister         lung problems, smoker    Cancer Sister         throat cancer    Other Son         HX of clots    Early Death Son         Hit by car and killed.  High Blood Pressure Daughter     Stroke Son         No residual    Other Son         HX myocarditis         ROS (10 systems)  Unable to ask ROS d/t patient being sedated on vent at time of examination. Physical Exam:       Vitals:    11/15/21 0645 11/15/21 0700 11/15/21 0734 11/15/21 0745   BP: (!) 80/56 82/61  138/83   Pulse: 60 64 62 60   Resp: 12 12 12 12   Temp:    102 °F (38.9 °C)   TempSrc:    Bladder   SpO2:   100% 100%   Height:           Wt Readings from Last 3 Encounters:   11/11/21 121 lb (54.9 kg)   11/01/21 121 lb 12.8 oz (55.2 kg)   10/13/21 124 lb 9.6 oz (56.5 kg)     Temp Readings from Last 3 Encounters:   11/15/21 102 °F (38.9 °C) (Bladder)   11/11/21 98 °F (36.7 °C)   11/01/21 97.5 °F (36.4 °C) (Infrared)     BP Readings from Last 3 Encounters:   11/15/21 138/83   11/11/21 (!) 129/57   11/01/21 116/60     Pulse Readings from Last 3 Encounters:   11/15/21 60   11/11/21 60   11/01/21 (!) 46        Gen: Sedated on vent, withdrawals to painful stimuli  HEENT: NC/AT, EOMI, PERRL, mmm, neck supple, no meningeal signs; Heart: SR on monitor  Lungs: Respirations Unlabored  Ext: no edema, no calf tenderness b/l  Psych: normal mood and affect  Skin: no rashes or lesions    NEUROLOGIC EXAM:    Mental Status: Sedated on vent, withdrawls to pain.    Cranial Nerve Exam:   CN II-XII: PERRL, sluggish, pinpoint, no nystagmus, no gaze paresis;face is symmetrical, tongue midline against Et tube,   Motor Exam:       Strength: patient sedated at time of exam, however reportedly  movings all extremities  Tone and bulk normal   LUIS ANTONIO pronator drift    Deep Tendon Reflexes: 1/4 biceps, triceps, brachioradialis, 1/4 patellar, and 0/4 achilles b/l; flexor plantar responses b/l    Sensation:  patient sedated at time of exam, however reportedly  movings all extremities      Coordination/Cerebellum:       Tremors--none      Rapidly alternating movements: LUIS ANTONIO        Heel-to-Shin: LUIS ANTONIO    Finger-to-Nose: LUIS ANTONIO  Gait and stance:      Gait: deferred      LABS:        CBC:   Recent Labs     11/15/21  0259   WBC 14.2*   RBC 4.16*   HGB 13.8   HCT 42.1      .2*     BMP:    Recent Labs     11/15/21  0259      K 4.0      CO2 20*   BUN 14   CREATININE 0.6   GLUCOSE 168*   CALCIUM 8.1*       IMAGING:    CT of head  Impression   No acute intracranial abnormality. CTA of head and neck  Impression   No large vessel occlusion visualized within the head or neck. Above imaging reviewed in detail. ASSESSMENT/PLAN:   80 y.o. -female with PMH COPD, asthma, and thyroid disease presenting with respiratory distress and unresponsiveness. Found in car by EMS. Neurology being consulted for possible seizure. Patient intubated and sedated at this time. 1. Unresponsiveness possibly due to metabolic acidosis vs seizure vs acute intracranial ischemia. On presentation, LA 13.1, K+ 5.6, WBC 13.4, UDS pos for cannabis, and . Neurodiagnostics  -CT HEAD- non acute  -CTA of head and neck- no LVO  -MRI ordered  -EEG ordered    Patient discussed with attending physician Dr. Jose Benavides    Thank you for allowing us to participate in the care of your patient. If there are any questions regarding evaluation please feel free to contact us. DERIK Acharya - CNP, 11/15/2021      ------------------------------------    Attending Note:  I have rounded on this patient with Kervin Covington NP. I have reviewed the chart and we have discussed this case in detail.

## 2021-11-15 NOTE — PROGRESS NOTES
HOD#2 acute resp failure, resp arrest    This pt arrived to the unit hemodynamically stable with the support of low dose levo which has since been stopped and appropriate MAP persists. She is vent compliant on minimal settings with dex and propofol titrated accordingly for a rass goal -2.    pulm consult placed. Spoke with physician. See new orders. Reported to RT with rate change on the vent. Daughter at bedside for 10min -- all questions answered and medications discussed. No EKG changes seen on the monitor since admission. sb-sr. No adventitious breath sounds auscultated bilat throughout. Skin is intact. Patent pan draining yellow/clear urine - adequate output.     Will continue to monitor closely

## 2021-11-15 NOTE — ED NOTES
Patient awake and moving all 4 extremities. Dr. Meenu Francois at bedside wanting to start Precedex. Will start once up from pharmacy. Patient aware of what is going on. Daughter and [place] at bedside. No questions at this time.       Peg Santos RN  11/14/21 1926

## 2021-11-15 NOTE — CONSULTS
CARDIOLOGY CONSULT NOTE   Reason for consultation:  Elevated trop    Referring physician:  No admitting provider for patient encounter. Primary care physician: Farida Brooks MD      Dear   Thanks for the consult. History of present Daniel Bronson is a 80 y. o.year old who was found unresponsive and agonally breathing, was intubated in the field by EMS and brought to hosptial, her altered mental condition improved after mechanical ventilations, she is sedated in ICU on ventilator, she also had [facility], her blood pressrue has been labile, is on pressors and sedation, cardiology consult is called for elevated trop, she had TAVR 2 yrs ago and at that time her cath showed mild CAD, she has history of asthma and COPD also. Chief Complaint   Patient presents with    Other     unresponsive    Irregular Heart Beat     abnormal EKG         Past medical history:    has a past medical history of Arthritis, Asthma, Blood transfusion, Chronic bronchitis ([address] COPD (chronic obstructive pulmonary disease) ([facility] [address] Echocardiogram, Fatigue, H/O cardiac catheterization, H/O cardiovascular stress test, H/O Doppler ultrasound, H/O Doppler ultrasound, H/O echocardiogram, H/O echocardiogram, H/O echocardiogram, H/O echocardiogram, H/O exercise stress test, History of cardiac cath, History of nuclear stress test, Holter monitor, abnormal, Normal cardiac stress test, On home O2, Syncope and collapse, Tachycardia, and Thyroid disease. Past surgical history:   has a past surgical history that includes joint replacement (2004); Hysterectomy (1966); Cholecystectomy (1961); Breast surgery (2009); Toe Surgery (Early 2000's); Carpal tunnel release (1990's); Finger surgery; Gastric restriction surgery (1984); Cardiac surgery (1989); hernia repair (unknown); lipectomy (1990's); Dilatation, esophagus; other surgical history (05/01/2012); Cardiac catheterization (03/02/2011); Hip fracture surgery (Left, 11/04/2015);  Colonoscopy; Endoscopy, colon, diagnostic (07/03/2017); Total shoulder arthroplasty (Left, 10/2017); Aortic valve replacement (N/A, 09/11/2019); and Aortic valve repair (N/A, 09/09/2019). Social History:   reports that she quit smoking about 59 years ago. She has a 15.00 pack-year smoking history. She has never used smokeless tobacco. She reports that she does not drink alcohol and does not use drugs. Family history:   no family history of CAD, STROKE of DM    Allergies   Allergen Reactions    Morphine Anaphylaxis     Dilaudid OK    Bactrim [Sulfamethoxazole-Trimethoprim]     Butorphanol      Other reaction(s): Other - comment required  \"out of body experience\"    Influenza Vaccines      Ended in hospital stay for 3 days told by md not to get it anymore     Lactose Intolerance (Gi) Nausea Only    Phenergan [Promethazine Hcl] Other (See Comments)     Makes me jerk all over. Zofran OK    Promethazine     Stadol [Butorphanol Tartrate] Other (See Comments)     Out of body experience. Was told it was a near death experience and was placed in ICU. Dilaudid OK    Tape Jeaneen [employer] Tape] Other (See Comments)     Plastic cause itching and rash. Paper tape causes my skin to blister.  (Tega-derm and Coban OK to use.)       enoxaparin (LOVENOX) injection 50 mg, BID  budesonide-formoterol (SYMBICORT) 160-4.5 MCG/ACT inhaler 2 puff, BID   And  tiotropium (SPIRIVA RESPIMAT) 2.5 MCG/ACT inhaler 2 puff, Daily  albuterol (PROVENTIL) nebulizer solution 2.5 mg, Q4H PRN  albuterol sulfate  (90 Base) MCG/ACT inhaler 4 puff, Q4H  montelukast (SINGULAIR) tablet 10 mg, Nightly  cefepime (MAXIPIME) 1000 mg IVPB minibag, Q12H  propofol injection, Titrated  glucose (GLUTOSE) 40 % oral gel 15 g, PRN  dextrose 50 % IV solution, PRN  glucagon (rDNA) injection 1 mg, PRN  dextrose 5 % solution, PRN  aspirin EC tablet 81 mg, Daily  escitalopram (LEXAPRO) tablet 20 mg, Daily  levothyroxine (SYNTHROID) tablet 50 mcg, Daily  sucralfate (CARAFATE) tablet 1 g, 4x Daily  pantoprazole (PROTONIX) tablet 40 mg, QAM AC  timolol (TIMOPTIC) 0.5 % ophthalmic solution 1 drop, Daily  chlorhexidine (PERIDEX) 0.12 % solution 15 mL, BID  sodium chloride flush 0.9 % injection 5-40 mL, 2 times per day  sodium chloride flush 0.9 % injection 5-40 mL, PRN  0.9 % sodium chloride infusion, PRN  ondansetron (ZOFRAN-ODT) disintegrating tablet 4 mg, Q8H PRN   Or  ondansetron (ZOFRAN) injection 4 mg, Q6H PRN  polyethylene glycol (GLYCOLAX) packet 17 g, Daily PRN  acetaminophen (TYLENOL) tablet 650 mg, Q6H PRN   Or  acetaminophen (TYLENOL) suppository 650 mg, Q6H PRN  magnesium sulfate 2000 mg in 50 mL IVPB premix, PRN  fentaNYL (SUBLIMAZE) injection 25 mcg, Q1H PRN  methylPREDNISolone sodium (SOLU-MEDROL) injection 40 mg, Q12H  doxycycline (VIBRAMYCIN) 100 mg in dextrose 5 % 100 mL IVPB, Q12H  0.9 % sodium chloride infusion, Continuous  dexmedetomidine (PRECEDEX) 400 mcg in sodium chloride 0.9 % 100 mL infusion, Continuous  norepinephrine (LEVOPHED) 16 mg in sodium chloride 0.9 % 250 mL infusion, Continuous      Current Facility-Administered Medications   Medication Dose Route Frequency Provider Last Rate Last Admin    enoxaparin (LOVENOX) injection 50 mg  1 mg/kg SubCUTAneous BID Prema Stage, APRN - NP   50 mg at 11/15/21 0819    budesonide-formoterol (SYMBICORT) 160-4.5 MCG/ACT inhaler 2 puff  2 puff Inhalation BID Tia Davey MD   2 puff at 11/15/21 0732    And    tiotropium (SPIRIVA RESPIMAT) 2.5 MCG/ACT inhaler 2 puff  2 puff Inhalation Daily Tia Davey MD        albuterol (PROVENTIL) nebulizer solution 2.5 mg  2.5 mg Nebulization Q4H PRN Tia Davey MD        albuterol sulfate  (90 Base) MCG/ACT inhaler 4 puff  4 puff Inhalation Q4H Shawn Edgar MD        montelukast (SINGULAIR) tablet 10 mg  10 mg Per NG tube Nightly Shawn Waltersa, MD        cefepime (MAXIPIME) 1000 mg IVPB minibag  1,000 mg IntraVENous Q12H Ryan Ferris MD  propofol injection  5-50 mcg/kg/min IntraVENous Titrated Shawn Edgar MD 6.6 mL/hr at 11/15/21 1025 20 mcg/kg/min at 11/15/21 1025    glucose (GLUTOSE) 40 % oral gel 15 g  15 g Oral PRN Alejandro Ramos MD        dextrose 50 % IV solution  12.5 g IntraVENous PRN Alejandro Ramos MD        glucagon (rDNA) injection 1 mg  1 mg IntraMUSCular PRN Alejandro Ramos MD        dextrose 5 % solution  100 mL/hr IntraVENous PRN Alejandro Ramos MD        aspirin EC tablet 81 mg  81 mg Oral Daily Patrick Peterson MD        escitalopram (LEXAPRO) tablet 20 mg  20 mg Oral Daily Patrick Peterson MD   20 mg at 11/15/21 0818    levothyroxine (SYNTHROID) tablet 50 mcg  50 mcg Oral Daily Patrick Peterson MD   50 mcg at 11/15/21 0818    sucralfate (CARAFATE) tablet 1 g  1 g Oral 4x Daily Patrick Peterson MD   1 g at 11/15/21 0819    pantoprazole (PROTONIX) tablet 40 mg  40 mg Oral QAM AC Patrick Peterson MD        timolol (TIMOPTIC) 0.5 % ophthalmic solution 1 drop  1 drop Both Eyes Daily Patrick Peterson MD        chlorhexidine (PERIDEX) 0.12 % solution 15 mL  15 mL Mouth/Throat BID Patrick Peterson MD   15 mL at 11/15/21 0805    sodium chloride flush 0.9 % injection 5-40 mL  5-40 mL IntraVENous 2 times per day Patrick Peterson MD   10 mL at 11/15/21 0819    sodium chloride flush 0.9 % injection 5-40 mL  5-40 mL IntraVENous PRN Patrick Peterson MD        0.9 % sodium chloride infusion  25 mL IntraVENous PRN Patrick Peterson MD        ondansetron (ZOFRAN-ODT) disintegrating tablet 4 mg  4 mg Oral Q8H PRN Patrick Peterson MD        Or    ondansetron [facility]) injection 4 mg  4 mg IntraVENous Q6H PRN Patrick Peterson MD        polyethylene glycol Anita Rile) packet 17 g  17 g Oral Daily PRN Patrick Peterson MD        acetaminophen (TYLENOL) tablet 650 mg  650 mg Oral Q6H PRN Patrick Peterson MD   650 mg at 11/15/21 0818    Or    acetaminophen (TYLENOL) suppository 650 mg  650 mg Rectal Q6H PRN MD Alison Garcia magnesium sulfate 2000 mg in 50 mL IVPB premix  2,000 mg IntraVENous PRN Patrick Peterson MD        fentaNYL (SUBLIMAZE) injection 25 mcg  25 mcg IntraVENous Q1H PRN Patrick Peterson MD   25 mcg at 11/14/21 2038    methylPREDNISolone sodium (SOLU-MEDROL) injection 40 mg  40 mg IntraVENous Q12H Patrick Peterson MD   40 mg at 11/15/21 0818    doxycycline (VIBRAMYCIN) 100 mg in dextrose 5 % 100 mL IVPB  100 mg IntraVENous Q12H Patrick Peterson MD   Stopped at 11/15/21 0928    0.9 % sodium chloride infusion   IntraVENous Continuous Patrick Peterson MD   Paused at 11/14/21 2120    dexmedetomidine (PRECEDEX) 400 mcg in sodium chloride 0.9 % 100 mL infusion  0.2-1.4 mcg/kg/hr IntraVENous Continuous Patrick Peterson MD 11 mL/hr at 11/15/21 0213 0.8 mcg/kg/hr at 11/15/21 0213    norepinephrine (LEVOPHED) 16 mg in sodium chloride 0.9 % 250 mL infusion  2-100 mcg/min IntraVENous Continuous Adiel Adas, APRN - NP 3.8 mL/hr at 11/15/21 0947 4 mcg/min at 11/15/21 0947     Review of Systems:   · Constitutional: No Fever or Weight Loss   · Eyes: No Decreased Vision  · ENT: No Headaches, Hearing Loss or Vertigo  · Cardiovascular: No chest pain, dyspnea on exertion, palpitations or loss of consciousness  · Respiratory: No cough or wheezing    · Gastrointestinal: No abdominal pain, appetite loss, blood in stools, constipation, diarrhea or heartburn  · Genitourinary: No dysuria, trouble voiding, or hematuria  · Musculoskeletal:  No gait disturbance, weakness or joint complaints  · Integumentary: No rash or pruritis  · Neurological: No TIA or stroke symptoms  · Psychiatric: No anxiety or depression  · Endocrine: No malaise, fatigue or temperature intolerance  · Hematologic/Lymphatic: No bleeding problems, blood clots or swollen lymph nodes  · Allergic/Immunologic: No nasal congestion or hives  All systems negative except as marked.      ·   ·      Physical Examination:    Vitals:    11/15/21 1000   BP: 127/89   Pulse: 58   Resp: hours. Lab Results   Component Value Date    BNP 57 05/23/2014    BNP 65 11/13/2012     Lab Results   Component Value Date    INR 0.82 11/14/2021    PROTIME 10.5 (L) 11/14/2021         EKG: WIDE COMPLEX TACHYCADIA    Chest Xray:nad    ECHO:pending  Labs, echo, meds reviewed  Assessment: 80 y. o.year old with PMH of  has a past medical history of Arthritis, Asthma, Blood transfusion, Chronic bronchitis ([facility] [address] COPD (chronic obstructive pulmonary disease) ([facility] [address] Echocardiogram, Fatigue, H/O cardiac catheterization, H/O cardiovascular stress test, H/O Doppler ultrasound, H/O Doppler ultrasound, H/O echocardiogram, H/O echocardiogram, H/O echocardiogram, H/O echocardiogram, H/O exercise stress test, History of cardiac cath, History of nuclear stress test, Holter monitor, abnormal, Normal cardiac stress test, On home O2, Syncope and collapse, Tachycardia, and Thyroid disease. Recommendations:    1. Respiratory arrest: could be ashthma and COPD related, intubated and sedated, however, mental condition appears intact, will recommend to wean as per protocol. 2. Elevated trop:its trending up, once extubated, will get ischemia work up, for now add full dose lovenox, echo ordered, cath films from 2019 reviewed( not much siginificant CAD NOTED), ADD ASPIRIN  3. WIDE COMPLEX TACHYCARDIA: Could be vtach, its resolved, Mg is normal, will get echo and if she started PVCs or NSVT, will add amiodarone drip  4. H/O TAVR: echo ordered  All labs, medications and tests reviewed, continue all other medications of all above medical condition listed as is.          Hollie Huynh MD, 11/15/2021 10:35 AM

## 2021-11-15 NOTE — PROGRESS NOTES
11/15/21 0416   Vent Information   Vent Type 980   Vent Mode AC/VC   Vt Ordered 500 mL   Rate Set 12 bmp   Peak Flow 50 L/min   Pressure Support 0 cmH20   FiO2  40 %   Sensitivity 3   PEEP/CPAP 5   I Time/ I Time % 0 s   Humidification Source HME   Vent Patient Data   High Peep/I Pressure 0   Peak Inspiratory Pressure 15 cmH2O   Mean Airway Pressure 7.3 cmH20   Rate Measured 12 br/min   Vt Exhaled 500 mL   Minute Volume 6.03 Liters   I:E Ratio 1:3.50   Cough/Sputum   Sputum How Obtained Endotracheal; Suctioned   $Obtained Sample $Induced Sputum   Cough Productive   Sputum Amount Small   Sputum Color Cloudy   Tenacity Thick   Spontaneous Breathing Trial (SBT) RT Doc   Pulse 56   Additional Respiratory  Assessments   Resp 15   Alarm Settings   High Pressure Alarm 40 cmH2O   Delay Alarm 20 sec(s)   Low Minute Volume Alarm 2.5 L/min   Apnea (secs) 20 secs   High Respiratory Rate 40 br/min   Low Exhaled Vt  250 mL   Non-Surgical Airway 11/14/21 Endo Tracheal Tube   Placement Date/Time: 11/14/21 1413   Timeout: Patient; Procedure  Mask Ventilation: Ventilated by mask (1)  Placed By: In ED  Inserted by: Dr. Sb Phillips  Insertion attempts: 1  Airway Device: Endo Tracheal Tube  Size: 7.5  Placement Verified By[de-identified] Chest X-ra. ..    Secured at 23 cm   Measured From [address] [employer] tube [employer]   Site Condition Dry   Cuff Pressure   (minimal leak)

## 2021-11-15 NOTE — PROGRESS NOTES
Hospitalist Progress Note      Name:  Gokul Smith /Age/Sex: 1935  (80 y.o. female)   MRN & CSN:  [id number] & [id number] Admission Date/Time: 2021  2:06 PM   Location:  -A PCP: Lilian Sanchez MD         Hospital Day: 2  Discharge barrier/Reason for continued hospitalization: Intubated, mechanically ventilated    Assessment and Plan:   Gokul Smith is a 80 y.o.   female with a past medical history of aortic stenosis/P TAVR, hypertension, COPD, nonobstructive CAD, gastric bypass, who presented to the ED on 2021 with altered mental status and unresponsiveness and was tearful intubated in the ED for Acute respiratory failure ([address] Patient was reportedly found in her car minimally responsive. Blood work was consistent with acute metabolic acidosis. 1.  Unresponsiveness: Reason for hospital admission and intubation  Undetermined etiology at this time. Remains sedated, intubated and mechanically ventilated  Ventilator management pulmonologist.  Urine tox positive for cannabis  CT head negative, CTA head and neck with no LVO  CT chest with cardiomegaly CAD and TAVR  CT abdomen and pelvis with gastric bypass and high-grade celiac artery origin stenosis    2. Suspected seizure vs PRES: Found in her car in the parking lot of [employer]. Blood pressure 200/145 on arrival.  Severe lactic acidosis on arrival, altered mental status and unresponsiveness on arrival  MRI brain with and without contrast, EEG    3. Acute toxic metabolic encephalopathy: Toxic due to sedatives or ventilator management and metabolic due to #1 above. Supportive management    4. Distributive shock: Hypertensive crisis on arrival, then blood pressure bottomed out. Sepsis unlikely. Now on Levophed. 5.  Unintentional weight loss: Weight is down to 121 lbs from 132 lbs earlier in the year. 6.  COPD: Exacerbation is unlikely at this time  Continue Singulair.   Defer steroid and antibiotic management to pulmonologist  7. Valvular heart disease s/p TAVR  8. Hypothyroidism: Check TSH  Continue Synthroid  9. Anxiety depression: Continue Lexapro  Diet Diet NPO   DVT Prophylaxis [x] Lovenox, []  Heparin, [] SCDs, [] Ambulation   GI Prophylaxis [x] PPI,  [] H2 Blocker,  [] Carafate,  [] Diet/Tube Feeds   Code Status Full Code   MDM [] Low, [] Moderate,[x]  High       History of Present Illness:     Chief Complaint: Acute respiratory failure ([address]     Tim Gomez is a 80 y.o.  female  who presents with unresponsiveness and was found to be in acute hypoxic respiratory failure required intubation and mechanical ventilation. 11/15/2021: Patient is seen and examined, intubated, mechanically ventilated, sedated. 10 point review of systems unable to be done due to patient's current status. Objective: Intake/Output Summary (Last 24 hours) at 11/15/2021 1349  Last data filed at 11/15/2021 0923  Gross per 24 hour   Intake 286.76 ml   Output 1270 ml   Net -983.24 ml        Vitals:   Vitals:    11/15/21 1100 11/15/21 1134 11/15/21 1200 11/15/21 1300   BP: 95/68  111/87 139/78   Pulse: 63 66 58 57   Resp: 12 12 12 12   Temp:   102.2 °F (39 °C)    TempSrc:   Bladder    SpO2: 100% 99% 98% 98%   Height:            Physical Exam:   GEN: Elderly female, sedated, in no apparent distress. Appears given age. HEENT: Normal excep oral intubated  RESP: Clear lung fields bilaterally. Symmetric chest movement while on Mech vent  CVS: RRR, S1, S2  GI/: Abdomen is soft, nontender, no organomegaly. . Bowel sounds normal, rectal exam deferred. No CVA tenderness. MSK: No gross joint deformities. No tenderness  SKIN: Normal coloration, warm, dry.   NEURO: Encephalopathic    Medications:   Medications:    enoxaparin  1 mg/kg SubCUTAneous BID    budesonide-formoterol  2 puff Inhalation BID    And    tiotropium  2 puff Inhalation Daily    albuterol sulfate HFA  4 puff Inhalation Q4H    montelukast  10 mg Per NG tube Nightly  cefepime  1,000 mg IntraVENous Q12H    aspirin  81 mg Oral Daily    escitalopram  20 mg Oral Daily    levothyroxine  50 mcg Oral Daily    sucralfate  1 g Oral 4x Daily    pantoprazole  40 mg Oral QAM AC    timolol  1 drop Both Eyes Daily    chlorhexidine  15 mL Mouth/Throat BID    sodium chloride flush  5-40 mL IntraVENous 2 times per day    methylPREDNISolone  40 mg IntraVENous Q12H    doxycycline (VIBRAMYCIN) IV  100 mg IntraVENous Q12H      Infusions:    propofol 20 mcg/kg/min (11/15/21 1323)    dextrose      sodium chloride      sodium chloride 75 mL/hr at 11/15/21 1118    dexmedetomidine (PRECEDEX) IV infusion 0.8 mcg/kg/hr (11/15/21 0213)    norepinephrine 4 mcg/min (11/15/21 0947)     PRN Meds: albuterol, 2.5 mg, Q4H PRN  glucose, 15 g, PRN  dextrose, 12.5 g, PRN  glucagon (rDNA), 1 mg, PRN  dextrose, 100 mL/hr, PRN  sodium chloride flush, 5-40 mL, PRN  sodium chloride, 25 mL, PRN  ondansetron, 4 mg, Q8H PRN   Or  ondansetron, 4 mg, Q6H PRN  polyethylene glycol, 17 g, Daily PRN  acetaminophen, 650 mg, Q6H PRN   Or  acetaminophen, 650 mg, Q6H PRN  magnesium sulfate, 2,000 mg, PRN  fentanNYL, 25 mcg, Q1H PRN          Electronically signed by Filemon Shoemaker MD on 11/15/2021 at 1:49 PM

## 2021-11-15 NOTE — PROGRESS NOTES
Spoke to Dr. Willard Prakash, sedation orders to different RASS parameters. Verbal order to change propofol to RASS -1 to -2.  Titrate propofol off first.

## 2021-11-16 ENCOUNTER — APPOINTMENT (OUTPATIENT)
Dept: GENERAL RADIOLOGY | Age: 86
DRG: 871 | End: 2021-11-16
Payer: MEDICARE

## 2021-11-16 LAB
ADENOVIRUS DETECTION BY PCR: NOT DETECTED
ALBUMIN SERPL-MCNC: 3.2 GM/DL (ref 3.4–5)
ALP BLD-CCNC: 62 IU/L (ref 40–128)
ALT SERPL-CCNC: 86 U/L (ref 10–40)
ANION GAP SERPL CALCULATED.3IONS-SCNC: 12 MMOL/L (ref 4–16)
AST SERPL-CCNC: 87 IU/L (ref 15–37)
BASE EXCESS: 0 (ref 0–2.4)
BASOPHILS ABSOLUTE: 0 K/CU MM
BASOPHILS RELATIVE PERCENT: 0.1 % (ref 0–1)
BILIRUB SERPL-MCNC: 0.3 MG/DL (ref 0–1)
BORDETELLA PARAPERTUSSIS BY PCR: NOT DETECTED
BORDETELLA PERTUSSIS PCR: NOT DETECTED
BUN BLDV-MCNC: 13 MG/DL (ref 6–23)
CALCIUM SERPL-MCNC: 7.3 MG/DL (ref 8.3–10.6)
CARBON MONOXIDE, BLOOD: 0 % (ref 0–5)
CHLAMYDOPHILA PNEUMONIA PCR: NOT DETECTED
CHLORIDE BLD-SCNC: 105 MMOL/L (ref 99–110)
CO2 CONTENT: 22.3 MMOL/L (ref 19–24)
CO2: 21 MMOL/L (ref 21–32)
COMMENT: ABNORMAL
CORONAVIRUS 229E PCR: NOT DETECTED
CORONAVIRUS HKU1 PCR: NOT DETECTED
CORONAVIRUS NL63 PCR: NOT DETECTED
CORONAVIRUS OC43 PCR: NOT DETECTED
CREAT SERPL-MCNC: 0.5 MG/DL (ref 0.6–1.1)
DIFFERENTIAL TYPE: ABNORMAL
EKG ATRIAL RATE: 120 BPM
EKG ATRIAL RATE: 79 BPM
EKG DIAGNOSIS: NORMAL
EKG DIAGNOSIS: NORMAL
EKG P AXIS: 75 DEGREES
EKG P-R INTERVAL: 214 MS
EKG Q-T INTERVAL: 348 MS
EKG Q-T INTERVAL: 438 MS
EKG QRS DURATION: 122 MS
EKG QRS DURATION: 74 MS
EKG QTC CALCULATION (BAZETT): 502 MS
EKG QTC CALCULATION (BAZETT): 513 MS
EKG R AXIS: -45 DEGREES
EKG R AXIS: 68 DEGREES
EKG T AXIS: 103 DEGREES
EKG T AXIS: 90 DEGREES
EKG VENTRICULAR RATE: 131 BPM
EKG VENTRICULAR RATE: 79 BPM
EOSINOPHILS ABSOLUTE: 0 K/CU MM
EOSINOPHILS RELATIVE PERCENT: 0 % (ref 0–3)
GFR AFRICAN AMERICAN: >60 ML/MIN/1.73M2
GFR NON-AFRICAN AMERICAN: >60 ML/MIN/1.73M2
GLUCOSE BLD-MCNC: 145 MG/DL (ref 70–99)
HCO3 ARTERIAL: 21.5 MMOL/L (ref 18–23)
HCT VFR BLD CALC: 37.2 % (ref 37–47)
HEMOGLOBIN: 12.5 GM/DL (ref 12.5–16)
HUMAN METAPNEUMOVIRUS PCR: NOT DETECTED
IMMATURE NEUTROPHIL %: 0.4 % (ref 0–0.43)
INFLUENZA A BY PCR: NOT DETECTED
INFLUENZA A H1 (2009) PCR: NOT DETECTED
INFLUENZA A H1 PANDEMIC PCR: NOT DETECTED
INFLUENZA A H3 PCR: NOT DETECTED
INFLUENZA B BY PCR: NOT DETECTED
LV EF: 28 %
LVEF MODALITY: NORMAL
LYMPHOCYTES ABSOLUTE: 2.2 K/CU MM
LYMPHOCYTES RELATIVE PERCENT: 18.3 % (ref 24–44)
MAGNESIUM: 2 MG/DL (ref 1.8–2.4)
MCH RBC QN AUTO: 33.1 PG (ref 27–31)
MCHC RBC AUTO-ENTMCNC: 33.6 % (ref 32–36)
MCV RBC AUTO: 98.4 FL (ref 78–100)
METHEMOGLOBIN ARTERIAL: 0 %
MONOCYTES ABSOLUTE: 0.5 K/CU MM
MONOCYTES RELATIVE PERCENT: 4.2 % (ref 0–4)
MYCOPLASMA PNEUMONIAE PCR: NOT DETECTED
NUCLEATED RBC %: 0 %
O2 SATURATION: 95.3 % (ref 96–97)
PARAINFLUENZA 1 PCR: NOT DETECTED
PARAINFLUENZA 2 PCR: NOT DETECTED
PARAINFLUENZA 3 PCR: NOT DETECTED
PARAINFLUENZA 4 PCR: NOT DETECTED
PCO2 ARTERIAL: 27 MMHG (ref 32–45)
PDW BLD-RTO: 13 % (ref 11.7–14.9)
PH BLOOD: 7.51 (ref 7.34–7.45)
PHOSPHORUS: 2.9 MG/DL (ref 2.5–4.9)
PLATELET # BLD: 209 K/CU MM (ref 140–440)
PMV BLD AUTO: 10.7 FL (ref 7.5–11.1)
PO2 ARTERIAL: 152 MMHG (ref 75–100)
POTASSIUM SERPL-SCNC: 3.5 MMOL/L (ref 3.5–5.1)
PRO-BNP: 7287 PG/ML
PROCALCITONIN: 0.12
RBC # BLD: 3.78 M/CU MM (ref 4.2–5.4)
RHINOVIRUS ENTEROVIRUS PCR: NOT DETECTED
RSV PCR: NOT DETECTED
SARS-COV-2: NOT DETECTED
SEGMENTED NEUTROPHILS ABSOLUTE COUNT: 9.4 K/CU MM
SEGMENTED NEUTROPHILS RELATIVE PERCENT: 77 % (ref 36–66)
SODIUM BLD-SCNC: 138 MMOL/L (ref 135–145)
TOTAL CK: 390 IU/L (ref 26–140)
TOTAL IMMATURE NEUTOROPHIL: 0.05 K/CU MM
TOTAL NUCLEATED RBC: 0 K/CU MM
TOTAL PROTEIN: 5.1 GM/DL (ref 6.4–8.2)
TROPONIN T: 0.57 NG/ML
WBC # BLD: 12.2 K/CU MM (ref 4–10.5)

## 2021-11-16 PROCEDURE — 2000000000 HC ICU R&B

## 2021-11-16 PROCEDURE — 94003 VENT MGMT INPAT SUBQ DAY: CPT

## 2021-11-16 PROCEDURE — 36200 PLACE CATHETER IN AORTA: CPT

## 2021-11-16 PROCEDURE — 93306 TTE W/DOPPLER COMPLETE: CPT

## 2021-11-16 PROCEDURE — 83735 ASSAY OF MAGNESIUM: CPT

## 2021-11-16 PROCEDURE — 93010 ELECTROCARDIOGRAM REPORT: CPT | Performed by: INTERNAL MEDICINE

## 2021-11-16 PROCEDURE — 6360000002 HC RX W HCPCS: Performed by: NURSE PRACTITIONER

## 2021-11-16 PROCEDURE — 2580000003 HC RX 258: Performed by: INTERNAL MEDICINE

## 2021-11-16 PROCEDURE — 2709999900 HC NON-CHARGEABLE SUPPLY

## 2021-11-16 PROCEDURE — 6360000002 HC RX W HCPCS: Performed by: INTERNAL MEDICINE

## 2021-11-16 PROCEDURE — 0202U NFCT DS 22 TRGT SARS-COV-2: CPT

## 2021-11-16 PROCEDURE — 82803 BLOOD GASES ANY COMBINATION: CPT

## 2021-11-16 PROCEDURE — 93005 ELECTROCARDIOGRAM TRACING: CPT | Performed by: STUDENT IN AN ORGANIZED HEALTH CARE EDUCATION/TRAINING PROGRAM

## 2021-11-16 PROCEDURE — 93454 CORONARY ARTERY ANGIO S&I: CPT | Performed by: INTERNAL MEDICINE

## 2021-11-16 PROCEDURE — 84484 ASSAY OF TROPONIN QUANT: CPT

## 2021-11-16 PROCEDURE — 94761 N-INVAS EAR/PLS OXIMETRY MLT: CPT

## 2021-11-16 PROCEDURE — 6370000000 HC RX 637 (ALT 250 FOR IP): Performed by: INTERNAL MEDICINE

## 2021-11-16 PROCEDURE — 6360000002 HC RX W HCPCS

## 2021-11-16 PROCEDURE — 83880 ASSAY OF NATRIURETIC PEPTIDE: CPT

## 2021-11-16 PROCEDURE — 2700000000 HC OXYGEN THERAPY PER DAY

## 2021-11-16 PROCEDURE — 99233 SBSQ HOSP IP/OBS HIGH 50: CPT

## 2021-11-16 PROCEDURE — 2500000003 HC RX 250 WO HCPCS: Performed by: INTERNAL MEDICINE

## 2021-11-16 PROCEDURE — 85025 COMPLETE CBC W/AUTO DIFF WBC: CPT

## 2021-11-16 PROCEDURE — 99024 POSTOP FOLLOW-UP VISIT: CPT | Performed by: INTERNAL MEDICINE

## 2021-11-16 PROCEDURE — 2500000003 HC RX 250 WO HCPCS

## 2021-11-16 PROCEDURE — 2580000003 HC RX 258: Performed by: NURSE PRACTITIONER

## 2021-11-16 PROCEDURE — 80048 BASIC METABOLIC PNL TOTAL CA: CPT

## 2021-11-16 PROCEDURE — 84100 ASSAY OF PHOSPHORUS: CPT

## 2021-11-16 PROCEDURE — 36600 WITHDRAWAL OF ARTERIAL BLOOD: CPT

## 2021-11-16 PROCEDURE — 94640 AIRWAY INHALATION TREATMENT: CPT

## 2021-11-16 PROCEDURE — 89220 SPUTUM SPECIMEN COLLECTION: CPT

## 2021-11-16 PROCEDURE — 84145 PROCALCITONIN (PCT): CPT

## 2021-11-16 PROCEDURE — 6360000004 HC RX CONTRAST MEDICATION

## 2021-11-16 PROCEDURE — 75605 CONTRAST EXAM THORACIC AORTA: CPT

## 2021-11-16 PROCEDURE — 80053 COMPREHEN METABOLIC PANEL: CPT

## 2021-11-16 PROCEDURE — C1769 GUIDE WIRE: HCPCS

## 2021-11-16 PROCEDURE — C1894 INTRO/SHEATH, NON-LASER: HCPCS

## 2021-11-16 PROCEDURE — 71045 X-RAY EXAM CHEST 1 VIEW: CPT

## 2021-11-16 PROCEDURE — 82550 ASSAY OF CK (CPK): CPT

## 2021-11-16 RX ORDER — SODIUM CHLORIDE 0.9 % (FLUSH) 0.9 %
5-40 SYRINGE (ML) INJECTION EVERY 12 HOURS SCHEDULED
Status: DISCONTINUED | OUTPATIENT
Start: 2021-11-16 | End: 2021-11-26 | Stop reason: HOSPADM

## 2021-11-16 RX ORDER — SODIUM CHLORIDE 0.9 % (FLUSH) 0.9 %
5-40 SYRINGE (ML) INJECTION PRN
Status: DISCONTINUED | OUTPATIENT
Start: 2021-11-16 | End: 2021-11-26 | Stop reason: HOSPADM

## 2021-11-16 RX ORDER — SODIUM CHLORIDE 9 MG/ML
25 INJECTION, SOLUTION INTRAVENOUS PRN
Status: DISCONTINUED | OUTPATIENT
Start: 2021-11-16 | End: 2021-11-26 | Stop reason: HOSPADM

## 2021-11-16 RX ORDER — ACETAMINOPHEN 325 MG/1
650 TABLET ORAL EVERY 4 HOURS PRN
Status: DISCONTINUED | OUTPATIENT
Start: 2021-11-16 | End: 2021-11-26 | Stop reason: HOSPADM

## 2021-11-16 RX ADMIN — SODIUM CHLORIDE: 9 INJECTION, SOLUTION INTRAVENOUS at 18:10

## 2021-11-16 RX ADMIN — 0.12% CHLORHEXIDINE GLUCONATE 15 ML: 1.2 RINSE ORAL at 20:11

## 2021-11-16 RX ADMIN — ASPIRIN 81 MG: 81 TABLET, COATED ORAL at 08:10

## 2021-11-16 RX ADMIN — SODIUM CHLORIDE, PRESERVATIVE FREE 10 ML: 5 INJECTION INTRAVENOUS at 20:12

## 2021-11-16 RX ADMIN — ESCITALOPRAM OXALATE 20 MG: 10 TABLET ORAL at 08:06

## 2021-11-16 RX ADMIN — SUCRALFATE 1 G: 1 TABLET ORAL at 08:06

## 2021-11-16 RX ADMIN — SODIUM CHLORIDE, PRESERVATIVE FREE 10 ML: 5 INJECTION INTRAVENOUS at 21:15

## 2021-11-16 RX ADMIN — ALBUTEROL SULFATE 4 PUFF: 90 AEROSOL, METERED RESPIRATORY (INHALATION) at 23:28

## 2021-11-16 RX ADMIN — ALBUTEROL SULFATE 4 PUFF: 90 AEROSOL, METERED RESPIRATORY (INHALATION) at 07:33

## 2021-11-16 RX ADMIN — MIDAZOLAM 8 MG/HR: 5 INJECTION INTRAMUSCULAR; INTRAVENOUS at 18:09

## 2021-11-16 RX ADMIN — ENOXAPARIN SODIUM 60 MG: 100 INJECTION SUBCUTANEOUS at 21:15

## 2021-11-16 RX ADMIN — Medication 25 MCG/HR: at 11:10

## 2021-11-16 RX ADMIN — LEVOTHYROXINE SODIUM 50 MCG: 25 TABLET ORAL at 06:26

## 2021-11-16 RX ADMIN — SUCRALFATE 1 G: 1 TABLET ORAL at 14:36

## 2021-11-16 RX ADMIN — MIDAZOLAM 1 MG/HR: 5 INJECTION INTRAMUSCULAR; INTRAVENOUS at 06:10

## 2021-11-16 RX ADMIN — PROPOFOL 45 MCG/KG/MIN: 10 INJECTION, EMULSION INTRAVENOUS at 03:58

## 2021-11-16 RX ADMIN — SUCRALFATE 1 G: 1 TABLET ORAL at 18:39

## 2021-11-16 RX ADMIN — DOXYCYCLINE 100 MG: 100 INJECTION, POWDER, LYOPHILIZED, FOR SOLUTION INTRAVENOUS at 08:09

## 2021-11-16 RX ADMIN — ALBUTEROL SULFATE 4 PUFF: 90 AEROSOL, METERED RESPIRATORY (INHALATION) at 03:53

## 2021-11-16 RX ADMIN — 0.12% CHLORHEXIDINE GLUCONATE 15 ML: 1.2 RINSE ORAL at 08:05

## 2021-11-16 RX ADMIN — METHYLPREDNISOLONE SODIUM SUCCINATE 40 MG: 40 INJECTION, POWDER, FOR SOLUTION INTRAMUSCULAR; INTRAVENOUS at 08:06

## 2021-11-16 RX ADMIN — CEFEPIME HYDROCHLORIDE 1000 MG: 1 INJECTION, POWDER, FOR SOLUTION INTRAMUSCULAR; INTRAVENOUS at 23:10

## 2021-11-16 RX ADMIN — DOXYCYCLINE 100 MG: 100 INJECTION, POWDER, LYOPHILIZED, FOR SOLUTION INTRAVENOUS at 22:06

## 2021-11-16 RX ADMIN — BUDESONIDE AND FORMOTEROL FUMARATE DIHYDRATE 2 PUFF: 160; 4.5 AEROSOL RESPIRATORY (INHALATION) at 20:11

## 2021-11-16 RX ADMIN — SUCRALFATE 1 G: 1 TABLET ORAL at 20:11

## 2021-11-16 RX ADMIN — BUDESONIDE AND FORMOTEROL FUMARATE DIHYDRATE 2 PUFF: 160; 4.5 AEROSOL RESPIRATORY (INHALATION) at 07:34

## 2021-11-16 RX ADMIN — MONTELUKAST 10 MG: 10 TABLET, FILM COATED ORAL at 20:11

## 2021-11-16 RX ADMIN — CEFEPIME HYDROCHLORIDE 1000 MG: 1 INJECTION, POWDER, FOR SOLUTION INTRAMUSCULAR; INTRAVENOUS at 10:53

## 2021-11-16 RX ADMIN — SODIUM CHLORIDE: 9 INJECTION, SOLUTION INTRAVENOUS at 03:01

## 2021-11-16 RX ADMIN — METHYLPREDNISOLONE SODIUM SUCCINATE 40 MG: 40 INJECTION, POWDER, FOR SOLUTION INTRAMUSCULAR; INTRAVENOUS at 20:11

## 2021-11-16 RX ADMIN — ALBUTEROL SULFATE 4 PUFF: 90 AEROSOL, METERED RESPIRATORY (INHALATION) at 20:10

## 2021-11-16 RX ADMIN — ALBUTEROL SULFATE 4 PUFF: 90 AEROSOL, METERED RESPIRATORY (INHALATION) at 10:39

## 2021-11-16 RX ADMIN — ALBUTEROL SULFATE 4 PUFF: 90 AEROSOL, METERED RESPIRATORY (INHALATION) at 14:55

## 2021-11-16 RX ADMIN — SODIUM CHLORIDE, PRESERVATIVE FREE 10 ML: 5 INJECTION INTRAVENOUS at 08:19

## 2021-11-16 ASSESSMENT — PULMONARY FUNCTION TESTS
PIF_VALUE: 8
PIF_VALUE: 15
PIF_VALUE: 16
PIF_VALUE: 16
PIF_VALUE: 15
PIF_VALUE: 8
PIF_VALUE: 15
PIF_VALUE: 16
PIF_VALUE: 16
PIF_VALUE: 15
PIF_VALUE: 15
PIF_VALUE: 16
PIF_VALUE: 15
PIF_VALUE: 17
PIF_VALUE: 15
PIF_VALUE: 19
PIF_VALUE: 15
PIF_VALUE: 15
PIF_VALUE: 14
PIF_VALUE: 15
PIF_VALUE: 15
PIF_VALUE: 8
PIF_VALUE: 16
PIF_VALUE: 15
PIF_VALUE: 8
PIF_VALUE: 15
PIF_VALUE: 15
PIF_VALUE: 17
PIF_VALUE: 16
PIF_VALUE: 15
PIF_VALUE: 15
PIF_VALUE: 14
PIF_VALUE: 15
PIF_VALUE: 16
PIF_VALUE: 15
PIF_VALUE: 15
PIF_VALUE: 8
PIF_VALUE: 15

## 2021-11-16 ASSESSMENT — PAIN SCALES - GENERAL
PAINLEVEL_OUTOF10: 0

## 2021-11-16 NOTE — PLAN OF CARE
EKG changes noted on the monitor @ approximately 0330, EKG obtained, results called to internal medicine. Discussed patients ST depression and prolonged QT, what medications patient is presently on and changes to be made. Ordered to stop precedex drip at this time and will continue to monitor patient.

## 2021-11-16 NOTE — PROGRESS NOTES
Hospitalist Progress Note      Name:  Adrien West /Age/Sex: 1935  (80 y.o. female)   MRN & CSN:  [id number] & [id number] Admission Date/Time: 2021  2:06 PM   Location:  Cath Lab/NONE PCP: Farida Brooks MD       Hospital Day: 3    Assessment and Plan:     Acute respiratory failure/near respiratory arrest  -Continue mechanical ventilation  -SBT in am    COPD Exac  -IV Solumedrol    Sepsis  -102.2 fever noted at noon 11/15  -cefepime  -doxycycline  -check respiratory molecular panel  -follow up blood cx results  -check biomarkers    Shock  -on norepinephrine    Elevated troponin  -concern for [place] CMP - follow up cath and Echo reports    Wide complex tachycardia  -appreciate cardiology consult    Unresponsive upon admission  -EEG done, neurology will not start AED at this time  -MRI ordered    Unintentional weight loss: Weight is down to 121 lbs from 132 lbs earlier in the year  -tube feeds ordered      History of TAVR  History of nonobstructive COPD per last catheterization  History of gastric bypass  Hypertension    Hypothyroidism   -Synthroid - TSH is normal range    Mood disorder - Lexapro    Please see hospitalist noted from 11/15 for more details    Subjective:       She was sedated and intubated when I saw her    Had bradycardia last night, and propofol and Precedex were stopped    Patient presented to the ED on  with unresponsiveness and an abnormal EKG. -She was found to be in respiratory distress found unresponsive at [facility] parking lot. She had apparently been okay earlier that morning per history from the ED. EMS noted agonal breathing. Intubation by EMS in the field was unsuccessful. LMA was placed. She was transported to ED and continued to have agonal breathing. She was intubated in the ED    Per admission history and physical she has a COPD exacerbation    Cardiology saw her on 11/15. She had ventricular tachycardia and her blood pressure was labile.   She was on pressors and on sedation. She had a TAVR 2 years ago. At that time cath showed mild CAD per cardiology. Troponin was elevated. On admission on 11/14 troponin was less than 1.01. Later that day it went up to 0.39 and the following day it was 0.44 and then today, on 1116, it was 0.57    Left heart cath ordered 11/16 because of abnormal echo and apical ballooning    History  -per bedside nurse 11/16 pt mows her won lawn at home        Objective:      Intake/Output Summary (Last 24 hours) at 11/16/2021 1250  Last data filed at 11/16/2021 0819  Gross per 24 hour   Intake 2100.4 ml   Output 1260 ml   Net 840.4 ml      Vitals:   Vitals:    11/16/21 1041   BP:    Pulse: 55   Resp: 12   Temp:    SpO2: 100%         Labs:   CBC:   Lab Results   Component Value Date    WBC 12.2 11/16/2021    HGB 12.5 11/16/2021    HCT 37.2 11/16/2021    MCV 98.4 11/16/2021     11/16/2021     BMP:   Lab Results   Component Value Date     11/16/2021    K 3.5 11/16/2021     11/16/2021    CO2 21 11/16/2021    PHOS 2.9 11/16/2021    BUN 13 11/16/2021    CREATININE 0.5 11/16/2021    CALCIUM 7.3 11/16/2021       Physical Exam:      CV: RRR  Lungs: ventilated, clear  Abd: Soft  Neuro: sedated  Skin: no jaundice    1 pm    Versed 10 mg per hour  Fentanyl 50 mcg  Norepi 1 mcg    IVF 75    Vent settings: AC 12   FiO2 28% PEEP 5    Not on tube feeds when I saw her    HR 46 on monitor when I was in the room - RN - messaged cardiology (patient on lots of sedation)    Temp was 102.2 last night    Blanketrol II is on    [place] with relatively small amount light merlyn urine    ETT noted    NG right nostril placed    Medications:   Medications:    [Held by provider] enoxaparin  1 mg/kg SubCUTAneous BID    budesonide-formoterol  2 puff Inhalation BID    albuterol sulfate HFA  4 puff Inhalation Q4H    montelukast  10 mg Per NG tube Nightly    cefepime  1,000 mg IntraVENous Q12H    aspirin  81 mg Oral Daily    escitalopram  20 mg Oral Daily    levothyroxine  50 mcg Oral Daily    sucralfate  1 g Oral 4x Daily    pantoprazole  40 mg Oral QAM AC    timolol  1 drop Both Eyes Daily    chlorhexidine  15 mL Mouth/Throat BID    sodium chloride flush  5-40 mL IntraVENous 2 times per day    methylPREDNISolone  40 mg IntraVENous Q12H    doxycycline (VIBRAMYCIN) IV  100 mg IntraVENous Q12H      Infusions:    midazolam 10 mg/hr (11/16/21 1016)    fentaNYL 25 mcg/hr (11/16/21 1110)    dextrose      sodium chloride      sodium chloride 75 mL/hr at 11/16/21 0301    norepinephrine 2 mcg/min (11/16/21 0320)     PRN Meds: albuterol, 2.5 mg, Q4H PRN  glucose, 15 g, PRN  dextrose, 12.5 g, PRN  glucagon (rDNA), 1 mg, PRN  dextrose, 100 mL/hr, PRN  sodium chloride flush, 5-40 mL, PRN  sodium chloride, 25 mL, PRN  ondansetron, 4 mg, Q8H PRN   Or  ondansetron, 4 mg, Q6H PRN  polyethylene glycol, 17 g, Daily PRN  acetaminophen, 650 mg, Q6H PRN   Or  acetaminophen, 650 mg, Q6H PRN  magnesium sulfate, 2,000 mg, PRN  fentanNYL, 25 mcg, Q1H PRN          Electronically signed by Princess Benitez MD on 11/16/2021 at 12:50 PM

## 2021-11-16 NOTE — SIGNIFICANT EVENT
Significant event documentation:   Patient assessed at bedside due to reported rhythm changes on telemetry by nurse. Stat EKG ordered which showed sinus bradycardia with diffuse T wave inversions. This was sent to Dr. Yuridia Farias who has been following patient. Stat labs ordered and pending. Last troponin 0 0.443 on 11/15/2021, will recheck stat. Otherwise patient is hemodynamically stable and sedated on ventilator.     Reason for call/time of call:   Rhythm changes    Physical Exam:   Vitals:    11/16/21 0415   BP:    Pulse:    Resp:    Temp: 99.1 °F (37.3 °C)   SpO2:       Mental status: Sedated on vent  CV: Sinus bradycardia  Lungs: CTA    Interventions/orders placed:  Stop Precedex/propofol  Switch to Versed  Stat troponin, electrolytes, CXR  Stat cardiology consult-Dr. Carilyn Cheadle, APRN - NP  Hospitalist

## 2021-11-16 NOTE — PROGRESS NOTES
Neurology Service Consult Note  [facility]  Patient Name: Kyle Nolasco  : 1935        Subjective:   Reason for consult: Possible seizure  Patient seen and examined. Chart reviewed in detail. Patient intubated and mildly sedated. Patient going for heart cath today because of increased troponins and rhythm changes on EKG. Although on sedation, patient is able to follow some commands. Per nursing staff patient is able to follow all commands and verbally communicate by mouthing words and able to use communication tools when sedation is completely off. Patient daughter was at bedside and communicated that she was at her baseline prior to this incident. She was in fact the family member that had lunch with her at the restaurant prior to EMS finding patient.       Past Medical History:   Diagnosis Date    Arthritis     generalized    Asthma     Blood transfusion     No reaction    Chronic bronchitis (HCC)     COPD (chronic obstructive pulmonary disease) ([facility] [address]     summer 2014    Echocardiogram 2021    EF 55-60%, Mild dilated LA, Mild annular calcification present, Mild mitral stenosis, Mild MR & TR.    Fatigue     H/O cardiac catheterization 06/15/2017    mild lad and cx disease    H/O cardiovascular stress test 2019    H/O Doppler ultrasound 2016 3/19/12    carotid- WNL 3/12right mild less than 50%and left wnl    H/O Doppler ultrasound     carotid - mod disease EILEEN, mild disease LICA    H/O echocardiogram 2010    H/O echocardiogram 04/15/2016    EF 60% sclerotic AV mildly stenosed-recommend yearly echo    H/O echocardiogram 2019    EF 55-60%, Minimal concentric left ventricular hypertrophy, left atrium is mildly dilated, severe aortic stenosis, Mitral annular calcification is present, Mild AR, Mild-Mod MR, Mild TR, No pericardial effusion     H/O echocardiogram 2019    H/O exercise stress test 2017    abnormal  History of cardiac cath 08/28/2019    Severe AS,   TAVR??  History of nuclear stress test 03/14/2017    EF 75%. Normal study.     Holter monitor, abnormal 02/22/2011    infrequent APC are seen    Normal cardiac stress test 07/23/2010    EF 70%, no ischemia    On home O2     only uses as needed, nightly prn    Syncope and collapse     Tachycardia     HX of tachycardia - had a cardioablation    Thyroid disease     :   Past Surgical History:   Procedure Laterality Date    AORTIC VALVE REPAIR N/A 09/09/2019    Core Valve EVOLUT 26mm [id number]    AORTIC VALVE REPLACEMENT N/A 09/11/2019    TAVR; [employer] Ev[employer]out 26    BREAST SURGERY  2009    bilat    CARDIAC CATHETERIZATION  03/02/2011    mild to moderate disease of diagonal and proximal RCA   [address]    ablation    CARPAL TUNNEL RELEASE  1990's    bilat    CHOLECYSTECTOMY  1961    open choley    COLONOSCOPY      DILATATION, ESOPHAGUS      ENDOSCOPY, COLON, DIAGNOSTIC  07/03/2017    s/p gastric bypass otherwise normal    FINGER SURGERY      right middle, thumb and index finger (screw and pin in right index finger)    GASTRIC RESTRICTION SURGERY  1984    stapled    HERNIA REPAIR  unknown    Inc hernia hernia repair    HIP FRACTURE SURGERY Left 11/04/2015    Left hip nail    HYSTERECTOMY  1966    Luke w/ BSO    JOINT REPLACEMENT  2004    right knee    LIPECTOMY  1990's    OTHER SURGICAL HISTORY  05/01/2012    Gastrojejunostomy/partial gastrectomy    SHOULDER ARTHROPLASTY Left 10/2017    TOE SURGERY  Early 2000's    hammer toes and bunions     Medications:  Scheduled Meds:   [Held by provider] enoxaparin  1 mg/kg SubCUTAneous BID    budesonide-formoterol  2 puff Inhalation BID    albuterol sulfate HFA  4 puff Inhalation Q4H    montelukast  10 mg Per NG tube Nightly    cefepime  1,000 mg IntraVENous Q12H    aspirin  81 mg Oral Daily    escitalopram  20 mg Oral Daily    levothyroxine  50 mcg Oral Daily    sucralfate  1 g Oral 4x Daily    pantoprazole  40 mg Oral QAM AC    timolol  1 drop Both Eyes Daily    chlorhexidine  15 mL Mouth/Throat BID    sodium chloride flush  5-40 mL IntraVENous 2 times per day    methylPREDNISolone  40 mg IntraVENous Q12H    doxycycline (VIBRAMYCIN) IV  100 mg IntraVENous Q12H     Continuous Infusions:   midazolam 9 mg/hr (21 0832)    dextrose      sodium chloride      sodium chloride 75 mL/hr at 21 0301    norepinephrine 2 mcg/min (21 0320)     PRN Meds:.albuterol, glucose, dextrose, glucagon (rDNA), dextrose, sodium chloride flush, sodium chloride, ondansetron **OR** ondansetron, polyethylene glycol, acetaminophen **OR** acetaminophen, magnesium sulfate, fentanNYL    Allergies   Allergen Reactions    Morphine Anaphylaxis     Dilaudid OK    Bactrim [Sulfamethoxazole-Trimethoprim]     Butorphanol      Other reaction(s): Other - comment required  \"out of body experience\"    Influenza Vaccines      Ended in hospital stay for 3 days told by md not to get it anymore     Lactose Intolerance (Gi) Nausea Only    Phenergan [Promethazine Hcl] Other (See Comments)     Makes me jerk all over. Zofran OK    Promethazine     Stadol [Butorphanol Tartrate] Other (See Comments)     Out of body experience. Was told it was a near death experience and was placed in ICU. Dilaudid OK    Tape Imelda Amaro Tape] Other (See Comments)     Plastic cause itching and rash. Paper tape causes my skin to blister.  (Tega-derm and Coban OK to use.)     Social History     Socioeconomic History    Marital status:      Spouse name: Not on file    Number of children: Not on file    Years of education: Not on file    Highest education level: Not on file   Occupational History    Not on file   Tobacco Use    Smoking status: Former Smoker     Packs/day: 3.00     Years: 5.00     Pack years: 15.00     Quit date: 1962     Years since quittin.9    Smokeless tobacco: Never Used   Substance and Sexual Activity    Alcohol use: No    Drug use: No    Sexual activity: Not on file   Other Topics Concern    Not on file   Social History Narrative    Not on file     Social Determinants of Health     Financial Resource Strain:     Difficulty of Paying Living Expenses: Not on file   Food Insecurity:     Worried About Running Out of Food in the Last Year: Not on file    Vicki of Food in the Last Year: Not on file   Transportation Needs:     Lack of Transportation (Medical): Not on file    Lack of Transportation (Non-Medical):  Not on file   Physical Activity:     Days of Exercise per Week: Not on file    Minutes of Exercise per Session: Not on file   Stress:     Feeling of Stress : Not on file   Social Connections:     Frequency of Communication with Friends and Family: Not on file    Frequency of Social Gatherings with Friends and Family: Not on file    Attends Oriental orthodox Services: Not on file    Active Member of Clubs or Organizations: Not on file    Attends Club or Organization Meetings: Not on file    Marital Status: Not on file   Intimate Partner Violence:     Fear of Current or Ex-Partner: Not on file    Emotionally Abused: Not on file    Physically Abused: Not on file    Sexually Abused: Not on file   Housing Stability:     Unable to Pay for Housing in the Last Year: Not on file    Number of [place] in the Last Year: Not on file    Unstable Housing in the Last Year: Not on file      Family History   Problem Relation Age of Onset    Diabetes Mother     Other Mother         thyroid    Heart Disease Father     Arthritis Father     High Blood Pressure Father     High Cholesterol Father     Other Father         glaucoma    Other Sister         thyroid, glaucoma    Diabetes Brother     Other Sister     Vision Loss Sister         lung problems, smoker    Cancer Sister         throat cancer    Other Son         HX of clots    Early Death Son         Hit by car and killed.  High Blood Pressure Daughter     Stroke Son         No residual    Other Son         HX myocarditis         Physical Exam:       Vitals:    11/16/21 0415 11/16/21 0600 11/16/21 0817 11/16/21 0839   BP:   138/76 138/76   Pulse:   68 56   Resp:   11 11   Temp: 99.1 °F (37.3 °C)  97 °F (36.1 °C) 97 °F (36.1 °C)   TempSrc: Bladder  Bladder Bladder   SpO2:   100% 100%   Weight:  133 lb 6.1 oz (60.5 kg)     Height:           Wt Readings from Last 3 Encounters:   11/16/21 133 lb 6.1 oz (60.5 kg)   11/11/21 121 lb (54.9 kg)   11/01/21 121 lb 12.8 oz (55.2 kg)     Temp Readings from Last 3 Encounters:   11/16/21 97 °F (36.1 °C) (Bladder)   11/11/21 98 °F (36.7 °C)   11/01/21 97.5 °F (36.4 °C) (Infrared)     BP Readings from Last 3 Encounters:   11/16/21 138/76   11/11/21 (!) 129/57   11/01/21 116/60     Pulse Readings from Last 3 Encounters:   11/16/21 56   11/11/21 60   11/01/21 (!) 46        Gen: Sedated on vent, follows some commands. HEENT: NC/AT, EOMI, PERRL, mmm, neck supple, no meningeal signs;    Heart: SB on monitor  Lungs: Respirations Unlabored on ventilator  Ext: no edema,   Psych: Calm on the vent   Skin: no rashes or lesions    NEUROLOGIC EXAM:    Mental Status: Sedated on vent, follows some commands  Cranial Nerve Exam:   CN II-XII: PERRL, sluggish, pinpoint, no nystagmus, no gaze paresis;face is symmetrical, tongue midline against Et tube,   Motor Exam:       Strength: patient sedated at time of exam, however moves extremities on command  Tone and bulk normal   LUIS ANTONIO pronator drift    Deep Tendon Reflexes: 1/4 biceps, triceps, brachioradialis, 1/4 patellar, and 0/4 achilles b/l; flexor plantar responses b/l    Sensation: Moves extremities to light touch    Coordination/Cerebellum:       Tremors--none      Rapidly alternating movements: LUIS ANTONOI        Heel-to-Shin: LUIS ANTONIO    Finger-to-Nose: LUIS ANTONIO  Gait and stance:      Gait: deferred      LABS:        CBC:   Recent Labs     11/16/21  0400   WBC 12.2*   RBC

## 2021-11-16 NOTE — PROGRESS NOTES
Today's plan: [facility] today for abnormal ECHO, apical ballooning, could be takotsubo syndrome      Admit Date:  11/14/2021    Subjective: sedated, intubated      Chief complaints on admission  Chief Complaint   Patient presents with    Other     unresponsive    Irregular Heart Beat     abnormal EKG         History of present illness:Opal is a 80 y. o.year old who  presents with had concerns including Other (unresponsive) and Irregular Heart Beat (abnormal EKG). Past medical history:    has a past medical history of Arthritis, Asthma, Blood transfusion, Chronic bronchitis ([address] COPD (chronic obstructive pulmonary disease) ([facility] [address] Echocardiogram, Fatigue, H/O cardiac catheterization, H/O cardiovascular stress test, H/O Doppler ultrasound, H/O Doppler ultrasound, H/O echocardiogram, H/O echocardiogram, H/O echocardiogram, H/O echocardiogram, H/O exercise stress test, History of cardiac cath, History of nuclear stress test, Holter monitor, abnormal, Normal cardiac stress test, On home O2, Syncope and collapse, Tachycardia, and Thyroid disease. Past surgical history:   has a past surgical history that includes joint replacement (2004); Hysterectomy (1966); Cholecystectomy (1961); Breast surgery (2009); Toe Surgery (Early 2000's); Carpal tunnel release (1990's); Finger surgery; Gastric restriction surgery (1984); Cardiac surgery (1989); hernia repair (unknown); lipectomy (1990's); Dilatation, esophagus; other surgical history (05/01/2012); Cardiac catheterization (03/02/2011); Hip fracture surgery (Left, 11/04/2015); Colonoscopy; Endoscopy, colon, diagnostic (07/03/2017); Total shoulder arthroplasty (Left, 10/2017); Aortic valve replacement (N/A, 09/11/2019); and Aortic valve repair (N/A, 09/09/2019). Social History:   reports that she quit smoking about 59 years ago. She has a 15.00 pack-year smoking history.  She has never used smokeless tobacco. She reports that she does not drink alcohol and does not use drugs. Family history:  family history includes Arthritis in her father; Cancer in her sister; Diabetes in her brother and mother; Early Death in her son; Heart Disease in her father; High Blood Pressure in her daughter and father; High Cholesterol in her father; Other in her father, mother, sister, sister, son, and son; Stroke in her son; Vision Loss in her sister. Allergies   Allergen Reactions    Morphine Anaphylaxis     Dilaudid OK    Bactrim [Sulfamethoxazole-Trimethoprim]     Butorphanol      Other reaction(s): Other - comment required  \"out of body experience\"    Influenza Vaccines      Ended in hospital stay for 3 days told by md not to get it anymore     Lactose Intolerance (Gi) Nausea Only    Phenergan [Promethazine Hcl] Other (See Comments)     Makes me jerk all over. Zofran OK    Promethazine     Stadol [Butorphanol Tartrate] Other (See Comments)     Out of body experience. Was told it was a near death experience and was placed in ICU. Dilaudid OK    Tape Donnel Sneddon Tape] Other (See Comments)     Plastic cause itching and rash. Paper tape causes my skin to blister. (Tega-derm and Coban OK to use.)         Objective:   /76   Pulse 55   Temp 97 °F (36.1 °C) (Bladder)   Resp 12   Ht 5' 0.98\" (1.549 m)   Wt 133 lb 6.1 oz (60.5 kg)   SpO2 100%   BMI 25.21 kg/m²       Intake/Output Summary (Last 24 hours) at 11/16/2021 1050  Last data filed at 11/16/2021 0819  Gross per 24 hour   Intake 2100.4 ml   Output 1260 ml   Net 840.4 ml       TELEMETRY: Sinus     Physical Exam:  Constitutional:  Well developed, Well nourished, No acute distress, Non-toxic appearance. HENT:  Normocephalic, Atraumatic, Bilateral external ears normal, Oropharynx moist, No oral exudates, Nose normal. Neck- Normal range of motion, No tenderness, Supple, No stridor. Eyes:  PERRL, EOMI, Conjunctiva normal, No discharge.    Respiratory:  Normal breath sounds, No respiratory distress, No wheezing, No chest tenderness. ,no use of accessory muscles, diaphragm movement is normal  Cardiovascular: (PMI) apex non displaced,no lifts no thrills, no s3,no s4, Normal heart rate, Normal rhythm, No murmurs, No rubs, No gallops. Carotid arteries pulse and amplitude are normal no bruit, no abdominal bruit noted ( normal abdominal aorta ausculation), femoral arteries pulse and amplitude are normal no bruit, pedal pulses are normal  GI:  Bowel sounds normal, Soft, No tenderness, No masses, No pulsatile masses. : External genitalia appear normal, No masses or lesions. No discharge. No CVA tenderness. Musculoskeletal:  Intact distal pulses, No edema, No tenderness, No cyanosis, No clubbing. Good range of motion in all major joints. No tenderness to palpation or major deformities noted. Back- No tenderness. Integument:  Warm, Dry, No erythema, No rash. Lymphatic:  No lymphadenopathy noted.    Neurologic: intubated and sedated    Medications:    [Held by provider] enoxaparin  1 mg/kg SubCUTAneous BID    budesonide-formoterol  2 puff Inhalation BID    albuterol sulfate HFA  4 puff Inhalation Q4H    montelukast  10 mg Per NG tube Nightly    cefepime  1,000 mg IntraVENous Q12H    aspirin  81 mg Oral Daily    escitalopram  20 mg Oral Daily    levothyroxine  50 mcg Oral Daily    sucralfate  1 g Oral 4x Daily    pantoprazole  40 mg Oral QAM AC    timolol  1 drop Both Eyes Daily    chlorhexidine  15 mL Mouth/Throat BID    sodium chloride flush  5-40 mL IntraVENous 2 times per day    methylPREDNISolone  40 mg IntraVENous Q12H    doxycycline (VIBRAMYCIN) IV  100 mg IntraVENous Q12H      midazolam 10 mg/hr (11/16/21 1016)    fentaNYL      dextrose      sodium chloride      sodium chloride 75 mL/hr at 11/16/21 0301    norepinephrine 2 mcg/min (11/16/21 0320)     albuterol, glucose, dextrose, glucagon (rDNA), dextrose, sodium chloride flush, sodium chloride, ondansetron **OR** ondansetron, polyethylene glycol, acetaminophen **OR** acetaminophen, magnesium sulfate, fentanNYL    Lab Data:  CBC:   Recent Labs     11/14/21  1415 11/15/21  0259 11/16/21  0400   WBC 13.4* 14.2* 12.2*   HGB 15.6 13.8 12.5   HCT 50.6* 42.1 37.2   .9* 101.2* 98.4    214 209     BMP:   Recent Labs     11/14/21  1415 11/15/21  0259 11/16/21  0400    137 138   K 5.6* 4.0 3.5   CL 98* 103 105   CO2 15* 20* 21   PHOS  --   --  2.9   BUN 11 14 13   CREATININE 0.7 0.6 0.5*     LIVER PROFILE:   Recent Labs     11/14/21 1415 11/14/21 2140 11/16/21  0400   AST 77*  --  87*   ALT 43*  --  86*   LIPASE 45 13  --    BILITOT 0.6  --  0.3   ALKPHOS 77  --  62     PT/INR:   Recent Labs     11/14/21 1415   PROTIME 10.5*   INR 0.82     APTT: No results for input(s): APTT in the last 72 hours. BNP:  No results for input(s): BNP in the last 72 hours. TROPONIN: @TROPONINI:3@      Assessment:  80 y. o.year old who is admitted for          Plan:  1. Respiratory arrest: could be ashthma and COPD related, intubated and sedated, however, mental condition appears intact, will recommend to wean as per protocol. 2. Elevated trop:its trending up, once extubated, will get ischemia work up, for now add full dose lovenox, echo ordered, cath films from 2019 reviewed( not much siginificant CAD NOTED), ADD ASPIRIN  3. WIDE COMPLEX TACHYCARDIA: Could be vtach, its resolved, Mg is normal, will get echo and if she started PVCs or NSVT, will add amiodarone drip  4. H/O TAVR: echo ordered    All labs, medications and tests reviewed, continue all other medications of all above medical condition listed as is.       Dov Espinoza MD, MD 11/16/2021 10:50 AM

## 2021-11-16 NOTE — PROGRESS NOTES
Pulmonary and Critical Care  Progress Note    Subjective: The patient is having cardiac cath later today. Shortness of breath none  Chest pain none  Addressing respiratory complaints Patient is negative for  hemoptysis and cyanosis  CONSTITUTIONAL:  negative for fevers and chills      Past Medical History:     has a past medical history of Arthritis, Asthma, Blood transfusion, Chronic bronchitis ([facility] [place] [address] COPD (chronic obstructive pulmonary disease) ([facility] [place] [address] Echocardiogram, Fatigue, H/O cardiac catheterization, H/O cardiovascular stress test, H/O Doppler ultrasound, H/O Doppler ultrasound, H/O echocardiogram, H/O echocardiogram, H/O echocardiogram, H/O echocardiogram, H/O exercise stress test, History of cardiac cath, History of nuclear stress test, Holter monitor, abnormal, Normal cardiac stress test, On home O2, Syncope and collapse, Tachycardia, and Thyroid disease. has a past surgical history that includes joint replacement (2004); Hysterectomy (1966); Cholecystectomy (1961); Breast surgery (2009); Toe Surgery (Early 2000's); Carpal tunnel release (1990's); Finger surgery; Gastric restriction surgery (1984); Cardiac surgery (1989); hernia repair (unknown); lipectomy (1990's); Dilatation, esophagus; other surgical history (05/01/2012); Cardiac catheterization (03/02/2011); Hip fracture surgery (Left, 11/04/2015); Colonoscopy; Endoscopy, colon, diagnostic (07/03/2017); Total shoulder arthroplasty (Left, 10/2017); Aortic valve replacement (N/A, 09/11/2019); and Aortic valve repair (N/A, 09/09/2019). reports that she quit smoking about 59 years ago. She has a 15.00 pack-year smoking history. She has never used smokeless tobacco. She reports that she does not drink alcohol and does not use drugs. Family history:  family history includes Arthritis in her father; Cancer in her sister; Diabetes in her brother and mother; Early Death in her son;  Heart Disease in her father; High Blood Pressure in her daughter and father; High Cholesterol in her father; Other in her father, mother, sister, sister, son, and son; Stroke in her son; Vision Loss in her sister. Allergies   Allergen Reactions    Morphine Anaphylaxis     Dilaudid OK    Bactrim [Sulfamethoxazole-Trimethoprim]     Butorphanol      Other reaction(s): Other - comment required  \"out of body experience\"    Influenza Vaccines      Ended in hospital stay for 3 days told by md not to get it anymore     Lactose Intolerance (Gi) Nausea Only    Phenergan [Promethazine Hcl] Other (See Comments)     Makes me jerk all over. Zofran OK    Promethazine     Stadol [Butorphanol Tartrate] Other (See Comments)     Out of body experience. Was told it was a near death experience and was placed in ICU. Dilaudid OK    Tape Sherolyn Pott Tape] Other (See Comments)     Plastic cause itching and rash. Paper tape causes my skin to blister. (Tega-derm and Coban OK to use.)     Social History:    Reviewed; no changes    Objective:   PHYSICAL EXAM:        VITALS:  /76   Pulse 56   Temp 97 °F (36.1 °C) (Bladder)   Resp 11   Ht 5' 0.98\" (1.549 m)   Wt 133 lb 6.1 oz (60.5 kg)   SpO2 100%   BMI 25.21 kg/m²     24HR INTAKE/OUTPUT:      Intake/Output Summary (Last 24 hours) at 11/16/2021 1025  Last data filed at 11/16/2021 0819  Gross per 24 hour   Intake 2100.4 ml   Output 1260 ml   Net 840.4 ml       CONSTITUTIONAL:  somnolent  LUNGS:  decreased breath sounds  CARDIOVASCULAR:  normal S1 and S2 and positive JVD  ABD:Abdomen soft, non-tender. BS normal. No masses,  No organomegaly  NEURO:Sedated on vent.   DATA:    CBC:  Recent Labs     11/14/21  1415 11/15/21  0259 11/16/21  0400   WBC 13.4* 14.2* 12.2*   RBC 4.69 4.16* 3.78*   HGB 15.6 13.8 12.5   HCT 50.6* 42.1 37.2    214 209   .9* 101.2* 98.4   MCH 33.3* 33.2* 33.1*   MCHC 30.8* 32.8 33.6   RDW 12.7 12.6 13.0   SEGSPCT 21.0* 75.0* 77.0*      BMP:  Recent Labs     11/14/21  1415 11/15/21  0259 11/16/21  0400   NA 137 137 138   K 5.6* 4.0 3.5   CL 98* 103 105   CO2 15* 20* 21   BUN 11 14 13   CREATININE 0.7 0.6 0.5*   CALCIUM 9.1 8.1* 7.3*   GLUCOSE 158* 168* 145*      ABG:  Recent Labs     11/14/21  2345 11/15/21  0800 11/16/21  0700   PH 7.56* 7.50* 7.51*   PO2ART 170* 137* 152*   TLP0JVX 25.0* 29.0* 27.0*   O2SAT 98.6* 98.5* 95.3*     Lab Results   Component Value Date    PROBNP 7,287 (H) 11/16/2021    PROBNP 888.5 (H) 11/14/2021    PROBNP 550.9 (H) 10/13/2020     No results found for: CULTRESP    Radiology Review:  Pertinent images / reports were reviewed as a part of this visit. Assessment:     Patient Active Problem List   Diagnosis    Closed intertrochanteric fracture of left femur (HCC)    Gait disturbance    Anemia    Dizziness    Acquired hypothyroidism    Murmur    Age-related osteoporosis without current pathological fracture    Black tongue    COPD exacerbation (HCC)    Vitamin D deficiency    Vitamin B12 deficiency    Gastroesophageal reflux disease without esophagitis    VHD (valvular heart disease)    Aortic stenosis, severe    Acute respiratory distress    COPD with exacerbation (HCC)    Nodule of lower lobe of right lung    S/P TAVR (transcatheter aortic valve replacement)    Primary hypertension    Acute respiratory failure (HCC)    Other seizures ([address]    Metabolic encephalopathy       Plan:   1. Overall the patient has improved. 2. Cardiac cath later today. 3. Cont present vent settings. 4. Discussed with the family and RN.   Marisa Maravilla MD   11/16/2021  10:25 AM

## 2021-11-17 ENCOUNTER — APPOINTMENT (OUTPATIENT)
Dept: GENERAL RADIOLOGY | Age: 86
DRG: 871 | End: 2021-11-17
Payer: MEDICARE

## 2021-11-17 LAB
ANION GAP SERPL CALCULATED.3IONS-SCNC: 10 MMOL/L (ref 4–16)
BASE EXCESS: 0 (ref 0–2.4)
BASOPHILS ABSOLUTE: 0 K/CU MM
BASOPHILS RELATIVE PERCENT: 0.1 % (ref 0–1)
BUN BLDV-MCNC: 11 MG/DL (ref 6–23)
CALCIUM SERPL-MCNC: 7.2 MG/DL (ref 8.3–10.6)
CARBON MONOXIDE, BLOOD: 0 % (ref 0–5)
CHLORIDE BLD-SCNC: 108 MMOL/L (ref 99–110)
CO2 CONTENT: 25.3 MMOL/L (ref 19–24)
CO2: 22 MMOL/L (ref 21–32)
COMMENT: ABNORMAL
CREAT SERPL-MCNC: 0.5 MG/DL (ref 0.6–1.1)
DIFFERENTIAL TYPE: ABNORMAL
EKG ATRIAL RATE: 51 BPM
EKG DIAGNOSIS: NORMAL
EKG P AXIS: -88 DEGREES
EKG P-R INTERVAL: 56 MS
EKG Q-T INTERVAL: 736 MS
EKG QRS DURATION: 104 MS
EKG QTC CALCULATION (BAZETT): 684 MS
EKG R AXIS: -3 DEGREES
EKG T AXIS: 250 DEGREES
EKG VENTRICULAR RATE: 52 BPM
EOSINOPHILS ABSOLUTE: 0 K/CU MM
EOSINOPHILS RELATIVE PERCENT: 0 % (ref 0–3)
GFR AFRICAN AMERICAN: >60 ML/MIN/1.73M2
GFR NON-AFRICAN AMERICAN: >60 ML/MIN/1.73M2
GLUCOSE BLD-MCNC: 167 MG/DL (ref 70–99)
HCO3 ARTERIAL: 24.1 MMOL/L (ref 18–23)
HCT VFR BLD CALC: 35.6 % (ref 37–47)
HEMOGLOBIN: 12 GM/DL (ref 12.5–16)
HIGH SENSITIVE C-REACTIVE PROTEIN: 6 MG/L
IMMATURE NEUTROPHIL %: 0.5 % (ref 0–0.43)
LYMPHOCYTES ABSOLUTE: 1.4 K/CU MM
LYMPHOCYTES RELATIVE PERCENT: 14.4 % (ref 24–44)
MAGNESIUM: 2 MG/DL (ref 1.8–2.4)
MCH RBC QN AUTO: 33.5 PG (ref 27–31)
MCHC RBC AUTO-ENTMCNC: 33.7 % (ref 32–36)
MCV RBC AUTO: 99.4 FL (ref 78–100)
METHEMOGLOBIN ARTERIAL: 0.1 %
MONOCYTES ABSOLUTE: 0.4 K/CU MM
MONOCYTES RELATIVE PERCENT: 4.4 % (ref 0–4)
NUCLEATED RBC %: 0 %
O2 SATURATION: 95.1 % (ref 96–97)
PCO2 ARTERIAL: 38 MMHG (ref 32–45)
PDW BLD-RTO: 13.1 % (ref 11.7–14.9)
PH BLOOD: 7.41 (ref 7.34–7.45)
PLATELET # BLD: 204 K/CU MM (ref 140–440)
PMV BLD AUTO: 10.3 FL (ref 7.5–11.1)
PO2 ARTERIAL: 124 MMHG (ref 75–100)
POTASSIUM SERPL-SCNC: 3.6 MMOL/L (ref 3.5–5.1)
PROCALCITONIN: 0.08
RBC # BLD: 3.58 M/CU MM (ref 4.2–5.4)
SEGMENTED NEUTROPHILS ABSOLUTE COUNT: 8 K/CU MM
SEGMENTED NEUTROPHILS RELATIVE PERCENT: 80.6 % (ref 36–66)
SODIUM BLD-SCNC: 140 MMOL/L (ref 135–145)
TOTAL IMMATURE NEUTOROPHIL: 0.05 K/CU MM
TOTAL NUCLEATED RBC: 0 K/CU MM
WBC # BLD: 9.9 K/CU MM (ref 4–10.5)

## 2021-11-17 PROCEDURE — 2580000003 HC RX 258: Performed by: INTERNAL MEDICINE

## 2021-11-17 PROCEDURE — 82803 BLOOD GASES ANY COMBINATION: CPT

## 2021-11-17 PROCEDURE — 6360000002 HC RX W HCPCS: Performed by: INTERNAL MEDICINE

## 2021-11-17 PROCEDURE — 99232 SBSQ HOSP IP/OBS MODERATE 35: CPT | Performed by: CLINICAL NURSE SPECIALIST

## 2021-11-17 PROCEDURE — 6370000000 HC RX 637 (ALT 250 FOR IP): Performed by: INTERNAL MEDICINE

## 2021-11-17 PROCEDURE — 2500000003 HC RX 250 WO HCPCS: Performed by: INTERNAL MEDICINE

## 2021-11-17 PROCEDURE — 94761 N-INVAS EAR/PLS OXIMETRY MLT: CPT

## 2021-11-17 PROCEDURE — 83735 ASSAY OF MAGNESIUM: CPT

## 2021-11-17 PROCEDURE — 2700000000 HC OXYGEN THERAPY PER DAY

## 2021-11-17 PROCEDURE — 94640 AIRWAY INHALATION TREATMENT: CPT

## 2021-11-17 PROCEDURE — 80048 BASIC METABOLIC PNL TOTAL CA: CPT

## 2021-11-17 PROCEDURE — 36600 WITHDRAWAL OF ARTERIAL BLOOD: CPT

## 2021-11-17 PROCEDURE — 71045 X-RAY EXAM CHEST 1 VIEW: CPT

## 2021-11-17 PROCEDURE — 85025 COMPLETE CBC W/AUTO DIFF WBC: CPT

## 2021-11-17 PROCEDURE — 84145 PROCALCITONIN (PCT): CPT

## 2021-11-17 PROCEDURE — 93010 ELECTROCARDIOGRAM REPORT: CPT | Performed by: INTERNAL MEDICINE

## 2021-11-17 PROCEDURE — 94003 VENT MGMT INPAT SUBQ DAY: CPT

## 2021-11-17 PROCEDURE — 99232 SBSQ HOSP IP/OBS MODERATE 35: CPT | Performed by: INTERNAL MEDICINE

## 2021-11-17 PROCEDURE — 86141 C-REACTIVE PROTEIN HS: CPT

## 2021-11-17 PROCEDURE — 2000000000 HC ICU R&B

## 2021-11-17 RX ORDER — POLYVINYL ALCOHOL 14 MG/ML
SOLUTION/ DROPS OPHTHALMIC
Status: DISCONTINUED | OUTPATIENT
Start: 2021-11-17 | End: 2021-11-26 | Stop reason: HOSPADM

## 2021-11-17 RX ADMIN — CEFEPIME HYDROCHLORIDE 1000 MG: 1 INJECTION, POWDER, FOR SOLUTION INTRAMUSCULAR; INTRAVENOUS at 22:19

## 2021-11-17 RX ADMIN — SODIUM CHLORIDE, PRESERVATIVE FREE 10 ML: 5 INJECTION INTRAVENOUS at 08:55

## 2021-11-17 RX ADMIN — TIMOLOL MALEATE 1 DROP: 5 SOLUTION OPHTHALMIC at 09:57

## 2021-11-17 RX ADMIN — ALBUTEROL SULFATE 4 PUFF: 90 AEROSOL, METERED RESPIRATORY (INHALATION) at 11:16

## 2021-11-17 RX ADMIN — ALBUTEROL SULFATE 4 PUFF: 90 AEROSOL, METERED RESPIRATORY (INHALATION) at 08:22

## 2021-11-17 RX ADMIN — METHYLPREDNISOLONE SODIUM SUCCINATE 40 MG: 40 INJECTION, POWDER, FOR SOLUTION INTRAMUSCULAR; INTRAVENOUS at 08:35

## 2021-11-17 RX ADMIN — 0.12% CHLORHEXIDINE GLUCONATE 15 ML: 1.2 RINSE ORAL at 08:34

## 2021-11-17 RX ADMIN — DOXYCYCLINE 100 MG: 100 INJECTION, POWDER, LYOPHILIZED, FOR SOLUTION INTRAVENOUS at 20:13

## 2021-11-17 RX ADMIN — SODIUM CHLORIDE: 9 INJECTION, SOLUTION INTRAVENOUS at 18:19

## 2021-11-17 RX ADMIN — METHYLPREDNISOLONE SODIUM SUCCINATE 40 MG: 40 INJECTION, POWDER, FOR SOLUTION INTRAMUSCULAR; INTRAVENOUS at 20:13

## 2021-11-17 RX ADMIN — ENOXAPARIN SODIUM 60 MG: 100 INJECTION SUBCUTANEOUS at 20:30

## 2021-11-17 RX ADMIN — ENOXAPARIN SODIUM 60 MG: 100 INJECTION SUBCUTANEOUS at 08:34

## 2021-11-17 RX ADMIN — SODIUM CHLORIDE: 9 INJECTION, SOLUTION INTRAVENOUS at 04:54

## 2021-11-17 RX ADMIN — MIDAZOLAM 8 MG/HR: 5 INJECTION INTRAMUSCULAR; INTRAVENOUS at 03:46

## 2021-11-17 RX ADMIN — SODIUM CHLORIDE, PRESERVATIVE FREE 10 ML: 5 INJECTION INTRAVENOUS at 08:35

## 2021-11-17 RX ADMIN — CEFEPIME HYDROCHLORIDE 1000 MG: 1 INJECTION, POWDER, FOR SOLUTION INTRAMUSCULAR; INTRAVENOUS at 10:39

## 2021-11-17 RX ADMIN — ESCITALOPRAM OXALATE 20 MG: 10 TABLET ORAL at 08:35

## 2021-11-17 RX ADMIN — ALBUTEROL SULFATE 4 PUFF: 90 AEROSOL, METERED RESPIRATORY (INHALATION) at 03:59

## 2021-11-17 RX ADMIN — SODIUM CHLORIDE, PRESERVATIVE FREE 10 ML: 5 INJECTION INTRAVENOUS at 20:29

## 2021-11-17 RX ADMIN — BUDESONIDE AND FORMOTEROL FUMARATE DIHYDRATE 2 PUFF: 160; 4.5 AEROSOL RESPIRATORY (INHALATION) at 21:30

## 2021-11-17 RX ADMIN — SODIUM CHLORIDE, PRESERVATIVE FREE 10 ML: 5 INJECTION INTRAVENOUS at 20:21

## 2021-11-17 RX ADMIN — ASPIRIN 81 MG: 81 TABLET, COATED ORAL at 08:35

## 2021-11-17 RX ADMIN — LEVOTHYROXINE SODIUM 50 MCG: 25 TABLET ORAL at 06:28

## 2021-11-17 RX ADMIN — SUCRALFATE 1 G: 1 TABLET ORAL at 08:35

## 2021-11-17 RX ADMIN — DOXYCYCLINE 100 MG: 100 INJECTION, POWDER, LYOPHILIZED, FOR SOLUTION INTRAVENOUS at 08:57

## 2021-11-17 RX ADMIN — ALBUTEROL SULFATE 4 PUFF: 90 AEROSOL, METERED RESPIRATORY (INHALATION) at 21:26

## 2021-11-17 ASSESSMENT — PAIN SCALES - GENERAL
PAINLEVEL_OUTOF10: 7
PAINLEVEL_OUTOF10: 0

## 2021-11-17 ASSESSMENT — PULMONARY FUNCTION TESTS
PIF_VALUE: 17
PEFR_L/MIN: 13
PIF_VALUE: 16
PIF_VALUE: 16

## 2021-11-17 NOTE — PROGRESS NOTES
Pt extubated per Dr. Jacob Olson. Pt tolerated well. Respiratory vitals prior to extubation were RR 6  RSBI 8 NIF -31. I will continue to monitor.

## 2021-11-17 NOTE — PROGRESS NOTES
Today's plan: C SHOWED PATENT CORONARIES tabnormal ECHO, apical ballooning, DEPRESSED LVEF could be takotsubo syndrome. ONCE OFF PRESSORS WILL NEED TO ADD LISINOPRIL AND BB ( IF HEART RATE TOLERATES)      Admit Date:  11/14/2021    Subjective: sedated, intubated      Chief complaints on admission  Chief Complaint   Patient presents with    Other     unresponsive    Irregular Heart Beat     abnormal EKG         History of present illness:Opal is a 80 y. o.year old who  presents with had concerns including Other (unresponsive) and Irregular Heart Beat (abnormal EKG). Past medical history:    has a past medical history of Arthritis, Asthma, Blood transfusion, Chronic bronchitis ([address] COPD (chronic obstructive pulmonary disease) ([facility] [address] Echocardiogram, Fatigue, H/O cardiac catheterization, H/O cardiovascular stress test, H/O Doppler ultrasound, H/O Doppler ultrasound, H/O echocardiogram, H/O echocardiogram, H/O echocardiogram, H/O echocardiogram, H/O exercise stress test, History of cardiac cath, History of nuclear stress test, Holter monitor, abnormal, Normal cardiac stress test, On home O2, Syncope and collapse, Tachycardia, and Thyroid disease. Past surgical history:   has a past surgical history that includes joint replacement (2004); Hysterectomy (1966); Cholecystectomy (1961); Breast surgery (2009); Toe Surgery (Early 2000's); Carpal tunnel release (1990's); Finger surgery; Gastric restriction surgery (1984); Cardiac surgery (1989); hernia repair (unknown); lipectomy (1990's); Dilatation, esophagus; other surgical history (05/01/2012); Cardiac catheterization (03/02/2011); Hip fracture surgery (Left, 11/04/2015); Colonoscopy; Endoscopy, colon, diagnostic (07/03/2017); Total shoulder arthroplasty (Left, 10/2017); Aortic valve replacement (N/A, 09/11/2019); and Aortic valve repair (N/A, 09/09/2019). Social History:   reports that she quit smoking about 59 years ago.  She has a 15.00 pack-year smoking history. She has never used smokeless tobacco. She reports that she does not drink alcohol and does not use drugs. Family history:  family history includes Arthritis in her father; Cancer in her sister; Diabetes in her brother and mother; Early Death in her son; Heart Disease in her father; High Blood Pressure in her daughter and father; High Cholesterol in her father; Other in her father, mother, sister, sister, son, and son; Stroke in her son; Vision Loss in her sister. Allergies   Allergen Reactions    Morphine Anaphylaxis     Dilaudid OK    Bactrim [Sulfamethoxazole-Trimethoprim]     Butorphanol      Other reaction(s): Other - comment required  \"out of body experience\"    Influenza Vaccines      Ended in hospital stay for 3 days told by md not to get it anymore     Lactose Intolerance (Gi) Nausea Only    Phenergan [Promethazine Hcl] Other (See Comments)     Makes me jerk all over. Zofran OK    Promethazine     Stadol [Butorphanol Tartrate] Other (See Comments)     Out of body experience. Was told it was a near death experience and was placed in ICU. Dilaudid OK    Tape Bernice Fidelia Tape] Other (See Comments)     Plastic cause itching and rash. Paper tape causes my skin to blister. (Tega-derm and Coban OK to use.)         Objective:   /60   Pulse 53   Temp 98.4 °F (36.9 °C) (Bladder)   Resp 11   Ht 5' 0.98\" (1.549 m)   Wt 134 lb 7.7 oz (61 kg)   SpO2 100%   BMI 25.42 kg/m²       Intake/Output Summary (Last 24 hours) at 11/17/2021 1121  Last data filed at 11/17/2021 0500  Gross per 24 hour   Intake 2172.58 ml   Output 1600 ml   Net 572.58 ml       TELEMETRY: Sinus     Physical Exam:  Constitutional:  Well developed, Well nourished, No acute distress, Non-toxic appearance. HENT:  Normocephalic, Atraumatic, Bilateral external ears normal, Oropharynx moist, No oral exudates, Nose normal. Neck- Normal range of motion, No tenderness, Supple, No stridor.    Eyes:  PERRL, EOMI, Conjunctiva normal, No discharge. Respiratory:  Normal breath sounds, No respiratory distress, No wheezing, No chest tenderness. ,no use of accessory muscles, diaphragm movement is normal  Cardiovascular: (PMI) apex non displaced,no lifts no thrills, no s3,no s4, Normal heart rate, Normal rhythm, No murmurs, No rubs, No gallops. Carotid arteries pulse and amplitude are normal no bruit, no abdominal bruit noted ( normal abdominal aorta ausculation), femoral arteries pulse and amplitude are normal no bruit, pedal pulses are normal  GI:  Bowel sounds normal, Soft, No tenderness, No masses, No pulsatile masses. : External genitalia appear normal, No masses or lesions. No discharge. No CVA tenderness. Musculoskeletal:  Intact distal pulses, No edema, No tenderness, No cyanosis, No clubbing. Good range of motion in all major joints. No tenderness to palpation or major deformities noted. Back- No tenderness. Integument:  Warm, Dry, No erythema, No rash. Lymphatic:  No lymphadenopathy noted.    Neurologic: intubated and sedated    Medications:    enoxaparin  1 mg/kg SubCUTAneous BID    sodium chloride flush  5-40 mL IntraVENous 2 times per day    budesonide-formoterol  2 puff Inhalation BID    albuterol sulfate HFA  4 puff Inhalation Q4H    montelukast  10 mg Per NG tube Nightly    cefepime  1,000 mg IntraVENous Q12H    aspirin  81 mg Oral Daily    escitalopram  20 mg Oral Daily    levothyroxine  50 mcg Oral Daily    sucralfate  1 g Oral 4x Daily    pantoprazole  40 mg Oral QAM AC    timolol  1 drop Both Eyes Daily    chlorhexidine  15 mL Mouth/Throat BID    sodium chloride flush  5-40 mL IntraVENous 2 times per day    methylPREDNISolone  40 mg IntraVENous Q12H    doxycycline (VIBRAMYCIN) IV  100 mg IntraVENous Q12H      midazolam Stopped (11/17/21 0956)    fentaNYL Stopped (11/17/21 0954)    sodium chloride      dextrose      sodium chloride      sodium chloride 75 mL/hr at 11/17/21 9450    norepinephrine 2 mcg/min (11/16/21 1550)     sodium chloride flush, sodium chloride, acetaminophen, albuterol, glucose, dextrose, glucagon (rDNA), dextrose, sodium chloride flush, sodium chloride, ondansetron **OR** ondansetron, polyethylene glycol, acetaminophen **OR** acetaminophen, magnesium sulfate, fentanNYL    Lab Data:  CBC:   Recent Labs     11/15/21  0259 11/16/21  0400 11/17/21  0515   WBC 14.2* 12.2* 9.9   HGB 13.8 12.5 12.0*   HCT 42.1 37.2 35.6*   .2* 98.4 99.4    209 204     BMP:   Recent Labs     11/15/21  0259 11/16/21  0400 11/17/21  0515    138 140   K 4.0 3.5 3.6    105 108   CO2 20* 21 22   PHOS  --  2.9  --    BUN 14 13 11   CREATININE 0.6 0.5* 0.5*     LIVER PROFILE:   Recent Labs     11/14/21  1415 11/14/21 2140 11/16/21  0400   AST 77*  --  87*   ALT 43*  --  86*   LIPASE 45 13  --    BILITOT 0.6  --  0.3   ALKPHOS 77  --  62     PT/INR:   Recent Labs     11/14/21  1415   PROTIME 10.5*   INR 0.82     APTT: No results for input(s): APTT in the last 72 hours. BNP:  No results for input(s): BNP in the last 72 hours. TROPONIN: @TROPONINI:3@      Assessment:  80 y. o.year old who is admitted for          Plan:  1. Respiratory arrest: could be ashthma and COPD related, intubated and sedated, however, mental condition appears intact, will recommend to wean as per protocol. 2. Elevated trop:its trending up, once extubated, will get ischemia work up, for now add full dose lovenox, echo ordered, cath films from 2019 reviewed( not much siginificant CAD NOTED), ADD ASPIRIN  3. WIDE COMPLEX TACHYCARDIA: Could be vtach, its resolved, Mg is normal, will get echo and if she started PVCs or NSVT, will add amiodarone drip  4. H/O TAVR: echo ordered    All labs, medications and tests reviewed, continue all other medications of all above medical condition listed as is.       Hawa Jansen MD, MD 11/17/2021 11:21 AM

## 2021-11-17 NOTE — PROGRESS NOTES
father; High Cholesterol in her father; Other in her father, mother, sister, sister, son, and son; Stroke in her son; Vision Loss in her sister. Allergies   Allergen Reactions    Morphine Anaphylaxis     Dilaudid OK    Bactrim [Sulfamethoxazole-Trimethoprim]     Butorphanol      Other reaction(s): Other - comment required  \"out of body experience\"    Influenza Vaccines      Ended in hospital stay for 3 days told by md not to get it anymore     Lactose Intolerance (Gi) Nausea Only    Phenergan [Promethazine Hcl] Other (See Comments)     Makes me jerk all over. Zofran OK    Promethazine     Stadol [Butorphanol Tartrate] Other (See Comments)     Out of body experience. Was told it was a near death experience and was placed in ICU. Dilaudid OK    Tape [facility] Tape] Other (See Comments)     Plastic cause itching and rash. Paper tape causes my skin to blister. (Tega-derm and Coban OK to use.)     Social History:    Reviewed; no changes    Objective:   PHYSICAL EXAM:        VITALS:  /60   Pulse 51   Temp 98.4 °F (36.9 °C) (Bladder)   Resp 12   Ht 5' 0.98\" (1.549 m)   Wt 134 lb 7.7 oz (61 kg)   SpO2 99%   BMI 25.42 kg/m²     24HR INTAKE/OUTPUT:      Intake/Output Summary (Last 24 hours) at 11/17/2021 1053  Last data filed at 11/17/2021 0500  Gross per 24 hour   Intake 2172.58 ml   Output 1600 ml   Net 572.58 ml       CONSTITUTIONAL:  Somnolent. LUNGS:  decreased breath sounds  CARDIOVASCULAR:  normal S1 and S2 and positive JVD  ABD:Abdomen soft, non-tender. BS normal. No masses,  No organomegaly  NEURO:Sedated on vent.   DATA:    CBC:  Recent Labs     11/15/21  0259 11/16/21  0400 11/17/21  0515   WBC 14.2* 12.2* 9.9   RBC 4.16* 3.78* 3.58*   HGB 13.8 12.5 12.0*   HCT 42.1 37.2 35.6*    209 204   .2* 98.4 99.4   MCH 33.2* 33.1* 33.5*   MCHC 32.8 33.6 33.7   RDW 12.6 13.0 13.1   SEGSPCT 75.0* 77.0* 80.6*      BMP:  Recent Labs     11/15/21  0259 11/16/21  0400 11/17/21  0515   NA 137 138 140   K 4.0 3.5 3.6    105 108   CO2 20* 21 22   BUN 14 13 11   CREATININE 0.6 0.5* 0.5*   CALCIUM 8.1* 7.3* 7.2*   GLUCOSE 168* 145* 167*      ABG:  Recent Labs     11/15/21  0800 11/16/21  0700 11/17/21  0700   PH 7.50* 7.51* 7.41   PO2ART 137* 152* 124*   RRI2YEG 29.0* 27.0* 38.0   O2SAT 98.5* 95.3* 95.1*     Lab Results   Component Value Date    PROBNP 7,287 (H) 11/16/2021    PROBNP 888.5 (H) 11/14/2021    PROBNP 550.9 (H) 10/13/2020     No results found for: CULTRESP    Radiology Review:  Pertinent images / reports were reviewed as a part of this visit. Assessment:     Patient Active Problem List   Diagnosis    Closed intertrochanteric fracture of left femur (HCC)    Gait disturbance    Anemia    Dizziness    Acquired hypothyroidism    Murmur    Age-related osteoporosis without current pathological fracture    Black tongue    COPD exacerbation (HCC)    Vitamin D deficiency    Vitamin B12 deficiency    Gastroesophageal reflux disease without esophagitis    VHD (valvular heart disease)    Aortic stenosis, severe    Acute respiratory distress    COPD with exacerbation (HCC)    Nodule of lower lobe of right lung    S/P TAVR (transcatheter aortic valve replacement)    Primary hypertension    Acute respiratory failure (HCC)    Other seizures ([address]    Metabolic encephalopathy       Plan:   1. Overall the patient has improved. 2. Wean sedation. 3. Wean per protocol. 4. Discussed with the family and RN.   Caroline Uribe MD   11/17/2021  10:53 AM

## 2021-11-17 NOTE — PROGRESS NOTES
Neurology Service Consult Note  [facility]  Patient Name: Beck Pop  : 1935        Subjective:   Reason for consult: Possible seizure  Patient seen and examined. Chart reviewed in detail. Patient remains intubated. Sedation was turned off prior to entering room. Daughter is at bedside. Patient is opening eyes and looking round room. No following of commands. Discussed EEG results with daughter. Still awaiting MRI of brain. Past Medical History:   Diagnosis Date    Arthritis     generalized    Asthma     Blood transfusion     No reaction    Chronic bronchitis (HCC)     COPD (chronic obstructive pulmonary disease) ([facility] [address]     summer 2014    Echocardiogram 2021    EF 55-60%, Mild dilated LA, Mild annular calcification present, Mild mitral stenosis, Mild MR & TR.    Fatigue     H/O cardiac catheterization 06/15/2017    mild lad and cx disease    H/O cardiovascular stress test 2019    H/O Doppler ultrasound 2016 3/19/12    carotid- WNL 3/12right mild less than 50%and left wnl    H/O Doppler ultrasound     carotid - mod disease EILEEN, mild disease LICA    H/O echocardiogram 2010    H/O echocardiogram 04/15/2016    EF 60% sclerotic AV mildly stenosed-recommend yearly echo    H/O echocardiogram 2019    EF 55-60%, Minimal concentric left ventricular hypertrophy, left atrium is mildly dilated, severe aortic stenosis, Mitral annular calcification is present, Mild AR, Mild-Mod MR, Mild TR, No pericardial effusion     H/O echocardiogram 2019    H/O exercise stress test 2017    abnormal    History of cardiac cath 2019    Severe AS,   TAVR??  History of nuclear stress test 2017    EF 75%. Normal study.     Holter monitor, abnormal 2011    infrequent APC are seen    Normal cardiac stress test 2010    EF 70%, no ischemia    On home O2     only uses as needed, nightly prn    Syncope and collapse     Tachycardia     HX of tachycardia - had a cardioablation    Thyroid disease     :   Past Surgical History:   Procedure Laterality Date    AORTIC VALVE REPAIR N/A 09/09/2019    Core Valve EVOLUT 26mm [id number]    AORTIC VALVE REPLACEMENT N/A 09/11/2019    TAVR; [employer] Evlout 26    BREAST SURGERY  2009    bilat    CARDIAC CATHETERIZATION  03/02/2011    mild to moderate disease of diagonal and proximal RCA   [address]    ablation    CARPAL TUNNEL RELEASE  1990's    bilat    CHOLECYSTECTOMY  1961    open choley    COLONOSCOPY      DILATATION, ESOPHAGUS      ENDOSCOPY, COLON, DIAGNOSTIC  07/03/2017    s/p gastric bypass otherwise normal    FINGER SURGERY      right middle, thumb and index finger (screw and pin in right index finger)    GASTRIC RESTRICTION SURGERY  1984    stapled    HERNIA REPAIR  unknown    Inc hernia hernia repair    HIP FRACTURE SURGERY Left 11/04/2015    Left hip nail    HYSTERECTOMY  1966    Luke w/ BSO    JOINT REPLACEMENT  2004    right knee    LIPECTOMY  1990's    OTHER SURGICAL HISTORY  05/01/2012    Gastrojejunostomy/partial gastrectomy    SHOULDER ARTHROPLASTY Left 10/2017    TOE SURGERY  Early 2000's    hammer toes and bunions     Medications:  Scheduled Meds:   enoxaparin  1 mg/kg SubCUTAneous BID    sodium chloride flush  5-40 mL IntraVENous 2 times per day    budesonide-formoterol  2 puff Inhalation BID    albuterol sulfate HFA  4 puff Inhalation Q4H    montelukast  10 mg Per NG tube Nightly    cefepime  1,000 mg IntraVENous Q12H    aspirin  81 mg Oral Daily    escitalopram  20 mg Oral Daily    levothyroxine  50 mcg Oral Daily    sucralfate  1 g Oral 4x Daily    pantoprazole  40 mg Oral QAM AC    timolol  1 drop Both Eyes Daily    chlorhexidine  15 mL Mouth/Throat BID    sodium chloride flush  5-40 mL IntraVENous 2 times per day    methylPREDNISolone  40 mg IntraVENous Q12H    doxycycline (VIBRAMYCIN) IV  100 mg IntraVENous Q12H     Continuous Infusions:   midazolam Stopped (21 0956)    fentaNYL Stopped (21 0954)    sodium chloride      dextrose      sodium chloride      sodium chloride 75 mL/hr at 21 0454    norepinephrine 2 mcg/min (21 1550)     PRN Meds:.sodium chloride flush, sodium chloride, acetaminophen, albuterol, glucose, dextrose, glucagon (rDNA), dextrose, sodium chloride flush, sodium chloride, ondansetron **OR** ondansetron, polyethylene glycol, acetaminophen **OR** acetaminophen, magnesium sulfate, fentanNYL    Allergies   Allergen Reactions    Morphine Anaphylaxis     Dilaudid OK    Bactrim [Sulfamethoxazole-Trimethoprim]     Butorphanol      Other reaction(s): Other - comment required  \"out of body experience\"    Influenza Vaccines      Ended in hospital stay for 3 days told by md not to get it anymore     Lactose Intolerance (Gi) Nausea Only    Phenergan [Promethazine Hcl] Other (See Comments)     Makes me jerk all over. Zofran OK    Promethazine     Stadol [Butorphanol Tartrate] Other (See Comments)     Out of body experience. Was told it was a near death experience and was placed in ICU. Dilaudid OK    Tape [employer] [place] Tape] Other (See Comments)     Plastic cause itching and rash. Paper tape causes my skin to blister.  (Tega-derm and Coban OK to use.)     Social History     Socioeconomic History    Marital status:      Spouse name: Not on file    Number of children: Not on file    Years of education: Not on file    Highest education level: Not on file   Occupational History    Not on file   Tobacco Use    Smoking status: Former Smoker     Packs/day: 3.00     Years: 5.00     Pack years: 15.00     Quit date: 1962     Years since quittin.9    Smokeless tobacco: Never Used   Substance and Sexual Activity    Alcohol use: No    Drug use: No    Sexual activity: Not on file   Other Topics Concern    Not on file   Social History Narrative    Not on file Social Determinants of Health     Financial Resource Strain:     Difficulty of Paying Living Expenses: Not on file   Food Insecurity:     Worried About Running Out of Food in the Last Year: Not on file    Vicki of Food in the Last Year: Not on file   Transportation Needs:     Lack of Transportation (Medical): Not on file    Lack of Transportation (Non-Medical): Not on file   Physical Activity:     Days of Exercise per Week: Not on file    Minutes of Exercise per Session: Not on file   Stress:     Feeling of Stress : Not on file   Social Connections:     Frequency of Communication with Friends and Family: Not on file    Frequency of Social Gatherings with Friends and Family: Not on file    Attends Voodoo Services: Not on file    Active Member of Clubs or Organizations: Not on file    Attends Club or Organization Meetings: Not on file    Marital Status: Not on file   Intimate Partner Violence:     Fear of Current or Ex-Partner: Not on file    Emotionally Abused: Not on file    Physically Abused: Not on file    Sexually Abused: Not on file   Housing Stability:     Unable to Pay for Housing in the Last Year: Not on file    Number of [place] in the Last Year: Not on file    Unstable Housing in the Last Year: Not on file      Family History   Problem Relation Age of Onset    Diabetes Mother     Other Mother         thyroid    Heart Disease Father     Arthritis Father     High Blood Pressure Father     High Cholesterol Father     Other Father         glaucoma    Other Sister         thyroid, glaucoma    Diabetes Brother     Other Sister     Vision Loss Sister         lung problems, smoker    Cancer Sister         throat cancer    Other Son         HX of clots    Early Death Son         Hit by car and killed.     High Blood Pressure Daughter     Stroke Son         No residual    Other Son         HX myocarditis         Physical Exam:       Vitals:    11/17/21 0823 11/17/21 0900 11/17/21 0916 11/17/21 1000   BP:  124/66  110/60   Pulse: 52 (!) 49 60 51   Resp: 12 12  12   Temp:       TempSrc:       SpO2: 98% 98% 98% 99%   Weight:       Height:           Wt Readings from Last 3 Encounters:   11/17/21 134 lb 7.7 oz (61 kg)   11/11/21 121 lb (54.9 kg)   11/01/21 121 lb 12.8 oz (55.2 kg)     Temp Readings from Last 3 Encounters:   11/17/21 98.4 °F (36.9 °C) (Bladder)   11/11/21 98 °F (36.7 °C)   11/01/21 97.5 °F (36.4 °C) (Infrared)     BP Readings from Last 3 Encounters:   11/17/21 110/60   11/11/21 (!) 129/57   11/01/21 116/60     Pulse Readings from Last 3 Encounters:   11/17/21 51   11/11/21 60   11/01/21 (!) 46        Gen: Intubated, opens eyes  HEENT: NC/AT, EOMI, PERRL, mmm, neck supple, no meningeal signs; Heart: SB on monitor  Lungs: Respirations Unlabored on ventilator  Ext: no edema,   Psych: Calm on the vent   Skin: no rashes or lesions    NEUROLOGIC EXAM:    Mental Status: opens eyes. Looks around room. No following commands. Cranial Nerve Exam:   CN II-XII: PERRL, sluggish, pinpoint, no nystagmus, no gaze paresis;face is symmetrical, tongue midline against Et tube,   Motor Exam:       Strength: withdraws to pain  Tone and bulk normal   LUIS ANTONIO pronator drift    Deep Tendon Reflexes: 1/4 biceps, triceps, brachioradialis, 1/4 patellar, and 0/4 achilles b/l; flexor plantar responses b/l    Sensation: withdraws to pain    Coordination/Cerebellum:       Tremors--none        Gait and stance:      Gait: deferred      LABS:        CBC:   Recent Labs     11/17/21  0515   WBC 9.9   RBC 3.58*   HGB 12.0*   HCT 35.6*      MCV 99.4     BMP:    Recent Labs     11/17/21  0515      K 3.6      CO2 22   BUN 11   CREATININE 0.5*   GLUCOSE 167*   CALCIUM 7.2*       IMAGING:    CT of head  Impression   No acute intracranial abnormality. CTA of head and neck  Impression   No large vessel occlusion visualized within the head or neck.        Above imaging reviewed in

## 2021-11-18 ENCOUNTER — APPOINTMENT (OUTPATIENT)
Dept: GENERAL RADIOLOGY | Age: 86
DRG: 871 | End: 2021-11-18
Payer: MEDICARE

## 2021-11-18 LAB
ANION GAP SERPL CALCULATED.3IONS-SCNC: 8 MMOL/L (ref 4–16)
BASOPHILS ABSOLUTE: 0 K/CU MM
BASOPHILS RELATIVE PERCENT: 0.1 % (ref 0–1)
BUN BLDV-MCNC: 11 MG/DL (ref 6–23)
CALCIUM SERPL-MCNC: 7.3 MG/DL (ref 8.3–10.6)
CHLORIDE BLD-SCNC: 109 MMOL/L (ref 99–110)
CO2: 23 MMOL/L (ref 21–32)
CREAT SERPL-MCNC: 0.4 MG/DL (ref 0.6–1.1)
DIFFERENTIAL TYPE: ABNORMAL
EOSINOPHILS ABSOLUTE: 0 K/CU MM
EOSINOPHILS RELATIVE PERCENT: 0 % (ref 0–3)
GFR AFRICAN AMERICAN: >60 ML/MIN/1.73M2
GFR NON-AFRICAN AMERICAN: >60 ML/MIN/1.73M2
GLUCOSE BLD-MCNC: 104 MG/DL (ref 70–99)
GLUCOSE BLD-MCNC: 159 MG/DL (ref 70–99)
GLUCOSE BLD-MCNC: 89 MG/DL (ref 70–99)
HCT VFR BLD CALC: 33.2 % (ref 37–47)
HEMOGLOBIN: 10.9 GM/DL (ref 12.5–16)
IMMATURE NEUTROPHIL %: 0.5 % (ref 0–0.43)
LYMPHOCYTES ABSOLUTE: 2.3 K/CU MM
LYMPHOCYTES RELATIVE PERCENT: 22.9 % (ref 24–44)
MAGNESIUM: 2 MG/DL (ref 1.8–2.4)
MCH RBC QN AUTO: 33.1 PG (ref 27–31)
MCHC RBC AUTO-ENTMCNC: 32.8 % (ref 32–36)
MCV RBC AUTO: 100.9 FL (ref 78–100)
MONOCYTES ABSOLUTE: 0.8 K/CU MM
MONOCYTES RELATIVE PERCENT: 7.8 % (ref 0–4)
NUCLEATED RBC %: 0 %
PDW BLD-RTO: 13.2 % (ref 11.7–14.9)
PLATELET # BLD: 169 K/CU MM (ref 140–440)
PMV BLD AUTO: 10.8 FL (ref 7.5–11.1)
POTASSIUM SERPL-SCNC: 3.4 MMOL/L (ref 3.5–5.1)
RBC # BLD: 3.29 M/CU MM (ref 4.2–5.4)
SEGMENTED NEUTROPHILS ABSOLUTE COUNT: 6.8 K/CU MM
SEGMENTED NEUTROPHILS RELATIVE PERCENT: 68.7 % (ref 36–66)
SODIUM BLD-SCNC: 140 MMOL/L (ref 135–145)
TOTAL IMMATURE NEUTOROPHIL: 0.05 K/CU MM
TOTAL NUCLEATED RBC: 0 K/CU MM
WBC # BLD: 9.9 K/CU MM (ref 4–10.5)

## 2021-11-18 PROCEDURE — 94664 DEMO&/EVAL PT USE INHALER: CPT

## 2021-11-18 PROCEDURE — 83735 ASSAY OF MAGNESIUM: CPT

## 2021-11-18 PROCEDURE — 93005 ELECTROCARDIOGRAM TRACING: CPT | Performed by: INTERNAL MEDICINE

## 2021-11-18 PROCEDURE — 97530 THERAPEUTIC ACTIVITIES: CPT

## 2021-11-18 PROCEDURE — 6370000000 HC RX 637 (ALT 250 FOR IP): Performed by: INTERNAL MEDICINE

## 2021-11-18 PROCEDURE — 6360000002 HC RX W HCPCS: Performed by: INTERNAL MEDICINE

## 2021-11-18 PROCEDURE — 2000000000 HC ICU R&B

## 2021-11-18 PROCEDURE — 94640 AIRWAY INHALATION TREATMENT: CPT

## 2021-11-18 PROCEDURE — 2580000003 HC RX 258: Performed by: INTERNAL MEDICINE

## 2021-11-18 PROCEDURE — 71045 X-RAY EXAM CHEST 1 VIEW: CPT

## 2021-11-18 PROCEDURE — 82962 GLUCOSE BLOOD TEST: CPT

## 2021-11-18 PROCEDURE — 99232 SBSQ HOSP IP/OBS MODERATE 35: CPT | Performed by: INTERNAL MEDICINE

## 2021-11-18 PROCEDURE — 85025 COMPLETE CBC W/AUTO DIFF WBC: CPT

## 2021-11-18 PROCEDURE — 80048 BASIC METABOLIC PNL TOTAL CA: CPT

## 2021-11-18 PROCEDURE — 99233 SBSQ HOSP IP/OBS HIGH 50: CPT | Performed by: CLINICAL NURSE SPECIALIST

## 2021-11-18 PROCEDURE — 2700000000 HC OXYGEN THERAPY PER DAY

## 2021-11-18 PROCEDURE — 97166 OT EVAL MOD COMPLEX 45 MIN: CPT

## 2021-11-18 PROCEDURE — 92610 EVALUATE SWALLOWING FUNCTION: CPT

## 2021-11-18 PROCEDURE — 2500000003 HC RX 250 WO HCPCS: Performed by: INTERNAL MEDICINE

## 2021-11-18 PROCEDURE — 94761 N-INVAS EAR/PLS OXIMETRY MLT: CPT

## 2021-11-18 RX ORDER — POTASSIUM CHLORIDE 29.8 MG/ML
20 INJECTION INTRAVENOUS PRN
Status: DISCONTINUED | OUTPATIENT
Start: 2021-11-18 | End: 2021-11-26 | Stop reason: HOSPADM

## 2021-11-18 RX ORDER — FUROSEMIDE 10 MG/ML
20 INJECTION INTRAMUSCULAR; INTRAVENOUS ONCE
Status: COMPLETED | OUTPATIENT
Start: 2021-11-18 | End: 2021-11-18

## 2021-11-18 RX ORDER — POTASSIUM BICARBONATE 25 MEQ/1
25 TABLET, EFFERVESCENT ORAL ONCE
Status: DISCONTINUED | OUTPATIENT
Start: 2021-11-18 | End: 2021-11-18 | Stop reason: CLARIF

## 2021-11-18 RX ORDER — POTASSIUM CHLORIDE 20 MEQ/1
40 TABLET, EXTENDED RELEASE ORAL PRN
Status: DISCONTINUED | OUTPATIENT
Start: 2021-11-18 | End: 2021-11-26 | Stop reason: HOSPADM

## 2021-11-18 RX ORDER — ALBUTEROL SULFATE 90 UG/1
4 AEROSOL, METERED RESPIRATORY (INHALATION) 4 TIMES DAILY
Status: DISCONTINUED | OUTPATIENT
Start: 2021-11-18 | End: 2021-11-26 | Stop reason: HOSPADM

## 2021-11-18 RX ORDER — POTASSIUM CHLORIDE 7.45 MG/ML
10 INJECTION INTRAVENOUS PRN
Status: DISCONTINUED | OUTPATIENT
Start: 2021-11-18 | End: 2021-11-26 | Stop reason: HOSPADM

## 2021-11-18 RX ADMIN — DOXYCYCLINE 100 MG: 100 INJECTION, POWDER, LYOPHILIZED, FOR SOLUTION INTRAVENOUS at 20:46

## 2021-11-18 RX ADMIN — DOXYCYCLINE 100 MG: 100 INJECTION, POWDER, LYOPHILIZED, FOR SOLUTION INTRAVENOUS at 08:16

## 2021-11-18 RX ADMIN — SUCRALFATE 1 G: 1 TABLET ORAL at 12:10

## 2021-11-18 RX ADMIN — SODIUM CHLORIDE 75 ML: 9 INJECTION, SOLUTION INTRAVENOUS at 11:58

## 2021-11-18 RX ADMIN — BUDESONIDE AND FORMOTEROL FUMARATE DIHYDRATE 2 PUFF: 160; 4.5 AEROSOL RESPIRATORY (INHALATION) at 21:13

## 2021-11-18 RX ADMIN — LEVOTHYROXINE SODIUM 50 MCG: 25 TABLET ORAL at 06:00

## 2021-11-18 RX ADMIN — CEFEPIME HYDROCHLORIDE 1000 MG: 1 INJECTION, POWDER, FOR SOLUTION INTRAMUSCULAR; INTRAVENOUS at 21:52

## 2021-11-18 RX ADMIN — POTASSIUM CHLORIDE 20 MEQ: 29.8 INJECTION, SOLUTION INTRAVENOUS at 09:14

## 2021-11-18 RX ADMIN — ALBUTEROL SULFATE 4 PUFF: 90 AEROSOL, METERED RESPIRATORY (INHALATION) at 11:06

## 2021-11-18 RX ADMIN — ALBUTEROL SULFATE 4 PUFF: 90 AEROSOL, METERED RESPIRATORY (INHALATION) at 21:12

## 2021-11-18 RX ADMIN — CEFEPIME HYDROCHLORIDE 1000 MG: 1 INJECTION, POWDER, FOR SOLUTION INTRAMUSCULAR; INTRAVENOUS at 12:11

## 2021-11-18 RX ADMIN — ENOXAPARIN SODIUM 60 MG: 100 INJECTION SUBCUTANEOUS at 20:36

## 2021-11-18 RX ADMIN — BUDESONIDE AND FORMOTEROL FUMARATE DIHYDRATE 2 PUFF: 160; 4.5 AEROSOL RESPIRATORY (INHALATION) at 07:53

## 2021-11-18 RX ADMIN — ENOXAPARIN SODIUM 60 MG: 100 INJECTION SUBCUTANEOUS at 08:08

## 2021-11-18 RX ADMIN — ESCITALOPRAM OXALATE 20 MG: 10 TABLET ORAL at 08:08

## 2021-11-18 RX ADMIN — FUROSEMIDE 20 MG: 10 INJECTION, SOLUTION INTRAMUSCULAR; INTRAVENOUS at 15:01

## 2021-11-18 RX ADMIN — SUCRALFATE 1 G: 1 TABLET ORAL at 08:08

## 2021-11-18 RX ADMIN — PANTOPRAZOLE SODIUM 40 MG: 40 TABLET, DELAYED RELEASE ORAL at 06:00

## 2021-11-18 RX ADMIN — METHYLPREDNISOLONE SODIUM SUCCINATE 40 MG: 40 INJECTION, POWDER, FOR SOLUTION INTRAMUSCULAR; INTRAVENOUS at 08:08

## 2021-11-18 RX ADMIN — ASPIRIN 81 MG: 81 TABLET, COATED ORAL at 08:07

## 2021-11-18 RX ADMIN — SODIUM CHLORIDE, PRESERVATIVE FREE 10 ML: 5 INJECTION INTRAVENOUS at 20:37

## 2021-11-18 RX ADMIN — ONDANSETRON 4 MG: 2 INJECTION INTRAMUSCULAR; INTRAVENOUS at 10:05

## 2021-11-18 RX ADMIN — METHYLPREDNISOLONE SODIUM SUCCINATE 40 MG: 40 INJECTION, POWDER, FOR SOLUTION INTRAMUSCULAR; INTRAVENOUS at 20:36

## 2021-11-18 RX ADMIN — POTASSIUM BICARBONATE 30 MEQ: 782 TABLET, EFFERVESCENT ORAL at 20:57

## 2021-11-18 RX ADMIN — SUCRALFATE 1 G: 1 TABLET ORAL at 20:36

## 2021-11-18 RX ADMIN — SUCRALFATE 1 G: 1 TABLET ORAL at 16:39

## 2021-11-18 ASSESSMENT — PAIN SCALES - GENERAL
PAINLEVEL_OUTOF10: 0

## 2021-11-18 NOTE — PROGRESS NOTES
Speech Language Pathology  Facility/Department: [facility] ICU   CLINICAL BEDSIDE SWALLOW EVALUATION    NAME: Armida Lopes  : 1935  MRN: [id number]    IMPRESSIONS: Armida Lopes was referred for a bedside swallow evaluation following admission to [facility] with acute respiratory failure, COPD exacerbation, acute metabolic encephalopathy. She was intubated on mechanical ventilation -. Medical hx includes COPD, s/p gastric bypass, GERD, HTN. No known history of dysphagia prior to admission. Pt seen for evaluation seated upright in bed, alert, pleasant, cooperative. Family present throughout. Oral mechanism examination WFL/no asymmetry. Pt presented with PO trials of thin liquids via cup/straw, puree, and regular solids. Oral stage WFL with intact labial seal/mastication/AP transit/oral clearance. Pharyngeal stage WFL with intact swallow initiation/laryngeal elevation, no s/s aspiration across all trials. Recommend continued regular diet/thin liquids. No further acute SLP needs identified at this time. ADMISSION DATE: 2021  ADMITTING DIAGNOSIS: has Closed intertrochanteric fracture of left femur ([address] Gait disturbance; Anemia; Dizziness; Acquired hypothyroidism; Murmur; Age-related osteoporosis without current pathological fracture; Black tongue; COPD exacerbation ([address] Vitamin D deficiency; Vitamin B12 deficiency; Gastroesophageal reflux disease without esophagitis; VHD (valvular heart disease); Aortic stenosis, severe; Acute respiratory distress; COPD with exacerbation ([address] Nodule of lower lobe of right lung; S/P TAVR (transcatheter aortic valve replacement); Primary hypertension; Acute respiratory failure ([address]  Other seizures ([address] and Metabolic encephalopathy on their problem list.  ONSET DATE: this admission    Recent Chest Xray/CT of Chest: see chart    Date of Eval: 2021  Evaluating Therapist: MEGHANN Sinha    Current Diet level:  Current Diet : Regular  Current Liquid Diet : Thin      Primary Complaint  Patient Complaint: denies current speech/swallowing concerns    Pain:  Pain Assessment  Pain Assessment: 0-10  Pain Level: 0  Patient's Stated Pain Goal: No pain  Response to Pain Intervention: None  RASS Score: Light Sedation - Patient awakens with eye opening and eye contact, but not sustained    Reason for Referral  Rogelio Hollingsworth was referred for a bedside swallow evaluation to assess the efficiency of her swallow function, identify signs and symptoms of aspiration and make recommendations regarding safe dietary consistencies, effective compensatory strategies, and safe eating environment. Impression  Dysphagia Diagnosis: Swallow function appears grossly intact  Dysphagia Outcome Severity Scale: Level 6: Within functional limits/Modified independence     Treatment Plan  Requires SLP Intervention: No  Duration/Frequency of Treatment: N/A  D/C Recommendations: To be determined       Recommended Diet and Intervention     Liquid Consistency Recommendation: Thin  Recommended Form of Meds: PO          Compensatory Swallowing Strategies  Compensatory Swallowing Strategies: Upright as possible for all oral intake    Treatment/Goals  Short-term Goals  Timeframe for Short-term Goals: N/A    General  Chart Reviewed: Yes  Behavior/Cognition: Alert; Cooperative; Pleasant mood  Respiratory Status: O2 via nasual cannula  O2 Device: Nasal cannula  Communication Observation: Functional  Follows Directions: Simple  Dentition: Dentures bottom; Dentures top; Edentulous  Patient Positioning: Upright in bed  Baseline Vocal Quality: Normal  Prior Dysphagia History: none known prior to admission  Consistencies Administered: Reg solid; Dysphagia Pureed (Dysphagia I); Thin - cup; Thin - straw           Vision/Hearing  Vision  Vision: Impaired  Vision Exceptions: Wears glasses for reading  Hearing  Hearing: Within functional limits    Oral Motor Deficits  Oral/Motor  Oral Motor:  Within functional limits    Oral Phase Dysfunction  Oral Phase  Oral Phase: WFL     Indicators of Pharyngeal Phase Dysfunction   Pharyngeal Phase  Pharyngeal Phase: WFL    Prognosis  Prognosis  Barriers/Prognosis Comment: N/A  Individuals consulted  Consulted and agree with results and recommendations: Patient; Family member    Education  Patient Education: results/recommendations  Patient Education Response: Verbalizes understanding  Safety Devices in place: Yes  Type of devices:  All fall risk precautions in place       Therapy Time  SLP Individual Minutes  Time In: 1110  Time Out: [phone]  Minutes: [facility]-SLP  11/18/2021 12:12 PM

## 2021-11-18 NOTE — PROGRESS NOTES
unsuccessful. LMA was placed. She was transported to ED and continued to have agonal breathing. She was intubated in the ED    Per admission history and physical she has a COPD exacerbation    Cardiology saw her on 11/15. She had ventricular tachycardia and her blood pressure was labile. She was on pressors and on sedation. She had a TAVR 2 years ago. At that time cath showed mild CAD per cardiology. Troponin was elevated. On admission on 11/14 troponin was less than 1.01. Later that day it went up to 0.39 and the following day it was 0.44 and then today, on 1116, it was 0.57    Left heart cath ordered 11/16 because of abnormal echo and apical ballooning    History  -per bedside nurse 11/16 pt mows her won lawn at home        Objective: Intake/Output Summary (Last 24 hours) at 11/17/2021 2303  Last data filed at 11/17/2021 2013  Gross per 24 hour   Intake 2325.13 ml   Output 1615 ml   Net 710.13 ml      Vitals:   Vitals:    11/17/21 2000   BP:    Pulse:    Resp:    Temp: 99.5 °F (37.5 °C)   SpO2:          Labs:   CBC:   Lab Results   Component Value Date    WBC 9.9 11/17/2021    HGB 12.0 11/17/2021    HCT 35.6 11/17/2021    MCV 99.4 11/17/2021     11/17/2021     BMP:   Lab Results   Component Value Date     11/17/2021    K 3.6 11/17/2021     11/17/2021    CO2 22 11/17/2021    PHOS 2.9 11/16/2021    BUN 11 11/17/2021    CREATININE 0.5 11/17/2021    CALCIUM 7.2 11/17/2021       Physical Exam:      Physical Exam  Constitutional:       Appearance: She is not diaphoretic. Pulmonary:      Effort: Pulmonary effort is normal.   Skin:     Coloration: Skin is not jaundiced.          Medications:   Medications:    enoxaparin  1 mg/kg SubCUTAneous BID    sodium chloride flush  5-40 mL IntraVENous 2 times per day    budesonide-formoterol  2 puff Inhalation BID    albuterol sulfate HFA  4 puff Inhalation Q4H    montelukast  10 mg Per NG tube Nightly    cefepime  1,000 mg IntraVENous Q12H    aspirin  81 mg Oral Daily    escitalopram  20 mg Oral Daily    levothyroxine  50 mcg Oral Daily    sucralfate  1 g Oral 4x Daily    pantoprazole  40 mg Oral QAM AC    timolol  1 drop Both Eyes Daily    chlorhexidine  15 mL Mouth/Throat BID    sodium chloride flush  5-40 mL IntraVENous 2 times per day    methylPREDNISolone  40 mg IntraVENous Q12H    doxycycline (VIBRAMYCIN) IV  100 mg IntraVENous Q12H      Infusions:    sodium chloride      dextrose      sodium chloride      sodium chloride 75 mL/hr at 11/17/21 1819    norepinephrine Stopped (11/17/21 1402)     PRN Meds: polyvinyl alcohol, , Q2H PRN  sodium chloride flush, 5-40 mL, PRN  sodium chloride, 25 mL, PRN  acetaminophen, 650 mg, Q4H PRN  albuterol, 2.5 mg, Q4H PRN  glucose, 15 g, PRN  dextrose, 12.5 g, PRN  glucagon (rDNA), 1 mg, PRN  dextrose, 100 mL/hr, PRN  sodium chloride flush, 5-40 mL, PRN  sodium chloride, 25 mL, PRN  ondansetron, 4 mg, Q8H PRN   Or  ondansetron, 4 mg, Q6H PRN  polyethylene glycol, 17 g, Daily PRN  acetaminophen, 650 mg, Q6H PRN   Or  acetaminophen, 650 mg, Q6H PRN  magnesium sulfate, 2,000 mg, PRN  fentanNYL, 25 mcg, Q1H PRN          Electronically signed by Osiel Shetty MD on 11/17/2021 at 11:03 PM

## 2021-11-18 NOTE — PROGRESS NOTES
Pulmonary and Critical Care  Progress Note    Subjective: The patient is doing well. On RA. Shortness of breath none  Chest pain none  Addressing respiratory complaints Patient is negative for  hemoptysis and cyanosis  CONSTITUTIONAL:  negative for fevers and chills      Past Medical History:     has a past medical history of Arthritis, Asthma, Blood transfusion, Chronic bronchitis ([facility] [address] COPD (chronic obstructive pulmonary disease) ([facility] [address] Echocardiogram, Fatigue, H/O cardiac catheterization, H/O cardiovascular stress test, H/O Doppler ultrasound, H/O Doppler ultrasound, H/O echocardiogram, H/O echocardiogram, H/O echocardiogram, H/O echocardiogram, H/O exercise stress test, History of cardiac cath, History of nuclear stress test, Holter monitor, abnormal, Normal cardiac stress test, On home O2, Syncope and collapse, Tachycardia, and Thyroid disease. has a past surgical history that includes joint replacement (2004); Hysterectomy (1966); Cholecystectomy (1961); Breast surgery (2009); Toe Surgery (Early 2000's); Carpal tunnel release (1990's); Finger surgery; Gastric restriction surgery (1984); Cardiac surgery (1989); hernia repair (unknown); lipectomy (1990's); Dilatation, esophagus; other surgical history (05/01/2012); Cardiac catheterization (03/02/2011); Hip fracture surgery (Left, 11/04/2015); Colonoscopy; Endoscopy, colon, diagnostic (07/03/2017); Total shoulder arthroplasty (Left, 10/2017); Aortic valve replacement (N/A, 09/11/2019); and Aortic valve repair (N/A, 09/09/2019). reports that she quit smoking about 59 years ago. She has a 15.00 pack-year smoking history. She has never used smokeless tobacco. She reports that she does not drink alcohol and does not use drugs. Family history:  family history includes Arthritis in her father; Cancer in her sister; Diabetes in her brother and mother; Early Death in her son;  Heart Disease in her father; High Blood Pressure in her daughter and father; High Cholesterol in her father; Other in her father, mother, sister, sister, son, and son; Stroke in her son; Vision Loss in her sister. Allergies   Allergen Reactions    Morphine Anaphylaxis     Dilaudid OK    Bactrim [Sulfamethoxazole-Trimethoprim]     Butorphanol      Other reaction(s): Other - comment required  \"out of body experience\"    Influenza Vaccines      Ended in hospital stay for 3 days told by md not to get it anymore     Lactose Intolerance (Gi) Nausea Only    Phenergan [Promethazine Hcl] Other (See Comments)     Makes me jerk all over. Zofran OK    Promethazine     Stadol [Butorphanol Tartrate] Other (See Comments)     Out of body experience. Was told it was a near death experience and was placed in ICU. Dilaudid OK    Tape Electa Ghazi Tape] Other (See Comments)     Plastic cause itching and rash. Paper tape causes my skin to blister. (Tega-derm and Coban OK to use.)     Social History:    Reviewed; no changes    Objective:   PHYSICAL EXAM:        VITALS:  /76   Pulse 66   Temp 99.3 °F (37.4 °C) (Bladder)   Resp 21   Ht 5' 0.98\" (1.549 m)   Wt 135 lb 2.3 oz (61.3 kg)   SpO2 96%   BMI 25.55 kg/m²     24HR INTAKE/OUTPUT:      Intake/Output Summary (Last 24 hours) at 11/18/2021 1043  Last data filed at 11/18/2021 0800  Gross per 24 hour   Intake 1564.33 ml   Output 1665 ml   Net -100.67 ml       CONSTITUTIONAL: Alert. LUNGS:  decreased breath sounds  CARDIOVASCULAR:  normal S1 and S2 and positive JVD  ABD:Abdomen soft, non-tender. BS normal. No masses,  No organomegaly  NEURO: Alert.   DATA:    CBC:  Recent Labs     11/16/21  0400 11/17/21  0515 11/18/21  0520   WBC 12.2* 9.9 9.9   RBC 3.78* 3.58* 3.29*   HGB 12.5 12.0* 10.9*   HCT 37.2 35.6* 33.2*    204 169   MCV 98.4 99.4 100.9*   MCH 33.1* 33.5* 33.1*   MCHC 33.6 33.7 32.8   RDW 13.0 13.1 13.2   SEGSPCT 77.0* 80.6* 68.7*      BMP:  Recent Labs     11/16/21  0400 11/17/21  0515 11/18/21  0520    140 140   K 3.5 3.6 3.4*    108 109   CO2 21 22 23   BUN 13 11 11   CREATININE 0.5* 0.5* 0.4*   CALCIUM 7.3* 7.2* 7.3*   GLUCOSE 145* 167* 89      ABG:  Recent Labs     11/16/21  0700 11/17/21  0700   PH 7.51* 7.41   PO2ART 152* 124*   NSO4OFL 27.0* 38.0   O2SAT 95.3* 95.1*     Lab Results   Component Value Date    PROBNP 7,287 (H) 11/16/2021    PROBNP 888.5 (H) 11/14/2021    PROBNP 550.9 (H) 10/13/2020     No results found for: CULTRESP    Radiology Review:  Pertinent images / reports were reviewed as a part of this visit. Assessment:     Patient Active Problem List   Diagnosis    Closed intertrochanteric fracture of left femur (HCC)    Gait disturbance    Anemia    Dizziness    Acquired hypothyroidism    Murmur    Age-related osteoporosis without current pathological fracture    Black tongue    COPD exacerbation (HCC)    Vitamin D deficiency    Vitamin B12 deficiency    Gastroesophageal reflux disease without esophagitis    VHD (valvular heart disease)    Aortic stenosis, severe    Acute respiratory distress    COPD with exacerbation (HCC)    Nodule of lower lobe of right lung    S/P TAVR (transcatheter aortic valve replacement)    Primary hypertension    Acute respiratory failure (HCC)    Other seizures ([facility] [address]    Metabolic encephalopathy       Plan:   1. Overall the patient has improved. 2. Inc. activity. 3. Discussed with the RN.   Micky Medrano MD   11/18/2021  10:43 AM

## 2021-11-18 NOTE — PROGRESS NOTES
Neurology Service Consult Note  [facility]  Patient Name: Leonila Johnson  : 1935        Subjective:   Reason for consult: Possible seizure  Patient seen and examined. Chart reviewed in detail. Patient is now extubated. She is alert and oriented. Asked about events leading up to her admission. She states she remember having lunch and sitting down. She states she remembers reaching for her phone as she was SOB and did not feel well. She appears to have SOB this morning. Heart catheterization revealing possible Takutsobo syndrome. Past Medical History:   Diagnosis Date    Arthritis     generalized    Asthma     Blood transfusion     No reaction    Chronic bronchitis (HCC)     COPD (chronic obstructive pulmonary disease) ([address]     summer 2014    Echocardiogram 2021    EF 55-60%, Mild dilated LA, Mild annular calcification present, Mild mitral stenosis, Mild MR & TR.    Fatigue     H/O cardiac catheterization 06/15/2017    mild lad and cx disease    H/O cardiovascular stress test 2019    H/O Doppler ultrasound 2016 3/19/12    carotid- WNL 3/12right mild less than 50%and left wnl    H/O Doppler ultrasound     carotid - mod disease EILEEN, mild disease LICA    H/O echocardiogram 2010    H/O echocardiogram 04/15/2016    EF 60% sclerotic AV mildly stenosed-recommend yearly echo    H/O echocardiogram 2019    EF 55-60%, Minimal concentric left ventricular hypertrophy, left atrium is mildly dilated, severe aortic stenosis, Mitral annular calcification is present, Mild AR, Mild-Mod MR, Mild TR, No pericardial effusion     H/O echocardiogram 2019    H/O exercise stress test 2017    abnormal    History of cardiac cath 2019    Severe AS,   TAVR??  History of nuclear stress test 2017    EF 75%. Normal study.     Holter monitor, abnormal 2011    infrequent APC are seen    Normal cardiac stress test 07/23/2010    EF 70%, no ischemia    On home O2     only uses as needed, nightly prn    Syncope and collapse     Tachycardia     HX of tachycardia - had a cardioablation    Thyroid disease     :   Past Surgical History:   Procedure Laterality Date    AORTIC VALVE REPAIR N/A 09/09/2019    Core Valve EVOLUT 26mm [id number]    AORTIC VALVE REPLACEMENT N/A 09/11/2019    TAVR; [employer] Evlout 26    BREAST SURGERY  2009    bilat    CARDIAC CATHETERIZATION  03/02/2011    mild to moderate disease of diagonal and proximal RCA    CARDIAC SURGERY  1989    ablation    CARPAL TUNNEL RELEASE  1990's    bilat    CHOLECYSTECTOMY  1961    open choley    COLONOSCOPY      DILATATION, ESOPHAGUS      ENDOSCOPY, COLON, DIAGNOSTIC  07/03/2017    s/p gastric bypass otherwise normal    FINGER SURGERY      right middle, thumb and index finger (screw and pin in right index finger)    GASTRIC RESTRICTION SURGERY  1984    stapled    HERNIA REPAIR  unknown    Inc hernia hernia repair    HIP FRACTURE SURGERY Left 11/04/2015    Left hip nail    HYSTERECTOMY  1966    Luke w/ BSO    JOINT REPLACEMENT  2004    right knee    LIPECTOMY  1990's    OTHER SURGICAL HISTORY  05/01/2012    Gastrojejunostomy/partial gastrectomy    SHOULDER ARTHROPLASTY Left 10/2017    TOE SURGERY  Early 2000's    hammer toes and bunions     Medications:  Scheduled Meds:   albuterol sulfate HFA  4 puff Inhalation 4x daily    potassium bicarb-citric acid  20 mEq Oral Once    enoxaparin  1 mg/kg SubCUTAneous BID    sodium chloride flush  5-40 mL IntraVENous 2 times per day    budesonide-formoterol  2 puff Inhalation BID    montelukast  10 mg Per NG tube Nightly    cefepime  1,000 mg IntraVENous Q12H    aspirin  81 mg Oral Daily    escitalopram  20 mg Oral Daily    levothyroxine  50 mcg Oral Daily    sucralfate  1 g Oral 4x Daily    pantoprazole  40 mg Oral QAM AC    timolol  1 drop Both Eyes Daily    chlorhexidine  15 mL Mouth/Throat BID    sodium chloride flush  5-40 mL IntraVENous 2 times per day    methylPREDNISolone  40 mg IntraVENous Q12H    doxycycline (VIBRAMYCIN) IV  100 mg IntraVENous Q12H     Continuous Infusions:   sodium chloride      dextrose      sodium chloride      sodium chloride 75 mL/hr at 21 1819    norepinephrine Stopped (21 1402)     PRN Meds:.potassium chloride, potassium chloride **OR** potassium alternative oral replacement **OR** potassium chloride, polyvinyl alcohol, sodium chloride flush, sodium chloride, acetaminophen, albuterol, glucose, dextrose, glucagon (rDNA), dextrose, sodium chloride flush, sodium chloride, ondansetron **OR** ondansetron, polyethylene glycol, acetaminophen **OR** acetaminophen, magnesium sulfate, fentanNYL    Allergies   Allergen Reactions    Morphine Anaphylaxis     Dilaudid OK    Bactrim [Sulfamethoxazole-Trimethoprim]     Butorphanol      Other reaction(s): Other - comment required  \"out of body experience\"    Influenza Vaccines      Ended in hospital stay for 3 days told by md not to get it anymore     Lactose Intolerance (Gi) Nausea Only    Phenergan [Promethazine Hcl] Other (See Comments)     Makes me jerk all over. Zofran OK    Promethazine     Stadol [Butorphanol Tartrate] Other (See Comments)     Out of body experience. Was told it was a near death experience and was placed in ICU. Dilaudid OK    Tape Joe Knows Tape] Other (See Comments)     Plastic cause itching and rash. Paper tape causes my skin to blister.  (Tega-derm and Coban OK to use.)     Social History     Socioeconomic History    Marital status:      Spouse name: Not on file    Number of children: Not on file    Years of education: Not on file    Highest education level: Not on file   Occupational History    Not on file   Tobacco Use    Smoking status: Former Smoker     Packs/day: 3.00     Years: 5.00     Pack years: 15.00     Quit date: 1962     Years since quittin.9    Smokeless tobacco: Never Used   Substance and Sexual Activity    Alcohol use: No    Drug use: No    Sexual activity: Not on file   Other Topics Concern    Not on file   Social History Narrative    Not on file     Social Determinants of Health     Financial Resource Strain:     Difficulty of Paying Living Expenses: Not on file   Food Insecurity:     Worried About Running Out of Food in the Last Year: Not on file    Vicki of Food in the Last Year: Not on file   Transportation Needs:     Lack of Transportation (Medical): Not on file    Lack of Transportation (Non-Medical):  Not on file   Physical Activity:     Days of Exercise per Week: Not on file    Minutes of Exercise per Session: Not on file   Stress:     Feeling of Stress : Not on file   Social Connections:     Frequency of Communication with Friends and Family: Not on file    Frequency of Social Gatherings with Friends and Family: Not on file    Attends Congregational Services: Not on file    Active Member of Clubs or Organizations: Not on file    Attends Club or Organization Meetings: Not on file    Marital Status: Not on file   Intimate Partner Violence:     Fear of Current or Ex-Partner: Not on file    Emotionally Abused: Not on file    Physically Abused: Not on file    Sexually Abused: Not on file   Housing Stability:     Unable to Pay for Housing in the Last Year: Not on file    Number of [place] in the Last Year: Not on file    Unstable Housing in the Last Year: Not on file      Family History   Problem Relation Age of Onset    Diabetes Mother     Other Mother         thyroid    Heart Disease Father     Arthritis Father     High Blood Pressure Father     High Cholesterol Father     Other Father         glaucoma    Other Sister         thyroid, glaucoma    Diabetes Brother     Other Sister     Vision Loss Sister         lung problems, smoker    Cancer Sister         throat cancer    Other Son         HX of clots   [address] [place] [address] Early Death Son         Hit by car and killed.  High Blood Pressure Daughter     Stroke Son         No residual    Other Son         HX myocarditis         Physical Exam:       Vitals:    11/18/21 0600 11/18/21 0753 11/18/21 0800 11/18/21 0900   BP:   (!) 145/71 133/76   Pulse:   66 66   Resp:  14 17 21   Temp:   99.3 °F (37.4 °C)    TempSrc:   Bladder    SpO2:  99% 95% 96%   Weight: 135 lb 2.3 oz (61.3 kg)      Height:           Wt Readings from Last 3 Encounters:   11/18/21 135 lb 2.3 oz (61.3 kg)   11/11/21 121 lb (54.9 kg)   11/01/21 121 lb 12.8 oz (55.2 kg)     Temp Readings from Last 3 Encounters:   11/18/21 99.3 °F (37.4 °C) (Bladder)   11/11/21 98 °F (36.7 °C)   11/01/21 97.5 °F (36.4 °C) (Infrared)     BP Readings from Last 3 Encounters:   11/18/21 133/76   11/11/21 (!) 129/57   11/01/21 116/60     Pulse Readings from Last 3 Encounters:   11/18/21 66   11/11/21 60   11/01/21 (!) 46        Gen: Alert and oriented X 4  HEENT: NC/AT, EOMI, PERRL, mmm, neck supple, no meningeal signs; Heart: SB on monitor  Lungs: appears to be SOB  Ext: no edema,   Psych: appears anxious  Skin: no rashes or lesions    NEUROLOGIC EXAM:    Mental Status: Alert and oriented to person, place, and month, Speech is clear. Language fluent. Follows all commands.    Cranial Nerve Exam:   CN II-XII:intact,   Motor Exam:       Strength: 5/5 UE/LE's  Tone and bulk normal   LUIS ANTONIO pronator drift    Deep Tendon Reflexes: 1/4 biceps, triceps, brachioradialis, 1/4 patellar, and 0/4 achilles b/l; flexor plantar responses b/l    Sensation: intact to LT    Coordination/Cerebellum:       Tremors--none        Gait and stance:      Gait: deferred      LABS:        CBC:   Recent Labs     11/18/21  0520   WBC 9.9   RBC 3.29*   HGB 10.9*   HCT 33.2*      .9*     BMP:    Recent Labs     11/18/21  0520      K 3.4*      CO2 23   BUN 11   CREATININE 0.4*   GLUCOSE 89   CALCIUM 7.3*       IMAGING:    CT of head  Impression   No acute intracranial abnormality. CTA of head and neck  Impression   No large vessel occlusion visualized within the head or neck. Above imaging reviewed in detail. ASSESSMENT/PLAN:   80 y.o. -female with PMH COPD, asthma, and thyroid disease presenting with respiratory distress and unresponsiveness. Found in car by EMS. Neurology being consulted for possible seizure. Patient is extubated and alert and oriented. No focal findings on exam.      1. Unresponsiveness possibly due to Takotsubo syndrome vs less likely seizure. Currently values are as follows, LA 13.1, ALT 86, AST 87, WBC 12.2, , troponin T 0.573. On presentation patient's UDS pos for cannabis, EEG unremarkable    Neurodiagnostics  -CT HEAD- non acute  -CTA of head and neck- no LVO  -MRI ordered. Do not feel comfortable patient having this completed at this time due to SOB. Will re evaluate tomorrow. -EEG ordered to assess for any evidence of seizure nidus/cortical irritability. EEG interpretation as follows: no electrographic seizures or non - convulsive status epilepticus was seen over the entire monitoring period. We will not start on AED at this time. We will continue to follow    Patient discussed with attending physician Dr. Amy Bridges    Thank you for allowing us to participate in the care of your patient. If there are any questions regarding evaluation please feel free to contact us.      DERIK Archer - CNS, 11/18/2021

## 2021-11-18 NOTE — PROGRESS NOTES
Today's plan:  Extubated, no complaints    Admit Date:  11/14/2021    Subjective:ok      Chief complaints on admission  Chief Complaint   Patient presents with    Other     unresponsive    Irregular Heart Beat     abnormal EKG         History of present illness:Opal is a 80 y. o.year old who  presents with had concerns including Other (unresponsive) and Irregular Heart Beat (abnormal EKG). Past medical history:    has a past medical history of Arthritis, Asthma, Blood transfusion, Chronic bronchitis ([facility] [address] COPD (chronic obstructive pulmonary disease) ([facility] [address] Echocardiogram, Fatigue, H/O cardiac catheterization, H/O cardiovascular stress test, H/O Doppler ultrasound, H/O Doppler ultrasound, H/O echocardiogram, H/O echocardiogram, H/O echocardiogram, H/O echocardiogram, H/O exercise stress test, History of cardiac cath, History of nuclear stress test, Holter monitor, abnormal, Normal cardiac stress test, On home O2, Syncope and collapse, Tachycardia, and Thyroid disease. Past surgical history:   has a past surgical history that includes joint replacement (2004); Hysterectomy (1966); Cholecystectomy (1961); Breast surgery (2009); Toe Surgery (Early 2000's); Carpal tunnel release (1990's); Finger surgery; Gastric restriction surgery (1984); Cardiac surgery (1989); hernia repair (unknown); lipectomy (1990's); Dilatation, esophagus; other surgical history (05/01/2012); Cardiac catheterization (03/02/2011); Hip fracture surgery (Left, 11/04/2015); Colonoscopy; Endoscopy, colon, diagnostic (07/03/2017); Total shoulder arthroplasty (Left, 10/2017); Aortic valve replacement (N/A, 09/11/2019); and Aortic valve repair (N/A, 09/09/2019). Social History:   reports that she quit smoking about 59 years ago. She has a 15.00 pack-year smoking history. She has never used smokeless tobacco. She reports that she does not drink alcohol and does not use drugs.   Family history:  family history includes Arthritis in her father; Cancer in her sister; Diabetes in her brother and mother; Early Death in her son; Heart Disease in her father; High Blood Pressure in her daughter and father; High Cholesterol in her father; Other in her father, mother, sister, sister, son, and son; Stroke in her son; Vision Loss in her sister. Allergies   Allergen Reactions    Morphine Anaphylaxis     Dilaudid OK    Bactrim [Sulfamethoxazole-Trimethoprim]     Butorphanol      Other reaction(s): Other - comment required  \"out of body experience\"    Influenza Vaccines      Ended in hospital stay for 3 days told by md not to get it anymore     Lactose Intolerance (Gi) Nausea Only    Phenergan [Promethazine Hcl] Other (See Comments)     Makes me jerk all over. Zofran OK    Promethazine     Stadol [Butorphanol Tartrate] Other (See Comments)     Out of body experience. Was told it was a near death experience and was placed in ICU. Dilaudid OK    Tape Bernice Fidelia Tape] Other (See Comments)     Plastic cause itching and rash. Paper tape causes my skin to blister. (Tega-derm and Coban OK to use.)         Objective:   BP (!) 156/82   Pulse 71   Temp 99.3 °F (37.4 °C) (Rectal)   Resp 18   Ht 5' 0.98\" (1.549 m)   Wt 135 lb 2.3 oz (61.3 kg)   SpO2 97%   BMI 25.55 kg/m²       Intake/Output Summary (Last 24 hours) at 11/18/2021 1351  Last data filed at 11/18/2021 1200  Gross per 24 hour   Intake 1564.33 ml   Output 1915 ml   Net -350.67 ml       TELEMETRY: Sinus     Physical Exam:  Constitutional:  Well developed, Well nourished, No acute distress, Non-toxic appearance. HENT:  Normocephalic, Atraumatic, Bilateral external ears normal, Oropharynx moist, No oral exudates, Nose normal. Neck- Normal range of motion, No tenderness, Supple, No stridor. Eyes:  PERRL, EOMI, Conjunctiva normal, No discharge. Respiratory:  Normal breath sounds, No respiratory distress, No wheezing, No chest tenderness. ,no use of accessory muscles, diaphragm movement is normal  Cardiovascular: (PMI) apex non displaced,no lifts no thrills, no s3,no s4, Normal heart rate, Normal rhythm, No murmurs, No rubs, No gallops. Carotid arteries pulse and amplitude are normal no bruit, no abdominal bruit noted ( normal abdominal aorta ausculation), femoral arteries pulse and amplitude are normal no bruit, pedal pulses are normal  GI:  Bowel sounds normal, Soft, No tenderness, No masses, No pulsatile masses. : External genitalia appear normal, No masses or lesions. No discharge. No CVA tenderness. Musculoskeletal:  Intact distal pulses, No edema, No tenderness, No cyanosis, No clubbing. Good range of motion in all major joints. No tenderness to palpation or major deformities noted. Back- No tenderness. Integument:  Warm, Dry, No erythema, No rash. Lymphatic:  No lymphadenopathy noted. Neurologic:  Alert & oriented x 3, Normal motor function, Normal sensory function, No focal deficits noted.    Psychiatric:  Affect normal, Judgment normal, Mood normal.     Medications:    albuterol sulfate HFA  4 puff Inhalation 4x daily    potassium bicarb-citric acid  20 mEq Oral Once    enoxaparin  1 mg/kg SubCUTAneous BID    sodium chloride flush  5-40 mL IntraVENous 2 times per day    budesonide-formoterol  2 puff Inhalation BID    montelukast  10 mg Per NG tube Nightly    cefepime  1,000 mg IntraVENous Q12H    aspirin  81 mg Oral Daily    escitalopram  20 mg Oral Daily    levothyroxine  50 mcg Oral Daily    sucralfate  1 g Oral 4x Daily    pantoprazole  40 mg Oral QAM AC    timolol  1 drop Both Eyes Daily    chlorhexidine  15 mL Mouth/Throat BID    sodium chloride flush  5-40 mL IntraVENous 2 times per day    methylPREDNISolone  40 mg IntraVENous Q12H    doxycycline (VIBRAMYCIN) IV  100 mg IntraVENous Q12H      sodium chloride      dextrose      sodium chloride      sodium chloride 75 mL (11/18/21 1158)    norepinephrine Stopped (11/17/21 1402)     potassium chloride, potassium chloride **OR** potassium alternative oral replacement **OR** potassium chloride, polyvinyl alcohol, sodium chloride flush, sodium chloride, acetaminophen, albuterol, glucose, dextrose, glucagon (rDNA), dextrose, sodium chloride flush, sodium chloride, ondansetron **OR** ondansetron, polyethylene glycol, acetaminophen **OR** acetaminophen, magnesium sulfate, fentanNYL    Lab Data:  CBC:   Recent Labs     11/16/21 0400 11/17/21 0515 11/18/21  0520   WBC 12.2* 9.9 9.9   HGB 12.5 12.0* 10.9*   HCT 37.2 35.6* 33.2*   MCV 98.4 99.4 100.9*    204 169     BMP:   Recent Labs     11/16/21 0400 11/17/21 0515 11/18/21  0520    140 140   K 3.5 3.6 3.4*    108 109   CO2 21 22 23   PHOS 2.9  --   --    BUN 13 11 11   CREATININE 0.5* 0.5* 0.4*     LIVER PROFILE:   Recent Labs     11/16/21 0400   AST 87*   ALT 86*   BILITOT 0.3   ALKPHOS 62     PT/INR: No results for input(s): PROTIME, INR in the last 72 hours. APTT: No results for input(s): APTT in the last 72 hours. BNP:  No results for input(s): BNP in the last 72 hours. TROPONIN: @TROPONINI:3@      Assessment:  80 y. o.year old who is admitted for          Plan:  1. Respiratory arrest: could be ashthma and COPD related, intubated and sedated, however, mental condition appears intact, will recommend to wean as per protocol. 2. Elevated trop:its trending up, extubated, had [facility]: no significant CAD noted  3. WIDE COMPLEX TACHYCARDIA: Could be vtach, its resolved, Mg is normal, will get echo and if she started PVCs or NSVT,  Off amiodarone drip  4. H/O TAVR: echo ordered    All labs, medications and tests reviewed, continue all other medications of all above medical condition listed as is.       Ashely Sagastume MD, MD 11/18/2021 1:51 PM

## 2021-11-18 NOTE — PROGRESS NOTES
decreased speed and dexterity in BL hands (provided foam build-up for feeding utensils)    Activities of Daily Living (ADLs):  · Feeding: Supervision/setup (for opening packages/containers; provided foam build-up due to decreased UE dexterity)  · Grooming: SBA (seated facial hygiene task; unable to complete in standing this date)  · UB bathing: SBA   · LB bathing: Mod A (reaching distal LEs and bottom)  · UB dressing: CGA (light dynamic sitting balance with donning michellee seated EOB)  · LB dressing: Max A (dependent with donning BL socks this date, able to cross legs into \"figure 4 position\" in attempt to complete task but lacking core stability; able to manage clothing to hips in standing with min A for dynamic standing balance)  · Toileting: Min A (dynamic standing balance with clothing mgmt both directions)    Cognitive and Psychosocial Functioning:  · Overall cognitive status: WNL  · Affect: Normal     Balance:   · Sitting: SBA static sitting, CGA with light dynamic sitting tasks  · Standing: CGA with RW    Functional Mobility:  · Bed Mobility: Min A supine to sitting EOB (HOB elevated to 40', increased time/effort, min cues for utilizing bed rail and min trunk boost for uprighting)  · Transfers: Min A sit to stand from bed, Mod A stand to sit to recliner for controlled descent (cues for safe hand placement each direction)  · Ambulation: Min A with RW 15 ft in room; intermittent unsteadiness and 2-3 episodes of LOB, one brief standing rest break required       AM-PAC 6 click short form for inpatient daily activity:   How much help from another person does the patient currently need. .. Unable  Dep A Lot  Max A A Lot   Mod A A Little  Min A A Little   CGA  SBA None   Mod I  Indep  Sup   1. Putting on and taking off regular lower body clothing? [] 1    [x] 2   [] 2   [] 3   [] 3   [] 4      2. Bathing (including washing, rinsing, drying)? [] 1   [] 2   [x] 2 [] 3 [] 3 [] 4   3.  Toileting, which includes using

## 2021-11-18 NOTE — PROGRESS NOTES
Physician Progress Note      [facility]  CSN #:                  [id number]  :                       1935  ADMIT DATE:       2021 2:06 PM  [address] [place] DATE:  RESPONDING  PROVIDER #:        Valerie Benito MD          QUERY TEXT:    Patient admitted with acute hypoxic respiratory failure. Documentation   reflects distributive shock in note dated 11/15/21 by Dr. Srinivasa Cui. If   possible, please document in the progress notes and discharge summary if   distributive shock was: The medical record reflects the following:  Risk Factors: MI, Respiratory failure  Clinical Indicators: Pt brought in via EMS unresponsive with agonal   respirations after being found in her car. Pt intubated and placed on vent in   the ED. VS initial 24 hrs: Tmax 102, P initially in the 60's then dropped to   50's, SBP on arrival was 129-151, then dropped in the 80's requiring IV   pressors to be initiated, SpO2 100% on vent. Initial labs: Lactate 13.1, 2.2,   2.7; WBC 13.4, 14.2, ECHO shows EF of 25-30%, [place] is akinetic with ballooning   suggesting possible Takotsubo, grade III diastolic dysfunction. Pt had   Aortography , diagnosis NSTEMI, ?  Takutsubo Syndrome   Treatment: Cefepime IV, Dexamethasone IV, Vibramycin IV, Solumedrol IV, Levo   IV, Vanco IV, Vent support, Pulm consult, Card Consult    Thank you,  Kristy Melara, RN  [phone]  Options provided:  -- Distributive shock confirmed after study  -- Distributive shock ruled out after study  -- Septic shock confirmed after study  -- Septic shock ruled out after study  -- Other - I will add my own diagnosis  -- Disagree - Not applicable / Not valid  -- Disagree - Clinically unable to determine / Unknown  -- Refer to Clinical Documentation Reviewer    PROVIDER RESPONSE TEXT:    Based on the high fever and the need for pressor she may have had septic shock    Query created by: Randee Duverney on 2021 1:36 PM      Electronically signed by:  Deangelo Mak Cathi Long MD 11/17/2021 10:51 PM

## 2021-11-18 NOTE — PROGRESS NOTES
Mono Torres BSN RN clinical  for [facility] SURGICAL [facility] RN students 07-19:00 this date.

## 2021-11-18 NOTE — PROGRESS NOTES
Pt not suitable to travel to MRI. MRI still on hold until pt can travel safely to MRI dept.   Rn is aware

## 2021-11-18 NOTE — PROGRESS NOTES
Hospitalist Progress Note      Name:  Lyssa Chambers /Age/Sex: 1935  (80 y.o. female)   MRN & CSN:  [id number] & [id number] Admission Date/Time: 2021  2:06 PM   Location:  -A PCP: Jihan Velasquez MD       Hospital Day: 5    Assessment and Plan:     Acute respiratory failure/near respiratory arrest  Extubated continue oxygen via nasal cannula  -continue oxygen via nasal cannula    COPD Exac  Continue IV methylprednisolone 40 mg every 12 hours    Sepsis  -102.2 fever noted at noon 11/15  -Continue cefepime  -Continue doxycycline  Follow-up blood culture results  CRP is 6 and procalcitonin is 0.09 today    Shock  -She may have had septic shock, now off pressors    Elevated troponin  -concern for [place] CMP  -Echo with ejection fraction 25 to 30%, akinetic apex suggesting possible Takotsubo, and grade 3 diastolic dysfunction    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    Wide complex tachycardia  -appreciate cardiology consult  -Amiodarone has been ordered, now off    Unresponsive upon admission  -EEG done, neurology will not start AED at this time  -MRI ordered    Unintentional weight loss: Weight is down to 121 lbs from 132 lbs earlier in the year  -Needs outpatient follow-up      History of nonobstructive COPD per last catheterization  History of gastric bypass  Hypertension    Hypothyroidism   -Synthroid - TSH is normal range    Mood disorder - Lexapro    Anticoagulation with Lovenox    Please see hospitalist noted from 11/15 for more details    Disposition  -PT/OT ordered    Subjective:       -She passed a swallow screen, will order her a diet  -Potassium is 3.4 today, will place a protocol    When I walked in her daughter was feeding her. Her daughter stated that the patient cannot finish a sentence without getting short of breath. I ordered a chest x-ray, stopped her IV fluids which has been running for 4 days, and ordered Lasix 20 mg IV x1.   Will order some potassium to go with the Lasix. November 17: She was extubated today. She was talking with others using a mobile phone when I walked in. She interacted with me appropriately. November 16: She was sedated and intubated when I saw her    Had bradycardia last night, and propofol and Precedex were stopped    Patient presented to the ED on 11/14 with unresponsiveness and an abnormal EKG. -She was found to be in respiratory distress found unresponsive at [employer] parking lot. She had apparently been okay earlier that morning per history from the ED. EMS noted agonal breathing. Intubation by EMS in the field was unsuccessful. LMA was placed. She was transported to ED and continued to have agonal breathing. She was intubated in the ED    Per admission history and physical she has a COPD exacerbation    Cardiology saw her on 11/15. She had ventricular tachycardia and her blood pressure was labile. She was on pressors and on sedation. She had a TAVR 2 years ago. At that time cath showed mild CAD per cardiology. Troponin was elevated. On admission on 11/14 troponin was less than 1.01. Later that day it went up to 0.39 and the following day it was 0.44 and then today, on 1116, it was 0.57    Left heart cath ordered 11/16 because of abnormal echo and apical ballooning    History  -per bedside nurse 11/16 pt mows her won lawn at home        Objective:        Intake/Output Summary (Last 24 hours) at 11/18/2021 0807  Last data filed at 11/18/2021 0500  Gross per 24 hour   Intake 1564.33 ml   Output 1415 ml   Net 149.33 ml      Vitals:   Vitals:    11/18/21 0753   BP:    Pulse:    Resp: 14   Temp:    SpO2: 99%         Labs:   CBC:   Lab Results   Component Value Date    WBC 9.9 11/18/2021    HGB 10.9 11/18/2021    HCT 33.2 11/18/2021    .9 11/18/2021     11/18/2021     BMP:   Lab Results   Component Value Date     11/18/2021    K 3.4 11/18/2021     11/18/2021 CO2 23 11/18/2021    PHOS 2.9 11/16/2021    BUN 11 11/18/2021    CREATININE 0.4 11/18/2021    CALCIUM 7.3 11/18/2021       Physical Exam:      Physical Exam  Constitutional:       Appearance: She is not diaphoretic. HENT:      Nose: No rhinorrhea. Eyes:      General:         Right eye: No discharge. Left eye: No discharge. Pulmonary:      Comments: Respirations were somewhat labored at rest while she was in bed  Skin:     Coloration: Skin is not jaundiced. Neurological:      Cranial Nerves: No cranial nerve deficit.          Medications:   Medications:    albuterol sulfate HFA  4 puff Inhalation 4x daily    enoxaparin  1 mg/kg SubCUTAneous BID    sodium chloride flush  5-40 mL IntraVENous 2 times per day    budesonide-formoterol  2 puff Inhalation BID    montelukast  10 mg Per NG tube Nightly    cefepime  1,000 mg IntraVENous Q12H    aspirin  81 mg Oral Daily    escitalopram  20 mg Oral Daily    levothyroxine  50 mcg Oral Daily    sucralfate  1 g Oral 4x Daily    pantoprazole  40 mg Oral QAM AC    timolol  1 drop Both Eyes Daily    chlorhexidine  15 mL Mouth/Throat BID    sodium chloride flush  5-40 mL IntraVENous 2 times per day    methylPREDNISolone  40 mg IntraVENous Q12H    doxycycline (VIBRAMYCIN) IV  100 mg IntraVENous Q12H      Infusions:    sodium chloride      dextrose      sodium chloride      sodium chloride 75 mL/hr at 11/17/21 1819    norepinephrine Stopped (11/17/21 1402)     PRN Meds: polyvinyl alcohol, , Q2H PRN  sodium chloride flush, 5-40 mL, PRN  sodium chloride, 25 mL, PRN  acetaminophen, 650 mg, Q4H PRN  albuterol, 2.5 mg, Q4H PRN  glucose, 15 g, PRN  dextrose, 12.5 g, PRN  glucagon (rDNA), 1 mg, PRN  dextrose, 100 mL/hr, PRN  sodium chloride flush, 5-40 mL, PRN  sodium chloride, 25 mL, PRN  ondansetron, 4 mg, Q8H PRN   Or  ondansetron, 4 mg, Q6H PRN  polyethylene glycol, 17 g, Daily PRN  acetaminophen, 650 mg, Q6H PRN   Or  acetaminophen, 650 mg, Q6H PRN  magnesium sulfate, 2,000 mg, PRN  fentanNYL, 25 mcg, Q1H PRN          Electronically signed by Carlitos Kelly MD on 11/18/2021 at 8:07 AM

## 2021-11-18 NOTE — FLOWSHEET NOTE
Pt called stating that she was dizzy, SOB, chest achiness and tingling in fingers. Had started Potassium cloride iv about 20 prior. Stopped Potassium, notified Dr Jocelin Lees and also checked BG  Which was 104.    Pt is now also complaining of nausea   Gave zofran   Continuing to monitor

## 2021-11-19 ENCOUNTER — APPOINTMENT (OUTPATIENT)
Dept: MRI IMAGING | Age: 86
DRG: 871 | End: 2021-11-19
Payer: MEDICARE

## 2021-11-19 LAB
ANION GAP SERPL CALCULATED.3IONS-SCNC: 9 MMOL/L (ref 4–16)
BASOPHILS ABSOLUTE: 0 K/CU MM
BASOPHILS RELATIVE PERCENT: 0 % (ref 0–1)
BUN BLDV-MCNC: 8 MG/DL (ref 6–23)
CALCIUM SERPL-MCNC: 7.7 MG/DL (ref 8.3–10.6)
CHLORIDE BLD-SCNC: 101 MMOL/L (ref 99–110)
CO2: 26 MMOL/L (ref 21–32)
CREAT SERPL-MCNC: 0.4 MG/DL (ref 0.6–1.1)
DIFFERENTIAL TYPE: ABNORMAL
EKG ATRIAL RATE: 61 BPM
EKG ATRIAL RATE: 75 BPM
EKG DIAGNOSIS: NORMAL
EKG DIAGNOSIS: NORMAL
EKG P AXIS: 76 DEGREES
EKG P AXIS: 88 DEGREES
EKG P-R INTERVAL: 144 MS
EKG P-R INTERVAL: 166 MS
EKG Q-T INTERVAL: 472 MS
EKG Q-T INTERVAL: 520 MS
EKG QRS DURATION: 76 MS
EKG QRS DURATION: 82 MS
EKG QTC CALCULATION (BAZETT): 523 MS
EKG QTC CALCULATION (BAZETT): 527 MS
EKG R AXIS: 61 DEGREES
EKG R AXIS: 65 DEGREES
EKG T AXIS: -68 DEGREES
EKG T AXIS: -82 DEGREES
EKG VENTRICULAR RATE: 61 BPM
EKG VENTRICULAR RATE: 75 BPM
EOSINOPHILS ABSOLUTE: 0 K/CU MM
EOSINOPHILS RELATIVE PERCENT: 0 % (ref 0–3)
GFR AFRICAN AMERICAN: >60 ML/MIN/1.73M2
GFR NON-AFRICAN AMERICAN: >60 ML/MIN/1.73M2
GLUCOSE BLD-MCNC: 128 MG/DL (ref 70–99)
HCT VFR BLD CALC: 32.9 % (ref 37–47)
HEMOGLOBIN: 11.2 GM/DL (ref 12.5–16)
IMMATURE NEUTROPHIL %: 0.6 % (ref 0–0.43)
LYMPHOCYTES ABSOLUTE: 1.8 K/CU MM
LYMPHOCYTES RELATIVE PERCENT: 19.8 % (ref 24–44)
MAGNESIUM: 1.8 MG/DL (ref 1.8–2.4)
MCH RBC QN AUTO: 33 PG (ref 27–31)
MCHC RBC AUTO-ENTMCNC: 34 % (ref 32–36)
MCV RBC AUTO: 97.1 FL (ref 78–100)
MONOCYTES ABSOLUTE: 0.5 K/CU MM
MONOCYTES RELATIVE PERCENT: 5.2 % (ref 0–4)
NUCLEATED RBC %: 0 %
PDW BLD-RTO: 13 % (ref 11.7–14.9)
PLATELET # BLD: 175 K/CU MM (ref 140–440)
PMV BLD AUTO: 11.1 FL (ref 7.5–11.1)
POTASSIUM SERPL-SCNC: 4 MMOL/L (ref 3.5–5.1)
RBC # BLD: 3.39 M/CU MM (ref 4.2–5.4)
SEGMENTED NEUTROPHILS ABSOLUTE COUNT: 6.7 K/CU MM
SEGMENTED NEUTROPHILS RELATIVE PERCENT: 74.4 % (ref 36–66)
SODIUM BLD-SCNC: 136 MMOL/L (ref 135–145)
TOTAL IMMATURE NEUTOROPHIL: 0.05 K/CU MM
TOTAL NUCLEATED RBC: 0 K/CU MM
TROPONIN T: 0.27 NG/ML
WBC # BLD: 8.9 K/CU MM (ref 4–10.5)

## 2021-11-19 PROCEDURE — 2700000000 HC OXYGEN THERAPY PER DAY

## 2021-11-19 PROCEDURE — 94761 N-INVAS EAR/PLS OXIMETRY MLT: CPT

## 2021-11-19 PROCEDURE — 6360000002 HC RX W HCPCS: Performed by: INTERNAL MEDICINE

## 2021-11-19 PROCEDURE — 6370000000 HC RX 637 (ALT 250 FOR IP): Performed by: INTERNAL MEDICINE

## 2021-11-19 PROCEDURE — 80048 BASIC METABOLIC PNL TOTAL CA: CPT

## 2021-11-19 PROCEDURE — 93010 ELECTROCARDIOGRAM REPORT: CPT | Performed by: INTERNAL MEDICINE

## 2021-11-19 PROCEDURE — 2580000003 HC RX 258: Performed by: INTERNAL MEDICINE

## 2021-11-19 PROCEDURE — 97530 THERAPEUTIC ACTIVITIES: CPT

## 2021-11-19 PROCEDURE — 70553 MRI BRAIN STEM W/O & W/DYE: CPT

## 2021-11-19 PROCEDURE — 2500000003 HC RX 250 WO HCPCS: Performed by: INTERNAL MEDICINE

## 2021-11-19 PROCEDURE — 6360000004 HC RX CONTRAST MEDICATION: Performed by: INTERNAL MEDICINE

## 2021-11-19 PROCEDURE — 94640 AIRWAY INHALATION TREATMENT: CPT

## 2021-11-19 PROCEDURE — 2000000000 HC ICU R&B

## 2021-11-19 PROCEDURE — 83735 ASSAY OF MAGNESIUM: CPT

## 2021-11-19 PROCEDURE — 97162 PT EVAL MOD COMPLEX 30 MIN: CPT

## 2021-11-19 PROCEDURE — 85025 COMPLETE CBC W/AUTO DIFF WBC: CPT

## 2021-11-19 PROCEDURE — A9579 GAD-BASE MR CONTRAST NOS,1ML: HCPCS | Performed by: INTERNAL MEDICINE

## 2021-11-19 PROCEDURE — 99233 SBSQ HOSP IP/OBS HIGH 50: CPT | Performed by: CLINICAL NURSE SPECIALIST

## 2021-11-19 PROCEDURE — 93005 ELECTROCARDIOGRAM TRACING: CPT | Performed by: NURSE PRACTITIONER

## 2021-11-19 PROCEDURE — 6370000000 HC RX 637 (ALT 250 FOR IP): Performed by: NURSE PRACTITIONER

## 2021-11-19 PROCEDURE — 84484 ASSAY OF TROPONIN QUANT: CPT

## 2021-11-19 PROCEDURE — 99232 SBSQ HOSP IP/OBS MODERATE 35: CPT | Performed by: INTERNAL MEDICINE

## 2021-11-19 PROCEDURE — 97116 GAIT TRAINING THERAPY: CPT

## 2021-11-19 RX ORDER — HYDROXYZINE PAMOATE 25 MG/1
50 CAPSULE ORAL ONCE
Status: COMPLETED | OUTPATIENT
Start: 2021-11-19 | End: 2021-11-19

## 2021-11-19 RX ORDER — METOPROLOL SUCCINATE 25 MG/1
12.5 TABLET, EXTENDED RELEASE ORAL DAILY
Status: DISCONTINUED | OUTPATIENT
Start: 2021-11-19 | End: 2021-11-26 | Stop reason: HOSPADM

## 2021-11-19 RX ORDER — LISINOPRIL 2.5 MG/1
1.25 TABLET ORAL DAILY
Status: DISCONTINUED | OUTPATIENT
Start: 2021-11-19 | End: 2021-11-24

## 2021-11-19 RX ADMIN — BUDESONIDE AND FORMOTEROL FUMARATE DIHYDRATE 2 PUFF: 160; 4.5 AEROSOL RESPIRATORY (INHALATION) at 07:50

## 2021-11-19 RX ADMIN — METHYLPREDNISOLONE SODIUM SUCCINATE 40 MG: 40 INJECTION, POWDER, FOR SOLUTION INTRAMUSCULAR; INTRAVENOUS at 08:20

## 2021-11-19 RX ADMIN — LISINOPRIL 1.25 MG: 2.5 TABLET ORAL at 16:02

## 2021-11-19 RX ADMIN — ASPIRIN 81 MG: 81 TABLET, COATED ORAL at 08:19

## 2021-11-19 RX ADMIN — ENOXAPARIN SODIUM 60 MG: 100 INJECTION SUBCUTANEOUS at 08:19

## 2021-11-19 RX ADMIN — SODIUM CHLORIDE, PRESERVATIVE FREE 10 ML: 5 INJECTION INTRAVENOUS at 08:20

## 2021-11-19 RX ADMIN — MONTELUKAST 10 MG: 10 TABLET, FILM COATED ORAL at 21:33

## 2021-11-19 RX ADMIN — DOXYCYCLINE 100 MG: 100 INJECTION, POWDER, LYOPHILIZED, FOR SOLUTION INTRAVENOUS at 21:42

## 2021-11-19 RX ADMIN — ALBUTEROL SULFATE 4 PUFF: 90 AEROSOL, METERED RESPIRATORY (INHALATION) at 12:06

## 2021-11-19 RX ADMIN — SODIUM CHLORIDE, PRESERVATIVE FREE 10 ML: 5 INJECTION INTRAVENOUS at 21:49

## 2021-11-19 RX ADMIN — DOXYCYCLINE 100 MG: 100 INJECTION, POWDER, LYOPHILIZED, FOR SOLUTION INTRAVENOUS at 08:32

## 2021-11-19 RX ADMIN — CEFEPIME HYDROCHLORIDE 1000 MG: 1 INJECTION, POWDER, FOR SOLUTION INTRAMUSCULAR; INTRAVENOUS at 23:09

## 2021-11-19 RX ADMIN — POLYETHYLENE GLYCOL (3350) 17 G: 17 POWDER, FOR SOLUTION ORAL at 23:33

## 2021-11-19 RX ADMIN — ALBUTEROL SULFATE 4 PUFF: 90 AEROSOL, METERED RESPIRATORY (INHALATION) at 20:18

## 2021-11-19 RX ADMIN — CEFEPIME HYDROCHLORIDE 1000 MG: 1 INJECTION, POWDER, FOR SOLUTION INTRAMUSCULAR; INTRAVENOUS at 10:51

## 2021-11-19 RX ADMIN — BUDESONIDE AND FORMOTEROL FUMARATE DIHYDRATE 2 PUFF: 160; 4.5 AEROSOL RESPIRATORY (INHALATION) at 20:18

## 2021-11-19 RX ADMIN — SODIUM CHLORIDE, PRESERVATIVE FREE 10 ML: 5 INJECTION INTRAVENOUS at 08:33

## 2021-11-19 RX ADMIN — METOPROLOL SUCCINATE 12.5 MG: 25 TABLET, EXTENDED RELEASE ORAL at 16:02

## 2021-11-19 RX ADMIN — LEVOTHYROXINE SODIUM 50 MCG: 25 TABLET ORAL at 06:10

## 2021-11-19 RX ADMIN — PANTOPRAZOLE SODIUM 40 MG: 40 TABLET, DELAYED RELEASE ORAL at 06:10

## 2021-11-19 RX ADMIN — TIMOLOL MALEATE 1 DROP: 5 SOLUTION OPHTHALMIC at 08:38

## 2021-11-19 RX ADMIN — SUCRALFATE 1 G: 1 TABLET ORAL at 08:19

## 2021-11-19 RX ADMIN — ESCITALOPRAM OXALATE 20 MG: 10 TABLET ORAL at 08:19

## 2021-11-19 RX ADMIN — ALBUTEROL SULFATE 4 PUFF: 90 AEROSOL, METERED RESPIRATORY (INHALATION) at 15:54

## 2021-11-19 RX ADMIN — SODIUM CHLORIDE, PRESERVATIVE FREE 10 ML: 5 INJECTION INTRAVENOUS at 21:08

## 2021-11-19 RX ADMIN — HYDROXYZINE PAMOATE 50 MG: 25 CAPSULE ORAL at 03:57

## 2021-11-19 RX ADMIN — ALBUTEROL SULFATE 4 PUFF: 90 AEROSOL, METERED RESPIRATORY (INHALATION) at 07:48

## 2021-11-19 RX ADMIN — METHYLPREDNISOLONE SODIUM SUCCINATE 40 MG: 40 INJECTION, POWDER, FOR SOLUTION INTRAMUSCULAR; INTRAVENOUS at 21:41

## 2021-11-19 RX ADMIN — GADOTERIDOL 15 ML: 279.3 INJECTION, SOLUTION INTRAVENOUS at 15:12

## 2021-11-19 RX ADMIN — SUCRALFATE 1 G: 1 TABLET ORAL at 16:02

## 2021-11-19 RX ADMIN — SUCRALFATE 1 G: 1 TABLET ORAL at 21:38

## 2021-11-19 ASSESSMENT — PAIN SCALES - GENERAL
PAINLEVEL_OUTOF10: 0
PAINLEVEL_OUTOF10: 0

## 2021-11-19 NOTE — PROGRESS NOTES
Neurology Service Consult Note  [facility]  Patient Name: Keaton Clemons  : 1935        Subjective:   Reason for consult: Possible seizure  Patient seen and examined. Chart reviewed in detail. Patient states she is feeling better. She is more calm this morning. Still awaiting for MRI to be completed. She denies needs. Past Medical History:   Diagnosis Date    Arthritis     generalized    Asthma     Blood transfusion     No reaction    Chronic bronchitis (HCC)     COPD (chronic obstructive pulmonary disease) ([address]     summer 2014    Echocardiogram 2021    EF 55-60%, Mild dilated LA, Mild annular calcification present, Mild mitral stenosis, Mild MR & TR.    Fatigue     H/O cardiac catheterization 06/15/2017    mild lad and cx disease    H/O cardiovascular stress test 2019    H/O Doppler ultrasound 2016 3/19/12    carotid- WNL 3/12right mild less than 50%and left wnl    H/O Doppler ultrasound     carotid - mod disease EILEEN, mild disease LICA    H/O echocardiogram 2010    H/O echocardiogram 04/15/2016    EF 60% sclerotic AV mildly stenosed-recommend yearly echo    H/O echocardiogram 2019    EF 55-60%, Minimal concentric left ventricular hypertrophy, left atrium is mildly dilated, severe aortic stenosis, Mitral annular calcification is present, Mild AR, Mild-Mod MR, Mild TR, No pericardial effusion     H/O echocardiogram 2019    H/O exercise stress test 2017    abnormal    History of cardiac cath 2019    Severe AS,   TAVR??  History of nuclear stress test 2017    EF 75%. Normal study.     Holter monitor, abnormal 2011    infrequent APC are seen    Normal cardiac stress test 2010    EF 70%, no ischemia    On home O2     only uses as needed, nightly prn    Syncope and collapse     Tachycardia     HX of tachycardia - had a cardioablation    Thyroid disease     :   Past Surgical History:   Procedure Laterality Date    AORTIC VALVE REPAIR N/A 09/09/2019    Core Valve EVOLUT 26mm [id number]    AORTIC VALVE REPLACEMENT N/A 09/11/2019    TAVR; [employer]    BREAST SURGERY  2009    bilat    CARDIAC CATHETERIZATION  03/02/2011    mild to moderate disease of diagonal and proximal RCA   [address]    ablation    CARPAL TUNNEL RELEASE  1990's    bilat    CHOLECYSTECTOMY  1961    open choley    COLONOSCOPY      DILATATION, ESOPHAGUS      ENDOSCOPY, COLON, DIAGNOSTIC  07/03/2017    s/p gastric bypass otherwise normal    FINGER SURGERY      right middle, thumb and index finger (screw and pin in right index finger)    GASTRIC RESTRICTION SURGERY  1984    stapled    HERNIA REPAIR  unknown    Inc hernia hernia repair    HIP FRACTURE SURGERY Left 11/04/2015    Left hip nail    HYSTERECTOMY  1966    Luke w/ BSO    JOINT REPLACEMENT  2004    right knee    LIPECTOMY  1990's    OTHER SURGICAL HISTORY  05/01/2012    Gastrojejunostomy/partial gastrectomy    SHOULDER ARTHROPLASTY Left 10/2017    TOE SURGERY  Early 2000's    hammer toes and bunions     Medications:  Scheduled Meds:   albuterol sulfate HFA  4 puff Inhalation 4x daily    potassium bicarb-citric acid  20 mEq Oral Once    potassium bicarb-citric acid  20 mEq Oral Once    enoxaparin  1 mg/kg SubCUTAneous BID    sodium chloride flush  5-40 mL IntraVENous 2 times per day    budesonide-formoterol  2 puff Inhalation BID    montelukast  10 mg Per NG tube Nightly    cefepime  1,000 mg IntraVENous Q12H    aspirin  81 mg Oral Daily    escitalopram  20 mg Oral Daily    levothyroxine  50 mcg Oral Daily    sucralfate  1 g Oral 4x Daily    pantoprazole  40 mg Oral QAM AC    timolol  1 drop Both Eyes Daily    sodium chloride flush  5-40 mL IntraVENous 2 times per day    methylPREDNISolone  40 mg IntraVENous Q12H    doxycycline (VIBRAMYCIN) IV  100 mg IntraVENous Q12H     Continuous Infusions:   sodium chloride  dextrose      sodium chloride       PRN Meds:.potassium chloride, potassium chloride **OR** potassium alternative oral replacement **OR** potassium chloride, polyvinyl alcohol, sodium chloride flush, sodium chloride, acetaminophen, albuterol, glucose, dextrose, glucagon (rDNA), dextrose, sodium chloride flush, sodium chloride, ondansetron **OR** ondansetron, polyethylene glycol, acetaminophen **OR** acetaminophen, magnesium sulfate, fentanNYL    Allergies   Allergen Reactions    Morphine Anaphylaxis     Dilaudid OK    Bactrim [Sulfamethoxazole-Trimethoprim]     Butorphanol      Other reaction(s): Other - comment required  \"out of body experience\"    Influenza Vaccines      Ended in hospital stay for 3 days told by md not to get it anymore     Lactose Intolerance (Gi) Nausea Only    Phenergan [Promethazine Hcl] Other (See Comments)     Makes me jerk all over. Zofran OK    Promethazine     Stadol [Butorphanol Tartrate] Other (See Comments)     Out of body experience. Was told it was a near death experience and was placed in ICU. Dilaudid OK    Tape Karene Vijay Tape] Other (See Comments)     Plastic cause itching and rash. Paper tape causes my skin to blister.  (Tega-derm and Coban OK to use.)     Social History     Socioeconomic History    Marital status:      Spouse name: Not on file    Number of children: Not on file    Years of education: Not on file    Highest education level: Not on file   Occupational History    Not on file   Tobacco Use    Smoking status: Former Smoker     Packs/day: 3.00     Years: 5.00     Pack years: 15.00     Quit date: 1962     Years since quittin.9    Smokeless tobacco: Never Used   Substance and Sexual Activity    Alcohol use: No    Drug use: No    Sexual activity: Not on file   Other Topics Concern    Not on file   Social History Narrative    Not on file     Social Determinants of Health     Financial Resource Strain:     Difficulty of Paying Living Expenses: Not on file   Food Insecurity:     Worried About [address] [employer] in the Last Year: Not on file    Vicki of Food in the Last Year: Not on file   Transportation Needs:     Lack of Transportation (Medical): Not on file    Lack of Transportation (Non-Medical): Not on file   Physical Activity:     Days of Exercise per Week: Not on file    Minutes of Exercise per Session: Not on file   Stress:     Feeling of Stress : Not on file   Social Connections:     Frequency of Communication with Friends and Family: Not on file    Frequency of Social Gatherings with Friends and Family: Not on file    Attends Caodaism Services: Not on file    Active Member of Clubs or Organizations: Not on file    Attends Club or Organization Meetings: Not on file    Marital Status: Not on file   Intimate Partner Violence:     Fear of Current or Ex-Partner: Not on file    Emotionally Abused: Not on file    Physically Abused: Not on file    Sexually Abused: Not on file   Housing Stability:     Unable to Pay for Housing in the Last Year: Not on file    Number of [place] in the Last Year: Not on file    Unstable Housing in the Last Year: Not on file      Family History   Problem Relation Age of Onset    Diabetes Mother     Other Mother         thyroid    Heart Disease Father     Arthritis Father     High Blood Pressure Father     High Cholesterol Father     Other Father         glaucoma    Other Sister         thyroid, glaucoma    Diabetes Brother     Other Sister     Vision Loss Sister         lung problems, smoker    Cancer Sister         throat cancer    Other Son         HX of clots    Early Death Son         Hit by car and killed.     High Blood Pressure Daughter     Stroke Son         No residual    Other Son         HX myocarditis         Physical Exam:       Vitals:    11/19/21 0748 11/19/21 0750 11/19/21 0800 11/19/21 0900   BP:   129/67 136/71   Pulse:   61 59   Resp: 14 20 14 17 Temp:   99 °F (37.2 °C)    TempSrc:   Rectal    SpO2: 100% 100% 100% 100%   Weight:       Height:           Wt Readings from Last 3 Encounters:   11/19/21 139 lb 1.8 oz (63.1 kg)   11/11/21 121 lb (54.9 kg)   11/01/21 121 lb 12.8 oz (55.2 kg)     Temp Readings from Last 3 Encounters:   11/19/21 99 °F (37.2 °C) (Rectal)   11/11/21 98 °F (36.7 °C)   11/01/21 97.5 °F (36.4 °C) (Infrared)     BP Readings from Last 3 Encounters:   11/19/21 136/71   11/11/21 (!) 129/57   11/01/21 116/60     Pulse Readings from Last 3 Encounters:   11/19/21 59   11/11/21 60   11/01/21 (!) 46        Gen: Alert and oriented X 4  HEENT: NC/AT, EOMI, PERRL, mmm, neck supple, no meningeal signs; Heart: SB on monitor  Lungs: appears to be SOB  Ext: no edema,   Psych: appears anxious  Skin: no rashes or lesions    NEUROLOGIC EXAM:    Mental Status: Alert and oriented to person, place, and month, Speech is clear. Language fluent. Follows all commands. Cranial Nerve Exam:   CN II-XII:intact,   Motor Exam:       Strength: 5/5 UE/LE's  Tone and bulk normal   LUIS ANTONIO pronator drift    Deep Tendon Reflexes: 1/4 biceps, triceps, brachioradialis, 1/4 patellar, and 0/4 achilles b/l; flexor plantar responses b/l    Sensation: intact to LT    Coordination/Cerebellum:       Tremors--none        Gait and stance:      Gait: deferred      LABS:        CBC:   Recent Labs     11/19/21  0445   WBC 8.9   RBC 3.39*   HGB 11.2*   HCT 32.9*      MCV 97.1     BMP:    Recent Labs     11/19/21  0445      K 4.0      CO2 26   BUN 8   CREATININE 0.4*   GLUCOSE 128*   CALCIUM 7.7*       IMAGING:    CT of head  Impression   No acute intracranial abnormality. CTA of head and neck  Impression   No large vessel occlusion visualized within the head or neck. Above imaging reviewed in detail. ASSESSMENT/PLAN:   80 y.o. -female with PMH COPD, asthma, and thyroid disease presenting with respiratory distress and unresponsiveness.  Found in car by EMS. Neurology being consulted for possible seizure. Patient is extubated and alert and oriented. No focal findings on exam.      1. Unresponsiveness possibly due to Takotsubo syndrome vs less likely seizure. Currently values are as follows, LA 13.1, ALT 86, AST 87, WBC 12.2, , troponin T 0.573. On presentation patient's UDS pos for cannabis, EEG unremarkable    Neurodiagnostics  -CT HEAD- non acute  -CTA of head and neck- no LVO  -MRI ordered. Hopefully this can be completed today   -EEG ordered to assess for any evidence of seizure nidus/cortical irritability. EEG interpretation as follows: no electrographic seizures or non - convulsive status epilepticus was seen over the entire monitoring period. We will not start on AED at this time. Will see after MRI has been completed. Patient discussed with attending physician Dr. Samir Clifford    Thank you for allowing us to participate in the care of your patient. If there are any questions regarding evaluation please feel free to contact us.      DERIK Holloway - CNS, 11/19/2021

## 2021-11-19 NOTE — PROGRESS NOTES
Today's plan:  Extubated, no complaints, change full dose lovenox to DVT prophylaxis, will lisinopril 1.25mg daily and toprol xl 12.5mg daily for LV dysfunction and out patient follow up, will sign off    Admit Date:  11/14/2021    Subjective:ok      Chief complaints on admission  Chief Complaint   Patient presents with    Other     unresponsive    Irregular Heart Beat     abnormal EKG         History of present illness:Opal is a 80 y. o.year old who  presents with had concerns including Other (unresponsive) and Irregular Heart Beat (abnormal EKG). Past medical history:    has a past medical history of Arthritis, Asthma, Blood transfusion, Chronic bronchitis ([facility] [address] COPD (chronic obstructive pulmonary disease) ([facility] [address] Echocardiogram, Fatigue, H/O cardiac catheterization, H/O cardiovascular stress test, H/O Doppler ultrasound, H/O Doppler ultrasound, H/O echocardiogram, H/O echocardiogram, H/O echocardiogram, H/O echocardiogram, H/O exercise stress test, History of cardiac cath, History of nuclear stress test, Holter monitor, abnormal, Normal cardiac stress test, On home O2, Syncope and collapse, Tachycardia, and Thyroid disease. Past surgical history:   has a past surgical history that includes joint replacement (2004); Hysterectomy (1966); Cholecystectomy (1961); Breast surgery (2009); Toe Surgery (Early 2000's); Carpal tunnel release (1990's); Finger surgery; Gastric restriction surgery (1984); Cardiac surgery (1989); hernia repair (unknown); lipectomy (1990's); Dilatation, esophagus; other surgical history (05/01/2012); Cardiac catheterization (03/02/2011); Hip fracture surgery (Left, 11/04/2015); Colonoscopy; Endoscopy, colon, diagnostic (07/03/2017); Total shoulder arthroplasty (Left, 10/2017); Aortic valve replacement (N/A, 09/11/2019); and Aortic valve repair (N/A, 09/09/2019). Social History:   reports that she quit smoking about 59 years ago. She has a 15.00 pack-year smoking history.  She has never used smokeless tobacco. She reports that she does not drink alcohol and does not use drugs. Family history:  family history includes Arthritis in her father; Cancer in her sister; Diabetes in her brother and mother; Early Death in her son; Heart Disease in her father; High Blood Pressure in her daughter and father; High Cholesterol in her father; Other in her father, mother, sister, sister, son, and son; Stroke in her son; Vision Loss in her sister. Allergies   Allergen Reactions    Morphine Anaphylaxis     Dilaudid OK    Bactrim [Sulfamethoxazole-Trimethoprim]     Butorphanol      Other reaction(s): Other - comment required  \"out of body experience\"    Influenza Vaccines      Ended in hospital stay for 3 days told by md not to get it anymore     Lactose Intolerance (Gi) Nausea Only    Phenergan [Promethazine Hcl] Other (See Comments)     Makes me jerk all over. Zofran OK    Promethazine     Stadol [Butorphanol Tartrate] Other (See Comments)     Out of body experience. Was told it was a near death experience and was placed in ICU. Dilaudid OK    Tape [place] [employer] Tape] Other (See Comments)     Plastic cause itching and rash. Paper tape causes my skin to blister. (Tega-derm and Coban OK to use.)         Objective:   /71   Pulse 59   Temp 99 °F (37.2 °C) (Rectal)   Resp 12   Ht 5' 0.98\" (1.549 m)   Wt 139 lb 1.8 oz (63.1 kg)   SpO2 96%   BMI 26.30 kg/m²       Intake/Output Summary (Last 24 hours) at 11/19/2021 1217  Last data filed at 11/19/2021 0820  Gross per 24 hour   Intake 148.33 ml   Output 4320 ml   Net -4171.67 ml       TELEMETRY: Sinus     Physical Exam:  Constitutional:  Well developed, Well nourished, No acute distress, Non-toxic appearance. HENT:  Normocephalic, Atraumatic, Bilateral external ears normal, Oropharynx moist, No oral exudates, Nose normal. Neck- Normal range of motion, No tenderness, Supple, No stridor. Eyes:  PERRL, EOMI, Conjunctiva normal, No discharge. Respiratory:  Normal breath sounds, No respiratory distress, No wheezing, No chest tenderness. ,no use of accessory muscles, diaphragm movement is normal  Cardiovascular: (PMI) apex non displaced,no lifts no thrills, no s3,no s4, Normal heart rate, Normal rhythm, No murmurs, No rubs, No gallops. Carotid arteries pulse and amplitude are normal no bruit, no abdominal bruit noted ( normal abdominal aorta ausculation), femoral arteries pulse and amplitude are normal no bruit, pedal pulses are normal  GI:  Bowel sounds normal, Soft, No tenderness, No masses, No pulsatile masses. : External genitalia appear normal, No masses or lesions. No discharge. No CVA tenderness. Musculoskeletal:  Intact distal pulses, No edema, No tenderness, No cyanosis, No clubbing. Good range of motion in all major joints. No tenderness to palpation or major deformities noted. Back- No tenderness. Integument:  Warm, Dry, No erythema, No rash. Lymphatic:  No lymphadenopathy noted. Neurologic:  Alert & oriented x 3, Normal motor function, Normal sensory function, No focal deficits noted.    Psychiatric:  Affect normal, Judgment normal, Mood normal.     Medications:    albuterol sulfate HFA  4 puff Inhalation 4x daily    potassium bicarb-citric acid  20 mEq Oral Once    potassium bicarb-citric acid  20 mEq Oral Once    enoxaparin  1 mg/kg SubCUTAneous BID    sodium chloride flush  5-40 mL IntraVENous 2 times per day    budesonide-formoterol  2 puff Inhalation BID    montelukast  10 mg Per NG tube Nightly    cefepime  1,000 mg IntraVENous Q12H    aspirin  81 mg Oral Daily    escitalopram  20 mg Oral Daily    levothyroxine  50 mcg Oral Daily    sucralfate  1 g Oral 4x Daily    pantoprazole  40 mg Oral QAM AC    timolol  1 drop Both Eyes Daily    sodium chloride flush  5-40 mL IntraVENous 2 times per day    methylPREDNISolone  40 mg IntraVENous Q12H    doxycycline (VIBRAMYCIN) IV  100 mg IntraVENous Q12H      sodium chloride      dextrose      sodium chloride       potassium chloride, potassium chloride **OR** potassium alternative oral replacement **OR** potassium chloride, polyvinyl alcohol, sodium chloride flush, sodium chloride, acetaminophen, albuterol, glucose, dextrose, glucagon (rDNA), dextrose, sodium chloride flush, sodium chloride, ondansetron **OR** ondansetron, polyethylene glycol, acetaminophen **OR** acetaminophen, magnesium sulfate, fentanNYL    Lab Data:  CBC:   Recent Labs     11/17/21 0515 11/18/21 0520 11/19/21  0445   WBC 9.9 9.9 8.9   HGB 12.0* 10.9* 11.2*   HCT 35.6* 33.2* 32.9*   MCV 99.4 100.9* 97.1    169 175     BMP:   Recent Labs     11/17/21 0515 11/18/21 0520 11/19/21  0445    140 136   K 3.6 3.4* 4.0    109 101   CO2 22 23 26   BUN 11 11 8   CREATININE 0.5* 0.4* 0.4*     LIVER PROFILE:   No results for input(s): AST, ALT, LIPASE, BILIDIR, BILITOT, ALKPHOS in the last 72 hours. Invalid input(s): AMYLASE,  ALB  PT/INR: No results for input(s): PROTIME, INR in the last 72 hours. APTT: No results for input(s): APTT in the last 72 hours. BNP:  No results for input(s): BNP in the last 72 hours. TROPONIN: @TROPONINI:3@      Assessment:  80 y. o.year old who is admitted for          Plan:  1. Respiratory arrest: could be ashthma and COPD related, intubated and sedated, however, mental condition appears intact, will recommend to wean as per protocol. 2. Elevated trop:its trending up, extubated, had [facility]: no significant CAD noted  3. WIDE COMPLEX TACHYCARDIA: Could be vtach, its resolved, Mg is normal, will get echo and if she started PVCs or NSVT,  Off amiodarone drip  4. H/O TAVR: echo ordered    All labs, medications and tests reviewed, continue all other medications of all above medical condition listed as is.       Coni Albert MD, MD 11/19/2021 12:17 PM

## 2021-11-19 NOTE — PROGRESS NOTES
Physical Therapy  [facility] ACUTE CARE PHYSICAL THERAPY EVALUATION  Natalie Moreno, 1935, 2115/2115-A, 11/19/2021    History  [place]:  The primary encounter diagnosis was Acute respiratory failure with hypoxia and hypercapnia ([address] A diagnosis of Septicemia ([address] was also pertinent to this visit. Patient  has a past medical history of Arthritis, Asthma, Blood transfusion, Chronic bronchitis ([address] COPD (chronic obstructive pulmonary disease) ([address] Echocardiogram, Fatigue, H/O cardiac catheterization, H/O cardiovascular stress test, H/O Doppler ultrasound, H/O Doppler ultrasound, H/O echocardiogram, H/O echocardiogram, H/O echocardiogram, H/O echocardiogram, H/O exercise stress test, History of cardiac cath, History of nuclear stress test, Holter monitor, abnormal, Normal cardiac stress test, On home O2, Syncope and collapse, Tachycardia, and Thyroid disease. Patient  has a past surgical history that includes joint replacement (2004); Hysterectomy (1966); Cholecystectomy (1961); Breast surgery (2009); Toe Surgery (Early 2000's); Carpal tunnel release (1990's); Finger surgery; Gastric restriction surgery (1984); Cardiac surgery (1989); hernia repair (unknown); lipectomy (1990's); Dilatation, esophagus; other surgical history (05/01/2012); Cardiac catheterization (03/02/2011); Hip fracture surgery (Left, 11/04/2015); Colonoscopy; Endoscopy, colon, diagnostic (07/03/2017); Total shoulder arthroplasty (Left, 10/2017); Aortic valve replacement (N/A, 09/11/2019); and Aortic valve repair (N/A, 09/09/2019).     Subjective:  Patient states:  \"I'm really weak\"    Pain:  Denies   Communication with other providers:  RN  Restrictions: general precautions, falls     Home Setup/Prior level of function  Social/Functional History  Lives With: Alone  Type of Home: House  Home Layout: One level  Home Access: Level entry  Bathroom Shower/Tub: Walk-in shower  Bathroom Toilet: Handicap height  Bathroom Equipment: Shower chair, Grab bars in shower, Grab bars around toilet  Bathroom Accessibility: Accessible  Home Equipment: 4 wheeled walker, Cane, Quad cane, Crutches, Oxygen (2L O2 at night)  ADL Assistance: [address]: Independent  Homemaking Responsibilities: Yes  Ambulation Assistance: Independent (mod I with quad cane)  Transfer Assistance: Independent  Active : Yes  Occupation: Retired  Leisure & Hobbies: Cutting grass, Making jewelry    Examination of body systems (includes body structures/functions, activity/participation limitations):  · Observation:  Supine in bed upon arrival. Cooperative to work with therapy. Daughter present and supportive. · Vision:  WFL, glasses  · Hearing: [employer]  · Cardiopulmonary:  Stable vitals on room air     Musculoskeletal  · ROM R/L:  WFL BLEs  · Strength R/L:  BLEs grossly 4+/5    Mobility/treatment:   · Rolling L/R:  NT   · Supine to sit:  Kaushik for trunk boost with HOB elevated ~60 deg. Cues for sequencing LEs and use of bed rail   · Transfers:  · Sit to stand: Kaushik from EOB for steadying assist. Kaushik from low recliner for lift assist. Dec power from low seat surface  · Stand to sit: Kaushik for controlling sit to recliner. Cues for sequencing UEs back to chair surface. · Step pivot: CGA to Kaushik for safety and steadying. Slightly impulsive to turn and sit on second transfer due to feeling weak and had a sense of urgency to sit. · Sitting balance:  SBA at EOB, static with BUE support   · Standing balance:  CGA to Kaushik at RW   · Gait: ~40ft +5ft with RW CGA to Kaushik for safety and steadying. Fair to slower pace initially though increased to fast pace with short shuffled gait when she got close to the recliner and had an urgency to sit. Fatigue limiting further distance. · Educated pt on POC, role of PT, DME use,discharge plan,ARU requirements.  Cues provided for sequencing to inc safety and idnep with mobility     [facility] 6 Clicks Inpatient Mobility:  AM-PAC Inpatient Mobility Raw Score : 17    Safety: patient left in chair, call light within reach, gait belt used. Assessment:  Pt is an 80year old female admitted after being found unresponsive and diagnosed with acute respiratory failure. Pt was intubated from 11-14 to 11-17. Recommend ARU once medically stable. At baseline pt is very indep with household and community mobility as well as ADLs/IADLs. She performed below her typical baseline and would benefit from continued therapy to address her current deficits, dec potential fall risk,and restore function. Complexity: Moderate  Prognosis: Good, no significant barriers to participation at this time.    Plan Times per week: 3+/week   Discharge Recommendations: IP Rehab  Equipment: continue to assess at next level of care     Goals:  Short term goals  Time Frame for Short term goals: 1 week  Short term goal 1: Pt will perform sit><supine SBA  Short term goal 2: Pt will transfer SBA  Short term goal 3: Pt will ambulate 100ft with LRAD SBA  Short term goal 4: Pt will perform standing light dynamic activity x 5 minutes SBA without UE support       Treatment plan:  Bed mobility, transfers, balance, gait, TA, TX, stairs     Recommendations for NURSING mobility: ambulate to the bathroom with RW     Time:   Time in: 1205  Time out: 1230  Timed treatment minutes: 15  Total time: 25    Electronically signed by:    Serafin Akers [id number]  11/19/2021, 3:03 PM

## 2021-11-20 LAB
ANION GAP SERPL CALCULATED.3IONS-SCNC: 9 MMOL/L (ref 4–16)
BASOPHILS ABSOLUTE: 0 K/CU MM
BASOPHILS RELATIVE PERCENT: 0 % (ref 0–1)
BUN BLDV-MCNC: 11 MG/DL (ref 6–23)
CALCIUM SERPL-MCNC: 7.9 MG/DL (ref 8.3–10.6)
CHLORIDE BLD-SCNC: 103 MMOL/L (ref 99–110)
CO2: 28 MMOL/L (ref 21–32)
CREAT SERPL-MCNC: 0.4 MG/DL (ref 0.6–1.1)
DIFFERENTIAL TYPE: ABNORMAL
EOSINOPHILS ABSOLUTE: 0 K/CU MM
EOSINOPHILS RELATIVE PERCENT: 0 % (ref 0–3)
GFR AFRICAN AMERICAN: >60 ML/MIN/1.73M2
GFR NON-AFRICAN AMERICAN: >60 ML/MIN/1.73M2
GLUCOSE BLD-MCNC: 122 MG/DL (ref 70–99)
HCT VFR BLD CALC: 33.1 % (ref 37–47)
HEMOGLOBIN: 11.1 GM/DL (ref 12.5–16)
IMMATURE NEUTROPHIL %: 0.9 % (ref 0–0.43)
LYMPHOCYTES ABSOLUTE: 1.5 K/CU MM
LYMPHOCYTES RELATIVE PERCENT: 18.9 % (ref 24–44)
MAGNESIUM: 1.9 MG/DL (ref 1.8–2.4)
MCH RBC QN AUTO: 33.2 PG (ref 27–31)
MCHC RBC AUTO-ENTMCNC: 33.5 % (ref 32–36)
MCV RBC AUTO: 99.1 FL (ref 78–100)
MONOCYTES ABSOLUTE: 0.5 K/CU MM
MONOCYTES RELATIVE PERCENT: 5.9 % (ref 0–4)
NUCLEATED RBC %: 0 %
PDW BLD-RTO: 13 % (ref 11.7–14.9)
PLATELET # BLD: 195 K/CU MM (ref 140–440)
PMV BLD AUTO: 11.4 FL (ref 7.5–11.1)
POTASSIUM SERPL-SCNC: 4.1 MMOL/L (ref 3.5–5.1)
RBC # BLD: 3.34 M/CU MM (ref 4.2–5.4)
SEGMENTED NEUTROPHILS ABSOLUTE COUNT: 5.8 K/CU MM
SEGMENTED NEUTROPHILS RELATIVE PERCENT: 74.3 % (ref 36–66)
SODIUM BLD-SCNC: 140 MMOL/L (ref 135–145)
TOTAL IMMATURE NEUTOROPHIL: 0.07 K/CU MM
TOTAL NUCLEATED RBC: 0 K/CU MM
WBC # BLD: 7.8 K/CU MM (ref 4–10.5)

## 2021-11-20 PROCEDURE — 2500000003 HC RX 250 WO HCPCS: Performed by: INTERNAL MEDICINE

## 2021-11-20 PROCEDURE — 2580000003 HC RX 258: Performed by: INTERNAL MEDICINE

## 2021-11-20 PROCEDURE — 6370000000 HC RX 637 (ALT 250 FOR IP): Performed by: INTERNAL MEDICINE

## 2021-11-20 PROCEDURE — 94640 AIRWAY INHALATION TREATMENT: CPT

## 2021-11-20 PROCEDURE — 6360000002 HC RX W HCPCS: Performed by: INTERNAL MEDICINE

## 2021-11-20 PROCEDURE — 2140000000 HC CCU INTERMEDIATE R&B

## 2021-11-20 PROCEDURE — 97110 THERAPEUTIC EXERCISES: CPT

## 2021-11-20 PROCEDURE — 97530 THERAPEUTIC ACTIVITIES: CPT

## 2021-11-20 PROCEDURE — 85025 COMPLETE CBC W/AUTO DIFF WBC: CPT

## 2021-11-20 PROCEDURE — 94669 MECHANICAL CHEST WALL OSCILL: CPT

## 2021-11-20 PROCEDURE — 2700000000 HC OXYGEN THERAPY PER DAY

## 2021-11-20 PROCEDURE — 36592 COLLECT BLOOD FROM PICC: CPT

## 2021-11-20 PROCEDURE — 94761 N-INVAS EAR/PLS OXIMETRY MLT: CPT

## 2021-11-20 PROCEDURE — 97535 SELF CARE MNGMENT TRAINING: CPT

## 2021-11-20 PROCEDURE — 83735 ASSAY OF MAGNESIUM: CPT

## 2021-11-20 PROCEDURE — 6370000000 HC RX 637 (ALT 250 FOR IP): Performed by: NURSE PRACTITIONER

## 2021-11-20 PROCEDURE — 80048 BASIC METABOLIC PNL TOTAL CA: CPT

## 2021-11-20 RX ORDER — ATORVASTATIN CALCIUM 20 MG/1
20 TABLET, FILM COATED ORAL NIGHTLY
Status: DISCONTINUED | OUTPATIENT
Start: 2021-11-20 | End: 2021-11-21

## 2021-11-20 RX ORDER — OXYCODONE HYDROCHLORIDE AND ACETAMINOPHEN 5; 325 MG/1; MG/1
1 TABLET ORAL EVERY 6 HOURS PRN
Status: DISCONTINUED | OUTPATIENT
Start: 2021-11-20 | End: 2021-11-26 | Stop reason: HOSPADM

## 2021-11-20 RX ADMIN — ESCITALOPRAM OXALATE 20 MG: 10 TABLET ORAL at 09:23

## 2021-11-20 RX ADMIN — ATORVASTATIN CALCIUM 20 MG: 20 TABLET, FILM COATED ORAL at 20:51

## 2021-11-20 RX ADMIN — ALBUTEROL SULFATE 4 PUFF: 90 AEROSOL, METERED RESPIRATORY (INHALATION) at 21:34

## 2021-11-20 RX ADMIN — METHYLPREDNISOLONE SODIUM SUCCINATE 40 MG: 40 INJECTION, POWDER, FOR SOLUTION INTRAMUSCULAR; INTRAVENOUS at 09:55

## 2021-11-20 RX ADMIN — MONTELUKAST 10 MG: 10 TABLET, FILM COATED ORAL at 20:50

## 2021-11-20 RX ADMIN — PANTOPRAZOLE SODIUM 40 MG: 40 TABLET, DELAYED RELEASE ORAL at 06:22

## 2021-11-20 RX ADMIN — SUCRALFATE 1 G: 1 TABLET ORAL at 14:32

## 2021-11-20 RX ADMIN — SUCRALFATE 1 G: 1 TABLET ORAL at 09:23

## 2021-11-20 RX ADMIN — CEFEPIME HYDROCHLORIDE 1000 MG: 1 INJECTION, POWDER, FOR SOLUTION INTRAMUSCULAR; INTRAVENOUS at 11:16

## 2021-11-20 RX ADMIN — LEVOTHYROXINE SODIUM 50 MCG: 25 TABLET ORAL at 06:22

## 2021-11-20 RX ADMIN — BUDESONIDE AND FORMOTEROL FUMARATE DIHYDRATE 2 PUFF: 160; 4.5 AEROSOL RESPIRATORY (INHALATION) at 21:35

## 2021-11-20 RX ADMIN — METHYLPREDNISOLONE SODIUM SUCCINATE 40 MG: 40 INJECTION, POWDER, FOR SOLUTION INTRAMUSCULAR; INTRAVENOUS at 20:50

## 2021-11-20 RX ADMIN — SODIUM CHLORIDE, PRESERVATIVE FREE 10 ML: 5 INJECTION INTRAVENOUS at 20:51

## 2021-11-20 RX ADMIN — DOXYCYCLINE 100 MG: 100 INJECTION, POWDER, LYOPHILIZED, FOR SOLUTION INTRAVENOUS at 22:28

## 2021-11-20 RX ADMIN — SODIUM CHLORIDE, PRESERVATIVE FREE 10 ML: 5 INJECTION INTRAVENOUS at 09:35

## 2021-11-20 RX ADMIN — BUDESONIDE AND FORMOTEROL FUMARATE DIHYDRATE 2 PUFF: 160; 4.5 AEROSOL RESPIRATORY (INHALATION) at 08:38

## 2021-11-20 RX ADMIN — DOXYCYCLINE 100 MG: 100 INJECTION, POWDER, LYOPHILIZED, FOR SOLUTION INTRAVENOUS at 09:31

## 2021-11-20 RX ADMIN — ALBUTEROL SULFATE 4 PUFF: 90 AEROSOL, METERED RESPIRATORY (INHALATION) at 12:21

## 2021-11-20 RX ADMIN — METOPROLOL SUCCINATE 12.5 MG: 25 TABLET, EXTENDED RELEASE ORAL at 09:24

## 2021-11-20 RX ADMIN — SUCRALFATE 1 G: 1 TABLET ORAL at 18:06

## 2021-11-20 RX ADMIN — CEFEPIME HYDROCHLORIDE 1000 MG: 1 INJECTION, POWDER, FOR SOLUTION INTRAMUSCULAR; INTRAVENOUS at 23:42

## 2021-11-20 RX ADMIN — LISINOPRIL 1.25 MG: 2.5 TABLET ORAL at 09:23

## 2021-11-20 RX ADMIN — ALBUTEROL SULFATE 4 PUFF: 90 AEROSOL, METERED RESPIRATORY (INHALATION) at 16:13

## 2021-11-20 RX ADMIN — SODIUM CHLORIDE, PRESERVATIVE FREE 10 ML: 5 INJECTION INTRAVENOUS at 09:33

## 2021-11-20 RX ADMIN — SUCRALFATE 1 G: 1 TABLET ORAL at 20:50

## 2021-11-20 RX ADMIN — ALBUTEROL SULFATE 4 PUFF: 90 AEROSOL, METERED RESPIRATORY (INHALATION) at 08:37

## 2021-11-20 RX ADMIN — SODIUM CHLORIDE 25 ML: 9 INJECTION, SOLUTION INTRAVENOUS at 23:41

## 2021-11-20 RX ADMIN — ASPIRIN 81 MG: 81 TABLET, COATED ORAL at 09:23

## 2021-11-20 ASSESSMENT — PAIN SCALES - GENERAL: PAINLEVEL_OUTOF10: 0

## 2021-11-20 NOTE — PROGRESS NOTES
Neurology Service Consult Note  [facility]  Patient Name: Lyssa Chambers  : 1935      Subjective:   Reason for consult: Possible seizure  Patient seen and examined. Chart reviewed in detail. Patient states she is feeling better. Still feeling weak when up ambulating to the restroom. Her neurological exam is normal. I do not appreciate any focal or lateralizing deficits on exam. She is sitting up in bed a/o able to give history. I spoke with her daughter on the phone and reviewed MRI results with patient and daughter. Multiple punctate infarcts concerning for cardioembolic source. Past Medical History:   Diagnosis Date    Arthritis     generalized    Asthma     Blood transfusion     No reaction    Chronic bronchitis (HCC)     COPD (chronic obstructive pulmonary disease) ([facility] [address]     summer 2014    Echocardiogram 2021    EF 55-60%, Mild dilated LA, Mild annular calcification present, Mild mitral stenosis, Mild MR & TR.    Fatigue     H/O cardiac catheterization 06/15/2017    mild lad and cx disease    H/O cardiovascular stress test 2019    H/O Doppler ultrasound 2016 3/19/12    carotid- WNL 3/12right mild less than 50%and left wnl    H/O Doppler ultrasound     carotid - mod disease EILEEN, mild disease LICA    H/O echocardiogram 2010    H/O echocardiogram 04/15/2016    EF 60% sclerotic AV mildly stenosed-recommend yearly echo    H/O echocardiogram 2019    EF 55-60%, Minimal concentric left ventricular hypertrophy, left atrium is mildly dilated, severe aortic stenosis, Mitral annular calcification is present, Mild AR, Mild-Mod MR, Mild TR, No pericardial effusion     H/O echocardiogram 2019    H/O exercise stress test 2017    abnormal    History of cardiac cath 2019    Severe AS,   TAVR??  History of nuclear stress test 2017    EF 75%. Normal study.     Holter monitor, abnormal 2011 infrequent APC are seen    Normal cardiac stress test 07/23/2010    EF 70%, no ischemia    On home O2     only uses as needed, nightly prn    Syncope and collapse     Tachycardia     HX of tachycardia - had a cardioablation    Thyroid disease     :   Past Surgical History:   Procedure Laterality Date    AORTIC VALVE REPAIR N/A 09/09/2019    Core Valve EVOLUT 26mm [id number]    AORTIC VALVE REPLACEMENT N/A 09/11/2019    TAVR; [employer] Evlout 26    BREAST SURGERY  2009    bilat    CARDIAC CATHETERIZATION  03/02/2011    mild to moderate disease of diagonal and proximal RCA    CARDIAC SURGERY  1989    ablation    CARPAL TUNNEL RELEASE  1990's    bilat    CHOLECYSTECTOMY  1961    open choley    COLONOSCOPY      DILATATION, ESOPHAGUS      ENDOSCOPY, COLON, DIAGNOSTIC  07/03/2017    s/p gastric bypass otherwise normal    FINGER SURGERY      right middle, thumb and index finger (screw and pin in right index finger)    GASTRIC RESTRICTION SURGERY  1984    stapled    HERNIA REPAIR  unknown    Inc hernia hernia repair    HIP FRACTURE SURGERY Left 11/04/2015    Left hip nail    HYSTERECTOMY  1966    Luke w/ BSO    JOINT REPLACEMENT  2004    right knee    LIPECTOMY  1990's    OTHER SURGICAL HISTORY  05/01/2012    Gastrojejunostomy/partial gastrectomy    SHOULDER ARTHROPLASTY Left 10/2017    TOE SURGERY  Early 2000's    hammer toes and bunions     Medications:  Scheduled Meds:   atorvastatin  20 mg Oral Nightly    lisinopril  1.25 mg Oral Daily    metoprolol succinate  12.5 mg Oral Daily    albuterol sulfate HFA  4 puff Inhalation 4x daily    potassium bicarb-citric acid  20 mEq Oral Once    potassium bicarb-citric acid  20 mEq Oral Once    sodium chloride flush  5-40 mL IntraVENous 2 times per day    budesonide-formoterol  2 puff Inhalation BID    montelukast  10 mg Per NG tube Nightly    cefepime  1,000 mg IntraVENous Q12H    aspirin  81 mg Oral Daily    escitalopram  20 mg Oral Daily    levothyroxine  50 mcg Oral Daily    sucralfate  1 g Oral 4x Daily    pantoprazole  40 mg Oral QAM AC    timolol  1 drop Both Eyes Daily    sodium chloride flush  5-40 mL IntraVENous 2 times per day    methylPREDNISolone  40 mg IntraVENous Q12H    doxycycline (VIBRAMYCIN) IV  100 mg IntraVENous Q12H     Continuous Infusions:   sodium chloride      dextrose      sodium chloride       PRN Meds:.potassium chloride, potassium chloride **OR** potassium alternative oral replacement **OR** potassium chloride, polyvinyl alcohol, sodium chloride flush, sodium chloride, acetaminophen, albuterol, glucose, dextrose, glucagon (rDNA), dextrose, sodium chloride flush, sodium chloride, ondansetron **OR** ondansetron, polyethylene glycol, acetaminophen **OR** acetaminophen, magnesium sulfate, fentanNYL    Allergies   Allergen Reactions    Morphine Anaphylaxis     Dilaudid OK    Bactrim [Sulfamethoxazole-Trimethoprim]     Butorphanol      Other reaction(s): Other - comment required  \"out of body experience\"    Influenza Vaccines      Ended in hospital stay for 3 days told by md not to get it anymore     Lactose Intolerance (Gi) Nausea Only    Phenergan [Promethazine Hcl] Other (See Comments)     Makes me jerk all over. Zofran OK    Promethazine     Stadol [Butorphanol Tartrate] Other (See Comments)     Out of body experience. Was told it was a near death experience and was placed in ICU. Dilaudid OK    Tape Dietra Andrea Tape] Other (See Comments)     Plastic cause itching and rash. Paper tape causes my skin to blister.  (Tega-derm and Coban OK to use.)     Social History     Socioeconomic History    Marital status:      Spouse name: Not on file    Number of children: Not on file    Years of education: Not on file    Highest education level: Not on file   Occupational History    Not on file   Tobacco Use    Smoking status: Former Smoker     Packs/day: 3.00     Years: 5.00     Pack years: 15.00     Quit throat cancer    Other Son         HX of clots    Early Death Son         Hit by car and killed.  High Blood Pressure Daughter     Stroke Son         No residual    Other Son         HX myocarditis         Physical Exam:       Vitals:    11/20/21 0845 11/20/21 0846 11/20/21 0923 11/20/21 1226   BP:   (!) 150/68    Pulse:   61    Resp: 18 20 14   Temp:       TempSrc:       SpO2: 100% 99%  100%   Weight:       Height:           Wt Readings from Last 3 Encounters:   11/20/21 142 lb 10.2 oz (64.7 kg)   11/11/21 121 lb (54.9 kg)   11/01/21 121 lb 12.8 oz (55.2 kg)     Temp Readings from Last 3 Encounters:   11/20/21 98.3 °F (36.8 °C) (Oral)   11/11/21 98 °F (36.7 °C)   11/01/21 97.5 °F (36.4 °C) (Infrared)     BP Readings from Last 3 Encounters:   11/20/21 (!) 150/68   11/11/21 (!) 129/57   11/01/21 116/60     Pulse Readings from Last 3 Encounters:   11/20/21 61   11/11/21 60   11/01/21 (!) 46        Gen: Alert and oriented X 4  HEENT: NC/AT, EOMI, PERRL, mmm, neck supple, no meningeal signs; Heart: NS on monitor  Lungs:Respirations easy and non labored  Ext: no edema,   Psych: anxious  Skin: no rashes or lesions    NEUROLOGIC EXAM:    Mental Status: Alert and oriented to person, place, and month, Speech is clear. Language fluent. Follows all commands. Cranial Nerve Exam:   CN II-XII:intact,   Motor Exam:       Strength: 5/5 UE/LE's  Tone and bulk normal   No  pronator drift    Deep Tendon Reflexes: 1/4 biceps, triceps, brachioradialis, 1/4 patellar, and 0/4 achilles b/l; flexor plantar responses b/l    Sensation: intact to light touch, vibration and temperature decreased in BLE stocking distribution.      Coordination/Cerebellum:       Tremors--none        Gait and stance:      Gait: deferred for safety      LABS:        CBC:   Recent Labs     11/20/21  0530   WBC 7.8   RBC 3.34*   HGB 11.1*   HCT 33.1*      MCV 99.1     BMP:    Recent Labs     11/20/21  0530      K 4.1      CO2 28   BUN 11 CREATININE 0.4*   GLUCOSE 122*   CALCIUM 7.9*       IMAGING:    CT of head  Impression   No acute intracranial abnormality. CTA of head and neck  Impression   No large vessel occlusion visualized within the head or neck. MRI BRAIN W WO CONTRAST    Status: Final result     Order Providers    Authorizing Billing   MD Kamilla Ya MD            Signed by    Signed Date/Time Phone Pager   Carol White 11/19/2021  3:35 [phone]      Reading Providers    Read Date Phone Pager   Laurita Benitobryon Nov 19, 2021  3:35 [phone]        MRI BRAIN W WO CONTRAST: Patient Communication     Add Comments   Add Notifications        Routing History    Priority Sent On From To Message Type    11/17/2021  6:05 AM [facility] Incoming Lab Results From [facility] ([facility]) Steven Islas MD CC'd Results    11/16/2021  4:48 AM [facility] Incoming Lab Results From [facility] ([facility]) Annabelle James MD CC'd Results    11/16/2021  4:48 AM [facility] Incoming Lab Results From [facility]) DERIK Hardy CNP CC'd Results     Orders Requiring a Screening Form    Procedure Order Status Form Status    MRI BRAIN W WO CONTRAST Completed Created       Radiation Dose Estimates    No radiation information found for this patient  Narrative   EXAMINATION:   MRI OF THE BRAIN WITHOUT AND WITH CONTRAST  11/19/2021 2:13 pm       TECHNIQUE:   Multiplanar multisequence MRI of the head/brain was performed without and   with the administration of intravenous contrast.       COMPARISON:   10/31/2008.       HISTORY:   ORDERING SYSTEM PROVIDED HISTORY: Unexplained change in mental status   TECHNOLOGIST PROVIDED HISTORY:   Reason for exam:->Unexplained change in mental status       Initial evaluation.       FINDINGS:   INTRACRANIAL STRUCTURES/VENTRICLES: Dilip Riojas is a punctate acute to subacute   infarct involving the right posterior frontal lobe cortex (series 6, image   22).   Additional punctate acute to subacute infarcts are seen within the left   temporal and left occipital lobes (series 6, image 11). Idalia Pleas is no evidence   of mass effect or midline shift.  No evidence of an acute intracranial   hemorrhage.  Scattered foci of T2 FLAIR hyperintensity are seen in the   periventricular and subcortical white matter, which are nonspecific, but may   represent chronic microvascular ischemic change. Idalia Pleas is prominence of the   ventricles and sulci due to global parenchymal volume loss.  The normal   signal voids within the major intracranial vessels appear maintained.       ORBITS: The visualized portion of the orbits demonstrate no acute abnormality.       SINUSES: The visualized paranasal sinuses and mastoid air cells are well   aerated.       BONES/SOFT TISSUES: The bone marrow signal intensity appears normal. The soft   tissues demonstrate no acute abnormality.           Impression   1. There appear to be 3 punctate acute to subacute infarcts involving the   right frontal lobe, left temporal lobe as well as the left occipital lobe. No significant mass effect or midline shift. 2. Otherwise, no acute intracranial abnormality. 3. Mild global parenchymal volume loss with mild chronic microvascular   ischemic changes.  Findings appear progressed. These results were sent to the [facility] [address] ([address] [place] [address]) on   11/19/2021 at 3:35 pm to be communicated to the referring/covering health   care provider/office.               Above imaging reviewed in detail. ASSESSMENT/PLAN:   80 y.o. -female with PMH COPD, asthma, Cardiac abl;ation and thyroid disease presenting with respiratory distress and unresponsiveness. Found in car by EMS. Neurology being consulted for possible seizure. 1. Unresponsiveness most likely superimposed on underlying arrhythmia, will need  cardiology work-up.      2.  MRI Brain showing 3 punctate areas acute vs subacute in right frontal, left temporal, and left occipital area, concerning for cardio embolic etiology. LA 13.1, ALT 86, AST 87, WBC 12.2, , troponin T 0.573. On presentation patient's UDS pos for cannabis, EEG unremarkable    Neurodiagnostics  -CT HEAD- non acute  -CTA of head and neck- no LVO  -MRI   3 punctate areas acute vs subacute in right frontal, left temporal, and left occipital area, concerning for cardio embolic etiology. -EEG ordered to assess for any evidence of seizure nidus/cortical irritability. EEG interpretation as follows: no electrographic seizures or non - convulsive status epilepticus was seen over the entire monitoring period.  -Do not suspect seizure    Medications:   Continue Aspirin and statin. Defer to cardiology for anticoagulation if underlying arrhythmia. If arrhythmia is  found and patient needs anticoagulation patient would not need to be on antiplatelet with anticoagulation unless cardiology feels this is necessary from a cardiac standpoint. PT/OT:   Continue PT/OT    Follow up:   Neurology outpatient in 4-6 weeks. Neurology will order Echo/JAN  Consult Cardiology and follow up with loop recorder. Patient discussed with attending physician Dr. Shahrzad Borden    Thank you for allowing us to participate in the care of your patient. If there are any questions regarding evaluation please feel free to contact us.      DERIK Alegre - RAMAN, 11/20/2021

## 2021-11-20 NOTE — PROGRESS NOTES
Comprehensive Nutrition Assessment    Type and Reason for Visit:  Reassess    Nutrition Recommendations/Plan:   · Continue current diet    Nutrition Assessment:  Pt is now extubated and started on a diet per SLP - will d/c the EN. Please record all intake in the chart so that we may better assess her nutritional needs    Malnutrition Assessment:  Malnutrition Status:  Insufficient data    Context:  Acute Illness       Estimated Daily Nutrient Needs:  Energy (kcal):  7544-9310 (25-30 kcal/kg); Weight Used for Energy Requirements:  Current     Protein (g):   (1.2-2.0 g/kg); Weight Used for Protein Requirements:  Current        Fluid (ml/day):   (fluid management per physician)    Wounds:  None       Current Nutrition Therapies:    ADULT TUBE FEEDING; Nasogastric; Peptide Based; Continuous; 10; Yes; 10; Q 6 hours; 50; 30; Q 4 hours  ADULT DIET;  Regular; Low Fat/Low Chol/High Fiber/2 gm Na    Anthropometric Measures:  · Height: 5' 0.98\" (154.9 cm)  · Current Body Weight: 139 lb (63 kg)   · Admission Body Weight: 121 lb (54.9 kg)    · Usual Body Weight: 132 lb 7.9 oz (60.1 kg) ((3/19/21) unknown weight source)     · Ideal Body Weight: 105 lbs; % Ideal Body Weight 115.3 %   · BMI: 26.3  · BMI Categories: Normal Weight (BMI 22.0 to 24.9) age over 72       Nutrition Diagnosis:   · Inadequate oral intake related to acute injury/trauma as evidenced by NPO or clear liquid status due to medical condition      Nutrition Interventions:   Food and/or Nutrient Delivery:  Continue Current Diet, Discontinue Tube Feeding  Nutrition Education/Counseling:  No recommendation at this time   Coordination of Nutrition Care:  Continue to monitor while inpatient    Goals:  pt will consume greater than 75% of her meals and supplements       Nutrition Monitoring and Evaluation:   Behavioral-Environmental Outcomes:  None Identified   Food/Nutrient Intake Outcomes:  Food and Nutrient Intake  Physical Signs/Symptoms Outcomes: Biochemical Data, Skin, Weight, Meal Time Behavior     Discharge Planning:     Too soon to determine     Electronically signed by Jayden Leyva RD, LD on 85/71/44 at 9:14 PM EST    Contact: [phone]

## 2021-11-20 NOTE — PROGRESS NOTES
Hospitalist Progress Note      Name:  Walter Garcia /Age/Sex: 1935  (80 y.o. female)   MRN & CSN:  [id number] & [id number] Admission Date/Time: 2021  2:06 PM   Location:  -A PCP: Talia Rodriguez MD       Hospital Day: 7    Assessment and Plan:     Acute respiratory failure/near respiratory arrest  Extubated continue oxygen via nasal cannula  -continue oxygen via nasal cannula  -continue oxygen via nasal cannula    COPD Exac  Continue IV methylprednisolone 40 mg every 12 hours  -Plan to wean steroids as she improves, currently still on methylprednisolone 40 mg IV every 12 hours    Sepsis  -102.2 fever noted at noon 11/15  -Continue cefepime  -Continue doxycycline  Follow-up blood culture results  CRP is 6 and procalcitonin is 0.09 today    Shock  -She may have had septic shock, now off pressors    Elevated troponin  -concern for Tak[place]bo CMP  -Echo with ejection fraction 25 to 30%, akinetic apex suggesting possible Takotsubo, and grade 3 diastolic dysfunction  -metoprolol succinate and lisinopril started  -blood pressure tolerating the ACE inhibitor and beta blocker well, continue to monitor blood pressure    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   Neurology following.  -Continue aspirin 81 mg daily  -Continue atorvastatin 20 mg daily    Unintentional weight loss: Weight is down to 121 lbs from 132 lbs earlier in the year  -Needs outpatient follow-up      History of nonobstructive COPD per last catheterization  History of gastric bypass  Hypertension    Hypothyroidism   -Synthroid - TSH is normal range  -Continue levothyroxine    Mood disorder - Lexapro    Anticoagulation with Lovenox    Please see hospitalist noted from 11/15 for more details    Disposition  -11/18PT/OT ordered    Subjective:     November 20  -She stated that she walked to the bathroom and her ICU room. She has a commode in the corner  -She stated that she had strokes \"on the front of my brain\"  -She stated that she is going to get another test because of the stroke-she was talking about the JAN that is ordered. November 19: She was breathing better today. She was less short of breath when I saw her today.  -I communicated with the charge nurse today and place an order to transfer her to the stepdown unit today as she has improved. November 18  -She passed a swallow screen, will order her a diet  -Potassium is 3.4 today, will place a protocol    When I walked in her daughter was feeding her. Her daughter stated that the patient cannot finish a sentence without getting short of breath. I ordered a chest x-ray, stopped her IV fluids which has been running for 4 days, and ordered Lasix 20 mg IV x1. Will order some potassium to go with the Lasix. November 17: She was extubated today. She was talking with others using a mobile phone when I walked in. She interacted with me appropriately. November 16: She was sedated and intubated when I saw her    Had bradycardia last night, and propofol and Precedex were stopped    Patient presented to the ED on 11/14 with unresponsiveness and an abnormal EKG. -She was found to be in respiratory distress found unresponsive at [facility] parking lot. She had apparently been okay earlier that morning per history from the ED. EMS noted agonal breathing. Intubation by EMS in the field was unsuccessful. LMA was placed. She was transported to ED and continued to have agonal breathing. She was intubated in the ED    Per admission history and physical she has a COPD exacerbation    Cardiology saw her on 11/15.   She had ventricular tachycardia and her blood pressure was labile. She was on pressors and on sedation. She had a TAVR 2 years ago. At that time cath showed mild CAD per cardiology. Troponin was elevated. On admission on 11/14 troponin was less than 1.01. Later that day it went up to 0.39 and the following day it was 0.44 and then today, on 1116, it was 0.57    Left heart cath ordered 11/16 because of abnormal echo and apical ballooning    History  -per bedside nurse 11/16 pt mows her won lawn at home        Objective: Intake/Output Summary (Last 24 hours) at 11/20/2021 1004  Last data filed at 11/20/2021 0544  Gross per 24 hour   Intake 958.72 ml   Output 2925 ml   Net -1966.28 ml      Vitals:   Vitals:    11/20/21 0923   BP: (!) 150/68   Pulse: 61   Resp:    Temp:    SpO2:          Labs:   CBC:   Lab Results   Component Value Date    WBC 7.8 11/20/2021    HGB 11.1 11/20/2021    HCT 33.1 11/20/2021    MCV 99.1 11/20/2021     11/20/2021     BMP:   Lab Results   Component Value Date     11/20/2021    K 4.1 11/20/2021     11/20/2021    CO2 28 11/20/2021    PHOS 2.9 11/16/2021    BUN 11 11/20/2021    CREATININE 0.4 11/20/2021    CALCIUM 7.9 11/20/2021       Physical Exam:      Physical Exam  Constitutional:       Appearance: She is not diaphoretic. HENT:      Nose: No rhinorrhea. Eyes:      General:         Right eye: No discharge. Left eye: No discharge. Pulmonary:      Comments: Respiratory effort was much less labored today compared to yesterday  Skin:     Coloration: Skin is not jaundiced. Neurological:      Cranial Nerves: No cranial nerve deficit.      Heart rate was 61 on the monitor when I was in the room    Blood pressure was 151/101 on the monitor when I was in the room    Hellen noted, with urine    Medications:   Medications:    lisinopril  1.25 mg Oral Daily    metoprolol succinate  12.5 mg Oral Daily    albuterol sulfate HFA  4 puff Inhalation 4x daily    potassium bicarb-citric acid  20 mEq Oral Once    potassium bicarb-citric acid  20 mEq Oral Once    sodium chloride flush  5-40 mL IntraVENous 2 times per day    budesonide-formoterol  2 puff Inhalation BID    montelukast  10 mg Per NG tube Nightly    cefepime  1,000 mg IntraVENous Q12H    aspirin  81 mg Oral Daily    escitalopram  20 mg Oral Daily    levothyroxine  50 mcg Oral Daily    sucralfate  1 g Oral 4x Daily    pantoprazole  40 mg Oral QAM AC    timolol  1 drop Both Eyes Daily    sodium chloride flush  5-40 mL IntraVENous 2 times per day    methylPREDNISolone  40 mg IntraVENous Q12H    doxycycline (VIBRAMYCIN) IV  100 mg IntraVENous Q12H      Infusions:    sodium chloride      dextrose      sodium chloride       PRN Meds: potassium chloride, 20 mEq, PRN  potassium chloride, 40 mEq, PRN   Or  potassium alternative oral replacement, 40 mEq, PRN   Or  potassium chloride, 10 mEq, PRN  polyvinyl alcohol, , Q2H PRN  sodium chloride flush, 5-40 mL, PRN  sodium chloride, 25 mL, PRN  acetaminophen, 650 mg, Q4H PRN  albuterol, 2.5 mg, Q4H PRN  glucose, 15 g, PRN  dextrose, 12.5 g, PRN  glucagon (rDNA), 1 mg, PRN  dextrose, 100 mL/hr, PRN  sodium chloride flush, 5-40 mL, PRN  sodium chloride, 25 mL, PRN  ondansetron, 4 mg, Q8H PRN   Or  ondansetron, 4 mg, Q6H PRN  polyethylene glycol, 17 g, Daily PRN  acetaminophen, 650 mg, Q6H PRN   Or  acetaminophen, 650 mg, Q6H PRN  magnesium sulfate, 2,000 mg, PRN  fentanNYL, 25 mcg, Q1H PRN          Electronically signed by Merline Muff, MD on 11/20/2021 at 10:04 AM

## 2021-11-20 NOTE — PROGRESS NOTES
Hospitalist Progress Note      Name:  Brigida Garzon /Age/Sex: 1935  (80 y.o. female)   MRN & CSN:  [id number] & [id number] Admission Date/Time: 2021  2:06 PM   Location:  -A PCP: Greyson Phan MD       Hospital Day: 6    Assessment and Plan:     Acute respiratory failure/near respiratory arrest  Extubated continue oxygen via nasal cannula  -continue oxygen via nasal cannula  -continue oxygen via nasal cannula    COPD Exac  Continue IV methylprednisolone 40 mg every 12 hours  -Plan to wean steroids as she improves    Sepsis  -102.2 fever noted at noon 11/15  -Continue cefepime  -Continue doxycycline  Follow-up blood culture results  CRP is 6 and procalcitonin is 0.09 today    Shock  -She may have had septic shock, now off pressors    Elevated troponin  -concern for [place] CMP  -Echo with ejection fraction 25 to 30%, akinetic apex suggesting possible Takotsubo, and grade 3 diastolic dysfunction  -metoprolol succinate and lisinopril started    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 Neurology following. Unintentional weight loss: Weight is down to 121 lbs from 132 lbs earlier in the year  -Needs outpatient follow-up      History of nonobstructive COPD per last catheterization  History of gastric bypass  Hypertension    Hypothyroidism   -Synthroid - TSH is normal range    Mood disorder - Lexapro    Anticoagulation with Lovenox    Please see hospitalist noted from 11/15 for more details    Disposition  -PT/OT ordered    Subjective:       : She was breathing better today.   She was less short of breath when I saw her today.  -I communicated with the charge nurse today and place an order to transfer her to the stepdown unit today as she has improved. November 18  -She passed a swallow screen, will order her a diet  -Potassium is 3.4 today, will place a protocol    When I walked in her daughter was feeding her. Her daughter stated that the patient cannot finish a sentence without getting short of breath. I ordered a chest x-ray, stopped her IV fluids which has been running for 4 days, and ordered Lasix 20 mg IV x1. Will order some potassium to go with the Lasix. November 17: She was extubated today. She was talking with others using a mobile phone when I walked in. She interacted with me appropriately. November 16: She was sedated and intubated when I saw her    Had bradycardia last night, and propofol and Precedex were stopped    Patient presented to the ED on 11/14 with unresponsiveness and an abnormal EKG. -She was found to be in respiratory distress found unresponsive at [employer] parking lot. She had apparently been okay earlier that morning per history from the ED. EMS noted agonal breathing. Intubation by EMS in the field was unsuccessful. LMA was placed. She was transported to ED and continued to have agonal breathing. She was intubated in the ED    Per admission history and physical she has a COPD exacerbation    Cardiology saw her on 11/15. She had ventricular tachycardia and her blood pressure was labile. She was on pressors and on sedation. She had a TAVR 2 years ago. At that time cath showed mild CAD per cardiology. Troponin was elevated. On admission on 11/14 troponin was less than 1.01. Later that day it went up to 0.39 and the following day it was 0.44 and then today, on 1116, it was 0.57    Left heart cath ordered 11/16 because of abnormal echo and apical ballooning    History  -per bedside nurse 11/16 pt mows her won lawn at home        Objective:        Intake/Output Summary (Last 24 hours) at 11/19/2021 2211  Last data filed at 11/19/2021 1756  Gross per 24 hour   Intake 757.05 ml   Output 2595 ml   Net -1837.95 ml      Vitals:   Vitals:    11/19/21 2018   BP:    Pulse:    Resp: 16   Temp:    SpO2: 97%         Labs:   CBC:   Lab Results   Component Value Date    WBC 8.9 11/19/2021    HGB 11.2 11/19/2021    HCT 32.9 11/19/2021    MCV 97.1 11/19/2021     11/19/2021     BMP:   Lab Results   Component Value Date     11/19/2021    K 4.0 11/19/2021     11/19/2021    CO2 26 11/19/2021    PHOS 2.9 11/16/2021    BUN 8 11/19/2021    CREATININE 0.4 11/19/2021    CALCIUM 7.7 11/19/2021       Physical Exam:      Physical Exam  Constitutional:       Appearance: She is not diaphoretic. HENT:      Nose: No rhinorrhea. Eyes:      General:         Right eye: No discharge. Left eye: No discharge. Pulmonary:      Comments: Respiratory effort was much less labored today compared to yesterday  Skin:     Coloration: Skin is not jaundiced. Neurological:      Cranial Nerves: No cranial nerve deficit.      Heart rate was 62 on the monitor when I was in the room    Medications:   Medications:    lisinopril  1.25 mg Oral Daily    metoprolol succinate  12.5 mg Oral Daily    albuterol sulfate HFA  4 puff Inhalation 4x daily    potassium bicarb-citric acid  20 mEq Oral Once    potassium bicarb-citric acid  20 mEq Oral Once    sodium chloride flush  5-40 mL IntraVENous 2 times per day    budesonide-formoterol  2 puff Inhalation BID    montelukast  10 mg Per NG tube Nightly    cefepime  1,000 mg IntraVENous Q12H    aspirin  81 mg Oral Daily    escitalopram  20 mg Oral Daily    levothyroxine  50 mcg Oral Daily    sucralfate  1 g Oral 4x Daily    pantoprazole  40 mg Oral QAM AC    timolol  1 drop Both Eyes Daily    sodium chloride flush  5-40 mL IntraVENous 2 times per day    methylPREDNISolone  40 mg IntraVENous Q12H    doxycycline (VIBRAMYCIN) IV  100 mg IntraVENous Q12H Infusions:    sodium chloride      dextrose      sodium chloride       PRN Meds: potassium chloride, 20 mEq, PRN  potassium chloride, 40 mEq, PRN   Or  potassium alternative oral replacement, 40 mEq, PRN   Or  potassium chloride, 10 mEq, PRN  polyvinyl alcohol, , Q2H PRN  sodium chloride flush, 5-40 mL, PRN  sodium chloride, 25 mL, PRN  acetaminophen, 650 mg, Q4H PRN  albuterol, 2.5 mg, Q4H PRN  glucose, 15 g, PRN  dextrose, 12.5 g, PRN  glucagon (rDNA), 1 mg, PRN  dextrose, 100 mL/hr, PRN  sodium chloride flush, 5-40 mL, PRN  sodium chloride, 25 mL, PRN  ondansetron, 4 mg, Q8H PRN   Or  ondansetron, 4 mg, Q6H PRN  polyethylene glycol, 17 g, Daily PRN  acetaminophen, 650 mg, Q6H PRN   Or  acetaminophen, 650 mg, Q6H PRN  magnesium sulfate, 2,000 mg, PRN  fentanNYL, 25 mcg, Q1H PRN          Electronically signed by Roslyn Garcia MD on 11/19/2021 at 10:11 PM

## 2021-11-20 NOTE — PROGRESS NOTES
.  Occupational Therapy Treatment Note      Name: Abebe Barnhart MRN: [id number] :   1935   Date:  2021   Admission Date: 2021 Room:  -A     Primary Problem:  The primary encounter diagnosis was Acute respiratory failure with hypoxia and hypercapnia ([facility] [address] A diagnosis of Septicemia [facility]) was also pertinent to this visit    Restrictions/Precautions:  General Precautions, Fall Risk, Telemetry, Pulse Ox, BP cuff, [place], Rectal Thermometer, SCDs, Bed/chair alarm    Communication with other providers:  Per chart review and Nurse Kemal Coffee patient is appropriate for therapeutic intervention. Subjective:  Patient states:  Pt agreeable to OT therapy. \"I have a hard time reaching up in upper cabinets with B arms. \"  Pain: denied (location, type, intensity)    Objective:    Observation:  Pt in semi nolasco's position upon arrival.  Objective Measures:  A/O x4    Treatment, including education:  Therapeutic Activity Training:   Therapeutic activity training was instructed today. Cues were given for safety, sequence, UE/LE placement, awareness, and balance. Bed mobility: Min A with trunk control with bed features    Sit/stand transfers:Min A from bed/recliner/toilet     Functional Mobility: CGA bed<>toilet x2 reps    Activities performed today included bed mobility training, transfers, functional mobility  to increase strength, activity tolerance to facilitate IND c ADL tasks, func transfers / mobility with G safety awareness carryover    Self Care Training:   Cues were given for safety, sequence, UE/LE placement, visual cues, and balance. Activities performed today included dressing, toileting, hand hygiene, and grooming. Engaged in personal hygiene and grooming task while standing at sink in order to improve ADL performance task. Req CGA. Denied bathroom need however focus on safety and IND utilizing RW.  Demo CGA    Therapeutic Exercise:  Cues were given for technique, safety, recruitment, and rationale. Cues were verbal and/or tactile. Seated BUE strengthening ex in recliner utilizing yellow theraband targeting shoulder press, chest press, shoulder abd/add, bicep/triceps curls x10 reps. Demo SBA  +verbal/visual cues for pace and corrected techs. Assessment / Impression:    Patient's tolerance of treatment: Fair+  Adverse Reaction: none   Significant change in status and impact:  none  Barriers to improvement: safety, strength, balance and activity tolerance    Safety  Patient safely in bed + alarm set at end of session, with call light/phone in reach, and nursing aware. Gait belt was used for func transfers / mobility. Plan for Next Session:    Continue OT POC    Time in:  4611  Time out:  1655  Timed treatment minutes:  40  Total treatment time:  40    Goals:  1. Pt will complete all aspects of bed mobility for EOB/OOB ADLs SBA  2. Pt will complete UB/LB bathing min A with setup   3. Pt will complete all aspects of LB dressing min A with setup  4. Pt will complete all functional transfers to and from bed, chair, toilet, shower chair CGA/good safety awareness  5. Pt will ambulate HH distance to bathroom for toileting CGA with RW  6. Pt will complete all aspects of toileting task CGA  7. Pt will complete oral hygiene/grooming routine in standing at sink SBA with setup  8.  Pt will complete ther ex/ther act with focus on UE strengthening, static/dynamic sitting balance, functional standing tolerance >8 minutes    Electronically signed by:    DENISSE Barrett,   11/20/2021, 4:21 PM

## 2021-11-21 LAB
ANION GAP SERPL CALCULATED.3IONS-SCNC: 8 MMOL/L (ref 4–16)
BASOPHILS ABSOLUTE: 0 K/CU MM
BASOPHILS RELATIVE PERCENT: 0.1 % (ref 0–1)
BUN BLDV-MCNC: 10 MG/DL (ref 6–23)
CALCIUM SERPL-MCNC: 8.5 MG/DL (ref 8.3–10.6)
CHLORIDE BLD-SCNC: 103 MMOL/L (ref 99–110)
CO2: 29 MMOL/L (ref 21–32)
CREAT SERPL-MCNC: 0.4 MG/DL (ref 0.6–1.1)
DIFFERENTIAL TYPE: ABNORMAL
EOSINOPHILS ABSOLUTE: 0 K/CU MM
EOSINOPHILS RELATIVE PERCENT: 0 % (ref 0–3)
GFR AFRICAN AMERICAN: >60 ML/MIN/1.73M2
GFR NON-AFRICAN AMERICAN: >60 ML/MIN/1.73M2
GLUCOSE BLD-MCNC: 128 MG/DL (ref 70–99)
HCT VFR BLD CALC: 35.3 % (ref 37–47)
HEMOGLOBIN: 11.8 GM/DL (ref 12.5–16)
IMMATURE NEUTROPHIL %: 0.8 % (ref 0–0.43)
LYMPHOCYTES ABSOLUTE: 1.8 K/CU MM
LYMPHOCYTES RELATIVE PERCENT: 18.4 % (ref 24–44)
MAGNESIUM: 1.8 MG/DL (ref 1.8–2.4)
MCH RBC QN AUTO: 33.3 PG (ref 27–31)
MCHC RBC AUTO-ENTMCNC: 33.4 % (ref 32–36)
MCV RBC AUTO: 99.7 FL (ref 78–100)
MONOCYTES ABSOLUTE: 0.6 K/CU MM
MONOCYTES RELATIVE PERCENT: 5.8 % (ref 0–4)
NUCLEATED RBC %: 0 %
PDW BLD-RTO: 13.1 % (ref 11.7–14.9)
PLATELET # BLD: 226 K/CU MM (ref 140–440)
PMV BLD AUTO: 10.6 FL (ref 7.5–11.1)
POTASSIUM SERPL-SCNC: 4.3 MMOL/L (ref 3.5–5.1)
RBC # BLD: 3.54 M/CU MM (ref 4.2–5.4)
SEGMENTED NEUTROPHILS ABSOLUTE COUNT: 7.1 K/CU MM
SEGMENTED NEUTROPHILS RELATIVE PERCENT: 74.9 % (ref 36–66)
SODIUM BLD-SCNC: 140 MMOL/L (ref 135–145)
TOTAL IMMATURE NEUTOROPHIL: 0.08 K/CU MM
TOTAL NUCLEATED RBC: 0 K/CU MM
WBC # BLD: 9.5 K/CU MM (ref 4–10.5)

## 2021-11-21 PROCEDURE — 6370000000 HC RX 637 (ALT 250 FOR IP): Performed by: INTERNAL MEDICINE

## 2021-11-21 PROCEDURE — 80048 BASIC METABOLIC PNL TOTAL CA: CPT

## 2021-11-21 PROCEDURE — 6360000002 HC RX W HCPCS: Performed by: INTERNAL MEDICINE

## 2021-11-21 PROCEDURE — 83735 ASSAY OF MAGNESIUM: CPT

## 2021-11-21 PROCEDURE — 2580000003 HC RX 258: Performed by: INTERNAL MEDICINE

## 2021-11-21 PROCEDURE — 85025 COMPLETE CBC W/AUTO DIFF WBC: CPT

## 2021-11-21 PROCEDURE — 2140000000 HC CCU INTERMEDIATE R&B

## 2021-11-21 PROCEDURE — 94640 AIRWAY INHALATION TREATMENT: CPT

## 2021-11-21 PROCEDURE — 94761 N-INVAS EAR/PLS OXIMETRY MLT: CPT

## 2021-11-21 PROCEDURE — 2500000003 HC RX 250 WO HCPCS: Performed by: INTERNAL MEDICINE

## 2021-11-21 RX ORDER — ATORVASTATIN CALCIUM 40 MG/1
40 TABLET, FILM COATED ORAL NIGHTLY
Status: DISCONTINUED | OUTPATIENT
Start: 2021-11-21 | End: 2021-11-26 | Stop reason: HOSPADM

## 2021-11-21 RX ORDER — PREDNISONE 20 MG/1
40 TABLET ORAL DAILY
Status: DISCONTINUED | OUTPATIENT
Start: 2021-11-22 | End: 2021-11-24

## 2021-11-21 RX ADMIN — SUCRALFATE 1 G: 1 TABLET ORAL at 10:12

## 2021-11-21 RX ADMIN — ASPIRIN 81 MG: 81 TABLET, COATED ORAL at 10:12

## 2021-11-21 RX ADMIN — SODIUM CHLORIDE, PRESERVATIVE FREE 10 ML: 5 INJECTION INTRAVENOUS at 21:24

## 2021-11-21 RX ADMIN — METHYLPREDNISOLONE SODIUM SUCCINATE 40 MG: 40 INJECTION, POWDER, FOR SOLUTION INTRAMUSCULAR; INTRAVENOUS at 10:12

## 2021-11-21 RX ADMIN — SUCRALFATE 1 G: 1 TABLET ORAL at 21:22

## 2021-11-21 RX ADMIN — BUDESONIDE AND FORMOTEROL FUMARATE DIHYDRATE 2 PUFF: 160; 4.5 AEROSOL RESPIRATORY (INHALATION) at 21:56

## 2021-11-21 RX ADMIN — SODIUM CHLORIDE 25 ML: 9 INJECTION, SOLUTION INTRAVENOUS at 21:21

## 2021-11-21 RX ADMIN — METOPROLOL SUCCINATE 12.5 MG: 25 TABLET, EXTENDED RELEASE ORAL at 10:12

## 2021-11-21 RX ADMIN — METHYLPREDNISOLONE SODIUM SUCCINATE 40 MG: 40 INJECTION, POWDER, FOR SOLUTION INTRAMUSCULAR; INTRAVENOUS at 21:33

## 2021-11-21 RX ADMIN — LISINOPRIL 1.25 MG: 2.5 TABLET ORAL at 10:11

## 2021-11-21 RX ADMIN — SODIUM CHLORIDE, PRESERVATIVE FREE 10 ML: 5 INJECTION INTRAVENOUS at 21:23

## 2021-11-21 RX ADMIN — ATORVASTATIN CALCIUM 40 MG: 40 TABLET, FILM COATED ORAL at 21:22

## 2021-11-21 RX ADMIN — DOXYCYCLINE 100 MG: 100 INJECTION, POWDER, LYOPHILIZED, FOR SOLUTION INTRAVENOUS at 21:22

## 2021-11-21 RX ADMIN — CEFEPIME HYDROCHLORIDE 1000 MG: 1 INJECTION, POWDER, FOR SOLUTION INTRAMUSCULAR; INTRAVENOUS at 10:20

## 2021-11-21 RX ADMIN — ALBUTEROL SULFATE 4 PUFF: 90 AEROSOL, METERED RESPIRATORY (INHALATION) at 11:31

## 2021-11-21 RX ADMIN — PANTOPRAZOLE SODIUM 40 MG: 40 TABLET, DELAYED RELEASE ORAL at 05:35

## 2021-11-21 RX ADMIN — DOXYCYCLINE 100 MG: 100 INJECTION, POWDER, LYOPHILIZED, FOR SOLUTION INTRAVENOUS at 12:08

## 2021-11-21 RX ADMIN — SUCRALFATE 1 G: 1 TABLET ORAL at 17:16

## 2021-11-21 RX ADMIN — SODIUM CHLORIDE 25 ML: 9 INJECTION, SOLUTION INTRAVENOUS at 10:19

## 2021-11-21 RX ADMIN — LEVOTHYROXINE SODIUM 50 MCG: 25 TABLET ORAL at 05:35

## 2021-11-21 RX ADMIN — ALBUTEROL SULFATE 4 PUFF: 90 AEROSOL, METERED RESPIRATORY (INHALATION) at 21:56

## 2021-11-21 RX ADMIN — SODIUM CHLORIDE 25 ML: 9 INJECTION, SOLUTION INTRAVENOUS at 12:07

## 2021-11-21 RX ADMIN — SODIUM CHLORIDE 25 ML: 9 INJECTION, SOLUTION INTRAVENOUS at 21:38

## 2021-11-21 RX ADMIN — ESCITALOPRAM OXALATE 20 MG: 10 TABLET ORAL at 10:11

## 2021-11-21 RX ADMIN — MONTELUKAST 10 MG: 10 TABLET, FILM COATED ORAL at 21:22

## 2021-11-21 RX ADMIN — SUCRALFATE 1 G: 1 TABLET ORAL at 14:55

## 2021-11-21 RX ADMIN — ALBUTEROL SULFATE 4 PUFF: 90 AEROSOL, METERED RESPIRATORY (INHALATION) at 15:43

## 2021-11-21 RX ADMIN — CEFEPIME HYDROCHLORIDE 1000 MG: 1 INJECTION, POWDER, FOR SOLUTION INTRAMUSCULAR; INTRAVENOUS at 21:40

## 2021-11-21 RX ADMIN — TIMOLOL MALEATE 1 DROP: 5 SOLUTION OPHTHALMIC at 10:22

## 2021-11-21 ASSESSMENT — PAIN SCALES - GENERAL
PAINLEVEL_OUTOF10: 0
PAINLEVEL_OUTOF10: 0

## 2021-11-21 NOTE — PROGRESS NOTES
Patient complaining of intermittent mid chest pain with shortness of breath. No previous documentation of either. Not in distress. Vital signs stable. [facility] NP informed and chart reviewed. No changes at this time.

## 2021-11-21 NOTE — PROGRESS NOTES
Hospitalist Progress Note      Name:  Jd Moore /Age/Sex: 1935  (80 y.o. female)   MRN & CSN:  [id number] & [id number] Admission Date/Time: 2021  2:06 PM   Location:  [address] [place] [address]- PCP: Venkat Brambila MD       Hospital Day: 8    Assessment and Plan:     Acute respiratory failure/near respiratory arrest  Extubated continue oxygen via nasal cannula  -she has been on room air since yesterday    COPD Exac  change IV Solu-Medrol to oral prednisone    Sepsis  -102.2 fever noted at noon 11/15  -Continue cefepime  -Continue doxycycline IV  Follow-up blood culture results  CRP is 6 and procalcitonin is 0.09 today - will recheck levels    Shock  -She may have had septic shock, now off pressors    Elevated troponin  -concern for [place] CMP  -Echo with ejection fraction 25 to 30%, akinetic apex suggesting possible Takotsubo, and grade 3 diastolic dysfunction  -metoprolol succinate and lisinopril started  --continue metoprolol succinate and lisinopril    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    Wide complex tachycardia  -appreciate cardiology consult  -Amiodarone has been ordered, now off    Acute cerebral infarctions  -MRI ordered and shows 3 punctate acute infarcts involving the right frontal, left temporal and left occipital lobes.   Neurology following.  -Continue aspirin 81 mg daily  -Continue atorvastatin 20 mg daily -we will increase the dose to 40 mg for now  -JAN planned    Unintentional weight loss: Weight is down to 121 lbs from 132 lbs earlier in the year  -Needs outpatient follow-up      History of nonobstructive COPD per last catheterization  History of gastric bypass  Hypertension    Hypothyroidism   -Synthroid - TSH is normal range  -Continue levothyroxine    Mood disorder - Lexapro    Anticoagulation with Lovenox    Please see hospitalist noted from 11/15 for more details    Dispositiontherapy recommended the acute rehab unit    Subjective: labile. She was on pressors and on sedation. She had a TAVR 2 years ago. At that time cath showed mild CAD per cardiology. Troponin was elevated. On admission on 11/14 troponin was less than 1.01. Later that day it went up to 0.39 and the following day it was 0.44 and then today, on 1116, it was 0.57    Left heart cath ordered 11/16 because of abnormal echo and apical ballooning    History  -per bedside nurse 11/16 pt mows her won lawn at home        Objective: Intake/Output Summary (Last 24 hours) at 11/21/2021 0822  Last data filed at 11/20/2021 2356  Gross per 24 hour   Intake    Output 1700 ml   Net -1700 ml      Vitals:   Vitals:    11/21/21 0400   BP: 114/63   Pulse: 58   Resp: 19   Temp:    SpO2: 95%         Labs:   CBC:   Lab Results   Component Value Date    WBC 9.5 11/21/2021    HGB 11.8 11/21/2021    HCT 35.3 11/21/2021    MCV 99.7 11/21/2021     11/21/2021     BMP:   Lab Results   Component Value Date     11/21/2021    K 4.3 11/21/2021     11/21/2021    CO2 29 11/21/2021    PHOS 2.9 11/16/2021    BUN 10 11/21/2021    CREATININE 0.4 11/21/2021    CALCIUM 8.5 11/21/2021       Physical Exam:      Physical Exam  Constitutional:       Appearance: She is not diaphoretic. HENT:      Nose: No rhinorrhea. Eyes:      General:         Right eye: No discharge. Left eye: No discharge. Pulmonary:      Effort: Pulmonary effort is normal.   Skin:     Coloration: Skin is not jaundiced. Findings: No lesion. Neurological:      Cranial Nerves: No cranial nerve deficit.        Medications:   Medications:    atorvastatin  40 mg Oral Nightly    lisinopril  1.25 mg Oral Daily    metoprolol succinate  12.5 mg Oral Daily    albuterol sulfate HFA  4 puff Inhalation 4x daily    potassium bicarb-citric acid  20 mEq Oral Once    potassium bicarb-citric acid  20 mEq Oral Once    sodium chloride flush  5-40 mL IntraVENous 2 times per day    budesonide-formoterol  2 puff

## 2021-11-21 NOTE — PROGRESS NOTES
Neurology Service Consult Note  [facility]  Patient Name: Brigida Garzon  : 1935      Subjective:   Reason for consult: Possible seizure  Patient seen and examined. Chart reviewed in detail. Patient states she is feeling better. Still feeling weak when up ambulating to the restroom. She feels like her left lower ext may be slightly weaker but otherwise is feeling much better. Her neurological exam is normal. I do not appreciate any focal or lateralizing deficits on exam. She is sitting up in bed a/o able to give history. I spoke with her daughter on the phone and reviewed MRI results with patient and daughter. Multiple punctate infarcts concerning for cardioembolic source. Past Medical History:   Diagnosis Date    Arthritis     generalized    Asthma     Blood transfusion     No reaction    Chronic bronchitis (HCC)     COPD (chronic obstructive pulmonary disease) ([facility] [address]     summer 2014    Echocardiogram 2021    EF 55-60%, Mild dilated LA, Mild annular calcification present, Mild mitral stenosis, Mild MR & TR.    Fatigue     H/O cardiac catheterization 06/15/2017    mild lad and cx disease    H/O cardiovascular stress test 2019    H/O Doppler ultrasound 2016 3/19/12    carotid- WNL 3/12right mild less than 50%and left wnl    H/O Doppler ultrasound     carotid - mod disease EILEEN, mild disease LICA    H/O echocardiogram 2010    H/O echocardiogram 04/15/2016    EF 60% sclerotic AV mildly stenosed-recommend yearly echo    H/O echocardiogram 2019    EF 55-60%, Minimal concentric left ventricular hypertrophy, left atrium is mildly dilated, severe aortic stenosis, Mitral annular calcification is present, Mild AR, Mild-Mod MR, Mild TR, No pericardial effusion     H/O echocardiogram 2019    H/O exercise stress test 2017    abnormal    History of cardiac cath 2019    Severe AS,   TAVR??     History of nuclear stress test 03/14/2017    EF 75%. Normal study.     Holter monitor, abnormal 02/22/2011    infrequent APC are seen    Normal cardiac stress test 07/23/2010    EF 70%, no ischemia    On home O2     only uses as needed, nightly prn    Syncope and collapse     Tachycardia     HX of tachycardia - had a cardioablation    Thyroid disease     :   Past Surgical History:   Procedure Laterality Date    AORTIC VALVE REPAIR N/A 09/09/2019    Core Valve EVOLUT 26mm [id number]    AORTIC VALVE REPLACEMENT N/A 09/11/2019    TAVR; Med[employer] Evlout 26    BREAST SURGERY  2009    bilat    CARDIAC CATHETERIZATION  03/02/2011    mild to moderate disease of diagonal and proximal RCA    CARDIAC SURGERY  1989    ablation    CARPAL TUNNEL RELEASE  1990's    bilat    CHOLECYSTECTOMY  1961    open choley    COLONOSCOPY      DILATATION, ESOPHAGUS      ENDOSCOPY, COLON, DIAGNOSTIC  07/03/2017    s/p gastric bypass otherwise normal    FINGER SURGERY      right middle, thumb and index finger (screw and pin in right index finger)    GASTRIC RESTRICTION SURGERY  1984    stapled    HERNIA REPAIR  unknown    Inc hernia hernia repair    HIP FRACTURE SURGERY Left 11/04/2015    Left hip nail    HYSTERECTOMY  1966    Luke w/ BSO    JOINT REPLACEMENT  2004    right knee    LIPECTOMY  1990's    OTHER SURGICAL HISTORY  05/01/2012    Gastrojejunostomy/partial gastrectomy    SHOULDER ARTHROPLASTY Left 10/2017    TOE SURGERY  Early 2000's    hammer toes and bunions     Medications:  Scheduled Meds:   atorvastatin  40 mg Oral Nightly    lisinopril  1.25 mg Oral Daily    metoprolol succinate  12.5 mg Oral Daily    albuterol sulfate HFA  4 puff Inhalation 4x daily    potassium bicarb-citric acid  20 mEq Oral Once    potassium bicarb-citric acid  20 mEq Oral Once    sodium chloride flush  5-40 mL IntraVENous 2 times per day    budesonide-formoterol  2 puff Inhalation BID    montelukast  10 mg Per NG tube Nightly    cefepime 1,000 mg IntraVENous Q12H    aspirin  81 mg Oral Daily    escitalopram  20 mg Oral Daily    levothyroxine  50 mcg Oral Daily    sucralfate  1 g Oral 4x Daily    pantoprazole  40 mg Oral QAM AC    timolol  1 drop Both Eyes Daily    sodium chloride flush  5-40 mL IntraVENous 2 times per day    methylPREDNISolone  40 mg IntraVENous Q12H    doxycycline (VIBRAMYCIN) IV  100 mg IntraVENous Q12H     Continuous Infusions:   sodium chloride 25 mL (11/20/21 5941)    dextrose      sodium chloride       PRN Meds:.oxyCODONE-acetaminophen, potassium chloride, potassium chloride **OR** potassium alternative oral replacement **OR** potassium chloride, polyvinyl alcohol, sodium chloride flush, sodium chloride, acetaminophen, albuterol, glucose, dextrose, glucagon (rDNA), dextrose, sodium chloride flush, sodium chloride, ondansetron **OR** ondansetron, polyethylene glycol, acetaminophen **OR** acetaminophen, magnesium sulfate    Allergies   Allergen Reactions    Morphine Anaphylaxis     Dilaudid OK    Bactrim [Sulfamethoxazole-Trimethoprim]     Butorphanol      Other reaction(s): Other - comment required  \"out of body experience\"    Influenza Vaccines      Ended in hospital stay for 3 days told by md not to get it anymore     Lactose Intolerance (Gi) Nausea Only    Phenergan [Promethazine Hcl] Other (See Comments)     Makes me jerk all over. Zofran OK    Promethazine     Stadol [Butorphanol Tartrate] Other (See Comments)     Out of body experience. Was told it was a near death experience and was placed in ICU. Dilaudid OK    Tape Ely Constant Tape] Other (See Comments)     Plastic cause itching and rash. Paper tape causes my skin to blister.  (Tega-derm and Coban OK to use.)     Social History     Socioeconomic History    Marital status:      Spouse name: Not on file    Number of children: Not on file    Years of education: Not on file    Highest education level: Not on file   Occupational History    Not on file   Tobacco Use    Smoking status: Former Smoker     Packs/day: 3.00     Years: 5.00     Pack years: 15.00     Quit date: 1962     Years since quittin.9    Smokeless tobacco: Never Used   Substance and Sexual Activity    Alcohol use: No    Drug use: No    Sexual activity: Not on file   Other Topics Concern    Not on file   Social History Narrative    Not on file     Social Determinants of Health     Financial Resource Strain:     Difficulty of Paying Living Expenses: Not on file   Food Insecurity:     Worried About Running Out of Food in the Last Year: Not on file    Vicki of Food in the Last Year: Not on file   Transportation Needs:     Lack of Transportation (Medical): Not on file    Lack of Transportation (Non-Medical):  Not on file   Physical Activity:     Days of Exercise per Week: Not on file    Minutes of Exercise per Session: Not on file   Stress:     Feeling of Stress : Not on file   Social Connections:     Frequency of Communication with Friends and Family: Not on file    Frequency of Social Gatherings with Friends and Family: Not on file    Attends [employer] Services: Not on file    Active Member of [address] [place] [address] [employer] or Organizations: Not on file    Attends Club or Organization Meetings: Not on file    Marital Status: Not on file   Intimate Partner Violence:     Fear of Current or Ex-Partner: Not on file    Emotionally Abused: Not on file    Physically Abused: Not on file    Sexually Abused: Not on file   Housing Stability:     Unable to Pay for Housing in the Last Year: Not on file    Number of [place] in the Last Year: Not on file    Unstable Housing in the Last Year: Not on file      Family History   Problem Relation Age of Onset    Diabetes Mother     Other Mother         thyroid    Heart Disease Father     Arthritis Father     High Blood Pressure Father     High Cholesterol Father     Other Father         glaucoma    Other Sister         thyroid, glaucoma    Diabetes Brother     Other Sister     Vision Loss Sister         lung problems, smoker    Cancer Sister         throat cancer    Other Son         HX of clots    Early Death Son         Hit by car and killed.  High Blood Pressure Daughter     Stroke Son         No residual    Other Son         HX myocarditis         Physical Exam:       Vitals:    11/20/21 2356 11/21/21 0200 11/21/21 0400 11/21/21 1003   BP: 123/64 125/60 114/63 (!) 146/81   Pulse: 60 58 58 59   Resp: 19 (!) 0 19 18   Temp:    98 °F (36.7 °C)   TempSrc:    Oral   SpO2: 97%  95% 94%   Weight:       Height:           Wt Readings from Last 3 Encounters:   11/20/21 142 lb 10.2 oz (64.7 kg)   11/11/21 121 lb (54.9 kg)   11/01/21 121 lb 12.8 oz (55.2 kg)     Temp Readings from Last 3 Encounters:   11/21/21 98 °F (36.7 °C) (Oral)   11/11/21 98 °F (36.7 °C)   11/01/21 97.5 °F (36.4 °C) (Infrared)     BP Readings from Last 3 Encounters:   11/21/21 (!) 146/81   11/11/21 (!) 129/57   11/01/21 116/60     Pulse Readings from Last 3 Encounters:   11/21/21 59   11/11/21 60   11/01/21 (!) 46        Gen: Alert and oriented X 4  HEENT: NC/AT, EOMI, PERRL, mmm, neck supple, no meningeal signs; Heart: NS on monitor  Lungs:Respirations easy and non labored  Ext: no edema,   Psych: anxious  Skin: no rashes or lesions    NEUROLOGIC EXAM:    Mental Status: Alert and oriented to person, place, and month, Speech is clear. Language fluent. Follows all commands. Cranial Nerve Exam:   CN II-XII:intact,   Motor Exam:       Strength: 5/5 UE/LE's  Tone and bulk normal   No  pronator drift    Deep Tendon Reflexes: 1/4 biceps, triceps, brachioradialis, 1/4 patellar, and 0/4 achilles b/l; flexor plantar responses b/l    Sensation: intact to light touch, vibration and temperature decreased in BLE stocking distribution.      Coordination/Cerebellum:       Tremors--none        Gait and stance:      Gait: deferred for safety      LABS:        CBC:   Recent Labs 11/21/21  0625   WBC 9.5   RBC 3.54*   HGB 11.8*   HCT 35.3*      MCV 99.7     BMP:    Recent Labs     11/21/21  0545      K 4.3      CO2 29   BUN 10   CREATININE 0.4*   GLUCOSE 128*   CALCIUM 8.5       IMAGING:    CT of head  Impression   No acute intracranial abnormality. CTA of head and neck  Impression   No large vessel occlusion visualized within the head or neck. MRI Brain:  1. There appear to be 3 punctate acute to subacute infarcts involving the   right frontal lobe, left temporal lobe as well as the left occipital lobe. No significant mass effect or midline shift. 2. Otherwise, no acute intracranial abnormality. 3. Mild global parenchymal volume loss with mild chronic microvascular   ischemic changes.  Findings appear progressed. MRI BRAIN W WO CONTRAST    Status: Final result     Order Providers    Authorizing Billing   MD Vamshi Carvajal MD            Signed by    Signed Date/Time Phone Pager   Sofía Ramachandran 11/19/2021  3:35 [phone]      Reading Providers    Read Date Phone Pager   Jim Ramírez Nov 19, 2021  3:35 [phone]        MRI BRAIN W WO CONTRAST: Patient Communication     Add Comments   Add Notifications                                                                                MRI BRAIN   Impression   1. There appear to be 3 punctate acute to subacute infarcts involving the   right frontal lobe, left temporal lobe as well as the left occipital lobe. No significant mass effect or midline shift. 2. Otherwise, no acute intracranial abnormality. 3. Mild global parenchymal volume loss with mild chronic microvascular   ischemic changes.  Findings appear progressed.    These results were sent to the [facility] [address] ([address] [place] [address]) on   11/19/2021 at 3:35 pm to be communicated to the referring/covering health   care provider/office.               Above imaging reviewed in detail. ASSESSMENT/PLAN:   80 y.o. -female with PMH COPD, asthma, Cardiac abl;ation and thyroid disease presenting with respiratory distress and unresponsiveness secondary to cardioembolic stroke. 1. Unresponsiveness most likely superimposed on underlying arrhythmia, will need  cardiology work-up. 2.  MRI Brain showing 3 punctate areas acute vs subacute in right frontal, left temporal, and left occipital area, concerning for cardio embolic etiology. LA 13.1, ALT 86, AST 87, WBC 12.2, , troponin T 0.573. On presentation patient's UDS pos for cannabis, EEG unremarkable    Neurodiagnostics  -CT HEAD- non acute  -CTA of head and neck- no LVO  -MRI   3 punctate areas acute vs subacute in right frontal, left temporal, and left occipital area, concerning for cardio embolic etiology. -EEG ordered to assess for any evidence of seizure nidus/cortical irritability. EEG interpretation as follows: no electrographic seizures or non - convulsive status epilepticus was seen over the entire monitoring period.  -Do not suspect seizure    Medications:   Continue Aspirin and statin. Defer to cardiology for anticoagulation if underlying arrhythmia. If arrhythmia is  found and patient needs anticoagulation patient would not need to be on antiplatelet with anticoagulation unless cardiology feels this is necessary from a cardiac standpoint. PT/OT:   Continue PT/OT    Follow up:   Neurology outpatient in 4-6 weeks. Neurology will order Echo/JAN  Consult Cardiology and follow up with loop recorder. Patient discussed with attending physician Dr. Salbador Figueroa    Thank you for allowing us to participate in the care of your patient. If there are any questions regarding evaluation please feel free to contact us.      DERIK Martinez - RAMAN, 11/21/2021

## 2021-11-22 LAB
ANION GAP SERPL CALCULATED.3IONS-SCNC: 10 MMOL/L (ref 4–16)
BASOPHILS ABSOLUTE: 0 K/CU MM
BASOPHILS RELATIVE PERCENT: 0.1 % (ref 0–1)
BUN BLDV-MCNC: 10 MG/DL (ref 6–23)
CALCIUM SERPL-MCNC: 8.3 MG/DL (ref 8.3–10.6)
CHLORIDE BLD-SCNC: 102 MMOL/L (ref 99–110)
CO2: 28 MMOL/L (ref 21–32)
CREAT SERPL-MCNC: 0.4 MG/DL (ref 0.6–1.1)
DIFFERENTIAL TYPE: ABNORMAL
EOSINOPHILS ABSOLUTE: 0 K/CU MM
EOSINOPHILS RELATIVE PERCENT: 0.1 % (ref 0–3)
GFR AFRICAN AMERICAN: >60 ML/MIN/1.73M2
GFR NON-AFRICAN AMERICAN: >60 ML/MIN/1.73M2
GLUCOSE BLD-MCNC: 132 MG/DL (ref 70–99)
HCT VFR BLD CALC: 35.9 % (ref 37–47)
HEMOGLOBIN: 12.1 GM/DL (ref 12.5–16)
HIGH SENSITIVE C-REACTIVE PROTEIN: 1.3 MG/L
IMMATURE NEUTROPHIL %: 1.2 % (ref 0–0.43)
LYMPHOCYTES ABSOLUTE: 1.8 K/CU MM
LYMPHOCYTES RELATIVE PERCENT: 17.9 % (ref 24–44)
MAGNESIUM: 1.9 MG/DL (ref 1.8–2.4)
MCH RBC QN AUTO: 33.2 PG (ref 27–31)
MCHC RBC AUTO-ENTMCNC: 33.7 % (ref 32–36)
MCV RBC AUTO: 98.6 FL (ref 78–100)
MONOCYTES ABSOLUTE: 0.5 K/CU MM
MONOCYTES RELATIVE PERCENT: 4.7 % (ref 0–4)
NUCLEATED RBC %: 0 %
PDW BLD-RTO: 13.2 % (ref 11.7–14.9)
PLATELET # BLD: 242 K/CU MM (ref 140–440)
PMV BLD AUTO: 10.8 FL (ref 7.5–11.1)
POTASSIUM SERPL-SCNC: 4.3 MMOL/L (ref 3.5–5.1)
PROCALCITONIN: 0.04
RBC # BLD: 3.64 M/CU MM (ref 4.2–5.4)
SEGMENTED NEUTROPHILS ABSOLUTE COUNT: 7.7 K/CU MM
SEGMENTED NEUTROPHILS RELATIVE PERCENT: 76 % (ref 36–66)
SODIUM BLD-SCNC: 140 MMOL/L (ref 135–145)
TOTAL IMMATURE NEUTOROPHIL: 0.12 K/CU MM
TOTAL NUCLEATED RBC: 0 K/CU MM
WBC # BLD: 10.1 K/CU MM (ref 4–10.5)

## 2021-11-22 PROCEDURE — 2580000003 HC RX 258: Performed by: INTERNAL MEDICINE

## 2021-11-22 PROCEDURE — 97530 THERAPEUTIC ACTIVITIES: CPT

## 2021-11-22 PROCEDURE — 80048 BASIC METABOLIC PNL TOTAL CA: CPT

## 2021-11-22 PROCEDURE — 86141 C-REACTIVE PROTEIN HS: CPT

## 2021-11-22 PROCEDURE — 99233 SBSQ HOSP IP/OBS HIGH 50: CPT | Performed by: CLINICAL NURSE SPECIALIST

## 2021-11-22 PROCEDURE — 2500000003 HC RX 250 WO HCPCS: Performed by: INTERNAL MEDICINE

## 2021-11-22 PROCEDURE — 83735 ASSAY OF MAGNESIUM: CPT

## 2021-11-22 PROCEDURE — 94761 N-INVAS EAR/PLS OXIMETRY MLT: CPT

## 2021-11-22 PROCEDURE — 6370000000 HC RX 637 (ALT 250 FOR IP): Performed by: INTERNAL MEDICINE

## 2021-11-22 PROCEDURE — 97535 SELF CARE MNGMENT TRAINING: CPT

## 2021-11-22 PROCEDURE — 85025 COMPLETE CBC W/AUTO DIFF WBC: CPT

## 2021-11-22 PROCEDURE — 94640 AIRWAY INHALATION TREATMENT: CPT

## 2021-11-22 PROCEDURE — 6360000002 HC RX W HCPCS: Performed by: INTERNAL MEDICINE

## 2021-11-22 PROCEDURE — 84145 PROCALCITONIN (PCT): CPT

## 2021-11-22 PROCEDURE — 94669 MECHANICAL CHEST WALL OSCILL: CPT

## 2021-11-22 PROCEDURE — 2140000000 HC CCU INTERMEDIATE R&B

## 2021-11-22 RX ORDER — LIDOCAINE HYDROCHLORIDE 20 MG/ML
15 SOLUTION OROPHARYNGEAL ONCE
Status: COMPLETED | OUTPATIENT
Start: 2021-11-22 | End: 2021-11-23

## 2021-11-22 RX ADMIN — ALBUTEROL SULFATE 4 PUFF: 90 AEROSOL, METERED RESPIRATORY (INHALATION) at 11:40

## 2021-11-22 RX ADMIN — CEFEPIME HYDROCHLORIDE 1000 MG: 1 INJECTION, POWDER, FOR SOLUTION INTRAMUSCULAR; INTRAVENOUS at 11:25

## 2021-11-22 RX ADMIN — SODIUM CHLORIDE 25 ML: 9 INJECTION, SOLUTION INTRAVENOUS at 23:13

## 2021-11-22 RX ADMIN — ESCITALOPRAM OXALATE 20 MG: 10 TABLET ORAL at 09:03

## 2021-11-22 RX ADMIN — PANTOPRAZOLE SODIUM 40 MG: 40 TABLET, DELAYED RELEASE ORAL at 06:26

## 2021-11-22 RX ADMIN — LEVOTHYROXINE SODIUM 50 MCG: 25 TABLET ORAL at 06:21

## 2021-11-22 RX ADMIN — LISINOPRIL 1.25 MG: 2.5 TABLET ORAL at 09:02

## 2021-11-22 RX ADMIN — METOPROLOL SUCCINATE 12.5 MG: 25 TABLET, EXTENDED RELEASE ORAL at 09:03

## 2021-11-22 RX ADMIN — BUDESONIDE AND FORMOTEROL FUMARATE DIHYDRATE 2 PUFF: 160; 4.5 AEROSOL RESPIRATORY (INHALATION) at 08:15

## 2021-11-22 RX ADMIN — SODIUM CHLORIDE, PRESERVATIVE FREE 10 ML: 5 INJECTION INTRAVENOUS at 20:20

## 2021-11-22 RX ADMIN — ALBUTEROL SULFATE 4 PUFF: 90 AEROSOL, METERED RESPIRATORY (INHALATION) at 08:15

## 2021-11-22 RX ADMIN — BUDESONIDE AND FORMOTEROL FUMARATE DIHYDRATE 2 PUFF: 160; 4.5 AEROSOL RESPIRATORY (INHALATION) at 19:20

## 2021-11-22 RX ADMIN — PREDNISONE 40 MG: 20 TABLET ORAL at 09:02

## 2021-11-22 RX ADMIN — CEFEPIME HYDROCHLORIDE 1000 MG: 1 INJECTION, POWDER, FOR SOLUTION INTRAMUSCULAR; INTRAVENOUS at 23:15

## 2021-11-22 RX ADMIN — TIMOLOL MALEATE 1 DROP: 5 SOLUTION OPHTHALMIC at 09:14

## 2021-11-22 RX ADMIN — DOXYCYCLINE 100 MG: 100 INJECTION, POWDER, LYOPHILIZED, FOR SOLUTION INTRAVENOUS at 09:13

## 2021-11-22 RX ADMIN — DOXYCYCLINE 100 MG: 100 INJECTION, POWDER, LYOPHILIZED, FOR SOLUTION INTRAVENOUS at 20:22

## 2021-11-22 RX ADMIN — ALBUTEROL SULFATE 4 PUFF: 90 AEROSOL, METERED RESPIRATORY (INHALATION) at 19:20

## 2021-11-22 RX ADMIN — SUCRALFATE 1 G: 1 TABLET ORAL at 09:03

## 2021-11-22 RX ADMIN — ASPIRIN 81 MG: 81 TABLET, COATED ORAL at 09:02

## 2021-11-22 RX ADMIN — MONTELUKAST 10 MG: 10 TABLET, FILM COATED ORAL at 20:26

## 2021-11-22 RX ADMIN — SUCRALFATE 1 G: 1 TABLET ORAL at 14:33

## 2021-11-22 RX ADMIN — SODIUM CHLORIDE, PRESERVATIVE FREE 10 ML: 5 INJECTION INTRAVENOUS at 09:05

## 2021-11-22 RX ADMIN — ALBUTEROL SULFATE 4 PUFF: 90 AEROSOL, METERED RESPIRATORY (INHALATION) at 16:09

## 2021-11-22 RX ADMIN — ATORVASTATIN CALCIUM 40 MG: 40 TABLET, FILM COATED ORAL at 20:26

## 2021-11-22 RX ADMIN — SODIUM CHLORIDE, PRESERVATIVE FREE 10 ML: 5 INJECTION INTRAVENOUS at 09:03

## 2021-11-22 RX ADMIN — SUCRALFATE 1 G: 1 TABLET ORAL at 20:26

## 2021-11-22 ASSESSMENT — PAIN SCALES - GENERAL
PAINLEVEL_OUTOF10: 0
PAINLEVEL_OUTOF10: 0

## 2021-11-22 NOTE — PROGRESS NOTES
Occupational Therapy      Occupational Therapy Treatment Note      Name: Tim Gomez MRN: [id number] :   1935   Date:  2021   Admission Date: 2021 Room:  [facility]3/3113-A     Primary Problem: Acute respiratory failure, Acute/subacute infarcts in Rt frontal, Lt temporal, and Lt occipital lobes    Restrictions/Precautions: General Precautions, Fall Risk, Telemetry, Pulse Ox, BP cuff, Bed exit alarm    Communication with other providers: RN CM    Subjective:  Patient states: \"I'm definitely feeling better. My legs still feel weak when I'm standing though. \"  Pain: Pt denied pain this date    Objective:    Observation: Pt received sitting EOB upon OT arrival. Pleasant and agreeable to treatment. Pt noted to have mild Lt visual field deficit. Objective Measures: A & O x 4, Stable vitals throughout session    Treatment, including education:  Self Care Training:   Cues were given for safety, sequence, UE/LE placement, visual cues, and balance. Therapeutic Activity Training:   Therapeutic activity training was instructed today. Cues were given for safety, sequence, UE/LE placement, awareness, and balance. Pt received sitting EOB upon OT arrival. Pt re-educated on role of OT, POC, and importance of EOB/OOB activity. Pt completed sit to stand transfer from bed CGA with min cues for safe hand placement. Pt ambulated to bathroom sink for ADLs CGA with RW and min cues for safe RW mgmt. Pt stood at sink, and completed facial hygiene, grooming task of brushing hair, and oral hygiene task of rinsing dentures CGA with setup and min cues for safe RW placement. After ADLs at sink, pt ambulated back to bed CGA with RW, and transferred stand to sit to bed CGA with cues for reaching back with arms. Pt took brief rest, and completed second sit to stand from bed CGA. Pt ambulated approximately 20 ft CGA with RW.  Pt with decreased speed, and fatigued at end of trial. Pt returned to sitting EOB CGA, and transferred from sitting EOB to supine SBA. Pt left positioned for comfort in bed with all lines intact, all needs within reach, and bed alarm on. Assessment / Impression:    Patient's tolerance of treatment: Well  Adverse Reaction: None  Significant change in status and impact: Improved functional strength and activity tolerance this date  Barriers to improvement: Mild Lt visual field deficits      Plan for Next Session:    Continue per OT POC. Continue to recommend inpatient rehab at discharge.     Time in: 1325  Time out: 1350  Timed treatment minutes: 25  Total treatment time: 25      Electronically signed by:    GENEVIEVE Voss/L, [address] [place] [address], .[id number] Erythromycin Counseling:  I discussed with the patient the risks of erythromycin including but not limited to GI upset, allergic reaction, drug rash, diarrhea, increase in liver enzymes, and yeast infections.

## 2021-11-22 NOTE — PROGRESS NOTES
Pulmonary and Critical Care  Progress Note    Subjective: The patient is doing well. On RA 98%. Shortness of breath none  Chest pain none  Addressing respiratory complaints Patient is negative for  hemoptysis and cyanosis  CONSTITUTIONAL:  negative for fevers and chills      Past Medical History:     has a past medical history of Arthritis, Asthma, Blood transfusion, Chronic bronchitis ([facility] [address] COPD (chronic obstructive pulmonary disease) ([facility] [address] Echocardiogram, Fatigue, H/O cardiac catheterization, H/O cardiovascular stress test, H/O Doppler ultrasound, H/O Doppler ultrasound, H/O echocardiogram, H/O echocardiogram, H/O echocardiogram, H/O echocardiogram, H/O exercise stress test, History of cardiac cath, History of nuclear stress test, Holter monitor, abnormal, Normal cardiac stress test, On home O2, Syncope and collapse, Tachycardia, and Thyroid disease. has a past surgical history that includes joint replacement (2004); Hysterectomy (1966); Cholecystectomy (1961); Breast surgery (2009); Toe Surgery (Early 2000's); Carpal tunnel release (1990's); Finger surgery; Gastric restriction surgery (1984); Cardiac surgery (1989); hernia repair (unknown); lipectomy (1990's); Dilatation, esophagus; other surgical history (05/01/2012); Cardiac catheterization (03/02/2011); Hip fracture surgery (Left, 11/04/2015); Colonoscopy; Endoscopy, colon, diagnostic (07/03/2017); Total shoulder arthroplasty (Left, 10/2017); Aortic valve replacement (N/A, 09/11/2019); and Aortic valve repair (N/A, 09/09/2019). reports that she quit smoking about 59 years ago. She has a 15.00 pack-year smoking history. She has never used smokeless tobacco. She reports that she does not drink alcohol and does not use drugs. Family history:  family history includes Arthritis in her father; Cancer in her sister; Diabetes in her brother and mother; Early Death in her son;  Heart Disease in her father; High Blood Pressure in her daughter and father; High Cholesterol in her father; Other in her father, mother, sister, sister, son, and son; Stroke in her son; Vision Loss in her sister. Allergies   Allergen Reactions    Morphine Anaphylaxis     Dilaudid OK    Bactrim [Sulfamethoxazole-Trimethoprim]     Butorphanol      Other reaction(s): Other - comment required  \"out of body experience\"    Influenza Vaccines      Ended in hospital stay for 3 days told by md not to get it anymore     Lactose Intolerance (Gi) Nausea Only    Phenergan [Promethazine Hcl] Other (See Comments)     Makes me jerk all over. Zofran OK    Promethazine     Stadol [Butorphanol Tartrate] Other (See Comments)     Out of body experience. Was told it was a near death experience and was placed in ICU. Dilaudid OK    Tape Quita Edinger Tape] Other (See Comments)     Plastic cause itching and rash. Paper tape causes my skin to blister. (Tega-derm and Coban OK to use.)     Social History:    Reviewed; no changes    Objective:   PHYSICAL EXAM:        VITALS:  BP (!) 153/74   Pulse 54   Temp 97.4 °F (36.3 °C) (Oral)   Resp 16   Ht 5' 0.98\" (1.549 m)   Wt 139 lb 12.8 oz (63.4 kg)   SpO2 98%   BMI 26.43 kg/m²     24HR INTAKE/OUTPUT:      Intake/Output Summary (Last 24 hours) at 11/22/2021 1043  Last data filed at 11/22/2021 0408  Gross per 24 hour   Intake    Output 1800 ml   Net -1800 ml       CONSTITUTIONAL: Alert. LUNGS:  decreased breath sounds  CARDIOVASCULAR:  normal S1 and S2 and positive JVD  ABD:Abdomen soft, non-tender. BS normal. No masses,  No organomegaly  NEURO: Alert.   DATA:    CBC:  Recent Labs     11/20/21  0530 11/21/21  0625 11/22/21  0451   WBC 7.8 9.5 10.1   RBC 3.34* 3.54* 3.64*   HGB 11.1* 11.8* 12.1*   HCT 33.1* 35.3* 35.9*    226 242   MCV 99.1 99.7 98.6   MCH 33.2* 33.3* 33.2*   MCHC 33.5 33.4 33.7   RDW 13.0 13.1 13.2   SEGSPCT 74.3* 74.9* 76.0*      BMP:  Recent Labs     11/20/21  0530 11/21/21  0545 11/22/21  0451    140 140   K 4.1 4.3 4.3

## 2021-11-22 NOTE — PROGRESS NOTES
Hospitalist Progress Note      Name:  Jeniffer Pink /Age/Sex: 1935  (80 y.o. female)   MRN & CSN:  [id number] & [id number] Admission Date/Time: 2021  2:06 PM   Location:  [address] [place] [address]-A PCP: Kaley Whittaker MD       Hospital Day: 9    Assessment and Plan:     JAN in a.m. Discussed with  today. Referred to acute rehab unit. Acute respiratory failure/near respiratory arrest  Extubated continue oxygen via nasal cannula  -she has been on room air since yesterday    COPD Exac  change IV Solu-Medrol to oral prednisone    Sepsis  -102.2 fever noted at noon 11/15  -Continue cefepime  -Continue doxycycline IV  Follow-up blood culture results  CRP is 6 and procalcitonin is 0.09 today - will recheck levels  procalcitonin levels improved to 0.04-    Shock  -She may have had septic shock, now off pressors    Elevated troponin  -concern for Takotusbo CMP  -Echo with ejection fraction 25 to 30%, akinetic apex suggesting possible Takotsubo, and grade 3 diastolic dysfunction  -metoprolol succinate and lisinopril started  --continue metoprolol succinate and lisinopril    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   Neurology following.  -Continue aspirin 81 mg daily  -Continue atorvastatin 20 mg daily -we will increase the dose to 40 mg for now  -JAN planned    Unintentional weight loss: Weight is down to 121 lbs from 132 lbs earlier in the year  -Needs outpatient follow-up      History of nonobstructive COPD per last catheterization  History of gastric bypass  Hypertension    Hypothyroidism   -Synthroid - TSH is normal range  -Continue levothyroxine    Mood disorder - Lexapro    Anticoagulation with Lovenox    Please see hospitalist noted from 11/15 for more details    Milton Joshua recommended the acute rehab unit    Subjective:     Late entry for November 22:  -She was supine in bed when I saw her  -She stated that yesterday she had 3 spells. No further spells today.  -Her daughter who is a nurse was at the bedside    November 21:  -She stated that she had 2 spells where she was sweaty today and her arms were numb. November 20  -She stated that she walked to the bathroom and her ICU room. She has a commode in the corner  -She stated that she had strokes \"on the front of my brain\"  -She stated that she is going to get another test because of the stroke-she was talking about the JAN that is ordered. November 19: She was breathing better today. She was less short of breath when I saw her today.  -I communicated with the charge nurse today and place an order to transfer her to the stepdown unit today as she has improved. November 18  -She passed a swallow screen, will order her a diet  -Potassium is 3.4 today, will place a protocol    When I walked in her daughter was feeding her. Her daughter stated that the patient cannot finish a sentence without getting short of breath. I ordered a chest x-ray, stopped her IV fluids which has been running for 4 days, and ordered Lasix 20 mg IV x1. Will order some potassium to go with the Lasix. November 17: She was extubated today. She was talking with others using a mobile phone when I walked in. She interacted with me appropriately. November 16: She was sedated and intubated when I saw her    Had bradycardia last night, and propofol and Precedex were stopped    Patient presented to the ED on 11/14 with unresponsiveness and an abnormal EKG. -She was found to be in respiratory distress found unresponsive at [employer] parking lot.   She had apparently been okay earlier that morning per history from the ED.  EMS noted agonal breathing. Intubation by EMS in the field was unsuccessful. LMA was placed. She was transported to ED and continued to have agonal breathing. She was intubated in the ED    Per admission history and physical she has a COPD exacerbation    Cardiology saw her on 11/15. She had ventricular tachycardia and her blood pressure was labile. She was on pressors and on sedation. She had a TAVR 2 years ago. At that time cath showed mild CAD per cardiology. Troponin was elevated. On admission on 11/14 troponin was less than 1.01. Later that day it went up to 0.39 and the following day it was 0.44 and then today, on 1116, it was 0.57    Left heart cath ordered 11/16 because of abnormal echo and apical ballooning    History  -per bedside nurse 11/16 pt mows her won lawn at home  -Her daughter is a nurse        Objective: Intake/Output Summary (Last 24 hours) at 11/22/2021 1212  Last data filed at 11/22/2021 0915  Gross per 24 hour   Intake 240 ml   Output 1800 ml   Net -1560 ml      Vitals:   Vitals:    11/22/21 1143   BP:    Pulse:    Resp: 16   Temp:    SpO2:          Labs:   CBC:   Lab Results   Component Value Date    WBC 10.1 11/22/2021    HGB 12.1 11/22/2021    HCT 35.9 11/22/2021    MCV 98.6 11/22/2021     11/22/2021     BMP:   Lab Results   Component Value Date     11/22/2021    K 4.3 11/22/2021     11/22/2021    CO2 28 11/22/2021    PHOS 2.9 11/16/2021    BUN 10 11/22/2021    CREATININE 0.4 11/22/2021    CALCIUM 8.3 11/22/2021       Physical Exam:      Physical Exam  Constitutional:       Appearance: She is not diaphoretic. HENT:      Nose: No rhinorrhea. Eyes:      General:         Right eye: No discharge. Left eye: No discharge. Pulmonary:      Effort: Pulmonary effort is normal.   Skin:     Coloration: Skin is not jaundiced. Findings: No lesion. Neurological:      Cranial Nerves: No cranial nerve deficit.        Medications: Medications:    lidocaine viscous hcl  15 mL Mouth/Throat Once    atorvastatin  40 mg Oral Nightly    predniSONE  40 mg Oral Daily    lisinopril  1.25 mg Oral Daily    metoprolol succinate  12.5 mg Oral Daily    albuterol sulfate HFA  4 puff Inhalation 4x daily    potassium bicarb-citric acid  20 mEq Oral Once    potassium bicarb-citric acid  20 mEq Oral Once    sodium chloride flush  5-40 mL IntraVENous 2 times per day    budesonide-formoterol  2 puff Inhalation BID    montelukast  10 mg Per NG tube Nightly    cefepime  1,000 mg IntraVENous Q12H    aspirin  81 mg Oral Daily    escitalopram  20 mg Oral Daily    levothyroxine  50 mcg Oral Daily    sucralfate  1 g Oral 4x Daily    pantoprazole  40 mg Oral QAM AC    timolol  1 drop Both Eyes Daily    sodium chloride flush  5-40 mL IntraVENous 2 times per day    doxycycline (VIBRAMYCIN) IV  100 mg IntraVENous Q12H      Infusions:    sodium chloride 25 mL (11/21/21 1207)    dextrose      sodium chloride 25 mL (11/21/21 9051)     PRN Meds: oxyCODONE-acetaminophen, 1 tablet, Q6H PRN  potassium chloride, 20 mEq, PRN  potassium chloride, 40 mEq, PRN   Or  potassium alternative oral replacement, 40 mEq, PRN   Or  potassium chloride, 10 mEq, PRN  polyvinyl alcohol, , Q2H PRN  sodium chloride flush, 5-40 mL, PRN  sodium chloride, 25 mL, PRN  acetaminophen, 650 mg, Q4H PRN  albuterol, 2.5 mg, Q4H PRN  glucose, 15 g, PRN  dextrose, 12.5 g, PRN  glucagon (rDNA), 1 mg, PRN  dextrose, 100 mL/hr, PRN  sodium chloride flush, 5-40 mL, PRN  sodium chloride, 25 mL, PRN  ondansetron, 4 mg, Q8H PRN   Or  ondansetron, 4 mg, Q6H PRN  polyethylene glycol, 17 g, Daily PRN  acetaminophen, 650 mg, Q6H PRN   Or  acetaminophen, 650 mg, Q6H PRN  magnesium sulfate, 2,000 mg, PRN          Electronically signed by Tracy Liang MD on 11/22/2021 at 12:12 PM

## 2021-11-22 NOTE — PROGRESS NOTES
Physical Therapy    Physical Therapy Treatment Note  Name: Laly Garcia MRN: [id number] :   1935   Date:  2021   Admission Date: 2021 Room:  [address] [place] [address]   Restrictions/Precautions:        general precautions, falls  Communication with other providers:  OT   Subjective:  Patient states:  \"I'm feeling better but still tired after walking\"   Pain:   Location, Type, Intensity (0/10 to 10/10): Denies   Objective:    Observation:    Supine in bed. Cooperative with therapy   Treatment, including education/measures:  Supine to sit: SBA with HOB slightly elevated  Sit to stand: CGA from EOB and recliner. VCs for hand sequencing from seat surface to RW  Stand to sit: CGA for safety to recliner. VCs on first trial to reach back for chair with good carry over on second not requiring VCs   Ambulation: ~50ft x 2 with RW CGA for safety. Needed a seat rest break between trials due to generalized fatigue. Fair to slower pace with reciprocal gait pattern. Narrowed DREW on turns with small LOB but able to recover with UE support on AD. Educated pt on POC, role of PT, DME use ,discharge.  Cues provided for sequencing to inc safety and indep with mobility   Assessment / Impression:    Patient's tolerance of treatment:  Good    Adverse Reaction: no  Significant change in status and impact:  no  Barriers to improvement:  Activity tolerance ,strength, balance, prolonged hospital stay   Plan for Next Session:    Activity tolerance, strength, balance, gait ,transfers   Time in:  1413  Time out:  1430  Timed treatment minutes:  17  Total treatment time:  17    Previously filed items:  Social/Functional History  Lives With: Alone  Type of Home: House  Home Layout: One level  Home Access: Level entry  Bathroom Shower/Tub: Walk-in shower  Bathroom Toilet: Handicap height  Bathroom Equipment: Shower chair, Grab bars in shower, Grab bars around toilet  Bathroom Accessibility: Accessible  Home Equipment: 4 wheeled walker, Cane, Quad cane, Crutches, Oxygen (2L O2 at night)  ADL Assistance: [address]: Independent  Homemaking Responsibilities: Yes  Ambulation Assistance: Independent (mod I with quad cane)  Transfer Assistance: Independent  Active : Yes  Occupation: Retired  Leisure & Hobbies: Cutting grass, Making jewelry  Short term goals  Time Frame for Short term goals: 1 week  Short term goal 1: Pt will perform sit><supine SBA  Short term goal 2: Pt will transfer SBA  Short term goal 3: Pt will ambulate 100ft with LRAD SBA  Short term goal 4: Pt will perform standing light dynamic activity x 5 minutes SBA without UE support       Electronically signed by:    Alicia Espinoza, [id number]  11/22/2021, 3:27 PM

## 2021-11-22 NOTE — PROGRESS NOTES
Neurology Service Consult Note  [facility]  Patient Name: Laly Garcia  : 1935        Subjective:   Reason for consult: Possible seizure  Patient seen and examined. Chart reviewed in detail. Patient is out of the ICU. She states she is feeling better. Discussed MRI results and plan of care. Past Medical History:   Diagnosis Date    Arthritis     generalized    Asthma     Blood transfusion     No reaction    Chronic bronchitis (HCC)     COPD (chronic obstructive pulmonary disease) ([address]     summer 2014    Echocardiogram 2021    EF 55-60%, Mild dilated LA, Mild annular calcification present, Mild mitral stenosis, Mild MR & TR.    Fatigue     H/O cardiac catheterization 06/15/2017    mild lad and cx disease    H/O cardiovascular stress test 2019    H/O Doppler ultrasound 2016 3/19/12    carotid- WNL 3/12right mild less than 50%and left wnl    H/O Doppler ultrasound     carotid - mod disease EILEEN, mild disease LICA    H/O echocardiogram 2010    H/O echocardiogram 04/15/2016    EF 60% sclerotic AV mildly stenosed-recommend yearly echo    H/O echocardiogram 2019    EF 55-60%, Minimal concentric left ventricular hypertrophy, left atrium is mildly dilated, severe aortic stenosis, Mitral annular calcification is present, Mild AR, Mild-Mod MR, Mild TR, No pericardial effusion     H/O echocardiogram 2019    H/O exercise stress test 2017    abnormal    History of cardiac cath 2019    Severe AS,   TAVR??  History of nuclear stress test 2017    EF 75%. Normal study.     Holter monitor, abnormal 2011    infrequent APC are seen    Normal cardiac stress test 2010    EF 70%, no ischemia    On home O2     only uses as needed, nightly prn    Syncope and collapse     Tachycardia     HX of tachycardia - had a cardioablation    Thyroid disease     :   Past Surgical History:   Procedure Laterality Date    AORTIC VALVE REPAIR N/A 09/09/2019    Core Valve EVOLUT 26mm [id number]    AORTIC VALVE REPLACEMENT N/A 09/11/2019    TAVR; [employer]out [employer]    BREAST SURGERY  2009    bilat    CARDIAC CATHETERIZATION  03/02/2011    mild to moderate disease of diagonal and proximal RCA   [address]    ablation    CARPAL TUNNEL RELEASE  1990's    bilat    CHOLECYSTECTOMY  1961    open choley    COLONOSCOPY      DILATATION, ESOPHAGUS      ENDOSCOPY, COLON, DIAGNOSTIC  07/03/2017    s/p gastric bypass otherwise normal    FINGER SURGERY      right middle, thumb and index finger (screw and pin in right index finger)    GASTRIC RESTRICTION SURGERY  1984    stapled    HERNIA REPAIR  unknown    Inc hernia hernia repair    HIP FRACTURE SURGERY Left 11/04/2015    Left hip nail    HYSTERECTOMY  1966    Luke w/ BSO    JOINT REPLACEMENT  2004    right knee    LIPECTOMY  1990's    OTHER SURGICAL HISTORY  05/01/2012    Gastrojejunostomy/partial gastrectomy    SHOULDER ARTHROPLASTY Left 10/2017    TOE SURGERY  Early 2000's    hammer toes and bunions     Medications:  Scheduled Meds:   lidocaine viscous hcl  15 mL Mouth/Throat Once    atorvastatin  40 mg Oral Nightly    predniSONE  40 mg Oral Daily    lisinopril  1.25 mg Oral Daily    metoprolol succinate  12.5 mg Oral Daily    albuterol sulfate HFA  4 puff Inhalation 4x daily    potassium bicarb-citric acid  20 mEq Oral Once    potassium bicarb-citric acid  20 mEq Oral Once    sodium chloride flush  5-40 mL IntraVENous 2 times per day    budesonide-formoterol  2 puff Inhalation BID    montelukast  10 mg Per NG tube Nightly    cefepime  1,000 mg IntraVENous Q12H    aspirin  81 mg Oral Daily    escitalopram  20 mg Oral Daily    levothyroxine  50 mcg Oral Daily    sucralfate  1 g Oral 4x Daily    pantoprazole  40 mg Oral QAM AC    timolol  1 drop Both Eyes Daily    sodium chloride flush  5-40 mL IntraVENous 2 times per day    doxycycline (VIBRAMYCIN) IV  100 mg IntraVENous Q12H     Continuous Infusions:   sodium chloride 25 mL (21 1207)    dextrose      sodium chloride 25 mL (218)     PRN Meds:.oxyCODONE-acetaminophen, potassium chloride, potassium chloride **OR** potassium alternative oral replacement **OR** potassium chloride, polyvinyl alcohol, sodium chloride flush, sodium chloride, acetaminophen, albuterol, glucose, dextrose, glucagon (rDNA), dextrose, sodium chloride flush, sodium chloride, ondansetron **OR** ondansetron, polyethylene glycol, acetaminophen **OR** acetaminophen, magnesium sulfate    Allergies   Allergen Reactions    Morphine Anaphylaxis     Dilaudid OK    Bactrim [Sulfamethoxazole-Trimethoprim]     Butorphanol      Other reaction(s): Other - comment required  \"out of body experience\"    Influenza Vaccines      Ended in hospital stay for 3 days told by md not to get it anymore     Lactose Intolerance (Gi) Nausea Only    Phenergan [Promethazine Hcl] Other (See Comments)     Makes me jerk all over. Zofran OK    Promethazine     Stadol [Butorphanol Tartrate] Other (See Comments)     Out of body experience. Was told it was a near death experience and was placed in ICU. Dilaudid OK    Tape [place] Tape] Other (See Comments)     Plastic cause itching and rash. Paper tape causes my skin to blister.  (Tega-derm and Coban OK to use.)     Social History     Socioeconomic History    Marital status:      Spouse name: Not on file    Number of children: Not on file    Years of education: Not on file    Highest education level: Not on file   Occupational History    Not on file   Tobacco Use    Smoking status: Former Smoker     Packs/day: 3.00     Years: 5.00     Pack years: 15.00     Quit date: 1962     Years since quittin.9    Smokeless tobacco: Never Used   Substance and Sexual Activity    Alcohol use: No    Drug use: No    Sexual activity: Not on file   Other Topics Concern    Not on file   Social History Narrative    Not on file     Social Determinants of Health     Financial Resource Strain:     Difficulty of Paying Living Expenses: Not on file   Food Insecurity:     Worried About Running Out of Food in the Last Year: Not on file    Vicki of Food in the Last Year: Not on file   Transportation Needs:     Lack of Transportation (Medical): Not on file    Lack of Transportation (Non-Medical): Not on file   Physical Activity:     Days of Exercise per Week: Not on file    Minutes of Exercise per Session: Not on file   Stress:     Feeling of Stress : Not on file   Social Connections:     Frequency of Communication with Friends and Family: Not on file    Frequency of Social Gatherings with Friends and Family: Not on file    Attends Latter-day Services: Not on file    Active Member of Clubs or Organizations: Not on file    Attends Club or Organization Meetings: Not on file    Marital Status: Not on file   Intimate Partner Violence:     Fear of Current or Ex-Partner: Not on file    Emotionally Abused: Not on file    Physically Abused: Not on file    Sexually Abused: Not on file   Housing Stability:     Unable to Pay for Housing in the Last Year: Not on file    Number of [place] in the Last Year: Not on file    Unstable Housing in the Last Year: Not on file      Family History   Problem Relation Age of Onset    Diabetes Mother     Other Mother         thyroid    Heart Disease Father     Arthritis Father     High Blood Pressure Father     High Cholesterol Father     Other Father         glaucoma    Other Sister         thyroid, glaucoma    Diabetes Brother     Other Sister     Vision Loss Sister         lung problems, smoker    Cancer Sister         throat cancer    Other Son         HX of clots    Early Death Son         Hit by car and killed.     High Blood Pressure Daughter     Stroke Son         No residual    Other Son         HX myocarditis Physical Exam:       Vitals:    11/22/21 0600 11/22/21 0823 11/22/21 0826 11/22/21 0915   BP:    (!) 153/74   Pulse:    54   Resp:  16 21 16   Temp:    97.4 °F (36.3 °C)   TempSrc:    Oral   SpO2:   95% 98%   Weight: 139 lb 12.8 oz (63.4 kg)      Height:           Wt Readings from Last 3 Encounters:   11/22/21 139 lb 12.8 oz (63.4 kg)   11/11/21 121 lb (54.9 kg)   11/01/21 121 lb 12.8 oz (55.2 kg)     Temp Readings from Last 3 Encounters:   11/22/21 97.4 °F (36.3 °C) (Oral)   11/11/21 98 °F (36.7 °C)   11/01/21 97.5 °F (36.4 °C) (Infrared)     BP Readings from Last 3 Encounters:   11/22/21 (!) 153/74   11/11/21 (!) 129/57   11/01/21 116/60     Pulse Readings from Last 3 Encounters:   11/22/21 54   11/11/21 60   11/01/21 (!) 46        Gen: Alert and oriented X 4  HEENT: NC/AT, EOMI, PERRL, mmm, neck supple, no meningeal signs; Heart: SB on monitor  Lungs: appears to be SOB  Ext: no edema,   Psych: appears anxious  Skin: no rashes or lesions    NEUROLOGIC EXAM:    Mental Status: Alert and oriented to person, place, and month, Speech is clear. Language fluent. Follows all commands. Cranial Nerve Exam:   CN II-XII:intact,   Motor Exam:       Strength: 5/5 UE/LE's  Tone and bulk normal   LUIS ANTONIO pronator drift    Deep Tendon Reflexes: 1/4 biceps, triceps, brachioradialis, 1/4 patellar, and 0/4 achilles b/l; flexor plantar responses b/l    Sensation: intact to LT    Coordination/Cerebellum:       Tremors--none        Gait and stance:      Gait: deferred      LABS:        CBC:   Recent Labs     11/22/21  0451   WBC 10.1   RBC 3.64*   HGB 12.1*   HCT 35.9*      MCV 98.6     BMP:    Recent Labs     11/22/21  0451      K 4.3      CO2 28   BUN 10   CREATININE 0.4*   GLUCOSE 132*   CALCIUM 8.3       IMAGING:    MRI of brain:  1. There appear to be 3 punctate acute to subacute infarcts involving the   right frontal lobe, left temporal lobe as well as the left occipital lobe.    No significant mass effect or midline shift. 2. Otherwise, no acute intracranial abnormality. 3. Mild global parenchymal volume loss with mild chronic microvascular   ischemic changes.  Findings appear progressed. These results were sent to the [facility] [id number] ([facility]) on   11/19/2021 at 3:35 pm to be communicated to the referring/covering health   care provider/office. CT of head  Impression   No acute intracranial abnormality. CTA of head and neck  Impression   No large vessel occlusion visualized within the head or neck. Above imaging reviewed in detail. ASSESSMENT/PLAN:   80 y.o. -female with PMH COPD, asthma, and thyroid disease presenting with respiratory distress and unresponsiveness. Found in car by EMS. Neurology being consulted for possible seizure. Patient is extubated and alert and oriented. No focal findings on exam.      1. Unresponsiveness possibly due to Takotsubo syndrome vs less likely seizure. MRI of brain does show multiple punctate areas of infarction  Neurodiagnostics  -CT HEAD- non acute  -CTA of head and neck- no LVO  -MRI as above. Personally reviewed and infarctions appear to be in a watershed distribution however feel further cardiac work up for possible embolic source is warranted. - Continue ASA and statin therapy  -EEG ordered to assess for any evidence of seizure nidus/cortical irritability. EEG interpretation as follows: no electrographic seizures or non - convulsive status epilepticus was seen over the entire monitoring period. JAN ordered    Patient discussed with attending physician Dr. Slim Phillip    Thank you for allowing us to participate in the care of your patient. If there are any questions regarding evaluation please feel free to contact us.      DERIK Jackson - CNS, 11/22/2021

## 2021-11-22 NOTE — PROGRESS NOTES
Patient scheduled for JAN tomorrow morning (11/23). Please keep patient NPO after midnight. Heart center notified RN.

## 2021-11-23 LAB
ANION GAP SERPL CALCULATED.3IONS-SCNC: 9 MMOL/L (ref 4–16)
BASOPHILS ABSOLUTE: 0 K/CU MM
BASOPHILS RELATIVE PERCENT: 0.1 % (ref 0–1)
BUN BLDV-MCNC: 11 MG/DL (ref 6–23)
CALCIUM SERPL-MCNC: 7.9 MG/DL (ref 8.3–10.6)
CHLORIDE BLD-SCNC: 102 MMOL/L (ref 99–110)
CO2: 28 MMOL/L (ref 21–32)
CREAT SERPL-MCNC: 0.5 MG/DL (ref 0.6–1.1)
DIFFERENTIAL TYPE: ABNORMAL
EOSINOPHILS ABSOLUTE: 0.1 K/CU MM
EOSINOPHILS RELATIVE PERCENT: 1.2 % (ref 0–3)
GFR AFRICAN AMERICAN: >60 ML/MIN/1.73M2
GFR NON-AFRICAN AMERICAN: >60 ML/MIN/1.73M2
GLUCOSE BLD-MCNC: 91 MG/DL (ref 70–99)
HCT VFR BLD CALC: 36.3 % (ref 37–47)
HEMOGLOBIN: 12.2 GM/DL (ref 12.5–16)
IMMATURE NEUTROPHIL %: 1.2 % (ref 0–0.43)
LV EF: 28 %
LVEF MODALITY: NORMAL
LYMPHOCYTES ABSOLUTE: 3.1 K/CU MM
LYMPHOCYTES RELATIVE PERCENT: 28 % (ref 24–44)
MAGNESIUM: 1.8 MG/DL (ref 1.8–2.4)
MCH RBC QN AUTO: 33.2 PG (ref 27–31)
MCHC RBC AUTO-ENTMCNC: 33.6 % (ref 32–36)
MCV RBC AUTO: 98.9 FL (ref 78–100)
MONOCYTES ABSOLUTE: 1 K/CU MM
MONOCYTES RELATIVE PERCENT: 8.6 % (ref 0–4)
NUCLEATED RBC %: 0 %
PDW BLD-RTO: 13.2 % (ref 11.7–14.9)
PLATELET # BLD: 239 K/CU MM (ref 140–440)
PMV BLD AUTO: 10.4 FL (ref 7.5–11.1)
POTASSIUM SERPL-SCNC: 3.7 MMOL/L (ref 3.5–5.1)
RBC # BLD: 3.67 M/CU MM (ref 4.2–5.4)
SEGMENTED NEUTROPHILS ABSOLUTE COUNT: 6.8 K/CU MM
SEGMENTED NEUTROPHILS RELATIVE PERCENT: 60.9 % (ref 36–66)
SODIUM BLD-SCNC: 139 MMOL/L (ref 135–145)
TOTAL IMMATURE NEUTOROPHIL: 0.14 K/CU MM
TOTAL NUCLEATED RBC: 0 K/CU MM
WBC # BLD: 11.2 K/CU MM (ref 4–10.5)

## 2021-11-23 PROCEDURE — 6370000000 HC RX 637 (ALT 250 FOR IP): Performed by: INTERNAL MEDICINE

## 2021-11-23 PROCEDURE — 97530 THERAPEUTIC ACTIVITIES: CPT

## 2021-11-23 PROCEDURE — 85025 COMPLETE CBC W/AUTO DIFF WBC: CPT

## 2021-11-23 PROCEDURE — 2700000000 HC OXYGEN THERAPY PER DAY

## 2021-11-23 PROCEDURE — 93325 DOPPLER ECHO COLOR FLOW MAPG: CPT | Performed by: INTERNAL MEDICINE

## 2021-11-23 PROCEDURE — 94640 AIRWAY INHALATION TREATMENT: CPT

## 2021-11-23 PROCEDURE — 93312 ECHO TRANSESOPHAGEAL: CPT

## 2021-11-23 PROCEDURE — 2500000003 HC RX 250 WO HCPCS: Performed by: INTERNAL MEDICINE

## 2021-11-23 PROCEDURE — 94761 N-INVAS EAR/PLS OXIMETRY MLT: CPT

## 2021-11-23 PROCEDURE — 2580000003 HC RX 258: Performed by: INTERNAL MEDICINE

## 2021-11-23 PROCEDURE — 83735 ASSAY OF MAGNESIUM: CPT

## 2021-11-23 PROCEDURE — 2140000000 HC CCU INTERMEDIATE R&B

## 2021-11-23 PROCEDURE — 97112 NEUROMUSCULAR REEDUCATION: CPT

## 2021-11-23 PROCEDURE — 7100000001 HC PACU RECOVERY - ADDTL 15 MIN

## 2021-11-23 PROCEDURE — 93312 ECHO TRANSESOPHAGEAL: CPT | Performed by: INTERNAL MEDICINE

## 2021-11-23 PROCEDURE — 7100000000 HC PACU RECOVERY - FIRST 15 MIN

## 2021-11-23 PROCEDURE — 80048 BASIC METABOLIC PNL TOTAL CA: CPT

## 2021-11-23 PROCEDURE — 6360000002 HC RX W HCPCS: Performed by: INTERNAL MEDICINE

## 2021-11-23 RX ORDER — FLUMAZENIL 0.1 MG/ML
0.2 INJECTION, SOLUTION INTRAVENOUS PRN
Status: DISCONTINUED | OUTPATIENT
Start: 2021-11-23 | End: 2021-11-26 | Stop reason: HOSPADM

## 2021-11-23 RX ADMIN — MONTELUKAST 10 MG: 10 TABLET, FILM COATED ORAL at 20:24

## 2021-11-23 RX ADMIN — PANTOPRAZOLE SODIUM 40 MG: 40 TABLET, DELAYED RELEASE ORAL at 06:40

## 2021-11-23 RX ADMIN — ESCITALOPRAM OXALATE 20 MG: 10 TABLET ORAL at 10:09

## 2021-11-23 RX ADMIN — SUCRALFATE 1 G: 1 TABLET ORAL at 12:12

## 2021-11-23 RX ADMIN — SUCRALFATE 1 G: 1 TABLET ORAL at 10:07

## 2021-11-23 RX ADMIN — LEVOTHYROXINE SODIUM 50 MCG: 25 TABLET ORAL at 06:40

## 2021-11-23 RX ADMIN — LISINOPRIL 1.25 MG: 2.5 TABLET ORAL at 10:08

## 2021-11-23 RX ADMIN — SODIUM CHLORIDE, PRESERVATIVE FREE 10 ML: 5 INJECTION INTRAVENOUS at 20:38

## 2021-11-23 RX ADMIN — SUCRALFATE 1 G: 1 TABLET ORAL at 20:24

## 2021-11-23 RX ADMIN — CEFEPIME HYDROCHLORIDE 1000 MG: 1 INJECTION, POWDER, FOR SOLUTION INTRAMUSCULAR; INTRAVENOUS at 23:06

## 2021-11-23 RX ADMIN — PREDNISONE 40 MG: 20 TABLET ORAL at 10:07

## 2021-11-23 RX ADMIN — SUCRALFATE 1 G: 1 TABLET ORAL at 16:12

## 2021-11-23 RX ADMIN — ATORVASTATIN CALCIUM 40 MG: 40 TABLET, FILM COATED ORAL at 20:24

## 2021-11-23 RX ADMIN — ALBUTEROL SULFATE 4 PUFF: 90 AEROSOL, METERED RESPIRATORY (INHALATION) at 16:12

## 2021-11-23 RX ADMIN — ALBUTEROL SULFATE 4 PUFF: 90 AEROSOL, METERED RESPIRATORY (INHALATION) at 12:24

## 2021-11-23 RX ADMIN — LIDOCAINE HYDROCHLORIDE 15 ML: 20 SOLUTION ORAL; TOPICAL at 08:44

## 2021-11-23 RX ADMIN — SODIUM CHLORIDE, PRESERVATIVE FREE 10 ML: 5 INJECTION INTRAVENOUS at 10:11

## 2021-11-23 RX ADMIN — DOXYCYCLINE 100 MG: 100 INJECTION, POWDER, LYOPHILIZED, FOR SOLUTION INTRAVENOUS at 21:55

## 2021-11-23 RX ADMIN — DOXYCYCLINE 100 MG: 100 INJECTION, POWDER, LYOPHILIZED, FOR SOLUTION INTRAVENOUS at 10:06

## 2021-11-23 RX ADMIN — ASPIRIN 81 MG: 81 TABLET, COATED ORAL at 10:09

## 2021-11-23 RX ADMIN — TIMOLOL MALEATE 1 DROP: 5 SOLUTION OPHTHALMIC at 10:14

## 2021-11-23 RX ADMIN — CEFEPIME HYDROCHLORIDE 1000 MG: 1 INJECTION, POWDER, FOR SOLUTION INTRAMUSCULAR; INTRAVENOUS at 11:24

## 2021-11-23 ASSESSMENT — PAIN SCALES - GENERAL
PAINLEVEL_OUTOF10: 0
PAINLEVEL_OUTOF10: 0

## 2021-11-23 NOTE — PROGRESS NOTES
Chart reviewed. Plans for JAN this morning. Continue current stroke prevention regimen. Can be discharged to rehab from our standpoint. Follow up in our office after discharge. Dede SANDOVAL-CNS  Neurology

## 2021-11-23 NOTE — PROGRESS NOTES
from floor. Pt picked up 5 wash cloths with CGA/min A for dynamic standing balance and tossed them into linen bin with min A for balance. Pt pushed linen bin to storage space with CGA for steadying and BL UE support on linen bin. Pt ambulated back to bed min A with no AD. Pt with very unsteady gait without use of device. Pt transferred stand to sit to bed SBA, and transferred sitting EOB to supine with supervision. Pt left positioned for comfort in bed with all lines intact, all needs within reach, and bed alarm on. Assessment / Impression:    Patient's tolerance of treatment: Well  Adverse Reaction: None  Significant change in status and impact: Continued improvements  Barriers to improvement: None      Plan for Next Session:    Continue per OT POC. Continue to recommend inpatient rehab at discharge. Pt is extremely high functioning at baseline, and is still well below baseline.     Time in: 1445  Time out: 1509  Timed treatment minutes: 24  Total treatment time: 24      Electronically signed by:    GENEVIEVE Rivera/L, [address] [place] [id number]

## 2021-11-23 NOTE — DISCHARGE INSTR - COC
Administered    COVID-19, Pfizer, PF, 30mcg/0.3mL 01/20/2021, 02/11/2021, 10/01/2021    Pneumococcal Conjugate 13-valent ([id number]) 07/26/2016    Pneumococcal Polysaccharide (Fqquouihk91) 04/06/2015, 11/28/2017    Tdap (Boostrix, Adacel) 07/26/2017    Zoster Recombinant (Shingrix) 05/05/2019, 07/06/2019       Active Problems:  Patient Active Problem List   Diagnosis Code    Closed intertrochanteric fracture of left femur ([facility] [address] S72.142A    Gait disturbance R26.9    Anemia D64.9    Dizziness R42    Acquired hypothyroidism E03.9    Murmur R01.1    Age-related osteoporosis without current pathological fracture M81.0    Black tongue K14.3    COPD exacerbation (HCC) J44.1    Vitamin D deficiency E55.9    Vitamin B12 deficiency E53.8    Gastroesophageal reflux disease without esophagitis K21.9    VHD (valvular heart disease) I38    Aortic stenosis, severe I35.0    Acute respiratory distress R06.03    COPD with exacerbation (HCC) J44.1    Nodule of lower lobe of right lung R91.1    S/P TAVR (transcatheter aortic valve replacement) Z95.2    Primary hypertension I10    Acute respiratory failure (HCC) J96.00    Other seizures ([facility] [address] Q51.96    Metabolic encephalopathy W59.39    Cerebrovascular accident (CVA) due to embolism of cerebral artery ([facility] [address] I63.40    [facility]) [facility] I63.9       Isolation/Infection:   Isolation            No Isolation          Patient Infection Status       Infection Onset Added Last Indicated Last Indicated By Review Planned Expiration Resolved Resolved By    None active    Resolved    COVID-19 (Rule Out) 11/16/21 11/16/21 11/16/21 Respiratory Panel, Molecular, with COVID-19 (Restricted: peds pts or suitable admitted adults) (Ordered)   11/16/21 Rule-Out Test Resulted    COVID-19 (Rule Out) 11/14/21 11/14/21 11/14/21 COVID-19, Rapid (Ordered)   11/14/21 Rule-Out Test Resulted            Nurse Assessment:  Last Vital Signs: BP (!) 190/94   Pulse 85   Temp 98.2 °F (36.8 °C) (Oral) Feeding: {CHP DME Other Feedings:471937854}  Liquids: {Slp liquid thickness:44024}  Daily Fluid Restriction: {CHP DME Yes amt example:036491927}  Last Modified Barium Swallow with Video (Video Swallowing Test): {Done Not Done KESU:991907031}    Treatments at the Time of Hospital Discharge:   Respiratory Treatments: ***  Oxygen Therapy:  {Therapy; copd oxygen:10879}  Ventilator:    {[facility] Vent WFB}    Rehab Therapies: {THERAPEUTIC INTERVENTION:5108205331}  Weight Bearing Status/Restrictions: {[facility] Weight Bearin}  Other Medical Equipment (for information only, NOT a DME order):  {EQUIPMENT:766403351}  Other Treatments: ***    Prognosis: {Prognosis:0928093986}    Condition at Discharge: [address] [place] [address] Patient Condition:927347711}    Rehab Potential (if transferring to Rehab): {Prognosis:4044372573}    Recommended Labs or Other Treatments After Discharge: ***    Physician Certification: I certify the above information and transfer of Melita Desai  is necessary for the continuing treatment of the diagnosis listed and that she requires {Admit to Appropriate Level of Care:95087} for {GREATER/LESS:563740267} 30 days.      Update Admission H&P: {CHP DME Changes in OVIZF:485148679}    PHYSICIAN SIGNATURE:  {Esignature:874314353}

## 2021-11-23 NOTE — PROGRESS NOTES
Pulmonary and Critical Care  Progress Note    Subjective: The patient is doing well. On RA. Having JAN today. Shortness of breath none  Chest pain none  Addressing respiratory complaints Patient is negative for  hemoptysis and cyanosis  CONSTITUTIONAL:  negative for fevers and chills      Past Medical History:     has a past medical history of Arthritis, Asthma, Blood transfusion, Chronic bronchitis ([facility] [address] COPD (chronic obstructive pulmonary disease) ([facility] [address] Echocardiogram, Fatigue, H/O cardiac catheterization, H/O cardiovascular stress test, H/O Doppler ultrasound, H/O Doppler ultrasound, H/O echocardiogram, H/O echocardiogram, H/O echocardiogram, H/O echocardiogram, H/O exercise stress test, History of cardiac cath, History of nuclear stress test, Holter monitor, abnormal, Normal cardiac stress test, On home O2, Syncope and collapse, Tachycardia, and Thyroid disease. has a past surgical history that includes joint replacement (2004); Hysterectomy (1966); Cholecystectomy (1961); Breast surgery (2009); Toe Surgery (Early 2000's); Carpal tunnel release (1990's); Finger surgery; Gastric restriction surgery (1984); Cardiac surgery (1989); hernia repair (unknown); lipectomy (1990's); Dilatation, esophagus; other surgical history (05/01/2012); Cardiac catheterization (03/02/2011); Hip fracture surgery (Left, 11/04/2015); Colonoscopy; Endoscopy, colon, diagnostic (07/03/2017); Total shoulder arthroplasty (Left, 10/2017); Aortic valve replacement (N/A, 09/11/2019); and Aortic valve repair (N/A, 09/09/2019). reports that she quit smoking about 59 years ago. She has a 15.00 pack-year smoking history. She has never used smokeless tobacco. She reports that she does not drink alcohol and does not use drugs. Family history:  family history includes Arthritis in her father; Cancer in her sister; Diabetes in her brother and mother; Early Death in her son;  Heart Disease in her father; High Blood Pressure in her daughter and father; High Cholesterol in her father; Other in her father, mother, sister, sister, son, and son; Stroke in her son; Vision Loss in her sister. Allergies   Allergen Reactions    Morphine Anaphylaxis     Dilaudid OK    Bactrim [Sulfamethoxazole-Trimethoprim]     Butorphanol      Other reaction(s): Other - comment required  \"out of body experience\"    Influenza Vaccines      Ended in hospital stay for 3 days told by md not to get it anymore     Lactose Intolerance (Gi) Nausea Only    Phenergan [Promethazine Hcl] Other (See Comments)     Makes me jerk all over. Zofran OK    Promethazine     Stadol [Butorphanol Tartrate] Other (See Comments)     Out of body experience. Was told it was a near death experience and was placed in ICU. Dilaudid OK    Tape Quita Edinger Tape] Other (See Comments)     Plastic cause itching and rash. Paper tape causes my skin to blister. (Tega-derm and Coban OK to use.)     Social History:    Reviewed; no changes    Objective:   PHYSICAL EXAM:        VITALS:  BP (!) 111/58   Pulse 53   Temp 98 °F (36.7 °C) (Oral)   Resp 12   Ht 5' 0.98\" (1.549 m)   Wt 132 lb 1.6 oz (59.9 kg)   SpO2 95%   BMI 24.97 kg/m²     24HR INTAKE/OUTPUT:    No intake or output data in the 24 hours ending 11/23/21 1022    CONSTITUTIONAL: Alert. LUNGS:  decreased breath sounds  CARDIOVASCULAR:  normal S1 and S2 and positive JVD  ABD:Abdomen soft, non-tender. BS normal. No masses,  No organomegaly  NEURO: Alert.   DATA:    CBC:  Recent Labs     11/21/21  0625 11/22/21  0451 11/23/21  0415   WBC 9.5 10.1 11.2*   RBC 3.54* 3.64* 3.67*   HGB 11.8* 12.1* 12.2*   HCT 35.3* 35.9* 36.3*    242 239   MCV 99.7 98.6 98.9   MCH 33.3* 33.2* 33.2*   MCHC 33.4 33.7 33.6   RDW 13.1 13.2 13.2   SEGSPCT 74.9* 76.0* 60.9      BMP:  Recent Labs     11/21/21  0545 11/22/21  0451 11/23/21  0415    140 139   K 4.3 4.3 3.7    102 102   CO2 29 28 28   BUN 10 10 11   CREATININE 0.4* 0.4* 0.5*   CALCIUM 8.5 8.3 7.9*   GLUCOSE 128* 132* 91      ABG:  No results for input(s): PH, PO2ART, PTU6BFI, HCO3, BEART, O2SAT in the last 72 hours. Lab Results   Component Value Date    PROBNP 7,287 (H) 11/16/2021    PROBNP 888.5 (H) 11/14/2021    PROBNP 550.9 (H) 10/13/2020     No results found for: [facility]    Radiology Review:  Pertinent images / reports were reviewed as a part of this visit. Assessment:     Patient Active Problem List   Diagnosis    Closed intertrochanteric fracture of left femur (HCC)    Gait disturbance    Anemia    Dizziness    Acquired hypothyroidism    Murmur    Age-related osteoporosis without current pathological fracture    Black tongue    COPD exacerbation (HCC)    Vitamin D deficiency    Vitamin B12 deficiency    Gastroesophageal reflux disease without esophagitis    VHD (valvular heart disease)    Aortic stenosis, severe    Acute respiratory distress    COPD with exacerbation (HCC)    Nodule of lower lobe of right lung    S/P TAVR (transcatheter aortic valve replacement)    Primary hypertension    Acute respiratory failure (HCC)    Other seizures ([address]    Metabolic encephalopathy    Cerebrovascular accident (CVA) due to embolism of cerebral artery ([address]    Cerebrovascular accident (CVA) ([address]       Plan:   1. Overall the patient has improved. 2. Inc. activity.   Graciela Smith MD   11/23/2021  10:22 AM

## 2021-11-23 NOTE — PROGRESS NOTES
Hospitalist Progress Note      Name:  Kyle Nolasco /Age/Sex: 1935  (80 y.o. female)   MRN & CSN:  [id number] & [id number] Admission Date/Time: 2021  2:06 PM   Location:  6091/6344S PCP: Rosi Davies, [address] Day: 10      Assessment and Plan:       Acute hypoxic ko failure: Extubated : Continue bronchodilators, continue prednisone  COPD exacerbation: Improving: Continue prednisone: Pulmonary following  Sepsis likely due to pneumonia: Continue doxy and cefepime  Elevated troponin: Suspect due to demand ischemia: Cardiology following  Status post TAVR: Cardiology following  Wide-complex tachycardia: Cardiology following  Acute cerebral infarct per MRI: JAN today continue aspirin, statins. Neurology following  History of mood disorder continue Lexapro  9. Hypohypothyroidism likely acquired: Continue levothyroxine      Patient to be discharged to inpatient rehab pending pre-CERT. DVT prophylaxis: SCDs  CODE STATUS: Total support        Subjective. :     Patient was seen and examined today. No acute events overnight. Scheduled for JAN today. Will be discharged to inpatient rehab pending pre-CERT. Objective:   Vitals:   Vitals:    21 1225   BP:    Pulse:    Resp: 15   Temp:    SpO2: 98%         Physical Exam:   GEN: Awake, alert, in no apparent distress awake female, sitting upright in bed in no apparent distress. Appears given age. HEENT: Atraumatic, normocephalic, PERRLA, EOMI  NECK: Supple, no apparent thyromegaly or masses. RESP: Clear lungs to auscultation  CARDIO/VASC: Regular rate and rhythm, normal S1, S2, no murmurs  GI: Abdomen is soft, nontender, no significant organomegaly noted, bowel sounds present  MSK: No gross joint deformities.   Skin: No significant rashes, skin lesions noted  Neuro: AOx3, cranial nerves grossly intact, no focal motor or sensory deficits   PSYCH: Affect appropriate    Medications:   Medications:    atorvastatin  40 mg Oral Nightly    predniSONE  40 mg Oral Daily    lisinopril  1.25 mg Oral Daily    metoprolol succinate  12.5 mg Oral Daily    albuterol sulfate HFA  4 puff Inhalation 4x daily    potassium bicarb-citric acid  20 mEq Oral Once    potassium bicarb-citric acid  20 mEq Oral Once    sodium chloride flush  5-40 mL IntraVENous 2 times per day    budesonide-formoterol  2 puff Inhalation BID    montelukast  10 mg Per NG tube Nightly    cefepime  1,000 mg IntraVENous Q12H    aspirin  81 mg Oral Daily    escitalopram  20 mg Oral Daily    levothyroxine  50 mcg Oral Daily    sucralfate  1 g Oral 4x Daily    pantoprazole  40 mg Oral QAM AC    timolol  1 drop Both Eyes Daily    sodium chloride flush  5-40 mL IntraVENous 2 times per day    doxycycline (VIBRAMYCIN) IV  100 mg IntraVENous Q12H      Infusions:    sodium chloride 25 mL (11/22/21 2313)    dextrose      sodium chloride 25 mL (11/21/21 2138)     PRN Meds: flumazenil, 0.2 mg, PRN  oxyCODONE-acetaminophen, 1 tablet, Q6H PRN  potassium chloride, 20 mEq, PRN  potassium chloride, 40 mEq, PRN   Or  potassium alternative oral replacement, 40 mEq, PRN   Or  potassium chloride, 10 mEq, PRN  polyvinyl alcohol, , Q2H PRN  sodium chloride flush, 5-40 mL, PRN  sodium chloride, 25 mL, PRN  acetaminophen, 650 mg, Q4H PRN  albuterol, 2.5 mg, Q4H PRN  glucose, 15 g, PRN  dextrose, 12.5 g, PRN  glucagon (rDNA), 1 mg, PRN  dextrose, 100 mL/hr, PRN  sodium chloride flush, 5-40 mL, PRN  sodium chloride, 25 mL, PRN  ondansetron, 4 mg, Q8H PRN   Or  ondansetron, 4 mg, Q6H PRN  polyethylene glycol, 17 g, Daily PRN  acetaminophen, 650 mg, Q6H PRN   Or  acetaminophen, 650 mg, Q6H PRN  magnesium sulfate, 2,000 mg, PRN          Electronically signed by Anastacia Asencio MD on 11/23/2021 at 1:04 PM

## 2021-11-23 NOTE — PLAN OF CARE
Nutrition Problem #1: Predicted inadequate energy intake  Intervention: Food and/or Nutrient Delivery: Continue Current Diet, Start Oral Nutrition Supplement  Nutritional Goals: pt will consume greater than 75% of her meals and supplements

## 2021-11-23 NOTE — PROGRESS NOTES
Comprehensive Nutrition Assessment    Type and Reason for Visit:  Reassess    Nutrition Recommendations/Plan:   · Continue current diet  · Begin standard high sheri oral nutrition supplement bid, between meals    Nutrition Assessment:  Feeding self and consuming generally greater than half of meals. Plan for D/C to ARU noted. No recent wt loss noted. Pt off unit for JAN during my rom visit. Will order oral supplement and continue to follow as moderate nutrition risk. Malnutrition Assessment:  Malnutrition Status:  Insufficient data    Context:  Acute Illness       Estimated Daily Nutrient Needs:  Energy (kcal):  2452-1306 (25-30 kcal/kg); Weight Used for Energy Requirements:  Current     Protein (g):   (1.2-2.0 g/kg); Weight Used for Protein Requirements:  Current        Fluid (ml/day):  1286-4225; Method Used for Fluid Requirements:    1 ml/kcal     Wounds:  None       Current Nutrition Therapies:    ADULT DIET; Regular; Low Sodium (2 gm)    Anthropometric Measures:  · Height: 5' 0.98\" (154.9 cm)  · Current Body Weight: 132 lb 1.6 oz (59.9 kg)   · Admission Body Weight: 121 lb (54.9 kg)    · Usual Body Weight: 132 lb 7.9 oz (60.1 kg) ((3/19/21) unknown weight source)     · Ideal Body Weight: 105 lbs; % Ideal Body Weight 115.3 %   · BMI: 25  · BMI Categories: Overweight (BMI 25.0-29. 9)       Nutrition Diagnosis:   · Predicted inadequate energy intake related to impaired respiratory function as evidenced by intake 51-75%    Nutrition Interventions:   Food and/or Nutrient Delivery:  Continue Current Diet, Start Oral Nutrition Supplement  Nutrition Education/Counseling:  No recommendation at this time   Coordination of Nutrition Care:  Continue to monitor while inpatient    Goals:  pt will consume greater than 75% of her meals and supplements       Nutrition Monitoring and Evaluation:   Behavioral-Environmental Outcomes:  None Identified   Food/Nutrient Intake Outcomes:  Food and Nutrient Intake, Supplement Intake  Physical Signs/Symptoms Outcomes:  Biochemical Data, Skin, Weight, Meal Time Behavior     Discharge Planning:    Continue Oral Nutrition Supplement     Electronically signed by Elroy Koyanagi, RD, LD on 11/23/21 at 1:19 PM EST    Contact: [phone]

## 2021-11-24 LAB
ANION GAP SERPL CALCULATED.3IONS-SCNC: 9 MMOL/L (ref 4–16)
BASOPHILS ABSOLUTE: 0 K/CU MM
BASOPHILS RELATIVE PERCENT: 0.2 % (ref 0–1)
BUN BLDV-MCNC: 9 MG/DL (ref 6–23)
CALCIUM SERPL-MCNC: 7.6 MG/DL (ref 8.3–10.6)
CHLORIDE BLD-SCNC: 103 MMOL/L (ref 99–110)
CO2: 28 MMOL/L (ref 21–32)
CREAT SERPL-MCNC: 0.4 MG/DL (ref 0.6–1.1)
DIFFERENTIAL TYPE: ABNORMAL
EOSINOPHILS ABSOLUTE: 0.2 K/CU MM
EOSINOPHILS RELATIVE PERCENT: 1.9 % (ref 0–3)
GFR AFRICAN AMERICAN: >60 ML/MIN/1.73M2
GFR NON-AFRICAN AMERICAN: >60 ML/MIN/1.73M2
GLUCOSE BLD-MCNC: 74 MG/DL (ref 70–99)
HCT VFR BLD CALC: 38.7 % (ref 37–47)
HEMOGLOBIN: 12.9 GM/DL (ref 12.5–16)
IMMATURE NEUTROPHIL %: 1.5 % (ref 0–0.43)
LYMPHOCYTES ABSOLUTE: 2.8 K/CU MM
LYMPHOCYTES RELATIVE PERCENT: 30.6 % (ref 24–44)
MAGNESIUM: 1.8 MG/DL (ref 1.8–2.4)
MCH RBC QN AUTO: 32.7 PG (ref 27–31)
MCHC RBC AUTO-ENTMCNC: 33.3 % (ref 32–36)
MCV RBC AUTO: 98.2 FL (ref 78–100)
MONOCYTES ABSOLUTE: 0.8 K/CU MM
MONOCYTES RELATIVE PERCENT: 8.3 % (ref 0–4)
NUCLEATED RBC %: 0 %
PDW BLD-RTO: 13.3 % (ref 11.7–14.9)
PLATELET # BLD: 258 K/CU MM (ref 140–440)
PMV BLD AUTO: 10.6 FL (ref 7.5–11.1)
POTASSIUM SERPL-SCNC: 3.6 MMOL/L (ref 3.5–5.1)
RBC # BLD: 3.94 M/CU MM (ref 4.2–5.4)
SEGMENTED NEUTROPHILS ABSOLUTE COUNT: 5.3 K/CU MM
SEGMENTED NEUTROPHILS RELATIVE PERCENT: 57.5 % (ref 36–66)
SODIUM BLD-SCNC: 140 MMOL/L (ref 135–145)
TOTAL IMMATURE NEUTOROPHIL: 0.14 K/CU MM
TOTAL NUCLEATED RBC: 0 K/CU MM
WBC # BLD: 9.1 K/CU MM (ref 4–10.5)

## 2021-11-24 PROCEDURE — 80048 BASIC METABOLIC PNL TOTAL CA: CPT

## 2021-11-24 PROCEDURE — 2140000000 HC CCU INTERMEDIATE R&B

## 2021-11-24 PROCEDURE — 6370000000 HC RX 637 (ALT 250 FOR IP): Performed by: INTERNAL MEDICINE

## 2021-11-24 PROCEDURE — 6360000002 HC RX W HCPCS: Performed by: INTERNAL MEDICINE

## 2021-11-24 PROCEDURE — APPSS45 APP SPLIT SHARED TIME 31-45 MINUTES: Performed by: NURSE PRACTITIONER

## 2021-11-24 PROCEDURE — 94761 N-INVAS EAR/PLS OXIMETRY MLT: CPT

## 2021-11-24 PROCEDURE — 94640 AIRWAY INHALATION TREATMENT: CPT

## 2021-11-24 PROCEDURE — 99232 SBSQ HOSP IP/OBS MODERATE 35: CPT | Performed by: INTERNAL MEDICINE

## 2021-11-24 PROCEDURE — 83735 ASSAY OF MAGNESIUM: CPT

## 2021-11-24 PROCEDURE — 85025 COMPLETE CBC W/AUTO DIFF WBC: CPT

## 2021-11-24 PROCEDURE — 2580000003 HC RX 258: Performed by: INTERNAL MEDICINE

## 2021-11-24 RX ORDER — PREDNISONE 20 MG/1
20 TABLET ORAL DAILY
Status: COMPLETED | OUTPATIENT
Start: 2021-11-25 | End: 2021-11-26

## 2021-11-24 RX ORDER — LISINOPRIL 2.5 MG/1
2.5 TABLET ORAL DAILY
Status: DISCONTINUED | OUTPATIENT
Start: 2021-11-25 | End: 2021-11-26 | Stop reason: HOSPADM

## 2021-11-24 RX ADMIN — PREDNISONE 40 MG: 20 TABLET ORAL at 09:28

## 2021-11-24 RX ADMIN — SUCRALFATE 1 G: 1 TABLET ORAL at 14:29

## 2021-11-24 RX ADMIN — ONDANSETRON 4 MG: 2 INJECTION INTRAMUSCULAR; INTRAVENOUS at 09:30

## 2021-11-24 RX ADMIN — LEVOTHYROXINE SODIUM 50 MCG: 25 TABLET ORAL at 06:21

## 2021-11-24 RX ADMIN — LISINOPRIL 1.25 MG: 2.5 TABLET ORAL at 09:30

## 2021-11-24 RX ADMIN — ALBUTEROL SULFATE 4 PUFF: 90 AEROSOL, METERED RESPIRATORY (INHALATION) at 12:10

## 2021-11-24 RX ADMIN — PANTOPRAZOLE SODIUM 40 MG: 40 TABLET, DELAYED RELEASE ORAL at 06:21

## 2021-11-24 RX ADMIN — ALBUTEROL SULFATE 4 PUFF: 90 AEROSOL, METERED RESPIRATORY (INHALATION) at 09:09

## 2021-11-24 RX ADMIN — SUCRALFATE 1 G: 1 TABLET ORAL at 17:47

## 2021-11-24 RX ADMIN — MONTELUKAST 10 MG: 10 TABLET, FILM COATED ORAL at 21:30

## 2021-11-24 RX ADMIN — SUCRALFATE 1 G: 1 TABLET ORAL at 21:30

## 2021-11-24 RX ADMIN — METOPROLOL SUCCINATE 12.5 MG: 25 TABLET, EXTENDED RELEASE ORAL at 09:28

## 2021-11-24 RX ADMIN — ONDANSETRON 4 MG: 2 INJECTION INTRAMUSCULAR; INTRAVENOUS at 18:39

## 2021-11-24 RX ADMIN — ASPIRIN 81 MG: 81 TABLET, COATED ORAL at 09:30

## 2021-11-24 RX ADMIN — SUCRALFATE 1 G: 1 TABLET ORAL at 09:30

## 2021-11-24 RX ADMIN — ALBUTEROL SULFATE 4 PUFF: 90 AEROSOL, METERED RESPIRATORY (INHALATION) at 15:56

## 2021-11-24 RX ADMIN — ALBUTEROL SULFATE 4 PUFF: 90 AEROSOL, METERED RESPIRATORY (INHALATION) at 20:07

## 2021-11-24 RX ADMIN — SODIUM CHLORIDE, PRESERVATIVE FREE 10 ML: 5 INJECTION INTRAVENOUS at 09:32

## 2021-11-24 RX ADMIN — ESCITALOPRAM OXALATE 20 MG: 10 TABLET ORAL at 09:28

## 2021-11-24 RX ADMIN — ATORVASTATIN CALCIUM 40 MG: 40 TABLET, FILM COATED ORAL at 21:30

## 2021-11-24 RX ADMIN — BUDESONIDE AND FORMOTEROL FUMARATE DIHYDRATE 2 PUFF: 160; 4.5 AEROSOL RESPIRATORY (INHALATION) at 20:07

## 2021-11-24 RX ADMIN — SODIUM CHLORIDE, PRESERVATIVE FREE 10 ML: 5 INJECTION INTRAVENOUS at 21:30

## 2021-11-24 RX ADMIN — BUDESONIDE AND FORMOTEROL FUMARATE DIHYDRATE 2 PUFF: 160; 4.5 AEROSOL RESPIRATORY (INHALATION) at 09:10

## 2021-11-24 ASSESSMENT — PAIN SCALES - GENERAL: PAINLEVEL_OUTOF10: 0

## 2021-11-24 NOTE — PROGRESS NOTES
Pulmonary and Critical Care  Progress Note    Subjective: The patient is feeling better. Shortness of breath none. Chest pain none. Addressing respiratory complaints Patient is negative for  hemoptysis and cyanosis. CONSTITUTIONAL:  negative for fevers and chills. Past Medical History:     has a past medical history of Arthritis, Asthma, Blood transfusion, Chronic bronchitis ([facility] [address] COPD (chronic obstructive pulmonary disease) ([facility] [address] Echocardiogram, Fatigue, H/O cardiac catheterization, H/O cardiovascular stress test, H/O Doppler ultrasound, H/O Doppler ultrasound, H/O echocardiogram, H/O echocardiogram, H/O echocardiogram, H/O echocardiogram, H/O exercise stress test, History of cardiac cath, History of nuclear stress test, Holter monitor, abnormal, Normal cardiac stress test, On home O2, Syncope and collapse, Tachycardia, and Thyroid disease. has a past surgical history that includes joint replacement (2004); Hysterectomy (1966); Cholecystectomy (1961); Breast surgery (2009); Toe Surgery (Early 2000's); Carpal tunnel release (1990's); Finger surgery; Gastric restriction surgery (1984); Cardiac surgery (1989); hernia repair (unknown); lipectomy (1990's); Dilatation, esophagus; other surgical history (05/01/2012); Cardiac catheterization (03/02/2011); Hip fracture surgery (Left, 11/04/2015); Colonoscopy; Endoscopy, colon, diagnostic (07/03/2017); Total shoulder arthroplasty (Left, 10/2017); Aortic valve replacement (N/A, 09/11/2019); and Aortic valve repair (N/A, 09/09/2019). reports that she quit smoking about 59 years ago. She has a 15.00 pack-year smoking history. She has never used smokeless tobacco. She reports that she does not drink alcohol and does not use drugs. Family history:  family history includes Arthritis in her father; Cancer in her sister; Diabetes in her brother and mother; Early Death in her son;  Heart Disease in her father; High Blood Pressure in her daughter and father; High Cholesterol in her father; Other in her father, mother, sister, sister, son, and son; Stroke in her son; Vision Loss in her sister. Allergies   Allergen Reactions    Morphine Anaphylaxis     Dilaudid OK    Bactrim [Sulfamethoxazole-Trimethoprim]     Butorphanol      Other reaction(s): Other - comment required  \"out of body experience\"    Influenza Vaccines      Ended in hospital stay for 3 days told by md not to get it anymore     Lactose Intolerance (Gi) Nausea Only    Phenergan [Promethazine Hcl] Other (See Comments)     Makes me jerk all over. Zofran OK    Promethazine     Stadol [Butorphanol Tartrate] Other (See Comments)     Out of body experience. Was told it was a near death experience and was placed in ICU. Dilaudid OK    Tape Orren Moore Tape] Other (See Comments)     Plastic cause itching and rash. Paper tape causes my skin to blister. (Tega-derm and Coban OK to use.)     Social History:    Reviewed; no changes    Objective:   PHYSICAL EXAM:        VITALS:  BP (!) 143/60   Pulse 57   Temp 97.5 °F (36.4 °C) (Oral)   Resp 17   Ht 5' 0.98\" (1.549 m)   Wt 133 lb (60.3 kg)   SpO2 98%   BMI 25.14 kg/m²     24HR INTAKE/OUTPUT:      Intake/Output Summary (Last 24 hours) at 11/24/2021 1040  Last data filed at 11/23/2021 2300  Gross per 24 hour   Intake 240 ml   Output    Net 240 ml       CONSTITUTIONAL: Alert. LUNGS:  decreased breath sounds  CARDIOVASCULAR:  normal S1 and S2 and positive JVD  ABD:Abdomen soft, non-tender. BS normal. No masses,  No organomegaly  NEURO: Alert.   DATA:    CBC:  Recent Labs     11/22/21  0451 11/23/21  0415 11/24/21  0545   WBC 10.1 11.2* 9.1   RBC 3.64* 3.67* 3.94*   HGB 12.1* 12.2* 12.9   HCT 35.9* 36.3* 38.7    239 258   MCV 98.6 98.9 98.2   MCH 33.2* 33.2* 32.7*   MCHC 33.7 33.6 33.3   RDW 13.2 13.2 13.3   SEGSPCT 76.0* 60.9 57.5      BMP:  Recent Labs     11/22/21  0451 11/23/21  0415 11/24/21  0545    139 140   K 4.3 3.7 3.6    102 103 CO2 28 28 28   BUN 10 11 9   CREATININE 0.4* 0.5* 0.4*   CALCIUM 8.3 7.9* 7.6*   GLUCOSE 132* 91 74      ABG:  No results for input(s): PH, PO2ART, CWE5KWT, HCO3, BEART, O2SAT in the last 72 hours. Lab Results   Component Value Date    PROBNP 7,287 (H) 11/16/2021    PROBNP 888.5 (H) 11/14/2021    PROBNP 550.9 (H) 10/13/2020     No results found for: [facility]    Radiology Review:  Pertinent images / reports were reviewed as a part of this visit. Assessment:     Patient Active Problem List   Diagnosis    Closed intertrochanteric fracture of left femur (HCC)    Gait disturbance    Anemia    Dizziness    Acquired hypothyroidism    Murmur    Age-related osteoporosis without current pathological fracture    Black tongue    COPD exacerbation (HCC)    Vitamin D deficiency    Vitamin B12 deficiency    Gastroesophageal reflux disease without esophagitis    VHD (valvular heart disease)    Aortic stenosis, severe    Acute respiratory distress    COPD with exacerbation (HCC)    Nodule of lower lobe of right lung    S/P TAVR (transcatheter aortic valve replacement)    Primary hypertension    Acute respiratory failure (HCC)    Other seizures ([address]    Metabolic encephalopathy    Cerebrovascular accident (CVA) due to embolism of cerebral artery ([address]    Cerebrovascular accident (CVA) ([address]       Plan:   1. Overall the patient has improved. 2. Inc. activity.   Ruta Krabbe, MD   11/24/2021  10:40 AM

## 2021-11-24 NOTE — PROGRESS NOTES
Hospitalist Progress Note      Name:  Keaton Clemons /Age/Sex: 1935  (80 y.o. female)   MRN & CSN:  [id number] & [id number] Admission Date/Time: 2021  2:06 PM   Location:  [address] [place] [address] PCP: Erika De La Cruz MD         Hospital Day: 11    Assessment and Plan:   Keaton Clemons is a 80 y.o.  female  who presents with Acute respiratory failure ([facility] [address]    1) Acute hypoxic resp failure due to COPD Exacerbation  -Extubated :   -Continue bronchodilators and prednisone  -Wean off steroid     2) Sepsis likely due to pneumonia  -Completed doxy and cefepime  -Monitor off Abx    3) Acute Ischemic CVA  -MRI Brain with 3 punctate acute to subacute infarcts involving Rt frontal, left temporal as well as left occipital lobe  -CTA Head and Neck: No large vessel occlusion  -JAN: No clot seen  -Neurology on board; continue ASA and Statin  -PT/OT; recommended reab    4) Elevated troponin  -Likely due to demand  -TTE noted  -Continue medical management    5) Takotsubo Cardiomyopathy  -TTE with EF of 25-30%, akinetic apex suggesting possible Takotsubo, and grade 3 diastolic dysfunction  -Continue medical management   -Repeat TTE in 3 months    Other chronic medical condition; medication resumed unless contraindicated    -Status post TAVR  -Mood Disorder  -Hypothyroidim       Diet ADULT DIET; Regular; Low Sodium (2 gm)   DVT Prophylaxis [] Lovenox, []  Heparin, [] SCDs, [] Ambulation   GI Prophylaxis [] PPI,  [] H2 Blocker,  [] Carafate,  [] Diet/Tube Feeds   Code Status Full Code   Disposition ARU Vs SNF   MDM      History of Present Illness:     Patient was seen and examined  Denied any chest pain or abdominal pain  No speech abnormality, no focal weakness reported  No acute events overnight    Ten point ROS reviewed negative, unless as noted above    Objective:        Intake/Output Summary (Last 24 hours) at 2021 1226  Last data filed at 2021 2300  Gross per 24 hour Intake 240 ml   Output    Net 240 ml      Vitals:   Vitals:    11/24/21 0915   BP: (!) 143/60   Pulse: 57   Resp: 17   Temp: 97.5 °F (36.4 °C)   SpO2:      Physical Exam:   GEN Awake female, sitting upright in bed in no apparent distress. Appears given age. EYES Pupils are equally round. No scleral erythema, discharge, or conjunctivitis. HENT Mucous membranes are moist. Oral pharynx without exudates, no evidence of thrush. NECK Supple, no apparent thyromegaly or masses. RESP Clear to auscultation, no wheezes, rales or rhonchi. Symmetric chest movement while on room air. CARDIO/VASC S1/S2 auscultated. Regular rate without appreciable murmurs, rubs, or gallops. No JVD or carotid bruits. Peripheral pulses equal bilaterally and palpable. No peripheral edema. GI Abdomen is soft without significant tenderness, masses, or guarding. Bowel sounds are normoactive. Rectal exam deferred.  No costovertebral angle tenderness. Macedo catheter is not present. HEME/LYMPH No palpable cervical lymphadenopathy and no hepatosplenomegaly. No petechiae or ecchymoses. MSK No gross joint deformities. SKIN Normal coloration, warm, dry. NEURO Cranial nerves appear grossly intact, normal speech, no lateralizing weakness. PSYCH Awake, alert, oriented x 4. Affect appropriate.     Medications:   Medications:    [START ON 11/25/2021] lisinopril  2.5 mg Oral Daily    atorvastatin  40 mg Oral Nightly    predniSONE  40 mg Oral Daily    metoprolol succinate  12.5 mg Oral Daily    albuterol sulfate HFA  4 puff Inhalation 4x daily    potassium bicarb-citric acid  20 mEq Oral Once    potassium bicarb-citric acid  20 mEq Oral Once    sodium chloride flush  5-40 mL IntraVENous 2 times per day    budesonide-formoterol  2 puff Inhalation BID    montelukast  10 mg Per NG tube Nightly    aspirin  81 mg Oral Daily    escitalopram  20 mg Oral Daily    levothyroxine  50 mcg Oral Daily    sucralfate  1 g Oral 4x Daily    pantoprazole  40 mg Oral QAM AC    timolol  1 drop Both Eyes Daily    sodium chloride flush  5-40 mL IntraVENous 2 times per day      Infusions:    sodium chloride 25 mL (11/22/21 2172)    dextrose      sodium chloride 25 mL (11/21/21 2138)     PRN Meds: flumazenil, 0.2 mg, PRN  oxyCODONE-acetaminophen, 1 tablet, Q6H PRN  potassium chloride, 20 mEq, PRN  potassium chloride, 40 mEq, PRN   Or  potassium alternative oral replacement, 40 mEq, PRN   Or  potassium chloride, 10 mEq, PRN  polyvinyl alcohol, , Q2H PRN  sodium chloride flush, 5-40 mL, PRN  sodium chloride, 25 mL, PRN  acetaminophen, 650 mg, Q4H PRN  albuterol, 2.5 mg, Q4H PRN  glucose, 15 g, PRN  dextrose, 12.5 g, PRN  glucagon (rDNA), 1 mg, PRN  dextrose, 100 mL/hr, PRN  sodium chloride flush, 5-40 mL, PRN  sodium chloride, 25 mL, PRN  ondansetron, 4 mg, Q8H PRN   Or  ondansetron, 4 mg, Q6H PRN  polyethylene glycol, 17 g, Daily PRN  acetaminophen, 650 mg, Q6H PRN   Or  acetaminophen, 650 mg, Q6H PRN  magnesium sulfate, 2,000 mg, PRN          Electronically signed by Yasmine Matson MD on 11/24/2021 at 12:26 PM

## 2021-11-24 NOTE — PROGRESS NOTES
Cardiology Progress Note     Today's Plan increase ACEi  will sign off and be available if needed    Admit Date:  11/14/2021    Consult reason/ Seen today for: elevated troponin     Subjective and  Overnight Events:  Reports she is feeling better-denies shortness of breath or chest pain     Telemetry SB     Assessment / Plan / Recommendation:     1. VHD- h/o TAVR- JAN completed-stable valve - noted to have depressed EF ( 25-30%) with Apical Ballooning consistent with Takotsubo cardiomyopathy   2. Takotsubo cardiomyopathy - Apical ballooning on JAN- EF 25-30 % medical management -continue lisinopril and metoprolol- no need for life vest- recommend titration of medications in the outpatient setting and recheck echo in approximantly 3-6 months   3. Elevated troponin-type 2 rise- had  [facility] 11/16/2021 no significant CAD  4. Wide complex tachycardia no further episodes -continue BB         History of Presenting Illness:    Chief complain on admission : 80 y. o.year old who is admitted for  Chief Complaint   Patient presents with    Other     unresponsive    Irregular Heart Beat     abnormal EKG        Past medical history:    has a past medical history of Arthritis, Asthma, Blood transfusion, Chronic bronchitis ([facility] [address] COPD (chronic obstructive pulmonary disease) ([facility] [address] Echocardiogram, Fatigue, H/O cardiac catheterization, H/O cardiovascular stress test, H/O Doppler ultrasound, H/O Doppler ultrasound, H/O echocardiogram, H/O echocardiogram, H/O echocardiogram, H/O echocardiogram, H/O exercise stress test, History of cardiac cath, History of nuclear stress test, Holter monitor, abnormal, Normal cardiac stress test, On home O2, Syncope and collapse, Tachycardia, and Thyroid disease. Past surgical history:   has a past surgical history that includes joint replacement (2004); Hysterectomy (1966); Cholecystectomy (1961); Breast surgery (2009);  Toe Surgery (Early 2000's); Carpal tunnel release (1990's); Finger surgery; Gastric restriction surgery (1984); Cardiac surgery (1989); hernia repair (unknown); lipectomy (1990's); Dilatation, esophagus; other surgical history (05/01/2012); Cardiac catheterization (03/02/2011); Hip fracture surgery (Left, 11/04/2015); Colonoscopy; Endoscopy, colon, diagnostic (07/03/2017); Total shoulder arthroplasty (Left, 10/2017); Aortic valve replacement (N/A, 09/11/2019); and Aortic valve repair (N/A, 09/09/2019). Social History:   reports that she quit smoking about 59 years ago. She has a 15.00 pack-year smoking history. She has never used smokeless tobacco. She reports that she does not drink alcohol and does not use drugs. Family history:  family history includes Arthritis in her father; Cancer in her sister; Diabetes in her brother and mother; Early Death in her son; Heart Disease in her father; High Blood Pressure in her daughter and father; High Cholesterol in her father; Other in her father, mother, sister, sister, son, and son; Stroke in her son; Vision Loss in her sister. Allergies   Allergen Reactions    Morphine Anaphylaxis     Dilaudid OK    Bactrim [Sulfamethoxazole-Trimethoprim]     Butorphanol      Other reaction(s): Other - comment required  \"out of body experience\"    Influenza Vaccines      Ended in hospital stay for 3 days told by md not to get it anymore     Lactose Intolerance (Gi) Nausea Only    Phenergan [Promethazine Hcl] Other (See Comments)     Makes me jerk all over. Zofran OK    Promethazine     Stadol [Butorphanol Tartrate] Other (See Comments)     Out of body experience. Was told it was a near death experience and was placed in ICU. Dilaudid OK    Tape Shero[place] Pott Tape] Other (See Comments)     Plastic cause itching and rash. Paper tape causes my skin to blister.  (Tega-derm and Coban OK to use.)       Review of Systems:   All 14 systems were reviewed and are negative  Except for the chloride, acetaminophen, albuterol, glucose, dextrose, glucagon (rDNA), dextrose, sodium chloride flush, sodium chloride, ondansetron **OR** ondansetron, polyethylene glycol, acetaminophen **OR** acetaminophen, magnesium sulfate    Lab Data:  CBC:   Recent Labs     11/22/21 0451 11/23/21 0415 11/24/21  0545   WBC 10.1 11.2* 9.1   HGB 12.1* 12.2* 12.9   HCT 35.9* 36.3* 38.7   MCV 98.6 98.9 98.2    239 258     BMP:   Recent Labs     11/22/21 0451 11/23/21  0415 11/24/21  0545    139 140   K 4.3 3.7 3.6    102 103   CO2 28 28 28   BUN 10 11 9   CREATININE 0.4* 0.5* 0.4*     PT/INR: No results for input(s): PROTIME, INR in the last 72 hours. BNP:  No results for input(s): PROBNP in the last 72 hours. TROPONIN: No results for input(s): TROPONINT in the last 72 hours. Impression:  Principal Problem:    Acute respiratory failure (HCC)  Active Problems:    Other seizures ([facility] [address]    Metabolic encephalopathy    Cerebrovascular accident (CVA) due to embolism of cerebral artery ([address]    Cerebrovascular accident (CVA) ([facility] [address]  Resolved Problems:    * No resolved hospital problems. *       All labs, medications and tests reviewed by myself, continue all other medications of all above medical condition listed as is except for changes mentioned above. Thank you   Please call with questions. Electronically signed by DERIK Coleman CNP on 11/24/2021 at 9:38 AM  I have seen ,spoken to  and examined this patient personally, independently of the nurse practitioner. I have reviewed the hospital care given to date and reviewed all pertinent labs and imaging. The plan was developed mutually at the time of the visit with the patient,  NP  and myself. I have spoken with patient, nursing staff and provided written and verbal instructions . The above note has been reviewed and I agree with the assessment, diagnosis, and treatment plan with changes made by me as follows     CARDIOLOGY ATTENDING ADDENDUM    HPI:  I have reviewed the above HPI  And agree with above   Gema Baez is a 80 y. o.year old who and presents with had concerns including Other (unresponsive) and Irregular Heart Beat (abnormal EKG). Chief Complaint   Patient presents with    Other     unresponsive    Irregular Heart Beat     abnormal EKG     Interval history:  No cp    Physical Exam:  General:  Awake, alert, NAD  Head:normal  Eye:normal  Neck:  No JVD   Chest:  Clear to auscultation, respiration easy  Cardiovascular:  RRR S1S2  Abdomen:   nontender  Extremities:  tr edema  Pulses; palpable  Neuro: grossly normal      MEDICAL DECISION MAKING;    I agree with the above plan, which was planned by myself and discussed with NP. Had JAN no clots  ?  takutsubo synbromw  Will fu as needed        Wanda Blake MD [address] [facility]

## 2021-11-25 LAB
ANION GAP SERPL CALCULATED.3IONS-SCNC: 7 MMOL/L (ref 4–16)
BASOPHILS ABSOLUTE: 0 K/CU MM
BASOPHILS RELATIVE PERCENT: 0.1 % (ref 0–1)
BUN BLDV-MCNC: 12 MG/DL (ref 6–23)
CALCIUM SERPL-MCNC: 7.5 MG/DL (ref 8.3–10.6)
CHLORIDE BLD-SCNC: 101 MMOL/L (ref 99–110)
CO2: 31 MMOL/L (ref 21–32)
CREAT SERPL-MCNC: 0.5 MG/DL (ref 0.6–1.1)
DIFFERENTIAL TYPE: ABNORMAL
EKG ATRIAL RATE: 49 BPM
EKG DIAGNOSIS: NORMAL
EKG P AXIS: 83 DEGREES
EKG P-R INTERVAL: 142 MS
EKG Q-T INTERVAL: 558 MS
EKG QRS DURATION: 70 MS
EKG QTC CALCULATION (BAZETT): 504 MS
EKG R AXIS: 33 DEGREES
EKG T AXIS: 200 DEGREES
EKG VENTRICULAR RATE: 49 BPM
EOSINOPHILS ABSOLUTE: 0.1 K/CU MM
EOSINOPHILS RELATIVE PERCENT: 1.4 % (ref 0–3)
GFR AFRICAN AMERICAN: >60 ML/MIN/1.73M2
GFR NON-AFRICAN AMERICAN: >60 ML/MIN/1.73M2
GLUCOSE BLD-MCNC: 79 MG/DL (ref 70–99)
HCT VFR BLD CALC: 37.4 % (ref 37–47)
HEMOGLOBIN: 12.4 GM/DL (ref 12.5–16)
IMMATURE NEUTROPHIL %: 1 % (ref 0–0.43)
LYMPHOCYTES ABSOLUTE: 2.9 K/CU MM
LYMPHOCYTES RELATIVE PERCENT: 29.8 % (ref 24–44)
MAGNESIUM: 1.9 MG/DL (ref 1.8–2.4)
MCH RBC QN AUTO: 32.8 PG (ref 27–31)
MCHC RBC AUTO-ENTMCNC: 33.2 % (ref 32–36)
MCV RBC AUTO: 98.9 FL (ref 78–100)
MONOCYTES ABSOLUTE: 0.8 K/CU MM
MONOCYTES RELATIVE PERCENT: 8 % (ref 0–4)
NUCLEATED RBC %: 0 %
PDW BLD-RTO: 13.4 % (ref 11.7–14.9)
PLATELET # BLD: 254 K/CU MM (ref 140–440)
PMV BLD AUTO: 10 FL (ref 7.5–11.1)
POTASSIUM SERPL-SCNC: 3.7 MMOL/L (ref 3.5–5.1)
RBC # BLD: 3.78 M/CU MM (ref 4.2–5.4)
SEGMENTED NEUTROPHILS ABSOLUTE COUNT: 5.8 K/CU MM
SEGMENTED NEUTROPHILS RELATIVE PERCENT: 59.7 % (ref 36–66)
SODIUM BLD-SCNC: 139 MMOL/L (ref 135–145)
TOTAL IMMATURE NEUTOROPHIL: 0.1 K/CU MM
TOTAL NUCLEATED RBC: 0 K/CU MM
WBC # BLD: 9.8 K/CU MM (ref 4–10.5)

## 2021-11-25 PROCEDURE — APPSS45 APP SPLIT SHARED TIME 31-45 MINUTES: Performed by: NURSE PRACTITIONER

## 2021-11-25 PROCEDURE — 6370000000 HC RX 637 (ALT 250 FOR IP): Performed by: INTERNAL MEDICINE

## 2021-11-25 PROCEDURE — 94761 N-INVAS EAR/PLS OXIMETRY MLT: CPT

## 2021-11-25 PROCEDURE — 2580000003 HC RX 258: Performed by: INTERNAL MEDICINE

## 2021-11-25 PROCEDURE — 85025 COMPLETE CBC W/AUTO DIFF WBC: CPT

## 2021-11-25 PROCEDURE — 83735 ASSAY OF MAGNESIUM: CPT

## 2021-11-25 PROCEDURE — 93010 ELECTROCARDIOGRAM REPORT: CPT | Performed by: INTERNAL MEDICINE

## 2021-11-25 PROCEDURE — 93005 ELECTROCARDIOGRAM TRACING: CPT | Performed by: NURSE PRACTITIONER

## 2021-11-25 PROCEDURE — 2140000000 HC CCU INTERMEDIATE R&B

## 2021-11-25 PROCEDURE — 6370000000 HC RX 637 (ALT 250 FOR IP): Performed by: NURSE PRACTITIONER

## 2021-11-25 PROCEDURE — 94640 AIRWAY INHALATION TREATMENT: CPT

## 2021-11-25 PROCEDURE — 99232 SBSQ HOSP IP/OBS MODERATE 35: CPT | Performed by: INTERNAL MEDICINE

## 2021-11-25 PROCEDURE — 80048 BASIC METABOLIC PNL TOTAL CA: CPT

## 2021-11-25 RX ADMIN — ESCITALOPRAM OXALATE 20 MG: 10 TABLET ORAL at 09:50

## 2021-11-25 RX ADMIN — ATORVASTATIN CALCIUM 40 MG: 40 TABLET, FILM COATED ORAL at 21:30

## 2021-11-25 RX ADMIN — BUDESONIDE AND FORMOTEROL FUMARATE DIHYDRATE 2 PUFF: 160; 4.5 AEROSOL RESPIRATORY (INHALATION) at 09:31

## 2021-11-25 RX ADMIN — LEVOTHYROXINE SODIUM 50 MCG: 25 TABLET ORAL at 06:14

## 2021-11-25 RX ADMIN — SUCRALFATE 1 G: 1 TABLET ORAL at 17:31

## 2021-11-25 RX ADMIN — PREDNISONE 20 MG: 20 TABLET ORAL at 09:51

## 2021-11-25 RX ADMIN — ALBUTEROL SULFATE 4 PUFF: 90 AEROSOL, METERED RESPIRATORY (INHALATION) at 12:34

## 2021-11-25 RX ADMIN — SODIUM CHLORIDE, PRESERVATIVE FREE 10 ML: 5 INJECTION INTRAVENOUS at 21:30

## 2021-11-25 RX ADMIN — ALBUTEROL SULFATE 4 PUFF: 90 AEROSOL, METERED RESPIRATORY (INHALATION) at 17:12

## 2021-11-25 RX ADMIN — MONTELUKAST 10 MG: 10 TABLET, FILM COATED ORAL at 21:30

## 2021-11-25 RX ADMIN — LISINOPRIL 2.5 MG: 2.5 TABLET ORAL at 09:52

## 2021-11-25 RX ADMIN — ASPIRIN 81 MG: 81 TABLET, COATED ORAL at 09:50

## 2021-11-25 RX ADMIN — PANTOPRAZOLE SODIUM 40 MG: 40 TABLET, DELAYED RELEASE ORAL at 06:14

## 2021-11-25 RX ADMIN — SUCRALFATE 1 G: 1 TABLET ORAL at 21:30

## 2021-11-25 RX ADMIN — SUCRALFATE 1 G: 1 TABLET ORAL at 09:51

## 2021-11-25 RX ADMIN — ALBUTEROL SULFATE 4 PUFF: 90 AEROSOL, METERED RESPIRATORY (INHALATION) at 09:31

## 2021-11-25 ASSESSMENT — PAIN SCALES - GENERAL
PAINLEVEL_OUTOF10: 0
PAINLEVEL_OUTOF10: 0

## 2021-11-25 NOTE — PROGRESS NOTES
Cardiology Progress Note     Today's Plan ekg  will sign off and be available if needed    Admit Date:  11/14/2021    Consult reason/ Seen today for: elevated troponin     Subjective and  Overnight Events:  Notes she is feeling better- notes abdominal discomfort       Telemetry SB     Assessment / Plan / Recommendation:     1. VHD- h/o TAVR- JAN completed-stable valve - noted to have depressed EF ( 25-30%) with Apical Ballooning consistent with Takotsubo cardiomyopathy   2. Takotsubo cardiomyopathy - Apical ballooning on JAN- EF 25-30 % medical management -continue lisinopril and metoprolol- no need for life vest- recommend titration of medications in the outpatient setting and recheck echo in approximantly 3-6 months   3. Elevated troponin-type 2 rise- had  [facility] 11/16/2021 no significant CAD  4. Wide complex tachycardia no further episodes -continue BB -   5. Bradycardia- check EKG - asymptomatic- hold BB for HF <55        History of Presenting Illness:    Chief complain on admission : 80 y. o.year old who is admitted for  Chief Complaint   Patient presents with    Other     unresponsive    Irregular Heart Beat     abnormal EKG        Past medical history:    has a past medical history of Arthritis, Asthma, Blood transfusion, Chronic bronchitis ([address] COPD (chronic obstructive pulmonary disease) ([facility] [address] Echocardiogram, Fatigue, H/O cardiac catheterization, H/O cardiovascular stress test, H/O Doppler ultrasound, H/O Doppler ultrasound, H/O echocardiogram, H/O echocardiogram, H/O echocardiogram, H/O echocardiogram, H/O exercise stress test, History of cardiac cath, History of nuclear stress test, Holter monitor, abnormal, Normal cardiac stress test, On home O2, Syncope and collapse, Tachycardia, and Thyroid disease. Past surgical history:   has a past surgical history that includes joint replacement (2004);  Hysterectomy (1966); Cholecystectomy (1961); Breast surgery (2009); Toe Surgery (Early 2000's); Carpal tunnel release (1990's); Finger surgery; Gastric restriction surgery (1984); Cardiac surgery (1989); hernia repair (unknown); lipectomy (1990's); Dilatation, esophagus; other surgical history (05/01/2012); Cardiac catheterization (03/02/2011); Hip fracture surgery (Left, 11/04/2015); Colonoscopy; Endoscopy, colon, diagnostic (07/03/2017); Total shoulder arthroplasty (Left, 10/2017); Aortic valve replacement (N/A, 09/11/2019); and Aortic valve repair (N/A, 09/09/2019). Social History:   reports that she quit smoking about 59 years ago. She has a 15.00 pack-year smoking history. She has never used smokeless tobacco. She reports that she does not drink alcohol and does not use drugs. Family history:  family history includes Arthritis in her father; Cancer in her sister; Diabetes in her brother and mother; Early Death in her son; Heart Disease in her father; High Blood Pressure in her daughter and father; High Cholesterol in her father; Other in her father, mother, sister, sister, son, and son; Stroke in her son; Vision Loss in her sister. Allergies   Allergen Reactions    Morphine Anaphylaxis     Dilaudid OK    Bactrim [Sulfamethoxazole-Trimethoprim]     Butorphanol      Other reaction(s): Other - comment required  \"out of body experience\"    Influenza Vaccines      Ended in hospital stay for 3 days told by md not to get it anymore     Lactose Intolerance (Gi) Nausea Only    Phenergan [Promethazine Hcl] Other (See Comments)     Makes me jerk all over. Zofran OK    Promethazine     Stadol [Butorphanol Tartrate] Other (See Comments)     Out of body experience. Was told it was a near death experience and was placed in ICU. Dilaudid OK    Tape [place] Tape] Other (See Comments)     Plastic cause itching and rash. Paper tape causes my skin to blister.  (Tega-derm and Coban OK to use.)       Review of Systems:   All 14 systems were reviewed and are negative  Except for the positive findings which are documented     BP (!) 143/65   Pulse 50   Temp 97.8 °F (36.6 °C) (Oral)   Resp 11   Ht 5' 0.98\" (1.549 m)   Wt 134 lb 9.6 oz (61.1 kg)   SpO2 96%   BMI 25.45 kg/m²       Intake/Output Summary (Last 24 hours) at 11/25/2021 5186  Last data filed at 11/24/2021 2130  Gross per 24 hour   Intake 10 ml   Output    Net 10 ml       Physical Exam:  Physical Exam  Cardiovascular:      Rate and Rhythm: Bradycardia present. Pulses: Normal pulses. Heart sounds: Murmur heard. Pulmonary:      Breath sounds: Normal breath sounds. Abdominal:      General: There is no distension. Tenderness: There is abdominal tenderness. There is no guarding. Musculoskeletal:      Right lower leg: No edema. Left lower leg: No edema. Skin:     General: Skin is warm and dry. Neurological:      General: No focal deficit present. Mental Status: She is alert.    Psychiatric:         Mood and Affect: Mood normal.         Behavior: Behavior normal.          Medications:    lisinopril  2.5 mg Oral Daily    predniSONE  20 mg Oral Daily    atorvastatin  40 mg Oral Nightly    metoprolol succinate  12.5 mg Oral Daily    albuterol sulfate HFA  4 puff Inhalation 4x daily    potassium bicarb-citric acid  20 mEq Oral Once    potassium bicarb-citric acid  20 mEq Oral Once    sodium chloride flush  5-40 mL IntraVENous 2 times per day    budesonide-formoterol  2 puff Inhalation BID    montelukast  10 mg Per NG tube Nightly    aspirin  81 mg Oral Daily    escitalopram  20 mg Oral Daily    levothyroxine  50 mcg Oral Daily    sucralfate  1 g Oral 4x Daily    pantoprazole  40 mg Oral QAM AC    timolol  1 drop Both Eyes Daily    sodium chloride flush  5-40 mL IntraVENous 2 times per day      sodium chloride 25 mL (11/22/21 2313)    dextrose      sodium chloride 25 mL (11/21/21 2138)     flumazenil, oxyCODONE-acetaminophen, potassium

## 2021-11-25 NOTE — PROGRESS NOTES
Pulmonary and Critical Care  Progress Note    Subjective: The patient is doing well. On RA. Shortness of breath none  Chest pain none  Addressing respiratory complaints Patient is negative for  hemoptysis and cyanosis  CONSTITUTIONAL:  negative for fevers and chills      Past Medical History:     has a past medical history of Arthritis, Asthma, Blood transfusion, Chronic bronchitis ([facility] [address] COPD (chronic obstructive pulmonary disease) ([facility] [address] Echocardiogram, Fatigue, H/O cardiac catheterization, H/O cardiovascular stress test, H/O Doppler ultrasound, H/O Doppler ultrasound, H/O echocardiogram, H/O echocardiogram, H/O echocardiogram, H/O echocardiogram, H/O exercise stress test, History of cardiac cath, History of nuclear stress test, Holter monitor, abnormal, Normal cardiac stress test, On home O2, Syncope and collapse, Tachycardia, and Thyroid disease. has a past surgical history that includes joint replacement (2004); Hysterectomy (1966); Cholecystectomy (1961); Breast surgery (2009); Toe Surgery (Early 2000's); Carpal tunnel release (1990's); Finger surgery; Gastric restriction surgery (1984); Cardiac surgery (1989); hernia repair (unknown); lipectomy (1990's); Dilatation, esophagus; other surgical history (05/01/2012); Cardiac catheterization (03/02/2011); Hip fracture surgery (Left, 11/04/2015); Colonoscopy; Endoscopy, colon, diagnostic (07/03/2017); Total shoulder arthroplasty (Left, 10/2017); Aortic valve replacement (N/A, 09/11/2019); and Aortic valve repair (N/A, 09/09/2019). reports that she quit smoking about 59 years ago. She has a 15.00 pack-year smoking history. She has never used smokeless tobacco. She reports that she does not drink alcohol and does not use drugs. Family history:  family history includes Arthritis in her father; Cancer in her sister; Diabetes in her brother and mother; Early Death in her son;  Heart Disease in her father; High Blood Pressure in her daughter and father; High 28 28 31   BUN 11 9 12   CREATININE 0.5* 0.4* 0.5*   CALCIUM 7.9* 7.6* 7.5*   GLUCOSE 91 74 79      ABG:  No results for input(s): PH, PO2ART, BFL1XLQ, HCO3, BEART, O2SAT in the last 72 hours. Lab Results   Component Value Date    PROBNP 7,287 (H) 11/16/2021    PROBNP 888.5 (H) 11/14/2021    PROBNP 550.9 (H) 10/13/2020     No results found for: [facility]    Radiology Review:  Pertinent images / reports were reviewed as a part of this visit. Assessment:     Patient Active Problem List   Diagnosis    Closed intertrochanteric fracture of left femur (HCC)    Gait disturbance    Anemia    Dizziness    Acquired hypothyroidism    Murmur    Age-related osteoporosis without current pathological fracture    Black tongue    COPD exacerbation (HCC)    Vitamin D deficiency    Vitamin B12 deficiency    Gastroesophageal reflux disease without esophagitis    VHD (valvular heart disease)    Aortic stenosis, severe    Acute respiratory distress    COPD with exacerbation (HCC)    Nodule of lower lobe of right lung    S/P TAVR (transcatheter aortic valve replacement)    Primary hypertension    Acute respiratory failure (HCC)    Other seizures ([address]    Metabolic encephalopathy    Cerebrovascular accident (CVA) due to embolism of cerebral artery ([address]    Cerebrovascular accident (CVA) ([address]       Plan:   1. Overall the patient is better. 2. Inc. activity.   Guillermo Cook MD   11/25/2021  1:34 PM

## 2021-11-25 NOTE — PROGRESS NOTES
Hospitalist Progress Note      Name:  Jd Moore /Age/Sex: 1935  (80 y.o. female)   MRN & CSN:  [id number] & [id number] Admission Date/Time: 2021  2:06 PM   Location:  [address] [place] [address] PCP: Venkat Brambila MD         Hospital Day: 12    Assessment and Plan:   Jd Moore is a 80 y.o.  female  who presents with Acute respiratory failure ([facility] [address]    1) Acute hypoxic resp failure due to COPD Exacerbation  -Extubated   -Continue bronchodilators and prednisone  -Wean off steroid     2) Sepsis likely due to pneumonia  -Completed doxy and cefepime  -Monitor off Abx    3) Acute Ischemic CVA  -MRI Brain with 3 punctate acute to subacute infarcts involving Rt frontal, left temporal as well as left occipital lobe  -CTA Head and Neck: No large vessel occlusion  -JAN: No clot seen  -Neurology on board; continue ASA and Statin  -PT/OT; recommended reab    4) Elevated troponin  -Likely due to demand  -TTE noted  -S/P [facility] with no significant coronary disease on 2021  -Continue medical management    5) Takotsubo Cardiomyopathy  -TTE with EF of 25-30%, akinetic apex suggesting possible Takotsubo, and grade 3 diastolic dysfunction  -Continue medical management   -Repeat TTE in 3 months    Other chronic medical condition; medication resumed unless contraindicated    -Status post TAVR  -Mood Disorder  -Hypothyroidim       Diet ADULT DIET; Regular; Low Sodium (2 gm)   DVT Prophylaxis [] Lovenox, []  Heparin, [] SCDs, [] Ambulation   GI Prophylaxis [] PPI,  [] H2 Blocker,  [] Carafate,  [] Diet/Tube Feeds   Code Status Full Code   Disposition ARU Vs SNF   MDM      History of Present Illness:     Patient was seen and examined  Denied any chest pain or abdominal pain  No dizziness or focal weakness reported  No acute events overnight    Ten point ROS reviewed negative, unless as noted above    Objective:        Intake/Output Summary (Last 24 hours) at 2021 1115  Last data filed at 11/24/2021 2130  Gross per 24 hour   Intake 10 ml   Output    Net 10 ml      Vitals:   Vitals:    11/25/21 0937   BP: 132/61   Pulse: 53   Resp: 16   Temp: 98.3 °F (36.8 °C)   SpO2:      Physical Exam:   GEN Awake female, sitting upright in bed in no apparent distress. Appears given age. EYES Pupils are equally round. No scleral erythema, discharge, or conjunctivitis. HENT Mucous membranes are moist. Oral pharynx without exudates, no evidence of thrush. NECK Supple, no apparent thyromegaly or masses. RESP Clear to auscultation, no wheezes, rales or rhonchi. Symmetric chest movement while on room air. CARDIO/VASC S1/S2 auscultated. Regular rate without appreciable murmurs, rubs, or gallops. No JVD or carotid bruits. Peripheral pulses equal bilaterally and palpable. No peripheral edema. GI Abdomen is soft without significant tenderness, masses, or guarding. Bowel sounds are normoactive. Rectal exam deferred.  No costovertebral angle tenderness. Macedo catheter is not present. HEME/LYMPH No palpable cervical lymphadenopathy and no hepatosplenomegaly. No petechiae or ecchymoses. MSK No gross joint deformities. SKIN Normal coloration, warm, dry. NEURO Cranial nerves appear grossly intact, normal speech, no lateralizing weakness. PSYCH Awake, alert, oriented x 4. Affect appropriate.     Medications:   Medications:    lisinopril  2.5 mg Oral Daily    predniSONE  20 mg Oral Daily    atorvastatin  40 mg Oral Nightly    metoprolol succinate  12.5 mg Oral Daily    albuterol sulfate HFA  4 puff Inhalation 4x daily    potassium bicarb-citric acid  20 mEq Oral Once    potassium bicarb-citric acid  20 mEq Oral Once    sodium chloride flush  5-40 mL IntraVENous 2 times per day    budesonide-formoterol  2 puff Inhalation BID    montelukast  10 mg Per NG tube Nightly    aspirin  81 mg Oral Daily    escitalopram  20 mg Oral Daily    levothyroxine  50 mcg Oral Daily    sucralfate  1 g Oral 4x Daily    pantoprazole  40 mg Oral QAM AC    timolol  1 drop Both Eyes Daily    sodium chloride flush  5-40 mL IntraVENous 2 times per day      Infusions:    sodium chloride 25 mL (11/22/21 231)    dextrose      sodium chloride 25 mL (11/21/21 2138)     PRN Meds: flumazenil, 0.2 mg, PRN  oxyCODONE-acetaminophen, 1 tablet, Q6H PRN  potassium chloride, 20 mEq, PRN  potassium chloride, 40 mEq, PRN   Or  potassium alternative oral replacement, 40 mEq, PRN   Or  potassium chloride, 10 mEq, PRN  polyvinyl alcohol, , Q2H PRN  sodium chloride flush, 5-40 mL, PRN  sodium chloride, 25 mL, PRN  acetaminophen, 650 mg, Q4H PRN  albuterol, 2.5 mg, Q4H PRN  glucose, 15 g, PRN  dextrose, 12.5 g, PRN  glucagon (rDNA), 1 mg, PRN  dextrose, 100 mL/hr, PRN  sodium chloride flush, 5-40 mL, PRN  sodium chloride, 25 mL, PRN  ondansetron, 4 mg, Q8H PRN   Or  ondansetron, 4 mg, Q6H PRN  polyethylene glycol, 17 g, Daily PRN  acetaminophen, 650 mg, Q6H PRN   Or  acetaminophen, 650 mg, Q6H PRN  magnesium sulfate, 2,000 mg, PRN          Electronically signed by Kim Da Silva MD on 11/25/2021 at 11:15 AM

## 2021-11-26 ENCOUNTER — HOSPITAL ENCOUNTER (INPATIENT)
Age: 86
LOS: 7 days | Discharge: HOME | DRG: 056 | End: 2021-12-03
Attending: PHYSICAL MEDICINE & REHABILITATION | Admitting: PHYSICAL MEDICINE & REHABILITATION
Payer: MEDICARE

## 2021-11-26 VITALS
SYSTOLIC BLOOD PRESSURE: 137 MMHG | OXYGEN SATURATION: 98 % | WEIGHT: 134.6 LBS | RESPIRATION RATE: 18 BRPM | DIASTOLIC BLOOD PRESSURE: 56 MMHG | BODY MASS INDEX: 25.41 KG/M2 | HEART RATE: 57 BPM | TEMPERATURE: 98.7 F | HEIGHT: 61 IN

## 2021-11-26 PROBLEM — I63.9 ACUTE CVA (CEREBROVASCULAR ACCIDENT) (HCC): Status: ACTIVE | Noted: 2021-11-26

## 2021-11-26 LAB
ANION GAP SERPL CALCULATED.3IONS-SCNC: 9 MMOL/L (ref 4–16)
BASOPHILS ABSOLUTE: 0 K/CU MM
BASOPHILS RELATIVE PERCENT: 0.1 % (ref 0–1)
BUN BLDV-MCNC: 11 MG/DL (ref 6–23)
CALCIUM SERPL-MCNC: 7.6 MG/DL (ref 8.3–10.6)
CHLORIDE BLD-SCNC: 102 MMOL/L (ref 99–110)
CO2: 28 MMOL/L (ref 21–32)
CREAT SERPL-MCNC: 0.5 MG/DL (ref 0.6–1.1)
DIFFERENTIAL TYPE: ABNORMAL
EKG ATRIAL RATE: 52 BPM
EKG DIAGNOSIS: NORMAL
EKG P AXIS: 61 DEGREES
EKG P-R INTERVAL: 126 MS
EKG Q-T INTERVAL: 556 MS
EKG QRS DURATION: 74 MS
EKG QTC CALCULATION (BAZETT): 517 MS
EKG R AXIS: 62 DEGREES
EKG T AXIS: 191 DEGREES
EKG VENTRICULAR RATE: 52 BPM
EOSINOPHILS ABSOLUTE: 0.1 K/CU MM
EOSINOPHILS RELATIVE PERCENT: 1 % (ref 0–3)
GFR AFRICAN AMERICAN: >60 ML/MIN/1.73M2
GFR NON-AFRICAN AMERICAN: >60 ML/MIN/1.73M2
GLUCOSE BLD-MCNC: 79 MG/DL (ref 70–99)
GLUCOSE BLD-MCNC: 88 MG/DL (ref 70–99)
GLUCOSE BLD-MCNC: 89 MG/DL (ref 70–99)
HCT VFR BLD CALC: 33.9 % (ref 37–47)
HEMOGLOBIN: 11.8 GM/DL (ref 12.5–16)
IMMATURE NEUTROPHIL %: 0.6 % (ref 0–0.43)
LYMPHOCYTES ABSOLUTE: 3.1 K/CU MM
LYMPHOCYTES RELATIVE PERCENT: 33.4 % (ref 24–44)
MAGNESIUM: 1.9 MG/DL (ref 1.8–2.4)
MCH RBC QN AUTO: 33.2 PG (ref 27–31)
MCHC RBC AUTO-ENTMCNC: 34.8 % (ref 32–36)
MCV RBC AUTO: 95.5 FL (ref 78–100)
MONOCYTES ABSOLUTE: 0.6 K/CU MM
MONOCYTES RELATIVE PERCENT: 6 % (ref 0–4)
NUCLEATED RBC %: 0 %
PDW BLD-RTO: 13.2 % (ref 11.7–14.9)
PLATELET # BLD: 236 K/CU MM (ref 140–440)
PMV BLD AUTO: 10.3 FL (ref 7.5–11.1)
POTASSIUM SERPL-SCNC: 3.7 MMOL/L (ref 3.5–5.1)
RBC # BLD: 3.55 M/CU MM (ref 4.2–5.4)
SEGMENTED NEUTROPHILS ABSOLUTE COUNT: 5.5 K/CU MM
SEGMENTED NEUTROPHILS RELATIVE PERCENT: 58.9 % (ref 36–66)
SODIUM BLD-SCNC: 139 MMOL/L (ref 135–145)
TOTAL IMMATURE NEUTOROPHIL: 0.06 K/CU MM
TOTAL NUCLEATED RBC: 0 K/CU MM
TROPONIN T: 0.08 NG/ML
WBC # BLD: 9.3 K/CU MM (ref 4–10.5)

## 2021-11-26 PROCEDURE — 93010 ELECTROCARDIOGRAM REPORT: CPT | Performed by: INTERNAL MEDICINE

## 2021-11-26 PROCEDURE — 6370000000 HC RX 637 (ALT 250 FOR IP): Performed by: INTERNAL MEDICINE

## 2021-11-26 PROCEDURE — 94761 N-INVAS EAR/PLS OXIMETRY MLT: CPT

## 2021-11-26 PROCEDURE — 83735 ASSAY OF MAGNESIUM: CPT

## 2021-11-26 PROCEDURE — 97116 GAIT TRAINING THERAPY: CPT

## 2021-11-26 PROCEDURE — 99223 1ST HOSP IP/OBS HIGH 75: CPT | Performed by: PHYSICAL MEDICINE & REHABILITATION

## 2021-11-26 PROCEDURE — 97110 THERAPEUTIC EXERCISES: CPT

## 2021-11-26 PROCEDURE — 80048 BASIC METABOLIC PNL TOTAL CA: CPT

## 2021-11-26 PROCEDURE — 94640 AIRWAY INHALATION TREATMENT: CPT

## 2021-11-26 PROCEDURE — 82962 GLUCOSE BLOOD TEST: CPT

## 2021-11-26 PROCEDURE — 36415 COLL VENOUS BLD VENIPUNCTURE: CPT

## 2021-11-26 PROCEDURE — 6370000000 HC RX 637 (ALT 250 FOR IP): Performed by: NURSE PRACTITIONER

## 2021-11-26 PROCEDURE — 85025 COMPLETE CBC W/AUTO DIFF WBC: CPT

## 2021-11-26 PROCEDURE — 84484 ASSAY OF TROPONIN QUANT: CPT

## 2021-11-26 PROCEDURE — 1280000000 HC REHAB R&B

## 2021-11-26 PROCEDURE — 93005 ELECTROCARDIOGRAM TRACING: CPT | Performed by: INTERNAL MEDICINE

## 2021-11-26 RX ORDER — SODIUM CHLORIDE 0.9 % (FLUSH) 0.9 %
5-40 SYRINGE (ML) INJECTION PRN
Status: DISCONTINUED | OUTPATIENT
Start: 2021-11-26 | End: 2021-11-26

## 2021-11-26 RX ORDER — SODIUM CHLORIDE 0.9 % (FLUSH) 0.9 %
5-40 SYRINGE (ML) INJECTION EVERY 12 HOURS SCHEDULED
Status: DISCONTINUED | OUTPATIENT
Start: 2021-11-27 | End: 2021-12-03 | Stop reason: HOSPADM

## 2021-11-26 RX ORDER — PANTOPRAZOLE SODIUM 40 MG/1
40 TABLET, DELAYED RELEASE ORAL
Status: CANCELLED | OUTPATIENT
Start: 2021-11-27

## 2021-11-26 RX ORDER — DEXTROSE MONOHYDRATE 25 G/50ML
12.5 INJECTION, SOLUTION INTRAVENOUS PRN
Status: CANCELLED | OUTPATIENT
Start: 2021-11-26

## 2021-11-26 RX ORDER — ONDANSETRON 2 MG/ML
2 INJECTION INTRAMUSCULAR; INTRAVENOUS EVERY 8 HOURS PRN
Status: DISCONTINUED | OUTPATIENT
Start: 2021-11-26 | End: 2021-12-03 | Stop reason: HOSPADM

## 2021-11-26 RX ORDER — DEXTROSE MONOHYDRATE 50 MG/ML
100 INJECTION, SOLUTION INTRAVENOUS PRN
Status: CANCELLED | OUTPATIENT
Start: 2021-11-26

## 2021-11-26 RX ORDER — METOPROLOL SUCCINATE 25 MG/1
12.5 TABLET, EXTENDED RELEASE ORAL DAILY
Status: CANCELLED | OUTPATIENT
Start: 2021-11-26

## 2021-11-26 RX ORDER — ONDANSETRON 4 MG/1
4 TABLET, ORALLY DISINTEGRATING ORAL EVERY 8 HOURS PRN
Status: DISCONTINUED | OUTPATIENT
Start: 2021-11-26 | End: 2021-11-26 | Stop reason: ALTCHOICE

## 2021-11-26 RX ORDER — SODIUM CHLORIDE 0.9 % (FLUSH) 0.9 %
5-40 SYRINGE (ML) INJECTION EVERY 12 HOURS SCHEDULED
Status: DISCONTINUED | OUTPATIENT
Start: 2021-11-27 | End: 2021-11-26

## 2021-11-26 RX ORDER — MONTELUKAST SODIUM 10 MG/1
10 TABLET ORAL NIGHTLY
Status: DISCONTINUED | OUTPATIENT
Start: 2021-11-26 | End: 2021-12-03 | Stop reason: HOSPADM

## 2021-11-26 RX ORDER — ATORVASTATIN CALCIUM 40 MG/1
40 TABLET, FILM COATED ORAL NIGHTLY
Status: DISCONTINUED | OUTPATIENT
Start: 2021-11-26 | End: 2021-12-03 | Stop reason: HOSPADM

## 2021-11-26 RX ORDER — POLYVINYL ALCOHOL 14 MG/ML
1 SOLUTION/ DROPS OPHTHALMIC
Status: CANCELLED | OUTPATIENT
Start: 2021-11-26

## 2021-11-26 RX ORDER — ONDANSETRON 4 MG/1
2 TABLET, ORALLY DISINTEGRATING ORAL EVERY 8 HOURS PRN
Status: DISCONTINUED | OUTPATIENT
Start: 2021-11-26 | End: 2021-12-03 | Stop reason: HOSPADM

## 2021-11-26 RX ORDER — PANTOPRAZOLE SODIUM 40 MG/1
40 TABLET, DELAYED RELEASE ORAL
Status: DISCONTINUED | OUTPATIENT
Start: 2021-11-27 | End: 2021-12-03 | Stop reason: HOSPADM

## 2021-11-26 RX ORDER — POLYVINYL ALCOHOL 14 MG/ML
1 SOLUTION/ DROPS OPHTHALMIC
Status: DISCONTINUED | OUTPATIENT
Start: 2021-11-26 | End: 2021-12-03 | Stop reason: HOSPADM

## 2021-11-26 RX ORDER — ESCITALOPRAM OXALATE 10 MG/1
20 TABLET ORAL DAILY
Status: DISCONTINUED | OUTPATIENT
Start: 2021-11-27 | End: 2021-12-03 | Stop reason: HOSPADM

## 2021-11-26 RX ORDER — ACETAMINOPHEN 650 MG/1
650 SUPPOSITORY RECTAL EVERY 6 HOURS PRN
Status: DISCONTINUED | OUTPATIENT
Start: 2021-11-26 | End: 2021-11-26

## 2021-11-26 RX ORDER — ACETAMINOPHEN 650 MG/1
650 SUPPOSITORY RECTAL EVERY 6 HOURS PRN
Status: CANCELLED | OUTPATIENT
Start: 2021-11-26

## 2021-11-26 RX ORDER — ONDANSETRON 2 MG/ML
4 INJECTION INTRAMUSCULAR; INTRAVENOUS EVERY 6 HOURS PRN
Status: DISCONTINUED | OUTPATIENT
Start: 2021-11-26 | End: 2021-11-26 | Stop reason: ALTCHOICE

## 2021-11-26 RX ORDER — ESCITALOPRAM OXALATE 10 MG/1
20 TABLET ORAL DAILY
Status: CANCELLED | OUTPATIENT
Start: 2021-11-26

## 2021-11-26 RX ORDER — TIMOLOL MALEATE 5 MG/ML
1 SOLUTION/ DROPS OPHTHALMIC DAILY
Status: DISCONTINUED | OUTPATIENT
Start: 2021-11-27 | End: 2021-12-03 | Stop reason: HOSPADM

## 2021-11-26 RX ORDER — OXYCODONE HYDROCHLORIDE AND ACETAMINOPHEN 5; 325 MG/1; MG/1
1 TABLET ORAL EVERY 6 HOURS PRN
Status: CANCELLED | OUTPATIENT
Start: 2021-11-26

## 2021-11-26 RX ORDER — POLYETHYLENE GLYCOL 3350 17 G/17G
17 POWDER, FOR SOLUTION ORAL DAILY PRN
Status: DISCONTINUED | OUTPATIENT
Start: 2021-11-26 | End: 2021-12-03 | Stop reason: HOSPADM

## 2021-11-26 RX ORDER — ONDANSETRON 2 MG/ML
4 INJECTION INTRAMUSCULAR; INTRAVENOUS EVERY 6 HOURS PRN
Status: CANCELLED | OUTPATIENT
Start: 2021-11-26

## 2021-11-26 RX ORDER — LISINOPRIL 2.5 MG/1
2.5 TABLET ORAL DAILY
Status: DISCONTINUED | OUTPATIENT
Start: 2021-11-27 | End: 2021-12-03 | Stop reason: HOSPADM

## 2021-11-26 RX ORDER — ATORVASTATIN CALCIUM 40 MG/1
40 TABLET, FILM COATED ORAL NIGHTLY
Status: CANCELLED | OUTPATIENT
Start: 2021-11-26

## 2021-11-26 RX ORDER — ACETAMINOPHEN 325 MG/1
650 TABLET ORAL EVERY 6 HOURS PRN
Status: DISCONTINUED | OUTPATIENT
Start: 2021-11-26 | End: 2021-12-03 | Stop reason: HOSPADM

## 2021-11-26 RX ORDER — ACETAMINOPHEN 325 MG/1
650 TABLET ORAL EVERY 6 HOURS PRN
Status: CANCELLED | OUTPATIENT
Start: 2021-11-26

## 2021-11-26 RX ORDER — LISINOPRIL 2.5 MG/1
2.5 TABLET ORAL DAILY
Status: CANCELLED | OUTPATIENT
Start: 2021-11-26

## 2021-11-26 RX ORDER — ASPIRIN 81 MG/1
81 TABLET ORAL DAILY
Status: DISCONTINUED | OUTPATIENT
Start: 2021-11-27 | End: 2021-12-03 | Stop reason: HOSPADM

## 2021-11-26 RX ORDER — BUDESONIDE AND FORMOTEROL FUMARATE DIHYDRATE 160; 4.5 UG/1; UG/1
2 AEROSOL RESPIRATORY (INHALATION) 2 TIMES DAILY
Status: CANCELLED | OUTPATIENT
Start: 2021-11-26

## 2021-11-26 RX ORDER — METOPROLOL SUCCINATE 25 MG/1
12.5 TABLET, EXTENDED RELEASE ORAL DAILY
Status: DISCONTINUED | OUTPATIENT
Start: 2021-11-27 | End: 2021-12-03 | Stop reason: HOSPADM

## 2021-11-26 RX ORDER — TIMOLOL MALEATE 5 MG/ML
1 SOLUTION/ DROPS OPHTHALMIC DAILY
Status: CANCELLED | OUTPATIENT
Start: 2021-11-26

## 2021-11-26 RX ORDER — LEVOTHYROXINE SODIUM 0.05 MG/1
50 TABLET ORAL DAILY
Status: DISCONTINUED | OUTPATIENT
Start: 2021-11-27 | End: 2021-12-03 | Stop reason: HOSPADM

## 2021-11-26 RX ORDER — BUDESONIDE AND FORMOTEROL FUMARATE DIHYDRATE 160; 4.5 UG/1; UG/1
2 AEROSOL RESPIRATORY (INHALATION) 2 TIMES DAILY
Status: DISCONTINUED | OUTPATIENT
Start: 2021-11-26 | End: 2021-12-03 | Stop reason: HOSPADM

## 2021-11-26 RX ORDER — ONDANSETRON 4 MG/1
4 TABLET, ORALLY DISINTEGRATING ORAL EVERY 8 HOURS PRN
Status: CANCELLED | OUTPATIENT
Start: 2021-11-26

## 2021-11-26 RX ORDER — OXYCODONE HYDROCHLORIDE AND ACETAMINOPHEN 5; 325 MG/1; MG/1
1 TABLET ORAL EVERY 6 HOURS PRN
Status: DISCONTINUED | OUTPATIENT
Start: 2021-11-26 | End: 2021-12-03 | Stop reason: HOSPADM

## 2021-11-26 RX ORDER — SUCRALFATE 1 G/1
1 TABLET ORAL 4 TIMES DAILY
Status: CANCELLED | OUTPATIENT
Start: 2021-11-26

## 2021-11-26 RX ORDER — ALBUTEROL SULFATE 90 UG/1
4 AEROSOL, METERED RESPIRATORY (INHALATION) 4 TIMES DAILY
Status: DISCONTINUED | OUTPATIENT
Start: 2021-11-26 | End: 2021-12-03 | Stop reason: HOSPADM

## 2021-11-26 RX ORDER — DEXTROSE MONOHYDRATE 50 MG/ML
100 INJECTION, SOLUTION INTRAVENOUS PRN
Status: DISCONTINUED | OUTPATIENT
Start: 2021-11-26 | End: 2021-12-03 | Stop reason: HOSPADM

## 2021-11-26 RX ORDER — MONTELUKAST SODIUM 10 MG/1
10 TABLET ORAL NIGHTLY
Status: CANCELLED | OUTPATIENT
Start: 2021-11-26

## 2021-11-26 RX ORDER — SODIUM CHLORIDE 0.9 % (FLUSH) 0.9 %
5-40 SYRINGE (ML) INJECTION PRN
Status: CANCELLED | OUTPATIENT
Start: 2021-11-26

## 2021-11-26 RX ORDER — POLYETHYLENE GLYCOL 3350 17 G/17G
17 POWDER, FOR SOLUTION ORAL DAILY PRN
Status: CANCELLED | OUTPATIENT
Start: 2021-11-26

## 2021-11-26 RX ORDER — SUCRALFATE 1 G/1
1 TABLET ORAL
Status: DISCONTINUED | OUTPATIENT
Start: 2021-11-26 | End: 2021-12-03 | Stop reason: HOSPADM

## 2021-11-26 RX ORDER — DEXTROSE MONOHYDRATE 25 G/50ML
12.5 INJECTION, SOLUTION INTRAVENOUS PRN
Status: DISCONTINUED | OUTPATIENT
Start: 2021-11-26 | End: 2021-12-03 | Stop reason: HOSPADM

## 2021-11-26 RX ORDER — ACETAMINOPHEN 325 MG/1
650 TABLET ORAL EVERY 4 HOURS PRN
Status: DISCONTINUED | OUTPATIENT
Start: 2021-11-26 | End: 2021-12-03 | Stop reason: HOSPADM

## 2021-11-26 RX ORDER — NICOTINE POLACRILEX 4 MG
15 LOZENGE BUCCAL PRN
Status: CANCELLED | OUTPATIENT
Start: 2021-11-26

## 2021-11-26 RX ORDER — POLYETHYLENE GLYCOL 3350 17 G/17G
17 POWDER, FOR SOLUTION ORAL DAILY PRN
Status: DISCONTINUED | OUTPATIENT
Start: 2021-11-26 | End: 2021-11-26

## 2021-11-26 RX ORDER — LEVOTHYROXINE SODIUM 0.05 MG/1
50 TABLET ORAL DAILY
Status: CANCELLED | OUTPATIENT
Start: 2021-11-27

## 2021-11-26 RX ORDER — NICOTINE POLACRILEX 4 MG
15 LOZENGE BUCCAL PRN
Status: DISCONTINUED | OUTPATIENT
Start: 2021-11-26 | End: 2021-12-03 | Stop reason: HOSPADM

## 2021-11-26 RX ORDER — ALBUTEROL SULFATE 90 UG/1
4 AEROSOL, METERED RESPIRATORY (INHALATION) 4 TIMES DAILY
Status: CANCELLED | OUTPATIENT
Start: 2021-11-26

## 2021-11-26 RX ORDER — SODIUM CHLORIDE 0.9 % (FLUSH) 0.9 %
5-40 SYRINGE (ML) INJECTION EVERY 12 HOURS SCHEDULED
Status: CANCELLED | OUTPATIENT
Start: 2021-11-26

## 2021-11-26 RX ORDER — SODIUM CHLORIDE 0.9 % (FLUSH) 0.9 %
5-40 SYRINGE (ML) INJECTION PRN
Status: DISCONTINUED | OUTPATIENT
Start: 2021-11-26 | End: 2021-12-03 | Stop reason: HOSPADM

## 2021-11-26 RX ORDER — ASPIRIN 81 MG/1
81 TABLET ORAL DAILY
Status: CANCELLED | OUTPATIENT
Start: 2021-11-26

## 2021-11-26 RX ADMIN — SUCRALFATE 1 G: 1 TABLET ORAL at 15:47

## 2021-11-26 RX ADMIN — MONTELUKAST 10 MG: 10 TABLET, FILM COATED ORAL at 22:00

## 2021-11-26 RX ADMIN — ALBUTEROL SULFATE 4 PUFF: 90 AEROSOL, METERED RESPIRATORY (INHALATION) at 20:46

## 2021-11-26 RX ADMIN — ASPIRIN 81 MG: 81 TABLET, COATED ORAL at 10:25

## 2021-11-26 RX ADMIN — SUCRALFATE 1 G: 1 TABLET ORAL at 22:00

## 2021-11-26 RX ADMIN — ATORVASTATIN CALCIUM 40 MG: 40 TABLET, FILM COATED ORAL at 21:59

## 2021-11-26 RX ADMIN — PREDNISONE 20 MG: 20 TABLET ORAL at 10:26

## 2021-11-26 RX ADMIN — LEVOTHYROXINE SODIUM 50 MCG: 25 TABLET ORAL at 10:25

## 2021-11-26 RX ADMIN — ASPIRIN 81 MG: 81 TABLET, COATED ORAL at 03:27

## 2021-11-26 RX ADMIN — LISINOPRIL 2.5 MG: 2.5 TABLET ORAL at 10:25

## 2021-11-26 RX ADMIN — PANTOPRAZOLE SODIUM 40 MG: 40 TABLET, DELAYED RELEASE ORAL at 10:26

## 2021-11-26 RX ADMIN — ALBUTEROL SULFATE 4 PUFF: 90 AEROSOL, METERED RESPIRATORY (INHALATION) at 15:18

## 2021-11-26 RX ADMIN — ESCITALOPRAM OXALATE 20 MG: 10 TABLET ORAL at 10:26

## 2021-11-26 RX ADMIN — SUCRALFATE 1 G: 1 TABLET ORAL at 10:26

## 2021-11-26 RX ADMIN — TIMOLOL MALEATE 1 DROP: 5 SOLUTION OPHTHALMIC at 12:34

## 2021-11-26 RX ADMIN — ALBUTEROL SULFATE 4 PUFF: 90 AEROSOL, METERED RESPIRATORY (INHALATION) at 11:13

## 2021-11-26 RX ADMIN — BUDESONIDE AND FORMOTEROL FUMARATE DIHYDRATE 2 PUFF: 160; 4.5 AEROSOL RESPIRATORY (INHALATION) at 20:47

## 2021-11-26 ASSESSMENT — PAIN DESCRIPTION - ONSET: ONSET: SUDDEN

## 2021-11-26 ASSESSMENT — PAIN DESCRIPTION - LOCATION: LOCATION: CHEST

## 2021-11-26 ASSESSMENT — PAIN DESCRIPTION - PROGRESSION
CLINICAL_PROGRESSION: RESOLVED

## 2021-11-26 ASSESSMENT — PAIN DESCRIPTION - ORIENTATION: ORIENTATION: MID;RIGHT;LEFT

## 2021-11-26 ASSESSMENT — PAIN DESCRIPTION - FREQUENCY: FREQUENCY: CONTINUOUS

## 2021-11-26 ASSESSMENT — PAIN DESCRIPTION - DESCRIPTORS: DESCRIPTORS: ACHING;SHARP

## 2021-11-26 ASSESSMENT — PAIN SCALES - GENERAL
PAINLEVEL_OUTOF10: 0
PAINLEVEL_OUTOF10: 5

## 2021-11-26 ASSESSMENT — PAIN DESCRIPTION - PAIN TYPE: TYPE: ACUTE PAIN

## 2021-11-26 ASSESSMENT — PAIN - FUNCTIONAL ASSESSMENT: PAIN_FUNCTIONAL_ASSESSMENT: ACTIVITIES ARE NOT PREVENTED

## 2021-11-26 NOTE — PLAN OF CARE
ARU Interdisciplinary Plan of Care (IPOC)  [facility], [address]  [phone]  Fax: [phone]        Mary Perry    : 1935  Acct #: [de-identified]  MRN: [id number]   PHYSICIAN:  Zachary Velasco MD  Primary Active Problems:   Active Hospital Problems    Diagnosis Date Noted    Acute CVA (cerebrovascular accident) [facility]) [I63.9] 2021       Rehabilitation Diagnosis:     Cerebral infarction, unspecified [I63.9]  Acute CVA (cerebrovascular accident) [facility]) [I63.9]       ADMIT DATE:2021   CARE PLAN     NURSING:  Mary Amador while on this unit will:      Bowel and Bladder   [x] Be continent of bowel and bladder      [x] Have an adequate number of bowel movements   [] Urinate with no urinary retention >300ml in bladder   [] Bladder Scan: (details)   [] Complete bladder protocol with pan removal   [] Initiate Bladder Program to toilet every ___ hours   [] Initiate Bowel Program to toilet every ___hours   [] Bladder training    [] Bowel training  Pulmonary   [x] Maintain O2 SATs at 92% or greater  Pain Management   [x] Have pain managed while on ARU        [] Be pain free by discharge    [] Medication Management and Education  Maintenance of Skin Integrity/Wound Management   [x] Have no skin breakdown while on ARU   [] Have improved skin integrity via wound measurements   [] Have no signs/symptoms of infection via infection protection and monitoring at the          wound site  Fall Prevention   [x] Be free from injury during hospitalization via fall prevention measures     [] Disease management and Education  Precautions   [] Weight Bearing Precautions   [x] Swallowing Precautions   [x] Monitoring of Risks of Complications   [] DVT Prophylaxis    [] Fluid/electrolyte/Nutrition Management    [] Complete education with patient/family with understanding demonstrated for          in-room safety with transfers to bed, toilet, wheelchair, shower as well as                bathroom activities and hygiene. [] Adjustment   [] Other:   Nursing interventions may include bowel/bladder training, education for medical assistive devices, medication education, O2 saturation management, energy conservation, stress management techniques, fall prevention, alarms protocol, seating and positioning, skin/wound care, pressure relief instruction,dressing changes,  infection protection, DVT prophylaxis, and/or assistance with in room safety with transfers to bed, toilet, wheelchair, shower as well as bathroom activities and hygiene. Patient/caregiver education for:   [x] Disease/sustained injury/management      [x] Medication Use   [] Surgical intervention   [x] Safety/Precautions   [] Body mechanics and or joint protection   [x] Health maintenance         PHYSICAL THERAPY:  Goals:                               Long term goals  Time Frame for Long term goals : 7-10 days STG=LTG  Long term goal 1: Pt will demonstrate all bed mobiltiy with mod I  Long term goal 2: Pt will demonstrate car, pivot and sit to stand transfers with mod I  Long term goal 3: Pt will perform gait on level surface at least 150' and on unlevel surface 20' with 2ww amd mod I  Long term goal 4: Pt will ascend curb step with 2ww and 12 stairs with b rails with mod I  Long term goal 5: Pt will retrieve small item from floor with 2ww and reacher with mod I  These goals were reviewed with this patient at the time of assessment and Adrien West is in agreement. Plan of Care: Pt to be seen 5 days per week for a minimum of 60 minutes for 8 days.                 Current Treatment Recommendations: Strengthening, Functional Mobility Training, IADL Training, Neuromuscular Re-education, Home Exercise Program, Equipment Evaluation, Education, & procurement, Transfer Training, Gait Training, Safety Education & Training, Balance Training, Endurance Training, Stair training, Patient/Caregiver Education & Training community reintegration,animal assisted therapy, and concurrent/group therapy.     PT IRF-JHOAN scores and goals for initial assessment:   Bed Mobility:   Sit to Lying  Assistance Needed: Independent  Comment: HOB slightly elevated as at home, no rail  CARE Score: 6  Discharge Goal: Independent  Roll Left and Right  Assistance Needed: Independent  Comment: HOB slightly elevated as at home, no rail  CARE Score: 6  Discharge Goal: Independent  Lying to Sitting on Side of Bed  Assistance Needed: Independent  Comment: HOB slightly elevated as at home, no rail  CARE Score: 6  Discharge Goal: Independent    Transfers:    Sit to Stand  Assistance Needed: Supervision or touching assistance  Comment: CG  CARE Score: 4  Discharge Goal: Independent  Chair/Bed-to-Chair Transfer  Assistance Needed: Supervision or touching assistance  Comment: CG  CARE Score: 4  Discharge Goal: Independent  Toilet Transfer  Assistance Needed: Supervision or touching assistance  Comment: supervision with rails  CARE Score: 4  Car Transfer  Assistance Needed: Supervision or touching assistance  Comment: SBA with 2ww  CARE Score: 4  Discharge Goal: Independent    Ambulation:    Walking Ability  Does the Patient Walk?: Yes     Walk 10 Feet  Assistance Needed: Partial/moderate assistance  Comment: min assist without device, narrow DREW(nearly tandem at times) with decreased stability L hip, path deviation  CARE Score: 3  Discharge Goal: Independent     Walk 50 Feet with Two Turns  Assistance Needed: Supervision or touching assistance  Comment: CG with 2ww  CARE Score: 4  Discharge Goal: Independent     Walk 150 Feet  Comment: 70' max attempt due to LE fatigue  Reason if not Attempted: Not attempted due to medical condition or safety concerns  CARE Score: 88  Discharge Goal: Independent     Walking 10 Feet on Uneven Surfaces  Assistance Needed: Supervision or touching assistance  Comment: CG with 2ww  CARE Score: 4  Discharge Goal: Independent     1 Step (Curb)  Assistance Needed: Supervision or touching assistance  Comment: CG with 2ww  CARE Score: 4  Discharge Goal: Independent     4 Steps  Assistance Needed: Supervision or touching assistance  Comment: CG with B rails  CARE Score: 4  Discharge Goal: Independent     12 Steps  Assistance Needed: Supervision or touching assistance  Comment: CG with B rails  CARE Score: 4  Discharge Goal: Independent    Gait Deviations: []None []Slow sunshine  [] Increased DREW  [] Staggers []Deviated Path  [] Decreased step length  [] Decreased step height  []Decreased arm swing  [] Shuffles  [] Decreased head and trunk rotation  []other:        Wheelchair:  w/c Ability: Wheelchair Ability  Uses a Wheelchair and/or Scooter?: No                Balance:    Balance  Posture: Good  Sitting - Static: Good  Sitting - Dynamic: Good  Standing - Static: Fair, -  Standing - Dynamic: Fair, -   Object: Picking Up Object  Assistance Needed: Supervision or touching assistance  Comment: CG with 2ww and reacher  CARE Score: 4  Discharge Goal: Independent  OCCUPATIONAL THERAPY:  Goals:             Short term goals  Time Frame for Short term goals: STG=LTG :  Long term goals  Time Frame for Long term goals : 7 Days  Long term goal 1: Pt will complete independent UB dressing  Long term goal 2: Pt will complete independent LB dressing  Long term goal 3: Pt will complete independent bathing  Long term goal 4: Pt will complete all aspects of toileting independently  Long term goal 5: Pt will complete independently doff/don her footwear  Long term goal 7: Pt will complete independent transfers (toilet, wheelchair, bed, shower) : These goals were reviewed with this patient at the time of assessment and Adithya Ledesma is in agreement    Plan of Care:  Pt to be seen 5 days per week for a minimum of 60 minutes for 7 days.           Plan  Current Treatment Recommendations: Strengthening, Functional Mobility Training, Gait Training, Patient/Caregiver Education & Training, ROM, Endurance Training, Equipment Evaluation, Education, & procurement, Balance Training, Safety Education & Training, Self-Care / ADL, Positioning, Home Management Training, cognitive training, home management, energy conservation training, community reintegration, splint fabrication, patient/caregiver education and training, animal assisted therapy, and concurrent and/or group therapy. OT IRF-JHOAN scores and goals for initial assessment:    ADLs:    Eating: Eating  Assistance Needed: Independent  Comment: Per pt, she was able to open all containers of breakfast. She reports that she does struggle to open bottled water  CARE Score: 6  Discharge Goal: Independent       Oral Hygiene: Oral Hygiene  Assistance Needed: Independent  CARE Score: 6  Discharge Goal: Independent    UB/LB Bathing: Shower/Bathe Self  Assistance Needed: Supervision or touching assistance  Comment: CGA; pt sat on shower bench throughout duration except to wash her bottom  CARE Score: 4  Discharge Goal: Independent    UB Dressing: Upper Body Dressing  Assistance Needed: Supervision or touching assistance  Comment: SBA with a verbal prompt to complete to in sitting to improve her safety. CARE Score: 4  Discharge Goal: Independent         LB Dressing: Lower Body Dressing  Assistance Needed: Supervision or touching assistance  Comment: CGA for donning underwear  CARE Score: 4  Discharge Goal: Independent    Donning and [place] Footwear: Putting On/Taking Off Footwear  Assistance Needed: Supervision or touching assistance  Comment: Donning and doffing socks  CARE Score: 4  Discharge Goal: Independent      Toileting: Toileting Hygiene  Comment: PT and pt reports that she completed toileting during their sesson prior to OT session  Reason if not Attempted: Patient refused  CARE Score: 7      Toilet Transfers:    Toilet Transfer  Assistance Needed: Supervision or touching assistance  Comment: PT and pt reports that she completed toileting prior to OT session  Reason if not Attempted: Patient refused  CARE Score: 7      SPEECH THERAPY: (If ordered)  Plan of Care and Goals:   LTG       Duration/Frequency of Treatment  Duration/Frequency of Treatment: N/A                                                 LTG:                           Treatments may include speech/language/communication therapy, cognitive training, animal assisted therapy, group therapy, education, and/or dysphagia therapy based on the above goals. Co-treats where appropriate with PT or OT to facilitate patient goals in functional tasks. These goals were reviewed with this patient at the time of assessment and Adithya Callie is in agreement. CASE MANAGEMENT:  Goals:   Assist patient/family with discharge planning, patient/family counseling, assistance in obtaining recommended equipment and other services, and coordination with insurance during ARU stay. Patient Goals:   Return to maximum level of independent function. Activities Prior to Admit:   Homemaking Responsibilities: Yes  Active : Yes  Mode of Transportation: Car  Occupation: Retired  Leisure & Hobbies: Cutting grass, Making jewelry         Intensity of [address] will be seen a minimum of 3 hours of therapy per day/a minimum of 5 out of 7 days per week. [] In this rare instance due to the nature of this patient's medical involvement, this patient will be seen 15 hours per week (900 minutes within a 7 day period). Treatments may include therapeutic exercises, gait training, neuromuscular re-ed, transfer training, community reintegration, bed mobility, w/c mobility and training, self care, home mgmt, cognitive training, energy conservation,dysphagia tx, speech/language/communication therapy, group therapy, and patient/family education.  In addition, dietician/nutritionist may monitor calorie count as well as intake and collaboratively work with SLP on dietary upgrades. Neuropsychology/Psychology may evaluate and provide necessary support. Group therapy as appropriate to facilitate improved endurance, STR, COORD, function, safety, transfers, awareness and insight into deficits, problem solving, memory, and social interaction and engagement. Medical issues being managed closely and that require 24 hour availability of a physician:   [] Swallowing Precautions                                     [] Weight bearing precautions   [] Wound Care                             [x] Infection Prevention   [x] DVT Prophylaxis/assessment              [x] Monitoring for complications    [x] Fall Precautions/Prevention                         [x] Fluid/Electrolyte/Nutrition Balance   [] Voice Protection                           [x] Medication Management   [x] Respiratory                   [x] Pain Mgmt   [x] Bowel/Bladder Fx    Medical Prognosis: [] Good  [x] Fair    [] Guarded   Total expected IRF days 141                                            Physician anticipated functional outcomes:  FWW and HHC OT/PT and supervision.   Rehab Goals:   [] Return to premorbid function of_______________________________.    [] Independent   [] Mod I  [x] Supervision  [] CGA   [] Min A   [] Mod A  Level for ambulation []without assistive device  [x] with assistive device        [] Independent   [] Mod I  [x] Supervision [] CGA   [] Min A   [] Mod A  Level for transfers []without assistive device  [x] with assistive device         [] Independent   [] Mod I  [x] Supervision [] CGA   [] Min A   [] Mod A Level with ADL's []without assistive device   [x] with assistive device     ___________________     Level with cognitive skills requiring [] No assist [x] Supervision  [] Active Assist/Cues     [] Maximize level of mobility and ADL's to decrease burden on caregiver    IPOC brief synthesis of Preadmission Screen, Post-Admission Evaluation, and Therapy Evaluations: Acute inpatient rehabilitation with occupational and physical therapy 180 minutes 5 out of every 7 days. Will address basic and  advancing mobility with self-care instruction and adaptive equipment training. Caregiver education will be offered. Expected length of stay  prior to a supervised level of function for discharge home with a walker and [facility] OT/PT is 2 weeks.        1. Acute CVA with impaired gait: Patient requires daily occupational and physical therapy with speech-language pathology. She is not on antiplatelet therapy because of past intolerance. She is on a statin. Working to control her blood pressure and nutrition. Bowel and bladder retraining. She needs adaptive equipment training, caregiver education and nutritional support. Aggressive pulmonary hygiene measures. DVT prophylaxis. 2. DVT prophylaxis: Lovenox 30 mg subcu every 12 hours. I must monitor her hemoglobin and platelet count periodically while on this medication. Weightbearing activities will be pursued daily. GI prophylaxis offered. 3. Pneumonia with sepsis: Her IV antibiotics and steroids have been transitioned to oral Keflex. Monitoring her fever curve, O2 saturations and respiratory rate. Aggressive pulmonary hygiene measures. Periodic blood counts. 4. COPD exacerbation: Aggressive pulmonary hygiene measures.     Anticipated discharge destination:    [] Home Independently   [x] Home with supervision    []SNF     [] Other       This plan has been reviewed with me in a language I understand.  I have had the opportunity to include my input with my therapy team.    ________________________________________________   ______________________  Patient/Significant Other      Date    I have reviewed this initial plan of care and agree with its contents:    Title   Name    Date    Time    Physician: Stephen Arora MD 11/29/2021 2:34 PM    Case Mgmt:  Cheri Hathaway 11/29/2021 1398    OT: Corinne Brush, MOT, OTR/L 11/27/2021   12:17    PT: Misael Aguilar, PT 11/27/21 12:30    RN: Paul Durham RN 11/26/21    ST:    Dietician:

## 2021-11-26 NOTE — DISCHARGE SUMMARY
Discharge Summary    Name:  Raji Puckett /Age/Sex: 1935  (80 y.o. female)   MRN & CSN:  [id number] & [id number] Admission Date/Time: 2021  2:06 PM   Attending:  Lexis Mahoney MD Discharging Physician: Willa Cabot, MD     Hospital Course: Raji Puckett is a 80 y.o.  female  who presents with Acute respiratory failure ([address]    Patient was seen and examined  Had chest pain overnight; now resolved  No new focal weakness  No dizziness or palpitations     1) Acute hypoxic resp failure due to COPD Exacerbation  -Extubated   -Continue bronchodilators and prednisone  -Weaned off steroid      2) Sepsis likely due to pneumonia  -Completed doxy and cefepime  -Monitor off Abx     3) Acute Ischemic CVA  -MRI Brain with 3 punctate acute to subacute infarcts involving Rt frontal, left temporal as well as left occipital lobe  -CTA Head and Neck: No large vessel occlusion  -JAN: No clot seen  -Neurology on board; continue ASA and Statin  -PT/OT; recommended rehab     4) Elevated troponin  -Likely due to demand  -TTE noted  -S/P C with no significant coronary artery disease on 2021  -Continue medical management     5) Takotsubo Cardiomyopathy  -TTE with EF of 25-30%, akinetic apex suggesting possible Takotsubo, and grade 3 diastolic dysfunction  -Continue medical management, BB and ACEi  -Repeat TTE in 3 months     Other chronic medical condition; medication resumed unless contraindicated     -Status post TAVR  -Mood Disorder  -Hypothyroidim      The patient expressed appropriate understanding of and agreement with the discharge recommendations, medications, and plan.      Consults this admission:  IP CONSULT TO PHARMACY  PHARMACY TO CHANGE BASE FLUIDS  IP CONSULT TO HOSPITALIST  IP CONSULT TO PULMONOLOGY  IP CONSULT TO PULMONOLOGY  IP CONSULT TO DIETITIAN  IP CONSULT TO CARDIOLOGY  IP CONSULT TO NEUROLOGY    Discharge Instruction:   Follow up appointments: cardiology in 2 weeks  Primary care physician:  within 2 weeks    Diet:  cardiac diet   Activity: activity as tolerated  Disposition: Discharged to:   []Home, []HHC, []SNF, [x]Acute Rehab, []Hospice   Condition on discharge: Stable    Discharge Medications:        Medication List      ASK your doctor about these medications    * albuterol sulfate  (90 Base) MCG/ACT inhaler  Inhale 2 puffs into the lungs every 6 hours as needed for Wheezing     * albuterol sulfate  (90 Base) MCG/ACT inhaler  Commonly known as: Proventil HFA  Inhale 2 puffs into the lungs every 6 hours as needed for Wheezing     amLODIPine 5 MG tablet  Commonly known as: NORVASC  Take 1 tablet by mouth daily     aspirin 81 MG EC tablet  Commonly known as: EQ Aspirin Adult Low Dose  Take 1 tablet by mouth daily     Budeson-Glycopyrrol-Formoterol 160-9-4.8 MCG/ACT Aero  Inhale 2 puffs into the lungs 2 times daily     Calcium-Magnesium-Zinc 167-83-8 MG Tabs     escitalopram 20 MG tablet  Commonly known as: Lexapro  Take 1 tablet by mouth daily     Handicap Placard Misc  by Does not apply route Please provide handicap placard for 5 years.     Diagnosis: Respiratory condition     ipratropium-albuterol 0.5-2.5 (3) MG/3ML Soln nebulizer solution  Commonly known as: DUONEB  Inhale 3 mLs into the lungs every 4 hours (while awake)     levothyroxine 75 MCG tablet  Commonly known as: SYNTHROID  Take 1 tablet by mouth daily     meclizine 25 MG tablet  Commonly known as: ANTIVERT  Take 1 tablet by mouth 3 times daily as needed for Dizziness  Ask about: Should I take this medication?     ondansetron 4 MG tablet  Commonly known as: ZOFRAN  Take 1 tablet by mouth every 8 hours as needed for Nausea or Vomiting     OXYGEN     pantoprazole 40 MG tablet  Commonly known as: PROTONIX  TAKE 1 TABLET NIGHTLY     rOPINIRole 0.25 MG tablet  Commonly known as: REQUIP  Take 1 tablet by mouth nightly     senna-docusate 8.6-50 MG per tablet  Commonly known as: PERICOLACE     silver sulfADIAZINE 1 % cream  Commonly known as: SILVADENE  Apply topically daily. sucralfate 1 GM tablet  Commonly known as: Carafate  Take 1 tablet by mouth 4 times daily     * timolol 0.5 % ophthalmic solution  Commonly known as: TIMOPTIC     * Timolol 0.5 % (DAILY) Soln ophthalmic solution  Commonly known as: TIMOPTIC     tiotropium-olodaterol 2.5-2.5 MCG/ACT Aers  Commonly known as: STIOLTO  Inhale 2 puffs into the lungs daily     UNABLE TO FIND     UNABLE TO FIND     vitamin B-12 1000 MCG tablet  Commonly known as: CYANOCOBALAMIN     Vitamin D3 50 MCG (2000 UT) Caps         * This list has 4 medication(s) that are the same as other medications prescribed for you. Read the directions carefully, and ask your doctor or other care provider to review them with you. Objective Findings at Discharge:   BP (!) 108/55   Pulse 50   Temp 98.7 °F (37.1 °C) (Oral)   Resp 12   Ht 5' 0.98\" (1.549 m)   Wt 134 lb 9.6 oz (61.1 kg)   SpO2 99%   BMI 25.45 kg/m²            PHYSICAL EXAM   GEN Awake female, sitting upright in bed in no apparent distress. Appears given age. EYES Pupils are equally round. No scleral erythema, discharge, or conjunctivitis. HENT Mucous membranes are moist. Oral pharynx without exudates, no evidence of thrush. NECK Supple, no apparent thyromegaly or masses. RESP Clear to auscultation, no wheezes, rales or rhonchi. Symmetric chest movement while on room air. CARDIO/VASC S1/S2 auscultated. Regular rate without appreciable murmurs, rubs, or gallops. No JVD or carotid bruits. Peripheral pulses equal bilaterally and palpable. No peripheral edema. GI Abdomen is soft without significant tenderness, masses, or guarding. Bowel sounds are normoactive. Rectal exam deferred.  No costovertebral angle tenderness.  [place] catheter is not present. HEME/LYMPH No palpable cervical lymphadenopathy and no hepatosplenomegaly. No petechiae or ecchymoses. MSK No gross joint deformities. SKIN Normal coloration, warm, dry. NEURO Cranial nerves appear grossly intact, normal speech, no lateralizing weakness. PSYCH Awake, alert, oriented x 4. Affect appropriate.     BMP/CBC  Recent Labs     11/24/21  0545 11/25/21  1000 11/26/21  0409    139 139   K 3.6 3.7 3.7    101 102   CO2 28 31 28   BUN 9 12 11   CREATININE 0.4* 0.5* 0.5*   WBC 9.1 9.8 9.3   HCT 38.7 37.4 33.9*    254 236       IMAGING:  As above    Discharge Time of 35 minutes    Electronically signed by Yanci Houes MD on 11/26/2021 at 9:56 AM

## 2021-11-26 NOTE — PROGRESS NOTES
Pulmonary and Critical Care  Progress Note    Subjective: The patient is feeling better. Comfortable in bed. C/O snoring. Shortness of breath none. Chest pain none. Addressing respiratory complaints Patient is negative for  hemoptysis and cyanosis. CONSTITUTIONAL:  negative for fevers and chills. Past Medical History:     has a past medical history of Arthritis, Asthma, Blood transfusion, Chronic bronchitis ([facility] [address] COPD (chronic obstructive pulmonary disease) ([facility] [address] Echocardiogram, Fatigue, H/O cardiac catheterization, H/O cardiovascular stress test, H/O Doppler ultrasound, H/O Doppler ultrasound, H/O echocardiogram, H/O echocardiogram, H/O echocardiogram, H/O echocardiogram, H/O exercise stress test, History of cardiac cath, History of nuclear stress test, Holter monitor, abnormal, Normal cardiac stress test, On home O2, Syncope and collapse, Tachycardia, and Thyroid disease. has a past surgical history that includes joint replacement (2004); Hysterectomy (1966); Cholecystectomy (1961); Breast surgery (2009); Toe Surgery (Early 2000's); Carpal tunnel release (1990's); Finger surgery; Gastric restriction surgery (1984); Cardiac surgery (1989); hernia repair (unknown); lipectomy (1990's); Dilatation, esophagus; other surgical history (05/01/2012); Cardiac catheterization (03/02/2011); Hip fracture surgery (Left, 11/04/2015); Colonoscopy; Endoscopy, colon, diagnostic (07/03/2017); Total shoulder arthroplasty (Left, 10/2017); Aortic valve replacement (N/A, 09/11/2019); and Aortic valve repair (N/A, 09/09/2019). reports that she quit smoking about 59 years ago. She has a 15.00 pack-year smoking history. She has never used smokeless tobacco. She reports that she does not drink alcohol and does not use drugs. Family history:  family history includes Arthritis in her father; Cancer in her sister; Diabetes in her brother and mother; Early Death in her son;  Heart Disease in her father; High Blood Pressure in her daughter and father; High Cholesterol in her father; Other in her father, mother, sister, sister, son, and son; Stroke in her son; Vision Loss in her sister. Allergies   Allergen Reactions    Morphine Anaphylaxis     Dilaudid OK    Bactrim [Sulfamethoxazole-Trimethoprim]     Butorphanol      Other reaction(s): Other - comment required  \"out of body experience\"    Influenza Vaccines      Ended in hospital stay for 3 days told by md not to get it anymore     Lactose Intolerance (Gi) Nausea Only    Phenergan [Promethazine Hcl] Other (See Comments)     Makes me jerk all over. Zofran OK    Promethazine     Stadol [Butorphanol Tartrate] Other (See Comments)     Out of body experience. Was told it was a near death experience and was placed in ICU. Dilaudid OK    Tape [place] Tape] Other (See Comments)     Plastic cause itching and rash. Paper tape causes my skin to blister. (Tega-derm and Coban OK to use.)     Social History:    Reviewed; no changes    Objective:   PHYSICAL EXAM:        VITALS:  BP (!) 137/56   Pulse 57   Temp 98.7 °F (37.1 °C) (Oral)   Resp 18   Ht 5' 0.98\" (1.549 m)   Wt 134 lb 9.6 oz (61.1 kg)   SpO2 98%   BMI 25.45 kg/m²     24HR INTAKE/OUTPUT:      Intake/Output Summary (Last 24 hours) at 11/26/2021 1231  Last data filed at 11/26/2021 1229  Gross per 24 hour   Intake 360 ml   Output    Net 360 ml       CONSTITUTIONAL: Alert. LUNGS:  decreased breath sounds  CARDIOVASCULAR:  normal S1 and S2 and positive JVD  ABD:Abdomen soft, non-tender. BS normal. No masses,  No organomegaly  NEURO: Alert.   DATA:    CBC:  Recent Labs     11/24/21  0545 11/25/21  1000 11/26/21  0409   WBC 9.1 9.8 9.3   RBC 3.94* 3.78* 3.55*   HGB 12.9 12.4* 11.8*   HCT 38.7 37.4 33.9*    254 236   MCV 98.2 98.9 95.5   MCH 32.7* 32.8* 33.2*   MCHC 33.3 33.2 34.8   RDW 13.3 13.4 13.2   SEGSPCT 57.5 59.7 58.9      BMP:  Recent Labs     11/24/21  0545 11/25/21  1000 11/26/21  0409    139 139   K 3.6 3.7 3.7    101 102   CO2 28 31 28   BUN 9 12 11   CREATININE 0.4* 0.5* 0.5*   CALCIUM 7.6* 7.5* 7.6*   GLUCOSE 74 79 79      ABG:  No results for input(s): PH, PO2ART, QUU4WON, HCO3, BEART, O2SAT in the last 72 hours. Lab Results   Component Value Date    PROBNP 7,287 (H) 11/16/2021    PROBNP 888.5 (H) 11/14/2021    PROBNP 550.9 (H) 10/13/2020     No results found for: [facility]    Radiology Review:  Pertinent images / reports were reviewed as a part of this visit. Assessment:     Patient Active Problem List   Diagnosis    Closed intertrochanteric fracture of left femur (HCC)    Gait disturbance    Anemia    Dizziness    Acquired hypothyroidism    Murmur    Age-related osteoporosis without current pathological fracture    Black tongue    COPD exacerbation (HCC)    Vitamin D deficiency    Vitamin B12 deficiency    Gastroesophageal reflux disease without esophagitis    VHD (valvular heart disease)    Aortic stenosis, severe    Acute respiratory distress    COPD with exacerbation (HCC)    Nodule of lower lobe of right lung    S/P TAVR (transcatheter aortic valve replacement)    Primary hypertension    Acute respiratory failure (HCC)    Other seizures ([address]    Metabolic encephalopathy    Cerebrovascular accident (CVA) due to embolism of cerebral artery ([address]    Cerebrovascular accident (CVA) ([address]       Plan:   1. Overall the patient has improved. 2. Inc. Activity. 3. Check apnea link.   Marisa Maravilla MD   11/26/2021  12:31 PM

## 2021-11-26 NOTE — PROGRESS NOTES
Physical Therapy    Physical Therapy Treatment Note  Name: Leonila Johnson MRN: [id number] :   1935   Date:  2021   Admission Date: 2021 Room:  [address] [place], [place] [address]   Restrictions/Precautions:  General precautions; fall risk  Communication with other providers:  RN clears pt for therapy  Subjective:  Patient states:  \"I'm feeling pretty good, my legs are a little rubbery, but I haven't been out of bed in a couple days. \"  Pain:   Location, Type, Intensity (0/10 to 10/10): Denies  Objective:    Observation:  Pt presents supine, awake alert in bed. Agreeable to therapy. Tele, BP cuff, pulse ox in place. Supine <-> sit: CGA  Seated balance: good   Sit <-> stand: CGA; inc time and effort for completion; good set-up and sequencing  Standing balance: fair  Stand <-> sit: SBA  Only assisted needed to intermittently steady; OOB for ~10 mins before onset of fatigue and requesting seated rest     Treatment, including education/measures:  Gait Training:  Cues were given for safety, sequence, device management, balance, posture, awareness, path. Pt ambulates 80 ft + 40 ft using FWW at SBA ; slightly narrow DREW, dec sunshine, no observed LOB; gait is grossly steady, standing rest x 1 to attend to fatigue    Therapeutic Exercise:  Cues were given for technique, safety, recruitment, and rationale. Cues were verbal and/or tactile. 2 x 10 LAQ's, hamstring curls, ankle pumps + DF lifts, marches  Sit to stands x 5 for functional strengthening  Rest breaks between sets    Assessment / Impression:    Initiated functional mobility, balance, transfer, gait, and strength training this session. Pt demonstrates positive response to tx as evidence by inc volume of functional mobility this session. Cont to demonstrate impaired balance, dec endurance, and slight L weakness. Will cont to benefit from intensive skilled therapy services to promote return to independence.      Patient's tolerance of treatment:  excellent  Barriers to improvement:  None observed  Plan for Next Session:    Cont w/ est POC and DC plan (ARU)    Time in:  1419  Time out:  1451  Timed treatment minutes:  32  Total treatment time:  32 (TE, GT)    Previously filed items:  Social/Functional History  Lives With: Alone  Type of Home: House  Home Layout: One level  Home Access: Level entry  Bathroom Shower/Tub: Walk-in shower  Bathroom Toilet: Handicap height  Bathroom Equipment: Shower chair, Grab bars in shower, Grab bars around toilet  Bathroom Accessibility: Accessible  Home Equipment: 4 wheeled walker, Cane, Quad cane, Crutches, Oxygen (2L O2 at night)  ADL Assistance: [address] [place] [address] Avenue: Independent  Homemaking Responsibilities: Yes  Ambulation Assistance: Independent (mod I with quad cane)  Transfer Assistance: Independent  Active : Yes  Occupation: Retired  Leisure & Hobbies: Cutting grass, Making jewelry  Short term goals  Time Frame for Short term goals: 1 week  Short term goal 1: Pt will perform sit><supine SBA  Short term goal 2: Pt will transfer SBA  Short term goal 3: Pt will ambulate 100ft with LRAD SBA  Short term goal 4: Pt will perform standing light dynamic activity x 5 minutes SBA without UE support       Electronically signed by:    Curly Jacques PT, DPT  11/26/2021, 2:55 PM

## 2021-11-26 NOTE — H&P
Brigida Garzon    : 1935  Acct #: [de-identified]  MRN: [id number]              History and physical      Admitting diagnosis: Cute CVA ([address] [address])    Comorbid diagnoses impacting rehabilitation: Acute respiratory failure with hypoxia, acute exacerbation of COPD, bacterial pneumonia with sepsis, dysarthria, Takatsubo cardiomyopathy, depression, hypothyroidism    Chief complaint: Inability to walk. Some shortness of breath with activity. History of present illness: Patient is an 66-year-old right-hand-dominant female who left a restaurant with her daughter and separate cars on 2021. Her daughter drove away and did not see her mom do the same. Later the mom was found in the car and agonal respirations. She was brought to our ED and intubated. She had acute respiratory failure with hypoxia. She developed bacterial pneumonia and was treated for a COPD exacerbation and sepsis. Her evaluation also identified multiple cerebral infarctions. She has had ataxia and impaired cognition but no localizing weakness. She has been unable to do her own toileting, transfers and self-care and cannot return directly home. She has been treated with antiplatelet therapies, blood pressure control and statins. She needs inpatient debilitation at this time to address these issues. Review of systems: She has poor insight and reasoning and review of systems was not terribly revealing. She has not been sleeping well. Her appetite is been fair. She has had incontinence of bowel and bladder. The remainder of their review of systems was negative except as mentioned in the history of present illness.     Social History:Lives With: Daughter Kalli Rodríguez dtr and .)  Type of Home: Apartment  Home Layout: One level, Performs ADL's on one level  Home Access: Level entry  Bathroom Shower/Tub: Tub/Shower unit  [employer]: Standard  Bathroom Equipment: Toilet raiser, Tub transfer bench, Grab bars in shower (Toilet raiser c hand rails)  Bathroom Accessibility: Accessible  Home Equipment: Rolling walker, Wheelchair-manual, Sock aid, Reacher  Receives Help From: Family  ADL Assistance: Needs assistance  Bath: Moderate assistance (Pt's dtr assists c bathing; washing BLE, viviane-area, hair, and back)  Meal Prep Responsibility: Secondary (Pt prepares breakfast, however pt's son-in law cooks for family for lunch and dinner, pt enjoys washing dishes and loads washer.)  Laundry Responsibility: Secondary (Pt assists c folding and putting laundry away. Dtr completes washing/drying.)  Cleaning Responsibility: No  Bill Paying/Finance Responsibility: No (Pt's son and dtr complete task)  Shopping Responsibility: No  Dependent Care Responsibility: No  Health Care Management: Primary  Ambulation Assistance: Independent (Mod Ind using RW (household distances usually and limited community walking occasionally))  Transfer Assistance: Independent  Active : No  Mode of Transportation: Car  Occupation: Retired  Leisure & Hobbies: Pt enjoys playing cards and family  Additional Comments: sleeps in adjustable bed with rails; no fall Hx in the past year    She reports that she quit smoking about 59 years ago. She has a 15.00 pack-year smoking history. She has never used smokeless tobacco. She reports that she does not drink alcohol and does not use drugs. Prior (baseline) level of function: Modified independent with mobility and self-care. Current level of function: Moderate verbal cues and physical assistance for mobility and self-care.     Allergies:  Morphine, Bactrim [sulfamethoxazole-trimethoprim], Butorphanol, Influenza vaccines, Lactose intolerance (gi), Phenergan [promethazine hcl], Promethazine, Stadol [butorphanol tartrate], and Tape [adhesive tape]    Past Medical History:   Past Medical History:   Diagnosis Date    Arthritis     generalized    Asthma     Blood transfusion 2004    No reaction    Chronic bronchitis ([facility] [address]     ENDOSCOPY, COLON, DIAGNOSTIC  07/03/2017    s/p gastric bypass otherwise normal    FINGER SURGERY      right middle, thumb and index finger (screw and pin in right index finger)    GASTRIC RESTRICTION SURGERY  1984    stapled    HERNIA REPAIR  unknown    Inc hernia hernia repair    HIP FRACTURE SURGERY Left 11/04/2015    Left hip nail    HYSTERECTOMY  1966    Luke w/ BSO    JOINT REPLACEMENT  2004    right knee    LIPECTOMY  1990's    OTHER SURGICAL HISTORY  05/01/2012    Gastrojejunostomy/partial gastrectomy    SHOULDER ARTHROPLASTY Left 10/2017    TOE SURGERY  Early 2000's    hammer toes and bunions       Current Medications:     Current Facility-Administered Medications:     [START ON 11/27/2021] sodium chloride flush 0.9 % injection 5-40 mL, 5-40 mL, IntraVENous, 2 times per day, Brenden Irizarry MD    sodium chloride flush 0.9 % injection 5-40 mL, 5-40 mL, IntraVENous, PRN, Brenden Irizarry MD    dextrose 5 % solution, 100 mL/hr, IntraVENous, PRN, Brenden Irizarry MD    dextrose 50 % IV solution, 12.5 g, IntraVENous, PRN, Brenden Irizarry MD    glucagon (rDNA) injection 1 mg, 1 mg, IntraMUSCular, PRN, Brenden Irizarry MD    glucose (GLUTOSE) 40 % oral gel 15 g, 15 g, Oral, PRN, Brenden Irizarry MD    acetaminophen (TYLENOL) tablet 650 mg, 650 mg, Oral, Q6H PRN **OR** [DISCONTINUED] acetaminophen (TYLENOL) suppository 650 mg, 650 mg, Rectal, Q6H PRN, Brenden Irizarry MD    ondansetron (ZOFRAN-ODT) disintegrating tablet 4 mg, 4 mg, Oral, Q8H PRN **OR** ondansetron (ZOFRAN) injection 4 mg, 4 mg, IntraVENous, Q6H PRN, Brenden Irizarry MD    albuterol sulfate  (90 Base) MCG/ACT inhaler 4 puff, 4 puff, Inhalation, 4x daily, Brenden Irizarry MD    [START ON 11/27/2021] aspirin EC tablet 81 mg, 81 mg, Oral, Daily, Brenden Irizarry MD    atorvastatin (LIPITOR) tablet 40 mg, 40 mg, Oral, Nightly, Brenden Irizarry MD    budesonide-formoterol (SYMBICORT) 160-4.5 MCG/ACT inhaler 2 puff, 2 puff, Inhalation, BID, Leanna Menendez MD    [START ON 11/27/2021] escitalopram (LEXAPRO) tablet 20 mg, 20 mg, Oral, Daily, Leanna Menendez MD    [START ON 11/27/2021] levothyroxine (SYNTHROID) tablet 50 mcg, 50 mcg, Oral, Daily, Leanna Menendez MD    [START ON 11/27/2021] lisinopril (PRINIVIL;ZESTRIL) tablet 2.5 mg, 2.5 mg, Oral, Daily, Leanna Menendez MD    [START ON 11/27/2021] metoprolol succinate (TOPROL XL) extended release tablet 12.5 mg, 12.5 mg, Oral, Daily, Leanna Menendez MD    montelukast (SINGULAIR) tablet 10 mg, 10 mg, Per NG tube, Nightly, Leanna Menendez MD    oxyCODONE-acetaminophen (PERCOCET) 5-325 MG per tablet 1 tablet, 1 tablet, Oral, Q6H PRN, Leanna Menendez MD    [START ON 11/27/2021] pantoprazole (PROTONIX) tablet 40 mg, 40 mg, Oral, QAM AC, Leanna Menendez MD    polyvinyl alcohol (LIQUIFILM TEARS) 1.4 % ophthalmic solution 1 drop, 1 drop, Both Eyes, Q2H PRN, Leanna Menendez MD    sucralfate (CARAFATE) tablet 1 g, 1 g, Oral, 4x Daily, Leanna Menendez MD    [START ON 11/27/2021] timolol (TIMOPTIC) 0.5 % ophthalmic solution 1 drop, 1 drop, Both Eyes, Daily, Leanna Menendez MD    acetaminophen (TYLENOL) tablet 650 mg, 650 mg, Oral, Q4H PRN, MELISSA Bearden MD    polyethylene glycol [facility]) packet 17 g, 17 g, Oral, Daily PRN, C Nura Bearden MD    Family History:   Family History   Problem Relation Age of Onset    Diabetes Mother     Other Mother         thyroid    Heart Disease Father     Arthritis Father     High Blood Pressure Father     High Cholesterol Father     Other Father         glaucoma    Other Sister         thyroid, glaucoma    Diabetes Brother     Other Sister     Vision Loss Sister         lung problems, smoker    Cancer Sister         throat cancer    Other Son         HX of clots    Early Death Son         Hit by car and killed.     High Blood Pressure Daughter     Stroke Son No residual    Other Son         HX myocarditis         Blood pressure 136/64, pulse 85, temperature 97.8 °F (36.6 °C), temperature source Oral, resp. rate 16, height 5' 1\" (1.549 m), weight 197 lb 15.6 oz (89.8 kg), SpO2 100 %. General: Sitting up in bed. Oxygen per nasal cannula  Easily distracted by events in the room. Alert. Follows one-step commands inconsistently. HEENT: Mild dysarthria. MMM. No JVD or adenopathy. No signs of trauma to her head or neck. Her neck is supple. Pulmonary: Shallow respirations with rhonchi in the bases. Cardiac: Regular rate and rhythm. Abdomen: Patient's abdomen was soft and nondistended. Bowel sounds were present throughout. There was no rebound, guarding or masses noted. Spinal exam: Poor trunk rotation. Upper extremities: Clumsy movements of both upper limbs. Give way with MMT more due to confusion than weakness. No significant bruising. Lower extremities: Trace edema. Heels clear. Clumsy movements across the knees and ankles. No localizing weakness. Sitting balance was poor. Standing balance was poor.   Exam:    Lab Results   Component Value Date    WBC 9.3 11/26/2021    HGB 11.8 (L) 11/26/2021    HCT 33.9 (L) 11/26/2021    MCV 95.5 11/26/2021     11/26/2021     Lab Results   Component Value Date    INR 0.82 11/14/2021    INR 0.90 10/13/2020    INR 0.95 10/17/2019    PROTIME 10.5 (L) 11/14/2021    PROTIME 10.9 (L) 10/13/2020    PROTIME 10.8 (L) 10/17/2019     Lab Results   Component Value Date    CREATININE 0.5 (L) 11/26/2021    BUN 11 11/26/2021     11/26/2021    K 3.7 11/26/2021     11/26/2021    CO2 28 11/26/2021     Lab Results   Component Value Date    ALT 86 (H) 11/16/2021    AST 87 (H) 11/16/2021    ALKPHOS 62 11/16/2021    BILITOT 0.3 11/16/2021         Impression: 68-year-old female with Takotsubo cardiomyopathy, depression hypothyroidism who was suffered a COPD exacerbation with respiratory failure, pneumonia and sepsis concurrent with a multi infarct stroke syndrome. She has no localizing weakness but significant cognitive impairment and ataxia. Strengths for the patient: Supportive family, some experience with adaptive equipment and an accessible home. Limitations/barriers for the patient: Her age, her confusion and her poor respiratory endurance. Recommendation: Acute inpatient rehabilitation with occupational and physical therapy 180 minutes 5 out of every 7 days. Will address basic and  advancing mobility with self-care instruction and adaptive equipment training. Caregiver education will be offered. Expected length of stay  prior to a supervised level of function for discharge home with a walker and [facility] OT/PT is 2 weeks. 1. Acute CVA with impaired gait: Patient requires daily occupational and physical therapy with speech-language pathology. She is not on antiplatelet therapy because of past intolerance. She is on a statin. Working to control her blood pressure and nutrition. Bowel and bladder retraining. She needs adaptive equipment training, caregiver education and nutritional support. Aggressive pulmonary hygiene measures. DVT prophylaxis. 2. DVT prophylaxis: Lovenox 30 mg subcu every 12 hours. I must monitor her hemoglobin and platelet count periodically while on this medication. Weightbearing activities will be pursued daily. GI prophylaxis offered. 3. Pneumonia with sepsis: Her IV antibiotics and steroids have been transitioned to oral Keflex. Monitoring her fever curve, O2 saturations and respiratory rate. Aggressive pulmonary hygiene measures. Periodic blood counts. 4. COPD exacerbation: Aggressive pulmonary hygiene measures. I personally performed a history and physical on this patient within 24 hours of admission to the rehab unit. I have reviewed the preadmission screening and concur with its findings without change.  A detailed plan of care will be established by hospital day 4 and I attest the patient is appropriate for inpatient rehabilitation at this time. I have compared the patient's current functional status noted during my history and physical with that of the preadmission screen and I have found no significant differences.

## 2021-11-26 NOTE — CARE COORDINATION
ARU expedited appeal is pending at this time. Will continue to follow for determination.
ARU pre-cert currently pending at this time. Pending reference [id number]. Will continue to follow for determination.
ARU pre-cert is currently pending at this time. Will continue to follow for determination.
CM spoke with Enzo Joseph. Precert remains pending for ARU. LH  1400 CM received call from Enzo Joseph that pt was denied for ARU. CM received paper work from Enzo Joseph for an expedited appeal. CM PS to Dr Theresa Toussaint and informed. 1430 Dr Theresa Toussaint completed paperwork for the expedited appeal. CM faxed and confirmed with Enzo Joseph that she received. Discharge plan pending the determination of the expedited appeal for ARU.  [facility]
Cm attempted to see pt and she is getting an EEG at present. Pt found unresponsive in restaurant parking lot. Pt intubated 11/14 and remains on vent at present. Pt has PCP and insurance to assist with cost of Rxs. Per chart plan for SBT/SAT 11/16.
Dr Leah Srivastava notified CM that he had discussed ARU with pt and she is agreeable. CM attempted to see pt and she is in the bathroom. Referral made to [facility]. Requested updated PT/OT notes asap via WB d/t pt will require a pre-cert. [facility] to review and will notify CM if pt meets ARU criteria or not.    TE
Met with pt and dgt at bedside. Pt was extubated 11/17/21. Dgt states immediately that she would like pt to go to Acute IP Rehab if possible. Pt has been to ARU in the past. Cm explained that physician will likely order PT/OT eval when he feels pt is medically appropriate to start therapy. Once their evals are complete and recommendation are know then will be able to discuss further possible ARU/SNF/home with HC. CM advised that ARU will beheavily dependent on whether pt can tolerate the 3 hr per day therapy requirement, insurance approval and bed availability. Cm notes PT/OT evals ordered today and whiteboard message left for therapy. Pt lives alone. Pt has the following DME for home; cane, shower seat, nebulizer and home 02 for nights PRN. Pt does not consistently use her cane. Pt has 3 children; son in [place]., son local to pt and dgt in [place]. Pt has PCP and insurance to assist with cost of Rxs. Cm to follow for discharge planning.
Patient has been approved for ARU. Auth #[id number]. Notified Dr. Liz Polo of approval.  Discussed patients rapid response this AM with him. Per Dr. Liz Polo patient is medically ready for d/c to ARU d/t cardiology w/u that was completed this admission is noted to be negative. Spoke with patients nurse Conner Thomson requesting patients vitals be retaken PTA to ARU. Per Conner Thomson she will retake them when patients meds are administered. Spoke with patient regarding updated status on patient. Dr. Heidi grant with patient admitting. Patient meets criteria and is approved to come to ARU. Patient able to admit once medically stable and after ARU Medical Director and  sign the pre-admission screen (PAS).
Per [facility] patient has been denied for admission to ARU. Notified Ibeth Carter CM that theres an option for an expedited appeal.  Ibeth Carter to ask MD if he wishes to pursue.
Received referral for ARU. Will review patients clinicals and PT/OT notes. Thank you for the referral.     1400:  Met with patient regarding pre-cert process and discussed ARU. Explained to patient the required 3 hours of therapy a day. Also explained the average length of stay is 11 days, could be longer or shorter depending on recommendations of therapy and Dr. Amadou Wu. Patient expresses her understanding and states she's agreeable to admit to ARU. Per patient her goal is to return home at discharge from ARU. Patient primary insurance is [employer], pre-cert needed. Per Siobhan RASHID whiteboard was placed for updated PT/OT notes. Will need updated therapy notes prior to initiating pre-cert. ARU will continue to follow.
Reviewed chart and spoke with Jen [place] appeal still pending. Spoke with pt and will give her a SNU PPO list to review for back up plan.
(0) independent

## 2021-11-27 LAB
GLUCOSE BLD-MCNC: 79 MG/DL (ref 70–99)
GLUCOSE BLD-MCNC: 83 MG/DL (ref 70–99)
GLUCOSE BLD-MCNC: 95 MG/DL (ref 70–99)

## 2021-11-27 PROCEDURE — 97535 SELF CARE MNGMENT TRAINING: CPT

## 2021-11-27 PROCEDURE — 92523 SPEECH SOUND LANG COMPREHEN: CPT

## 2021-11-27 PROCEDURE — 97530 THERAPEUTIC ACTIVITIES: CPT

## 2021-11-27 PROCEDURE — 82962 GLUCOSE BLOOD TEST: CPT

## 2021-11-27 PROCEDURE — 97116 GAIT TRAINING THERAPY: CPT

## 2021-11-27 PROCEDURE — 1280000000 HC REHAB R&B

## 2021-11-27 PROCEDURE — 94761 N-INVAS EAR/PLS OXIMETRY MLT: CPT

## 2021-11-27 PROCEDURE — 94640 AIRWAY INHALATION TREATMENT: CPT

## 2021-11-27 PROCEDURE — 6370000000 HC RX 637 (ALT 250 FOR IP): Performed by: INTERNAL MEDICINE

## 2021-11-27 PROCEDURE — 97162 PT EVAL MOD COMPLEX 30 MIN: CPT

## 2021-11-27 PROCEDURE — 92610 EVALUATE SWALLOWING FUNCTION: CPT

## 2021-11-27 PROCEDURE — 2580000003 HC RX 258: Performed by: INTERNAL MEDICINE

## 2021-11-27 PROCEDURE — 97167 OT EVAL HIGH COMPLEX 60 MIN: CPT

## 2021-11-27 RX ADMIN — ESCITALOPRAM OXALATE 20 MG: 10 TABLET ORAL at 08:18

## 2021-11-27 RX ADMIN — ASPIRIN 81 MG: 81 TABLET, COATED ORAL at 08:18

## 2021-11-27 RX ADMIN — LEVOTHYROXINE SODIUM 50 MCG: 0.05 TABLET ORAL at 06:05

## 2021-11-27 RX ADMIN — TIMOLOL MALEATE 1 DROP: 5 SOLUTION OPHTHALMIC at 08:19

## 2021-11-27 RX ADMIN — SUCRALFATE 1 G: 1 TABLET ORAL at 10:41

## 2021-11-27 RX ADMIN — ALBUTEROL SULFATE 4 PUFF: 90 AEROSOL, METERED RESPIRATORY (INHALATION) at 11:03

## 2021-11-27 RX ADMIN — ALBUTEROL SULFATE 4 PUFF: 90 AEROSOL, METERED RESPIRATORY (INHALATION) at 07:51

## 2021-11-27 RX ADMIN — PANTOPRAZOLE SODIUM 40 MG: 40 TABLET, DELAYED RELEASE ORAL at 06:05

## 2021-11-27 RX ADMIN — LISINOPRIL 2.5 MG: 2.5 TABLET ORAL at 08:18

## 2021-11-27 RX ADMIN — BUDESONIDE AND FORMOTEROL FUMARATE DIHYDRATE 2 PUFF: 160; 4.5 AEROSOL RESPIRATORY (INHALATION) at 07:53

## 2021-11-27 RX ADMIN — METOPROLOL SUCCINATE 12.5 MG: 25 TABLET, EXTENDED RELEASE ORAL at 08:18

## 2021-11-27 RX ADMIN — SODIUM CHLORIDE, PRESERVATIVE FREE 10 ML: 5 INJECTION INTRAVENOUS at 08:19

## 2021-11-27 RX ADMIN — SUCRALFATE 1 G: 1 TABLET ORAL at 16:51

## 2021-11-27 RX ADMIN — SUCRALFATE 1 G: 1 TABLET ORAL at 06:06

## 2021-11-27 RX ADMIN — ALBUTEROL SULFATE 4 PUFF: 90 AEROSOL, METERED RESPIRATORY (INHALATION) at 15:43

## 2021-11-27 ASSESSMENT — PAIN SCALES - GENERAL
PAINLEVEL_OUTOF10: 0

## 2021-11-27 NOTE — PROGRESS NOTES
Received pt from [address] [facility]. Alert and oriented. Pleasant. VSS. Pt has some scattered bruising BUE and Mepilex borders to bilateral elbows to prevent friction on sheets. No skin breakdown noted as verified by this nurse and Julián Torres. Denies pain. No distress noted.

## 2021-11-27 NOTE — PROGRESS NOTES
Occupational Therapy                                                  [facility] ARU OCCUPATIONAL THERAPY EVALUATION    Chart Review:  Past Medical History:   Diagnosis Date    Arthritis     generalized    Asthma     Blood transfusion 2004    No reaction    Chronic bronchitis (HCC)     COPD (chronic obstructive pulmonary disease) ([address]     summer 2014    Echocardiogram 04/09/2021    EF 55-60%, Mild dilated LA, Mild annular calcification present, Mild mitral stenosis, Mild MR & TR.    Fatigue     H/O cardiac catheterization 06/15/2017    mild lad and cx disease    H/O cardiovascular stress test 08/22/2019    H/O Doppler ultrasound 4/7/2016 3/19/12    carotid-4/16 WNL 3/12right mild less than 50%and left wnl    H/O Doppler ultrasound 6/14/017    carotid - mod disease EILEEN, mild disease LICA    H/O echocardiogram 07/23/2010    H/O echocardiogram 04/15/2016    EF 60% sclerotic AV mildly stenosed-recommend yearly echo    H/O echocardiogram 08/22/2019    EF 55-60%, Minimal concentric left ventricular hypertrophy, left atrium is mildly dilated, severe aortic stenosis, Mitral annular calcification is present, Mild AR, Mild-Mod MR, Mild TR, No pericardial effusion     H/O echocardiogram 09/23/2019    H/O exercise stress test 06/14/2017    abnormal    History of cardiac cath 08/28/2019    Severe AS,   TAVR??  History of nuclear stress test 03/14/2017    EF 75%. Normal study.     Holter monitor, abnormal 02/22/2011    infrequent APC are seen    Normal cardiac stress test 07/23/2010    EF 70%, no ischemia    On home O2     only uses as needed, nightly prn    Syncope and collapse     Tachycardia     HX of tachycardia - had a cardioablation    Thyroid disease      Past Surgical History:   Procedure Laterality Date    AORTIC VALVE REPAIR N/A 09/09/2019    Core Valve EVOLUT 26mm [id number]    AORTIC VALVE REPLACEMENT N/A 09/11/2019    TAVR; [employer] Evlout 26    BREAST SURGERY  2009    bilat    CARDIAC CATHETERIZATION  03/02/2011    mild to moderate disease of diagonal and proximal RCA   [address]    ablation    CARPAL TUNNEL RELEASE  1990's    bilat    CHOLECYSTECTOMY  1961    open choley    COLONOSCOPY      DILATATION, ESOPHAGUS      ENDOSCOPY, COLON, DIAGNOSTIC  07/03/2017    s/p gastric bypass otherwise normal    FINGER SURGERY      right middle, thumb and index finger (screw and pin in right index finger)    GASTRIC RESTRICTION SURGERY  1984    stapled    HERNIA REPAIR  unknown    Inc hernia hernia repair    HIP FRACTURE SURGERY Left 11/04/2015    Left hip nail    HYSTERECTOMY  1966    Luke w/ BSO    JOINT REPLACEMENT  2004    right knee    LIPECTOMY  1990's    OTHER SURGICAL HISTORY  05/01/2012    Gastrojejunostomy/partial gastrectomy    SHOULDER ARTHROPLASTY Left 10/2017    TOE SURGERY  Early 2000's    hammer toes and bunions     Social History:   Lives With: Alone  Type of Home: House  Home Layout: One level  Home Access: Level entry (threshold)  Bathroom Shower/Tub: Walk-in shower  Bathroom Toilet: Handicap height  Bathroom Equipment: Shower chair, Grab bars in shower, Grab bars around toilet, Hand-held shower (no back on shower chair)  Bathroom Accessibility: Baez Richard: Cane, Quad cane, Crutches, Oxygen, Rolling walker, Reacher (O2 for night use(2-3x a week))  ADL Assistance: Independent  Homemaking Assistance: Independent  Homemaking Responsibilities: Yes  Meal Prep Responsibility: Primary  Laundry Responsibility: Primary  Cleaning Responsibility: Primary  Bill Paying/Finance Responsibility: Primary  Shopping Responsibility: Primary  Dependent Care Responsibility: No  Health Care Management: Primary  Ambulation Assistance: Independent (pt uses quad cane and other canes when she feels like it(family has been encouraging her to use it at all times))  Transfer Assistance: Independent  Active : Yes  Mode of Transportation: Car  Education: High x2 in hallway with Min verbal cues for hand placement)  Assist Level: Contact guard assistance     Cognition:  Cognition  Overall Cognitive Status: WNL    Perception:  Perception  Overall Perceptual Status: WFL    OT IRF-JHOAN scores and goals for initial assessment:    Eating  Assistance Needed: Independent  Comment: Per pt, she was able to open all containers of breakfast. She reports that she does struggle to open bottled water  CARE Score: 6  Discharge Goal: Independent    Oral Hygiene  Assistance Needed: Independent  CARE Score: 6  Discharge Goal: Independent    Toileting Hygiene  Comment: PT and pt reports that she completed toileting during their sesson prior to OT session  Reason if not Attempted: Patient refused  CARE Score: 7    Shower/Bathe Self  Assistance Needed: Supervision or touching assistance  Comment: CGA; pt sat on shower bench throughout duration except to wash her bottom  CARE Score: 4  Discharge Goal: Independent    Upper Body Dressing  Assistance Needed: Supervision or touching assistance  Comment: SBA with a verbal prompt to complete to in sitting to improve her safety. CARE Score: 4  Discharge Goal: Independent    Lower Body Dressing  Assistance Needed: Supervision or touching assistance  Comment: CGA for donning underwear  CARE Score: 4  Discharge Goal: Independent    Putting On/Taking Off Footwear  Assistance Needed: Supervision or touching assistance  Comment: Donning and doffing socks  CARE Score: 4  Discharge Goal: Independent    Assessment:     The patient is a 80year old female admitted onto ARU after hospitalization on 11/14/21 after being found unresponsive in her car in a restaurant parking lot. Pt was intubated upon arrival with acute respiratory failure d/t COPD exacerbation and was placed on a vent from 11/14-11/17. Agustina Colón is now on room air.  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 \" Pt also had complications of sepsis, runs of Vtach(cardiology consult with [facility] normal). She has a PMH of arthritis, asthmas, COPD, HCC, and thyroid disease. PTA pt was independent with her ADLs and IADLs. Asad Richter was modified independent with functional mobility (use of quad cane). She now presents with decreased independence in ADLs, IADLs, and functional mobility which negatively impacts her ability to return home safely. She would benefit from direct, skilled occupational therapy on ARU to address her balance, endurance, and strength to improve her independence and safety. Prognosis: Good  Decision Making: High Complexity  Clinical Presentation:  Decreased functional mobility , Decreased strength, Decreased endurance, Decreased high-level IADLs, Decreased safe awareness, Decreased ADL status, Decreased balance, Decreased fine motor control  History:  See \"Social/Functional\" sections for complete Occupational Profile. Pt's co-morbidities, mentioned above, all impact function and ability to progress through plan of care. Assistance/Modification:  Use of DME, providing verbal cues, providing physical assistance for mobility and ADL's, providing set-up of supplies during ADL's  Patient education:   ARU procotol, Role of O.T., O.T. plan of care, RW education  Treatment Initiated:  Patient education, ADL re-training, RW safety  Barriers to Improvement:  None  REQUIRES OT FOLLOW UP: Yes  Discharge Recommendations:  Home  Equipment Recommendations:  None    Goals:  Patient Goals   Patient goals : \"To get strong so I can go home. \"  Short term goals  Time Frame for Short term goals: STG=LTG  Long term goals  Time Frame for Long term goals : 7 Days  Long term goal 1: Pt will complete independent UB dressing  Long term goal 2: Pt will complete independent LB dressing  Long term goal 3: Pt will complete independent bathing  Long term goal 4: Pt will complete all aspects of toileting independently  Long term goal 5: Pt will complete independently doff/don her footwear  Long term goal 7: Pt will complete independent transfers (toilet, wheelchair, bed, shower)    Plan:    Pt will be seen at least 60 minutes per day for a minimum of 5 days per week, plus group therapy as appropriate       OT Individual Minutes  Time In: 1045  Time Out: 1202  Minutes: 77    Number of Minutes/Billable Intervention  OT Evaluation 15 Minutes   Therapeutic Exercise    ADL Self-care 47 Minutes   Neuro Re-Ed    Therapeutic Activity 15 Mintues   Group    Other:    TOTAL 77 Minutes     Electronically signed by:    JARAD Bear, OTR/L  11/27/2021, 12:13 PM

## 2021-11-27 NOTE — PROGRESS NOTES
Physical Therapy  . [facility] ARU PHYSICAL THERAPY EVALUATION    Chart Review:  Past Medical History:   Diagnosis Date    Arthritis     generalized    Asthma     Blood transfusion 2004    No reaction    Chronic bronchitis (HCC)     COPD (chronic obstructive pulmonary disease) ([address]     summer 2014    Echocardiogram 04/09/2021    EF 55-60%, Mild dilated LA, Mild annular calcification present, Mild mitral stenosis, Mild MR & TR.    Fatigue     H/O cardiac catheterization 06/15/2017    mild lad and cx disease    H/O cardiovascular stress test 08/22/2019    H/O Doppler ultrasound 4/7/2016 3/19/12    carotid-4/16 WNL 3/12right mild less than 50%and left wnl    H/O Doppler ultrasound 6/14/017    carotid - mod disease EILEEN, mild disease LICA    H/O echocardiogram 07/23/2010    H/O echocardiogram 04/15/2016    EF 60% sclerotic AV mildly stenosed-recommend yearly echo    H/O echocardiogram 08/22/2019    EF 55-60%, Minimal concentric left ventricular hypertrophy, left atrium is mildly dilated, severe aortic stenosis, Mitral annular calcification is present, Mild AR, Mild-Mod MR, Mild TR, No pericardial effusion     H/O echocardiogram 09/23/2019    H/O exercise stress test 06/14/2017    abnormal    History of cardiac cath 08/28/2019    Severe AS,   TAVR??  History of nuclear stress test 03/14/2017    EF 75%. Normal study.     Holter monitor, abnormal 02/22/2011    infrequent APC are seen    Normal cardiac stress test 07/23/2010    EF 70%, no ischemia    On home O2     only uses as needed, nightly prn    Syncope and collapse     Tachycardia     HX of tachycardia - had a cardioablation    Thyroid disease      Past Surgical History:   Procedure Laterality Date    AORTIC VALVE REPAIR N/A 09/09/2019    Core Valve EVOLUT 26mm [id number]    AORTIC VALVE REPLACEMENT N/A 09/11/2019    TAVR; [employer] Evlout 26    BREAST SURGERY  2009    bilat    CARDIAC CATHETERIZATION  03/02/2011    mild to moderate disease of diagonal and proximal RCA   [address]    ablation    CARPAL TUNNEL RELEASE  1990's    bilat    CHOLECYSTECTOMY  1961    open choley    COLONOSCOPY      DILATATION, ESOPHAGUS      ENDOSCOPY, COLON, DIAGNOSTIC  07/03/2017    s/p gastric bypass otherwise normal    FINGER SURGERY      right middle, thumb and index finger (screw and pin in right index finger)    GASTRIC RESTRICTION SURGERY  1984    stapled    HERNIA REPAIR  unknown    Inc hernia hernia repair    HIP FRACTURE SURGERY Left 11/04/2015    Left hip nail    HYSTERECTOMY  1966    Luke w/ BSO    JOINT REPLACEMENT  2004    right knee    LIPECTOMY  1990's    OTHER SURGICAL HISTORY  05/01/2012    Gastrojejunostomy/partial gastrectomy    SHOULDER ARTHROPLASTY Left 10/2017    TOE SURGERY  Early 2000's    hammer toes and bunions     Fall History: Pt estimates at least 4 falls in past year with injury  Social History:  Social/Functional History  Lives With: Alone  Type of Home: [address] [place], [place] [address] [facility]: One level  Home Access: Level entry (threshold)  Bathroom Shower/Tub: Walk-in shower  Bathroom Toilet: Handicap height  Bathroom Equipment: Shower chair, Grab bars in shower, Grab bars around toilet, Hand-held shower (no back on shower chair)  Bathroom Accessibility: Accessible  Home Equipment: Cane, Quad cane, Crutches, Oxygen, Rolling walker, Reacher (O2 for night use(2-3x a week))  ADL Assistance: Independent  Homemaking Assistance: Independent  Homemaking Responsibilities: Yes  Meal Prep Responsibility: Primary  Laundry Responsibility: Primary  Cleaning Responsibility: Primary  Bill Paying/Finance Responsibility: Primary  Shopping Responsibility: Primary  Dependent Care Responsibility: No  Health Care Management: Primary  Ambulation Assistance: Independent (pt uses quad cane and other canes when she feels like it(family has been encouraging her to use it at all times))  Transfer Assistance: Independent  Active : Yes  Mode of Transportation: Car  Education: [employer]  Occupation: Retired  Leisure & Hobbies: Cutting grass, Making jewelry  Additional Comments: bed has adjustability features. 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), significant wounds to L arm;  Pt reports she uses a pill organizer and she organizes it herself; Top & bottom dentures    Restrictions:  Restrictions/Precautions  Restrictions/Precautions: General Precautions, Fall Risk            Pain Level: 0       Objective:  Orientation  Overall Orientation Status: Within Normal Limits        Vision  Vision: Impaired  Vision Exceptions: Wears glasses for reading  Hearing  Hearing: Within functional limits    ROM:      AROM RLE (degrees)  RLE AROM: WFL  PROM LLE (degrees)  LLE General PROM: LLE ~1/2\" shorter than R(states she had been given heel lifts years ago but she does not wear them)  AROM LLE (degrees)  LLE AROM : WFL                 Strength:    Strength RLE  Strength RLE: WFL  Strength LLE  Comment: WFL except L hip abduction 4-/5(past surgery)              Bed Mobility:   Lying to Sitting on Side of Bed  Assistance Needed: Independent  Comment: HOB slightly elevated as at home, no rail  CARE Score: 6  Discharge Goal: Independent  Roll Left and Right  Assistance Needed: Independent  Comment: HOB slightly elevated as at home, no rail  CARE Score: 6  Discharge Goal: Independent  Sit to Lying  Assistance Needed: Independent  Comment: HOB slightly elevated as at home, no rail  CARE Score: 6  Discharge Goal: Independent    Transfers:    Sit to Stand  Assistance Needed: Supervision or touching assistance  Comment: CG  CARE Score: 4  Discharge Goal: Independent  Chair/Bed-to-Chair Transfer  Assistance Needed: Supervision or touching assistance  Comment: CG  CARE Score: 4  Discharge Goal: Independent    Car Transfer  Assistance Needed: Supervision or touching assistance  Comment: SBA with 2ww  CARE Score: 4  Discharge Goal: Independent    Ambulation:   Device used PTA: quad cane   Walking Ability  Does the Patient Walk?: Yes     Walk 10 Feet  Assistance Needed: Partial/moderate assistance  Comment: min assist without device, narrow DREW(nearly tandem at times) with decreased stability L hip, path deviation  CARE Score: 3  Discharge Goal: Independent     Walk 50 Feet with Two Turns  Assistance Needed: Supervision or touching assistance  Comment: CG with 2ww  CARE Score: 4  Discharge Goal: Independent     Walk 150 Feet  Comment: 70' max attempt due to LE fatigue  Reason if not Attempted: Not attempted due to medical condition or safety concerns  CARE Score: 88  Discharge Goal: Independent     Walking 10 Feet on Uneven Surfaces  Assistance Needed: Supervision or touching assistance  Comment: CG with 2ww  CARE Score: 4  Discharge Goal: Independent     1 Step (Curb)  Assistance Needed: Supervision or touching assistance  Comment: CG with 2ww  CARE Score: 4  Discharge Goal: Independent     4 Steps  Assistance Needed: Supervision or touching assistance  Comment: CG with B rails  CARE Score: 4  Discharge Goal: Independent     12 Steps  Assistance Needed: Supervision or touching assistance  Comment: CG with B rails  CARE Score: 4  Discharge Goal: Independent    Gait Deviations: []None [x]Slow sunshine  [] Increased DREW  [] Staggers [x]Deviated Path  [] Decreased step length  [x] Decreased step height  []Decreased arm swing  [] Shuffles  [] Decreased head and trunk rotation  []other:        Wheelchair:  w/c Ability: Wheelchair Ability  Uses a Wheelchair and/or Scooter?: No                Balance:    Balance  Posture: Good  Sitting - Static: Good  Sitting - Dynamic: Good  Standing - Static: Fair, -  Standing - Dynamic: Fair, -   Object: Picking Up Object  Assistance Needed: Supervision or touching assistance  Comment: CG with 2ww and reacher  CARE Score: 4  Discharge Goal: Independent    Assessment:   The patient is a 80year old femaleadmitted onto ARU after hospitalization 11/14/21 after being found unresponsive in her car at restaurant parking lot. Pt was intubated upon arrival with acute respiratory failure d/t COPD exacerbation and was on vent 11/14-11/17 and is now on room air. (Pt on home O2 prn) Pt also had MRI and pt found to have \"3 punctate acute to subacute infarcts involving the R frontal lobe, L temporal lobe as well as the L occipital lobe. \" Pt also had complications of sepsis, runs of Vtach(cardiology consult with [facility] normal). PTA pt lives alone and is independent with ADL and gait using quad cane, was able to negotiate 12 steps with a rail. Pt presents this date with decreased strength, balance and activity tolerance at overall CG level, though as session progressed pt demonstrated increased effort to complete tasks. Feel pt will benefit from ARU-PT to address deficits as noted and that training with 2ww for balance and activity tolerance will allow for safest discharge to home.         Body structures, Functions, Activity limitations: Decreased functional mobility , Decreased strength, Decreased endurance, Decreased high-level IADLs, Decreased cognition, Decreased balance     Prognosis: Good  Decision Making: Medium Complexity  Clinical Presentation: evolving presentation      Patient education:   ARU schedule, ARU expectations for participation, plan of care,   Treatment Initiated:  Functional mobility training, gait training, patient education  Barriers to Improvement:  Activity tolerance  Discharge Recommendations:  Home with increased support  Equipment Recommendations:  Pt has a 2ww from prior hip replacement    Goals:  Patient Goals   Patient goals : return home at PLOF     Long term goals  Time Frame for Long term goals : 7-10 days STG=LTG  Long term goal 1: Pt will demonstrate all bed mobiltiy with mod I  Long term goal 2: Pt will demonstrate car, pivot and sit to stand transfers with mod I  Long term goal 3: Pt will perform gait on level surface at least 150' and on unlevel surface 20' with 2ww amd mod I  Long term goal 4: Pt will ascend curb step with 2ww and 12 stairs with b rails with mod I  Long term goal 5: Pt will retrieve small item from floor with 2ww and reacher with mod I  Plan:    REQUIRES PT FOLLOW UP: Yes  Pt will be seen at least 60 minutes per day for a minimum of 5 days per week, plus group therapy as appropriate       PT Individual Minutes  Time In: 0930  Time Out: 1045  Minutes: 75                 PT Evaluation 15   Gait Training 30   Therapeutic Exercise    Neuro Re-Ed    Therapeutic Activity 30   Wheelchair Propulsion    Group    Other:    TOTAL 75       Electronically signed by:    Humberto Parikh, PT, PT   11/27/2021, @NOW

## 2021-11-28 PROBLEM — A41.9 SEPSIS DUE TO PNEUMONIA (HCC): Status: ACTIVE | Noted: 2019-09-06

## 2021-11-28 LAB
GLUCOSE BLD-MCNC: 100 MG/DL (ref 70–99)
GLUCOSE BLD-MCNC: 83 MG/DL (ref 70–99)

## 2021-11-28 PROCEDURE — 94761 N-INVAS EAR/PLS OXIMETRY MLT: CPT

## 2021-11-28 PROCEDURE — 94640 AIRWAY INHALATION TREATMENT: CPT

## 2021-11-28 PROCEDURE — 6370000000 HC RX 637 (ALT 250 FOR IP): Performed by: INTERNAL MEDICINE

## 2021-11-28 PROCEDURE — 2580000003 HC RX 258: Performed by: INTERNAL MEDICINE

## 2021-11-28 PROCEDURE — 82962 GLUCOSE BLOOD TEST: CPT

## 2021-11-28 PROCEDURE — 1280000000 HC REHAB R&B

## 2021-11-28 RX ADMIN — MONTELUKAST 10 MG: 10 TABLET, FILM COATED ORAL at 00:06

## 2021-11-28 RX ADMIN — ESCITALOPRAM OXALATE 20 MG: 10 TABLET ORAL at 09:35

## 2021-11-28 RX ADMIN — SUCRALFATE 1 G: 1 TABLET ORAL at 05:46

## 2021-11-28 RX ADMIN — ATORVASTATIN CALCIUM 40 MG: 40 TABLET, FILM COATED ORAL at 22:05

## 2021-11-28 RX ADMIN — ALBUTEROL SULFATE 4 PUFF: 90 AEROSOL, METERED RESPIRATORY (INHALATION) at 09:09

## 2021-11-28 RX ADMIN — PANTOPRAZOLE SODIUM 40 MG: 40 TABLET, DELAYED RELEASE ORAL at 05:46

## 2021-11-28 RX ADMIN — SODIUM CHLORIDE, PRESERVATIVE FREE 10 ML: 5 INJECTION INTRAVENOUS at 00:07

## 2021-11-28 RX ADMIN — BUDESONIDE AND FORMOTEROL FUMARATE DIHYDRATE 2 PUFF: 160; 4.5 AEROSOL RESPIRATORY (INHALATION) at 09:10

## 2021-11-28 RX ADMIN — ASPIRIN 81 MG: 81 TABLET, COATED ORAL at 09:36

## 2021-11-28 RX ADMIN — MONTELUKAST 10 MG: 10 TABLET, FILM COATED ORAL at 22:05

## 2021-11-28 RX ADMIN — SUCRALFATE 1 G: 1 TABLET ORAL at 17:07

## 2021-11-28 RX ADMIN — SUCRALFATE 1 G: 1 TABLET ORAL at 09:36

## 2021-11-28 RX ADMIN — SUCRALFATE 1 G: 1 TABLET ORAL at 22:05

## 2021-11-28 RX ADMIN — TIMOLOL MALEATE 1 DROP: 5 SOLUTION OPHTHALMIC at 09:37

## 2021-11-28 RX ADMIN — ATORVASTATIN CALCIUM 40 MG: 40 TABLET, FILM COATED ORAL at 00:06

## 2021-11-28 RX ADMIN — SUCRALFATE 1 G: 1 TABLET ORAL at 00:06

## 2021-11-28 RX ADMIN — LISINOPRIL 2.5 MG: 2.5 TABLET ORAL at 09:35

## 2021-11-28 RX ADMIN — SODIUM CHLORIDE, PRESERVATIVE FREE 10 ML: 5 INJECTION INTRAVENOUS at 17:07

## 2021-11-28 RX ADMIN — ALBUTEROL SULFATE 4 PUFF: 90 AEROSOL, METERED RESPIRATORY (INHALATION) at 19:08

## 2021-11-28 RX ADMIN — LEVOTHYROXINE SODIUM 50 MCG: 0.05 TABLET ORAL at 05:46

## 2021-11-28 RX ADMIN — BUDESONIDE AND FORMOTEROL FUMARATE DIHYDRATE 2 PUFF: 160; 4.5 AEROSOL RESPIRATORY (INHALATION) at 19:08

## 2021-11-28 RX ADMIN — SODIUM CHLORIDE, PRESERVATIVE FREE 10 ML: 5 INJECTION INTRAVENOUS at 21:47

## 2021-11-28 ASSESSMENT — PAIN SCALES - GENERAL
PAINLEVEL_OUTOF10: 0
PAINLEVEL_OUTOF10: 0

## 2021-11-28 NOTE — CONSULTS
Comprehensive Nutrition Assessment    Type and Reason for Visit:  Initial, Consult (Oral nutrition supplements)    Nutrition Recommendations/Plan:   · Continue current diet   · Start clear liquid oral nutrition supplement once daily     Nutrition Assessment:  Rehab admit with acute CVA, acute respiratory failure with hypoxia, COPD excerbation, bacterial PNA with sepsis, dysarthria, takatsubo cardiomyopathy, hypothyroidism. PMH: gastric bypass, GERD. Pt on regular low sodium. Pt is consuming 51-75% of meals, has early satiety, had decreased appetite but forces herself to eat food. Daughter present in room. Denies any taste/smell aversions. Understands importance of high protein for LBM. Will trial clear liquid supplement once daily. Encouraged pt to consume adequate protein. Moderate nutrition risk. Malnutrition Assessment:  Malnutrition Status: At risk for malnutrition (Comment)    Context:  Acute Illness     Findings of the 6 clinical characteristics of malnutrition:  Energy Intake:  Mild decrease in energy intake (Comment)  Weight Loss:  No significant weight loss     Body Fat Loss:  Unable to assess     Muscle Mass Loss:  1 - Mild muscle mass loss Clavicles (pectoralis & deltoids), Hand (interosseous), Scapula (trapezius)  Fluid Accumulation:  Unable to assess Extremities   Strength:  Not Performed    Estimated Daily Nutrient Needs:  Energy (kcal):  5569-2054 (22-25 kcals/kg); Weight Used for Energy Requirements:  Current     Protein (g):  61-73 (1-1.2 g/kg); Weight Used for Protein Requirements:  Current        Fluid (ml/day):  1500; Method Used for Fluid Requirements:  1 ml/kcal      Nutrition Related Findings: S/p ICU stay 11/14-11/17 on vent support and intubated. Weight gain since acute stay, pt sensitive to some milk products and concentrated sweets, will consume high protein sources first per pt      Wounds:  None       Current Nutrition Therapies:    ADULT DIET; Regular;  Low Sodium (2 gm)    Anthropometric Measures:  · Height: 5' (152.4 cm)  · Current Body Weight: 134 lb (60.8 kg)   · Admission Body Weight: 121 lb (54.9 kg) (acute stay)    · Usual Body Weight: 126 lb 12.8 oz (57.5 kg) (August 2021 per hx)     · Ideal Body Weight: 100 lbs; % Ideal Body Weight 134 %   · BMI: 26.2  · Adjusted Body Weight:  ; No Adjustment   · Adjusted BMI:      · BMI Categories: Overweight (BMI 25.0-29. 9)       Nutrition Diagnosis:   · Predicted inadequate energy intake related to early satiety, acute injury/trauma as evidenced by intake 51-75%, poor intake prior to admission (weakness s/p vent support)    Nutrition Interventions:   Food and/or Nutrient Delivery:  Continue Current Diet, Start Oral Nutrition Supplement  Nutrition Education/Counseling:  No recommendation at this time   Coordination of Nutrition Care:  Continue to monitor while inpatient, Feeding Assistance/Environment Change    Goals:  Pt will consume greater than 75% of meals and supplements       Nutrition Monitoring and Evaluation:   Behavioral-Environmental Outcomes:  None Identified   Food/Nutrient Intake Outcomes:  Food and Nutrient Intake, Supplement Intake  Physical Signs/Symptoms Outcomes:  Biochemical Data, GI Status, Meal Time Behavior, Weight     Discharge Planning:    Continue Oral Nutrition Supplement     Electronically signed by Leisa Allen RD, LD on 11/28/21 at 5:01 PM EST    Contact: [phone]

## 2021-11-29 PROCEDURE — 6370000000 HC RX 637 (ALT 250 FOR IP): Performed by: PHYSICAL MEDICINE & REHABILITATION

## 2021-11-29 PROCEDURE — 1280000000 HC REHAB R&B

## 2021-11-29 PROCEDURE — 94761 N-INVAS EAR/PLS OXIMETRY MLT: CPT

## 2021-11-29 PROCEDURE — 97116 GAIT TRAINING THERAPY: CPT

## 2021-11-29 PROCEDURE — 99232 SBSQ HOSP IP/OBS MODERATE 35: CPT | Performed by: PHYSICAL MEDICINE & REHABILITATION

## 2021-11-29 PROCEDURE — 97535 SELF CARE MNGMENT TRAINING: CPT

## 2021-11-29 PROCEDURE — 97530 THERAPEUTIC ACTIVITIES: CPT

## 2021-11-29 PROCEDURE — 97110 THERAPEUTIC EXERCISES: CPT

## 2021-11-29 PROCEDURE — 6370000000 HC RX 637 (ALT 250 FOR IP): Performed by: INTERNAL MEDICINE

## 2021-11-29 PROCEDURE — 94640 AIRWAY INHALATION TREATMENT: CPT

## 2021-11-29 PROCEDURE — 2580000003 HC RX 258: Performed by: INTERNAL MEDICINE

## 2021-11-29 RX ADMIN — SODIUM CHLORIDE, PRESERVATIVE FREE 10 ML: 5 INJECTION INTRAVENOUS at 09:44

## 2021-11-29 RX ADMIN — LEVOTHYROXINE SODIUM 50 MCG: 0.05 TABLET ORAL at 05:56

## 2021-11-29 RX ADMIN — ALBUTEROL SULFATE 4 PUFF: 90 AEROSOL, METERED RESPIRATORY (INHALATION) at 16:10

## 2021-11-29 RX ADMIN — SUCRALFATE 1 G: 1 TABLET ORAL at 09:57

## 2021-11-29 RX ADMIN — LISINOPRIL 2.5 MG: 2.5 TABLET ORAL at 09:43

## 2021-11-29 RX ADMIN — SODIUM CHLORIDE, PRESERVATIVE FREE 10 ML: 5 INJECTION INTRAVENOUS at 23:31

## 2021-11-29 RX ADMIN — MONTELUKAST 10 MG: 10 TABLET, FILM COATED ORAL at 22:09

## 2021-11-29 RX ADMIN — SUCRALFATE 1 G: 1 TABLET ORAL at 17:39

## 2021-11-29 RX ADMIN — ATORVASTATIN CALCIUM 40 MG: 40 TABLET, FILM COATED ORAL at 22:09

## 2021-11-29 RX ADMIN — ACETAMINOPHEN 650 MG: 325 TABLET ORAL at 05:56

## 2021-11-29 RX ADMIN — ACETAMINOPHEN 650 MG: 325 TABLET ORAL at 22:16

## 2021-11-29 RX ADMIN — ALBUTEROL SULFATE 4 PUFF: 90 AEROSOL, METERED RESPIRATORY (INHALATION) at 08:22

## 2021-11-29 RX ADMIN — BUDESONIDE AND FORMOTEROL FUMARATE DIHYDRATE 2 PUFF: 160; 4.5 AEROSOL RESPIRATORY (INHALATION) at 08:22

## 2021-11-29 RX ADMIN — ESCITALOPRAM OXALATE 20 MG: 10 TABLET ORAL at 09:43

## 2021-11-29 RX ADMIN — SUCRALFATE 1 G: 1 TABLET ORAL at 05:56

## 2021-11-29 RX ADMIN — ASPIRIN 81 MG: 81 TABLET, COATED ORAL at 09:43

## 2021-11-29 RX ADMIN — ALBUTEROL SULFATE 4 PUFF: 90 AEROSOL, METERED RESPIRATORY (INHALATION) at 12:27

## 2021-11-29 RX ADMIN — TIMOLOL MALEATE 1 DROP: 5 SOLUTION OPHTHALMIC at 09:45

## 2021-11-29 RX ADMIN — SUCRALFATE 1 G: 1 TABLET ORAL at 22:09

## 2021-11-29 RX ADMIN — PANTOPRAZOLE SODIUM 40 MG: 40 TABLET, DELAYED RELEASE ORAL at 05:56

## 2021-11-29 ASSESSMENT — PAIN SCALES - GENERAL
PAINLEVEL_OUTOF10: 0
PAINLEVEL_OUTOF10: 2
PAINLEVEL_OUTOF10: 2

## 2021-11-29 ASSESSMENT — PAIN DESCRIPTION - LOCATION: LOCATION: HIP

## 2021-11-29 ASSESSMENT — PAIN DESCRIPTION - ONSET: ONSET: GRADUAL

## 2021-11-29 ASSESSMENT — PAIN DESCRIPTION - PAIN TYPE: TYPE: ACUTE PAIN

## 2021-11-29 ASSESSMENT — PAIN DESCRIPTION - DESCRIPTORS: DESCRIPTORS: DULL;DISCOMFORT

## 2021-11-29 ASSESSMENT — PAIN DESCRIPTION - ORIENTATION: ORIENTATION: LEFT

## 2021-11-29 ASSESSMENT — PAIN DESCRIPTION - FREQUENCY: FREQUENCY: INTERMITTENT

## 2021-11-29 NOTE — PROGRESS NOTES
Physical Rehabilitation: OCCUPATIONAL THERAPY     [x] daily progress note       [] discharge       Patient Name:  Han Crawford   :  1935 MRN: [id number]  Room:  [address] [place] [address] Date of Admission: 2021  Rehabilitation Diagnosis:   Cerebral infarction, unspecified [I63.9]  Acute CVA (cerebrovascular accident) ([facility] [address] [I63.9]       Date 2021       Day of ARU Week:  4   Time IN/OUT 1000/1100   Individual Tx Minutes 60   Group Tx Minutes    Co-Treat Minutes    Concurrent Tx Minutes    TOTAL Tx Time Mins 60   Variance Time    Variance Time []   Refusal due to:     []   Medical hold/reason:    []   Illness   []   Off Unit for test/procedure  []   Extra time needed to complete task  []   Therapeutic need  []   Other (specify):   Restrictions Restrictions/Precautions: General Precautions, Fall Risk         Communication with other providers: [x]   OK to see per nursing:     []   Spoke with team member regarding:      Subjective observations and cognitive status: Patient in semi fowlers upon approach, pleasant and agreeable to therapy      Pain level/location:    /10       Location: per patient no pain noted    Discharge recommendations  Anticipated discharge date:  TBD  Destination: [x]home alone   []home alone w assist prn   [] home w/ family    [] Continuous supervision       []SNF    [] Assisted living     [] Other:   Continued therapy: [x]HHC OT  []OUTPATIENT  OT   [] No Further OT  Equipment needs: TBD       ADLs:    Eating: Eating  Assistance Needed: Independent  Comment: X  CARE Score: 6  Discharge Goal: Independent       Oral Hygiene: Oral Hygiene  Assistance Needed: Independent  Comment: X  CARE Score: 6  Discharge Goal: Independent    UB/LB Bathing: Shower/Bathe Self  Assistance Needed: Supervision or touching assistance  Comment: Sup/SB in stance  CARE Score: 4  Discharge Goal: Independent    UB Dressing: Upper Body Dressing  Assistance Needed: Independent  Comment: X  CARE Score: 6  Discharge Goal: Independent         LB Dressing: Lower Body Dressing  Assistance Needed: Supervision or touching assistance  Comment: Sup/SBA  CARE Score: 4  Discharge Goal: Independent    Donning and [employer] Footwear: Putting On/Taking Off Footwear  Assistance Needed: Independent  Comment: X  CARE Score: 6  Discharge Goal: Independent      Toiletin [address] needed: Supervision pr touching assistance   Comment: Sup in stance   CARE Score: 4     Toilet Transfers:     Toilet Transfer  Assistance Needed: Supervision or touching assistance  Comment: SBA  CARE Score: 4  Device Used:    [x]   Standard Toilet         [x]   Grab Bars           []  Bedside Commode       []   Elevated Toilet          []   Other:        Bed Mobility:           []   Pt received out of bed   Rolling R/L:    Scooting:  SUp  Supine --> Sit:  Sup  Sit --> Supine:  Sup    Transfers:    Sit--> Stand:  SB  Stand --> Sit:   SB  Stand-Pivot:   SBA  Other:    Assistive device required for transfer:   RW      Functional Mobility:  Throughout room during ADL; down hallway of [facility] 2 seated rest breaks   Assistance:  SBA  Device:   [x]   Rolling Walker     []   Standard Walker []   Wheelchair        []   [employer]       []   4-Wheeled [employer]         []   Cardiac Walker       []   Other:        Homemaking Tasks:   Retrieves clothing from closet and transports to bathroom prior to shower at [address] [place] [address]       Additional Therapeutic activities/exercises completed this date:     [x]   ADL Training   [x]   Balance/Postural training     [x]   Bed/Transfer Training   [x]   Endurance Training instructed in bilateral reciprocal patterned movement for 5 minutes with min resistance no rest breaks to increase endurance for facilitation of self care and mobility tasks at [employer] reentry levels   []   Neuromuscular Re-ed   []   Nu-step:  Time:        Level:         #Steps:       []   Rebounder:    []  Seated     []  Standing        []   Supine Ther Ex (reps/sets): []   Seated Ther Ex (reps/sets):     []   Standing Ther Ex (reps/sets):     []   Other:      Comments:  Requires extra time to complete tasks and frequent rest breaks 2* to fatigue     Patient/Caregiver Education and Training:   [x]   Adaptive Equipment Use  [x]   Bed Mobility/Transfer Technique/Safety  [x]   Energy Conservation Tips  []   Family training  [x]   Postural Awareness  [x]   Safety During Functional Activities  []   Reinforced Patient's Precautions   []   Progress was updated and reviewed in Rehabtracker with patient and/or family this         date.     Treatment Plan for Next Session: POC to continue as tolerated           Treatment/Activity Tolerance:   [x] Tolerated treatment with no adverse effects    [] Patient limited by fatigue  [] Patient limited by pain   [] Patient limited by medical complications:    [] Adverse reaction to Tx:   [] Significant change in status    Safety:       []  bed alarm set    []  chair alarm set    []  Pt refused alarms                []  Telesitter activated      [x]  Gait belt used during tx session      []other:       Number of Minutes/Billable Intervention  Therapeutic Exercise    ADL Self-care 35   Neuro Re-Ed    Therapeutic Activity 25   Group    Other:    TOTAL 60       Social History  Social/Functional History  Lives With: Alone  Type of Home: House  Home Layout: One level  Home Access: Level entry (threshold)  Bathroom Shower/Tub: Walk-in shower  Bathroom Toilet: Handicap height  Bathroom Equipment: Shower chair, Grab bars in shower, Grab bars around toilet, Hand-held shower (no back on shower chair)  Bathroom Accessibility: Accessible  Home Equipment: Cane, Quad cane, Crutches, Oxygen, Rolling walker, Reacher (O2 for night use(2-3x a week))  ADL Assistance: Independent  Homemaking Assistance: Independent  Homemaking Responsibilities: Yes  Meal Prep Responsibility: Primary  Laundry Responsibility: Primary  Cleaning Responsibility: Primary  Bill Paying/Finance Responsibility: Primary  Shopping Responsibility: Primary  Dependent Care Responsibility: No  Health Care Management: Primary  Ambulation Assistance: Independent (pt uses quad cane and other canes when she feels like it(family has been encouraging her to use it at all times))  Transfer Assistance: Independent  Active : Yes  Mode of Transportation: Car  Education: [employer]  Occupation: Retired  Leisure & Hobbies: Cutting grass, Making jewelry  Additional Comments: bed has adjustability features. 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), significant wounds to L arm; Pt reports she uses a pill organizer and she organizes it herself; Top & bottom dentures    Objective                                                                                    Goals:  (Update in navigator)  Short term goals  Time Frame for Short term goals: STG=LTG:  Long term goals  Time Frame for Long term goals : 7 Days  Long term goal 1: Pt will complete independent UB dressing  Long term goal 2: Pt will complete independent LB dressing  Long term goal 3: Pt will complete independent bathing  Long term goal 4: Pt will complete all aspects of toileting independently  Long term goal 5: Pt will complete independently doff/don her footwear  Long term goal 7: Pt will complete independent transfers (toilet, wheelchair, bed, shower):        Plan of Care                                                                              Times per week: 5 days per week for a minimum of 60 minutes/day plus group as appropriate for 60 minutes.   Treatment to include Plan  Current Treatment Recommendations: Strengthening, Functional Mobility Training, Gait Training, Patient/Caregiver Education & Training, ROM, Endurance Training, Equipment Evaluation, Education, & procurement, Balance Training, Safety Education & Training, Self-Care / ADL, Positioning, Home Management Training    Electronically signed by   Isela Dye DENISSE Amato,  11/29/2021, 10:27 AM

## 2021-11-29 NOTE — PATIENT CARE CONFERENCE
ACUTE REHAB TEAM CONFERENCE SUMMARY   [facility]    NAME: Adrien West  : 1935 ADMIT DATE: 2021    Rehab Admitting Dx:Cerebral infarction, unspecified [I63.9]  Acute CVA (cerebrovascular accident) ([facility] [address] [I63.9]  Patient Comorbid Conditions: Active Hospital Problems    Diagnosis Date Noted    Ataxia [R27.0]     Dysarthria due to acute stroke ([facility] [address] [I63.9, R47.1]     Takotsubo cardiomyopathy [I51.81]     Dysthymia [F34.1]     Acute CVA (cerebrovascular accident) ([facility] [address] [I63.9] 2021    [facility] to [facility] [J18.9, A41.9] 2019     Date: 2021    CASE MANAGEMENT  Current issues/needs regarding patient and family discharge status:   Patient plans dc 1-level home alone. No KELLY. Local son and dtr PRN only. DME including o2 & RW PTA    PHYSICAL THERAPY (Updated in QI)        Long term goals  Time Frame for Long term goals : 7-10 days STG=LTG  Long term goal 1: Pt will demonstrate all bed mobiltiy with mod I  Long term goal 2: Pt will demonstrate car, pivot and sit to stand transfers with mod I  Long term goal 3: Pt will perform gait on level surface at least 150' and on unlevel surface 20' with 2ww amd mod I  Long term goal 4: Pt will ascend curb step with 2ww and 12 stairs with b rails with mod I  Long term goal 5: Pt will retrieve small item from floor with 2ww and reacher with mod I    Impairments/deficits, barriers:     Body structures, Functions, Activity limitations: Decreased functional mobility , Decreased strength, Decreased endurance, Decreased high-level IADLs, Decreased cognition, Decreased balance     Prognosis: Good  Decision Making: Medium Complexity  Clinical Presentation: evolving presentation  Equipment needed at discharge: 2WW      PT IRF-JHOAN scores since initial assessment  Bed Mobility:   Sit to Lying  Assistance Needed: Independent  Comment: Independent with bed flat and no rails  CARE Score: 6  Discharge Goal: Independent    Roll Left and Right  Assistance Needed: Independent  Comment: Independent with bed flat and no rails  CARE Score: 6  Discharge Goal: Independent    Lying to Sitting on Side of Bed  Assistance Needed: Independent  Comment: Independent with bed flat and no rails  CARE Score: 6  Discharge Goal: Independent    Transfers:    Sit to Stand  Assistance Needed: Supervision or touching assistance  Comment: SBA with RW  CARE Score: 4  Discharge Goal: Independent    Chair/Bed-to-Chair Transfer  Assistance Needed: Supervision or touching assistance  Comment: SBA with RW  CARE Score: 4  Discharge Goal: Independent    Toilet Transfer  Assistance Needed: Supervision or touching assistance  Comment: supervision with rails  CARE Score: 4  Discharge Goal: Independent    Car Transfer  Assistance Needed: Supervision or touching assistance  Comment: SBA with RW  CARE Score: 4  Discharge Goal: Independent    Ambulation:    Walking Ability  Does the Patient Walk?: Yes     Walk 10 Feet  Assistance Needed: Supervision or touching assistance  Comment: SBA with RW  CARE Score: 4  Discharge Goal: Independent     Walk 50 Feet with Two Turns  Assistance Needed: Supervision or touching assistance  Comment: SBA with RW  CARE Score: 4  Discharge Goal: Independent     Walk 150 Feet  Comment: 80' max effort  Reason if not Attempted: Not attempted due to medical condition or safety concerns  CARE Score: 88  Discharge Goal: Independent     Walking 10 Feet on Uneven Surfaces  Assistance Needed: Supervision or touching assistance  Comment: SBA with RW  CARE Score: 4  Discharge Goal: Independent     1 Step (Curb)  Assistance Needed: Supervision or touching assistance  Comment: SBA with RW  CARE Score: 4  Discharge Goal: Independent     4 Steps  Assistance Needed: Supervision or touching assistance  Comment: SBA with 2 rails  CARE Score: 4  Discharge Goal: Independent     12 Steps  Assistance Needed: Supervision or touching assistance  Comment: SBA with 2 rails  CARE Score: 4  Discharge Goal: Independent           Wheelchair:  w/c Ability: Wheelchair Ability  Uses a Wheelchair and/or Scooter?: No                        Balance:    Balance  Posture: Good  Sitting - Static: Good  Sitting - Dynamic: Good  Standing - Static: Fair, -  Standing - Dynamic: Fair, -   Object: Picking Up Object  Assistance Needed: Supervision or touching assistance  Comment: SBA with reacher and RW  CARE Score: 4  Discharge Goal: Independent    Fall Risk: []  Yes  []  No    OCCUPATIONAL THERAPY  (Updated in QI)  Short term goals  Time Frame for Short term goals: STG=LTG :   Long term goals  Time Frame for Long term goals : 7 Days  Long term goal 1: Pt will complete independent UB dressing  Long term goal 2: Pt will complete independent LB dressing  Long term goal 3: Pt will complete independent bathing  Long term goal 4: Pt will complete all aspects of toileting independently  Long term goal 5: Pt will complete independently doff/don her footwear  Long term goal 7: Pt will complete independent transfers (toilet, wheelchair, bed, shower) :                                       OT IRF-JHOAN scores and goals since initial assessment:    ADLs:    Eating: Eating  Assistance Needed: Independent  Comment: X  CARE Score: 6  Discharge Goal: Independent       Oral Hygiene: Oral Hygiene  Assistance Needed: Independent  Comment: X  CARE Score: 6  Discharge Goal: Independent    UB/LB Bathing: Shower/Bathe Self  Assistance Needed: Supervision or touching assistance  Comment: Sup  CARE Score: 4  Discharge Goal: Independent    UB Dressing: Upper Body Dressing  Assistance Needed: Independent  Comment: X  CARE Score: 6  Discharge Goal: Independent         LB Dressing: Lower Body Dressing  Assistance Needed: Independent  Comment: X  CARE Score: 6  Discharge Goal: Independent    Donning and [employer] Footwear: Putting On/Taking Off Footwear  Assistance Needed: Independent  Comment: X  CARE Score: 6  Discharge Goal: Independent      Toileting: Toileting Hygiene  Assistance Needed: Independent  Comment: X  Reason if not Attempted: Patient refused  CARE Score: 6      Toilet Transfers: Toilet Transfer  Assistance Needed: Supervision or touching assistance  Comment: Sup  Reason if not Attempted: Patient refused  CARE Score: 4  Discharge Goal: Independent      Impairments/deficits, barriers:  Decreased endurance  Assessment  Performance deficits / Impairments: Decreased functional mobility , Decreased strength, Decreased endurance, Decreased high-level IADLs, Decreased safe awareness, Decreased ADL status, Decreased balance, Decreased fine motor control  Prognosis: Good  Decision Making: High Complexity  REQUIRES OT FOLLOW UP: Yes  Equipment needed at discharge:Pending pt progress, possible BSC      COGNITIVE FUNCTION/SPEECH THERAPY (AS INDICATED)  LTG       No ST recommended  Duration/Frequency of Treatment  Duration/Frequency of Treatment: N/A                                    Nursing Current Medical Status:   [x] Is continent of bowel and bladder     [] Is incontinent of bowel and bladder    [x] Has had an adequate number of bowel movements   [x] Urinates with no urinary retention >300ml in bladder   [] Targeting bladder protocol with pan removal   [x] Maintaining O2 SATs at 92% or greater   [x] Has pain managed while on ARU         [x] Has had no skin breakdown while on ARU   [] Has improved skin integrity via wound measurements   [] Has no signs/symptoms of infection at the wound site   [] Pressure wounds Stage/Location:    [] Arrived on unit with pressure wound  [x] Has been free from injury during hospitalization   [] Has experienced a fall during hospitalization  [x] Ongoing education with patient/family with understanding demonstrated for:  [] Receives IV Fluids  [] Other:        NUTRITION  Weight: 131 lb 9.8 oz (59.7 kg) / Body mass index is 25.7 kg/m².   Current diet: ADULT DIET; Regular; Low Sodium (2 gm)  ADULT ORAL NUTRITION SUPPLEMENT; Breakfast; Clear Liquid Oral Supplement  Intake:       Medical improvements/barriers: Progressing well        Team goals for next treatment period/Intervention for current barriers:   [x] Pt will increase activity tolerance for daily tasks. [] Pt will improve bed mobility with reduced assist.  [] Pt will improve safety in fx tasks with reduced cues/assist  [] Pt will improve transfers with reduced assist  [x] Pt will improve toileting with reduced assist  [x] Pt will improve ADL's with use of adaptive equipment with reduced assist  [] Pt will improve pain mgmt for maximum participation in tx program  [] Pt will improve communication to get basic needs met on unit  [] Pt will improve swallowing for safe diet advancement with use of strategies  []  Plan for discharge to home. Patient Strengths: Motivated, Cooperative and Pleasant    Justification for Continued Stay  Based on my medical assessment of the patient and review of information from the interdisciplinary team as part of this weekly team conference, the patient continues to meet the following criteria for IRF level of care:   The patient requires active and ongoing intervention of multiple therapy disciplines   The patient requires and intensive rehabilitation therapy program   The patient requires continued physician supervision by a rehabilitation physician   The patient requires 24 hours rehab nursing care   The patient requires an intensive and coordinated interdisciplinary team approach to the delivery of rehabilitative care.      Assessment/Plan   [x]  The patient is making good progression towards their long term goals and is actively participating in and has a reasonable expectation to continue to benefit from the intensive rehabilitation therapy program   []  The estimated discharge date has been changed from initial team conference due to:   []  The estimated discharge destination has been changed from initial team conference due to:         Ongoing tx following discharge: [x]HHC OT, PT    []OUTPATIENT     [] No Further Treatment     [] Family/Caregiver Training  []  Transitional Living Arrangement   [] Home Assessment (date  )     [] Family Conference   []  Therapeutic Pass       []  Other: (specify)    Estimated Discharge Date: 12/2/21    Estimated Discharge Destination: []home alone   [x]home alone with assist prn  []Continuous supervision []Return home with s/o/spouse/family   [] Assisted living    []SNF     Team members participating in today's conference. [x] Diane Caballero, Medical Director  [x] Sarai Ramos,    [] Peter Mcdaniels, Nurse Mgr [] Viridiana Rubio, Nurse Supervisor   [x]  Socorro Barragan, PT   [x] Romana Rohrer, OT   []  Rea Fry PT  [] Chandrika Amezcua, OT      [x]  Rosario Matias, SLP    []  Jessica Zamora, SLP   [] Ramandeep Thomas, MEGHANN   []  Apoorva Snyder, [address] [place] [address]     [] Alix Sen,     [x]Rosie Quick,     [x] Rosalind Horner, PARISH[] Sara Vaughan RN     [] Niya Toussaint, RN    [] Agustin Lynn RN    [] Neena Marrero,  PARISH  [] Keanu Johnson RN    []     I have led this Team Conference and agree with the plan, Jeannine Guido MD, 11/30/2021, 1:03 PM  Goals have been updated to reflect recent status.     Team conference note transcribed this date by: Claudio Verdin MA, Christina Lopez, Therapy Coordinator'

## 2021-11-29 NOTE — FLOWSHEET NOTE
[x] daily progress note       [] discharge       Patient Name:  Gokul Smith   :  1935 MRN: [id number]  Room:  [address] [place] [address] Date of Admission: 2021  Rehabilitation Diagnosis:   Cerebral infarction, unspecified [I63.9]  Acute CVA (cerebrovascular accident) ([facility] [address] [I63.9]       Date 2021       Day of ARU Week:  4   Time IN/-928  5072-0784  2102-6997   Individual Tx Minutes 120   TOTAL Tx Time Mins 120   Restrictions Restrictions/Precautions  Restrictions/Precautions: General Precautions, Fall Risk      Communication with other providers: [x]   OK to see per nursing:     []   Spoke with team member regarding:      Subjective observations and cognitive status: (AM) Pt received in semifowlers position in bed and was agreeable to therapy. Vitals taken at start of session:   97% SpO2 and 51 bmp  Vitals taken after ambulation:    98% SpO2 and 53 bpm    (PM #1) Received in recliner and was agreeable to therapy. Pt's daughter was present in room during start of session. (PM #2) Pt received in bed in semi fowlers position and was agreeable to therapy. Pt's daughter was present in room during start of session.       Pain level/location:   0 /10         Discharge recommendations  Anticipated discharge date:  TBD  Destination: []home alone   []home alone with assist PRN     [] home w/ family      [] Continuous supervision  []SNF    [] Assisted living     [] Other:   Continued therapy: []HHC PT  []OUTPATIENT  PT   [] No Further PT  Equipment needs: TBD     Bed Mobility:           [x]   Pt received out of bed (AM and PM #2)   Rolling R/L:  ind  Scooting:  ind  Lying --> Sit: ind  Sit --> lying:  ind  Pt practiced with bed flat and no rails     Transfers:    Sit--> Stand:  SBA  Stand --> Sit:   SBA w/ VC to stay within walker and keep it close by when sitting down  Chair-->Bed/Bed --> Chair:  SBA  Car Transfers:  SBA  Assistive device required for transfer:   FWW     Gait:    Walk 10 feet:  SBA  Walk 50 feet with 2 turns: SBA   pt amb 87' (max effort) SBA with RW  (AM)   Other distance:  30' (PM #1)  Device:  FWW  Gait Quality: Slow sunshine     Pt stated that she felt her legs were \"becoming weak\" toward the end of her 80' walk      Stairs   Curb: SBA (2 trials)  Supportive Device:  FWW  Height:   4\"     4 steps: SBA  12 steps:  SBA 15 steps total up and down   Supportive Device:  Two rails     Uneven Surfaces:       Assistance:  SBA  Device:    FWW  Surfaces Completed:   [x]  2 trials x 10' carpeted surface with bean bags beneath            Picking Up Object From Floor (must be standing position):  Assistance:    SBA  [x]   Reacher used    Additional Therapeutic activities/exercises completed this date:           [x]   Balance training in // bars with CGA to promote balance and strengthen at the ankles and hips  Blue stability trainers: Staggered stance (both sides), small DREW, and wide DREW (1 trial x 1 min [each])  Black stability trainers:  Wide DREW (2 trials x 1 min)  Side-stepping over 2 red bolsters (2 sets x 3 trials [L and R each])   Forward step over red bolsters (2 sets x 3 trials)   Balloon toss (2 trials x 4 min) with SBA with RW           [x]   Standing ther ex (reps/sets) with SBA:   To strengthen the BLE   Marches, hip abduction, hip extension, mini-squats, heel raises, toe raises (1 set x 15 reps [each])     Comments: Pt had one case of LOB during small DREW standing on blue stability  but was able to right herself with use of BUE on // bar for support     Patient/Caregiver Education and Training:   [x]   Bed Mobility/Transfer technique/safety  [x]   Gait technique/sequencing  [x]   Proper use of assistive device  [x]   Advanced mobility safety and technique  []   Reinforced patient's precautions/mobility while maintaining precautions  []   Postural awareness  []   Family training    Treatment Plan for Next Session: Continue with ambulation, endurance training, standing LE exercises, and balance training activities     Assessment:   Pt is making positive progress towards her goals. Deficits noted in the areas of balance, strength, and endurance, and continued physical therapy with a focus on improving balance, strength, and endurance is recommended.     Treatment/Activity Tolerance:   [x] Tolerated treatment with no adverse effects    [] Patient limited by fatigue  [] Patient limited by pain   [] Patient limited by medical complications:    [] Adverse reaction to Tx:   [] Significant change in status    Safety:       [x]  bed alarm set    []  chair alarm set    []  Pt refused alarms                []  Telesitter activated      [x]  Gait belt used during tx session      [x]other: (AM, PM #1, and PM#2 ) left in bed with call light at end of session       Number of Minutes/Billable Intervention  Gait Training 35   Therapeutic Exercise 30   Neuro Re-Ed    Therapeutic Activity 55   Wheelchair Propulsion    Group    Other:    TOTAL 120         Social History  Social/Functional History  Lives With: Alone  Type of Home: House  Home Layout: One level  Home Access: Level entry (threshold)  Bathroom Shower/Tub: Walk-in shower  Bathroom Toilet: Handicap height  Bathroom Equipment: Shower chair, Grab bars in shower, Grab bars around toilet, Hand-held shower (no back on shower chair)  Bathroom Accessibility: Accessible  Home Equipment: Cane, Quad cane, Crutches, Oxygen, Rolling walker, Reacher (O2 for night use(2-3x a week))  ADL Assistance: Independent  Homemaking Assistance: Independent  Homemaking Responsibilities: Yes  Meal Prep Responsibility: Primary  Laundry Responsibility: Primary  Cleaning Responsibility: Primary  Bill Paying/Finance Responsibility: Primary  Shopping Responsibility: Primary  Dependent Care Responsibility: No  Health Care Management: Primary  Ambulation Assistance: Independent (pt uses quad cane and other canes when she feels like it(family has been encouraging her to use it at all

## 2021-11-29 NOTE — CARE COORDINATION
Case Management Admission Note      Patient:Opal Gordillo      :1935  CCF:[id number]  Rehab Dx/Hx: Cerebral infarction, unspecified [I63.9]  Acute CVA (cerebrovascular accident) [facility]) [I63.9]    Chief Complaint:   Past Medical History:   Diagnosis Date    Arthritis     generalized    Asthma     Blood transfusion     No reaction    Chronic bronchitis ([facility] [address]     COPD (chronic obstructive pulmonary disease) ([facility] [address]     summer 2014    Echocardiogram 2021    EF 55-60%, Mild dilated LA, Mild annular calcification present, Mild mitral stenosis, Mild MR & TR.    Fatigue     H/O cardiac catheterization 06/15/2017    mild lad and cx disease    H/O cardiovascular stress test 2019    H/O Doppler ultrasound 2016 3/19/12    carotid- WNL 3/12right mild less than 50%and left wnl    H/O Doppler ultrasound     carotid - mod disease EILEEN, mild disease LICA    H/O echocardiogram 2010    H/O echocardiogram 04/15/2016    EF 60% sclerotic AV mildly stenosed-recommend yearly echo    H/O echocardiogram 2019    EF 55-60%, Minimal concentric left ventricular hypertrophy, left atrium is mildly dilated, severe aortic stenosis, Mitral annular calcification is present, Mild AR, Mild-Mod MR, Mild TR, No pericardial effusion     H/O echocardiogram 2019    H/O exercise stress test 2017    abnormal    History of cardiac cath 2019    Severe AS,   TAVR??  History of nuclear stress test 2017    EF 75%. Normal study.     Holter monitor, abnormal 2011    infrequent APC are seen    Normal cardiac stress test 2010    EF 70%, no ischemia    On home O2     only uses as needed, nightly prn    Syncope and collapse     Tachycardia     HX of tachycardia - had a cardioablation    Thyroid disease      Past Surgical History:   Procedure Laterality Date    AORTIC VALVE REPAIR N/A 2019    Core Valve EVOLUT 26mm [id number]    AORTIC VALVE REPLACEMENT N/A 09/11/2019    TAVR; [employer] Evlout 26    BREAST SURGERY  2009    bilat    CARDIAC CATHETERIZATION  03/02/2011    mild to moderate disease of diagonal and proximal RCA   [address]    ablation    CARPAL TUNNEL RELEASE  1990's    bilat    CHOLECYSTECTOMY  1961    open choley    COLONOSCOPY      DILATATION, ESOPHAGUS      ENDOSCOPY, COLON, DIAGNOSTIC  07/03/2017    s/p gastric bypass otherwise normal    FINGER SURGERY      right middle, thumb and index finger (screw and pin in right index finger)    GASTRIC RESTRICTION SURGERY  1984    stapled    HERNIA REPAIR  unknown    Inc hernia hernia repair    HIP FRACTURE SURGERY Left 11/04/2015    Left hip nail    HYSTERECTOMY  1966    Luke w/ BSO    JOINT REPLACEMENT  2004    right knee    LIPECTOMY  1990's    OTHER SURGICAL HISTORY  05/01/2012    Gastrojejunostomy/partial gastrectomy    SHOULDER ARTHROPLASTY Left 10/2017    TOE SURGERY  Early 2000's    hammer toes and bunions     Allergies   Allergen Reactions    Morphine Anaphylaxis     Dilaudid OK    Bactrim [Sulfamethoxazole-Trimethoprim]     Butorphanol      Other reaction(s): Other - comment required  \"out of body experience\"    Influenza Vaccines      Ended in hospital stay for 3 days told by md not to get it anymore     Lactose Intolerance (Gi) Nausea Only    Phenergan [Promethazine Hcl] Other (See Comments)     Makes me jerk all over. Zofran OK    Promethazine     Stadol [Butorphanol Tartrate] Other (See Comments)     Out of body experience. Was told it was a near death experience and was placed in ICU. Dilaudid OK    Tape [place] Tape] Other (See Comments)     Plastic cause itching and rash. Paper tape causes my skin to blister. (Tega-derm and Coban OK to use. )     Precautions: falls    Date of Admit: 11/26/2021  Room #: 1013/1013-A      Current functional status at time of admit:        Home Living/DME Available:      Type of Home: House  Home Access: Level entry

## 2021-11-29 NOTE — PROGRESS NOTES
X  CARE Score: 6  Discharge Goal: Independent      Toileting: Toileting Hygiene  Assistance Needed: Supervision or touching assistance  Comment: Sup in stance  Reason if not Attempted: Patient refused  CARE Score: 4      Toilet Transfers:   Toilet Transfer  Assistance Needed: Supervision or touching assistance  Comment: Sup/SBA  Reason if not Attempted: Patient refused  CARE Score: 4    Physical Therapy:         Long term goals  Time Frame for Long term goals : 7-10 days STG=LTG  Long term goal 1: Pt will demonstrate all bed mobiltiy with mod I  Long term goal 2: Pt will demonstrate car, pivot and sit to stand transfers with mod I  Long term goal 3: Pt will perform gait on level surface at least 150' and on unlevel surface 20' with 2ww amd mod I  Long term goal 4: Pt will ascend curb step with 2ww and 12 stairs with b rails with mod I  Long term goal 5: Pt will retrieve small item from floor with 2ww and reacher with mod I      Bed Mobility:   Sit to Lying  Assistance Needed: Independent  Comment: HOB slightly elevated as at home, no rail  CARE Score: 6  Discharge Goal: Independent  Roll Left and Right  Assistance Needed: Independent  Comment: HOB slightly elevated as at home, no rail  CARE Score: 6  Discharge Goal: Independent  Lying to Sitting on Side of Bed  Assistance Needed: Independent  Comment: HOB slightly elevated as at home, no rail  CARE Score: 6  Discharge Goal: Independent    Transfers:    Sit to Stand  Assistance Needed: Supervision or touching assistance  Comment: CG  CARE Score: 4  Discharge Goal: Independent  Chair/Bed-to-Chair Transfer  Assistance Needed: Supervision or touching assistance  Comment: CG  CARE Score: 4  Discharge Goal: Independent  Toilet Transfer  Assistance Needed: Supervision or touching assistance  Comment: supervision with rails  CARE Score: 4  Car Transfer  Assistance Needed: Supervision or touching assistance  Comment: SBA with 2ww  CARE Score: 4  Discharge Goal: Independent    Ambulation:    Walking Ability  Does the Patient Walk?: Yes     Walk 10 Feet  Assistance Needed: Partial/moderate assistance  Comment: min assist without device, narrow DREW(nearly tandem at times) with decreased stability L hip, path deviation  CARE Score: 3  Discharge Goal: Independent     Walk 50 Feet with Two Turns  Assistance Needed: Supervision or touching assistance  Comment: CG with 2ww  CARE Score: 4  Discharge Goal: Independent     Walk 150 Feet  Comment: 70' max attempt due to LE fatigue  Reason if not Attempted: Not attempted due to medical condition or safety concerns  CARE Score: 88  Discharge Goal: Independent     Walking 10 Feet on Uneven Surfaces  Assistance Needed: Supervision or touching assistance  Comment: CG with 2ww  CARE Score: 4  Discharge Goal: Independent     1 Step (Curb)  Assistance Needed: Supervision or touching assistance  Comment: CG with 2ww  CARE Score: 4  Discharge Goal: Independent     4 Steps  Assistance Needed: Supervision or touching assistance  Comment: CG with B rails  CARE Score: 4  Discharge Goal: Independent     12 Steps  Assistance Needed: Supervision or touching assistance  Comment: CG with B rails  CARE Score: 4  Discharge Goal: Independent       Wheelchair:  w/c Ability: Wheelchair Ability  Uses a Wheelchair and/or Scooter?: No                Balance:    Balance  Posture: Good  Sitting - Static: Good  Sitting - Dynamic: Good  Standing - Static: Fair, -  Standing - Dynamic: Fair, -   Object: Picking Up Object  Assistance Needed: Supervision or touching assistance  Comment: CG with 2ww and reacher  CARE Score: 4  Discharge Goal: Independent    I      Exam:    Blood pressure (!) 142/54, pulse (!) 49, temperature 97.7 °F (36.5 °C), temperature source Oral, resp. rate 16, height 5' (1.524 m), weight 128 lb 15.5 oz (58.5 kg), SpO2 98 %. General: Sitting up in a bedside chair with legs elevated.   Interacting with her daughter with a jewelry making task.    HEENT: MMM. Neck supple. No JVD. Clear speech. Pulmonary: Clear and unlabored. Cardiac: Distant heart sounds with regular rate and rhythm. Abdomen: Patient's abdomen is soft and nondistended. Bowel sounds were present throughout. There was no rebound, guarding or masses noted. Upper extremities: Moves her upper limbs slowly and deliberately. Strong . Normal tone and reflexes. Lower extremities: No localizing weakness to exam.  Poor coordination. No signs of DVT. Sitting balance was good. Standing balance was fair-.    Lab Results   Component Value Date    WBC 9.3 11/26/2021    HGB 11.8 (L) 11/26/2021    HCT 33.9 (L) 11/26/2021    MCV 95.5 11/26/2021     11/26/2021     Lab Results   Component Value Date    INR 0.82 11/14/2021    INR 0.90 10/13/2020    INR 0.95 10/17/2019    PROTIME 10.5 (L) 11/14/2021    PROTIME 10.9 (L) 10/13/2020    PROTIME 10.8 (L) 10/17/2019     Lab Results   Component Value Date    CREATININE 0.5 (L) 11/26/2021    BUN 11 11/26/2021     11/26/2021    K 3.7 11/26/2021     11/26/2021    CO2 28 11/26/2021     Lab Results   Component Value Date    ALT 86 (H) 11/16/2021    AST 87 (H) 11/16/2021    ALKPHOS 62 11/16/2021    BILITOT 0.3 11/16/2021       Expected length of stay  prior to a supervised level of function for discharge home with a walker and [facility] OT/PT is 10 to 14 days. Recommendations:    1. Acute CVA with impaired gait: Developing the routine for her daily occupational and physical therapy with speech-language pathology. She is not on antiplatelet therapy because of past intolerance. Continue with the statin. Working to control her blood pressure and nutrition. Bowel and bladder retraining. Planning for adaptive equipment training, caregiver education and nutritional support. Aggressive pulmonary hygiene measures. DVT prophylaxis. Verbal cues and minimum physical assistance for transfers.   2. DVT prophylaxis: Lovenox 30 mg subcu every 12 hours. I must monitor her hemoglobin and platelet count periodically while on this medication. Weightbearing activities are limited but pursued daily. GI prophylaxis offered. No signs of acute blood loss. 3. Pneumonia with sepsis: Her IV antibiotics and steroids have been transitioned to oral Keflex. Monitoring her fever curve, O2 saturations and respiratory rate. Aggressive pulmonary hygiene measures. Periodic blood counts. No fever or hypoxia now. 4. COPD exacerbation: Aggressive pulmonary hygiene measures. 5. Takotsubo cardiomyopathy: Statin. Graduated increases in physical activity. Anxiolytics.

## 2021-11-30 PROCEDURE — 2580000003 HC RX 258: Performed by: INTERNAL MEDICINE

## 2021-11-30 PROCEDURE — 6370000000 HC RX 637 (ALT 250 FOR IP)

## 2021-11-30 PROCEDURE — 97535 SELF CARE MNGMENT TRAINING: CPT

## 2021-11-30 PROCEDURE — 97530 THERAPEUTIC ACTIVITIES: CPT

## 2021-11-30 PROCEDURE — 97116 GAIT TRAINING THERAPY: CPT

## 2021-11-30 PROCEDURE — 1280000000 HC REHAB R&B

## 2021-11-30 PROCEDURE — 6370000000 HC RX 637 (ALT 250 FOR IP): Performed by: INTERNAL MEDICINE

## 2021-11-30 PROCEDURE — 6370000000 HC RX 637 (ALT 250 FOR IP): Performed by: PHYSICAL MEDICINE & REHABILITATION

## 2021-11-30 PROCEDURE — 99233 SBSQ HOSP IP/OBS HIGH 50: CPT | Performed by: PHYSICAL MEDICINE & REHABILITATION

## 2021-11-30 PROCEDURE — 94640 AIRWAY INHALATION TREATMENT: CPT

## 2021-11-30 PROCEDURE — 94761 N-INVAS EAR/PLS OXIMETRY MLT: CPT

## 2021-11-30 PROCEDURE — 97110 THERAPEUTIC EXERCISES: CPT

## 2021-11-30 RX ADMIN — ALBUTEROL SULFATE 4 PUFF: 90 AEROSOL, METERED RESPIRATORY (INHALATION) at 06:53

## 2021-11-30 RX ADMIN — ALBUTEROL SULFATE 4 PUFF: 90 AEROSOL, METERED RESPIRATORY (INHALATION) at 14:22

## 2021-11-30 RX ADMIN — SUCRALFATE 1 G: 1 TABLET ORAL at 06:30

## 2021-11-30 RX ADMIN — ACETAMINOPHEN 650 MG: 325 TABLET ORAL at 06:30

## 2021-11-30 RX ADMIN — LISINOPRIL 2.5 MG: 2.5 TABLET ORAL at 08:37

## 2021-11-30 RX ADMIN — ESCITALOPRAM OXALATE 10 MG: 10 TABLET ORAL at 08:37

## 2021-11-30 RX ADMIN — METOPROLOL SUCCINATE 12.5 MG: 25 TABLET, EXTENDED RELEASE ORAL at 08:38

## 2021-11-30 RX ADMIN — TIMOLOL MALEATE 1 DROP: 5 SOLUTION OPHTHALMIC at 08:37

## 2021-11-30 RX ADMIN — ASPIRIN 81 MG: 81 TABLET, COATED ORAL at 08:37

## 2021-11-30 RX ADMIN — LEVOTHYROXINE SODIUM 50 MCG: 0.05 TABLET ORAL at 06:30

## 2021-11-30 RX ADMIN — BUDESONIDE AND FORMOTEROL FUMARATE DIHYDRATE 2 PUFF: 160; 4.5 AEROSOL RESPIRATORY (INHALATION) at 06:53

## 2021-11-30 RX ADMIN — SUCRALFATE 1 G: 1 TABLET ORAL at 18:01

## 2021-11-30 RX ADMIN — MONTELUKAST 10 MG: 10 TABLET, FILM COATED ORAL at 20:23

## 2021-11-30 RX ADMIN — SUCRALFATE 1 G: 1 TABLET ORAL at 12:17

## 2021-11-30 RX ADMIN — SUCRALFATE 1 G: 1 TABLET ORAL at 20:23

## 2021-11-30 RX ADMIN — ALBUTEROL SULFATE 4 PUFF: 90 AEROSOL, METERED RESPIRATORY (INHALATION) at 10:40

## 2021-11-30 RX ADMIN — ONDANSETRON 2 MG: 4 TABLET, ORALLY DISINTEGRATING ORAL at 08:38

## 2021-11-30 RX ADMIN — SODIUM CHLORIDE, PRESERVATIVE FREE 30 ML: 5 INJECTION INTRAVENOUS at 20:32

## 2021-11-30 RX ADMIN — SODIUM CHLORIDE, PRESERVATIVE FREE 10 ML: 5 INJECTION INTRAVENOUS at 08:38

## 2021-11-30 RX ADMIN — ATORVASTATIN CALCIUM 40 MG: 40 TABLET, FILM COATED ORAL at 20:23

## 2021-11-30 RX ADMIN — PANTOPRAZOLE SODIUM 40 MG: 40 TABLET, DELAYED RELEASE ORAL at 06:30

## 2021-11-30 ASSESSMENT — PAIN DESCRIPTION - DESCRIPTORS: DESCRIPTORS: DISCOMFORT

## 2021-11-30 ASSESSMENT — PAIN DESCRIPTION - PROGRESSION: CLINICAL_PROGRESSION: GRADUALLY IMPROVING

## 2021-11-30 ASSESSMENT — PAIN DESCRIPTION - PAIN TYPE: TYPE: ACUTE PAIN

## 2021-11-30 ASSESSMENT — PAIN - FUNCTIONAL ASSESSMENT: PAIN_FUNCTIONAL_ASSESSMENT: ACTIVITIES ARE NOT PREVENTED

## 2021-11-30 ASSESSMENT — PAIN SCALES - GENERAL
PAINLEVEL_OUTOF10: 2
PAINLEVEL_OUTOF10: 0
PAINLEVEL_OUTOF10: 0

## 2021-11-30 ASSESSMENT — PAIN DESCRIPTION - ONSET: ONSET: ON-GOING

## 2021-11-30 ASSESSMENT — PAIN DESCRIPTION - LOCATION: LOCATION: GENERALIZED

## 2021-11-30 ASSESSMENT — PAIN DESCRIPTION - FREQUENCY: FREQUENCY: INTERMITTENT

## 2021-11-30 NOTE — FLOWSHEET NOTE
[x] daily progress note       [] discharge       Patient Name:  Adrien West   :  1935 MRN: [id number]  Room:  [address] [place] [address] Date of Admission: 2021  Rehabilitation Diagnosis:   Cerebral infarction, unspecified [I63.9]  Acute CVA (cerebrovascular accident) ([facility] [address] [I63.9]       Date 2021       Day of ARU Week:  5   Time IN/-930  [id number]   Individual Tx Minutes 120   TOTAL Tx Time Mins 120   Restrictions Restrictions/Precautions  Restrictions/Precautions: General Precautions, Fall Risk      Communication with other providers: [x]   OK to see per nursing:     [x]   Spoke with RN Rosey Hamilton) about pt's nausea. Pt's nurse gave patient anti-nausea medication. Subjective observations and cognitive status: (AM) Pt presented in semi-fowlers position and stated that she is has been feeling nauseous since breakfast this morning. She noted that this was the first time she had been nauseous during her time in the ARU. Toward the end of the session the patient stated that she struggles with nausea on a fairly often basis at home. (PM) Pt presented in semi-fowlers position in bed and stated that she wasn't experiencing nausea currently and was agreeable to therapy. Pain level/location:   0 /10          Discharge recommendations  Anticipated discharge date: Expected Discharge Date: 21    Destination: []home alone   []home alone with assist PRN     [] home w/ family      [] Continuous supervision  []SNF    [] Assisted living     [] Other:   Continued therapy: [x]C PT  []OUTPATIENT  PT   [] No Further PT  Equipment needs: has RW          Bed Mobility:           [x]   Pt received out of bed (AM and PM)  Lying --> Sit:  Mod I   Sit --> lying: Mod I    Transfers:    Sit--> Stand:  SBA  Stand --> Sit:   SBA  Chair-->Bed/Bed --> Chair:  SBA  Toilet Transfer (if applicable):  SBA w/ BUE on railing   Assistive device required for transfer:   FWW     Gait:    Distance: (AM) 37' + 45' + 110'     (PM) 80' + 43' + 62'  Assistance:  SBA  Device:  FWW  Gait Quality:  slow sunshine       Additional Therapeutic activities/exercises completed this date:     [x]   Nu-step:  Time: 3 min (AM)     8 min (rest between 4 min) (PM)     Level: 3  To strengthen BLE and improve endurance         [x]   Balance training  Blue stability trainers: Staggered stance (both sides), small DREW (2 trials x 1 min [each]) (SBA)  Black stability trainers: Wide DREW (2 trials x 1 min) (SBA)  Side-stepping over 2 red bolsters (2 sets x 3 trials [L and R each]) (CGA) (Pt had self-corrected LOB with BUEs)   Forward step over red bolsters (2 sets x 3 trials) (SBA)      [x]   Other: Pt pulled pants down and up during restroom visit x 2 trials SBA w/ UE support on railing   []   Other:    Comments: Pt's nausea limited her ability to accomplish activities for most of her AM sesion. However, she was able to participate in activities without nausea after having a bowel movement. No nausea was reported during the PM session. Patient/Caregiver Education and Training:   [x]   Bed Mobility/Transfer technique/safety  [x]   Gait technique/sequencing  [x]   Proper use of assistive device  []   Advanced mobility safety and technique  []   Reinforced patient's precautions/mobility while maintaining precautions  []   Postural awareness    Treatment Plan for Next Session:  Continue with ambulation, balance training, endurance training, and LE strengthening    Assessment: This pt demonstrated a positive response to today's treatment as evidenced by increased endurance, strength, and balance. The patient is making progress toward established goals as evidenced by QI scores. Ongoing deficits are observed in the areas of balance, endurance, and strength, and continued focus on balance, endurance, and strength is recommended.      Treatment/Activity Tolerance:   [x] Tolerated treatment with no adverse effects (PM session)     [] Patient limited by fatigue  [] Patient limited by pain   [x] Patient limited by nausea (AM session)    [] Adverse reaction to Tx:   [] Significant change in status    Safety:       [x]  bed alarm set    []  chair alarm set    []  Pt refused alarms                []  Telesitter activated      [x]  Gait belt used during tx session      [x]other:   (AM and PM) Pt left in bed with call light at end of session. Number of Minutes/Billable Intervention  Gait Training 50   Therapeutic Exercise 25   Neuro Re-Ed    Therapeutic Activity 45   Wheelchair Propulsion    Group    Other:    TOTAL 120         Social History  Social/Functional History  Lives With: Alone  Type of Home: House  Home Layout: One level  Home Access: Level entry (threshold)  Bathroom Shower/Tub: Walk-in shower  Bathroom Toilet: Handicap height  Bathroom Equipment: Shower chair, Grab bars in shower, Grab bars around toilet, Hand-held shower (no back on shower chair)  Bathroom Accessibility: [facility]: Cane, Quad cane, Crutches, Oxygen, Rolling walker, Reacher (O2 for night use(2-3x a week))  ADL Assistance: [address] [place] [address] Avenue: Independent  Homemaking Responsibilities: Yes  Meal Prep Responsibility: Primary  Laundry Responsibility: Primary  Cleaning Responsibility: Primary  Bill Paying/Finance Responsibility: Primary  Shopping Responsibility: Primary  Dependent Care Responsibility: No  Health Care Management: Primary  Ambulation Assistance: Independent (pt uses quad cane and other canes when she feels like it(family has been encouraging her to use it at all times))  Transfer Assistance: Independent  Active : Yes  Mode of Transportation: Car  Education: [employer]  Occupation: Retired  Leisure & Hobbies: Cutting grass, Making jewelry  Additional Comments: bed has adjustability features. 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), significant wounds to L arm;  Pt reports she uses a pill organizer and she organizes it herself; Top & bottom dentures    Objective                                                                                    Goals:  (Update in navigator)   : Long term goals  Time Frame for Long term goals : 7-10 days STG=LTG  Long term goal 1: Pt will demonstrate all bed mobiltiy with mod I  Long term goal 2: Pt will demonstrate car, pivot and sit to stand transfers with mod I  Long term goal 3: Pt will perform gait on level surface at least 150' and on unlevel surface 20' with 2ww amd mod I  Long term goal 4: Pt will ascend curb step with 2ww and 12 stairs with b rails with mod I  Long term goal 5: Pt will retrieve small item from floor with 2ww and reacher with mod I:        Plan of Care                                                                              Times per week: 5 days per week for a minimum of 60 minutes/day plus group as appropriate for 60 minutes.   Treatment to include Current Treatment Recommendations: Strengthening, Functional Mobility Training, IADL Training, Neuromuscular Re-education, Home Exercise Program, Equipment Evaluation, Education, & procurement, Transfer Training, Gait Training, Safety Education & Training, Balance Training, Endurance Training, Stair training, Patient/Caregiver Education & Training    Electronically signed by   LUPE David  11/30/2021, 8:39 AM  I have read and agree with the documentation above:  María Guillaume, [id number]

## 2021-11-30 NOTE — PROGRESS NOTES
Physical Rehabilitation: OCCUPATIONAL THERAPY     [x] daily progress note       [] discharge       Patient Name:  Gokul Smith   :  1935 MRN: [id number]  Room:  [address] [place], [place] [address] Date of Admission: 2021  Rehabilitation Diagnosis:   Cerebral infarction, unspecified [I63.9]  Acute CVA (cerebrovascular accident) ([facility] [address] [I63.9]       Date 2021       Day of ARU Week:  6   Time IN/OUT 1100/1200   Individual Tx Minutes 60   Group Tx Minutes    Co-Treat Minutes    Concurrent Tx Minutes    TOTAL Tx Time Mins 60   Variance Time    Variance Time []   Refusal due to:     []   Medical hold/reason:    []   Illness   []   Off Unit for test/procedure  []   Extra time needed to complete task  []   Therapeutic need  []   Other (specify):   Restrictions Restrictions/Precautions: General Precautions, Fall Risk         Communication with other providers: [x]   OK to see per nursing:     []   Spoke with team member regarding:      Subjective observations and cognitive status:  patient states she didn't have a very good morning however feels better and nausea has passed agreeable to therapy requests a shower     Pain level/location:    /10       Location: per patient no pain noted    Discharge recommendations  Anticipated discharge date:  TBD  Destination: []home alone   []home alone w assist prn   [x] home w/ family    [] Continuous supervision       []SNF    [] Assisted living     [] Other:   Continued therapy: [x]HHC OT  []OUTPATIENT  OT   [] No Further OT  Equipment needs: TBD       ADLs:    Eating: Eating  Assistance Needed: Independent  Comment: X  CARE Score: 6  Discharge Goal: Independent       Oral Hygiene: Oral Hygiene  Assistance Needed: Independent  Comment: X  CARE Score: 6  Discharge Goal: Independent    UB/LB Bathing: Shower/Bathe Self  Assistance Needed: Independent  Comment: MOd I seated  CARE Score: 6  Discharge Goal: Independent    UB Dressing: Upper Body Dressing  Assistance Needed: Independent  Comment: X  CARE Score: 6  Discharge Goal: Independent         LB Dressing: Lower Body Dressing  Assistance Needed: Independent  Comment: X  CARE Score: 6  Discharge Goal: Independent    Donning and [place] Footwear: Putting On/Taking Off Footwear  Assistance Needed: Independent  Comment: X  CARE Score: 6  Discharge Goal: Independent      Toileting: Toileting Hygiene  Assistance Needed: Independent  Comment: X  CARE Score: 6      Toilet Transfers: Toilet Transfer  Assistance Needed: Supervision or touching assistance  Comment: Sup  CARE Score: 4  Device Used:    [x]   Standard Toilet         [x]   Grab Bars           []  Bedside Commode       []   Elevated Toilet          []   Other:        Bed Mobility:           []   Pt received out of bed   Rolling R/L:    Scooting: Mod I   Supine --> Sit:  Mod I   Sit --> Supine:   Mod I     Transfers:    Sit--> Stand:  Sup  Stand --> Sit:   SUp  Stand-Pivot:   SUp  Other:    Assistive device required for transfer:   RW      Functional Mobility:  Throughout room, down hallway of ARU   Assistance:  SBA  Device:   [x]   Rolling Walker     []   Standard Walker []   Wheelchair        []   [employer]       []   4-Wheeled Druscilla Covert         []   Cardiac Druscilla Covert       []   Other:        Homemaking Tasks:   Prepares and transports clothing from [facility] to  [place] at [employer] level       Additional Therapeutic activities/exercises completed this date:     [x]   ADL Training   [x]   Balance/Postural training     [x]   Bed/Transfer Training   []   Endurance Training   []   Neuromuscular Re-ed   []   Nu-step:  Time:        Level:         #Steps:       [x]   Rebounder:    []  Seated     [x]  Standing instructs patient on dynamic standing balance/tolerance tasks 3 sets of 15, reaching outside of DREW and crossing midline to increase activity tolerance in standing to facilitate ADL/IADL  Upon discharge         []   Supine Ther Ex (reps/sets):     []   Seated Ther Ex (reps/sets):     [] Primary  Ambulation Assistance: Independent (pt uses quad cane and other canes when she feels like it(family has been encouraging her to use it at all times))  Transfer Assistance: Independent  Active : Yes  Mode of Transportation: Car  Education: [employer]  Occupation: Retired  Leisure & Hobbies: Cutting grass, Making jewelry  Additional Comments: bed has adjustability features. 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), significant wounds to L arm; Pt reports she uses a pill organizer and she organizes it herself; Top & bottom dentures    Objective                                                                                    Goals:  (Update in navigator)  Short term goals  Time Frame for Short term goals: STG=LTG:  Long term goals  Time Frame for Long term goals : 7 Days  Long term goal 1: Pt will complete independent UB dressing  Long term goal 2: Pt will complete independent LB dressing  Long term goal 3: Pt will complete independent bathing  Long term goal 4: Pt will complete all aspects of toileting independently  Long term goal 5: Pt will complete independently doff/don her footwear  Long term goal 7: Pt will complete independent transfers (toilet, wheelchair, bed, shower):        Plan of Care                                                                              Times per week: 5 days per week for a minimum of 60 minutes/day plus group as appropriate for 60 minutes.   Treatment to include Plan  Current Treatment Recommendations: Strengthening, Functional Mobility Training, Gait Training, Patient/Caregiver Education & Training, ROM, Endurance Training, Equipment Evaluation, Education, & procurement, Balance Training, Safety Education & Training, Self-Care / ADL, Positioning, Home Management Training    Electronically signed by   DENISSE Elam,  12/1/2021, 11:11 AM

## 2021-11-30 NOTE — PROGRESS NOTES
Shyanne Reyes    : 1935  Acct #: [de-identified]  MRN: [id number]              PM&R Progress Note      Admitting diagnosis: Cute CVA ([address])     Comorbid diagnoses impacting rehabilitation: Acute respiratory failure with hypoxia, acute exacerbation of COPD, bacterial pneumonia with sepsis, dysarthria, Takatsubo cardiomyopathy, depression, hypothyroidism    Chief complaint: Gaining confidence with movement. Mild dyspnea with exercise. No chills or cough. Prior (baseline) level of function: Independent. Current level of function:         Current  IRF-JHOAN and Goals:   Occupational Therapy:    Short term goals  Time Frame for Short term goals: STG=LTG :   Long term goals  Time Frame for Long term goals : 7 Days  Long term goal 1: Pt will complete independent UB dressing  Long term goal 2: Pt will complete independent LB dressing  Long term goal 3: Pt will complete independent bathing  Long term goal 4: Pt will complete all aspects of toileting independently  Long term goal 5: Pt will complete independently doff/don her footwear  Long term goal 7: Pt will complete independent transfers (toilet, wheelchair, bed, shower) :                                        Eating: Eating  Assistance Needed: Independent  Comment: X  CARE Score: 6  Discharge Goal: Independent       Oral Hygiene: Oral Hygiene  Assistance Needed: Independent  Comment: X  CARE Score: 6  Discharge Goal: Independent    UB/LB Bathing: Shower/Bathe Self  Assistance Needed: Supervision or touching assistance  Comment: Sup  CARE Score: 4  Discharge Goal: Independent    UB Dressing: Upper Body Dressing  Assistance Needed: Independent  Comment: X  CARE Score: 6  Discharge Goal: Independent         LB Dressing: Lower Body Dressing  Assistance Needed: Independent  Comment: X  CARE Score: 6  Discharge Goal: Independent    Donning and [place] Footwear: Putting On/Taking Off Footwear  Assistance Needed: Independent  Comment: X  CARE Score: 6  Discharge Goal: Independent      Toileting: Toileting Hygiene  Assistance Needed: Independent  Comment: X  Reason if not Attempted: Patient refused  CARE Score: 6      Toilet Transfers:   Toilet Transfer  Assistance Needed: Supervision or touching assistance  Comment: Sup  Reason if not Attempted: Patient refused  CARE Score: 4  Discharge Goal: Independent    Physical Therapy:         Long term goals  Time Frame for Long term goals : 7-10 days STG=LTG  Long term goal 1: Pt will demonstrate all bed mobiltiy with mod I  Long term goal 2: Pt will demonstrate car, pivot and sit to stand transfers with mod I  Long term goal 3: Pt will perform gait on level surface at least 150' and on unlevel surface 20' with 2ww amd mod I  Long term goal 4: Pt will ascend curb step with 2ww and 12 stairs with b rails with mod I  Long term goal 5: Pt will retrieve small item from floor with 2ww and reacher with mod I      Bed Mobility:   Sit to Lying  Assistance Needed: Independent  Comment: Independent with bed flat and no rails  CARE Score: 6  Discharge Goal: Independent  Roll Left and Right  Assistance Needed: Independent  Comment: Independent with bed flat and no rails  CARE Score: 6  Discharge Goal: Independent  Lying to Sitting on Side of Bed  Assistance Needed: Independent  Comment: Independent with bed flat and no rails  CARE Score: 6  Discharge Goal: Independent    Transfers:    Sit to Stand  Assistance Needed: Supervision or touching assistance  Comment: SBA with RW  CARE Score: 4  Discharge Goal: Independent  Chair/Bed-to-Chair Transfer  Assistance Needed: Supervision or touching assistance  Comment: SBA with RW  CARE Score: 4  Discharge Goal: Independent  Toilet Transfer  Assistance Needed: Supervision or touching assistance  Comment: supervision with rails  CARE Score: 4  Discharge Goal: Independent  Car Transfer  Assistance Needed: Supervision or touching assistance  Comment: SBA with RW  CARE Score: 4  Discharge Goal: Independent    Ambulation:    Walking Ability  Does the Patient Walk?: Yes     Walk 10 Feet  Assistance Needed: Supervision or touching assistance  Comment: SBA with RW  CARE Score: 4  Discharge Goal: Independent     Walk 50 Feet with Two Turns  Assistance Needed: Supervision or touching assistance  Comment: SBA with RW  CARE Score: 4  Discharge Goal: Independent     Walk 150 Feet  Comment: 80' max effort  Reason if not Attempted: Not attempted due to medical condition or safety concerns  CARE Score: 88  Discharge Goal: Independent     Walking 10 Feet on Uneven Surfaces  Assistance Needed: Supervision or touching assistance  Comment: SBA with RW  CARE Score: 4  Discharge Goal: Independent     1 Step (Curb)  Assistance Needed: Supervision or touching assistance  Comment: SBA with RW  CARE Score: 4  Discharge Goal: Independent     4 Steps  Assistance Needed: Supervision or touching assistance  Comment: SBA with 2 rails  CARE Score: 4  Discharge Goal: Independent     12 Steps  Assistance Needed: Supervision or touching assistance  Comment: SBA with 2 rails  CARE Score: 4  Discharge Goal: Independent       Wheelchair:  w/c Ability: Wheelchair Ability  Uses a Wheelchair and/or Scooter?: No                Balance:    Balance  Posture: Good  Sitting - Static: Good  Sitting - Dynamic: Good  Standing - Static: Fair, -  Standing - Dynamic: Fair, -   Object: Picking Up Object  Assistance Needed: Supervision or touching assistance  Comment: SBA with reacher and RW  CARE Score: 4  Discharge Goal: Independent    I      Exam:    Blood pressure 122/61, pulse 55, temperature 97.9 °F (36.6 °C), resp. rate 16, height 5' (1.524 m), weight 131 lb 9.8 oz (59.7 kg), SpO2 98 %. General: Observed walking in a walker with staff. Maintaining a brisk pace. Alert. HEENT: Neck supple. No JVD. Full visual field. Pulmonary: Shallow but clear. Cardiac: RRR. Abdomen: Patient's abdomen is soft and nondistended.   Bowel sounds were present throughout. There was no rebound, guarding or masses noted. Upper extremities: Able to hold a walker with both hands. Clumsy movements bilaterally. Lower extremities: Wide base of support with gait. No new swelling or bruising. Sitting balance was good. Standing balance was fair-.    Lab Results   Component Value Date    WBC 9.3 11/26/2021    HGB 11.8 (L) 11/26/2021    HCT 33.9 (L) 11/26/2021    MCV 95.5 11/26/2021     11/26/2021     Lab Results   Component Value Date    INR 0.82 11/14/2021    INR 0.90 10/13/2020    INR 0.95 10/17/2019    PROTIME 10.5 (L) 11/14/2021    PROTIME 10.9 (L) 10/13/2020    PROTIME 10.8 (L) 10/17/2019     Lab Results   Component Value Date    CREATININE 0.5 (L) 11/26/2021    BUN 11 11/26/2021     11/26/2021    K 3.7 11/26/2021     11/26/2021    CO2 28 11/26/2021     Lab Results   Component Value Date    ALT 86 (H) 11/16/2021    AST 87 (H) 11/16/2021    ALKPHOS 62 11/16/2021    BILITOT 0.3 11/16/2021       Expected length of stay  prior to a supervised level of function for discharge home with a walker and [facility] OT/PT is 12/2/2021. Recommendations:    1. Acute CVA with impaired gait:  Already demonstrating significant benefit from participating in the daily occupational and physical therapy with speech-language pathology. Off antiplatelet therapy because of past intolerance.    Continue with the statin.  Working to control her blood pressure and nutrition. Generally continent of bowel and bladder.    Ongoing adaptive equipment training, caregiver education and nutritional support.  Aggressive pulmonary hygiene measures.  DVT prophylaxis. Verbal cues and CGA for transfers. 2. DVT prophylaxis: Lovenox 30 mg subcu every 12 hours.  I must monitor her hemoglobin and platelet count periodically while on this medication.  Weightbearing activities have picked up significantly recently.  GI prophylaxis offered. No new bruising or swelling.   3. Pneumonia with sepsis: Her IV antibiotics and steroids have been transitioned to oral Keflex. No fever. Monitoring her fever curve, O2 saturations and respiratory rate.  Aggressive pulmonary hygiene measures.  Periodic blood counts. No fever or hypoxia now. 4. COPD exacerbation: Aggressive pulmonary hygiene measures. 5. Takotsubo cardiomyopathy: Statin. Graduated increases in physical activity. Anxiolytics.       Counseling and Coordination of Care: In care conference today I met with the patient's OT, PT, RN and . We discussed the patient's problems, progress and prognosis. Disposition issues were clarified and plans were established for ongoing rehabilitation efforts beyond the ARU stay. I reviewed this information with the patient during a second distinct visit with the patient. More than half of the total time of 32 minutes spent with the patient involved counseling and coordination of care.

## 2021-11-30 NOTE — PLAN OF CARE
Problem: Falls - Risk of:  Goal: Will remain free from falls  Description: Will remain free from falls  11/30/2021 1126 by Constantino Jaquez RN  Outcome: Ongoing  11/29/2021 2328 by Fawn Myers LPN  Outcome: Ongoing  Goal: Absence of physical injury  Description: Absence of physical injury  11/30/2021 1126 by Constantino Jaquez RN  Outcome: Ongoing  11/29/2021 2328 by Fawn Myers LPN  Outcome: Ongoing     Problem: Safety:  Goal: Free from accidental physical injury  Description: Free from accidental physical injury  11/30/2021 1126 by Constantino Jaquez RN  Outcome: Ongoing  11/29/2021 2328 by Fawn Myers LPN  Outcome: Ongoing  Goal: Free from intentional harm  Description: Free from intentional harm  11/30/2021 1126 by Constantino Jaquez RN  Outcome: Ongoing  11/29/2021 2328 by Fawn Myers LPN  Outcome: Ongoing

## 2021-12-01 PROCEDURE — 94640 AIRWAY INHALATION TREATMENT: CPT

## 2021-12-01 PROCEDURE — 1280000000 HC REHAB R&B

## 2021-12-01 PROCEDURE — 6370000000 HC RX 637 (ALT 250 FOR IP): Performed by: INTERNAL MEDICINE

## 2021-12-01 PROCEDURE — 6370000000 HC RX 637 (ALT 250 FOR IP): Performed by: PHYSICAL MEDICINE & REHABILITATION

## 2021-12-01 PROCEDURE — 97530 THERAPEUTIC ACTIVITIES: CPT

## 2021-12-01 PROCEDURE — 97535 SELF CARE MNGMENT TRAINING: CPT

## 2021-12-01 PROCEDURE — 97116 GAIT TRAINING THERAPY: CPT

## 2021-12-01 PROCEDURE — 2700000000 HC OXYGEN THERAPY PER DAY

## 2021-12-01 PROCEDURE — 99232 SBSQ HOSP IP/OBS MODERATE 35: CPT | Performed by: PHYSICAL MEDICINE & REHABILITATION

## 2021-12-01 PROCEDURE — 2580000003 HC RX 258: Performed by: INTERNAL MEDICINE

## 2021-12-01 PROCEDURE — 94761 N-INVAS EAR/PLS OXIMETRY MLT: CPT

## 2021-12-01 PROCEDURE — 97110 THERAPEUTIC EXERCISES: CPT

## 2021-12-01 PROCEDURE — 94664 DEMO&/EVAL PT USE INHALER: CPT

## 2021-12-01 RX ADMIN — SUCRALFATE 1 G: 1 TABLET ORAL at 05:49

## 2021-12-01 RX ADMIN — SUCRALFATE 1 G: 1 TABLET ORAL at 18:26

## 2021-12-01 RX ADMIN — ACETAMINOPHEN 650 MG: 325 TABLET ORAL at 10:06

## 2021-12-01 RX ADMIN — TIMOLOL MALEATE 1 DROP: 5 SOLUTION OPHTHALMIC at 09:57

## 2021-12-01 RX ADMIN — LEVOTHYROXINE SODIUM 50 MCG: 0.05 TABLET ORAL at 05:49

## 2021-12-01 RX ADMIN — METOPROLOL SUCCINATE 12.5 MG: 25 TABLET, EXTENDED RELEASE ORAL at 09:56

## 2021-12-01 RX ADMIN — ALBUTEROL SULFATE 4 PUFF: 90 AEROSOL, METERED RESPIRATORY (INHALATION) at 19:42

## 2021-12-01 RX ADMIN — ASPIRIN 81 MG: 81 TABLET, COATED ORAL at 09:56

## 2021-12-01 RX ADMIN — ESCITALOPRAM OXALATE 20 MG: 10 TABLET ORAL at 09:57

## 2021-12-01 RX ADMIN — SUCRALFATE 1 G: 1 TABLET ORAL at 11:35

## 2021-12-01 RX ADMIN — BUDESONIDE AND FORMOTEROL FUMARATE DIHYDRATE 2 PUFF: 160; 4.5 AEROSOL RESPIRATORY (INHALATION) at 19:42

## 2021-12-01 RX ADMIN — BUDESONIDE AND FORMOTEROL FUMARATE DIHYDRATE 2 PUFF: 160; 4.5 AEROSOL RESPIRATORY (INHALATION) at 07:02

## 2021-12-01 RX ADMIN — ALBUTEROL SULFATE 4 PUFF: 90 AEROSOL, METERED RESPIRATORY (INHALATION) at 15:03

## 2021-12-01 RX ADMIN — ALBUTEROL SULFATE 4 PUFF: 90 AEROSOL, METERED RESPIRATORY (INHALATION) at 11:55

## 2021-12-01 RX ADMIN — ATORVASTATIN CALCIUM 40 MG: 40 TABLET, FILM COATED ORAL at 20:38

## 2021-12-01 RX ADMIN — PANTOPRAZOLE SODIUM 40 MG: 40 TABLET, DELAYED RELEASE ORAL at 05:49

## 2021-12-01 RX ADMIN — LISINOPRIL 2.5 MG: 2.5 TABLET ORAL at 09:56

## 2021-12-01 RX ADMIN — ALBUTEROL SULFATE 4 PUFF: 90 AEROSOL, METERED RESPIRATORY (INHALATION) at 07:02

## 2021-12-01 RX ADMIN — MONTELUKAST 10 MG: 10 TABLET, FILM COATED ORAL at 20:38

## 2021-12-01 RX ADMIN — SODIUM CHLORIDE, PRESERVATIVE FREE 10 ML: 5 INJECTION INTRAVENOUS at 10:02

## 2021-12-01 RX ADMIN — ACETAMINOPHEN 650 MG: 325 TABLET ORAL at 05:48

## 2021-12-01 RX ADMIN — SUCRALFATE 1 G: 1 TABLET ORAL at 20:38

## 2021-12-01 RX ADMIN — SODIUM CHLORIDE, PRESERVATIVE FREE 30 ML: 5 INJECTION INTRAVENOUS at 20:39

## 2021-12-01 ASSESSMENT — PAIN SCALES - GENERAL
PAINLEVEL_OUTOF10: 0
PAINLEVEL_OUTOF10: 4
PAINLEVEL_OUTOF10: 2

## 2021-12-01 ASSESSMENT — PAIN DESCRIPTION - FREQUENCY
FREQUENCY: INTERMITTENT
FREQUENCY: CONTINUOUS

## 2021-12-01 ASSESSMENT — PAIN DESCRIPTION - ONSET
ONSET: ON-GOING
ONSET: ON-GOING

## 2021-12-01 ASSESSMENT — PAIN DESCRIPTION - PROGRESSION
CLINICAL_PROGRESSION: GRADUALLY IMPROVING
CLINICAL_PROGRESSION: NOT CHANGED

## 2021-12-01 ASSESSMENT — PAIN DESCRIPTION - PAIN TYPE
TYPE: ACUTE PAIN
TYPE: ACUTE PAIN

## 2021-12-01 ASSESSMENT — PAIN DESCRIPTION - ORIENTATION
ORIENTATION: ANTERIOR
ORIENTATION: LEFT

## 2021-12-01 ASSESSMENT — PAIN - FUNCTIONAL ASSESSMENT
PAIN_FUNCTIONAL_ASSESSMENT: PREVENTS OR INTERFERES SOME ACTIVE ACTIVITIES AND ADLS
PAIN_FUNCTIONAL_ASSESSMENT: ACTIVITIES ARE NOT PREVENTED

## 2021-12-01 ASSESSMENT — PAIN DESCRIPTION - DESCRIPTORS
DESCRIPTORS: DISCOMFORT
DESCRIPTORS: ACHING;DULL

## 2021-12-01 ASSESSMENT — PAIN DESCRIPTION - LOCATION
LOCATION: HEAD
LOCATION: GENERALIZED

## 2021-12-01 NOTE — FLOWSHEET NOTE
[x] daily progress note       [x] discharge       Patient Name:  Melita Desai   :  1935 MRN: [id number]  Room:  [address] [place] [address] Date of Admission: 2021  Rehabilitation Diagnosis:   Cerebral infarction, unspecified [I63.9]  Acute CVA (cerebrovascular accident) ([facility] [address] [I63.9]       Date 2021       Day of ARU Week:  6   Time IN/OUT 4873-3470  8477-1712   Individual Tx Minutes 120   TOTAL Tx Time Mins 120   Restrictions Restrictions/Precautions  Restrictions/Precautions: General Precautions, Fall Risk      Communication with other providers: [x]   OK to see per nursing:     []   Spoke with team member regarding:      Subjective observations and cognitive status: (AM) Pt received laying supine in bed and stated that she had a headache when she woke up this morning and was feeling weak and tired. The pt stated that she does not feel ready to go home at this point and that she is unsure that she will be safe. Pt's daughter called during session and also stated concern of her mother's safety and readiness to return home. Spoke to daughter and explained that Nathan Mcdowell had her QI today and that her status and progress would be evaluated by MD before a decision is made on the discharge date and also that both of them would be updated on this information later. Vitals taken prior to session:   97% SpO2   58 bpm  BP of 122/51     (PM) Pt received longsitting in bed and stated that her headache was better from this morning. During session pt stated that her daughter would not be available to help her at home every day due to her work schedule. Pain level/location:   0 /10          Discharge recommendations  Anticipated discharge date: Expected Discharge Date: 21    Destination: []?home alone   [x]? home alone with assist PRN     []? home w/ family      []? Continuous supervision  []? SNF    []? Assisted living     []? Other:   Continued therapy: [x]? Elva Lewis PT  []? OUTPATIENT  PT   []?  No Further PT  Equipment needs: Shanti Johansen requires the assistance of a wheeled walker to successfully ambulate from room to room at home to allow completion of daily living tasks such as: bathing, toileting, dressing and grooming. A wheeled walker is necessary due to the patient's unsteady gait, upper body weakness, inability to  a standard walker. This patient can ambulate only by pushing a walker instead of using a lesser assistive device such as a cane or crutch. Bed Mobility:           [x]   Pt received out of bed (AM and PM)   Rolling R/L:  Ind  Scooting:  Ind  Lying --> Sit: Ind  Sit --> lying:  Ind    Transfers:    Sit--> Stand: Mod I  Stand --> Sit:   Mod I   Chair-->Bed/Bed --> Chair:  Mod I   Car Transfers: mod I   Other: w/c<->nustep with mod I   Assistive device required for transfer:   FWW    Gait:    Walk 10 feet: Mod I   Walk 50 feet with 2 turns: Mod I   Walk 150 feet:  Not attempted d/t safety concerns (64' max potential) (limited by fatigue) Mod I   Device:  FWW  Gait Quality:  Small step length and slow sunshine    Stairs   Curb: Mod I x 2 trials  Supportive Device:  FWW  Height:   4\"  4 Steps: Supervision (pt reported BLEs were very weak)   12 steps:   Supervision   Supportive Device:  Two rails     Uneven Surfaces:       Assistance: Mod I   Device:   FWW  Surfaces Completed:   [x]  2 trials x 10' carpeted surface with bean bags beneath         Picking Up Object From Floor (must be standing position):  Assistance:    Mod I with RW   [x]   Reacher used    Additional Therapeutic activities/exercises completed this date:     [x]   Nu-step:  Time: 10 min (with rest break between)       Level: 3     To strengthen LE and improve endurance         [x]   Balance training (in // bars)     Blue stability trainers: Staggered stance (2 trials x 1 min [both sides]) CGA  Black stability trainers: Wide DREW (2 trials x 1 min) CGA  SLS ([L and R] 2 trials x 30 [each]) CGA          [x]   Standing ther ex (reps/sets): Hip abduction, hip extension, marches, toe raises, heel raises, minisquats (1 set x 10 reps) (for strengthening) (in // bars)      []   Other:   []   Other:   []   Other:    Comments:    (AM) Pt stated that she had more difficulty with therex on her R side. She also stated that toe raises were the most difficult exercises for her to accomplish. (PM) Pt stated that the stairs were especially difficult and that her knee felt \"wobbly\" when stepping down. Pt stated that SLS was more difficult on the R side than the L. Pt did display less stability when in R sided SLS. Patient/Caregiver Education and Training:   [x]   Bed Mobility/Transfer technique/safety  [x]   Gait technique/sequencing  [x]   Proper use of assistive device  [x]   Advanced mobility safety and technique  []   Reinforced patient's precautions/mobility while maintaining precautions  []   Postural awareness  []   Family training    Treatment Plan for Next Session: pt to discharge tomorrow (12-1-2021) from ARU. Assessment:  Pt demonstrated a positive response to therapy today. Pt is making progress towards her established goals. Ongoing deficits are observed in the areas of balance, endurance, and strength, and continued focus on balance, endurance, and strength is recommended.     Treatment/Activity Tolerance:   [x] Tolerated treatment with no adverse effects    [] Patient limited by fatigue  [] Patient limited by pain   [] Patient limited by medical complications:    [] Adverse reaction to Tx:   [] Significant change in status    Safety:       [x]  bed alarm set    []  chair alarm set    []  Pt refused alarms                []  Telesitter activated      [x]  Gait belt used during tx session      [x]other: (AM and PM) Pt left in bed in semifowler's position with call light at end of session        Number of Minutes/Billable Intervention  Gait Training 45   Therapeutic Exercise 35   Neuro Re-Ed    Therapeutic Activity 40   Wheelchair Propulsion    Group    Other:    TOTAL 120         Social History  Social/Functional History  Lives With: Alone  Type of Home: House  Home Layout: One level  Home Access: Level entry (threshold)  Bathroom Shower/Tub: Walk-in shower  Bathroom Toilet: Handicap height  Bathroom Equipment: Shower chair, Grab bars in shower, Grab bars around toilet, Hand-held shower (no back on shower chair)  Bathroom Accessibility: Accessible  Home Equipment: Cane, Quad cane, Crutches, Oxygen, Rolling walker, Reacher (O2 for night use(2-3x a week))  ADL Assistance: [address]: Independent  Homemaking Responsibilities: Yes  Meal Prep Responsibility: Primary  Laundry Responsibility: Primary  Cleaning Responsibility: Primary  Bill Paying/Finance Responsibility: Primary  Shopping Responsibility: Primary  Dependent Care Responsibility: No  Health Care Management: Primary  Ambulation Assistance: Independent (pt uses quad cane and other canes when she feels like it(family has been encouraging her to use it at all times))  Transfer Assistance: Independent  Active : Yes  Mode of Transportation: Car  Education: [employer]  Occupation: Retired  Leisure & Hobbies: Cutting grass, Making jewelry  Additional Comments: bed has adjustability features. 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), significant wounds to L arm; Pt reports she uses a pill organizer and she organizes it herself; Top & bottom dentures    Objective                                                                                    Goals:  (Update in navigator)   :   Long term goals  Time Frame for Long term goals : 7-10 days STG=LTG  Long term goal 1: Pt will demonstrate all bed mobiltiy with mod I  Long term goal 2: Pt will demonstrate car, pivot and sit to stand transfers with mod I  Long term goal 3: Pt will perform gait on level surface at least 150' and on unlevel surface 20' with 2ww amd mod I  Long term goal 4: Pt will ascend curb step with 2ww and 12 stairs with b rails with mod I  Long term goal 5: Pt will retrieve small item from floor with 2ww and reacher with mod I:        Plan of Care                                                                              Times per week: 5 days per week for a minimum of 60 minutes/day plus group as appropriate for 60 minutes.   Treatment to include Current Treatment Recommendations: Strengthening, Functional Mobility Training, IADL Training, Neuromuscular Re-education, Home Exercise Program, Equipment Evaluation, Education, & procurement, Transfer Training, Gait Training, Safety Education & Training, Balance Training, Endurance Training, Stair training, Patient/Caregiver Education & Training    Electronically signed by   LUPE Montelongo  12/1/2021, 10:35 AM  I have read and agree with the documentation above:  Pasha Ruano, [id number]

## 2021-12-01 NOTE — PROGRESS NOTES
Physical Rehabilitation: OCCUPATIONAL THERAPY     [x] daily progress note       [x] discharge       Patient Name:  Iliana Byrd   :  1935 MRN: [id number]  Room:  [address] [place] [address] Date of Admission: 2021  Rehabilitation Diagnosis:   Cerebral infarction, unspecified [I63.9]  Acute CVA (cerebrovascular accident) ([facility] [address] [I63.9]       Date 2021       Day of ARU Week:  6   Time IN/OUT 1105/1205   Individual Tx Minutes 60   Group Tx Minutes    Co-Treat Minutes    Concurrent Tx Minutes    TOTAL Tx Time Mins 60   Variance Time    Variance Time []   Refusal due to:     []   Medical hold/reason:    []   Illness   []   Off Unit for test/procedure  []   Extra time needed to complete task  []   Therapeutic need  []   Other (specify):   Restrictions Restrictions/Precautions: General Precautions, Fall Risk         Communication with other providers: [x]   OK to see per nursing:     []   Spoke with team member regarding:      Subjective observations and cognitive status: Patient supine in bed verbalizes she feels weak and fatigued this date, declines shower for QI, patient took full shower previous day do QI has been captured      Pain level/location:    /10       Location: no pain per patient    Discharge recommendations  Anticipated discharge date:    Destination: [x]home alone   []home alone w assist prn   [] home w/ family    [] Continuous supervision       []SNF    [] Assisted living     [] Other:   Continued therapy: [x]HHC OT  []OUTPATIENT  OT   [] No Further OT  Equipment needs: None      Toileting: Toileting Hygiene  Assistance Needed: Independent  Comment: mod I  Reason if not Attempted: Patient refused  CARE Score: 6      Toilet Transfers: Toilet Transfer  Assistance Needed: Independent  Comment:  Mod I  Reason if not Attempted: Patient refused  CARE Score: 6  Discharge Goal: Independent  Device Used:    [x]   Standard Toilet         [x]   Grab Bars           []  Bedside Commode       []   Elevated Toilet          []   Other:        Bed Mobility:           []   Pt received out of bed   Rolling R/L:  Mod I   Scooting:  MOD I   Supine --> Sit:  Mod I   Sit --> Supine: Mod I     Transfers:    Sit--> Stand: Mod I   Stand --> Sit:   Mod I   Stand-Pivot:   SUp  Other:    Assistive device required for transfer:   RW      Functional Mobility:  Down hallway of ARU   Assistance:  SUp  Device:   [x]   Rolling Walker     []   Standard Walker []   Wheelchair        []   [employer]       []   4-Wheeled [employer]         []   Cardiac Walker       []   Other:          Additional Therapeutic activities/exercises completed this date:     [x]   ADL Training   [x]   Balance/Postural training patient instructed in standing tolerance/balance activity with crad matching task, reaching outside of DREW and crossing midleine to increase endurance c stand tasks for ADL and community reentry level     [x]   Bed/Transfer Training   [x]   Endurance Training Patient instructed in bilateral reciprocal patterned movement for 10 minutes with min resistance while seated with no rest breaks to increase activity tolerance for facilitation of ADL and mobility tasks    []   Neuromuscular Re-ed   []   Nu-step:  Time:        Level:         #Steps:       []   Rebounder:    []  Seated     []  Standing        []   Supine Ther Ex (reps/sets):     []   Seated Ther Ex (reps/sets):     []   Standing Ther Ex (reps/sets):     []   Other:      Comments:      Patient/Caregiver Education and Training:   [x]   [employer] Equipment Use  [x]   Bed Mobility/Transfer Technique/Safety  [x]   Energy Conservation Tips  []   Family training  [x]   Postural Awareness  [x]   Safety During Functional Activities  []   Reinforced Patient's Precautions   []   Progress was updated and reviewed in Rehabtracker with patient and/or family this         date.     Treatment Plan for Next Session: POC to continue as tolerated         Treatment/Activity Tolerance:   [x] Tolerated treatment with no adverse effects    [] Patient limited by fatigue  [] Patient limited by pain   [] Patient limited by medical complications:    [] Adverse reaction to Tx:   [] Significant change in status    Safety:       [x]  bed alarm set    []  chair alarm set    []  Pt refused alarms                []  Telesitter activated      [x]  Gait belt used during tx session      []other:       Number of Minutes/Billable Intervention  Therapeutic Exercise    ADL Self-care 25   Neuro Re-Ed    Therapeutic Activity 35   Group    Other:    TOTAL 60       Social History  Social/Functional History  Lives With: Alone  Type of Home: House  Home Layout: One level  Home Access: Level entry (threshold)  Bathroom Shower/Tub: Walk-in shower  Bathroom Toilet: Handicap height  Bathroom Equipment: Shower chair, Grab bars in shower, Grab bars around toilet, Hand-held shower (no back on shower chair)  Bathroom Accessibility: Accessible  Home Equipment: Cane, Quad cane, Crutches, Oxygen, Rolling walker, Reacher (O2 for night use(2-3x a week))  ADL Assistance: Independent  Homemaking Assistance: Independent  Homemaking Responsibilities: Yes  Meal Prep Responsibility: Primary  Laundry Responsibility: Primary  Cleaning Responsibility: Primary  Bill Paying/Finance Responsibility: Primary  Shopping Responsibility: Primary  Dependent Care Responsibility: No  Health Care Management: Primary  Ambulation Assistance: Independent (pt uses quad cane and other canes when she feels like it(family has been encouraging her to use it at all times))  Transfer Assistance: Independent  Active : Yes  Mode of Transportation: Car  Education: [employer]  Occupation: Retired  Leisure & Hobbies: Cutting grass, Making jewelry  Additional Comments: bed has adjustability features. 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), significant wounds to L arm;  Pt reports she uses a pill organizer and she organizes it herself; Top & bottom dentures    Objective                                                                                    Goals:  (Update in navigator)  Short term goals  Time Frame for Short term goals: STG=LTG:  Long term goals  Time Frame for Long term goals : 7 Days  Long term goal 1: Pt will complete independent UB dressing  Long term goal 2: Pt will complete independent LB dressing  Long term goal 3: Pt will complete independent bathing  Long term goal 4: Pt will complete all aspects of toileting independently  Long term goal 5: Pt will complete independently doff/don her footwear  Long term goal 7: Pt will complete independent transfers (toilet, wheelchair, bed, shower):        Plan of Care                                                                              Times per week: 5 days per week for a minimum of 60 minutes/day plus group as appropriate for 60 minutes.   Treatment to include Plan  Current Treatment Recommendations: Strengthening, Functional Mobility Training, Gait Training, Patient/Caregiver Education & Training, ROM, Endurance Training, Equipment Evaluation, Education, & procurement, Balance Training, Safety Education & Training, Self-Care / ADL, Positioning, Home Management Training    Electronically signed by   DENISSE Arzate,  12/1/2021, 11:35 AM

## 2021-12-02 LAB
ANION GAP SERPL CALCULATED.3IONS-SCNC: 7 MMOL/L (ref 4–16)
BUN BLDV-MCNC: 8 MG/DL (ref 6–23)
CALCIUM SERPL-MCNC: 8.2 MG/DL (ref 8.3–10.6)
CHLORIDE BLD-SCNC: 103 MMOL/L (ref 99–110)
CO2: 29 MMOL/L (ref 21–32)
CREAT SERPL-MCNC: 0.5 MG/DL (ref 0.6–1.1)
GFR AFRICAN AMERICAN: >60 ML/MIN/1.73M2
GFR NON-AFRICAN AMERICAN: >60 ML/MIN/1.73M2
GLUCOSE BLD-MCNC: 87 MG/DL (ref 70–99)
HCT VFR BLD CALC: 35.2 % (ref 37–47)
HEMOGLOBIN: 11.7 GM/DL (ref 12.5–16)
MCH RBC QN AUTO: 33.1 PG (ref 27–31)
MCHC RBC AUTO-ENTMCNC: 33.2 % (ref 32–36)
MCV RBC AUTO: 99.4 FL (ref 78–100)
PDW BLD-RTO: 13.8 % (ref 11.7–14.9)
PLATELET # BLD: 212 K/CU MM (ref 140–440)
PMV BLD AUTO: 10.3 FL (ref 7.5–11.1)
POTASSIUM SERPL-SCNC: 3.7 MMOL/L (ref 3.5–5.1)
RBC # BLD: 3.54 M/CU MM (ref 4.2–5.4)
SODIUM BLD-SCNC: 139 MMOL/L (ref 135–145)
WBC # BLD: 6.6 K/CU MM (ref 4–10.5)

## 2021-12-02 PROCEDURE — 6370000000 HC RX 637 (ALT 250 FOR IP)

## 2021-12-02 PROCEDURE — 1280000000 HC REHAB R&B

## 2021-12-02 PROCEDURE — 6370000000 HC RX 637 (ALT 250 FOR IP): Performed by: INTERNAL MEDICINE

## 2021-12-02 PROCEDURE — 94761 N-INVAS EAR/PLS OXIMETRY MLT: CPT

## 2021-12-02 PROCEDURE — 80048 BASIC METABOLIC PNL TOTAL CA: CPT

## 2021-12-02 PROCEDURE — 6370000000 HC RX 637 (ALT 250 FOR IP): Performed by: PHYSICAL MEDICINE & REHABILITATION

## 2021-12-02 PROCEDURE — 97530 THERAPEUTIC ACTIVITIES: CPT

## 2021-12-02 PROCEDURE — 36592 COLLECT BLOOD FROM PICC: CPT

## 2021-12-02 PROCEDURE — 85027 COMPLETE CBC AUTOMATED: CPT

## 2021-12-02 PROCEDURE — 94150 VITAL CAPACITY TEST: CPT

## 2021-12-02 PROCEDURE — 94640 AIRWAY INHALATION TREATMENT: CPT

## 2021-12-02 PROCEDURE — 2580000003 HC RX 258: Performed by: INTERNAL MEDICINE

## 2021-12-02 PROCEDURE — 97535 SELF CARE MNGMENT TRAINING: CPT

## 2021-12-02 PROCEDURE — 99232 SBSQ HOSP IP/OBS MODERATE 35: CPT | Performed by: PHYSICAL MEDICINE & REHABILITATION

## 2021-12-02 RX ORDER — ASPIRIN 81 MG/1
81 TABLET ORAL DAILY
Qty: 30 TABLET | Refills: 0 | COMMUNITY
Start: 2021-12-02

## 2021-12-02 RX ORDER — MONTELUKAST SODIUM 10 MG/1
10 TABLET ORAL NIGHTLY
Qty: 30 TABLET | Refills: 0 | Status: SHIPPED | OUTPATIENT
Start: 2021-12-02 | End: 2021-12-27 | Stop reason: SDUPTHER

## 2021-12-02 RX ORDER — LISINOPRIL 2.5 MG/1
2.5 TABLET ORAL DAILY
Qty: 30 TABLET | Refills: 0 | Status: SHIPPED | OUTPATIENT
Start: 2021-12-02 | End: 2021-12-30 | Stop reason: SDUPTHER

## 2021-12-02 RX ORDER — LEVOTHYROXINE SODIUM 0.05 MG/1
50 TABLET ORAL DAILY
Qty: 30 TABLET | Refills: 0 | Status: SHIPPED | OUTPATIENT
Start: 2021-12-03 | End: 2021-12-27 | Stop reason: SDUPTHER

## 2021-12-02 RX ORDER — METOPROLOL SUCCINATE 25 MG/1
12.5 TABLET, EXTENDED RELEASE ORAL DAILY
Qty: 15 TABLET | Refills: 0 | Status: SHIPPED | OUTPATIENT
Start: 2021-12-02 | End: 2022-02-22

## 2021-12-02 RX ORDER — ATORVASTATIN CALCIUM 40 MG/1
40 TABLET, FILM COATED ORAL NIGHTLY
Qty: 30 TABLET | Refills: 0 | Status: SHIPPED | OUTPATIENT
Start: 2021-12-02 | End: 2021-12-27 | Stop reason: SDUPTHER

## 2021-12-02 RX ADMIN — TIMOLOL MALEATE 1 DROP: 5 SOLUTION OPHTHALMIC at 07:42

## 2021-12-02 RX ADMIN — BUDESONIDE AND FORMOTEROL FUMARATE DIHYDRATE 2 PUFF: 160; 4.5 AEROSOL RESPIRATORY (INHALATION) at 08:12

## 2021-12-02 RX ADMIN — SUCRALFATE 1 G: 1 TABLET ORAL at 05:39

## 2021-12-02 RX ADMIN — LISINOPRIL 2.5 MG: 2.5 TABLET ORAL at 07:42

## 2021-12-02 RX ADMIN — ONDANSETRON 2 MG: 4 TABLET, ORALLY DISINTEGRATING ORAL at 08:54

## 2021-12-02 RX ADMIN — BUDESONIDE AND FORMOTEROL FUMARATE DIHYDRATE 2 PUFF: 160; 4.5 AEROSOL RESPIRATORY (INHALATION) at 21:07

## 2021-12-02 RX ADMIN — ALBUTEROL SULFATE 4 PUFF: 90 AEROSOL, METERED RESPIRATORY (INHALATION) at 12:02

## 2021-12-02 RX ADMIN — MONTELUKAST 10 MG: 10 TABLET, FILM COATED ORAL at 21:09

## 2021-12-02 RX ADMIN — SODIUM CHLORIDE, PRESERVATIVE FREE 10 ML: 5 INJECTION INTRAVENOUS at 07:46

## 2021-12-02 RX ADMIN — ACETAMINOPHEN 650 MG: 325 TABLET ORAL at 05:39

## 2021-12-02 RX ADMIN — SUCRALFATE 1 G: 1 TABLET ORAL at 21:09

## 2021-12-02 RX ADMIN — ALBUTEROL SULFATE 4 PUFF: 90 AEROSOL, METERED RESPIRATORY (INHALATION) at 21:05

## 2021-12-02 RX ADMIN — ASPIRIN 81 MG: 81 TABLET, COATED ORAL at 07:42

## 2021-12-02 RX ADMIN — ACETAMINOPHEN 650 MG: 325 TABLET ORAL at 18:18

## 2021-12-02 RX ADMIN — SUCRALFATE 1 G: 1 TABLET ORAL at 17:25

## 2021-12-02 RX ADMIN — SUCRALFATE 1 G: 1 TABLET ORAL at 10:40

## 2021-12-02 RX ADMIN — LEVOTHYROXINE SODIUM 50 MCG: 0.05 TABLET ORAL at 05:39

## 2021-12-02 RX ADMIN — PANTOPRAZOLE SODIUM 40 MG: 40 TABLET, DELAYED RELEASE ORAL at 05:39

## 2021-12-02 RX ADMIN — ATORVASTATIN CALCIUM 40 MG: 40 TABLET, FILM COATED ORAL at 21:09

## 2021-12-02 RX ADMIN — ONDANSETRON 2 MG: 4 TABLET, ORALLY DISINTEGRATING ORAL at 21:41

## 2021-12-02 RX ADMIN — ESCITALOPRAM OXALATE 20 MG: 10 TABLET ORAL at 07:42

## 2021-12-02 RX ADMIN — ACETAMINOPHEN 650 MG: 325 TABLET ORAL at 23:18

## 2021-12-02 RX ADMIN — METOPROLOL SUCCINATE 12.5 MG: 25 TABLET, EXTENDED RELEASE ORAL at 07:41

## 2021-12-02 RX ADMIN — ALBUTEROL SULFATE 4 PUFF: 90 AEROSOL, METERED RESPIRATORY (INHALATION) at 08:12

## 2021-12-02 ASSESSMENT — PAIN - FUNCTIONAL ASSESSMENT: PAIN_FUNCTIONAL_ASSESSMENT: ACTIVITIES ARE NOT PREVENTED

## 2021-12-02 ASSESSMENT — PAIN DESCRIPTION - LOCATION
LOCATION: ABDOMEN
LOCATION: CHEST
LOCATION: RIB CAGE
LOCATION: RIB CAGE

## 2021-12-02 ASSESSMENT — PAIN DESCRIPTION - ONSET
ONSET: ON-GOING

## 2021-12-02 ASSESSMENT — PAIN DESCRIPTION - DESCRIPTORS
DESCRIPTORS: CONSTANT

## 2021-12-02 ASSESSMENT — PAIN DESCRIPTION - FREQUENCY
FREQUENCY: CONTINUOUS

## 2021-12-02 ASSESSMENT — PAIN DESCRIPTION - ORIENTATION
ORIENTATION: LEFT;LOWER
ORIENTATION: LEFT;LOWER
ORIENTATION: LEFT;UPPER

## 2021-12-02 ASSESSMENT — PAIN DESCRIPTION - PROGRESSION
CLINICAL_PROGRESSION: NOT CHANGED

## 2021-12-02 ASSESSMENT — PAIN SCALES - GENERAL
PAINLEVEL_OUTOF10: 4
PAINLEVEL_OUTOF10: 6
PAINLEVEL_OUTOF10: 2
PAINLEVEL_OUTOF10: 4
PAINLEVEL_OUTOF10: 1
PAINLEVEL_OUTOF10: 1

## 2021-12-02 ASSESSMENT — PAIN DESCRIPTION - PAIN TYPE
TYPE: ACUTE PAIN

## 2021-12-02 NOTE — PROGRESS NOTES
Physical Rehabilitation: OCCUPATIONAL THERAPY     [x] daily progress note       [] discharge       Patient Name:  Lyssa Chambers   :  1935 MRN: [id number]  Room:  [address] [place] [address] Date of Admission: 2021  Rehabilitation Diagnosis:   Cerebral infarction, unspecified [I63.9]  Acute CVA (cerebrovascular accident) ([facility] [address] [I63.9]       Date 2021       Day of ARU Week:  7   Time IN/OUT 1415/1310   Individual Tx Minutes 54   Group Tx Minutes    Co-Treat Minutes    Concurrent Tx Minutes    TOTAL Tx Time Mins 55   Variance Time    Variance Time []   Refusal due to:     []   Medical hold/reason:    []   Illness   []   Off Unit for test/procedure  []   Extra time needed to complete task  []   Therapeutic need  []   Other (specify):   Restrictions Restrictions/Precautions: General Precautions, Fall Risk         Communication with other providers: [x]   OK to see per nursing:     []   Spoke with team member regarding:      Subjective observations and cognitive status: Patient walking around room/bed upon approach making bed for herself, agreeable to therapy stating \"oh I didn't think I would get any therapy today\"      Pain level/location:    /10       Location: per patient no pain noted    Discharge recommendations  Anticipated discharge date:  12/3  Destination: [x]home alone   []home alone w assist prn   [] home w/ family    [] Continuous supervision       []SNF    [] Assisted living     [] Other:   Continued therapy: [x]HHC OT  []OUTPATIENT  OT   [] No Further OT  Equipment needs:None       Toileting: Mod I       Toilet Transfers: Mod I   Device Used:    [x]   Standard Toilet         [x]   Grab Bars           []  Bedside Commode       []   Elevated Toilet          []   Other:        Bed Mobility:           [x]   Pt received out of bed   Rolling R/L:    Scooting:    Supine --> Sit:    Sit --> Supine:      Transfers:    Sit--> Stand: Mod I   Stand --> Sit:   Mod I   Stand-Pivot:    Mod I   Other:    Assistive device required for transfer:   RW      Functional Mobility:  Down hallway and back throughout ARU  prior to and after treatment session, one seated rest break at beginning of session, no rest breaks required at end of tx session   Assistance: Mod I   Device:   [x]   Rolling Walker     []   Standard Walker []   Wheelchair        []   [employer]       []   4-Wheeled [employer]         []   Cardiac Walker       []   Other:        Homemaking Tasks:   Patient making bed upon approach, therapist allows her to finish patient makes bed with Ind       Additional Therapeutic activities/exercises completed this date:     [x]   ADL Training   [x]   Balance/Postural training     [x]   Bed/Transfer Training   [x]   Endurance Training Patient instructed in bilateral reciprocal patterned movement while seated with min resistance for 8 minutes with one rest break at 3 minute beverly to increase strength and endurance  facilitate mobility tasks at community reentry level  and ADL/IADL tasks    []   Neuromuscular Re-ed   []   Nu-step:  Time:        Level:         #Steps:       [x]   Rebounder:    []  Seated     [x]  Standing Patient instructed in standing tolerance/balance activity at rebounder to increase activity tolerance, endurance with exertion to facilitate ADL upon dc        []   Supine Ther Ex (reps/sets):     []   Seated Ther Ex (reps/sets):     []   Standing Ther Ex (reps/sets):     []   Other:      Comments:      Patient/Caregiver Education and Training:   [x]   [employer] Equipment Use  [x]   Bed Mobility/Transfer Technique/Safety  [x]   Energy Conservation Tips  []   Family training  [x]   Postural Awareness  [x]   Safety During Functional Activities  []   Reinforced Patient's Precautions   []   Progress was updated and reviewed in Rehabtracker with patient and/or family this         date.     Treatment Plan for Next Session: POC to continue as tolerated         Treatment/Activity Tolerance:   [x] Tolerated treatment with no adverse effects    [] Patient limited by fatigue  [] Patient limited by pain   [] Patient limited by medical complications:    [] Adverse reaction to Tx:   [] Significant change in status    Safety:       []  bed alarm set    []  chair alarm set    []  Pt refused alarms                []  Telesitter activated      [x]  Gait belt used during tx session      []other:       Number of Minutes/Billable Intervention  Therapeutic Exercise    ADL Self-care 30   Neuro Re-Ed    Therapeutic Activity 25   Group    Other:    TOTAL 55       Social History  Social/Functional History  Lives With: Alone  Type of Home: House  Home Layout: One level  Home Access: Level entry (threshold)  Bathroom Shower/Tub: Walk-in shower  Bathroom Toilet: Handicap height  Bathroom Equipment: Shower chair, Grab bars in shower, Grab bars around toilet, Hand-held shower (no back on shower chair)  Bathroom Accessibility: Accessible  Home Equipment: Cane, Quad cane, Crutches, Oxygen, Rolling walker, Reacher (O2 for night use(2-3x a week))  ADL Assistance: Independent  Homemaking Assistance: Independent  Homemaking Responsibilities: Yes  Meal Prep Responsibility: Primary  Laundry Responsibility: Primary  Cleaning Responsibility: Primary  Bill Paying/Finance Responsibility: Primary  Shopping Responsibility: Primary  Dependent Care Responsibility: No  Health Care Management: Primary  Ambulation Assistance: Independent (pt uses quad cane and other canes when she feels like it(family has been encouraging her to use it at all times))  Transfer Assistance: Independent  Active : Yes  Mode of Transportation: Car  Education: [employer]  Occupation: Retired  Leisure & Hobbies: Cutting grass, Making jewelry  Additional Comments: bed has adjustability features. 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), significant wounds to L arm;  Pt reports she uses a pill organizer and she organizes it herself; Top & bottom dentures    Objective                                                                                    Goals:  (Update in navigator)  Short term goals  Time Frame for Short term goals: STG=LTG:  Long term goals  Time Frame for Long term goals : 7 Days  Long term goal 1: Pt will complete independent UB dressing  Long term goal 2: Pt will complete independent LB dressing  Long term goal 3: Pt will complete independent bathing  Long term goal 4: Pt will complete all aspects of toileting independently  Long term goal 5: Pt will complete independently doff/don her footwear  Long term goal 7: Pt will complete independent transfers (toilet, wheelchair, bed, shower):        Plan of Care                                                                              Times per week: 5 days per week for a minimum of 60 minutes/day plus group as appropriate for 60 minutes.   Treatment to include Plan  Current Treatment Recommendations: Strengthening, Functional Mobility Training, Gait Training, Patient/Caregiver Education & Training, ROM, Endurance Training, Equipment Evaluation, Education, & procurement, Balance Training, Safety Education & Training, Self-Care / ADL, Positioning, Home Management Training    Electronically signed by   DENISSE Michelle,  12/2/2021, 2:46 PM

## 2021-12-02 NOTE — CARE COORDINATION
RW order faxed to [address]. Limited number of [place] [address] agencies that accept [employer] & can staff patient's address. [place] cannot. [facility] cannot. [facility] can accept patient. Referral faxed to [facility]. 1000: case mgt spoke with patient's friend Roula Akins who reports herself as a rehab nurse. Patient has already given permission to speak with Roula Akins. Bryant castellanost reviewed patient's complete OT & PT LOF from yesterday's QI. Reviewed that she is medically stable per Dr Hugo Osborn yesterday and she's safe to return home with [place] [address] and doesn't need continuous supervision. Roula Akins reports that they don't want patient staying home alone, but neither of them are able to take time off to stay with her and the patient has a poor relationship with her son who lives next door. Roula Akins states they can't provide dc transport today. Case mgt advised that arrangements for a ride can be made, or discharge can be when they're off work this evening. 1100:  Case mgt met with patient in room. She still states she would like her discharge extended to Saturday 12/4. Conversation interrupted by call from Dr Hugo Osborn. Dr Hugo Osborn is agreeable to discharge 12/3 as her family will be more available to stay with her. Patient continues to report she'd prefer a Saturday discharge. Case mgt offered patient a discharge appeal which she accepted. Bryant castellanost left room for paperwork. On return patient was on the phone with Roula Akins. Roula Akins stated that she's in agreement with the appeal so patient can dc home 12/3 with more support. Bryant castellanost advised Roula Akins that this was offered to the patient who declined. Patient and Roula Akins spoke and are both agreeable to 12/3 discharge. Roula Akins will get her after work. Patient no longer proceeding with discharge appeal. She had not called to start the process. [facility] now stating they can't service patient's address.     [place] [id number] doesn't have staffing for [facility]    Confirmed that [address] has all needed documentation. They'll deliver the RW Friday afternoon to the patient's room. Case mgt called to patient's room. She's remembered her prior [address] Case mgt left  for Anayeli wilson requesting return call re: insurance status and staffing.

## 2021-12-02 NOTE — PROGRESS NOTES
Raji Puckett    : 1935  Acct #: [de-identified]  MRN: [id number]              PM&R Progress Note      Admitting diagnosis: Acute CVA ([address])     Comorbid diagnoses impacting rehabilitation: Acute respiratory failure with hypoxia, acute exacerbation of COPD, bacterial pneumonia with sepsis, dysarthria, Takatsubo cardiomyopathy, depression, hypothyroidism    Chief complaint: The patient reports that she does not \"feel well\". She was very nonspecific as she denied dizziness, chills, nausea, emesis, dysuria, dyspnea or limb cramping. Prior (baseline) level of function: Independent. Current level of function:         Current  IRF-JHOAN and Goals:   Occupational Therapy:    Short term goals  Time Frame for Short term goals: STG=LTG :   Long term goals  Time Frame for Long term goals : 7 Days  Long term goal 1: Pt will complete independent UB dressing  Long term goal 2: Pt will complete independent LB dressing  Long term goal 3: Pt will complete independent bathing  Long term goal 4: Pt will complete all aspects of toileting independently  Long term goal 5: Pt will complete independently doff/don her footwear  Long term goal 7: Pt will complete independent transfers (toilet, wheelchair, bed, shower) :                                        Eating: Eating  Assistance Needed: Independent  Comment: X  CARE Score: 6  Discharge Goal: Independent       Oral Hygiene: Oral Hygiene  Assistance Needed: Independent  Comment: X  CARE Score: 6  Discharge Goal: Independent    UB/LB Bathing: Shower/Bathe Self  Assistance Needed: Independent  Comment: MOd I seated  CARE Score: 6  Discharge Goal: Independent    UB Dressing: Upper Body Dressing  Assistance Needed: Independent  Comment: X  CARE Score: 6  Discharge Goal: Independent         LB Dressing: Lower Body Dressing  Assistance Needed: Independent  Comment: X  CARE Score: 6  Discharge Goal: Independent    Donning and [place] Footwear: Putting On/Taking Off Footwear  Assistance Needed: Independent  Comment: X  CARE Score: 6  Discharge Goal: Independent      Toileting: Toileting Hygiene  Assistance Needed: Independent  Comment: mod I  Reason if not Attempted: Patient refused  CARE Score: 6      Toilet Transfers: Toilet Transfer  Assistance Needed: Independent  Comment:  Mod I  Reason if not Attempted: Patient refused  CARE Score: 6  Discharge Goal: Independent    Physical Therapy:         Long term goals  Time Frame for Long term goals : 7-10 days STG=LTG  Long term goal 1: Pt will demonstrate all bed mobiltiy with mod I  Long term goal 2: Pt will demonstrate car, pivot and sit to stand transfers with mod I  Long term goal 3: Pt will perform gait on level surface at least 150' and on unlevel surface 20' with 2ww amd mod I  Long term goal 4: Pt will ascend curb step with 2ww and 12 stairs with b rails with mod I  Long term goal 5: Pt will retrieve small item from floor with 2ww and reacher with mod I      Bed Mobility:   Sit to Lying  Assistance Needed: Independent  Comment: Independent with bed flat and no rails  CARE Score: 6  Discharge Goal: Independent  Roll Left and Right  Assistance Needed: Independent  Comment: Independent with bed flat and no rails  CARE Score: 6  Discharge Goal: Independent  Lying to Sitting on Side of Bed  Assistance Needed: Independent  Comment: Independent with bed flat and no rails  CARE Score: 6  Discharge Goal: Independent    Transfers:    Sit to Stand  Assistance Needed: Independent  Comment: mod I with RW  CARE Score: 6  Discharge Goal: Independent  Chair/Bed-to-Chair Transfer  Assistance Needed: Independent  Comment: mod I with RW  CARE Score: 6  Discharge Goal: Independent  Toilet Transfer  Assistance Needed: Supervision or touching assistance  Comment: supervision with rails  CARE Score: 4  Discharge Goal: Independent  Car Transfer  Assistance Needed: Independent  Comment: mod I with RW  CARE Score: 6  Discharge Goal: Independent    Ambulation:    Walking Ability  Does the Patient Walk?: Yes     Walk 10 Feet  Assistance Needed: Independent  Comment: mod I with RW  CARE Score: 6  Discharge Goal: Independent     Walk 50 Feet with Two Turns  Assistance Needed: Independent  Comment: mod I with RW  CARE Score: 6  Discharge Goal: Independent     Walk 150 Feet  Comment: 110' max effort (limited by fatigue)  Reason if not Attempted: Not attempted due to medical condition or safety concerns  CARE Score: 88  Discharge Goal: Independent     Walking 10 Feet on Uneven Surfaces  Assistance Needed: Independent  Comment: mod I with RW  CARE Score: 6  Discharge Goal: Independent     1 Step (Curb)  Assistance Needed: Independent  Comment: mod I with RW  CARE Score: 6  Discharge Goal: Independent     4 Steps  Assistance Needed: Supervision or touching assistance  Comment: Supervision with 2 rails d/t fatigue in BLEs  CARE Score: 4  Discharge Goal: Independent     12 Steps  Assistance Needed: Supervision or touching assistance  Comment: Supervision with 2 rails d/t fatigue in BLEs  CARE Score: 4  Discharge Goal: Independent       Wheelchair:  w/c Ability: Wheelchair Ability  Uses a Wheelchair and/or Scooter?: No                Balance:    Balance  Posture: Good  Sitting - Static: Good  Sitting - Dynamic: Good  Standing - Static: Fair, -  Standing - Dynamic: Fair, -   Object: Picking Up Object  Assistance Needed: Independent  Comment: mod I with reacher and RW  CARE Score: 6  Discharge Goal: Independent    I      Exam:    Blood pressure (!) 151/74, pulse 57, temperature 99 °F (37.2 °C), temperature source Oral, resp. rate 15, height 5' (1.524 m), weight 132 lb 0.9 oz (59.9 kg), SpO2 97 %. General: Sitting up in bed. Talkative. Oriented x3. Poor reasoning. HEENT: MMM. Neck supple. Full visual field. Pulmonary: Unlabored with symmetric air exchange. Cardiac: Regular rate and rhythm.     Abdomen: Patient's abdomen is soft and nondistended. Bowel sounds were present throughout. There was no rebound, guarding or masses noted. Upper extremities: She was able to bring both hands up to meet mine. [address] coordination. No new bruising or swelling observed. Lower extremities: No new swelling. Reposition each leg while in bed. Sitting balance was good. Standing balance was fair. Lab Results   Component Value Date    WBC 9.3 11/26/2021    HGB 11.8 (L) 11/26/2021    HCT 33.9 (L) 11/26/2021    MCV 95.5 11/26/2021     11/26/2021     Lab Results   Component Value Date    INR 0.82 11/14/2021    INR 0.90 10/13/2020    INR 0.95 10/17/2019    PROTIME 10.5 (L) 11/14/2021    PROTIME 10.9 (L) 10/13/2020    PROTIME 10.8 (L) 10/17/2019     Lab Results   Component Value Date    CREATININE 0.5 (L) 11/26/2021    BUN 11 11/26/2021     11/26/2021    K 3.7 11/26/2021     11/26/2021    CO2 28 11/26/2021     Lab Results   Component Value Date    ALT 86 (H) 11/16/2021    AST 87 (H) 11/16/2021    ALKPHOS 62 11/16/2021    BILITOT 0.3 11/16/2021       Expected length of stay  prior to a supervised level of function for discharge home with a walker and [facility] OT/PT is 12/2/2021. Recommendations:    1. Acute CVA with impaired gait:   Patient has demonstrated competence with front wheeled walker to do her own transfers, household distance ambulation and self-care. Her subjective complaints are very nonspecific. Her measurable's are all within acceptable limits. I will recheck blood counts and chemistries in the morning. Her pulse, respiratory rate, O2 saturation and blood pressure are all excellent. It is unlikely she will become any more independent than modified independent with a walker so it is reasonable to transition her out of the rehab unit tomorrow. Home care services can continue to monitor her functional competence in the home setting. She did have a specific complaint that she was noted to be able to do her laundry.   Her history is that she folds close but that her daughter typically completes washing and drying. Off antiplatelet therapy because of past intolerance.    Continue with the statin.  Working to control her blood pressure and nutrition. Generally continent of bowel and bladder.      2. DVT prophylaxis: Lovenox 30 mg subcu every 12 hours.  I must monitor her hemoglobin and platelet count periodically while on this medication.  Weightbearing activities have picked up significantly recently.  GI prophylaxis offered.  No signs of acute blood loss. Check hemoglobin in the morning. Anticipate stopping Lovenox at discharge as her gait distances are now protection enough. 3. Pneumonia with sepsis: Her IV antibiotics and steroids were transitioned to oral Keflex which was completed before acute rehab. No fever. Monitoring her fever curve, O2 saturations and respiratory rate.  Aggressive pulmonary hygiene measures.  Periodic blood counts.  No fever or hypoxia now. 4. COPD exacerbation: Aggressive pulmonary hygiene measures. 5. Takotsubo cardiomyopathy: Statin.  Graduated increases in physical activity.  Anxiolytics.

## 2021-12-02 NOTE — PROGRESS NOTES
patient states she does not feel safe going home today because her daughter is working, states her daughter will be off tomorrow and can come get her and help her at home then but is NOT leaving today and Ricky Sykes is coming to get her. Very adamet that she is not leaving. Dr. Cara Bloch aware and case management also aware.

## 2021-12-03 VITALS
DIASTOLIC BLOOD PRESSURE: 70 MMHG | HEART RATE: 55 BPM | SYSTOLIC BLOOD PRESSURE: 148 MMHG | HEIGHT: 60 IN | WEIGHT: 125.66 LBS | OXYGEN SATURATION: 95 % | RESPIRATION RATE: 20 BRPM | TEMPERATURE: 98.3 F | BODY MASS INDEX: 24.67 KG/M2

## 2021-12-03 PROCEDURE — 94761 N-INVAS EAR/PLS OXIMETRY MLT: CPT

## 2021-12-03 PROCEDURE — 6370000000 HC RX 637 (ALT 250 FOR IP): Performed by: PHYSICAL MEDICINE & REHABILITATION

## 2021-12-03 PROCEDURE — 99239 HOSP IP/OBS DSCHRG MGMT >30: CPT | Performed by: PHYSICAL MEDICINE & REHABILITATION

## 2021-12-03 PROCEDURE — 94640 AIRWAY INHALATION TREATMENT: CPT

## 2021-12-03 PROCEDURE — 6370000000 HC RX 637 (ALT 250 FOR IP): Performed by: INTERNAL MEDICINE

## 2021-12-03 RX ADMIN — BUDESONIDE AND FORMOTEROL FUMARATE DIHYDRATE 2 PUFF: 160; 4.5 AEROSOL RESPIRATORY (INHALATION) at 09:42

## 2021-12-03 RX ADMIN — ASPIRIN 81 MG: 81 TABLET, COATED ORAL at 08:47

## 2021-12-03 RX ADMIN — PANTOPRAZOLE SODIUM 40 MG: 40 TABLET, DELAYED RELEASE ORAL at 05:23

## 2021-12-03 RX ADMIN — LISINOPRIL 2.5 MG: 2.5 TABLET ORAL at 08:46

## 2021-12-03 RX ADMIN — SUCRALFATE 1 G: 1 TABLET ORAL at 16:49

## 2021-12-03 RX ADMIN — LEVOTHYROXINE SODIUM 50 MCG: 0.05 TABLET ORAL at 05:23

## 2021-12-03 RX ADMIN — SUCRALFATE 1 G: 1 TABLET ORAL at 11:39

## 2021-12-03 RX ADMIN — ESCITALOPRAM OXALATE 20 MG: 10 TABLET ORAL at 08:46

## 2021-12-03 RX ADMIN — TIMOLOL MALEATE 1 DROP: 5 SOLUTION OPHTHALMIC at 08:47

## 2021-12-03 RX ADMIN — ACETAMINOPHEN 650 MG: 325 TABLET ORAL at 05:23

## 2021-12-03 RX ADMIN — SUCRALFATE 1 G: 1 TABLET ORAL at 05:23

## 2021-12-03 ASSESSMENT — PAIN SCALES - GENERAL
PAINLEVEL_OUTOF10: 2
PAINLEVEL_OUTOF10: 0
PAINLEVEL_OUTOF10: 2
PAINLEVEL_OUTOF10: 0

## 2021-12-03 NOTE — PROGRESS NOTES
Outpatient Pharmacy Progress Note for Meds-to-Beds    Total number of Prescriptions Filled: 4  The following medications were delivered to the patient or nursing unit during the discharge process:   Lisinopril 2.5mg   Metoprolol ER 25mg   Montelukast 10mg   Atorvastatin 40mg    Additional Documentation:         Thank you for letting us serve your patients.   [address] [place]    [address], [address] [facility]    Phone: [phone]    Fax: [phone]

## 2021-12-03 NOTE — CARE COORDINATION
Received a call from [facility] in the out pt pharmacy regarding pt'd d/c medications. Pt does have insurance but has no money for her d/c mediations: lisinopril, metoprolol, montelukast, atorvastatin. Voucher created and faxed to outpt pharmacy.  Pt will be put on the flag list. If she would need any further assistance, she must fill out an application and provide required documenation to be considered for eligibility./MB

## 2021-12-03 NOTE — PLAN OF CARE
Problem: Falls - Risk of:  Goal: Will remain free from falls  Description: Will remain free from falls  Outcome: Completed  Goal: Absence of physical injury  Description: Absence of physical injury  Outcome: Completed     Problem: Infection:  Goal: Will remain free from infection  Description: Will remain free from infection  Outcome: Completed     Problem: Safety:  Goal: Free from accidental physical injury  Description: Free from accidental physical injury  Outcome: Completed  Goal: Free from intentional harm  Description: Free from intentional harm  Outcome: Completed     Problem: Daily Care:  Goal: Daily care needs are met  Description: Daily care needs are met  Outcome: Completed     Problem: Pain:  Goal: Patient's pain/discomfort is manageable  Description: Patient's pain/discomfort is manageable  Outcome: Completed  Goal: Pain level will decrease  Description: Pain level will decrease  Outcome: Completed  Goal: Control of acute pain  Description: Control of acute pain  Outcome: Completed  Goal: Control of chronic pain  Description: Control of chronic pain  Outcome: Completed     Problem: Skin Integrity:  Goal: Skin integrity will stabilize  Description: Skin integrity will stabilize  Outcome: Completed     Problem: Discharge Planning:  Goal: Patients continuum of care needs are met  Description: Patients continuum of care needs are met  Outcome: Completed     Problem: Skin Integrity:  Goal: Will show no infection signs and symptoms  Description: Will show no infection signs and symptoms  Outcome: Completed  Goal: Absence of new skin breakdown  Description: Absence of new skin breakdown  Outcome: Completed

## 2021-12-03 NOTE — CARE COORDINATION
Received a call from Aleja Bell in the outpt pharmacy regarding pt's d/c medications. Pt does have insurance but has no money for her d/c medications: lisinopril, metroprolol ER, montelukast, atorvastatin. Voucher created and faxed to the outpt pharmacy.  Pt will be put on the flag list. If she would need any further assistance, she must fill out an application and provide required documentation to be considered for eligibility./MB

## 2021-12-03 NOTE — CARE COORDINATION
LSW returned call to [facility] with [employer] ([phone]) to inform that patient had not discharged. Additional information to be faxed for continued stay review. 3:00 PM  Clinicals sent via routed fax to [facility] at [phone].

## 2021-12-03 NOTE — PROGRESS NOTES
Lyssa Reyes    : 1935  Acct #: [de-identified]  MRN: [id number]              PM&R Progress Note      Admitting diagnosis: Acute CVA ( [address])     Comorbid diagnoses impacting rehabilitation: Acute respiratory failure with hypoxia, acute exacerbation of COPD, bacterial pneumonia with sepsis, dysarthria, Takatsubo cardiomyopathy, depression, hypothyroidism    Chief complaint: She is about going home. Does not feel ready. When asked about particular things she could work on, she could not come up with anything, however. Prior (baseline) level of function: Independent. Current level of function:         Current  IRF-JHOAN and Goals:   Occupational Therapy:    Short term goals  Time Frame for Short term goals: STG=LTG :   Long term goals  Time Frame for Long term goals : 7 Days  Long term goal 1: Pt will complete independent UB dressing  Long term goal 2: Pt will complete independent LB dressing  Long term goal 3: Pt will complete independent bathing  Long term goal 4: Pt will complete all aspects of toileting independently  Long term goal 5: Pt will complete independently doff/don her footwear  Long term goal 7: Pt will complete independent transfers (toilet, wheelchair, bed, shower) :                                        Eating: Eating  Assistance Needed: Independent  Comment: X  CARE Score: 6  Discharge Goal: Independent       Oral Hygiene: Oral Hygiene  Assistance Needed: Independent  Comment: X  CARE Score: 6  Discharge Goal: Independent    UB/LB Bathing: Shower/Bathe Self  Assistance Needed: Independent  Comment: MOd I seated  CARE Score: 6  Discharge Goal: Independent    UB Dressing: Upper Body Dressing  Assistance Needed: Independent  Comment: X  CARE Score: 6  Discharge Goal: Independent         LB Dressing: Lower Body Dressing  Assistance Needed: Independent  Comment: X  CARE Score: 6  Discharge Goal: Independent    Donning and [place] Footwear: Putting On/Taking Off Footwear  Assistance Needed: Independent  Comment: X  CARE Score: 6  Discharge Goal: Independent      Toileting: Toileting Hygiene  Assistance Needed: Independent  Comment: mod I  Reason if not Attempted: Patient refused  CARE Score: 6      Toilet Transfers: Toilet Transfer  Assistance Needed: Independent  Comment:  Mod I  Reason if not Attempted: Patient refused  CARE Score: 6  Discharge Goal: Independent    Physical Therapy:         Long term goals  Time Frame for Long term goals : 7-10 days STG=LTG  Long term goal 1: Pt will demonstrate all bed mobiltiy with mod I  Long term goal 2: Pt will demonstrate car, pivot and sit to stand transfers with mod I  Long term goal 3: Pt will perform gait on level surface at least 150' and on unlevel surface 20' with 2ww amd mod I  Long term goal 4: Pt will ascend curb step with 2ww and 12 stairs with b rails with mod I  Long term goal 5: Pt will retrieve small item from floor with 2ww and reacher with mod I      Bed Mobility:   Sit to Lying  Assistance Needed: Independent  Comment: Independent with bed flat and no rails  CARE Score: 6  Discharge Goal: Independent  Roll Left and Right  Assistance Needed: Independent  Comment: Independent with bed flat and no rails  CARE Score: 6  Discharge Goal: Independent  Lying to Sitting on Side of Bed  Assistance Needed: Independent  Comment: Independent with bed flat and no rails  CARE Score: 6  Discharge Goal: Independent    Transfers:    Sit to Stand  Assistance Needed: Independent  Comment: mod I with RW  CARE Score: 6  Discharge Goal: Independent  Chair/Bed-to-Chair Transfer  Assistance Needed: Independent  Comment: mod I with RW  CARE Score: 6  Discharge Goal: Independent  Toilet Transfer  Assistance Needed: Supervision or touching assistance  Comment: supervision with rails  CARE Score: 4  Discharge Goal: Independent  Car Transfer  Assistance Needed: Independent  Comment: mod I with RW  CARE Score: 6  Discharge Goal: Independent    Ambulation:    Walking Ability  Does the Patient Walk?: Yes     Walk 10 Feet  Assistance Needed: Independent  Comment: mod I with RW  CARE Score: 6  Discharge Goal: Independent     Walk 50 Feet with Two Turns  Assistance Needed: Independent  Comment: mod I with RW  CARE Score: 6  Discharge Goal: Independent     Walk 150 Feet  Comment: 110' max effort (limited by fatigue)  Reason if not Attempted: Not attempted due to medical condition or safety concerns  CARE Score: 88  Discharge Goal: Independent     Walking 10 Feet on Uneven Surfaces  Assistance Needed: Independent  Comment: mod I with RW  CARE Score: 6  Discharge Goal: Independent     1 Step (Curb)  Assistance Needed: Independent  Comment: mod I with RW  CARE Score: 6  Discharge Goal: Independent     4 Steps  Assistance Needed: Supervision or touching assistance  Comment: Supervision with 2 rails d/t fatigue in BLEs  CARE Score: 4  Discharge Goal: Independent     12 Steps  Assistance Needed: Supervision or touching assistance  Comment: Supervision with 2 rails d/t fatigue in BLEs  CARE Score: 4  Discharge Goal: Independent       Wheelchair:  w/c Ability: Wheelchair Ability  Uses a Wheelchair and/or Scooter?: No                Balance:    Balance  Posture: Good  Sitting - Static: Good  Sitting - Dynamic: Good  Standing - Static: Fair, -  Standing - Dynamic: Fair, -   Object: Picking Up Object  Assistance Needed: Independent  Comment: mod I with reacher and RW  CARE Score: 6  Discharge Goal: Independent    I      Exam:    Blood pressure (!) 119/59, pulse 54, temperature 97.6 °F (36.4 °C), temperature source Oral, resp. rate 16, height 5' (1.524 m), weight 124 lb 9 oz (56.5 kg), SpO2 96 %. General: Up in a bedside chair. Legs elevated. Talkative. Poor reasoning and insight. HEENT: Neck supple. No JVD.  MMM. Pulmonary: Diminished breath sounds in the bases. No wheezes or rales. No coughing or accessory muscle use. Cardiac: Regular rate and rhythm.   Distant heart sounds. Abdomen: Patient's abdomen is soft and nondistended. Bowel sounds were present throughout. There was no rebound, guarding or masses noted. Upper extremities: Slow and deliberate with arm movements. 4/5 strength throughout. Lower extremities: 4 -/5 strength throughout. Minimal edema. Calf soft. No signs of DVT. Sitting balance was good. Standing balance was fair-.    Lab Results   Component Value Date    WBC 6.6 12/02/2021    HGB 11.7 (L) 12/02/2021    HCT 35.2 (L) 12/02/2021    MCV 99.4 12/02/2021     12/02/2021     Lab Results   Component Value Date    INR 0.82 11/14/2021    INR 0.90 10/13/2020    INR 0.95 10/17/2019    PROTIME 10.5 (L) 11/14/2021    PROTIME 10.9 (L) 10/13/2020    PROTIME 10.8 (L) 10/17/2019     Lab Results   Component Value Date    CREATININE 0.5 (L) 12/02/2021    BUN 8 12/02/2021     12/02/2021    K 3.7 12/02/2021     12/02/2021    CO2 29 12/02/2021     Lab Results   Component Value Date    ALT 86 (H) 11/16/2021    AST 87 (H) 11/16/2021    ALKPHOS 62 11/16/2021    BILITOT 0.3 11/16/2021       Expected length of stay  prior to a supervised level of function for discharge home with a walker and [facility] OT/PT is now 12/3/2021. Recommendations:    1. Acute CVA with impaired gait:    Patient and her home caregiver both have concerns about her going home today. They would like her to go home tomorrow when the caregiver is available to stay with her more consistently. Although there were no specific goals the patient could identify to work on between now and then, we will try to reduce the patient's distress and anxiety by changing the discharge to tomorrow. Her subjective complaints remain very nonspecific. Home care services can continue to monitor her functional competence in the home setting.    Off antiplatelet therapy because of past intolerance.    Continue with the statin.  Working to control her blood pressure and nutrition. Generally continent of bowel and bladder.      2. DVT prophylaxis: I have discontinued her Lovenox. Her walking distances are protective now. 3. Pneumonia with sepsis: Her IV antibiotics and steroids were transitioned to oral Keflex which was completed before acute rehab. No fever. Monitoring her fever curve, O2 saturations and respiratory rate.  Aggressive pulmonary hygiene measures.  Periodic blood counts.  No fever or hypoxia now. 4. COPD exacerbation: Aggressive pulmonary hygiene measures. 5. Takotsubo cardiomyopathy: Statin.  Graduated increases in physical activity.  Anxiolytics.

## 2021-12-03 NOTE — CARE COORDINATION
Spoke with Carlos Fong at [facility]. They are in network and service [employer] and have staff. Referral packet faxed. Prescriptions taken to outpatient pharmacy for meds to beds.           ARU  Discharge Summary    D/C Date:   12/3/21     Patient discharged to: home alone    Transported by:   Nathan Quevedo    Referrals made to:   Pasquale DEMARCO    Additional information:   Original dc date was 12/3    Caregiver training: none recommended

## 2021-12-03 NOTE — PROGRESS NOTES
Patient discharged home. Discharge instructions and medications reviewed with patient. Verbalized understanding. Prescriptions filled and delivered to bed. Discharged per private vehicle.

## 2021-12-06 ENCOUNTER — CARE COORDINATION (OUTPATIENT)
Dept: CASE MANAGEMENT | Age: 86
End: 2021-12-06

## 2021-12-08 ENCOUNTER — OFFICE VISIT (OUTPATIENT)
Dept: FAMILY MEDICINE CLINIC | Age: 86
End: 2021-12-08
Payer: MEDICARE

## 2021-12-08 VITALS
SYSTOLIC BLOOD PRESSURE: 112 MMHG | WEIGHT: 129.2 LBS | DIASTOLIC BLOOD PRESSURE: 60 MMHG | HEART RATE: 55 BPM | TEMPERATURE: 96.8 F | BODY MASS INDEX: 25.23 KG/M2 | OXYGEN SATURATION: 100 %

## 2021-12-08 DIAGNOSIS — J44.9 CHRONIC OBSTRUCTIVE PULMONARY DISEASE, UNSPECIFIED COPD TYPE (HCC): ICD-10-CM

## 2021-12-08 DIAGNOSIS — J18.9 SEPSIS DUE TO PNEUMONIA (HCC): ICD-10-CM

## 2021-12-08 DIAGNOSIS — Z09 HOSPITAL DISCHARGE FOLLOW-UP: ICD-10-CM

## 2021-12-08 DIAGNOSIS — R53.1 WEAKNESS: ICD-10-CM

## 2021-12-08 DIAGNOSIS — J96.00 ACUTE RESPIRATORY FAILURE, UNSPECIFIED WHETHER WITH HYPOXIA OR HYPERCAPNIA (HCC): Primary | ICD-10-CM

## 2021-12-08 DIAGNOSIS — A41.9 SEPSIS DUE TO PNEUMONIA (HCC): ICD-10-CM

## 2021-12-08 DIAGNOSIS — I51.81 TAKOTSUBO CARDIOMYOPATHY: ICD-10-CM

## 2021-12-08 DIAGNOSIS — I63.9 ACUTE CVA (CEREBROVASCULAR ACCIDENT) (HCC): ICD-10-CM

## 2021-12-08 PROCEDURE — 3288F FALL RISK ASSESSMENT DOCD: CPT | Performed by: FAMILY MEDICINE

## 2021-12-08 PROCEDURE — 99215 OFFICE O/P EST HI 40 MIN: CPT | Performed by: FAMILY MEDICINE

## 2021-12-08 PROCEDURE — 1111F DSCHRG MED/CURRENT MED MERGE: CPT | Performed by: FAMILY MEDICINE

## 2021-12-08 ASSESSMENT — PATIENT HEALTH QUESTIONNAIRE - PHQ9
1. LITTLE INTEREST OR PLEASURE IN DOING THINGS: 0
SUM OF ALL RESPONSES TO PHQ QUESTIONS 1-9: 0
2. FEELING DOWN, DEPRESSED OR HOPELESS: 0
SUM OF ALL RESPONSES TO PHQ QUESTIONS 1-9: 0
SUM OF ALL RESPONSES TO PHQ QUESTIONS 1-9: 0
SUM OF ALL RESPONSES TO PHQ9 QUESTIONS 1 & 2: 0

## 2021-12-08 NOTE — PROGRESS NOTES
Post-Discharge Transitional Care Management Services or Hospital Follow Up      Leighton Lawrence   YOB: 1935    Date of Office Visit:  12/8/2021  Date of Hospital Admission: 11/14/21  Date of Hospital Discharge: 12/3/21  Risk of hospital readmission (high >=14%. Medium >=10%) :Readmission Risk Score: 20.4 ( )      Care management risk score Rising risk (score 2-5) and Complex Care (Scores >=6): 5     Non face to face  following discharge, date last encounter closed (first attempt may have been earlier): *No documented post hospital discharge outreach found in the last 14 days    Call initiated 2 business days of discharge: *No response recorded in the last 14 days    Patient Active Problem List   Diagnosis    Closed intertrochanteric fracture of left femur ([address]    Gait disturbance    Anemia    Dizziness    Acquired hypothyroidism    Murmur    Age-related osteoporosis without current pathological fracture    Black tongue    COPD exacerbation ([address]    Vitamin D deficiency    Vitamin B12 deficiency    Gastroesophageal reflux disease without esophagitis    VHD (valvular heart disease)    Aortic stenosis, severe    Acute respiratory distress    COPD with exacerbation ([address]    Sepsis due to pneumonia ([address]    Nodule of lower lobe of right lung    S/P TAVR (transcatheter aortic valve replacement)    Primary hypertension    Acute respiratory failure ([address]    Other seizures ([address]    Metabolic encephalopathy    Cerebrovascular accident (CVA) due to embolism of cerebral artery ([address]    Cerebrovascular accident (CVA) ([address]    Acute CVA (cerebrovascular accident) ([address]    Ataxia    Dysarthria due to acute stroke ([address]    Takotsubo cardiomyopathy    Dysthymia       Allergies   Allergen Reactions    Morphine Anaphylaxis     Dilaudid OK    Bactrim [Sulfamethoxazole-Trimethoprim]     Butorphanol      Other reaction(s):  Other - comment required  \"out of body experience\"    Influenza Vaccines      Ended in hospital stay for 3 days told by md not to get it anymore     Lactose Intolerance (Gi) Nausea Only    Phenergan [Promethazine Hcl] Other (See Comments)     Makes me jerk all over. Zofran OK    Promethazine     Stadol [Butorphanol Tartrate] Other (See Comments)     Out of body experience. Was told it was a near death experience and was placed in ICU. Dilaudid OK    Tape Ata  Tape] Other (See Comments)     Plastic cause itching and rash. Paper tape causes my skin to blister. (Tega-derm and Coban OK to use.)       Medications listed as ordered at the time of discharge from hospital  Lipitor 40 mg daily  Aspirin 81 mg daily    Medications marked \"taking\" at this time  Outpatient Medications Marked as Taking for the 12/8/21 encounter (Office Visit) with Crissy Whalen MD   Medication Sig Dispense Refill    aspirin (EQ ASPIRIN ADULT LOW DOSE) 81 MG EC tablet Take 1 tablet by mouth daily 30 tablet 0    atorvastatin (LIPITOR) 40 MG tablet Take 1 tablet by mouth nightly 30 tablet 0        Medications patient taking as of now reconciled against medications ordered at time of hospital discharge: Yes    Chief Complaint   Patient presents with   [address] from Hospital       History of Present illness - Follow up of Hospital diagnosis(es): Acute hypoxic respiratory failure due to COPD exacerbation, sepsis likely due to pneumonia, acute ischemic CVA, elevated troponin, Takotsubo cardiomyopathy    Inpatient course: Discharge summary reviewed- see chart. Interval history/Current status: Improved. Patient states she is still feeling very weak. Patient had been in inpatient rehab for 1 week, and when she was told she was go to be discharged, she told that she was not ready. She states the doctor thought she was living with her daughter aware and in fact she actually lives by herself. She is currently unable to walk more than a few feet with her walker.   She could not walk from the car to the clinic door without stopping and sitting to rest.  Home health has gone to her home for an evaluation but they have not started any kind of therapy. Denies any shortness of breath or chest pain. A comprehensive review of systems was negative except for what was noted in the HPI. Vitals:    12/08/21 1614   BP: 112/60   Site: Left Upper Arm   Position: Sitting   Cuff Size: Medium Adult   Pulse: 55   Temp: 96.8 °F (36 °C)   TempSrc: Infrared   SpO2: 100%   Weight: 129 lb 3.2 oz (58.6 kg)     Body mass index is 25.23 kg/m². Wt Readings from Last 3 Encounters:   12/08/21 129 lb 3.2 oz (58.6 kg)   12/03/21 125 lb 10.6 oz (57 kg)   11/25/21 134 lb 9.6 oz (61.1 kg)     BP Readings from Last 3 Encounters:   12/08/21 112/60   12/03/21 (!) 148/70   11/26/21 (!) 137/56        Physical Exam:  General Appearance: alert and oriented to person, place and time, well developed and well- nourished, in no acute distress  Skin: warm and dry, no rash or erythema  Head: normocephalic and atraumatic  Eyes: pupils equal, round, and reactive to light, extraocular eye movements intact, conjunctivae normal  ENT: tympanic membrane, external ear and ear canal normal bilaterally, nose without deformity, nasal mucosa and turbinates normal without polyps  Neck: supple and non-tender without mass, no thyromegaly or thyroid nodules, no cervical lymphadenopathy  Pulmonary/Chest: clear to auscultation bilaterally- no wheezes, rales or rhonchi, normal air movement, no respiratory distress  Cardiovascular: normal rate, regular rhythm, normal S1 and S2, no murmurs, rubs, clicks, or gallops, distal pulses intact, no carotid bruits  Abdomen: soft, non-tender, non-distended, normal bowel sounds, no masses or organomegaly  Extremities: no cyanosis, clubbing or edema  Musculoskeletal: normal range of motion, no joint swelling, deformity or tenderness    Assessment/Plan:  1.  Acute respiratory failure, unspecified whether with hypoxia or hypercapnia ([address]  Resolved    2. Acute CVA (cerebrovascular accident) ([address]  Improved    3. Takotsubo cardiomyopathy  Stable    4. Sepsis due to pneumonia ([address]  Resolved    5. Chronic obstructive pulmonary disease, unspecified COPD type ([address]  Controlled    6. Hospital discharge follow-up      7. Weakness  Needs improvement        Medical Decision Making: high complexity    A total of 45 minutes was spent on this visit to include face-to-face time, reviewing inpatient records, and writing note.

## 2021-12-10 ENCOUNTER — CARE COORDINATION (OUTPATIENT)
Dept: CASE MANAGEMENT | Age: 86
End: 2021-12-10

## 2021-12-10 ENCOUNTER — TELEPHONE (OUTPATIENT)
Dept: FAMILY MEDICINE CLINIC | Age: 86
End: 2021-12-10

## 2021-12-10 DIAGNOSIS — I63.9 ACUTE CVA (CEREBROVASCULAR ACCIDENT) (HCC): Primary | ICD-10-CM

## 2021-12-10 NOTE — CARE COORDINATION
[facility] Transitions Follow Up Call    12/10/2021    Patient: Albert Hanna  Patient : 1935   MRN: [id number]  Reason for Admission: CVA, Sepsis, PNA  Discharge Date: 12/3/21 RARS: Readmission Risk Score: 20.4 ( )         Spoke with:  Pt, Richelle Ceja, she reports she is doing fair. Denies shortness of breath, no chest pain, no N&V, appetite fair. Pt feels as if she is getting weaker as she hasn't received any therapies (PT/OT) since home from the hospital. Noted order for [facility] PT/OT is now in the system from Dr Toya Conley & it was faxed over to [facility] by MD office staff today. CTN placed call to [facility], spoke with Hunter Naylor in Intake who states [address] order received, Leatha Mccoy from [facility] received & pt is scheduled for PT & OT to start on Mon 2021. Discussed this with Richelle Ceja & she is in agreement. She initially wanted [facility] for [address] although now since [facility] has insurance auth & is scheduled for Mon, she is ok with [facility]. CTN left vm message with [facility]. Richelle Ceja  reports she is getting around her home with RW, ambulating w/o deficit, although is weak. She lives alone. Brooklineann Ceja had follow up appt with Dr Toya Conley on 2021 appt went ok . Her main issue to report is weakness. No change in meds. CTN to continue to follow      Care Transitions Subsequent and Final Call    Schedule Follow Up Appointment with PCP: Completed  Subsequent and Final Calls  Do you have any ongoing symptoms?: No  Have your medications changed?: No  Do you have any questions related to your medications?: No  Do you currently have any active services?: Yes  Are you currently active with any services?: Home Health  Do you have any needs or concerns that I can assist you with?: Yes  Patient-reported Needs or Concerns:  To get [facility] PT/OT out to my home to start therapy as Im getting more weak  Identified Barriers: None  Care Transitions Interventions   Home Care Waiver: Completed        DME Assistance: Completed   Disease Association: Completed      Other Interventions:            Follow Up  Future Appointments   Date Time Provider Wero Rowe   4/22/2022 10:20 AM Gi Cosby  [address], RN [phone]

## 2021-12-10 NOTE — TELEPHONE ENCOUNTER
Patient called stating you had told her you could order occupational therapy and physical therapy and she has not heard from them.

## 2021-12-13 ENCOUNTER — TELEPHONE (OUTPATIENT)
Dept: FAMILY MEDICINE CLINIC | Age: 86
End: 2021-12-13

## 2021-12-13 NOTE — TELEPHONE ENCOUNTER
Lexis Collins from [facility] called requesting verbal okay to continue home care for patient.      Lexis Collins can be reached at [phone]

## 2021-12-21 ENCOUNTER — CARE COORDINATION (OUTPATIENT)
Dept: CASE MANAGEMENT | Age: 86
End: 2021-12-21

## 2021-12-21 NOTE — CARE COORDINATION
[facility] Transitions Follow Up Call    2021    Patient: Julia Contreras  Patient : 1935   MRN: [id number]  Reason for Admission: CVA  Discharge Date: 12/3/21 RARS: Readmission Risk Score: 20.4 ( )         Spoke with: Alexus Huntrodrigo. She states she is doing pretty good, although still very tired. Appetite is good, denies any  respiratory issues, ablle to walk with walker. Has no issues to report. Alexus Narayanan states [facility] has been making regular visits, will be out to her house tomorrow. Alexus Narayanan has no issues to report. Care Transitions Subsequent and Final Call    Subsequent and Final Calls  Are you currently active with any services?: Home Health  Care Transitions Interventions   Home Care Waiver: Completed        DME Assistance: Completed   Disease Association: Completed      Other Interventions:            Follow Up  Future Appointments   Date Time Provider Wero Rowe   2022 10:20 AM Bryant Parmar MD [facility] [address] [facility], RN [phone]

## 2021-12-23 ENCOUNTER — TELEPHONE (OUTPATIENT)
Dept: CARDIOLOGY CLINIC | Age: 86
End: 2021-12-23

## 2021-12-23 NOTE — TELEPHONE ENCOUNTER
Patient's [facility] called to report that patient's heart rate is running low in the mornings after taking her metoprolol, it has been running under 50. Please call patient back at # [phone].

## 2021-12-23 NOTE — TELEPHONE ENCOUNTER
Patient started 12.5mg Metoprolol. Since starting she states slight dizzy, foggy, and HR in 40's. This AM B/P 110/71, HR 44.    Spoke with Zia Lopez NP, stop metoprolol and continue to monitor. Patient advised and voices understanding.

## 2021-12-27 RX ORDER — LEVOTHYROXINE SODIUM 0.05 MG/1
50 TABLET ORAL DAILY
Qty: 30 TABLET | Refills: 0 | Status: SHIPPED | OUTPATIENT
Start: 2021-12-27 | End: 2022-02-22

## 2021-12-27 RX ORDER — MONTELUKAST SODIUM 10 MG/1
10 TABLET ORAL NIGHTLY
Qty: 30 TABLET | Refills: 0 | Status: SHIPPED | OUTPATIENT
Start: 2021-12-27 | End: 2022-01-21

## 2021-12-27 RX ORDER — ATORVASTATIN CALCIUM 40 MG/1
40 TABLET, FILM COATED ORAL NIGHTLY
Qty: 30 TABLET | Refills: 0 | Status: SHIPPED | OUTPATIENT
Start: 2021-12-27 | End: 2022-01-21

## 2021-12-27 NOTE — TELEPHONE ENCOUNTER
I would continue all 3 and also make an appointment with Dr Kayy Joseph to get everything up to date.

## 2021-12-27 NOTE — TELEPHONE ENCOUNTER
Attempted to contact patient to inform her MRI showing signs of previous ankle injury, some plantar fasciitis and spurring. All mild overall. F/u as needed to discuss. No answer, voicemail left stating we an be reached until 5pm at [phone] or anytime after 8:30am tomorrow at [phone].    Patient called state she has yellow and green chunky phlegm that she is coughing up. States she cannot drive due to hip surgery. Advised you do not call in antibiotics without an evaluation. Patient states she does not need abx but needs a steroid. Attempted to schedule patient for a virtual appt states she is 79 yo and does not know how to work a camera even though her phone does have one. States there is no one at home that would be able to help her set it up. Patient denies scheduling would like me to ask if you can just call medication in first. States she has never had to do a virtual appt before to get medication.

## 2021-12-27 NOTE — TELEPHONE ENCOUNTER
Patient stated these 3 medications were started in the hospital. She would like to know if she should continue them. She stated she does not have refills of them. She uses [facility] [address].

## 2021-12-30 RX ORDER — LISINOPRIL 2.5 MG/1
2.5 TABLET ORAL DAILY
Qty: 30 TABLET | Refills: 5 | Status: SHIPPED | OUTPATIENT
Start: 2021-12-30 | End: 2022-02-22 | Stop reason: SDUPTHER

## 2021-12-30 NOTE — DISCHARGE SUMMARY
Patient Name: Nelda Sheriff  Patient :  1935  Patient MRN:   [id number]      Admission Date:  2021  Discharge Date: 12/3/2021    Admitting diagnosis: Acute CVA ( [place] [address])     Comorbid diagnoses impacting rehabilitation: Acute respiratory failure with hypoxia, acute exacerbation of COPD, bacterial pneumonia with sepsis, dysarthria, Takatsubo cardiomyopathy, depression, hypothyroidism    Discharging diagnosis: Acute CVA ( [place] [address])     Comorbid diagnoses impacting rehabilitation: Acute respiratory failure with hypoxia, acute exacerbation of COPD, bacterial pneumonia with sepsis, dysarthria, Takatsubo cardiomyopathy, depression, hypothyroidism    History of present illness: Patient is an 59-year-old right-hand-dominant female who left a restaurant with her daughter and separate cars on 2021. Her daughter drove away and did not see her mom do the same. Later the mom was found in the car and agonal respirations. She was brought to our ED and intubated. She had acute respiratory failure with hypoxia. She developed bacterial pneumonia and was treated for a COPD exacerbation and sepsis. Her evaluation also identified multiple cerebral infarctions. She had ataxia and impaired cognition but no localizing weakness. Prior (baseline) level of function: Independent.     Current level of function:         Current  IRF-JHOAN and Goals:   Occupational Therapy:    Short term goals  Time Frame for Short term goals: STG=LTG :   Long term goals  Time Frame for Long term goals : 7 Days  Long term goal 1: Pt will complete independent UB dressing  Long term goal 2: Pt will complete independent LB dressing  Long term goal 3: Pt will complete independent bathing  Long term goal 4: Pt will complete all aspects of toileting independently  Long term goal 5: Pt will complete independently doff/don her footwear  Long term goal 7: Pt will complete independent transfers (toilet, wheelchair, bed, shower) : Eating: Eating  Assistance Needed: Independent  Comment: X  CARE Score: 6  Discharge Goal: Independent       Oral Hygiene: Oral Hygiene  Assistance Needed: Independent  Comment: X  CARE Score: 6  Discharge Goal: Independent    UB/LB Bathing: Shower/Bathe Self  Assistance Needed: Independent  Comment: MOd I seated  CARE Score: 6  Discharge Goal: Independent    UB Dressing: Upper Body Dressing  Assistance Needed: Independent  Comment: X  CARE Score: 6  Discharge Goal: Independent         LB Dressing: Lower Body Dressing  Assistance Needed: Independent  Comment: X  CARE Score: 6  Discharge Goal: Independent    Donning and [place] Footwear: Putting On/Taking Off Footwear  Assistance Needed: Independent  Comment: X  CARE Score: 6  Discharge Goal: Independent      Toileting: Toileting Hygiene  Assistance Needed: Independent  Comment: mod I  Reason if not Attempted: Patient refused  CARE Score: 6      Toilet Transfers: Toilet Transfer  Assistance Needed: Independent  Comment:  Mod I  Reason if not Attempted: Patient refused  CARE Score: 6  Discharge Goal: Independent    Physical Therapy:         Long term goals  Time Frame for Long term goals : 7-10 days STG=LTG  Long term goal 1: Pt will demonstrate all bed mobiltiy with mod I  Long term goal 2: Pt will demonstrate car, pivot and sit to stand transfers with mod I  Long term goal 3: Pt will perform gait on level surface at least 150' and on unlevel surface 20' with 2ww amd mod I  Long term goal 4: Pt will ascend curb step with 2ww and 12 stairs with b rails with mod I  Long term goal 5: Pt will retrieve small item from floor with 2ww and reacher with mod I      Bed Mobility:   Sit to Lying  Assistance Needed: Independent  Comment: Independent with bed flat and no rails  CARE Score: 6  Discharge Goal: Independent  Roll Left and Right  Assistance Needed: Independent  Comment: Independent with bed flat and no rails  CARE Score: 6  Discharge Goal: Independent  Lying to Sitting on Side of Bed  Assistance Needed: Independent  Comment: Independent with bed flat and no rails  CARE Score: 6  Discharge Goal: Independent    Transfers:    Sit to Stand  Assistance Needed: Independent  Comment: mod I with RW  CARE Score: 6  Discharge Goal: Independent  Chair/Bed-to-Chair Transfer  Assistance Needed: Independent  Comment: mod I with RW  CARE Score: 6  Discharge Goal: Independent  Toilet Transfer  Assistance Needed: Supervision or touching assistance  Comment: supervision with rails  CARE Score: 4  Discharge Goal: Independent  Car Transfer  Assistance Needed: Independent  Comment: mod I with RW  CARE Score: 6  Discharge Goal: Independent    Ambulation:    Walking Ability  Does the Patient Walk?: Yes     Walk 10 Feet  Assistance Needed: Independent  Comment: mod I with RW  CARE Score: 6  Discharge Goal: Independent     Walk 50 Feet with Two Turns  Assistance Needed: Independent  Comment: mod I with RW  CARE Score: 6  Discharge Goal: Independent     Walk 150 Feet  Comment: 110' max effort (limited by fatigue)  Reason if not Attempted: Not attempted due to medical condition or safety concerns  CARE Score: 88  Discharge Goal: Independent     Walking 10 Feet on Uneven Surfaces  Assistance Needed: Independent  Comment: mod I with RW  CARE Score: 6  Discharge Goal: Independent     1 Step (Curb)  Assistance Needed: Independent  Comment: mod I with RW  CARE Score: 6  Discharge Goal: Independent     4 Steps  Assistance Needed: Supervision or touching assistance  Comment: Supervision with 2 rails d/t fatigue in BLEs  CARE Score: 4  Discharge Goal: Independent     12 Steps  Assistance Needed: Supervision or touching assistance  Comment: Supervision with 2 rails d/t fatigue in BLEs  CARE Score: 4  Discharge Goal: Independent       Wheelchair:  w/c Ability: Wheelchair Ability  Uses a Wheelchair and/or Scooter?: No                Balance:    Balance  Posture: Good  Sitting - Static: Good  Sitting - Dynamic: Good  Standing - Static: Fair,-  Standing - Dynamic: Fair,-   Object: Picking Up Object  Assistance Needed: Independent  Comment: mod I with reacher and RW  CARE Score: 6  Discharge Goal: Independent    I      Exam:    Blood pressure (!) 148/70, pulse 55, temperature 98.3 °F (36.8 °C), temperature source Oral, resp. rate 20, height 5' (1.524 m), weight 125 lb 10.6 oz (57 kg), SpO2 95 %. General: Sitting up in a wheelchair. Moving it some with her legs. Talkative. Poor reasoning and insight. HEENT: Clear voice. Neck supple. No JVD.  MMM. Pulmonary: Unlabored with diminished breath sounds in the bases. No wheezes or rales. No coughing or accessory muscle use. Cardiac: Regular rate and rhythm. Distant heart sounds. Abdomen: Patient's abdomen is soft and nondistended. Bowel sounds were present throughout. There was no rebound, guarding or masses noted. Upper extremities: Slow and deliberate with arm movements. 4/5 strength throughout. Lower extremities: 4 -/5 strength throughout. Minimal edema. Calf soft. No signs of DVT. Sitting balance was good.   Standing balance was fair-.    Lab Results   Component Value Date    WBC 6.6 12/02/2021    HGB 11.7 (L) 12/02/2021    HCT 35.2 (L) 12/02/2021    MCV 99.4 12/02/2021     12/02/2021     Lab Results   Component Value Date    INR 0.82 11/14/2021    INR 0.90 10/13/2020    INR 0.95 10/17/2019    PROTIME 10.5 (L) 11/14/2021    PROTIME 10.9 (L) 10/13/2020    PROTIME 10.8 (L) 10/17/2019     Lab Results   Component Value Date    CREATININE 0.5 (L) 12/02/2021    BUN 8 12/02/2021     12/02/2021    K 3.7 12/02/2021     12/02/2021    CO2 29 12/02/2021     Lab Results   Component Value Date    ALT 86 (H) 11/16/2021    AST 87 (H) 11/16/2021    ALKPHOS 62 11/16/2021    BILITOT 0.3 11/16/2021           The patient presented to the ARU with the above history requiring a multidisciplinary treatment plan including close medical supervision by the [facility] Director. The patient participated in the prescribed therapy treatment plan with reasonable compliance and progressive tolerance. They avoided significant medical complications. By the time of discharge the patient had become safer with adaptive equipment to transfer and toilet with a FWW with the supervision of family/caregivers. The patient was tolerating an oral diet without choking/coughing and was back to their baseline with regards to bowel and bladder control. Discharge instructions were reviewed with the patient and her caregiver The patient is to follow up with the PCP in 1 week. No driving. [facility] PT/OT/RN will be arranged. Medication List      START taking these medications    metoprolol succinate 25 MG extended release tablet  Commonly known as: TOPROL XL  Take 0.5 tablets by mouth daily        CHANGE how you take these medications    albuterol sulfate  (90 Base) MCG/ACT inhaler  Inhale 2 puffs into the lungs every 6 hours as needed for Wheezing  What changed: Another medication with the same name was removed. Continue taking this medication, and follow the directions you see here. pantoprazole 40 MG tablet  Commonly known as: PROTONIX  TAKE 1 TABLET NIGHTLY  What changed:   · when to take this  · additional instructions        CONTINUE taking these medications    aspirin 81 MG EC tablet  Commonly known as: EQ Aspirin Adult Low Dose  Take 1 tablet by mouth daily     Budeson-Glycopyrrol-Formoterol 160-9-4.8 MCG/ACT Aero  Inhale 2 puffs into the lungs 2 times daily     Calcium-Magnesium-Zinc 167-83-8 MG Tabs     escitalopram 20 MG tablet  Commonly known as: Lexapro  Take 1 tablet by mouth daily     Handicap Placard Misc  by Does not apply route Please provide handicap placard for 5 years.     Diagnosis: Respiratory condition     ipratropium-albuterol 0.5-2.5 (3) MG/3ML Soln nebulizer solution  Commonly known as: DUONEB  Inhale 3 mLs into the lungs every 4 hours (while awake)     ondansetron 4 MG tablet  Commonly known as: ZOFRAN  Take 1 tablet by mouth every 8 hours as needed for Nausea or Vomiting     senna-docusate 8.6-50 MG per tablet  Commonly known as: PERICOLACE     sucralfate 1 GM tablet  Commonly known as: Carafate  Take 1 tablet by mouth 4 times daily     Timolol 0.5 % Soln ophthalmic solution  Commonly known as: TIMOPTIC     tiotropium-olodaterol 2.5-2.5 MCG/ACT Aers  Commonly known as: STIOLTO  Inhale 2 puffs into the lungs daily     vitamin B-12 1000 MCG tablet  Commonly known as: CYANOCOBALAMIN     Vitamin D3 50 MCG (2000 UT) Caps        STOP taking these medications    amLODIPine 5 MG tablet  Commonly known as: NORVASC     levothyroxine 75 MCG tablet  Commonly known as: SYNTHROID     OXYGEN     rOPINIRole 0.25 MG tablet  Commonly known as: REQUIP     silver sulfADIAZINE 1 % cream  Commonly known as: SILVADENE     UNABLE TO FIND     UNABLE TO FIND           Where to Get Your Medications      You can get these medications from any pharmacy    Bring a paper prescription for each of these medications  · metoprolol succinate 25 MG extended release tablet  You don't need a prescription for these medications  · aspirin 81 MG EC tablet         CONDITION ON DISCHARGE: Stable. The prognosis is fair for further improvements in ADL's and safety with adapted gait/transfers. Record review, patient exam, discharge instructions, medication reconciliation and summary for this discharge visit took more than 30 minutes.

## 2022-01-04 ENCOUNTER — APPOINTMENT (OUTPATIENT)
Dept: CT IMAGING | Age: 87
End: 2022-01-04
Payer: MEDICARE

## 2022-01-04 ENCOUNTER — APPOINTMENT (OUTPATIENT)
Dept: GENERAL RADIOLOGY | Age: 87
End: 2022-01-04
Payer: MEDICARE

## 2022-01-04 ENCOUNTER — HOSPITAL ENCOUNTER (EMERGENCY)
Age: 87
Discharge: HOME | End: 2022-01-05
Payer: MEDICARE
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
## 2022-01-04 DIAGNOSIS — S80.00XA CONTUSION OF KNEE, UNSPECIFIED LATERALITY, INITIAL ENCOUNTER: ICD-10-CM

## 2022-01-04 PROCEDURE — 72125 CT NECK SPINE W/O DYE: CPT

## 2022-01-04 PROCEDURE — 99284 EMERGENCY DEPT VISIT MOD MDM: CPT

## 2022-01-04 PROCEDURE — 73564 X-RAY EXAM KNEE 4 OR MORE: CPT

## 2022-01-04 PROCEDURE — 70450 CT HEAD/BRAIN W/O DYE: CPT

## 2022-01-04 PROCEDURE — 72192 CT PELVIS W/O DYE: CPT

## 2022-01-04 ASSESSMENT — PAIN DESCRIPTION - LOCATION: LOCATION: HIP;KNEE

## 2022-01-04 ASSESSMENT — PAIN DESCRIPTION - ORIENTATION: ORIENTATION: LEFT

## 2022-01-04 ASSESSMENT — PAIN SCALES - GENERAL: PAINLEVEL_OUTOF10: 8

## 2022-01-04 ASSESSMENT — PAIN DESCRIPTION - PAIN TYPE: TYPE: ACUTE PAIN

## 2022-01-05 VITALS
SYSTOLIC BLOOD PRESSURE: 136 MMHG | OXYGEN SATURATION: 96 % | HEART RATE: 62 BPM | DIASTOLIC BLOOD PRESSURE: 62 MMHG | RESPIRATION RATE: 16 BRPM | TEMPERATURE: 98.7 F

## 2022-01-05 PROCEDURE — 6370000000 HC RX 637 (ALT 250 FOR IP): Performed by: PHYSICIAN ASSISTANT

## 2022-01-05 RX ORDER — TRAMADOL HYDROCHLORIDE 50 MG/1
50 TABLET ORAL EVERY 6 HOURS PRN
Qty: 15 TABLET | Refills: 0 | Status: SHIPPED | OUTPATIENT
Start: 2022-01-05 | End: 2022-01-08

## 2022-01-05 RX ORDER — TRAMADOL HYDROCHLORIDE 50 MG/1
50 TABLET ORAL ONCE
Status: COMPLETED | OUTPATIENT
Start: 2022-01-05 | End: 2022-01-05

## 2022-01-05 RX ORDER — IBUPROFEN 600 MG/1
600 TABLET ORAL ONCE
Status: DISCONTINUED | OUTPATIENT
Start: 2022-01-05 | End: 2022-01-05 | Stop reason: HOSPADM

## 2022-01-05 RX ORDER — TRAMADOL HYDROCHLORIDE 50 MG/1
50 TABLET ORAL ONCE
Status: DISCONTINUED | OUTPATIENT
Start: 2022-01-05 | End: 2022-01-05

## 2022-01-05 RX ADMIN — TRAMADOL HYDROCHLORIDE 50 MG: 50 TABLET, COATED ORAL at 01:18

## 2022-01-05 ASSESSMENT — PAIN SCALES - GENERAL
PAINLEVEL_OUTOF10: 8
PAINLEVEL_OUTOF10: 8

## 2022-01-05 NOTE — ED NOTES
Reviewed discharge information. Patient verbalized understanding of paperwork, medication, and care instructions. Patient denied any questions.      Adán Pate RN  01/05/22 [id number]

## 2022-01-05 NOTE — ED TRIAGE NOTES
Tripped and fell tonight c/o left hip and knee pain,patient states she hit her head ,no blood thinners,no LOC

## 2022-01-05 NOTE — ED PROVIDER NOTES
Triage Chief Complaint:   Fall (left hip and knee pain)    [place]:  Dell Rubalcava is a 80 y.o. female that presents today complaining of  L sided hip pain. Context is, pt tripped at home on a piece of a comfortor. No syncope. Since then has had left-sided leg pain and inability to ambulate. No headache. Patient is accompanied by son is at bedside      ROS:  At least 06 systems reviewed and otherwise negative except as in the [address] Past Medical History:   Diagnosis Date    Arthritis     generalized    Asthma     Blood transfusion 2004    No reaction    Chronic bronchitis (HCC)     COPD (chronic obstructive pulmonary disease) ([facility] [address]     summer 2014    Echocardiogram 04/09/2021    EF 55-60%, Mild dilated LA, Mild annular calcification present, Mild mitral stenosis, Mild MR & TR.    Fatigue     H/O cardiac catheterization 06/15/2017    mild lad and cx disease    H/O cardiovascular stress test 08/22/2019    H/O Doppler ultrasound 4/7/2016 3/19/12    carotid-4/16 WNL 3/12right mild less than 50%and left wnl    H/O Doppler ultrasound 6/14/017    carotid - mod disease EILEEN, mild disease LICA    H/O echocardiogram 07/23/2010    H/O echocardiogram 04/15/2016    EF 60% sclerotic AV mildly stenosed-recommend yearly echo    H/O echocardiogram 08/22/2019    EF 55-60%, Minimal concentric left ventricular hypertrophy, left atrium is mildly dilated, severe aortic stenosis, Mitral annular calcification is present, Mild AR, Mild-Mod MR, Mild TR, No pericardial effusion     H/O echocardiogram 09/23/2019    H/O exercise stress test 06/14/2017    abnormal    History of cardiac cath 08/28/2019    Severe AS,   TAVR??  History of nuclear stress test 03/14/2017    EF 75%. Normal study.     Holter monitor, abnormal 02/22/2011    infrequent APC are seen    Normal cardiac stress test 07/23/2010    EF 70%, no ischemia    On home O2     only uses as needed, nightly prn    Syncope and collapse     Tachycardia     HX of tachycardia - had a cardioablation    Thyroid disease      Past Surgical History:   Procedure Laterality Date    AORTIC VALVE REPAIR N/A 09/09/2019    Core Valve EVOLUT 26mm [id number]    AORTIC VALVE REPLACEMENT N/A 09/11/2019    TAVR; [employer] Evlout 32    BREAST SURGERY  2009    bilat    CARDIAC CATHETERIZATION  03/02/2011    mild to moderate disease of diagonal and proximal RCA    CARDIAC SURGERY  1989    ablation    CARPAL TUNNEL RELEASE  1990's    bilat    CHOLECYSTECTOMY  1961    open choley    COLONOSCOPY      DILATATION, ESOPHAGUS      ENDOSCOPY, COLON, DIAGNOSTIC  07/03/2017    s/p gastric bypass otherwise normal    FINGER SURGERY      right middle, thumb and index finger (screw and pin in right index finger)    GASTRIC RESTRICTION SURGERY  1984    stapled    HERNIA REPAIR  unknown    Inc hernia hernia repair    HIP FRACTURE SURGERY Left 11/04/2015    Left hip nail    HYSTERECTOMY  1966    Luke w/ BSO    JOINT REPLACEMENT  2004    right knee    LIPECTOMY  1's    OTHER SURGICAL HISTORY  05/01/2012    Gastrojejunostomy/partial gastrectomy    SHOULDER ARTHROPLASTY Left 10/2017    TOE SURGERY  Early 2000's    hammer toes and bunions     Family History   Problem Relation Age of Onset    Diabetes Mother     Other Mother         thyroid    Heart Disease Father     Arthritis Father     High Blood Pressure Father     High Cholesterol Father     Other Father         glaucoma    Other Sister         thyroid, glaucoma    Diabetes Brother     Other Sister     Vision Loss Sister         lung problems, smoker    Cancer Sister         throat cancer    Other Son         HX of clots    Early Death Son         Hit by car and killed.     High Blood Pressure Daughter     Stroke Son         No residual    Other Son         HX myocarditis     Social History     Socioeconomic History    Marital status:      Spouse name: Not on file    Number of children: Not on file    Years of education: Not on file    Highest education level: Not on file   Occupational History    Not on file   Tobacco Use    Smoking status: Former Smoker     Packs/day: 3.00     Years: 5.00     Pack years: 15.00     Quit date: 1962     Years since quittin.0    Smokeless tobacco: Never Used   Substance and Sexual Activity    Alcohol use: No    Drug use: No    Sexual activity: Not on file   Other Topics Concern    Not on file   Social History Narrative    Not on file     Social Determinants of Health     Financial Resource Strain:     Difficulty of Paying Living Expenses: Not on file   Food Insecurity:     Worried About Running Out of Food in the Last Year: Not on file    Vicki of Food in the Last Year: Not on file   Transportation Needs:     Lack of Transportation (Medical): Not on file    Lack of Transportation (Non-Medical): Not on file   Physical Activity:     Days of Exercise per Week: Not on file    Minutes of Exercise per Session: Not on file   Stress:     Feeling of Stress : Not on file   Social Connections:     Frequency of Communication with Friends and Family: Not on file    Frequency of Social Gatherings with Friends and Family: Not on file    Attends Anabaptist Services: Not on file    Active Member of [address] [place] [address] [employer] or Organizations: Not on file    Attends Club or Organization Meetings: Not on file    Marital Status: Not on file   Intimate Partner Violence:     Fear of Current or Ex-Partner: Not on file    Emotionally Abused: Not on file    Physically Abused: Not on file    Sexually Abused: Not on file   Housing Stability:     Unable to Pay for Housing in the Last Year: Not on file    Number of [place] in the Last Year: Not on file    Unstable Housing in the Last Year: Not on file     No current facility-administered medications for this encounter.      Current Outpatient Medications   Medication Sig Dispense Refill    lisinopril (PRINIVIL;ZESTRIL) 2.5 MG tablet Take 1 tablet by mouth daily 30 tablet 5    atorvastatin (LIPITOR) 40 MG tablet Take 1 tablet by mouth nightly 30 tablet 0    levothyroxine (SYNTHROID) 50 MCG tablet Take 1 tablet by mouth daily 30 tablet 0    montelukast (SINGULAIR) 10 MG tablet 1 tablet by Per NG tube route nightly 30 tablet 0    aspirin (EQ ASPIRIN ADULT LOW DOSE) 81 MG EC tablet Take 1 tablet by mouth daily 30 tablet 0    metoprolol succinate (TOPROL XL) 25 MG extended release tablet Take 0.5 tablets by mouth daily (Patient not taking: Reported on 12/23/2021) 15 tablet 0    escitalopram (LEXAPRO) 20 MG tablet Take 1 tablet by mouth daily 90 tablet 1    ipratropium-albuterol (DUONEB) 0.5-2.5 (3) MG/3ML SOLN nebulizer solution Inhale 3 mLs into the lungs every 4 hours (while awake) 360 mL 1    pantoprazole (PROTONIX) 40 MG tablet TAKE 1 TABLET NIGHTLY (Patient taking differently: daily ) 90 tablet 1    ondansetron (ZOFRAN) 4 MG tablet Take 1 tablet by mouth every 8 hours as needed for Nausea or Vomiting 30 tablet 5    sucralfate (CARAFATE) 1 GM tablet Take 1 tablet by mouth 4 times daily 120 tablet 3    Budeson-Glycopyrrol-Formoterol 160-9-4.8 MCG/ACT AERO Inhale 2 puffs into the lungs 2 times daily 1 Inhaler 5    Tiotropium Bromide-Olodaterol 2.5-2.5 MCG/ACT AERS Inhale 2 puffs into the lungs daily 1 Inhaler 5    Handicap Placard MISC by Does not apply route Please provide handicap placard for 5 years.     Diagnosis: Respiratory condition 1 each 0    albuterol sulfate  (90 Base) MCG/ACT inhaler Inhale 2 puffs into the lungs every 6 hours as needed for Wheezing 1 Inhaler 5    Timolol (TIMOPTIC) 0.5 % (DAILY) SOLN ophthalmic solution Place 1 drop into both eyes daily      vitamin B-12 (CYANOCOBALAMIN) 1000 MCG tablet Take 1,000 mcg by mouth daily OTC      senna-docusate (PERICOLACE) 8.6-50 MG per tablet Take 1 tablet by mouth daily       Calcium-Magnesium-Zinc 167-83-8 MG TABS Take 1 tablet by mouth daily OTC      Cholecalciferol (VITAMIN D3) 2000 UNITS CAPS Take 1 capsule by mouth daily OTC       Allergies   Allergen Reactions    Morphine Anaphylaxis     Dilaudid OK    Bactrim [Sulfamethoxazole-Trimethoprim]     Butorphanol      Other reaction(s): Other - comment required  \"out of body experience\"    Influenza Vaccines      Ended in hospital stay for 3 days told by md not to get it anymore     Lactose Intolerance (Gi) Nausea Only    Phenergan [Promethazine Hcl] Other (See Comments)     Makes me jerk all over. Zofran OK    Promethazine     Stadol [Butorphanol Tartrate] Other (See Comments)     Out of body experience. Was told it was a near death experience and was placed in ICU. Dilaudid OK    Tape Mariaa Hawk Tape] Other (See Comments)     Plastic cause itching and rash. Paper tape causes my skin to blister. (Tega-derm and Coban OK to use.)       Nursing Notes Reviewed    Physical Exam:  ED Triage Vitals   Enc Vitals Group      BP       Pulse       Resp       Temp       Temp src       SpO2       Weight       Height       Head Circumference       Peak Flow       Pain Score       Pain Loc       Pain Edu? Excl. in [address]? GENERAL APPEARANCE: Awake and alert. Cooperative. No acute distress. HEAD: Normocephalic. Atraumatic. No hemotympanum, Carlos sign, raccoon eyes, periorbital tenderness, nasal bone tenderness, mandibular tenderness, skull tenderness  CERVICAL SPINE: There is no cervical midline tenderness to palpation, step-offs, or acute deformities. No posterior midline pain on ROM. The cervical spine has been cleared clinically. EYES: EOM's grossly intact. Sclera anicteric. PERRLA. Conjunctiva non injected. No discharge  ENT: Mucous membranes are moist. No trismus. Posterior Pharynx non injected, no tongue swelling, airway patent, no tonsillar edema. No exudates noted. No abscess. No discharge. Middle ear spaces clear. Tympanic membrane non injected. Auditory canal clear.     Legs are equal in length without rotation. Patent stands without depression of hips. There is unilateral tenderness to palpation to the iliac crest. Full ROM of his bilateral with internal rotation, external rotation, Abduction, Adduction, flexion and extension. No obvious deformities. KNEE: No erythema. No warmth. No large effusion. Anterior drawer intact. Posterior drawer intact. Varus testing intact. Valgus testing intact. Knee extension intact. DNVI (DP pulse 2+, plantar and brea flexion intact, light touch intact). NECK: Supple. No meningismus. No palpable masses. No lymphadenopathy. CARDIOVASCULAR: RRR. No rubs, murmurs, gallops, clicks. Radial pulses 2+. Pedal Pulses 2+. Capillary refill <2 seconds. LUNGS: Respirations unlabored. CTAB. ABDOMEN: Soft. Non-tender. No guarding or rebound. No organomegaly. No palpable masses  MUSCULOSKELETAL: No acute deformities. SKIN: Warm and dry. No rash, No erythema, No edema. No ecchymoses. NEUROLOGICAL: GCS 15. Cranial nerves 2-12 grossly intact, PEERLA, EOMs intact, Short and long term memory intact, Pt shows good judgement, follows command well, carries on logical conversation. No ataxia with finger to nose.  strength full bilateral. Rapid alternating movements performed well, negative romberg, negative motor drift. LE DTRs full and symmetrical. Sharp and dull sensation in distal extremities present. I have reviewed and interpreted all of the currently available lab results from this visit (if applicable):  No results found for this visit on 01/04/22. Radiographs (if obtained):  [] The following radiograph was interpreted by myself in the absence of a radiologist:   [] Radiologist's Report Reviewed:  XR KNEE LEFT (MIN 4 VIEWS)   Final Result   No radiographic evidence of acute fracture. Moderately large suprapatellar effusion. CT PELVIS WO CONTRAST Additional Contrast? None   Final Result   No acute fracture visualized.       RECOMMENDATIONS:   Unavailable accidental trip by another person    2. Contusion of knee, unspecified laterality, initial encounter        Disposition referral (if applicable):  Malik Talbot MD  [address]  [place] [facility] [phone]    In 2 days      Desmond Del Realmarcella, [address] [place] [phone]    In 1 week  If symptoms worsen or persist    Disposition medications (if applicable):  New Prescriptions    No medications on file       Comment: Please note this report has been produced using speech recognition software and may contain errors related to that system including errors in grammar, punctuation, and spelling, as well as words and phrases that may be inappropriate. If there are any questions or concerns please feel free to contact the dictating provider for clarification.     PREMA [place]  01/05/22 2029

## 2022-01-05 NOTE — ED NOTES
This RN ambulated patient w/ front wheeled walker. Patient tolerated fairly well; reports increased discomfort when positioning from sitting to standing. Patient reports having and using walker at home.      Myrna Hook RN  01/05/22 335 Fadi Orozco RN  01/05/22 1491

## 2022-01-11 ENCOUNTER — TELEPHONE (OUTPATIENT)
Dept: FAMILY MEDICINE CLINIC | Age: 87
End: 2022-01-11

## 2022-01-11 NOTE — TELEPHONE ENCOUNTER
Patient called stating she fell and went to ED. Stated her whole leg is bruised and her knee hurts when she is walking. Stated her daughter is a nurse and recommended that she get an x-ray of her leg since it is all bruised.

## 2022-01-11 NOTE — TELEPHONE ENCOUNTER
They x-rayed her knee at the ER. If she still having significant symptoms, to have to see her in the office to determine if an x-ray is needed.

## 2022-01-12 ENCOUNTER — OFFICE VISIT (OUTPATIENT)
Dept: FAMILY MEDICINE CLINIC | Age: 87
End: 2022-01-12
Payer: MEDICARE

## 2022-01-12 VITALS
BODY MASS INDEX: 23.44 KG/M2 | TEMPERATURE: 97.2 F | SYSTOLIC BLOOD PRESSURE: 168 MMHG | WEIGHT: 120 LBS | DIASTOLIC BLOOD PRESSURE: 72 MMHG | HEART RATE: 51 BPM | OXYGEN SATURATION: 96 %
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
## 2022-01-12 DIAGNOSIS — F32.0 CURRENT MILD EPISODE OF MAJOR DEPRESSIVE DISORDER WITHOUT PRIOR EPISODE (HCC): ICD-10-CM

## 2022-01-12 DIAGNOSIS — M25.562 ACUTE PAIN OF LEFT KNEE: Primary | ICD-10-CM

## 2022-01-12 DIAGNOSIS — J44.9 CHRONIC OBSTRUCTIVE PULMONARY DISEASE, UNSPECIFIED COPD TYPE (HCC): ICD-10-CM

## 2022-01-12 DIAGNOSIS — S70.12XS CONTUSION OF LEFT THIGH, SEQUELA: ICD-10-CM

## 2022-01-12 DIAGNOSIS — G40.89 OTHER SEIZURES (HCC): ICD-10-CM

## 2022-01-12 DIAGNOSIS — I70.0 ATHEROSCLEROSIS OF AORTA (HCC): ICD-10-CM

## 2022-01-12 PROCEDURE — 99214 OFFICE O/P EST MOD 30 MIN: CPT | Performed by: FAMILY MEDICINE

## 2022-01-12 RX ORDER — TRAMADOL HYDROCHLORIDE 50 MG/1
50 TABLET ORAL EVERY 4 HOURS PRN
Qty: 56 TABLET | Refills: 0 | Status: SHIPPED | OUTPATIENT
Start: 2022-01-12 | End: 2022-02-22 | Stop reason: SDUPTHER

## 2022-01-12 ASSESSMENT — PATIENT HEALTH QUESTIONNAIRE - PHQ9
SUM OF ALL RESPONSES TO PHQ QUESTIONS 1-9: 0
1. LITTLE INTEREST OR PLEASURE IN DOING THINGS: 0
SUM OF ALL RESPONSES TO PHQ QUESTIONS 1-9: 0
SUM OF ALL RESPONSES TO PHQ9 QUESTIONS 1 & 2: 0
SUM OF ALL RESPONSES TO PHQ QUESTIONS 1-9: 0
2. FEELING DOWN, DEPRESSED OR HOPELESS: 0
SUM OF ALL RESPONSES TO PHQ QUESTIONS 1-9: 0

## 2022-01-12 NOTE — PATIENT INSTRUCTIONS
Patient Education        Preventing Falls: Care Instructions  Your Care Instructions     Getting around your home safely can be a challenge if you have injuries or health problems that make it easy for you to fall. Loose rugs and furniture in walkways are among the dangers for many older people who have problems walking or who have poor eyesight. People who have conditions such as arthritis, osteoporosis, or dementia also have to be careful not to fall. You can make your home safer with a few simple measures. Follow-up care is a key part of your treatment and safety. Be sure to make and go to all appointments, and call your doctor if you are having problems. It's also a good idea to know your test results and keep a list of the medicines you take. How can you care for yourself at home? Taking care of yourself  · You may get dizzy if you do not drink enough water. To prevent dehydration, drink plenty of fluids. Choose water and other clear liquids. If you have kidney, heart, or liver disease and have to limit fluids, talk with your doctor before you increase the amount of fluids you drink. · Exercise regularly to improve your strength, muscle tone, and balance. Walk if you can. Swimming may be a good choice if you cannot walk easily. · Have your vision and hearing checked each year or any time you notice a change. If you have trouble seeing and hearing, you might not be able to avoid objects and could lose your balance. · Know the side effects of the medicines you take. Ask your doctor or pharmacist whether the medicines you take can affect your balance. Sleeping pills or sedatives can affect your balance. · Limit the amount of alcohol you drink. Alcohol can impair your balance and other senses. · Ask your doctor whether calluses or corns on your feet need to be removed. If you wear loose-fitting shoes because of calluses or corns, you can lose your balance and fall.   · Talk to your doctor if you have numbness in your feet. Preventing falls at home  · Remove raised doorway thresholds, throw rugs, and clutter. Repair loose carpet or raised areas in the floor. · Move furniture and electrical cords to keep them out of walking paths. · Use nonskid floor wax, and wipe up spills right away, especially on ceramic tile floors. · If you use a walker or cane, put rubber tips on it. If you use crutches, clean the bottoms of them regularly with an abrasive pad, such as steel wool. · Keep your house well lit, especially Jerline Burton, and outside walkways. Use night-lights in areas such as hallways and bathrooms. Add extra light switches or use remote switches (such as switches that go on or off when you clap your hands) to make it easier to turn lights on if you have to get up during the night. · Install sturdy handrails on stairways. · Move items in your cabinets so that the things you use a lot are on the lower shelves (about waist level). · Keep a cordless phone and a flashlight with new batteries by your bed. If possible, put a phone in each of the main rooms of your house, or carry a cell phone in case you fall and cannot reach a phone. Or, you can wear a device around your neck or wrist. You push a button that sends a signal for help. · Wear low-heeled shoes that fit well and give your feet good support. Use footwear with nonskid soles. Check the heels and soles of your shoes for wear. Repair or replace worn heels or soles. · Do not wear socks without shoes on wood floors. · Walk on the grass when the sidewalks are slippery. If you live in an area that gets snow and ice in the winter, sprinkle salt on slippery steps and sidewalks. Preventing falls in the bath  · Install grab bars and nonskid mats inside and outside your shower or tub and near the toilet and sinks. · Use shower chairs and bath benches. · Use a hand-held shower head that will allow you to sit while showering.   · Get into a tub or shower by putting the weaker leg in first. Get out of a tub or shower with your strong side first.  · Repair loose toilet seats and consider installing a raised toilet seat to make getting on and off the toilet easier. · Keep your bathroom door unlocked while you are in the shower. Where can you learn more? Go to [email or web address] and sign in to your Draft account. Enter [phone] in the [facility] box to learn more about \"Preventing Falls: Care Instructions. \"     If you do not have an account, please click on the \"Sign Up Now\" link. Current as of: September 8, 2021               Content Version: 13.1  © 0421-4168 Healthwise, [employer]orporated. Care instructions adapted under license by [facility] ([facility]). If you have questions about a medical condition or this instruction, always ask your healthcare professional. [place] [address] any warranty or liability for your use of this information.

## 2022-01-12 NOTE — PROGRESS NOTES
SUBJECTIVE:  Bucky Shoemaker is a 80 y.o. female who sustained a left knee injury 8 day(s) ago. Mechanism of injury: She was changing the sheets on the bed and got tangled up in them and tripped and fell landing on knee. Immediate symptoms: immediate pain, inability to bear weight directly after injury, no deformity was noted by the patient. Symptoms have been constant since that time. Prior history of related problems: no prior problems with this area in the past.  Patient was seen in the emergency room at [facility] on 1/4/2022. She had a CT of her head, C-spine, and pelvis which were normal.  She had plain x-ray of her left knee which was also normal.  She was giving analgesics and sent home. Because of the continued pain and worsening bruising, her family thought she should have an x-ray. Denies any swelling. Debility walking. Patient took a tramadol and Tylenol last night and her pain was much better. When she tried to walk this morning she had significant pain. Patient with known COPD. She does not have any increased shortness of breath or dyspnea on exertion except when she goes out in the cold air. No recent exacerbations. New    Denies any suicidal homicidal ideations with her depression. Tolerating meds well. Denies any recent seizures. OBJECTIVE:  Vital signs as noted above. Appearance: alert, well appearing, and in no distress, oriented to person, place, and time and normal appearing weight. Knee exam: antalgic gait with walker, soft tissue tenderness over posterior knee. Negative pain with varus or valgus stress. Negative Shiela's and negative Lachman's. She does have significant contusions mid anterior thigh and popliteal fossa. No swelling. ASSESSMENT:   Diagnosis Orders   1. Acute pain of left knee   Needs improvement traMADol (ULTRAM) 50 MG tablet   2. Contusion of left thigh, sequela   Needs improvement traMADol (ULTRAM) 50 MG tablet   3. Fall, sequela     4. Current mild episode of major depressive disorder without prior episode (HCC)   Stable, no changes    5. Atherosclerosis of aorta (HCC)   Asymptomatic, no changes    6. Chronic obstructive pulmonary disease, unspecified COPD type ([facility] [address]   Well-controlled, no changes    7. [facility]     Asymptomatic, no changes      PLAN: Recommend patient take tramadol 1 to 2 tablets every 6 hours along with Tylenol for pain control. Patient should ensure that she increases her water intake to at least 60 ounces daily. Patient should use a walker at all times to prevent falls. rest the injured area as much as practical, prescription for analgesic given  See orders for this visit as documented in the electronic medical record.

## 2022-01-13 ENCOUNTER — TELEPHONE (OUTPATIENT)
Dept: FAMILY MEDICINE CLINIC | Age: 87
End: 2022-01-13

## 2022-01-13 NOTE — TELEPHONE ENCOUNTER
[facility] physical therapist called asking a verbal order to be made for physical therapy and social work for this pt.

## 2022-01-14 ENCOUNTER — TELEPHONE (OUTPATIENT)
Dept: FAMILY MEDICINE CLINIC | Age: 87
End: 2022-01-14

## 2022-01-14 NOTE — TELEPHONE ENCOUNTER
Ayana from [facility] called to notify office of staff testing positive. Ayana said Lucas Dominguez has been notified and educated on signs to look for and who to call if needed.

## 2022-01-20 ENCOUNTER — TELEPHONE (OUTPATIENT)
Dept: GASTROENTEROLOGY | Age: 87
End: 2022-01-20

## 2022-01-20 NOTE — TELEPHONE ENCOUNTER
Patient LVM stating that she felt nausea and dry heaving prior to breakfast this morning. I returned the call to patient; she states that its been going on for 2 wks. Most of the time it happens after she eats. Wants to know if she needs an EGD? Or, maybe an atb? I told her that you were not in the office and that she should call Dr. Amrit Hollingsworth and she stated that she did and he didn't say anything.

## 2022-01-21 RX ORDER — ATORVASTATIN CALCIUM 40 MG/1
40 TABLET, FILM COATED ORAL NIGHTLY
Qty: 30 TABLET | Refills: 0 | Status: SHIPPED | OUTPATIENT
Start: 2022-01-21 | End: 2022-02-22 | Stop reason: SDUPTHER

## 2022-01-21 RX ORDER — MONTELUKAST SODIUM 10 MG/1
TABLET ORAL
Qty: 30 TABLET | Refills: 0 | Status: SHIPPED | OUTPATIENT
Start: 2022-01-21 | End: 2022-02-22 | Stop reason: SDUPTHER

## 2022-02-09 ENCOUNTER — CARE COORDINATION (OUTPATIENT)
Dept: CARE COORDINATION | Age: 87
End: 2022-02-09

## 2022-02-10 ENCOUNTER — TELEPHONE (OUTPATIENT)
Dept: GASTROENTEROLOGY | Age: 87
End: 2022-02-10

## 2022-02-10 NOTE — TELEPHONE ENCOUNTER
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    ?  Mesenteric ischemia--prob needs EGD and if negative a vasc surgery consult      Please schedule EGD

## 2022-02-10 NOTE — TELEPHONE ENCOUNTER
Patient called back to let you know she is still nauseated. Now her stomach hurts on one side.   Please advise

## 2022-02-11 NOTE — CARE COORDINATION
ACM call to pt to complete initial assessment. Spoke to Sandra Urban. States she is out shopping and requested for ACM to contact her mid-next week. ACM agreeable with plan for follow up next week to complete assessment and review care coordination.

## 2022-02-14 ENCOUNTER — TELEPHONE (OUTPATIENT)
Dept: FAMILY MEDICINE CLINIC | Age: 87
End: 2022-02-14

## 2022-02-14 NOTE — TELEPHONE ENCOUNTER
Pt called stating her handicap placard had  and asked for a new prescription be mailed to her if possible if not she said she can come and pick it up.

## 2022-02-16 ENCOUNTER — CARE COORDINATION (OUTPATIENT)
Dept: CARE COORDINATION | Age: 87
End: 2022-02-16

## 2022-02-16 DIAGNOSIS — Z01.818 PRE-OP TESTING: Primary | ICD-10-CM

## 2022-02-17 ENCOUNTER — TELEPHONE (OUTPATIENT)
Dept: CARE COORDINATION | Age: 87
End: 2022-02-17

## 2022-02-17 ENCOUNTER — CARE COORDINATION (OUTPATIENT)
Dept: CARE COORDINATION | Age: 87
End: 2022-02-17

## 2022-02-17 SDOH — ECONOMIC STABILITY: FOOD INSECURITY: WITHIN THE PAST 12 MONTHS, THE FOOD YOU BOUGHT JUST DIDN'T LAST AND YOU DIDN'T HAVE MONEY TO GET MORE.: NEVER TRUE

## 2022-02-17 SDOH — ECONOMIC STABILITY: TRANSPORTATION INSECURITY
IN THE PAST 12 MONTHS, HAS LACK OF TRANSPORTATION KEPT YOU FROM MEETINGS, WORK, OR FROM GETTING THINGS NEEDED FOR DAILY LIVING?: NO

## 2022-02-17 SDOH — ECONOMIC STABILITY: HOUSING INSECURITY: IN THE LAST 12 MONTHS, HOW MANY PLACES HAVE YOU LIVED?: 1

## 2022-02-17 SDOH — ECONOMIC STABILITY: FOOD INSECURITY: WITHIN THE PAST 12 MONTHS, YOU WORRIED THAT YOUR FOOD WOULD RUN OUT BEFORE YOU GOT MONEY TO BUY MORE.: NEVER TRUE

## 2022-02-17 SDOH — ECONOMIC STABILITY: INCOME INSECURITY: IN THE LAST 12 MONTHS, WAS THERE A TIME WHEN YOU WERE NOT ABLE TO PAY THE MORTGAGE OR RENT ON TIME?: NO

## 2022-02-17 SDOH — ECONOMIC STABILITY: HOUSING INSECURITY
IN THE LAST 12 MONTHS, WAS THERE A TIME WHEN YOU DID NOT HAVE A STEADY PLACE TO SLEEP OR SLEPT IN A SHELTER (INCLUDING NOW)?: NO

## 2022-02-17 SDOH — ECONOMIC STABILITY: TRANSPORTATION INSECURITY
IN THE PAST 12 MONTHS, HAS THE LACK OF TRANSPORTATION KEPT YOU FROM MEDICAL APPOINTMENTS OR FROM GETTING MEDICATIONS?: NO

## 2022-02-17 ASSESSMENT — LIFESTYLE VARIABLES: HOW OFTEN DO YOU HAVE A DRINK CONTAINING ALCOHOL: NEVER

## 2022-02-17 ASSESSMENT — ENCOUNTER SYMPTOMS: DYSPNEA ASSOCIATED WITH: EXERTION

## 2022-02-17 ASSESSMENT — SOCIAL DETERMINANTS OF HEALTH (SDOH): HOW HARD IS IT FOR YOU TO PAY FOR THE VERY BASICS LIKE FOOD, HOUSING, MEDICAL CARE, AND HEATING?: NOT VERY HARD

## 2022-02-17 NOTE — CARE COORDINATION
Ambulatory Care Coordination Note  2/17/2022  CM Risk Score: 9  Charlson 10 Year Mortality Risk Score: 100%     ACC: Gabbie Farias, RN    Summary Note: ACM call to pt. Spoke to Dyana. She is pleasant, talkative and engaged in the conversation. Pt lives alone and manages independently. Has daughter in the area who she sees regularly. States she is feeling well overall. Pt reports having a fall on 1/4/22 with injury to left knee. States has improved. Reviewed fall prevention and safety. States she uses her walker or cane with ambulation. Has been in contact with GI MD d/t nausea and stomach pain. Pt has hx of gastric bypass. Per GI note pt to be scheduled for EGD. Pt states has not been scheduled, ACM will contact GI office. Pt did express questions regarding Medicare and different plan options, like seen on tv commercials. Pt agreeable to speak to Christiane Dodge regarding Medicare plans. ACM to contact [address] [place] [address] for referral.   Pt also expressed concerns for [employer] she received for transport in 11/2021. States the bill is over $900. She has not contacted them to setup a payment plan or make financial arrangements. ACM encouraged her to contact them to inquire about payment plan or financial assistance. She has a fixed income and is concerned about how to pay the bill. Pt agreeable to referral to Clarence Pierceh for any possible financial assistance that may be available. COPD: States she understands her medications. Has O2 concentrator that she uses PRN with 2L NC. O2 supplied by [employer]. Denies any SOB, cough, congestion. Explained COPD zone tool would be mailed to patient. Meds: Pt manages own medications and sets up in weekly pill box. Denies the need for pill packs. States she understands her regimen. Denies the need for pharmacist referral. States she can afford most of her medications, but her Carafate and Stiolto inhaler are expensive.  (Inhaler is $61 monthly) Pt states she is supposed to use inhaler daily, but only uses as needed d/t the cost. Pt agreeable to referral to Manisha Alvarado in medication assistance to check on pricing. ACM to make referral. States she has used [facility] in the past and lowered the cost by $2.     DME: Pt reports having walker, cane, grabber, extended shoe horn, bsc, shower chair, grab bars. Denies the need for any additional DME at this time. Referrals: Pt has been receiving PT/OT from [facility] and will graduate today. Pt denies the need for any further PT/OT or [facility] services. ACM inquired about MOW or transportation. Pt declined, states she can cook and drive herself. Note added after speaking to pt, shows EGD scheduled for 3/30/22 at 10:45am. Pt notified. ACM call to Maverick Lanza regarding Medicare questions. Left voicemail requesting return call to ACM. Actions:  ACM role reviewed  Med rec completed  Initial week 1 assessment completed  Referrals for MOW, HHC, PT/OT, Transportation offered and declined. Discussed COPD zone tool and Fall prevention  Referral to America Keith in Med Assist.   Request zone tools mailed to pt.    Referral to Maverick Lanza regarding Medicare questions  Referral to CARLOS Jackson for financial resources   Notified pt of EGD date and time scheduled    Plan:  Continue to work to complete SDOH  Review community resources available  Monitor for completion of referrals    COPD Assessment    Does the patient understand envrionmental exposure?: Yes  Is the patient able to verbalize Rescue vs. Long Acting medications?: Yes  Does the patient have a nebulizer?: Yes  Does the patient use a space with inhaled medications?: No     No patient-reported symptoms         Symptoms:  None: Yes      Symptom course: stable  Breathlessness: exertion  Increase use of rapid acting/rescue inhaled medications?: No  Change in chronic cough?: No/At Baseline  Change in sputum?: No/At Baseline  Have you had a recent diagnosis of pneumonia either by PCP or at a hospital?: No      and   General Assessment    Do you have any symptoms that are causing concern?: Yes  Progression since Onset: Unchanged  Reported Symptoms: Nausea (Comment: Gastro MD aware)           Ambulatory Care Coordination Assessment    Care Coordination Protocol  Program Enrollment: Rising Risk  Referral from Primary Care Provider: No  Week 1 - Initial Assessment     Do you have all of your prescriptions and are they filled?: Yes  Barriers to medication adherence: Other  Other barriers to medication adherence: Pt take most medications as prescribed. Inhaler Stiolto Respimat she takes only as needed, d/t cost.   Are you able to afford your medications?: Yes  How often do you have trouble taking your medications the way you have been told to take them?: Sometimes I take them as prescribed. Do you have Home O2 Therapy?: Yes   Oxygen Regimen: PRN Flow - Enter rate/FIO2: 2   Method of Delivery: Nasal Cannula   CPAP Use: None      Ability to seek help/take action for Emergent Urgent situations i.e. fire, crime, inclement weather or health crisis. : Independent  Ability to ambulate to restroom: Independent  Ability handle personal hygeine needs (bathing/dressing/grooming): Independent  Ability to manage Medications: Independent  Ability to prepare Food Preparation: Independent  Ability to maintain home (clean home, laundry): Independent  Ability to drive and/or has transportation: Independent  Ability to do shopping: Independent  Ability to manage finances: Independent  Is patient able to live independently?: Yes     Current Housing: Private Residence        Per the Fall Risk Screening, did the patient have 2 or more falls or 1 fall with injury in the past year?: Yes  How often do you think you are about to fall and you do NOT fall?  For example, you grab something to stabilize yourself or hold onto a wall/furniture?: Rarely  Use of a Mobility Aid: Yes  Difficulty walking/impaired gait: No  Issues with feet or shoes like numbness, edema, shoes not fitting: No  Changes in vision, poor vision or poor lighting in environment: No  Dizziness: No  Other Fall Risk: No     Frequent urination at night?: No  Do you use rails/bars?: Yes  Do you have a non-slip tub mat?: Yes     Are you experiencing loss of meaning?: No  Are you experiencing loss of hope and peace?: No     Thinking about your patient's physical health needs, are there any symptoms or problems (risk indicators) you are unsure about that require further investigation?: No identified areas of uncertainly or problems already being investigated   Are the patients physical health problems impacting on their mental well-being?: No identified areas of concern   Are there any problems with your patients lifestyle behaviors (alcohol, drugs, diet, exercise) that are impacting on physical or mental well-being?: No identified areas of concern   Do you have any other concerns about your patients mental well-being?  How would you rate their severity and impact on the patient?: No identified areas of concern   How would you rate their home environment in terms of safety and stability (including domestic violence, insecure housing, neighbor harassment)?: Consistently safe, supportive, stable, no identified problems   How do daily activities impact on the patient's well-being? (include current or anticipated unemployment, work, caregiving, access to transportation or other): No identified problems or perceived positive benefits   How would you rate their social network (family, work, friends)?: Adequate participation with social networks   How would you rate their financial resources (including ability to afford all required medical care)?: Financially secure, some resource challenges   How wells does the patient now understand their health and well-being (symptoms, signs or risk factors) and what they need to do to manage their health?: Reasonable to good understanding and already engages in managing health or is willing to undertake better management   How well do you think your patient can engage in healthcare discussions? (Barriers include language, deafness, aphasia, alcohol or drug problems, learning difficulties, concentration): Clear and open communication, no identified barriers   Do other services need to be involved to help this patient?: Other care/services in place and adequate   Are current services involved with this patient well-coordinated? (Include coordination with other services you are now recommendation): All required care/services in place and well-coordinated   Suggested Interventions and Community Resources  Fall Risk Prevention: In Process Home Health Services: Declined (Comment: Declined 2/22)   Meals on Wheels: Declined   Medication Assistance Program: Not Started   Medi Set or Pill Pack: Declined   Occupational Therapy: Declined   Pharmacist: Declined   Physical Therapy: Declined   Registered Dietician: Declined   Social Work: Not Started   Other Services: Not Started   Transportation Services: Declined   Zone Management Tools: In Process         Set up/Review an Education Plan, Set up/Review Goals              Prior to Admission medications    Medication Sig Start Date End Date Taking?  Authorizing Provider   montelukast (SINGULAIR) 10 MG tablet TAKE 1 TABLET PER NG TUBE NIGHTLY 1/21/22  Yes Fanny Medrano MD   atorvastatin (LIPITOR) 40 MG tablet Take 1 tablet by mouth nightly 1/21/22  Yes Fanny Medrano MD   lisinopril (PRINIVIL;ZESTRIL) 2.5 MG tablet Take 1 tablet by mouth daily 12/30/21  Yes Reina Collier APRN - CNP   levothyroxine (SYNTHROID) 50 MCG tablet Take 1 tablet by mouth daily 12/27/21  Yes Madisyn Grant MD   aspirin [facility] ASPIRIN ADULT LOW DOSE) 81 MG EC tablet Take 1 tablet by mouth daily 12/2/21  Yes Alethea Garza MD   escitalopram (LEXAPRO) 20 MG tablet Take 1 tablet by mouth daily 8/6/21  Yes Fanny Medrano MD ipratropium-albuterol (DUONEB) 0.5-2.5 (3) MG/3ML SOLN nebulizer solution Inhale 3 mLs into the lungs every 4 hours (while awake) 8/6/21  Yes Keaton Conway MD   pantoprazole (PROTONIX) 40 MG tablet TAKE 1 TABLET NIGHTLY  Patient taking differently: daily  8/6/21  Yes Keaton Conway MD   [facility]etron [facility] 4 MG tablet Take 1 tablet by mouth every 8 hours as needed for Nausea or Vomiting 8/6/21  Yes Keaton Conway MD   sucralfate (CARAFATE) 1 GM tablet Take 1 tablet by mouth 4 times daily 6/21/21  Yes Eugene French MD   Budeson-Glycopyrrol-Formoterol 160-9-4.8 MCG/ACT AERO Inhale 2 puffs into the lungs 2 times daily 3/19/21  Yes Keaton Conway MD   Tiotropium Bromide-Olodaterol 2.5-2.5 MCG/ACT AERS Inhale 2 puffs into the lungs daily 2/22/21  Yes Keaton Conway MD   albuterol sulfate  (90 Base) MCG/ACT inhaler Inhale 2 puffs into the lungs every 6 hours as needed for Wheezing 8/10/20  Yes Elmo Millard MD   Timolol (TIMOPTIC) 0.5 % (DAILY) SOLN ophthalmic solution Place 1 drop into both eyes daily   Yes Historical Provider, MD   vitamin B-12 (CYANOCOBALAMIN) 1000 MCG tablet Take 1,000 mcg by mouth daily OTC   Yes Historical Provider, MD   senna-docusate (Tyson Shone) 8.6-50 MG per tablet Take 1 tablet by mouth daily    Yes Historical Provider, MD   Calcium-Magnesium-Zinc 457-58-4 MG TABS Take 1 tablet by mouth daily OTC   Yes Historical Provider, MD   Cholecalciferol (VITAMIN D3) 2000 UNITS CAPS Take 1 capsule by mouth daily OTC   Yes Historical Provider, MD   Handicap Placard MISC by Does not apply route Please provide handicap placard for 5 years.     Diagnosis: Orthopedic condition 2/14/22   Keaton Conway MD   metoprolol succinate (TOPROL XL) 25 MG extended release tablet Take 0.5 tablets by mouth daily  Patient not taking: Reported on 12/23/2021 12/2/21   Joel Wheatley MD       Future Appointments   Date Time Provider Wero Rowe   3/24/2022  2:00 PM SCHEDULE, [facility]   4/22/2022 10:20 AM Karli Woodward MD [facility]

## 2022-02-21 ENCOUNTER — TELEPHONE (OUTPATIENT)
Dept: FAMILY MEDICINE CLINIC | Age: 87
End: 2022-02-21

## 2022-02-21 DIAGNOSIS — K21.9 GASTROESOPHAGEAL REFLUX DISEASE WITHOUT ESOPHAGITIS: ICD-10-CM

## 2022-02-21 DIAGNOSIS — F32.0 CURRENT MILD EPISODE OF MAJOR DEPRESSIVE DISORDER WITHOUT PRIOR EPISODE (HCC): ICD-10-CM

## 2022-02-21 DIAGNOSIS — R11.0 NAUSEA: ICD-10-CM

## 2022-02-21 NOTE — TELEPHONE ENCOUNTER
Pt called stating she needs a refill on medications atorvastatin 40mg, montelukast 10mg, and tramadol 50mg

## 2022-02-22 ENCOUNTER — OFFICE VISIT (OUTPATIENT)
Dept: FAMILY MEDICINE CLINIC | Age: 87
End: 2022-02-22
Payer: MEDICARE

## 2022-02-22 VITALS
DIASTOLIC BLOOD PRESSURE: 52 MMHG | OXYGEN SATURATION: 100 % | SYSTOLIC BLOOD PRESSURE: 130 MMHG | HEART RATE: 63 BPM | WEIGHT: 117.2 LBS | TEMPERATURE: 97 F | BODY MASS INDEX: 22.89 KG/M2

## 2022-02-22 DIAGNOSIS — I51.81 TAKOTSUBO CARDIOMYOPATHY: ICD-10-CM

## 2022-02-22 DIAGNOSIS — J30.89 NON-SEASONAL ALLERGIC RHINITIS, UNSPECIFIED TRIGGER: ICD-10-CM

## 2022-02-22 DIAGNOSIS — Z86.73 HISTORY OF CVA (CEREBROVASCULAR ACCIDENT): ICD-10-CM

## 2022-02-22 DIAGNOSIS — K21.9 GASTROESOPHAGEAL REFLUX DISEASE WITHOUT ESOPHAGITIS: ICD-10-CM

## 2022-02-22 DIAGNOSIS — F32.0 CURRENT MILD EPISODE OF MAJOR DEPRESSIVE DISORDER WITHOUT PRIOR EPISODE (HCC): Primary | ICD-10-CM

## 2022-02-22 DIAGNOSIS — R11.0 NAUSEA: ICD-10-CM

## 2022-02-22 DIAGNOSIS — M25.562 ACUTE PAIN OF LEFT KNEE: ICD-10-CM

## 2022-02-22 DIAGNOSIS — S70.12XS CONTUSION OF LEFT THIGH, SEQUELA: ICD-10-CM

## 2022-02-22 DIAGNOSIS — Z91.81 AT HIGH RISK FOR FALLS: ICD-10-CM

## 2022-02-22 PROCEDURE — 99214 OFFICE O/P EST MOD 30 MIN: CPT | Performed by: FAMILY MEDICINE

## 2022-02-22 RX ORDER — PANTOPRAZOLE SODIUM 40 MG/1
TABLET, DELAYED RELEASE ORAL
Qty: 90 TABLET | Refills: 1 | Status: SHIPPED | OUTPATIENT
Start: 2022-02-22 | End: 2022-08-22 | Stop reason: SDUPTHER

## 2022-02-22 RX ORDER — LEVOTHYROXINE SODIUM 0.07 MG/1
TABLET ORAL
COMMUNITY
Start: 2022-01-27

## 2022-02-22 RX ORDER — LISINOPRIL 2.5 MG/1
2.5 TABLET ORAL DAILY
Qty: 90 TABLET | Refills: 1 | Status: SHIPPED | OUTPATIENT
Start: 2022-02-22 | End: 2022-03-02

## 2022-02-22 RX ORDER — ATORVASTATIN CALCIUM 40 MG/1
40 TABLET, FILM COATED ORAL NIGHTLY
Qty: 90 TABLET | Refills: 1 | Status: SHIPPED | OUTPATIENT
Start: 2022-02-22 | End: 2022-08-22 | Stop reason: SDUPTHER

## 2022-02-22 RX ORDER — ONDANSETRON 4 MG/1
4 TABLET, FILM COATED ORAL EVERY 8 HOURS PRN
Qty: 90 TABLET | Refills: 1 | Status: SHIPPED | OUTPATIENT
Start: 2022-02-22 | End: 2022-08-22 | Stop reason: SDUPTHER

## 2022-02-22 RX ORDER — MONTELUKAST SODIUM 10 MG/1
TABLET ORAL
Qty: 90 TABLET | Refills: 1 | Status: SHIPPED | OUTPATIENT
Start: 2022-02-22 | End: 2022-08-22 | Stop reason: SDUPTHER

## 2022-02-22 RX ORDER — ESCITALOPRAM OXALATE 20 MG/1
20 TABLET ORAL DAILY
Qty: 90 TABLET | Refills: 1 | Status: SHIPPED | OUTPATIENT
Start: 2022-02-22 | End: 2022-08-22 | Stop reason: SDUPTHER

## 2022-02-22 RX ORDER — TRAMADOL HYDROCHLORIDE 50 MG/1
50 TABLET ORAL EVERY 4 HOURS PRN
Qty: 56 TABLET | Refills: 0 | Status: SHIPPED | OUTPATIENT
Start: 2022-02-22 | End: 2022-03-01

## 2022-02-22 ASSESSMENT — PATIENT HEALTH QUESTIONNAIRE - PHQ9
SUM OF ALL RESPONSES TO PHQ QUESTIONS 1-9: 0
SUM OF ALL RESPONSES TO PHQ QUESTIONS 1-9: 0
1. LITTLE INTEREST OR PLEASURE IN DOING THINGS: 0
SUM OF ALL RESPONSES TO PHQ QUESTIONS 1-9: 0
SUM OF ALL RESPONSES TO PHQ9 QUESTIONS 1 & 2: 0
2. FEELING DOWN, DEPRESSED OR HOPELESS: 0
SUM OF ALL RESPONSES TO PHQ QUESTIONS 1-9: 0

## 2022-02-22 NOTE — PATIENT INSTRUCTIONS
Patient Education        Recovering From Depression: Care Instructions  Your Care Instructions     Taking good care of yourself is important as you recover from depression. In time, your symptoms will fade as your treatment takes hold. Do not give up. Instead, focus your energy on getting better. Your mood will improve. It just takes some time. Focus on things that can help you feel better, such as being with friends and family, eating well, and getting enough rest. But take things slowly. Do not do too much too soon. You will begin to feel better gradually. Follow-up care is a key part of your treatment and safety. Be sure to make and go to all appointments, and call your doctor if you are having problems. It's also a good idea to know your test results and keep a list of the medicines you take. How can you care for yourself at home? Be realistic  · If you have a large task to do, break it up into smaller steps you can handle, and just do what you can. · You may want to put off important decisions until your depression has lifted. If you have plans that will have a major impact on your life, such as marriage, divorce, or a job change, try to wait a bit. Talk it over with friends and loved ones who can help you look at the overall picture first.  · Reaching out to people for help is important. Do not isolate yourself. Let your family and friends help you. Find someone you can trust and confide in, and talk to that person. · Be patient, and be kind to yourself. Remember that depression is not your fault and is not something you can overcome with willpower alone. Treatment is important for depression, just like for any other illness. Feeling better takes time, and your mood will improve little by little. Stay active  · Stay busy and get outside. Take a walk, or try some other light exercise. · Talk with your doctor about an exercise program. Exercise can help with mild depression.   · Go to a movie or concert. Take part in a Congregational activity or other social gathering. Go to a [employer] game. · Ask a friend to have dinner with you. Take care of yourself  · Eat a balanced diet with plenty of fresh fruits and vegetables, whole grains, and lean protein. If you have lost your appetite, eat small snacks rather than large meals. · Avoid using illegal drugs or marijuana and drinking alcohol. Do not take medicines that have not been prescribed for you. They may interfere with medicines you may be taking for depression, or they may make your depression worse. · Take your medicines exactly as they are prescribed. You may start to feel better within 1 to 3 weeks of taking antidepressant medicine. But it can take as many as 6 to 8 weeks to see more improvement. If you have questions or concerns about your medicines, or if you do not notice any improvement by 3 weeks, talk to your doctor. · Continue to take your medicine after your symptoms improve. Taking your medicine for at least 6 months after you feel better can help keep you from getting depressed again. If this isn't the first time you have been depressed, your doctor may recommend you to take medicine even longer. · If you have any side effects from your medicine, tell your doctor. Many side effects are mild and will go away on their own after you have been taking the medicine for a few weeks. Some may last longer. Talk to your doctor if side effects are bothering you too much. You might be able to try a different medicine. · Continue counseling. It may help prevent depression from returning, especially if you've had multiple episodes of depression. Talk with your counselor if you are having a hard time attending your sessions or you think the sessions aren't working. Don't just stop going. · Get enough sleep. Talk to your doctor if you are having problems sleeping. · Avoid sleeping pills unless they are prescribed by the doctor treating your depression.  Sleeping pills may make you groggy during the day, and they may interact with other medicine you are taking. · If you have any other illnesses, such as diabetes, heart disease, or high blood pressure, make sure to continue with your treatment. Tell your doctor about all of the medicines you take, including those with or without a prescription. · If you or someone you know talks about suicide, self-harm, or feeling hopeless, get help right away. Call the [address] [place] [address] at 1-800-273-talk (3-618.626.3649) or text HOME to 679007 to access the Crisis Text Line. Consider saving these numbers in your phone. When should you call for help? Call 391 anytime you think you may need emergency care. For example, call if:    · You feel like hurting yourself or someone else.     · Someone you know has depression and is about to attempt or is attempting suicide. Call your doctor now or seek immediate medical care if:    · You hear voices.     · Someone you know has depression and:  ? Starts to give away his or her possessions. ? Uses illegal drugs or drinks alcohol heavily. ? Talks or writes about death, including writing suicide notes or talking about guns, knives, or pills. ? Starts to spend a lot of time alone. ? Acts very aggressively or suddenly appears calm. Watch closely for changes in your health, and be sure to contact your doctor if:    · You do not get better as expected. Where can you learn more? Go to [email or web address]://KicksendneldaVDP.[email or web address]. org and sign in to your [employer] account. Enter V999 in the [facility] box to learn more about \"Recovering From Depression: Care Instructions. \"     If you do not have an account, please click on the \"Sign Up Now\" link. Current as of: June 16, 2021               Content Version: 13.1  © 7169-3515 Healthwise, Incorporated. Care instructions adapted under license by [facility] ([facility]).  If you have questions about a medical condition or this instruction, always ask your healthcare professional. [place] [address] any warranty or liability for your use of this information.

## 2022-02-22 NOTE — PROGRESS NOTES
Subjective: Yuriy Darling is a 80 y.o. female who presents for follow up of depression. Current symptoms include depressed mood. Symptoms have been controlled since that time. Patient denies recurrent thoughts of death. Previous treatment includes: Lexapro 20 mg daily. She complains of the following side effects from the treatment: none. Patient with GERD symptoms. Patient currently on Protonix 40 mg daily with good control. She does have occasional nausea for which she takes Zofran. Patient with left thigh and knee pain since fall on 1/4/2022. Still doing physical therapy at home and is getting better. She does take tramadol 1-2 times per day with decent control of her pain. She would like to have a refill. Patient with a history of CVA January 22. She has occasional symptoms similar to what she had prior to her stroke but they do not last very long. She has not followed up with a neurologist since she was hospitalized. While hospitalized she was diagnosed with Takotsubo cardiomyopathy. She does follow with cardiology regularly. Patient does have chronic allergic rhinitis for which she takes Singulair with good control    Patient's medications, allergies, past medical, surgical, social and family histories were reviewed and updated as appropriate. Review of Systems  ROS:  Energy level fair overall, and weight is stable. No chest pain or shortness of breath. Bowels have been normal without constipation or diarrhea. No shakes or tremors. Objective:      BP (!) 130/52 (Site: Left Upper Arm, Position: Sitting, Cuff Size: Medium Adult)   Pulse 63   Temp 97 °F (36.1 °C) (Infrared)   Wt 117 lb 3.2 oz (53.2 kg)   SpO2 100%   BMI 22.89 kg/m²    General:  alert, appears stated age and cooperative   Neck: Thyroid not palpable, not enlarged, no nodules detected. Chest: clear with no wheezes or rales. No retractions, or use of accessory muscles noted.    Cardiovascular: PMI is not displaced, and no thrill noted. Regular rate and rhythm with no rub, murmur or gallop. No peripheral edema. Pedal pulses are normal.    Affect & Behavior:  full facial expressions, good grooming, good insight, normal perception, normal reasoning, normal speech pattern and content and normal thought patterns        Assessment:      Diagnosis Orders   1. Current mild episode of major depressive disorder without prior episode (HCC)   Controlled escitalopram (LEXAPRO) 20 MG tablet   2. Gastroesophageal reflux disease without esophagitis   controlled pantoprazole (PROTONIX) 40 MG tablet   3. Nausea   Controlled ondansetron (ZOFRAN) 4 MG tablet   4. Acute pain of left knee   Improving traMADol (ULTRAM) 50 MG tablet   5. Contusion of left thigh, sequela   Improving traMADol (ULTRAM) 50 MG tablet   6. History of CVA (cerebrovascular accident)  atorvastatin (LIPITOR) 40 MG tablet    aMy Bermudez MD, Neurology, [facility]   7. Non-seasonal allergic rhinitis, unspecified trigger   Controlled montelukast (SINGULAIR) 10 MG tablet   8. Takotsubo cardiomyopathy   Currently asymptomatic lisinopril (PRINIVIL;ZESTRIL) 2.5 MG tablet   9. At high risk for falls            Plan: We will send consult to neurology for follow-up. Patient is currently on atorvastatin and lisinopril but may need further secondary prevention. Otherwise continue all medications. Recommended counseling. Follow up: 6 months. On the basis of positive falls risk screening, assessment and plan is as follows: home safety tips provided.

## 2022-02-23 ENCOUNTER — CARE COORDINATION (OUTPATIENT)
Dept: CARE COORDINATION | Age: 87
End: 2022-02-23

## 2022-02-24 ENCOUNTER — CARE COORDINATION (OUTPATIENT)
Dept: CARE COORDINATION | Age: 87
End: 2022-02-24

## 2022-02-24 NOTE — CARE COORDINATION
Ambulatory Care Coordination Note  2/24/2022  CM Risk Score: 9  Charlson 10 Year Mortality Risk Score: 100%     ACC: Gabbie Farias, RN    Summary Note: ACM call to pt. Spoke to Evens Moore. She is pleasant. States she is doing well and denies any new issues. States her left knee continue to bother her from fall in January. And she continues to take GI medications as prescribed for GERD, nausea. States she is just arriving back home from faxing PAP paperwork back to Paul Marcano for prescription assistance. Reviewed Anabell's note with pt and reminded that [facility] had Carafate less expensive than [facility] - pt states she will check on that. Pt states that Annia Lezama has contacted her and told her she would attempt to find resources to assist with ambulance bill. Pt states is grateful for any assistance. Reviewed information for fall prevention, using DME and being mindful of trip hazards. Reviewed pt OV with PCP yesterday. Reminded pt that referral had been ordered for Neurology. Pt voiced understanding and declines needing assistance with scheduling appt.      COPD: Denies any new cough or SOB. Understands medications. Has concentrator that she uses PRN with 2L NC. O2 supplied by [employer]. COPD zone tool has been mailed.  Will review at next outreach.      Actions:  Discussed COPD zone tool and Fall prevention  Pt faxed PAP paperwork back to Maern Xie today     Plan:  Continue to work to complete SDOH  Review community resources available  Monitor for completion of referrals  Review COPD zone tool    COPD Assessment    Does the patient understand envrionmental exposure?: Yes  Is the patient able to verbalize Rescue vs. Long Acting medications?: Yes  Does the patient have a nebulizer?: Yes  Does the patient use a space with inhaled medications?: No            Symptoms:         and   General Assessment    Do you have any symptoms that are causing concern?: No           Care Coordination Interventions    Program Enrollment: Rising Risk  Referral from Primary Care Provider: No  Suggested Interventions and Community Resources  Fall Risk Prevention: In Process  Home Health Services: Declined (Comment: Declined 2/22)  Meals on Wheels: Declined (Comment: Declined 2/22)  Medication Assistance Program: Not Started (Comment: referral for med asst. 2/22)  Medi Set or Pill Pack: Declined (Comment: Declined 2/22)  Occupational Therapy: Declined (Comment: Just completed 2/22)  Pharmacist: Jared Lezama (Comment: Declined 2/22)  Physical Therapy: Declined (Comment: Just completed 2/22)  Registered Dietician: Declined (Comment: Declined 2/22)  Social Work: Not Started (Comment: SW for financial questions regarding ambulance bill)  Other Services: Not Started (Comment: Faisal Cmaejo - Medicare Questions)  Transportation Support: Declined  Zone Management Tools: In Process (Comment: COPD)         Goals Addressed    None         Prior to Admission medications    Medication Sig Start Date End Date Taking? Authorizing Provider   levothyroxine (EUTHYROX) 75 MCG tablet 1 tablet DAILY (route: oral) 1/27/22   Historical Provider, MD   montelukast (SINGULAIR) 10 MG tablet TAKE 1 TABLET PER NG TUBE NIGHTLY 2/22/22   Fanny Medrano MD   atorvastatin (LIPITOR) 40 MG tablet Take 1 tablet by mouth nightly 2/22/22   Fanny Medrano MD   lisinopril (PRINIVIL;ZESTRIL) 2.5 MG tablet Take 1 tablet by mouth daily 2/22/22   Fanny Medrano MD   escitalopram (LEXAPRO) 20 MG tablet Take 1 tablet by mouth daily 2/22/22   Fanny Medrano MD   pantoprazole (PROTONIX) 40 MG tablet TAKE 1 TABLET NIGHTLY 2/22/22   Fanny Medrano MD   ondansetron [facility]) 4 MG tablet Take 1 tablet by mouth every 8 hours as needed for Nausea or Vomiting 2/22/22   Fanny Medrano MD   traMADol (ULTRAM) 50 MG tablet Take 1 tablet by mouth every 4 hours as needed for Pain for up to 7 days. Intended supply: 7 days.  Take lowest dose possible to manage pain 2/22/22 3/1/22  Fanny Medrano MD Handicap Placard MISC by Does not apply route Please provide handicap placard for 5 years.     Diagnosis: Orthopedic condition 2/14/22   Naeem Sparrow MD   aspirin DELIA [facility] ASPIRIN ADULT LOW DOSE) 81 MG EC tablet Take 1 tablet by mouth daily 12/2/21   C Genia Murphy MD   ipratropium-albuterol (DUONEB) 0.5-2.5 (3) MG/3ML SOLN nebulizer solution Inhale 3 mLs into the lungs every 4 hours (while awake) 8/6/21   Naeem Sparrow MD   sucralfate (CARAFATE) 1 GM tablet Take 1 tablet by mouth 4 times daily 6/21/21   Sarah Julien MD   Budeson-Glycopyrrol-Formoterol 160-9-4.8 MCG/ACT AERO Inhale 2 puffs into the lungs 2 times daily 3/19/21   Naeem Sparrow MD   Tiotropium Bromide-Olodaterol 2.5-2.5 MCG/ACT AERS Inhale 2 puffs into the lungs daily 2/22/21   Naeem Sparrow MD   albuterol sulfate  (90 Base) MCG/ACT inhaler Inhale 2 puffs into the lungs every 6 hours as needed for Wheezing 8/10/20   Greyson Pollack MD   Timolol (TIMOPTIC) 0.5 % (DAILY) SOLN ophthalmic solution Place 1 drop into both eyes daily    Historical Provider, MD   vitamin B-12 (CYANOCOBALAMIN) 1000 MCG tablet Take 1,000 mcg by mouth daily OTC    Historical Provider, MD   senna-docusate (Blima Plump) 8.6-50 MG per tablet Take 1 tablet by mouth daily     Historical Provider, MD   Calcium-Magnesium-Zinc 401-62-1 MG TABS Take 1 tablet by mouth daily OTC    Historical Provider, MD   Cholecalciferol (VITAMIN D3) 2000 UNITS CAPS Take 1 capsule by mouth daily OTC    Historical Provider, MD       Future Appointments   Date Time Provider Wero Rowe   3/24/2022  2:00 PM Melody [facility] CC GASTRO [facility] Gastro MMA   4/22/2022 10:20 AM Lary Copeland MD [facility] Heart MMA

## 2022-02-25 ENCOUNTER — TELEPHONE (OUTPATIENT)
Dept: NEUROLOGY | Age: 87
End: 2022-02-25

## 2022-02-25 ENCOUNTER — CARE COORDINATION (OUTPATIENT)
Dept: CARE COORDINATION | Age: 87
End: 2022-02-25

## 2022-02-25 NOTE — CARE COORDINATION
Igor Smith was able to refax me her paperwork and I did receive it, I am faxing this application to [address] [place] [address].

## 2022-02-25 NOTE — TELEPHONE ENCOUNTER
Patient was seen in the hospital by you in November. She is now being referred by her PCP for similar issues. Should she be scheduled with you or the NP ?

## 2022-02-25 NOTE — CARE COORDINATION
I called Kylee to let her know I did not get her paperwork through fax, she is going to try to resend it.

## 2022-03-02 ENCOUNTER — TELEPHONE (OUTPATIENT)
Dept: CARDIOLOGY CLINIC | Age: 87
End: 2022-03-02

## 2022-03-02 ENCOUNTER — OFFICE VISIT (OUTPATIENT)
Dept: CARDIOLOGY CLINIC | Age: 87
End: 2022-03-02
Payer: MEDICARE

## 2022-03-02 ENCOUNTER — HOSPITAL ENCOUNTER (OUTPATIENT)
Age: 87
Discharge: HOME | End: 2022-03-02
Payer: MEDICARE

## 2022-03-02 VITALS
DIASTOLIC BLOOD PRESSURE: 60 MMHG | HEIGHT: 60 IN | BODY MASS INDEX: 23.16 KG/M2 | OXYGEN SATURATION: 98 % | WEIGHT: 118 LBS | HEART RATE: 45 BPM | SYSTOLIC BLOOD PRESSURE: 178 MMHG

## 2022-03-02 DIAGNOSIS — I38 VHD (VALVULAR HEART DISEASE): ICD-10-CM

## 2022-03-02 DIAGNOSIS — R07.9 CHEST PAIN, UNSPECIFIED TYPE: Primary | ICD-10-CM

## 2022-03-02 DIAGNOSIS — R06.02 SHORTNESS OF BREATH: Primary | ICD-10-CM

## 2022-03-02 DIAGNOSIS — I10 PRIMARY HYPERTENSION: ICD-10-CM

## 2022-03-02 DIAGNOSIS — R06.02 SOBOE (SHORTNESS OF BREATH ON EXERTION): ICD-10-CM

## 2022-03-02 LAB — D DIMER: 478 NG/ML(DDU)

## 2022-03-02 PROCEDURE — 85379 FIBRIN DEGRADATION QUANT: CPT

## 2022-03-02 PROCEDURE — 36415 COLL VENOUS BLD VENIPUNCTURE: CPT

## 2022-03-02 PROCEDURE — 99214 OFFICE O/P EST MOD 30 MIN: CPT | Performed by: NURSE PRACTITIONER

## 2022-03-02 PROCEDURE — 93000 ELECTROCARDIOGRAM COMPLETE: CPT | Performed by: NURSE PRACTITIONER

## 2022-03-02 RX ORDER — LISINOPRIL 5 MG/1
5 TABLET ORAL DAILY
Qty: 90 TABLET | Refills: 0
Start: 2022-03-02 | End: 2022-05-17 | Stop reason: SDUPTHER

## 2022-03-02 ASSESSMENT — ENCOUNTER SYMPTOMS
SHORTNESS OF BREATH: 1
BACK PAIN: 0

## 2022-03-02 NOTE — PROGRESS NOTES
3/2/2022  Primary cardiologist: Dr. Aries Ardon  is an established 80 y.o.  female here for chest pain and shortness of breath      SUBJECTIVE/OBJECTIVE:    HPI : Linda Tapia is an 49-year-old female patient with a history of valvular heart disease status post TAVR, hypertension, and COPD  In September 2019 she underwent aortic valve replacement (26mm Medtronic Evolut PRO). She underwent left heart catheterization November 2021 demonstrated no significant disease in the coronaries with possibleTakutsubo syndrome    Linda Tapia reports right sided chest pain- no aggravating factors- noticed it more yesterday while she was out shopping. The pain lasted for hours and went away on own. She noted nausea with the discomfort. She needed to sit and rest. She also notes she has had a head ache recently. She notices shortness of breath with walking or making bed. Review of Systems   Constitutional: Positive for malaise/fatigue. Cardiovascular: Positive for chest pain and dyspnea on exertion. Respiratory: Positive for shortness of breath. Musculoskeletal: Negative for back pain. Vitals:    03/02/22 1057 03/02/22 1115   BP: (!) 180/62 (!) 178/60   Site: Left Upper Arm Left Upper Arm   Position: Sitting Sitting   Cuff Size: Medium Adult Medium Adult   Pulse: (!) 45    SpO2: 98%    Weight: 118 lb (53.5 kg)    Height: 5' (1.524 m)      No flowsheet data found. Wt Readings from Last 3 Encounters:   03/02/22 118 lb (53.5 kg)   02/22/22 117 lb 3.2 oz (53.2 kg)   01/12/22 120 lb (54.4 kg)     Body mass index is 23.05 kg/m². Physical Exam  Vitals reviewed. HENT:      Head: Normocephalic and atraumatic. Mouth/Throat:      Mouth: Mucous membranes are moist.   Eyes:      Extraocular Movements: Extraocular movements intact. Neck:      Vascular: No carotid bruit. Cardiovascular:      Rate and Rhythm: Regular rhythm. Bradycardia present. Pulses: Normal pulses. Heart sounds: Normal heart sounds. Pulmonary:      Effort: Pulmonary effort is normal.      Breath sounds: Normal breath sounds. No wheezing or rales. Musculoskeletal:      Right lower leg: No edema. Left lower leg: No edema. Skin:     General: Skin is warm and dry. Neurological:      Mental Status: She is oriented to person, place, and time. Current Outpatient Medications   Medication Sig Dispense Refill    levothyroxine (EUTHYROX) 75 MCG tablet 1 tablet DAILY (route: oral)      montelukast (SINGULAIR) 10 MG tablet TAKE 1 TABLET PER NG TUBE NIGHTLY 90 tablet 1    atorvastatin (LIPITOR) 40 MG tablet Take 1 tablet by mouth nightly 90 tablet 1    lisinopril (PRINIVIL;ZESTRIL) 2.5 MG tablet Take 1 tablet by mouth daily 90 tablet 1    escitalopram (LEXAPRO) 20 MG tablet Take 1 tablet by mouth daily 90 tablet 1    pantoprazole (PROTONIX) 40 MG tablet TAKE 1 TABLET NIGHTLY 90 tablet 1    ondansetron (ZOFRAN) 4 MG tablet Take 1 tablet by mouth every 8 hours as needed for Nausea or Vomiting 90 tablet 1    Handicap Placard MISC by Does not apply route Please provide handicap placard for 5 years.     Diagnosis: Orthopedic condition 1 each 0    aspirin (EQ ASPIRIN ADULT LOW DOSE) 81 MG EC tablet Take 1 tablet by mouth daily 30 tablet 0    ipratropium-albuterol (DUONEB) 0.5-2.5 (3) MG/3ML SOLN nebulizer solution Inhale 3 mLs into the lungs every 4 hours (while awake) 360 mL 1    sucralfate (CARAFATE) 1 GM tablet Take 1 tablet by mouth 4 times daily 120 tablet 3    Budeson-Glycopyrrol-Formoterol 160-9-4.8 MCG/ACT AERO Inhale 2 puffs into the lungs 2 times daily 1 Inhaler 5    Tiotropium Bromide-Olodaterol 2.5-2.5 MCG/ACT AERS Inhale 2 puffs into the lungs daily 1 Inhaler 5    albuterol sulfate  (90 Base) MCG/ACT inhaler Inhale 2 puffs into the lungs every 6 hours as needed for Wheezing 1 Inhaler 5    Timolol (TIMOPTIC) 0.5 % (DAILY) SOLN ophthalmic solution Place 1 drop into both eyes daily      vitamin B-12 (CYANOCOBALAMIN) 1000 MCG tablet Take 1,000 mcg by mouth daily OTC      senna-docusate (PERICOLACE) 8.6-50 MG per tablet Take 1 tablet by mouth daily       Calcium-Magnesium-Zinc 167-83-8 MG TABS Take 1 tablet by mouth daily OTC      Cholecalciferol (VITAMIN D3) 2000 UNITS CAPS Take 1 capsule by mouth daily OTC       No current facility-administered medications for this visit. All pertinent data reviewed and discussed with patient       ASSESSMENT/PLAN:    Chest pain with shortness of breath  Occurred with activity - on right side of chest-doubt cardiac- recent [facility] no CAD  Will check D dimer-if elevated consider doing CTA pulmonary  Currently chest pain-free and in no respiratory distress. Shortness of breath has resolved as well    HTN  Blood pressure is not well controlled  Increase lisinopril to 5 mg daily    Valvular heart disease  History of TAVR  Left ventricular systolic function is abnormal.   Ejection fraction is visually estimated at 25-30%. Mildly dilated left atrium. No evidence of thrombus within the left atrial appendage. Negative bubble study; no ASD or PFO noted. S/p TAVR: 26mm Medtronic Evolut PRO inserted 9/19/2019. Mild mitral regurgitation. Medications reviewed and confirmed with patient     Tests ordered:  D dimer      Follow-up  2 weeks     Signed:  DERIK Vargas CNP, 3/2/2022, 11:18 AM    An electronic signature was used to authenticate this note. Please note this report has been partially produced using speech recognition software and may contain errors related to that system including errors in grammar, punctuation, and spelling, as well as words and phrases that may be inappropriate. If there are any questions or concerns please feel free to contact the dictating provider for clarification.

## 2022-03-02 NOTE — TELEPHONE ENCOUNTER
Pt called and states she was in [employer] yesterday and become SOB, very tired and had some chest pain. Now this am having some pain and still SOB This new to pt.   Please advise

## 2022-03-02 NOTE — PROGRESS NOTES
D dimer noted : Results for Roel Jones (MRN [id number]) as of 3/2/2022 16:00   Ref.  Range 3/2/2022 11:57   D-Dimer, Quant Latest Ref Range: <230 NG/mL(DDU) 478 (H)     Will purse a CTA to ru/e out PE

## 2022-03-02 NOTE — LETTER
Viraj Glasgow  1935  [id number]    Have you had any Chest Pain that is not new? - Yes  If Yes DO EKG - How does it feel - Heaviness   How long does the pain last - 2 hours    How long have you been having the pain - Day's     Have you had any Shortness of Breath - Yes  If Yes - When on exertion    Have you had any dizziness - No    Have you had any palpitations that are not new?  - No    Do you have any edema -No      When did you have your last labs drawn 12/2/21    Do you have a surgery or procedure scheduled in the near future - No

## 2022-03-02 NOTE — PATIENT INSTRUCTIONS
Please be informed that if you contact our office outside of normal business hours the physician on call cannot help with any scheduling or rescheduling issues, procedure instruction questions or any type of medication issue. We advise you for any urgent/emergency that you go to the nearest emergency room! PLEASE CALL OUR OFFICE DURING NORMAL BUSINESS HOURS    Monday - Friday   8 am to 5 pm    [place]: [address]: [phone]    [place]:  [phone]      **It is YOUR responsibilty to bring medication bottles and/or updated medication list to [address] [place] [address].  This will allow us to better serve you and all your healthcare needs**

## 2022-03-03 ENCOUNTER — TELEPHONE (OUTPATIENT)
Dept: CARDIOLOGY CLINIC | Age: 87
End: 2022-03-03

## 2022-03-03 ENCOUNTER — CARE COORDINATION (OUTPATIENT)
Dept: CARE COORDINATION | Age: 87
End: 2022-03-03

## 2022-03-03 NOTE — TELEPHONE ENCOUNTER
Returned patient's dropped call, advised that her CT is scheduled at [facility] at 10:00, tomorrow 3/4/22. She should arrive at 9:30 am.  Have nothing by mouth for 4 hours prior.

## 2022-03-03 NOTE — TELEPHONE ENCOUNTER
Patient called stating that Connie Del Rio called her yesterday about an MRI, she needs to know when and where it is. Please call her back at ph# [phone].

## 2022-03-04 ENCOUNTER — HOSPITAL ENCOUNTER (OUTPATIENT)
Dept: CT IMAGING | Age: 87
Discharge: HOME | End: 2022-03-04
Payer: MEDICARE

## 2022-03-04 DIAGNOSIS — R06.02 SHORTNESS OF BREATH: ICD-10-CM

## 2022-03-04 LAB
GFR AFRICAN AMERICAN: >60 ML/MIN/1.73M2
GFR NON-AFRICAN AMERICAN: >60 ML/MIN/1.73M2
POC CREATININE: 0.4 MG/DL (ref 0.6–1.1)

## 2022-03-04 PROCEDURE — 71275 CT ANGIOGRAPHY CHEST: CPT

## 2022-03-04 PROCEDURE — 6360000004 HC RX CONTRAST MEDICATION: Performed by: NURSE PRACTITIONER

## 2022-03-04 RX ADMIN — IOPAMIDOL 75 ML: 755 INJECTION, SOLUTION INTRAVENOUS at 10:20

## 2022-03-04 NOTE — TELEPHONE ENCOUNTER
Called pt to discuss bill. Pt unavailable at the time of my call and I left a vm encouraging pt to return my call to [phone].

## 2022-03-04 NOTE — TELEPHONE ENCOUNTER
Made several phone call to try to help pt with ambulance bill. Niranjan Jones at [facility] advised me to have pt call the # on the back of the bill as there are currently no services to help with that. She stated, pt could also call the local fire dept or  to discuss issues. I will advise pt of this.

## 2022-03-04 NOTE — CARE COORDINATION
Ambulatory Care Coordination Note  3/4/2022  CM Risk Score: 9  Charlson 10 Year Mortality Risk Score: 100%     ACC: Gabbie Farias RN    Summary Note: ACM call to pt, spoke to [place]. Discussed recent episode of right chest pain and SOB. Had appt with Cardiology 3/2/22 and scheduled for pulmonary CT 3/4/22 at 10:00am. Pt denies any current SOB or chest pain. [place] states she completed PAP application and faxed back to Henri Ramachandran last week for medication assistance. States that Krupa Tee was working on resources for assistance with her ambulance bill. COPD: Pt denies any new cough or current SOB. COPD zone tool discussed. Reviewed s/s to monitor and when to contact PCP. Confirmed pts appt for Pulmonary CT 3/4/22 at 10:00am. Discussed fall prevention and safety. Encouraged pt to contact ACM or PCP with any questions or needs. Actions:  Reviewed COPD zone tool  Confirmed appt for CT    Plan:  Review ACP  Complete SDOH  Continue to review community resources  Continue education on COPD zone tool       COPD Assessment    Does the patient understand envrionmental exposure?: Yes  Is the patient able to verbalize Rescue vs. Long Acting medications?: Yes  Does the patient have a nebulizer?: Yes  Does the patient use a space with inhaled medications?: No     No patient-reported symptoms         Symptoms:     Have you had a recent diagnosis of pneumonia either by PCP or at a hospital?: No      and   General Assessment    Do you have any symptoms that are causing concern?: No           Care Coordination Interventions    Program Enrollment: Rising Risk  Referral from Primary Care Provider: No  Suggested Interventions and Community Resources  Fall Risk Prevention:  In Process  Home Health Services: Declined (Comment: Declined 2/22)  Meals on Wheels: Declined (Comment: Declined 2/22)  Medication Assistance Program: Not Started (Comment: referral for med asst. 2/22)  Medi Set or Pill Pack: Declined (Comment: Declined 2/22)  Occupational Therapy: Declined (Comment: Just completed 2/22)  Pharmacist: Declined (Comment: Declined 2/22)  Physical Therapy: Declined (Comment: Just completed 2/22)  Registered Dietician: Declined (Comment: Declined 2/22)  Social Work: Not Started (Comment:  for financial questions regarding ambulance bill)  Other Services: Not Started (Comment: Maverick Lanza - Medicare Questions)  Transportation Support: Declined  Zone Management Tools: In Process (Comment: COPD)         Goals Addressed                 This Visit's Progress     Conditions and Symptoms   On track     I will schedule office visits, as directed by my provider. I will keep my appointment or reschedule if I have to cancel. I will notify my provider of any barriers to my plan of care. I will follow my Zone Management tool to seek urgent or emergent care. I will notify my provider of any symptoms that indicate a worsening of my condition. Barriers: financial and lack of education  Plan for overcoming my barriers: Utilize [facility] for education and resources    Confidence: 8/10  Anticipated Goal Completion Date: 5/9/22              Prior to Admission medications    Medication Sig Start Date End Date Taking?  Authorizing Provider   lisinopril (PRINIVIL;ZESTRIL) 5 MG tablet Take 1 tablet by mouth daily 3/2/22   DERIK Comer - CNP   levothyroxine (EUTHYROX) 75 MCG tablet 1 tablet DAILY (route: oral) 1/27/22   Historical Provider, MD   montelukast (SINGULAIR) 10 MG tablet TAKE 1 TABLET PER NG TUBE NIGHTLY 2/22/22   Gisella Abdullahi MD   atorvastatin (LIPITOR) 40 MG tablet Take 1 tablet by mouth nightly 2/22/22   Gisella Abdullahi MD   escitalopram (LEXAPRO) 20 MG tablet Take 1 tablet by mouth daily 2/22/22   Gisella Abdullahi MD   pantoprazole (PROTONIX) 40 MG tablet TAKE 1 TABLET NIGHTLY 2/22/22   Gisella Abdullahi MD   ondansetron (ZOFRAN) 4 MG tablet Take 1 tablet by mouth every 8 hours as needed for Nausea or Vomiting 2/22/22 Eloy Burkitt, MD   [facility]icap [facility]acard [facility] by Does not apply route Please provide handicap placard for 5 years.     Diagnosis: Orthopedic condition 2/14/22   Eloy Burkitt, MD   aspirin DELIA [facility] ASPIRIN ADULT LOW DOSE) 81 MG EC tablet Take 1 tablet by mouth daily 12/2/21   MELISSA Najera MD   ipratropium-albuterol (DUONEB) 0.5-2.5 (3) MG/3ML SOLN nebulizer solution Inhale 3 mLs into the lungs every 4 hours (while awake) 8/6/21   Eloy Burkitt, MD   sucralfate (CARAFATE) 1 GM tablet Take 1 tablet by mouth 4 times daily 6/21/21   Hui Barillas MD   Budeson-Glycopyrrol-Formoterol 160-9-4.8 MCG/ACT AERO Inhale 2 puffs into the lungs 2 times daily 3/19/21   Eloy Burkitt, MD   Tiotropium Bromide-Olodaterol 2.5-2.5 MCG/ACT AERS Inhale 2 puffs into the lungs daily 2/22/21   Eloy Burkitt, MD   albuterol sulfate  (90 Base) MCG/ACT inhaler Inhale 2 puffs into the lungs every 6 hours as needed for Wheezing 8/10/20   Stef Villa MD   Timolol (TIMOPTIC) 0.5 % (DAILY) SOLN ophthalmic solution Place 1 drop into both eyes daily    Historical Provider, MD   vitamin B-12 (CYANOCOBALAMIN) 1000 MCG tablet Take 1,000 mcg by mouth daily OTC    Historical Provider, MD   senna-docusate (Zannie Force) 8.6-50 MG per tablet Take 1 tablet by mouth daily     Historical Provider, MD   Calcium-Magnesium-Zinc 494-78-5 MG TABS Take 1 tablet by mouth daily OTC    Historical Provider, MD   Cholecalciferol (VITAMIN D3) 2000 UNITS CAPS Take 1 capsule by mouth daily OTC    Historical Provider, MD       Future Appointments   Date Time Provider Wero Rowe   3/4/2022 10:00 AM Inga [facility] Rad   3/22/2022 11:00 AM DERIK Cannon - CNP [facility] Heart MMA   3/24/2022  2:00 PM SCHEDULE, SR[facility] CC GASTRO [facility] Gastro MMA   4/22/2022 10:20 AM Yamila Obregon MD [facility] Heart [facility]   5/3/2022  8:00 AM Epi Deshpande DO [facility] NEURO MMA

## 2022-03-07 ENCOUNTER — TELEPHONE (OUTPATIENT)
Dept: CARDIOLOGY CLINIC | Age: 87
End: 2022-03-07

## 2022-03-07 NOTE — TELEPHONE ENCOUNTER
----- Message from DERIK Lopez CNP sent at 3/6/2022  5:59 PM EST -----  Please phone results- CT negative for PE      Spoke to pt about CTA results, pt was pleased and stated she was feeling better.   [facility] [place]

## 2022-03-08 ENCOUNTER — TELEPHONE (OUTPATIENT)
Dept: GASTROENTEROLOGY | Age: 87
End: 2022-03-08

## 2022-03-08 NOTE — TELEPHONE ENCOUNTER
Per my call to [facility]; no Ludmila Bernal is required.  Ref# [id number]

## 2022-03-10 ENCOUNTER — CARE COORDINATION (OUTPATIENT)
Dept: CARE COORDINATION | Age: 87
End: 2022-03-10

## 2022-03-10 NOTE — CARE COORDINATION
I got a fax saying the patient must apply for LIS before they will enroll her into the [facility], they will send this to the patient to let them know.           [facility]   Medication Assistance  [address] [place] [address], and [employer]    (N) [phone]  (W) [phone]

## 2022-03-11 ENCOUNTER — CARE COORDINATION (OUTPATIENT)
Dept: CARE COORDINATION | Age: 87
End: 2022-03-11

## 2022-03-14 NOTE — CARE COORDINATION
Attempted to reach patient for continued Care Coordination follow up and education. Patient was unavailable at the time of my call, and a generic voicemail message was left asking patient to return my call at [phone].

## 2022-03-14 NOTE — PROGRESS NOTES
3/14/22    Tracie Gowers Clos  1935    Chief Complaint   Patient presents with    Follow-Up from Hospital     TIA x3 11/14/21        History of Present Illness  Juju Rivas is a 80 y.o. female presenting today for follow-up from hospitalization back in November for  unresponsiveness. Found in car by EMS. UDS positive for cannibus Patient saw Dr. Paul Delgado while hospitalized for possible seizure. CTA head and neck non acute, CT head non acute, MRI, EEG unremarkable. Heart cath performed for increased troponins and EKG changes, showed possible Takutsobo syndrome. MRI showed multiple acute/subacute punctate infarcts right frontal lobe, left temporal lobe and left occipital lobe. Possible arrhythmia, ECHO, JAN, Loop recorder. She did not have a Loop recorder placed, this was not discussed. JAN/ECHO-Negative bubble study, EF 25-30%, large calcified plaque in descending aorta. S/P TAVR 2019  D/C on ASA and statin-deferred to cardiology for anticoagulation if underlying arrhythmia. CTA chest performed 3/4/22 negative for PE. She had another episode in December. She felt \"funny\" in her head-not clear in her thinking and both arms went numb. Squad was called but symptoms resolved by the time they arrived. She called PCP and was told to follow with neurology. To note, the bilateral UE numbness was present when she was hospitalized in November with acute CVA. Cannabis to help her sleep and chronic pain, capsules. She has a medical card for this. Juju Rivas denies snoring, waking gasping during the night, waking with morning headaches, she does endorse occasional dry mouth. Juju Rivas does tell me that she has had a right-sided headache frequently since last episode in December. She denies waking with these headaches. She also reports having some tightness in her neck but does not feel this is related to headaches.   She denies any other accompanying symptoms with her headaches such as nausea, vomiting, light sensitivity, sound sensitivity, visual disturbance. Current Outpatient Medications   Medication Sig Dispense Refill    lisinopril (PRINIVIL;ZESTRIL) 5 MG tablet Take 1 tablet by mouth daily 90 tablet 0    levothyroxine (EUTHYROX) 75 MCG tablet 1 tablet DAILY (route: oral)      montelukast (SINGULAIR) 10 MG tablet TAKE 1 TABLET PER NG TUBE NIGHTLY 90 tablet 1    atorvastatin (LIPITOR) 40 MG tablet Take 1 tablet by mouth nightly 90 tablet 1    escitalopram (LEXAPRO) 20 MG tablet Take 1 tablet by mouth daily 90 tablet 1    pantoprazole (PROTONIX) 40 MG tablet TAKE 1 TABLET NIGHTLY 90 tablet 1    ondansetron (ZOFRAN) 4 MG tablet Take 1 tablet by mouth every 8 hours as needed for Nausea or Vomiting 90 tablet 1    Handicap Placard MISC by Does not apply route Please provide handicap placard for 5 years.     Diagnosis: Orthopedic condition 1 each 0    aspirin (EQ ASPIRIN ADULT LOW DOSE) 81 MG EC tablet Take 1 tablet by mouth daily 30 tablet 0    ipratropium-albuterol (DUONEB) 0.5-2.5 (3) MG/3ML SOLN nebulizer solution Inhale 3 mLs into the lungs every 4 hours (while awake) 360 mL 1    sucralfate (CARAFATE) 1 GM tablet Take 1 tablet by mouth 4 times daily 120 tablet 3    Budeson-Glycopyrrol-Formoterol 160-9-4.8 MCG/ACT AERO Inhale 2 puffs into the lungs 2 times daily 1 Inhaler 5    Tiotropium Bromide-Olodaterol 2.5-2.5 MCG/ACT AERS Inhale 2 puffs into the lungs daily 1 Inhaler 5    albuterol sulfate  (90 Base) MCG/ACT inhaler Inhale 2 puffs into the lungs every 6 hours as needed for Wheezing 1 Inhaler 5    Timolol (TIMOPTIC) 0.5 % (DAILY) SOLN ophthalmic solution Place 1 drop into both eyes daily      vitamin B-12 (CYANOCOBALAMIN) 1000 MCG tablet Take 1,000 mcg by mouth daily OTC      senna-docusate (PERICOLACE) 8.6-50 MG per tablet Take 1 tablet by mouth daily       Calcium-Magnesium-Zinc 167-83-8 MG TABS Take 1 tablet by mouth daily OTC      Cholecalciferol (VITAMIN D3) 2000 UNITS CAPS Take 1 capsule by mouth daily OTC       No current facility-administered medications for this visit. Physical Exam:  Also present during visit:     Mental Status   Orientation: oriented to person, oriented to place, oriented to problem and oriented to time    Mood/affectappropriate mood and appropriate affect   Memory/Other: recent memory intact, remote memory intact, fund of knowledge intact, attention span normal and concentration normal  Language  Language: (normal) language, no dysarthria, (normal) articulation and no dysphasia/aphasia  Cranial Nerves   Eyes: pupils normal size and reactive to light and visual fields appear full   CN III, IV, VI : extraocular muscle strength normal, normal pursuit, no nystagmus and no ptosis   Facial Motor: normal facial motor   CN XII: tongue protrudes midline  Motor/Coordination Exam   Power: motor strength appears intact throughout, no arm drift and normal tone   Coordination: normal finger-to-nose, forearm rotation intact and rapid alternating movement normal  Sensory Exam No Bradykinesia, No Dyskindesia, No Tremor, No myoclonus, Normal strength, Normal Tone Normal bulk and Normal tone     Gait and Stance   Gait/Posture: station normal, casual gait normal, ambulates independently , tiptoe normal and steady in Romberg's position with eyes open and closed        /72 (Site: Left Upper Arm, Position: Sitting, Cuff Size: Medium Adult)   Pulse (!) 48   Ht 5' (1.524 m)   Wt 113 lb 12.8 oz (51.6 kg)   SpO2 98%   BMI 22.23 kg/m²     Assessment and Plan     Diagnosis Orders   1. Cerebrovascular accident (CVA), unspecified mechanism ([address]  MRI BRAIN WO CONTRAST    Platelet H6N60 Inhibition   2. Cerebrovascular accident (CVA) due to embolism of cerebral artery ([address]     3. Atherosclerosis of aorta ([address]     4. Primary hypertension     5. S/P TAVR (transcatheter aortic valve replacement)     6. Takotsubo cardiomyopathy     7.  Thyroid disease       I am going to order a MRI without contrast to determine if Kait Peng has had another CVA or recrudescence of old CVA. For now she will remain on aspirin and statin however I will get a verify now study to ensure that aspirin is therapeutic for secondary stroke prevention. I strongly encouraged Kait Peng to follow-up with cardiology for Holter monitor to determine if she has any heart rhythm abnormalities. This was recommended upon hospital discharge from her CVA in November however this has not yet been done. She tells me she does have an appointment with cardiology next week and she will be sure to discuss this. I will be in touch with her with results of MRI as well as verify now study. If she is not therapeutic on aspirin we will switch her to Plavix. If MRI is negative we may look into EEG however Kait Peng denies having any staring spells, automatisms such as lip smacking or eye blinking, recurring feelings of déjà vu, awakening in the morning having bitten her tongue during sleep. Patient understands the importance of recognizing strokelike symptoms such as acute mental status change, slurred speech, facial droop, one-sided weakness differing from baseline. Patient knows to call 911 immediately in the event that they experience any of these symptoms. Medications prescribed for the patient were discussed in detail. This included a discussion of the potential risks versus potential benefits of the medications. The patient was given time to ask questions and these were answered to the best of my ability. The patient appeared to understand the information provided. Return in about 3 months (around 6/15/2022).     DERIK Ruelas - DUSTIN

## 2022-03-15 ENCOUNTER — OFFICE VISIT (OUTPATIENT)
Dept: NEUROLOGY | Age: 87
End: 2022-03-15
Payer: MEDICARE

## 2022-03-15 VITALS
DIASTOLIC BLOOD PRESSURE: 72 MMHG | BODY MASS INDEX: 22.34 KG/M2 | HEART RATE: 48 BPM | OXYGEN SATURATION: 98 % | SYSTOLIC BLOOD PRESSURE: 138 MMHG | WEIGHT: 113.8 LBS | HEIGHT: 60 IN

## 2022-03-15 DIAGNOSIS — I51.81 TAKOTSUBO CARDIOMYOPATHY: ICD-10-CM

## 2022-03-15 DIAGNOSIS — I63.40 CEREBROVASCULAR ACCIDENT (CVA) DUE TO EMBOLISM OF CEREBRAL ARTERY (HCC): ICD-10-CM

## 2022-03-15 DIAGNOSIS — Z95.2 S/P TAVR (TRANSCATHETER AORTIC VALVE REPLACEMENT): ICD-10-CM

## 2022-03-15 DIAGNOSIS — I63.9 CEREBROVASCULAR ACCIDENT (CVA), UNSPECIFIED MECHANISM (HCC): Primary | ICD-10-CM

## 2022-03-15 DIAGNOSIS — I10 PRIMARY HYPERTENSION: ICD-10-CM

## 2022-03-15 DIAGNOSIS — I70.0 ATHEROSCLEROSIS OF AORTA (HCC): ICD-10-CM

## 2022-03-15 DIAGNOSIS — E07.9 THYROID DISEASE: ICD-10-CM

## 2022-03-15 PROCEDURE — 99215 OFFICE O/P EST HI 40 MIN: CPT | Performed by: NURSE PRACTITIONER

## 2022-03-15 NOTE — CARE COORDINATION
Ambulatory Care Coordination Note  3/15/2022  CM Risk Score: 9  Charlson 10 Year Mortality Risk Score: 100%     ACC: Conchita Benedicto    Summary Note: I spoke with the patient for continued Care Coordination follow up and education. Patient denies chest pain or SOB. Pt denies any new cough or current SOB. COPD zone tool discussed. Reviewed s/s to monitor and when to contact PCP. Patient has been in contact in contact with Chelsea [facility]. Patient must apply for LIS before they will enroll her into the [facility]. Educated on how to identify sx's that are worse than the baseline and the importance of early symptom recognition and reporting to prevent exacerbation which may lead to ED visits and hospital admissions. I advised patient to contact PCP office if needed. No further needs at this time. Care Coordination Interventions    Program Enrollment: Rising Risk  Referral from Primary Care Provider: No  Suggested Interventions and Community Resources  Fall Risk Prevention: In Process  Home Health Services: Declined (Comment: Declined 2/22)  Meals on Wheels: Declined (Comment: Declined 2/22)  Medication Assistance Program: Not Started (Comment: referral for med asst. 2/22)  Medi Set or Pill Pack: Declined (Comment: Declined 2/22)  Occupational Therapy: Declined (Comment: Just completed 2/22)  Pharmacist: Adrian Vazquez (Comment: Declined 2/22)  Physical Therapy: Declined (Comment: Just completed 2/22)  Registered Dietician: Declined (Comment: Declined 2/22)  Social Work: Not Started (Comment: SW for financial questions regarding ambulance bill)  Other Services: Not Started (Comment: [employer] [facility] - Medicare Questions)  Transportation Support: Declined  Zone Management Tools: In Process (Comment: COPD)         Goals Addressed    None         Prior to Admission medications    Medication Sig Start Date End Date Taking?  Authorizing Provider   lisinopril (PRINIVIL;ZESTRIL) 5 MG tablet Take 1 tablet by mouth daily 3/2/22   Amira Pathak, APRN - CNP   levothyroxine (EUTHYROX) 75 MCG tablet 1 tablet DAILY (route: oral) 1/27/22   Historical Provider, MD   montelukast (SINGULAIR) 10 MG tablet TAKE 1 TABLET PER NG TUBE NIGHTLY 2/22/22   Kashif Archer MD   atorvastatin (LIPITOR) 40 MG tablet Take 1 tablet by mouth nightly 2/22/22   Kashif Archer MD   escitalopram (LEXAPRO) 20 MG tablet Take 1 tablet by mouth daily 2/22/22   Kashif Archer MD   pantoprazole (PROTONIX) 40 MG tablet TAKE 1 TABLET NIGHTLY 2/22/22   Kashif Archer MD   ondansetron [facility]) 4 MG tablet Take 1 tablet by mouth every 8 hours as needed for Nausea or Vomiting 2/22/22   Kashif Archer MD   Handicap Placard MISC by Does not apply route Please provide handicap placard for 5 years.     Diagnosis: Orthopedic condition 2/14/22   Kashif Archer MD   aspirin [facility] ASPIRIN ADULT LOW DOSE) 81 MG EC tablet Take 1 tablet by mouth daily 12/2/21   C Franki Bryan MD   ipratropium-albuterol (DUONEB) 0.5-2.5 (3) MG/3ML SOLN nebulizer solution Inhale 3 mLs into the lungs every 4 hours (while awake) 8/6/21   Kashif Archer MD   sucralfate (CARAFATE) 1 GM tablet Take 1 tablet by mouth 4 times daily 6/21/21   Neha Pierce MD   Budeson-Glycopyrrol-Formoterol 160-9-4.8 MCG/ACT AERO Inhale 2 puffs into the lungs 2 times daily 3/19/21   Kashif Archer MD   Tiotropium Bromide-Olodaterol 2.5-2.5 MCG/ACT AERS Inhale 2 puffs into the lungs daily 2/22/21   Kashif Archer MD   albuterol sulfate  (90 Base) MCG/ACT inhaler Inhale 2 puffs into the lungs every 6 hours as needed for Wheezing 8/10/20   Padmaja Wheatley MD   Timolol (TIMOPTIC) 0.5 % (DAILY) SOLN ophthalmic solution Place 1 drop into both eyes daily    Historical Provider, MD   vitamin B-12 (CYANOCOBALAMIN) 1000 MCG tablet Take 1,000 mcg by mouth daily OTC    Historical Provider, MD   senna-docusate (PERICOLACE) 8.6-50 MG per tablet Take 1 tablet by mouth daily     Historical Provider, MD Calcium-Magnesium-Zinc 167-83-8 MG TABS Take 1 tablet by mouth daily OTC    Historical Provider, MD   Cholecalciferol (VITAMIN D3) 2000 UNITS CAPS Take 1 capsule by mouth daily OTC    Historical Provider, MD       Future Appointments   Date Time Provider Wero Esquiveli   3/22/2022 10:40 AM DERIK Tom - CNP [facility]   3/24/2022  2:00 PM SCHEDULE, [address] [place] [facility] [address] [place] [facility]   4/22/2022 10:20 AM Nara eSlf MD [facility]   6/15/2022 10:15 AM DERIK Escalera NP [address] [place] [facility]

## 2022-03-21 ENCOUNTER — TELEPHONE (OUTPATIENT)
Dept: GASTROENTEROLOGY | Age: 87
End: 2022-03-21

## 2022-03-21 NOTE — PROGRESS NOTES
Patient will arrive at 46 at [facility] on 3/30/2022 for his procedure at 1045.               1. Do not eat or drink anything after midnight - unless instructed by your doctor prior to surgery. This includes                   no water, chewing gum or mints. 2. Follow your directions as prescribed by the doctor for your procedure and medications. 3. Check with your Doctor regarding stopping vitamins, supplements, blood thinners (Plavix, Coumadin, Lovenox, Effient, Pradaxa, Xarelto, Fragmin or                   other blood thinners) and follow their instructions. 4. Do not smoke, and do not drink any alcoholic beverages 24 hours prior to surgery. This includes NA Beer. 5. You may brush your teeth and gargle the morning of surgery. DO NOT SWALLOW WATER   6. You MUST make arrangements for a responsible adult to take you home after your surgery and be able to check on you every couple                   hours for the day. You will not be allowed to leave alone or drive yourself home. It is strongly suggested someone stay with you the first 24                   hrs. Your surgery will be cancelled if you do not have a ride home. 7. Please wear simple, loose fitting clothing to the hospital.  Claire Varela not bring valuables (money, credit cards, checkbooks, etc.) Do not wear any                   makeup (including no eye makeup) or nail polish on your fingers or toes. 8. DO NOT wear any jewelry or piercings on day of surgery. All body piercing jewelry must be removed. 9. If you have dentures, they will be removed before going to the OR; we will provide you a container. If you wear contact lenses or glasses,                  they will be removed; please bring a case for them. 10. If you  have a Living Will and Durable Power of  for Healthcare, please bring in a copy.            11. Please bring picture ID,  insurance card, paperwork from the doctors office    (H & P, Consent, & card for implantable devices). 12. Take a shower the morning of your procedure with Hibiclens or an anti-bacterial soap. Do not apply any deodorant, lotion, oil or powder. Patient will take her lexapro, levothyroxine and protonix the morning of his procedure, patient will also use her inhalers that morning and bring her rescue inhaler with er that day. Patient will take a breathing treatment the morning of her procedure, if she feels like she needs one.

## 2022-03-22 ENCOUNTER — ANESTHESIA EVENT (OUTPATIENT)
Dept: ENDOSCOPY | Age: 87
End: 2022-03-22
Payer: MEDICARE

## 2022-03-25 ENCOUNTER — TELEPHONE (OUTPATIENT)
Dept: CARDIOLOGY CLINIC | Age: 87
End: 2022-03-25

## 2022-03-25 NOTE — TELEPHONE ENCOUNTER
Cardiologist: Dr. January Humphrey  Surgeon: Dr. [facility]  Surgery: Right Rotator cuff repair/ Subacromial decompression  Anesthesia: General  Date: TBD  FAX# [phone]  # [phone]    Last OV 3/2/2022 w/Mimi    Chest pain with shortness of breath  Occurred with activity - on right side of chest-doubt cardiac- recent [facility] no CAD  Will check D dimer-if elevated consider doing CTA pulmonary  Currently chest pain-free and in no respiratory distress. Shortness of breath has resolved as well     HTN  Blood pressure is not well controlled  Increase lisinopril to 5 mg daily     Valvular heart disease  History of TAVR  Left ventricular systolic function is abnormal.   Ejection fraction is visually estimated at 25-30%.  Mildly dilated left atrium.   No evidence of thrombus within the left atrial appendage.   Negative bubble study; no ASD or PFO noted.   S/p TAVR: 26mm Medtronic Evolut PRO inserted 2019.   Mild mitral regurgitation.     Medications reviewed and confirmed with patient      Tests ordered:  D dimer         Echo- 2021  Poor acoustical windows. Patient is on a ventilator. Left ventricular systolic function is abnormal.   Ejection fraction is visually estimated at 25-30%. [place] is akinetic with ballooning suggesting possible takotsubo. Grade III diastolic dysfunction. S/p TAVR: 26mm Medtronic Evolut PRO inserted 2019; (ADRIANA: 3.71 cm sq,   peak PG: 3 mmHg, mean P mmHg). Mitral annular calcification is present.    No evidence of any pericardial effusion      Cath- 2021   NO SIG DISEASE IN CORONARIES     Aortic valve replacement-2019      Aspirin

## 2022-03-27 NOTE — H&P
Original H &P in soft chart. I have examined the patient immediately before the procedure and there is no change in the previous history and physical exam, which has been reviewed. There is no history of sleep apnea, snoring, or stridor. There has been no  previous adverse experience with sedation/anesthesia. There is no increased risk for aspiration of gastric contents. The patient has been instructed that all resuscitative measures (during the operative and immediate perioperative period) will be instituted in the unlikely event that they will be needed. The patient has no pertinent past surgical or family history other than listed in the original H&P. The patient was counseled about the risks of felicia Covid-19 during their perioperative period and any recovery window from their procedure. The patient was made aware that felicia Covid-19  may worsen their prognosis for recovering from their procedure  and lend to a higher morbidity and/or mortality risk. All material risks, benefits, and reasonable alternatives including postponing the procedure were discussed. The patient does wish to proceed with the procedure at this time.     ASA Class: 3  AIRWAY Class: 1

## 2022-03-29 ENCOUNTER — OFFICE VISIT (OUTPATIENT)
Dept: CARDIOLOGY CLINIC | Age: 87
End: 2022-03-29
Payer: MEDICARE

## 2022-03-29 ENCOUNTER — PROCEDURE VISIT (OUTPATIENT)
Dept: CARDIOLOGY CLINIC | Age: 87
End: 2022-03-29
Payer: MEDICARE

## 2022-03-29 VITALS
HEIGHT: 60 IN | BODY MASS INDEX: 22.58 KG/M2 | OXYGEN SATURATION: 97 % | HEART RATE: 52 BPM | WEIGHT: 115 LBS | SYSTOLIC BLOOD PRESSURE: 160 MMHG | DIASTOLIC BLOOD PRESSURE: 70 MMHG

## 2022-03-29 DIAGNOSIS — I38 VHD (VALVULAR HEART DISEASE): ICD-10-CM

## 2022-03-29 DIAGNOSIS — I51.81 TAKOTSUBO CARDIOMYOPATHY: ICD-10-CM

## 2022-03-29 DIAGNOSIS — I51.81 TAKOTSUBO CARDIOMYOPATHY: Primary | ICD-10-CM

## 2022-03-29 DIAGNOSIS — R01.1 MURMUR: ICD-10-CM

## 2022-03-29 DIAGNOSIS — I10 PRIMARY HYPERTENSION: ICD-10-CM

## 2022-03-29 PROCEDURE — 93308 TTE F-UP OR LMTD: CPT | Performed by: INTERNAL MEDICINE

## 2022-03-29 PROCEDURE — 99214 OFFICE O/P EST MOD 30 MIN: CPT | Performed by: NURSE PRACTITIONER

## 2022-03-29 ASSESSMENT — ENCOUNTER SYMPTOMS
ORTHOPNEA: 0
SHORTNESS OF BREATH: 0

## 2022-03-29 NOTE — PATIENT INSTRUCTIONS
Please be informed that if you contact our office outside of normal business hours the physician on call cannot help with any scheduling or rescheduling issues, procedure instruction questions or any type of medication issue. We advise you for any urgent/emergency that you go to the nearest emergency room! PLEASE CALL OUR OFFICE DURING NORMAL BUSINESS HOURS    Monday - Friday   8 am to 5 pm    [place]: [address]: [phone]    [place]:  [phone]      **It is YOUR responsibilty to bring medication bottles and/or updated medication list to [address] [place] [address].  This will allow us to better serve you and all your healthcare needs**

## 2022-03-29 NOTE — PROGRESS NOTES
3/29/2022  Primary cardiologist: Dr. Ari Millan  is an established 80 y.o.  female here for follow-up on CTA-D dimer      SUBJECTIVE/OBJECTIVE:    HPI : Alicia Cummings is an 80-year-old female patient with a history of valvular heart disease status post TAVR, hypertension, CVA, Takutsubo, hypothyroid and COPD  In September 2019 she underwent aortic valve replacement (26mm Medtronic Evolut PRO). 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 At that time she was under a great deal of stress as her  had just passed away. Alicia Cummings reports she is feeling well. She notes dyspnea on exertion-she can walk around [facility] at least once before she becomes short of breath. She denies chest pain . She notes shoulder pain is planning for upcoming shoulder surgery. She denies episodes of palpitations, lightheadedness or dizziness    Review of Systems   Constitutional: Negative for diaphoresis and malaise/fatigue. Cardiovascular: Positive for dyspnea on exertion. Negative for chest pain, claudication, irregular heartbeat, leg swelling, near-syncope, orthopnea, palpitations and paroxysmal nocturnal dyspnea. Respiratory: Negative for shortness of breath. Musculoskeletal: Positive for arthritis. Neurological: Negative for dizziness and light-headedness. Vitals:    03/29/22 1033 03/29/22 1046   BP: (!) 164/66 (!) 160/70   Site: Left Upper Arm Left Upper Arm   Position: Sitting Sitting   Cuff Size: Medium Adult Medium Adult   Pulse: 52    SpO2: 97% 97%   Weight: 115 lb (52.2 kg)    Height: 5' (1.524 m)      No flowsheet data found. Wt Readings from Last 3 Encounters:   03/29/22 115 lb (52.2 kg)   03/15/22 113 lb 12.8 oz (51.6 kg)   03/02/22 118 lb (53.5 kg)     Body mass index is 22.46 kg/m². Physical Exam  HENT:      Head: Normocephalic. Neck:      Vascular: No carotid bruit.    Cardiovascular: Rate and Rhythm: Regular rhythm. Bradycardia present. Pulses: Normal pulses. Heart sounds: Murmur heard. Pulmonary:      Effort: Pulmonary effort is normal.      Breath sounds: Normal breath sounds. Abdominal:      General: There is no distension. Tenderness: There is no abdominal tenderness. Skin:     General: Skin is warm and dry. Neurological:      General: No focal deficit present. Mental Status: She is alert. Psychiatric:         Mood and Affect: Mood normal.                Current Outpatient Medications   Medication Sig Dispense Refill    lisinopril (PRINIVIL;ZESTRIL) 5 MG tablet Take 1 tablet by mouth daily 90 tablet 0    levothyroxine (EUTHYROX) 75 MCG tablet 1 tablet DAILY (route: oral)      montelukast (SINGULAIR) 10 MG tablet TAKE 1 TABLET PER NG TUBE NIGHTLY 90 tablet 1    atorvastatin (LIPITOR) 40 MG tablet Take 1 tablet by mouth nightly 90 tablet 1    escitalopram (LEXAPRO) 20 MG tablet Take 1 tablet by mouth daily 90 tablet 1    pantoprazole (PROTONIX) 40 MG tablet TAKE 1 TABLET NIGHTLY 90 tablet 1    ondansetron (ZOFRAN) 4 MG tablet Take 1 tablet by mouth every 8 hours as needed for Nausea or Vomiting 90 tablet 1    Handicap Placard MISC by Does not apply route Please provide handicap placard for 5 years.     Diagnosis: Orthopedic condition 1 each 0    aspirin (EQ ASPIRIN ADULT LOW DOSE) 81 MG EC tablet Take 1 tablet by mouth daily 30 tablet 0    ipratropium-albuterol (DUONEB) 0.5-2.5 (3) MG/3ML SOLN nebulizer solution Inhale 3 mLs into the lungs every 4 hours (while awake) 360 mL 1    sucralfate (CARAFATE) 1 GM tablet Take 1 tablet by mouth 4 times daily 120 tablet 3    Budeson-Glycopyrrol-Formoterol 160-9-4.8 MCG/ACT AERO Inhale 2 puffs into the lungs 2 times daily 1 Inhaler 5    Tiotropium Bromide-Olodaterol 2.5-2.5 MCG/ACT AERS Inhale 2 puffs into the lungs daily 1 Inhaler 5    albuterol sulfate  (90 Base) MCG/ACT inhaler Inhale 2 puffs into the lungs every 6 hours as needed for Wheezing 1 Inhaler 5    Timolol (TIMOPTIC) 0.5 % (DAILY) SOLN ophthalmic solution Place 1 drop into both eyes daily      vitamin B-12 (CYANOCOBALAMIN) 1000 MCG tablet Take 1,000 mcg by mouth daily OTC      senna-docusate (PERICOLACE) 8.6-50 MG per tablet Take 1 tablet by mouth daily       Calcium-Magnesium-Zinc 167-83-8 MG TABS Take 1 tablet by mouth daily OTC      Cholecalciferol (VITAMIN D3) 2000 UNITS CAPS Take 1 capsule by mouth daily OTC       No current facility-administered medications for this visit. All pertinent data reviewed and discussed with patient    Results for Sherice Rutherford ()    Ref. Range 3/2/2022 11:57   D-Dimer, [place] [facility] Latest Ref Range: <230 NG/mL(DDU) 478 (H)       CTA pulmonary 03/04/2022  No evidence of pulmonary embolism or acute pulmonary abnormality     ASSESSMENT/PLAN:    Intermittent episodes of shortness of breath-improved since last visit: CTA negative for PE    Depressed left ventricular systolic function  Echocardiogram revealed EF 25 to 30%-apex was akinetic with ballooning suggesting a possible Takotsubo cardiomyopathy  Recommend to recheck limited echo to reassess EF    Valvular heart disease  S/p TAVR number 26 mm Medtronic evolute Pro (2019)  Stable-  Recommend ongoing surveillance    Hypertension  Blood pressure is elevated today however monitoring at home blood pressures been well controlled. We will continue to monitor: If SBP > 140/80 at home to notify office and medications will be adjusted: advised low sodium diet . Continue with lisinopril     Medications reviewed and confirmed with patient     Tests ordered:  Limited echo      Follow-up  6 months      Signed:  DERIK Lawson CNP, 3/29/2022, 10:52 AM    An electronic signature was used to authenticate this note.     Please note this report has been partially produced using speech recognition software and may contain errors related to that system including errors in grammar, punctuation, and spelling, as well as words and phrases that may be inappropriate. If there are any questions or concerns please feel free to contact the dictating provider for clarification.

## 2022-03-29 NOTE — ANESTHESIA PRE PROCEDURE
Department of Anesthesiology  Preprocedure Note       Name:  Natlay Goodrich   Age:  80 y.o.  :  1935                                          MRN:  [id number]         Date:  3/29/2022      Surgeon: Zainab Cuello):  Monda Koyanagi, MD    Procedure: Procedure(s):  EGD ESOPHAGOGASTRODUODENOSCOPY    Medications prior to admission:   Prior to Admission medications    Medication Sig Start Date End Date Taking? Authorizing Provider   lisinopril (PRINIVIL;ZESTRIL) 5 MG tablet Take 1 tablet by mouth daily 3/2/22   DERIK Levine - CNP   levothyroxine (EUTHYROX) 75 MCG tablet 1 tablet DAILY (route: oral) 22   Historical Provider, MD   montelukast (SINGULAIR) 10 MG tablet TAKE 1 TABLET PER NG TUBE NIGHTLY 22   Juliette Mendez MD   atorvastatin (LIPITOR) 40 MG tablet Take 1 tablet by mouth nightly 22   Juliette Mendez MD   escitalopram (LEXAPRO) 20 MG tablet Take 1 tablet by mouth daily 22   Juliette Mendez MD   pantoprazole (PROTONIX) 40 MG tablet TAKE 1 TABLET NIGHTLY 22   Juliette Mendez MD   ondansetron [facility]) 4 MG tablet Take 1 tablet by mouth every 8 hours as needed for Nausea or Vomiting 22   Juliette Mendez MD   Handicap Placard MISC by Does not apply route Please provide handicap placard for 5 years.     Diagnosis: Orthopedic condition 22   Juliette Mendez MD   aspirin [facility] ASPIRIN ADULT LOW DOSE) 81 MG EC tablet Take 1 tablet by mouth daily 21   MELISSA Serna MD   ipratropium-albuterol (DUONEB) 0.5-2.5 (3) MG/3ML SOLN nebulizer solution Inhale 3 mLs into the lungs every 4 hours (while awake) 21   Juliette Mendez MD   sucralfate (CARAFATE) 1 GM tablet Take 1 tablet by mouth 4 times daily 21   Monda Koyanagi, MD   Budeson-Glycopyrrol-Formoterol 160-9-4.8 MCG/ACT AERO Inhale 2 puffs into the lungs 2 times daily 3/19/21   Juliette Mendez MD   Tiotropium Bromide-Olodaterol 2.5-2.5 MCG/ACT AERS Inhale 2 puffs into the lungs daily 21   Juliette Mendez MD albuterol sulfate  (90 Base) MCG/ACT inhaler Inhale 2 puffs into the lungs every 6 hours as needed for Wheezing 8/10/20   Bita Cazares MD   Timolol (TIMOPTIC) 0.5 % (DAILY) SOLN ophthalmic solution Place 1 drop into both eyes daily    Historical Provider, MD   vitamin B-12 (CYANOCOBALAMIN) 1000 MCG tablet Take 1,000 mcg by mouth daily OTC    Historical Provider, MD   senna-docusate (Rosi Kansky) 8.6-50 MG per tablet Take 1 tablet by mouth daily     Historical Provider, MD   Calcium-Magnesium-Zinc 152-46-4 MG TABS Take 1 tablet by mouth daily OTC    Historical Provider, MD   Cholecalciferol (VITAMIN D3) 2000 UNITS CAPS Take 1 capsule by mouth daily OTC    Historical Provider, MD       Current medications:    No current facility-administered medications for this encounter. Current Outpatient Medications   Medication Sig Dispense Refill    lisinopril (PRINIVIL;ZESTRIL) 5 MG tablet Take 1 tablet by mouth daily 90 tablet 0    levothyroxine (EUTHYROX) 75 MCG tablet 1 tablet DAILY (route: oral)      montelukast (SINGULAIR) 10 MG tablet TAKE 1 TABLET PER NG TUBE NIGHTLY 90 tablet 1    atorvastatin (LIPITOR) 40 MG tablet Take 1 tablet by mouth nightly 90 tablet 1    escitalopram (LEXAPRO) 20 MG tablet Take 1 tablet by mouth daily 90 tablet 1    pantoprazole (PROTONIX) 40 MG tablet TAKE 1 TABLET NIGHTLY 90 tablet 1    ondansetron (ZOFRAN) 4 MG tablet Take 1 tablet by mouth every 8 hours as needed for Nausea or Vomiting 90 tablet 1    Handicap Placard MISC by Does not apply route Please provide handicap placard for 5 years.     Diagnosis: Orthopedic condition 1 each 0    aspirin (EQ ASPIRIN ADULT LOW DOSE) 81 MG EC tablet Take 1 tablet by mouth daily 30 tablet 0    ipratropium-albuterol (DUONEB) 0.5-2.5 (3) MG/3ML SOLN nebulizer solution Inhale 3 mLs into the lungs every 4 hours (while awake) 360 mL 1    sucralfate (CARAFATE) 1 GM tablet Take 1 tablet by mouth 4 times daily 120 tablet 3     Vitamin D deficiency E55.9    Vitamin B12 deficiency E53.8    Gastroesophageal reflux disease without esophagitis K21.9    VHD (valvular heart disease) I38    Aortic stenosis, severe I35.0    Acute respiratory distress R06.03    COPD with exacerbation ([facility]) J44.1    Sepsis due to pneumonia ([facility]) J18.9, A41.9    Nodule of lower lobe of right lung R91.1    S/P TAVR (transcatheter aortic valve replacement) Z95.2    Primary hypertension I10    Acute respiratory failure ([facility]) J96.00    Other seizures ([facility]) K18.70    Metabolic encephalopathy P54.99    Cerebrovascular accident (CVA) due to embolism of cerebral artery ([facility]) I63.40    Cerebrovascular accident (CVA) ([facility] I63.9    Acute CVA (cerebrovascular accident) ([facility] I63.9    Ataxia R27.0    Dysarthria due to acute stroke ([facility]) I63.9, R47.1    Takotsubo cardiomyopathy I51.81    Dysthymia F34.1    Current mild episode of major depressive disorder without prior episode ([facility] F32.0    Atherosclerosis of aorta ([facility]) I70.0    Thyroid disease E07.9       Past Medical History:        Diagnosis Date    Arthritis     generalized    Asthma     Blood transfusion 2004    No reaction    Broken heart syndrome     Cerebral artery occlusion with cerebral infarction [facility])     Patient \"states she was told she has had three small strokes. \"    Chronic bronchitis ([facility]     COPD (chronic obstructive pulmonary disease) ([facility]     summer 2014    Echocardiogram 04/09/2021    EF 55-60%, Mild dilated LA, Mild annular calcification present, Mild mitral stenosis, Mild MR & TR.    Fatigue     H/O cardiac catheterization 06/15/2017    mild lad and cx disease    H/O cardiovascular stress test 08/22/2019    H/O Doppler ultrasound 4/7/2016 3/19/12    carotid-4/16 WNL 3/12right mild less than 50%and left wnl    H/O Doppler ultrasound 6/14/017    carotid - mod disease EILEEN, mild disease LICA    H/O echocardiogram 07/23/2010    H/O echocardiogram 04/15/2016    EF 60% sclerotic AV mildly stenosed-recommend yearly echo    H/O echocardiogram 08/22/2019    EF 55-60%, Minimal concentric left ventricular hypertrophy, left atrium is mildly dilated, severe aortic stenosis, Mitral annular calcification is present, Mild AR, Mild-Mod MR, Mild TR, No pericardial effusion     H/O echocardiogram 09/23/2019    H/O exercise stress test 06/14/2017    abnormal    History of cardiac cath 08/28/2019    Severe AS,   TAVR??  History of nuclear stress test 03/14/2017    EF 75%. Normal study.     Holter monitor, abnormal 02/22/2011    infrequent APC are seen    Hyperlipidemia     Hypertension     Normal cardiac stress test 07/23/2010    EF 70%, no ischemia    On home O2     only uses as needed, nightly prn    PONV (postoperative nausea and vomiting)     Syncope and collapse     Tachycardia     HX of tachycardia - had a cardioablation    Thyroid disease        Past Surgical History:        Procedure Laterality Date    AORTIC VALVE REPAIR N/A 09/09/2019    Core Valve EVOLUT 26mm [id number]    AORTIC VALVE REPLACEMENT N/A 09/11/2019    TAVR; Medtronic Evlout 26    BREAST SURGERY  2009    reduction    CARDIAC CATHETERIZATION  03/02/2011    mild to moderate disease of diagonal and proximal RCA   [address]    ablation    CARPAL TUNNEL RELEASE  1990's    bilat    CHOLECYSTECTOMY  1961    open choley    COLONOSCOPY      DILATATION, ESOPHAGUS      ENDOSCOPY, COLON, DIAGNOSTIC  07/03/2017    s/p gastric bypass otherwise normal    EYE SURGERY Bilateral     cataract    FINGER SURGERY      right middle, thumb and index finger (screw and pin in right index finger)    GASTRIC RESTRICTION SURGERY  1984    stapled    HERNIA REPAIR  unknown    Inc hernia hernia repair    HIP FRACTURE SURGERY Left 11/04/2015    Left hip nail    HYSTERECTOMY  1966    Luke w/ BSO    JOINT REPLACEMENT  2004    right knee    LIPECTOMY  1990's    OTHER SURGICAL HISTORY  05/01/2012 INR 0.82 11/14/2021    APTT 29.1 10/13/2020       HCG (If Applicable):   Lab Results   Component Value Date    PREGTESTUR neg 12/18/2012        ABGs:   Lab Results   Component Value Date    PO2ART 124 11/17/2021    IYZ1EYW 38.0 11/17/2021    SFK7JHY 24.1 11/17/2021        Type & Screen (If Applicable):  No results found for: LABABO, LABRH    Drug/Infectious Status (If Applicable):  No results found for: HIV, HEPCAB    COVID-19 Screening (If Applicable):   Lab Results   Component Value Date    COVID19 NOT DETECTED 11/16/2021           Anesthesia Evaluation     history of anesthetic complications: PONV. Airway: Mallampati: II  TM distance: >3 FB     Mouth opening: > = 3 FB Dental:    (+) upper dentures and lower dentures      Pulmonary:   (+) COPD:  decreased breath sounds,  asthma:                            Cardiovascular:    (+) hypertension:, CHF:, VELAZQUEZ:,       ECG reviewed      Echocardiogram reviewed               ROS comment:  Conclusions      Summary   Left ventricular systolic function is abnormal.   Ejection fraction is visually estimated at 25-30%. Mildly dilated left atrium. No evidence of thrombus within the left atrial appendage. Negative bubble study; no ASD or PFO noted. S/p TAVR: 26mm Medtronic Evolut PRO inserted 9/19/2019. Mild mitral regurgitation. No evidence of any pericardial effusion. Large calcific plaque noted in the descending aorta. Signature      ------------------------------------------------------------------   Electronically signed by Funmi Mooney MD   (Interpreting physician) on 11/23/2021 at 10:54 AM   ------------------------------------------------------------------     Takotsubo cardiomyopathy       Neuro/Psych:   (+) seizures:, CVA:,              ROS comment: carotid - mod disease EILEEN, mild disease LICA GI/Hepatic/Renal:   (+) GERD:,           Endo/Other:    (+) hypothyroidism::., .                 Abdominal:             Vascular:           Other Findings:           Anesthesia Plan      MAC     ASA 3     (Chart Review)  Induction: intravenous. Anesthetic plan and risks discussed with patient. Use of blood products discussed with patient whom. Plan discussed with CRNA.     Attending anesthesiologist reviewed and agrees with Preprocedure content            DERIK Cerna - EMMIE   3/29/2022

## 2022-03-29 NOTE — LETTER
Emilee Glasgow  1935  460    Have you had any Chest Pain that is not new? - No    Have you had any Shortness of Breath - Yes  If Yes - When on exertion    Have you had any dizziness - No    Have you had any palpitations that are not new?  - No    Do you have any edema -No      When did you have your last labs drawn 12/2/21    Do you have a surgery or procedure scheduled in the near future - No

## 2022-03-30 ENCOUNTER — TELEPHONE (OUTPATIENT)
Dept: CARDIOLOGY CLINIC | Age: 87
End: 2022-03-30

## 2022-03-30 ENCOUNTER — HOSPITAL ENCOUNTER (OUTPATIENT)
Age: 87
Setting detail: OUTPATIENT SURGERY
Discharge: HOME | End: 2022-03-30
Attending: SPECIALIST | Admitting: SPECIALIST
Payer: MEDICARE

## 2022-03-30 ENCOUNTER — ANESTHESIA (OUTPATIENT)
Dept: ENDOSCOPY | Age: 87
End: 2022-03-30
Payer: MEDICARE

## 2022-03-30 VITALS — OXYGEN SATURATION: 100 % | SYSTOLIC BLOOD PRESSURE: 104 MMHG | DIASTOLIC BLOOD PRESSURE: 46 MMHG

## 2022-03-30 VITALS
OXYGEN SATURATION: 95 % | DIASTOLIC BLOOD PRESSURE: 58 MMHG | WEIGHT: 113 LBS | SYSTOLIC BLOOD PRESSURE: 135 MMHG | TEMPERATURE: 97.9 F | HEART RATE: 49 BPM | HEIGHT: 60 IN | BODY MASS INDEX: 22.19 KG/M2 | RESPIRATION RATE: 16 BRPM

## 2022-03-30 PROCEDURE — 3700000001 HC ADD 15 MINUTES (ANESTHESIA): Performed by: SPECIALIST

## 2022-03-30 PROCEDURE — 7100000010 HC PHASE II RECOVERY - FIRST 15 MIN: Performed by: SPECIALIST

## 2022-03-30 PROCEDURE — 2580000003 HC RX 258: Performed by: SPECIALIST

## 2022-03-30 PROCEDURE — 2500000003 HC RX 250 WO HCPCS: Performed by: NURSE ANESTHETIST, CERTIFIED REGISTERED

## 2022-03-30 PROCEDURE — 3700000000 HC ANESTHESIA ATTENDED CARE: Performed by: SPECIALIST

## 2022-03-30 PROCEDURE — 6360000002 HC RX W HCPCS: Performed by: NURSE ANESTHETIST, CERTIFIED REGISTERED

## 2022-03-30 PROCEDURE — 2709999900 HC NON-CHARGEABLE SUPPLY: Performed by: SPECIALIST

## 2022-03-30 PROCEDURE — 3609017100 HC EGD: Performed by: SPECIALIST

## 2022-03-30 PROCEDURE — 43235 EGD DIAGNOSTIC BRUSH WASH: CPT | Performed by: SPECIALIST

## 2022-03-30 PROCEDURE — 7100000011 HC PHASE II RECOVERY - ADDTL 15 MIN: Performed by: SPECIALIST

## 2022-03-30 RX ORDER — SODIUM CHLORIDE, SODIUM LACTATE, POTASSIUM CHLORIDE, CALCIUM CHLORIDE 600; 310; 30; 20 MG/100ML; MG/100ML; MG/100ML; MG/100ML
INJECTION, SOLUTION INTRAVENOUS CONTINUOUS
Status: DISCONTINUED | OUTPATIENT
Start: 2022-03-30 | End: 2022-03-30 | Stop reason: HOSPADM

## 2022-03-30 RX ORDER — LIDOCAINE HYDROCHLORIDE 20 MG/ML
INJECTION, SOLUTION EPIDURAL; INFILTRATION; INTRACAUDAL; PERINEURAL PRN
Status: DISCONTINUED | OUTPATIENT
Start: 2022-03-30 | End: 2022-03-30 | Stop reason: SDUPTHER

## 2022-03-30 RX ORDER — PROPOFOL 10 MG/ML
INJECTION, EMULSION INTRAVENOUS PRN
Status: DISCONTINUED | OUTPATIENT
Start: 2022-03-30 | End: 2022-03-30 | Stop reason: SDUPTHER

## 2022-03-30 RX ADMIN — LIDOCAINE HYDROCHLORIDE 100 MG: 20 INJECTION, SOLUTION EPIDURAL; INFILTRATION; INTRACAUDAL; PERINEURAL at 11:45

## 2022-03-30 RX ADMIN — SODIUM CHLORIDE, POTASSIUM CHLORIDE, SODIUM LACTATE AND CALCIUM CHLORIDE: 600; 310; 30; 20 INJECTION, SOLUTION INTRAVENOUS at 09:20

## 2022-03-30 RX ADMIN — PROPOFOL 70 MG: 10 INJECTION, EMULSION INTRAVENOUS at 11:45

## 2022-03-30 ASSESSMENT — PAIN - FUNCTIONAL ASSESSMENT: PAIN_FUNCTIONAL_ASSESSMENT: 0-10

## 2022-03-30 ASSESSMENT — PAIN SCALES - GENERAL
PAINLEVEL_OUTOF10: 0
PAINLEVEL_OUTOF10: 0

## 2022-03-30 NOTE — ANESTHESIA POSTPROCEDURE EVALUATION
Department of Anesthesiology  Postprocedure Note    Patient: Dipti Pastor  MRN: [id number]  [place]: 1935  Date of evaluation: 3/30/2022  Time:  12:00 PM     Procedure Summary     Date: 03/30/22 Room / Location: [address]    Anesthesia Start: 1125 Anesthesia Stop: 1200    Procedure: EGD DIAGNOSTIC ONLY (N/A ) Diagnosis:       Gastroesophageal reflux disease, unspecified whether esophagitis present      Nausea      Abdominal pain, unspecified abdominal location      (Gastroesophageal reflux disease, unspecified whether esophagitis present [K21.9] Nausea [R11.0] Abdominal pain, unspecified abdominal location [R10.9])    Surgeons: Melina Rajput MD Responsible Provider: Junior Tasha MD    Anesthesia Type: MAC ASA Status: 3          Anesthesia Type: MAC    Emi Phase I:      Emi Phase II:      Last vitals: Reviewed and per EMR flowsheets.        Anesthesia Post Evaluation    Patient location during evaluation: bedside  Patient participation: complete - patient participated  Level of consciousness: awake and alert  Pain score: 0  Airway patency: patent  Nausea & Vomiting: no vomiting and no nausea  Complications: no  Cardiovascular status: blood pressure returned to baseline and hemodynamically stable  Respiratory status: acceptable, room air, spontaneous ventilation and nonlabored ventilation  Hydration status: stable

## 2022-03-30 NOTE — PROGRESS NOTES
1204  Pt back to [facility] from Endo per cart. Pt is awake and alert--skin W&D. Pt denies any pain or nausea. VS rechecked and stable. Report received from [email or web address] and [facility]. Pt given soda to drink per request.  1230  Pt tolerating po fluids well w/o nausea. Son-in law at bedside  1245  VS remain stable. IV dc'd for pt to get dressed to go home. 0  Pt and son-in-law instructed for discharge care and follow-up with understanding voiced. 1310 To car at exit per wheelchair by volunteer.

## 2022-03-30 NOTE — TELEPHONE ENCOUNTER
----- Message from DERIK Turner CNP sent at 3/30/2022  8:51 AM EDT -----  Please phone results- EF has improved - 55-60%      Spoke to pts daughter Jeanne Henderson, gave her the echo results and she stated she would let her mother know. Reminded her of her next visit with us as well.    Kristi Wetzel

## 2022-03-30 NOTE — BRIEF OP NOTE
BRIEF EGD REPORT:     Photos and full EGD report available by going to [facility] review\" then \"procedures\" then  \"EGD\" then \"View Endoscopy Report\"     IMPRESSION :   1) S/P gastric bypass  2) otherwise normal- no esophagitis or Raza's noted        Suggest:   1) continue anti-reflux Rx

## 2022-03-31 ENCOUNTER — CARE COORDINATION (OUTPATIENT)
Dept: CARE COORDINATION | Age: 87
End: 2022-03-31

## 2022-03-31 NOTE — CARE COORDINATION
ACM call to pt regarding care coordination follow up. No answer. Left voicemail requesting return call to ACM.

## 2022-03-31 NOTE — CARE COORDINATION
HC contacted patient to follow up regarding any possible social needs and a concern about a bill from the [facility] for a medical transport. Pt states that she contacted the number on the back of the bill as advised and was not able to get through to anyone. Pt denies receiving any other communication or attempts to collect the bill. Pt denies any further needs at this time. Pt is advised to contact Aziza Roberts at [phone] with any future needs/ concerns. Care plan: Follow up with patient in 1 week.

## 2022-04-05 ENCOUNTER — TELEPHONE (OUTPATIENT)
Dept: GASTROENTEROLOGY | Age: 87
End: 2022-04-05

## 2022-04-05 NOTE — TELEPHONE ENCOUNTER
1) S/P gastric bypass  2) otherwise normal- no esophagitis or Raza's noted        Suggest:              1) continue anti-reflux Rx (Protonix)    Please notify patient her EGD showed above and follow-up as needed.

## 2022-04-07 ENCOUNTER — CARE COORDINATION (OUTPATIENT)
Dept: CARE COORDINATION | Age: 87
End: 2022-04-07

## 2022-04-07 ASSESSMENT — ENCOUNTER SYMPTOMS: DYSPNEA ASSOCIATED WITH: EXERTION

## 2022-04-07 NOTE — CARE COORDINATION
Ambulatory Care Coordination Note  4/7/2022  CM Risk Score: 5  Charlson 10 Year Mortality Risk Score: 100%     ACC: Gabbie Farias, RN    Summary Note: ACM call to pt regarding care coordination follow up. Spoke to Dyana. She is pleasant and talkative. States she is doing well. States her recent cardiac echo showed improvement and her EGD was normal.     ACM inquired if she had received any information regarding her medication assistance, as the last follow up note states additional information was needed. Pt denies receiving any information or being contacted for additional information regarding medication assistance. States she did complete her social security application, believes was for [employer]. ACM will reach out to SIVAN Noland in Med Assist for clarification. Pt denies any additional needs at this time. Confirmed upcoming appt with Cardiology as 4/22/22. Denies any recent falls. COPD: States her breathing is normal. No new cough, congestion or SOB. Discussed s/s to report to PCP. Pt voiced understanding. Actions:  Reviewed referrals  Reviewed latest appts and tests  Confirmed upcoming appt.    Discussed fall prevention  Message sent to Amadou Montes in Med Assist    Plan:  Monitor referrals  Plan for graduation if no new barriers      COPD Assessment    Does the patient understand envrionmental exposure?: Yes  Is the patient able to verbalize Rescue vs. Long Acting medications?: Yes  Does the patient have a nebulizer?: Yes  Does the patient use a space with inhaled medications?: No     No patient-reported symptoms         Symptoms:  None: Yes      Symptom course: stable  Breathlessness: exertion  Increase use of rapid acting/rescue inhaled medications?: No  Change in chronic cough?: No/At Baseline  Change in sputum?: No/At Baseline  Have you had a recent diagnosis of pneumonia either by PCP or at a hospital?: No      and   General Assessment    Do you have any symptoms that are causing concern?: No Care Coordination Interventions    Program Enrollment: Rising Risk  Referral from Primary Care Provider: No  Suggested Interventions and Community Resources  Fall Risk Prevention: In Process  Home Health Services: Declined (Comment: Declined 2/22)  Meals on Wheels: Declined (Comment: Declined 2/22)  Medication Assistance Program: In Process (Comment: referral for med asst. 2/22)  Medi Set or Pill Pack: Declined (Comment: Declined 2/22)  Occupational Therapy: Declined (Comment: Just completed 2/22)  Pharmacist: Connie Roque (Comment: Declined 2/22)  Physical Therapy: Declined (Comment: Just completed 2/22)  Registered Dietician: Connie Roque (Comment: Declined 2/22)  Social Work: In Process (Comment: SW for financial questions regarding ambulance bill)  Other Services: Completed (Comment: Manda Beaver - Medicare Questions)  Transportation Support: Declined  Zone Management Tools: In Process (Comment: COPD)         Goals Addressed                 This Visit's Progress     Conditions and Symptoms   On track     I will schedule office visits, as directed by my provider. I will keep my appointment or reschedule if I have to cancel. I will notify my provider of any barriers to my plan of care. I will follow my Zone Management tool to seek urgent or emergent care. I will notify my provider of any symptoms that indicate a worsening of my condition. Barriers: financial and lack of education  Plan for overcoming my barriers: Utilize [facility] for education and resources    Confidence: 8/10  Anticipated Goal Completion Date: 5/9/22              Prior to Admission medications    Medication Sig Start Date End Date Taking?  Authorizing Provider   lisinopril (PRINIVIL;ZESTRIL) 5 MG tablet Take 1 tablet by mouth daily 3/2/22   Nitza Diamond APRN - CNP   levothyroxine (EUTHYROX) 75 MCG tablet 1 tablet DAILY (route: oral) 1/27/22   Historical Provider, MD   montelukast (SINGULAIR) 10 MG tablet TAKE 1 TABLET PER NG TUBE NIGHTLY 2/22/22   Sherine Altman MD   atorvastatin (LIPITOR) 40 MG tablet Take 1 tablet by mouth nightly 2/22/22   Sherine Altman MD   escitalopram [facility]) 20 MG tablet Take 1 tablet by mouth daily 2/22/22   Sherine Altman MD   pantoprazole (PROTONIX) 40 MG tablet TAKE 1 TABLET NIGHTLY 2/22/22   Sherine Altman MD   ondansetron [facility]) 4 MG tablet Take 1 tablet by mouth every 8 hours as needed for Nausea or Vomiting 2/22/22   Sherine Altman MD   Handicap Placard MISC by Does not apply route Please provide handicap placard for 5 years.     Diagnosis: Orthopedic condition 2/14/22   Sherine Altman MD   aspirin [facility] ASPIRIN ADULT LOW DOSE) 81 MG EC tablet Take 1 tablet by mouth daily 12/2/21   C Jayde Yusuf MD   ipratropium-albuterol (DUONEB) 0.5-2.5 (3) MG/3ML SOLN nebulizer solution Inhale 3 mLs into the lungs every 4 hours (while awake) 8/6/21   Sherine Altman MD   sucralfate (CARAFATE) 1 GM tablet Take 1 tablet by mouth 4 times daily 6/21/21   Timoteo Paez MD   Budeson-Glycopyrrol-Formoterol 160-9-4.8 MCG/ACT AERO Inhale 2 puffs into the lungs 2 times daily 3/19/21   Sherine Altman MD   albuterol sulfate  (90 Base) MCG/ACT inhaler Inhale 2 puffs into the lungs every 6 hours as needed for Wheezing 8/10/20   Linda Ardon MD   Timolol (TIMOPTIC) 0.5 % (DAILY) SOLN ophthalmic solution Place 1 drop into both eyes daily    Historical Provider, MD   vitamin B-12 (CYANOCOBALAMIN) 1000 MCG tablet Take 1,000 mcg by mouth daily OTC    Historical Provider, MD   senna-docusate (Bernett Pages) 8.6-50 MG per tablet Take 1 tablet by mouth daily     Historical Provider, MD   Calcium-Magnesium-Zinc 464-90-5 MG TABS Take 1 tablet by mouth daily OTC    Historical Provider, MD   Cholecalciferol (VITAMIN D3) 2000 UNITS CAPS Take 1 capsule by mouth daily OTC    Historical Provider, MD       Future Appointments   Date Time Provider Wero Rowe   6/15/2022 10:15 AM Rosalee Lara APRN - NP [facility] NEURO MMA 10/4/2022 11:40 AM Mitesh Haas MD [facility] Heart MMA

## 2022-04-11 ENCOUNTER — TELEPHONE (OUTPATIENT)
Dept: FAMILY MEDICINE CLINIC | Age: 87
End: 2022-04-11

## 2022-04-11 NOTE — TELEPHONE ENCOUNTER
Patient called stating her allergies are really affecting her. She stated her eye hurt and itch, nose itches, sneezing. She stated she takes Singulair every night but wants to know if you can call in something for her to take during the day. Patient uses [facility].

## 2022-04-11 NOTE — TELEPHONE ENCOUNTER
I recommend that she try Flonase 2 sprays each nostril once a day.   I can send a prescription if she would like, or she can buy over-the-counter

## 2022-04-18 ENCOUNTER — CARE COORDINATION (OUTPATIENT)
Dept: CARE COORDINATION | Age: 87
End: 2022-04-18

## 2022-04-18 NOTE — CARE COORDINATION
Deedee Jean called letting me know she got a letter from [employer] that she would be eligible for extra help. I told her to contact the number on the paper to see what her next steps are to get enrolled into Medicare Extra Help.           [facility]   Medication Assistance  [address]    (H) [phone]  (B) [phone]

## 2022-04-25 ENCOUNTER — CARE COORDINATION (OUTPATIENT)
Dept: CARE COORDINATION | Age: 87
End: 2022-04-25

## 2022-04-26 ASSESSMENT — ENCOUNTER SYMPTOMS: DYSPNEA ASSOCIATED WITH: EXERTION

## 2022-04-26 NOTE — CARE COORDINATION
Ambulatory Care Coordination Note  4/26/2022  CM Risk Score: 9  Charlson 10 Year Mortality Risk Score: 100%     ACC: Gabbie Farias RN    Summary Note: ACM call to pt for care coordination follow up. Spoke to Mikaela Vázquez. States she is doing well. [facility] she spoke to Dayron Joe with medication assistance, states she has been approved for Medicare Extra Help program for her medication costs. States she did obtain Flonase a couple weeks ago and it has improved her allergy symptoms. Pt denies any other needs to ongoing symptoms. COPD: Denies any new or worsening cough, congestion, SOB. Discussed zone tool and when to contact PCP. Pt has appts scheduled with Neurology 6/15/22 and Cardiology 10/4/22. She had OV with PCP on 2/22/22 and will call to schedule 6 month follow up. Had appt with Cardiology 4/22/22. Pt compliant with appts and taking medications. Pt agreeable to graduation from care coordination at this time. Has ACM contact information if any needs in the future.          COPD Assessment    Does the patient understand envrionmental exposure?: Yes  Is the patient able to verbalize Rescue vs. Long Acting medications?: Yes  Does the patient have a nebulizer?: Yes  Does the patient use a space with inhaled medications?: No     No patient-reported symptoms         Symptoms:  None: Yes      Symptom course: stable  Breathlessness: exertion  Increase use of rapid acting/rescue inhaled medications?: No  Change in chronic cough?: No/At Baseline  Change in sputum?: No/At Baseline  Self Monitoring - SaO2: No  Have you had a recent diagnosis of pneumonia either by PCP or at a hospital?: No      and   General Assessment    Do you have any symptoms that are causing concern?: No           Care Coordination Interventions    Program Enrollment: Rising Risk  Referral from Primary Care Provider: No  Suggested Interventions and Community Resources  Fall Risk Prevention: Completed  Home Health Services: Declined (Comment: Declined 2/22)  Meals on Wheels: Declined (Comment: Declined 2/22)  Medication Assistance Program: Completed (Comment: referral for med asst. 2/22)  Medi Set or Pill Pack: Declined (Comment: Declined 2/22)  Occupational Therapy: Declined (Comment: Just completed 2/22)  Pharmacist: Leona Macdonald (Comment: Declined 2/22)  Physical Therapy: Declined (Comment: Just completed 2/22)  Registered Dietician: Declined (Comment: Declined 2/22)  Social Work: Completed (Comment:  for financial questions regarding ambulance bill)  Other Services: Completed (Comment: Gamaliel Potts - Medicare Questions)  Transportation Support: Declined  Zone Management Tools: Completed (Comment: COPD)         Goals Addressed                 This Visit's Progress     COMPLETED: Conditions and Symptoms   On track     I will schedule office visits, as directed by my provider. I will keep my appointment or reschedule if I have to cancel. I will notify my provider of any barriers to my plan of care. I will follow my Zone Management tool to seek urgent or emergent care. I will notify my provider of any symptoms that indicate a worsening of my condition. Barriers: financial and lack of education  Plan for overcoming my barriers: Utilize [facility] for education and resources    Confidence: 8/10  Anticipated Goal Completion Date: 5/9/22              Prior to Admission medications    Medication Sig Start Date End Date Taking?  Authorizing Provider   lisinopril (PRINIVIL;ZESTRIL) 5 MG tablet Take 1 tablet by mouth daily 3/2/22   DERIK Sanchez - CNP   levothyroxine (EUTHYROX) 75 MCG tablet 1 tablet DAILY (route: oral) 1/27/22   Historical Provider, MD   montelukast (SINGULAIR) 10 MG tablet TAKE 1 TABLET PER NG TUBE NIGHTLY 2/22/22   Tino Quesada MD   atorvastatin (LIPITOR) 40 MG tablet Take 1 tablet by mouth nightly 2/22/22   Tino Quesada MD   escitalopram (LEXAPRO) 20 MG tablet Take 1 tablet by mouth daily 2/22/22   Tino Quesada MD pantoprazole (PROTONIX) 40 MG tablet TAKE 1 TABLET NIGHTLY 2/22/22   Juliette Mendez MD   ondansetron [facility]) 4 MG tablet Take 1 tablet by mouth every 8 hours as needed for Nausea or Vomiting 2/22/22   Juliette Mendez MD   Handicap Placard MISC by Does not apply route Please provide handicap placard for 5 years.     Diagnosis: Orthopedic condition 2/14/22   Juliette Mendez MD   aspirin [facility] ASPIRIN ADULT LOW DOSE) 81 MG EC tablet Take 1 tablet by mouth daily 12/2/21   C Amilcar Serna MD   ipratropium-albuterol (DUONEB) 0.5-2.5 (3) MG/3ML SOLN nebulizer solution Inhale 3 mLs into the lungs every 4 hours (while awake) 8/6/21   Juliette Mendez MD   sucralfate (CARAFATE) 1 GM tablet Take 1 tablet by mouth 4 times daily 6/21/21   Monda Koyanagi, MD   Budeson-Glycopyrrol-Formoterol 160-9-4.8 MCG/ACT AERO Inhale 2 puffs into the lungs 2 times daily 3/19/21   Juliette Mendez MD   albuterol sulfate  (90 Base) MCG/ACT inhaler Inhale 2 puffs into the lungs every 6 hours as needed for Wheezing 8/10/20   Lacey Barron MD   Timolol (TIMOPTIC) 0.5 % (DAILY) SOLN ophthalmic solution Place 1 drop into both eyes daily    Historical Provider, MD   vitamin B-12 (CYANOCOBALAMIN) 1000 MCG tablet Take 1,000 mcg by mouth daily OTC    Historical Provider, MD   senna-docusate (Magda Dakins) 8.6-50 MG per tablet Take 1 tablet by mouth daily     Historical Provider, MD   Calcium-Magnesium-Zinc 898-71-9 MG TABS Take 1 tablet by mouth daily OTC    Historical Provider, MD   Cholecalciferol (VITAMIN D3) 2000 UNITS CAPS Take 1 capsule by mouth daily OTC    Historical Provider, MD       Future Appointments   Date Time Provider Wero Rowe   6/15/2022 10:15 AM DERIK Bynum - NP [facility] NEURO [facility]   10/4/2022 11:40 AM Jarad Castelan MD [facility] Heart MMA

## 2022-05-17 RX ORDER — LISINOPRIL 5 MG/1
5 TABLET ORAL DAILY
Qty: 90 TABLET | Refills: 3 | Status: SHIPPED | OUTPATIENT
Start: 2022-05-17

## 2022-05-24 ENCOUNTER — TELEPHONE (OUTPATIENT)
Dept: FAMILY MEDICINE CLINIC | Age: 87
End: 2022-05-24

## 2022-05-24 NOTE — TELEPHONE ENCOUNTER
Patient called asking for refill on Stiolto. She states she has not used it for about six months because it was expensive. She is leaving town on 5-26-22.

## 2022-05-25 DIAGNOSIS — J44.9 CHRONIC OBSTRUCTIVE PULMONARY DISEASE, UNSPECIFIED COPD TYPE (HCC): ICD-10-CM

## 2022-05-25 NOTE — TELEPHONE ENCOUNTER
Patient is using it now she wasn't using because too expensive but switched insurance and can afford it and  yes it works.

## 2022-05-25 NOTE — TELEPHONE ENCOUNTER
I called [facility] [address] [place] and they stated this was ordered on 2-22-21 under the generic medication name of Tiotropium.

## 2022-05-26 ENCOUNTER — CARE COORDINATION (OUTPATIENT)
Dept: CARE COORDINATION | Age: 87
End: 2022-05-26

## 2022-05-26 NOTE — CARE COORDINATION
Olga Willis called asking about the cost for her Stiolto, after reviewing her chart I see she has Medicare Extra Help now. I told her she won't qualify for assistance since she is already getting government assistance, I suggested that she calls her insurance to inquire about the villalta.         [facility]   Medication Assistance  [address] [place] [address], and [employer] [place]    (S) [phone]  (P) [phone]

## 2022-06-15 ENCOUNTER — OFFICE VISIT (OUTPATIENT)
Dept: NEUROLOGY | Age: 87
End: 2022-06-15
Payer: MEDICARE

## 2022-06-15 VITALS
OXYGEN SATURATION: 96 % | BODY MASS INDEX: 22.19 KG/M2 | WEIGHT: 113 LBS | SYSTOLIC BLOOD PRESSURE: 128 MMHG | HEART RATE: 50 BPM | HEIGHT: 60 IN | DIASTOLIC BLOOD PRESSURE: 78 MMHG

## 2022-06-15 DIAGNOSIS — I63.9 CEREBROVASCULAR ACCIDENT (CVA), UNSPECIFIED MECHANISM (HCC): Primary | ICD-10-CM

## 2022-06-15 DIAGNOSIS — I70.0 ATHEROSCLEROSIS OF AORTA (HCC): ICD-10-CM

## 2022-06-15 DIAGNOSIS — Z95.2 S/P TAVR (TRANSCATHETER AORTIC VALVE REPLACEMENT): ICD-10-CM

## 2022-06-15 DIAGNOSIS — I10 PRIMARY HYPERTENSION: ICD-10-CM

## 2022-06-15 DIAGNOSIS — I51.81 TAKOTSUBO CARDIOMYOPATHY: ICD-10-CM

## 2022-06-15 PROCEDURE — 99213 OFFICE O/P EST LOW 20 MIN: CPT | Performed by: NURSE PRACTITIONER

## 2022-06-15 PROCEDURE — 1123F ACP DISCUSS/DSCN MKR DOCD: CPT | Performed by: NURSE PRACTITIONER

## 2022-06-15 NOTE — PROGRESS NOTES
6/15/22    [address] [facility]  1935    Chief Complaint   Patient presents with    Follow-up     history of CVA. Still having walking issues       History of Present Illness  Marc Pascal is a 80 y.o. female presenting today for follow-up of: History of CVA, TIA. On last visit 3/20/2022 which was a hospital follow-up for TIA, Marc Pascal was encouraged to follow-up with cardiology for Holter monitor as this was not done after hospitalization but had been recommended. She did not receive a Holter monitor. She was discharged on aspirin and statin for secondary stroke prevention, on last visit verify now study was ordered to ensure aspirin was a therapeutic agent for her, verify now study showed that Marc Pascal was a responder to aspirin. MRI brain without contrast was ordered on last visit as this was not done during hospitalization, MRI was negative for acute findings. She reports today that she has been experiencing feeling her pulse in her feet occasionally in the mornings when laying in bed during stretching. She denies any other associated symptoms. She also reports having headaches on top of her head occasionally with no other associated symptoms. She reports taking tylenol which will rid her of head pain. Pain rated 4/10. Neither of these symptoms happen with any regularity.        Current Outpatient Medications   Medication Sig Dispense Refill    tiotropium-olodaterol (STIOLTO) 2.5-2.5 MCG/ACT AERS Inhale 2 puffs into the lungs daily 1 each 5    lisinopril (PRINIVIL;ZESTRIL) 5 MG tablet Take 1 tablet by mouth daily 90 tablet 3    levothyroxine (EUTHYROX) 75 MCG tablet 1 tablet DAILY (route: oral)      montelukast (SINGULAIR) 10 MG tablet TAKE 1 TABLET PER NG TUBE NIGHTLY 90 tablet 1    atorvastatin (LIPITOR) 40 MG tablet Take 1 tablet by mouth nightly 90 tablet 1    escitalopram (LEXAPRO) 20 MG tablet Take 1 tablet by mouth daily 90 tablet 1    pantoprazole (PROTONIX) 40 MG tablet TAKE 1 TABLET NIGHTLY 90 tablet 1    ondansetron (ZOFRAN) 4 MG tablet Take 1 tablet by mouth every 8 hours as needed for Nausea or Vomiting 90 tablet 1    Handicap Placard MISC by Does not apply route Please provide handicap placard for 5 years. Diagnosis: Orthopedic condition 1 each 0    aspirin (EQ ASPIRIN ADULT LOW DOSE) 81 MG EC tablet Take 1 tablet by mouth daily 30 tablet 0    ipratropium-albuterol (DUONEB) 0.5-2.5 (3) MG/3ML SOLN nebulizer solution Inhale 3 mLs into the lungs every 4 hours (while awake) 360 mL 1    sucralfate (CARAFATE) 1 GM tablet Take 1 tablet by mouth 4 times daily 120 tablet 3    Budeson-Glycopyrrol-Formoterol 160-9-4.8 MCG/ACT AERO Inhale 2 puffs into the lungs 2 times daily 1 Inhaler 5    albuterol sulfate  (90 Base) MCG/ACT inhaler Inhale 2 puffs into the lungs every 6 hours as needed for Wheezing 1 Inhaler 5    Timolol (TIMOPTIC) 0.5 % (DAILY) SOLN ophthalmic solution Place 1 drop into both eyes daily      vitamin B-12 (CYANOCOBALAMIN) 1000 MCG tablet Take 1,000 mcg by mouth daily OTC      senna-docusate (PERICOLACE) 8.6-50 MG per tablet Take 1 tablet by mouth daily       Calcium-Magnesium-Zinc 167-83-8 MG TABS Take 1 tablet by mouth daily OTC      Cholecalciferol (VITAMIN D3) 2000 UNITS CAPS Take 1 capsule by mouth daily OTC       No current facility-administered medications for this visit.        Physical Exam:    Mental Status              Orientation: oriented to person, oriented to place, oriented to problem and oriented to time                        Mood/affectappropriate mood and appropriate affect              Memory/Other: recent memory intact, remote memory intact, fund of knowledge intact, attention span normal and concentration normal  Language  Language: (normal) language, no dysarthria, (normal) articulation and no dysphasia/aphasia  Cranial Nerves              Eyes: pupils normal size and reactive to light and visual fields appear full              CN III, IV, VI : extraocular muscle strength normal, normal pursuit, no nystagmus and no ptosis              Facial Motor: normal facial motor              CN XII: tongue protrudes midline  Motor/Coordination Exam              Power: motor strength appears intact throughout, no arm drift and normal tone              Coordination: normal finger-to-nose, forearm rotation intact and rapid alternating movement normal  Sensory Exam No Bradykinesia, No Dyskindesia, No Tremor, No myoclonus, Normal strength, Normal Tone Normal bulk and Normal tone                Gait and Stance              Gait/Posture: station normal, casual gait normal, ambulates independently , tiptoe normal and steady in Romberg's position with eyes open and closed         /78 (Site: Left Upper Arm, Position: Sitting, Cuff Size: Medium Adult)   Pulse 50   Ht 5' (1.524 m)   Wt 113 lb (51.3 kg)   SpO2 96%   BMI 22.07 kg/m²     Assessment and Plan     Diagnosis Orders   1. Cerebrovascular accident (CVA), unspecified mechanism ([address]     2. Primary hypertension     3. S/P TAVR (transcatheter aortic valve replacement)     4. Takotsubo cardiomyopathy     5. Atherosclerosis of aorta ([address]       Furman Schlatter will remain on aspirin and statin for secondary stroke prevention. I encouraged Furman Schlatter to follow-up with PCP in the event that the reported feeling her pulse in her feet or her headaches increase in frequency or severity. Furman Schlatter will continue to follow with other medical services for management of her other chronic health conditions. Patient understands the importance of recognizing strokelike symptoms such as acute mental status change, slurred speech, facial droop, one-sided weakness differing from baseline. Patient knows to call 911 immediately in the event that they experience any of these symptoms. I will plan on following up with Pepe Tolbertstarr again in 6 months, sooner if the need arises  Return in about 6 months (around 12/15/2022).     Clement Braun, APRN - NP

## 2022-06-21 NOTE — TELEPHONE ENCOUNTER
Patient called state Dr. Yoon Factor prescribed her 0.25 mg of the ropinirole and she has been taking 2 tabs a night and it is helping some. Patient wants to know if you can send in a refill of a higher dose. 142

## 2022-06-23 ENCOUNTER — TELEPHONE (OUTPATIENT)
Dept: NEUROLOGY | Age: 87
End: 2022-06-23

## 2022-06-23 NOTE — TELEPHONE ENCOUNTER
Received call from pt stating her orthopedic surgeon requires surgical clearance for shoulder surgery stating that she is stable and healthy enough for surgery following CVA. Informed that she will have to obtain clearance to stop anticoagulants from cardiologist. She stated Dr. Kyle Pepe with [facility] is faxing clearance form.

## 2022-06-23 NOTE — LETTER
[facility] Neurology  [address] [address]  Phone: [phone]  Fax: [address], DO      July 11, 2022     Patient: Mónica Figueroa   MR Number: [id number]   YOB: 1935   Date of Visit: 6/23/2022       Dear Dr. Karl Barragan :    After reviewing the patient's chart, it is okay  to hold aspirin prior to surgery at the discretion of her surgeon. Would resume aspirin as soon as possible after surgery as long as there are no complications. Certainly, there would be increased risk of stroke during the time of holding aspirin but if she is in need of the surgery holding aspirin would be important to decrease bleeding risk. If you have questions, please do not hesitate to call me. I look forward to following [place] along with you.     Sincerely,        Shruti Rodarte,     CC providers:    No Recipients

## 2022-06-24 ENCOUNTER — OFFICE VISIT (OUTPATIENT)
Dept: CARDIOLOGY CLINIC | Age: 87
End: 2022-06-24
Payer: MEDICARE

## 2022-06-24 VITALS
WEIGHT: 119.13 LBS | HEIGHT: 60 IN | OXYGEN SATURATION: 100 % | DIASTOLIC BLOOD PRESSURE: 64 MMHG | BODY MASS INDEX: 23.39 KG/M2 | HEART RATE: 54 BPM | SYSTOLIC BLOOD PRESSURE: 120 MMHG | RESPIRATION RATE: 16 BRPM

## 2022-06-24 DIAGNOSIS — Z01.818 PREOPERATIVE CLEARANCE: Primary | ICD-10-CM

## 2022-06-24 DIAGNOSIS — I38 VHD (VALVULAR HEART DISEASE): ICD-10-CM

## 2022-06-24 PROBLEM — I35.0 AORTIC STENOSIS, SEVERE: Status: RESOLVED | Noted: 2019-08-28 | Resolved: 2022-06-24

## 2022-06-24 PROBLEM — I63.9 ACUTE CVA (CEREBROVASCULAR ACCIDENT) (HCC): Status: RESOLVED | Noted: 2021-11-26 | Resolved: 2022-06-24

## 2022-06-24 PROCEDURE — 99214 OFFICE O/P EST MOD 30 MIN: CPT | Performed by: NURSE PRACTITIONER

## 2022-06-24 PROCEDURE — 93000 ELECTROCARDIOGRAM COMPLETE: CPT | Performed by: NURSE PRACTITIONER

## 2022-06-24 PROCEDURE — 1123F ACP DISCUSS/DSCN MKR DOCD: CPT | Performed by: NURSE PRACTITIONER

## 2022-06-24 ASSESSMENT — ENCOUNTER SYMPTOMS
SHORTNESS OF BREATH: 0
ORTHOPNEA: 0

## 2022-06-24 NOTE — PROGRESS NOTES
6/24/2022  Primary cardiologist: Dr. Iqbal Sol:   Daisy Diego  is an established 80 y.o.  female here for a pre op assessment VHD- preop assessment       SUBJECTIVE/OBJECTIVE:  Daisy Diego is a 80 y.o. female with a history of valvular heart disease status post TAVR, hypertension, bradycardia ,Takutsubo, hypothyroid, COPD, and CVA ( ischemic). In September 2019 she underwent aortic valve replacement (26mm Medtronic Evolut PRO).   She underwent left heart catheterization November 2021 demonstrated no significant disease in the coronaries with possible Takutsubo syndrome. At that time she was under a great deal of stress as her  had just passed away. Repeat echo showed left ventricular systolic function normal with EF 55 to 60% with normal functioning prosthetic aortic valve; mean gradient 4 mmHg. HPI :   Daisy Diego reports overall she is feeling fairly well. Denies chest pain, shortness of breath or palpitations. Notes right shoulder pain and is planning for shoulder replacement. Review of Systems   Constitutional: Negative for diaphoresis and malaise/fatigue. Cardiovascular: Negative for chest pain, claudication, dyspnea on exertion, irregular heartbeat, leg swelling, near-syncope, orthopnea, palpitations and paroxysmal nocturnal dyspnea. Respiratory: Negative for shortness of breath. Musculoskeletal: Positive for joint pain. Neurological: Negative for dizziness and light-headedness. Vitals:    06/24/22 0952   BP: 120/64   Pulse: 54   Resp: 16   SpO2: 100%   Weight: 119 lb 2 oz (54 kg)   Height: 5' (1.524 m)     No flowsheet data found. Wt Readings from Last 3 Encounters:   06/24/22 119 lb 2 oz (54 kg)   06/15/22 113 lb (51.3 kg)   03/30/22 113 lb (51.3 kg)     Body mass index is 23.27 kg/m². Physical Exam  Vitals reviewed. HENT:      Head: Normocephalic. Eyes:      Extraocular Movements: Extraocular movements intact. Neck:      Vascular: No carotid bruit.    Cardiovascular: Rate and Rhythm: Normal rate and regular rhythm. Pulses: Normal pulses. Pulmonary:      Effort: Pulmonary effort is normal.      Breath sounds: Normal breath sounds. Abdominal:      General: There is distension. Tenderness: There is no abdominal tenderness. Musculoskeletal:      Right lower leg: No edema. Left lower leg: No edema. Skin:     General: Skin is warm and dry. Capillary Refill: Capillary refill takes less than 2 seconds. Neurological:      General: No focal deficit present. Mental Status: She is alert and oriented to person, place, and time. Psychiatric:         Mood and Affect: Mood normal.                Current Outpatient Medications   Medication Sig Dispense Refill    lisinopril (PRINIVIL;ZESTRIL) 5 MG tablet Take 1 tablet by mouth daily 90 tablet 3    levothyroxine (EUTHYROX) 75 MCG tablet 1 tablet DAILY (route: oral)      montelukast (SINGULAIR) 10 MG tablet TAKE 1 TABLET PER NG TUBE NIGHTLY 90 tablet 1    atorvastatin (LIPITOR) 40 MG tablet Take 1 tablet by mouth nightly 90 tablet 1    escitalopram (LEXAPRO) 20 MG tablet Take 1 tablet by mouth daily 90 tablet 1    pantoprazole (PROTONIX) 40 MG tablet TAKE 1 TABLET NIGHTLY 90 tablet 1    ondansetron (ZOFRAN) 4 MG tablet Take 1 tablet by mouth every 8 hours as needed for Nausea or Vomiting 90 tablet 1    Handicap Placard MISC by Does not apply route Please provide handicap placard for 5 years.     Diagnosis: Orthopedic condition 1 each 0    aspirin (EQ ASPIRIN ADULT LOW DOSE) 81 MG EC tablet Take 1 tablet by mouth daily 30 tablet 0    ipratropium-albuterol (DUONEB) 0.5-2.5 (3) MG/3ML SOLN nebulizer solution Inhale 3 mLs into the lungs every 4 hours (while awake) 360 mL 1    sucralfate (CARAFATE) 1 GM tablet Take 1 tablet by mouth 4 times daily 120 tablet 3    albuterol sulfate  (90 Base) MCG/ACT inhaler Inhale 2 puffs into the lungs every 6 hours as needed for Wheezing 1 Inhaler 5    Timolol (TIMOPTIC) 0.5 % (DAILY) SOLN ophthalmic solution Place 1 drop into both eyes daily      vitamin B-12 (CYANOCOBALAMIN) 1000 MCG tablet Take 1,000 mcg by mouth daily OTC      Calcium-Magnesium-Zinc 167-83-8 MG TABS Take 1 tablet by mouth daily OTC      Cholecalciferol (VITAMIN D3) 2000 UNITS CAPS Take 1 capsule by mouth daily OTC       No current facility-administered medications for this visit. All pertinent data reviewed and discussed with patient       ASSESSMENT/PLAN:    Valvular heart disease  Underwent aortic valve replacement 2019. Echocardiogram from March 2022 reveals stable valve      Takotsubo cardiomyopathy  Noted moderately depressed left ventricular systolic function with apical ballooning suggestive of Takotsubo in November 2021 after the passing of her . Left heart catheterization showed no significant CAD. Repeat echocardiogram shows normalization of left ventricular systolic function. Bradycardia  Asymptomatic bradycardia: Continue to monitor  EKG SB no acute ST or T wave abnormality   Recommend no rate lowering medications    Hypertension  Blood pressures well controlled with use of lisinopril: Recommend to continue present dose       Revised Cardiac Risk Index:   High risk type of surgery no   H/O ischemic heart disease  (h/o MI, or a positive stress test, current complaint of chest pain considered to be secondary to myocardial ischemia,  Use of nitrate therapy or ECG with pathological Q waves; do not count prior coronary revascularization procedure unless one of the other criteria for ischemic heart disease is present) no     H/O heart failure yes  H/O CVA yes   H/O DM treated with insulin no  Preoperative serum creatinine >2.0 mg/dl (177 micromol/L) no     The calculated rate of cardiac death, nonfatal myocardial infarction, and nonfatal cardiac arrest according to the number of predictors is;    Two risk factors  2.4% (95% CI: 1.3-3.5)     she is considered a moderate risk for surgery. Anticoagulation can be held prior to surgery at the discretion of the surgeon. Current recommendations are to hold 24-48 hours prior to surgery. It should be resumed as soon as possible if held. Antiplatelet therapy can be held prior to surgery at the discretion of the surgeon. To be resumed as soon as possible if held. Known history of valvular heart disease yes   S/p TAVR- valve is stable - echo March 2022       Tests ordered:  none  Follow-up  As scheduled      Signed:  DERIK Sousa CNP, 6/24/2022, 10:02 AM    An electronic signature was used to authenticate this note. Please note this report has been partially produced using speech recognition software and may contain errors related to that system including errors in grammar, punctuation, and spelling, as well as words and phrases that may be inappropriate. If there are any questions or concerns please feel free to contact the dictating provider for clarification.

## 2022-06-27 ENCOUNTER — TELEPHONE (OUTPATIENT)
Dept: CARDIOLOGY CLINIC | Age: 87
End: 2022-06-27

## 2022-07-11 NOTE — TELEPHONE ENCOUNTER
Okay to hold aspirin prior to surgery at the discretion of her surgeon. Would resume aspirin as soon as possible after surgery as long as there are no complications. Certainly, there would be increased risk of stroke during the time of holding aspirin but if she is in need of the surgery holding aspirin would be important to decrease bleeding risk.

## 2022-07-18 NOTE — TELEPHONE ENCOUNTER
Received call from pt stating Dr. Lele Uribe office hasn't received surgical clearance.  Printed and faxed to #[phone] to Leoncio Mendosa at Dr. Lele Uribe office per request.

## 2022-08-11 ENCOUNTER — TELEPHONE (OUTPATIENT)
Dept: FAMILY MEDICINE CLINIC | Age: 87
End: 2022-08-11

## 2022-08-11 NOTE — TELEPHONE ENCOUNTER
Patient called asking for a prescription for Robaxin for her leg cramps.   She stated if she stretches in bed she gets cramps in her legs

## 2022-08-22 ENCOUNTER — OFFICE VISIT (OUTPATIENT)
Dept: FAMILY MEDICINE CLINIC | Age: 87
End: 2022-08-22
Payer: MEDICARE

## 2022-08-22 VITALS
SYSTOLIC BLOOD PRESSURE: 120 MMHG | TEMPERATURE: 96.7 F | RESPIRATION RATE: 16 BRPM | WEIGHT: 115 LBS | OXYGEN SATURATION: 97 % | HEIGHT: 60 IN | DIASTOLIC BLOOD PRESSURE: 64 MMHG | HEART RATE: 47 BPM | BODY MASS INDEX: 22.58 KG/M2

## 2022-08-22 DIAGNOSIS — G47.62 NOCTURNAL LEG CRAMPS: ICD-10-CM

## 2022-08-22 DIAGNOSIS — Z86.73 HISTORY OF CVA (CEREBROVASCULAR ACCIDENT): ICD-10-CM

## 2022-08-22 DIAGNOSIS — Z00.00 MEDICARE ANNUAL WELLNESS VISIT, SUBSEQUENT: ICD-10-CM

## 2022-08-22 DIAGNOSIS — K21.9 GASTROESOPHAGEAL REFLUX DISEASE WITHOUT ESOPHAGITIS: ICD-10-CM

## 2022-08-22 DIAGNOSIS — J30.89 NON-SEASONAL ALLERGIC RHINITIS, UNSPECIFIED TRIGGER: ICD-10-CM

## 2022-08-22 DIAGNOSIS — F32.0 CURRENT MILD EPISODE OF MAJOR DEPRESSIVE DISORDER WITHOUT PRIOR EPISODE (HCC): Primary | ICD-10-CM

## 2022-08-22 DIAGNOSIS — R11.0 NAUSEA: ICD-10-CM

## 2022-08-22 PROCEDURE — 99214 OFFICE O/P EST MOD 30 MIN: CPT | Performed by: FAMILY MEDICINE

## 2022-08-22 PROCEDURE — 1123F ACP DISCUSS/DSCN MKR DOCD: CPT | Performed by: FAMILY MEDICINE

## 2022-08-22 PROCEDURE — G0439 PPPS, SUBSEQ VISIT: HCPCS | Performed by: FAMILY MEDICINE

## 2022-08-22 RX ORDER — BACLOFEN 10 MG/1
10 TABLET ORAL EVERY EVENING
Qty: 90 TABLET | Refills: 1 | Status: SHIPPED | OUTPATIENT
Start: 2022-08-22 | End: 2022-10-12 | Stop reason: ALTCHOICE

## 2022-08-22 RX ORDER — ATORVASTATIN CALCIUM 40 MG/1
40 TABLET, FILM COATED ORAL NIGHTLY
Qty: 90 TABLET | Refills: 1 | Status: SHIPPED | OUTPATIENT
Start: 2022-08-22

## 2022-08-22 RX ORDER — ESCITALOPRAM OXALATE 20 MG/1
20 TABLET ORAL DAILY
Qty: 90 TABLET | Refills: 1 | Status: SHIPPED | OUTPATIENT
Start: 2022-08-22

## 2022-08-22 RX ORDER — MONTELUKAST SODIUM 10 MG/1
TABLET ORAL
Qty: 90 TABLET | Refills: 1 | Status: SHIPPED | OUTPATIENT
Start: 2022-08-22

## 2022-08-22 RX ORDER — ONDANSETRON 4 MG/1
4 TABLET, FILM COATED ORAL EVERY 8 HOURS PRN
Qty: 90 TABLET | Refills: 1 | Status: SHIPPED | OUTPATIENT
Start: 2022-08-22

## 2022-08-22 RX ORDER — PANTOPRAZOLE SODIUM 40 MG/1
TABLET, DELAYED RELEASE ORAL
Qty: 90 TABLET | Refills: 1 | Status: SHIPPED | OUTPATIENT
Start: 2022-08-22

## 2022-08-22 ASSESSMENT — PATIENT HEALTH QUESTIONNAIRE - PHQ9
8. MOVING OR SPEAKING SO SLOWLY THAT OTHER PEOPLE COULD HAVE NOTICED. OR THE OPPOSITE, BEING SO FIGETY OR RESTLESS THAT YOU HAVE BEEN MOVING AROUND A LOT MORE THAN USUAL: 0
SUM OF ALL RESPONSES TO PHQ QUESTIONS 1-9: 0
1. LITTLE INTEREST OR PLEASURE IN DOING THINGS: 0
4. FEELING TIRED OR HAVING LITTLE ENERGY: 0
10. IF YOU CHECKED OFF ANY PROBLEMS, HOW DIFFICULT HAVE THESE PROBLEMS MADE IT FOR YOU TO DO YOUR WORK, TAKE CARE OF THINGS AT HOME, OR GET ALONG WITH OTHER PEOPLE: 0
2. FEELING DOWN, DEPRESSED OR HOPELESS: 0
SUM OF ALL RESPONSES TO PHQ QUESTIONS 1-9: 0
9. THOUGHTS THAT YOU WOULD BE BETTER OFF DEAD, OR OF HURTING YOURSELF: 0
5. POOR APPETITE OR OVEREATING: 0
3. TROUBLE FALLING OR STAYING ASLEEP: 0
SUM OF ALL RESPONSES TO PHQ QUESTIONS 1-9: 0
7. TROUBLE CONCENTRATING ON THINGS, SUCH AS READING THE NEWSPAPER OR WATCHING TELEVISION: 0
6. FEELING BAD ABOUT YOURSELF - OR THAT YOU ARE A FAILURE OR HAVE LET YOURSELF OR YOUR FAMILY DOWN: 0
SUM OF ALL RESPONSES TO PHQ QUESTIONS 1-9: 0
SUM OF ALL RESPONSES TO PHQ9 QUESTIONS 1 & 2: 0

## 2022-08-22 ASSESSMENT — LIFESTYLE VARIABLES
HOW MANY STANDARD DRINKS CONTAINING ALCOHOL DO YOU HAVE ON A TYPICAL DAY: PATIENT DOES NOT DRINK
HOW OFTEN DO YOU HAVE A DRINK CONTAINING ALCOHOL: NEVER

## 2022-08-22 ASSESSMENT — VISUAL ACUITY
OD_CC: 20/40
OS_CC: 20/50

## 2022-08-22 NOTE — PATIENT INSTRUCTIONS
Advance Directives: Care Instructions  Overview  An advance directive is a legal way to state your wishes at the end of your life. It tells your family and your doctor what to do if you can't say what youwant. There are two main types of advance directives. You can change them any timeyour wishes change. Living will. This form tells your family and your doctor your wishes about life support and other treatment. The form is also called a declaration. Medical power of . This form lets you name a person to make treatment decisions for you when you can't speak for yourself. This person is called a health care agent (health care proxy, health care surrogate). The form is also called a durable power of  for health care. If you do not have an advance directive, decisions about your medical care maybe made by a family member, or by a doctor or a  who doesn't know you. It may help to think of an advance directive as a gift to the people who carefor you. If you have one, they won't have to make tough decisions by themselves. Follow-up care is a key part of your treatment and safety. Be sure to make and go to all appointments, and call your doctor if you are having problems. It's also a good idea to know your test results and keep alist of the medicines you take. What should you include in an advance directive? Many states have a unique advance directive form. (It may ask you to address specific issues.) Or you might use a universal form that's approved by manystates. If your form doesn't tell you what to address, it may be hard to know what to include in your advance directive. Use the questions below to help you getstarted. Who do you want to make decisions about your medical care if you are not able to? What life-support measures do you want if you have a serious illness that gets worse over time or can't be cured? What are you most afraid of that might happen?  (Maybe you're afraid of

## 2022-08-22 NOTE — PROGRESS NOTES
Subjective: Shanti Johansen is a 80 y.o. female who presents for follow up of depression. Current symptoms include depressed mood. Symptoms have been controlled since that time. Patient denies recurrent thoughts of death. Previous treatment includes:  Lexapro 20 mg daily . She complains of the following side effects from the treatment: none. Patient with GERD symptoms. Patient currently on Protonix 40 mg daily with good control. She does have occasional nausea for which she takes Zofran. Patient with a history of CVA January 22. She has occasional symptoms similar to what she had prior to her stroke but they do not last very long. She has not followed up with a neurologist since she was hospitalized. While hospitalized she was diagnosed with Takotsubo cardiomyopathy. She does follow with cardiology regularly. Patient does have chronic allergic rhinitis for which she takes Singulair with good control. Muscle spasms in legs at night. Has used Robaxin in the past with good results. Patient's medications, allergies, past medical, surgical, social and family histories were reviewed and updated as appropriate. Review of Systems  ROS:  Energy level fair overall, and weight is stable. No chest pain or shortness of breath. Bowels have been normal without constipation or diarrhea. No shakes or tremors. Objective:      /64 (Site: Left Upper Arm, Position: Sitting, Cuff Size: Medium Adult)   Pulse (!) 47   Temp (!) 96.7 °F (35.9 °C) (Infrared)   Resp 16   Ht 5' (1.524 m)   Wt 115 lb (52.2 kg)   SpO2 97%   BMI 22.46 kg/m²    General:  alert, appears stated age and cooperative   Neck: Thyroid not palpable, not enlarged, no nodules detected. Chest: clear with no wheezes or rales. No retractions, or use of accessory muscles noted. Cardiovascular: PMI is not displaced, and no thrill noted. Regular rate and rhythm with no rub, murmur or gallop. No peripheral edema.   Pedal pulses are normal.    Affect & Behavior:  full facial expressions, good grooming, good insight, normal perception, normal reasoning, normal speech pattern and content and normal thought patterns        Assessment:      Diagnosis Orders   1. Current mild episode of major depressive disorder without prior episode (HCC)   Controlled escitalopram (LEXAPRO) 20 MG tablet   2. Gastroesophageal reflux disease without esophagitis   controlled pantoprazole (PROTONIX) 40 MG tablet   3. Nausea   Controlled ondansetron (ZOFRAN) 4 MG tablet   4. Acute pain of left knee   Improving traMADol (ULTRAM) 50 MG tablet   5. Contusion of left thigh, sequela   Improving traMADol (ULTRAM) 50 MG tablet   6. History of CVA (cerebrovascular accident)  atorvastatin (LIPITOR) 40 MG tablet    Eva Desir MD, Neurology, [facility]   7. Non-seasonal allergic rhinitis, unspecified trigger   Controlled montelukast (SINGULAIR) 10 MG tablet   8. Takotsubo cardiomyopathy   Currently asymptomatic lisinopril (PRINIVIL;ZESTRIL) 2.5 MG tablet               Plan:    continue all medications. Recommended counseling. Follow up: 6 months.

## 2022-08-22 NOTE — PROGRESS NOTES
support that you need?: Yes  Do you have a Living Will?: (!) No    Advance Directives       Power of [address] Will ACP-Advance Directive ACP-Power of     Not on File Not on File Not on File Not on File        General Health Risk Interventions:  No Living Will: Advance Care Planning addressed with patient today    Health Habits/Nutrition:  Physical Activity: Sufficiently Active    Days of Exercise per Week: 7 days    Minutes of Exercise per Session: 30 min     Have you lost any weight without trying in the past 3 months?: (!) Yes  Body mass index: 22.46  Have you seen the dentist within the past year?: Yes  Health Habits/Nutrition Interventions:  Nutritional issues:  educational materials for healthy, well-balanced diet provided             Objective   Vitals:    08/22/22 1303   BP: 120/64   Site: Left Upper Arm   Position: Sitting   Cuff Size: Medium Adult   Pulse: (!) 47   Resp: 16   Temp: (!) 96.7 °F (35.9 °C)   TempSrc: Infrared   SpO2: 97%   Weight: 115 lb (52.2 kg)   Height: 5' (1.524 m)      Body mass index is 22.46 kg/m². Allergies   Allergen Reactions    Morphine Anaphylaxis     Dilaudid OK    Bactrim [Sulfamethoxazole-Trimethoprim]     Influenza Vaccines      Ended in hospital stay for 3 days told by md not to get it anymore     Lactose Intolerance (Gi) Nausea Only    Phenergan [Promethazine Hcl] Other (See Comments)     Makes me jerk all over. Zofran OK    Stadol [Butorphanol Tartrate] Other (See Comments)     Out of body experience. Was told it was a near death experience and was placed in ICU. Dilaudid OK    Tape Claude Erm Tape] Other (See Comments)     Plastic cause itching and rash. Paper tape causes my skin to blister. (Tega-derm and Coban OK to use.)     Prior to Visit Medications    Medication Sig Taking?  Authorizing Provider   baclofen (LIORESAL) 10 MG tablet Take 1 tablet by mouth every evening Yes May MD Freya   montelukast (SINGULAIR) 10 MG tablet TAKE 1 TABLET PER NG TUBE NIGHTLY Yes Candi Kuhn MD   atorvastatin (LIPITOR) 40 MG tablet Take 1 tablet by mouth nightly Yes Candi Kuhn MD   escitalopram (LEXAPRO) 20 MG tablet Take 1 tablet by mouth daily Yes Candi Kuhn MD   pantoprazole (PROTONIX) 40 MG tablet TAKE 1 TABLET NIGHTLY Yes Candi Kuhn MD   ondansetron (ZOFRAN) 4 MG tablet Take 1 tablet by mouth every 8 hours as needed for Nausea or Vomiting Yes Candi Kuhn MD   lisinopril (PRINIVIL;ZESTRIL) 5 MG tablet Take 1 tablet by mouth daily  Blima Lombardi, APRN - CNP   levothyroxine (EUTHYROX) 75 MCG tablet 1 tablet DAILY (route: oral)  Historical Provider, MD   Handicap Placard MISC by Does not apply route Please provide handicap placard for 5 years.     Diagnosis: Orthopedic condition  Candi Kuhn MD   aspirin (EQ ASPIRIN ADULT LOW DOSE) 81 MG EC tablet Take 1 tablet by mouth daily  MELISSA Romano MD   ipratropium-albuterol (DUONEB) 0.5-2.5 (3) MG/3ML SOLN nebulizer solution Inhale 3 mLs into the lungs every 4 hours (while awake)  Candi Kuhn MD   sucralfate (CARAFATE) 1 GM tablet Take 1 tablet by mouth 4 times daily  Talia Lopez MD   albuterol sulfate  (90 Base) MCG/ACT inhaler Inhale 2 puffs into the lungs every 6 hours as needed for Wheezing  Anders Kenney MD   Timolol (TIMOPTIC) 0.5 % (DAILY) SOLN ophthalmic solution Place 1 drop into both eyes daily  Historical Provider, MD   vitamin B-12 (CYANOCOBALAMIN) 1000 MCG tablet Take 1,000 mcg by mouth daily OTC  Historical Provider, MD   Calcium-Magnesium-Zinc 242-56-6 MG TABS Take 1 tablet by mouth daily OTC  Historical Provider, MD   Cholecalciferol (VITAMIN D3) 2000 UNITS CAPS Take 1 capsule by mouth daily OTC  Historical Provider, MD Wagner (Including outside providers/suppliers regularly involved in providing care):   Patient Care Team:  Candi Kuhn MD as PCP - General (Family Medicine)  Candi Kuhn MD as PCP - [facility] Empaneled Provider  Rachael Gaming MD as Consulting Physician (Cardiology)     Reviewed and updated this visit:  Tobacco  Allergies  Med Hx  Surg Hx  Soc Hx  Fam Hx             Cardiovascular Disease Risk Counseling: Assessed the patient's risk to develop cardiovascular disease and reviewed main risk factors. Reviewed steps to reduce disease risk including:   Quitting tobacco use, reducing amount smoked, or not starting the habit  Making healthy food choices  Being physically active and gradualy increasing activity levels   Reduce weight and determine a healthy BMI goal  Monitor blood pressure and treat if higher than 140/90 mmHg  Maintain blood total cholesterol levels under 5 mmol/l or 190 mg/dl  Maintain LDL cholesterol levels under 3.0 mmol/l or 115 mg/dl   Control blood glucose levels  Consider taking aspirin (75 mg daily), once blood pressure is controlled   Provided a follow up plan. Time spent (minutes): 3  Advance Care Planning   Advanced Care Planning: Discussed the patients choices for care and treatment in case of a health event that adversely affects decision-making abilities. Also discussed the patients long-term treatment options. Reviewed with the patient the appropriate state-specific advance directive documents. Reviewed the process of designating a competent adult as an Agent (or -in-fact) that could take make health care decisions for the patient if incompetent. Patient was asked to complete the declaration forms, either acknowledge the forms by a public notary or an eligible witness and provide a signed copy to the practice office.   Time spent (minutes): 5

## 2022-08-23 LAB
ALBUMIN/GLOBULIN RATIO: 2.4 RATIO (ref 0.8–2.6)
ALBUMIN: 4.3 G/DL (ref 3.5–5.2)
ALP BLD-CCNC: 58 U/L (ref 23–144)
ALT SERPL-CCNC: 64 U/L (ref 0–60)
AST SERPL-CCNC: 48 U/L (ref 0–55)
BILIRUB SERPL-MCNC: 0.7 MG/DL (ref 0–1.2)
BUN BLDV-MCNC: 10 MG/DL (ref 3–29)
BUN/CREAT BLD: 17 (ref 7–25)
CALCIUM SERPL-MCNC: 9.4 MG/DL (ref 8.5–10.5)
CHLORIDE BLD-SCNC: 101 MEQ/L (ref 96–110)
CHOLESTEROL: 114 MG/DL
CO2: 26 MEQ/L (ref 19–32)
CREAT SERPL-MCNC: 0.6 MG/DL (ref 0.5–1.2)
GLOBULIN: 1.8 G/DL (ref 1.9–3.6)
GLOMERULAR FILTRATION RATE: 87 MLS/MIN/1.73M2
GLUCOSE BLD-MCNC: 88 MG/DL (ref 70–99)
HDLC SERPL-MCNC: 71 MG/DL
LDL CHOLESTEROL CALCULATED: 28 MG/DL
POTASSIUM SERPL-SCNC: 5.2 MEQ/L (ref 3.4–5.3)
SODIUM BLD-SCNC: 137 MEQ/L (ref 135–148)
STATUS: ABNORMAL
TOTAL PROTEIN: 6.1 G/DL (ref 6–8.3)
TRIGL SERPL-MCNC: 77 MG/DL
VLDLC SERPL CALC-MCNC: 15 MG/DL (ref 4–38)

## 2022-09-02 PROBLEM — E78.2 MIXED HYPERLIPIDEMIA: Status: ACTIVE | Noted: 2022-09-02

## 2022-09-16 ENCOUNTER — TELEPHONE (OUTPATIENT)
Dept: FAMILY MEDICINE CLINIC | Age: 87
End: 2022-09-16

## 2022-09-16 NOTE — TELEPHONE ENCOUNTER
Taya Landry from [facility] calling. Pt having surgery on Tuesday, Sept 20 and would like to have the last lab results from August 22 faxed to [phone] as a pretest for surgery.

## 2022-09-26 ENCOUNTER — TELEPHONE (OUTPATIENT)
Dept: FAMILY MEDICINE CLINIC | Age: 87
End: 2022-09-26

## 2022-09-26 NOTE — TELEPHONE ENCOUNTER
Pt stated that she is going to call her insurance and figure out what to do about her prescriptions.

## 2022-09-26 NOTE — TELEPHONE ENCOUNTER
Pt called in stating that her insurance wants her prescription for her Lisinopril and atorvastatin to be sent in as 100 day prescriptions to [facility] in [facility].

## 2022-10-12 ENCOUNTER — TELEPHONE (OUTPATIENT)
Dept: FAMILY MEDICINE CLINIC | Age: 87
End: 2022-10-12

## 2022-10-12 RX ORDER — METHOCARBAMOL 500 MG/1
500 TABLET, FILM COATED ORAL EVERY EVENING
Qty: 30 TABLET | Refills: 5 | Status: SHIPPED | OUTPATIENT
Start: 2022-10-12

## 2022-10-12 NOTE — TELEPHONE ENCOUNTER
The next thing I would recommend trying would be getting tonic water and drinking 6 ounces before bedtime. It contains quinine which can help with nocturnal leg cramps.

## 2022-10-12 NOTE — TELEPHONE ENCOUNTER
Patient informed. She would like to know why she can not get robaxin. She stated she has previously been on it and it helped.

## 2022-10-12 NOTE — TELEPHONE ENCOUNTER
Pt called in stating that the baclofen 10 mg is not helping with the jerking in her legs. Please advise.

## 2022-10-24 ENCOUNTER — OFFICE VISIT (OUTPATIENT)
Dept: CARDIOLOGY CLINIC | Age: 87
End: 2022-10-24
Payer: MEDICARE

## 2022-10-24 VITALS
BODY MASS INDEX: 23.13 KG/M2 | HEIGHT: 60 IN | DIASTOLIC BLOOD PRESSURE: 42 MMHG | SYSTOLIC BLOOD PRESSURE: 110 MMHG | OXYGEN SATURATION: 99 % | HEART RATE: 60 BPM | WEIGHT: 117.8 LBS

## 2022-10-24 DIAGNOSIS — I38 VHD (VALVULAR HEART DISEASE): Primary | ICD-10-CM

## 2022-10-24 PROCEDURE — 1123F ACP DISCUSS/DSCN MKR DOCD: CPT | Performed by: INTERNAL MEDICINE

## 2022-10-24 PROCEDURE — 99214 OFFICE O/P EST MOD 30 MIN: CPT | Performed by: INTERNAL MEDICINE

## 2022-10-24 NOTE — PROGRESS NOTES
Karen Mckeon  is a  Established patient  ,80 y.o.   female here for evaluation of the following chief complaint(s):    vhd        SUBJECTIVE/OBJECTIVE:  HPI : h/o  Vhd, TAVR now here  Has been feeling fatigued, has mild mid sternal recurrent CP as well. Review of Systems    fatigue    Vitals:    10/24/22 1617 10/24/22 1622   BP: (!) 98/40 (!) 110/42   Site: Left Upper Arm Left Upper Arm   Position: Sitting Sitting   Cuff Size: Medium Adult Medium Adult   Pulse: 60    SpO2: 99%    Weight: 117 lb 12.8 oz (53.4 kg)    Height: 5' (1.524 m)        BP (!) 110/42 (Site: Left Upper Arm, Position: Sitting, Cuff Size: Medium Adult)   Pulse 60   Ht 5' (1.524 m)   Wt 117 lb 12.8 oz (53.4 kg)   SpO2 99%   BMI 23.01 kg/m²   No flowsheet data found. Wt Readings from Last 3 Encounters:   10/24/22 117 lb 12.8 oz (53.4 kg)   08/22/22 115 lb (52.2 kg)   06/24/22 119 lb 2 oz (54 kg)     Body mass index is 23.01 kg/m². Physical Exam     Neck: JVD      Lungs : clear    Cardio : Si and S2 audilble      Ext: edema      All pertinent data reviewed      Meds : reviewed         Tests ordered    echo    ASSESSMENT/PLAN:    - VHD had TAVR  Check echo  Conclusions      Summary   This is a limited echo to assess EF and RVSP. Left ventricular function is normal, EF is estimated at 55-60%. Normally functioning prosthetic valve in aortic position with mean gradient   of 4mmHg. Mild tricuspid regurgitation. Mild Pulmonary hypertension with RVSP of 43mmhg. No evidence of pericardial effusion. Signature      ------------------------------------------------------------------   Electronically signed by Cady Avalos MD   (Interpreting physician) on 03/29/2022 at 06:18 PM    -Had mild pulmonary hypertension in the last echo    - fatigue check echo for EF    - has shoulder surgery    An electronic signature was used to authenticate this note.     --Gigi Samuel MD

## 2022-11-02 ENCOUNTER — TRANSCRIBE ORDERS (OUTPATIENT)
Dept: ADMINISTRATIVE | Age: 87
End: 2022-11-02

## 2022-11-02 DIAGNOSIS — M81.0 AGE-RELATED OSTEOPOROSIS WITHOUT CURRENT PATHOLOGICAL FRACTURE: Primary | ICD-10-CM

## 2022-11-07 ENCOUNTER — HOSPITAL ENCOUNTER (OUTPATIENT)
Dept: WOMENS IMAGING | Age: 87
Discharge: HOME | End: 2022-11-07
Payer: MEDICARE

## 2022-11-07 DIAGNOSIS — M81.0 AGE-RELATED OSTEOPOROSIS WITHOUT CURRENT PATHOLOGICAL FRACTURE: ICD-10-CM

## 2022-11-07 PROCEDURE — 77080 DXA BONE DENSITY AXIAL: CPT

## 2022-12-05 ENCOUNTER — TELEPHONE (OUTPATIENT)
Dept: CARDIOLOGY CLINIC | Age: 87
End: 2022-12-05

## 2022-12-07 ENCOUNTER — OFFICE VISIT (OUTPATIENT)
Dept: CARDIOLOGY CLINIC | Age: 87
End: 2022-12-07
Payer: MEDICARE

## 2022-12-07 ENCOUNTER — HOSPITAL ENCOUNTER (OUTPATIENT)
Age: 87
Discharge: HOME | End: 2022-12-07
Payer: MEDICARE

## 2022-12-07 ENCOUNTER — NURSE ONLY (OUTPATIENT)
Dept: CARDIOLOGY CLINIC | Age: 87
End: 2022-12-07

## 2022-12-07 VITALS
HEIGHT: 60 IN | WEIGHT: 119 LBS | HEART RATE: 48 BPM | DIASTOLIC BLOOD PRESSURE: 56 MMHG | BODY MASS INDEX: 23.36 KG/M2 | OXYGEN SATURATION: 96 % | SYSTOLIC BLOOD PRESSURE: 152 MMHG

## 2022-12-07 DIAGNOSIS — R53.83 OTHER FATIGUE: ICD-10-CM

## 2022-12-07 DIAGNOSIS — I73.9 PAD (PERIPHERAL ARTERY DISEASE) (HCC): Primary | ICD-10-CM

## 2022-12-07 DIAGNOSIS — R00.1 BRADYCARDIA: ICD-10-CM

## 2022-12-07 DIAGNOSIS — E07.9 THYROID DISEASE: ICD-10-CM

## 2022-12-07 LAB
HCT VFR BLD CALC: 32.2 % (ref 37–47)
HEMOGLOBIN: 9.6 GM/DL (ref 12.5–16)
MCH RBC QN AUTO: 25.6 PG (ref 27–31)
MCHC RBC AUTO-ENTMCNC: 29.8 % (ref 32–36)
MCV RBC AUTO: 85.9 FL (ref 78–100)
PDW BLD-RTO: 15.8 % (ref 11.7–14.9)
PLATELET # BLD: 278 K/CU MM (ref 140–440)
PMV BLD AUTO: 10 FL (ref 7.5–11.1)
RBC # BLD: 3.75 M/CU MM (ref 4.2–5.4)
TSH HIGH SENSITIVITY: 1.05 UIU/ML (ref 0.27–4.2)
WBC # BLD: 6.5 K/CU MM (ref 4–10.5)

## 2022-12-07 PROCEDURE — 99214 OFFICE O/P EST MOD 30 MIN: CPT | Performed by: NURSE PRACTITIONER

## 2022-12-07 PROCEDURE — 84443 ASSAY THYROID STIM HORMONE: CPT

## 2022-12-07 PROCEDURE — 1123F ACP DISCUSS/DSCN MKR DOCD: CPT | Performed by: NURSE PRACTITIONER

## 2022-12-07 PROCEDURE — 85027 COMPLETE CBC AUTOMATED: CPT

## 2022-12-07 PROCEDURE — 36415 COLL VENOUS BLD VENIPUNCTURE: CPT

## 2022-12-07 ASSESSMENT — ENCOUNTER SYMPTOMS
ORTHOPNEA: 0
SHORTNESS OF BREATH: 0

## 2022-12-07 NOTE — PROGRESS NOTES
12/7/2022  Primary cardiologist: Dr. Gui Smith:   Osmani Brian  is an established 80 y.o.  female here for pain in legs      SUBJECTIVE/OBJECTIVE:  Osmani Brian is a 80 y.o. female with a history of valvular heart disease status post TAVR, hypertension, bradycardia ,Takutsubo, hypothyroid, COPD, and CVA ( ischemic). In September 2019 she underwent aortic valve replacement (26mm Medtronic Evolut PRO). 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 Repeat echo showed left ventricular systolic function normal with EF 55 to 60% with normal functioning prosthetic aortic valve; mean gradient 4 mmHg. HPI :   Osmani Brian reports notes that over the past 2-3 weeks she has noticed fatigue and pain in her legs with walking. Walking from parking lot to office she had to stop and rest.  She also has noted increased fatigue, dizziness and near syncope. Episodes occur at random times however is increased over the last 2 weeks    Review of Systems   Constitutional: Positive for malaise/fatigue. Negative for diaphoresis. Cardiovascular:  Positive for claudication. Negative for chest pain, dyspnea on exertion, irregular heartbeat, leg swelling, near-syncope, orthopnea, palpitations and paroxysmal nocturnal dyspnea. Respiratory:  Negative for shortness of breath. Neurological:  Positive for dizziness and light-headedness. Vitals:    12/07/22 1318 12/07/22 1324   BP: (!) 148/52 (!) 152/56   Site: Left Upper Arm Left Upper Arm   Position: Sitting Sitting   Cuff Size: Medium Adult Medium Adult   Pulse: (!) 48    SpO2: 96%    Weight: 119 lb (54 kg)    Height: 5' (1.524 m)      No flowsheet data found. Wt Readings from Last 3 Encounters:   12/07/22 119 lb (54 kg)   10/24/22 117 lb 12.8 oz (53.4 kg)   08/22/22 115 lb (52.2 kg)     Body mass index is 23.24 kg/m². Physical Exam  Vitals reviewed.    Constitutional:       Appearance: Normal appearance. HENT:      Head: Normocephalic and atraumatic. Mouth/Throat:      Mouth: Mucous membranes are moist.   Eyes:      Pupils: Pupils are equal, round, and reactive to light. Neck:      Vascular: No carotid bruit. Cardiovascular:      Rate and Rhythm: Regular rhythm. Bradycardia present. Pulses:           Dorsalis pedis pulses are 1+ on the right side and detected w/ Doppler on the left side. Posterior tibial pulses are 1+ on the right side and detected w/ Doppler on the left side. Pulmonary:      Effort: Pulmonary effort is normal.      Breath sounds: Normal breath sounds. No rales. Chest:      Chest wall: No tenderness. Abdominal:      General: There is no distension. Palpations: Abdomen is soft. Tenderness: There is no abdominal tenderness. Musculoskeletal:      Cervical back: No tenderness. Right lower leg: No edema. Left lower leg: No edema. Skin:     General: Skin is warm and dry. Capillary Refill: Capillary refill takes less than 2 seconds. Coloration: Skin is pale. Neurological:      General: No focal deficit present. Mental Status: She is alert and oriented to person, place, and time.    Psychiatric:         Mood and Affect: Mood normal.         Behavior: Behavior normal.              Current Outpatient Medications   Medication Sig Dispense Refill    methocarbamol (ROBAXIN) 500 MG tablet Take 1 tablet by mouth every evening 30 tablet 5    montelukast (SINGULAIR) 10 MG tablet TAKE 1 TABLET PER NG TUBE NIGHTLY 90 tablet 1    atorvastatin (LIPITOR) 40 MG tablet Take 1 tablet by mouth nightly 90 tablet 1    escitalopram (LEXAPRO) 20 MG tablet Take 1 tablet by mouth daily 90 tablet 1    pantoprazole (PROTONIX) 40 MG tablet TAKE 1 TABLET NIGHTLY 90 tablet 1    ondansetron (ZOFRAN) 4 MG tablet Take 1 tablet by mouth every 8 hours as needed for Nausea or Vomiting 90 tablet 1    lisinopril (PRINIVIL;ZESTRIL) 5 MG tablet Take 1 tablet by mouth daily 90 tablet 3    levothyroxine (SYNTHROID) 75 MCG tablet 1 tablet DAILY (route: oral)      Handicap Placard MISC by Does not apply route Please provide handicap placard for 5 years. Diagnosis: Orthopedic condition 1 each 0    aspirin (EQ ASPIRIN ADULT LOW DOSE) 81 MG EC tablet Take 1 tablet by mouth daily 30 tablet 0    ipratropium-albuterol (DUONEB) 0.5-2.5 (3) MG/3ML SOLN nebulizer solution Inhale 3 mLs into the lungs every 4 hours (while awake) 360 mL 1    albuterol sulfate  (90 Base) MCG/ACT inhaler Inhale 2 puffs into the lungs every 6 hours as needed for Wheezing 1 Inhaler 5    Timolol (TIMOPTIC) 0.5 % (DAILY) SOLN ophthalmic solution Place 1 drop into both eyes daily      vitamin B-12 (CYANOCOBALAMIN) 1000 MCG tablet Take 1,000 mcg by mouth daily OTC      Calcium-Magnesium-Zinc 167-83-8 MG TABS Take 1 tablet by mouth daily OTC      Cholecalciferol (VITAMIN D3) 2000 UNITS CAPS Take 1 capsule by mouth daily OTC      sucralfate (CARAFATE) 1 GM tablet Take 1 tablet by mouth 4 times daily (Patient not taking: Reported on 12/7/2022) 120 tablet 3     No current facility-administered medications for this visit. All pertinent data reviewed and discussed with patient       ASSESSMENT/PLAN:    Claudication  Recently passed a weeks is noted claudication symptoms with walking short distances. Notes pain in bilateral legs with walking short distances. Pedal pulses are diminished. Left PT and DP are obtained via Doppler. Recommend check ABIs and if abnormal will pursue arterial Doppler    Dizziness/lightheadedness  Has increased episodes of dizziness lightheadedness and fatigue. Heart rate today in office is bradycardic rate and 40s. -Concern for symptomatic bradycardia  Check 48-hour monitor.   Check CBC and TSH/T4    Hypertension  Blood pressure mildly elevated today  On lisinopril   Continue       Hypothyroid  C/o fatigue- now bradycardiac     Check TSH  Tests ordered:  TSH CBC 48 hour monitor and MARLON  Follow-up  as scheduled      Signed:  DERIK Acharya CNP, 12/7/2022, 1:29 PM    An electronic signature was used to authenticate this note. Please note this report has been partially produced using speech recognition software and may contain errors related to that system including errors in grammar, punctuation, and spelling, as well as words and phrases that may be inappropriate. If there are any questions or concerns please feel free to contact the dictating provider for clarification.

## 2022-12-07 NOTE — PATIENT INSTRUCTIONS
Please be informed that if you contact our office outside of normal business hours the physician on call cannot help with any scheduling or rescheduling issues, procedure instruction questions or any type of medication issue. We advise you for any urgent/emergency that you go to the nearest emergency room! PLEASE CALL OUR OFFICE DURING NORMAL BUSINESS HOURS    Monday - Friday   8 am to 5 pm    [place]: [address]: [phone]    [place]:  [phone]    **It is YOUR responsibilty to bring medication bottles and/or updated medication list to [address] [place] [address].  This will allow us to better serve you and all your healthcare needs**

## 2022-12-08 ENCOUNTER — TELEPHONE (OUTPATIENT)
Dept: CARDIOLOGY CLINIC | Age: 87
End: 2022-12-08

## 2022-12-08 NOTE — TELEPHONE ENCOUNTER
Called pt with lab results - TSH is ok   CBC shows anemia-will refer her back to PCP for follow up   Forward copy of labs to pcp  Pt has appt with pcp on 12/13/22

## 2022-12-13 ENCOUNTER — OFFICE VISIT (OUTPATIENT)
Dept: FAMILY MEDICINE CLINIC | Age: 87
End: 2022-12-13
Payer: MEDICARE

## 2022-12-13 VITALS
SYSTOLIC BLOOD PRESSURE: 108 MMHG | HEART RATE: 55 BPM | BODY MASS INDEX: 22.73 KG/M2 | WEIGHT: 116.4 LBS | OXYGEN SATURATION: 99 % | DIASTOLIC BLOOD PRESSURE: 60 MMHG | TEMPERATURE: 96.6 F

## 2022-12-13 DIAGNOSIS — D50.9 MICROCYTIC ANEMIA: Primary | ICD-10-CM

## 2022-12-13 DIAGNOSIS — R53.82 CHRONIC FATIGUE: ICD-10-CM

## 2022-12-13 DIAGNOSIS — R42 LIGHTHEADEDNESS: ICD-10-CM

## 2022-12-13 PROCEDURE — 99214 OFFICE O/P EST MOD 30 MIN: CPT | Performed by: FAMILY MEDICINE

## 2022-12-13 PROCEDURE — 1123F ACP DISCUSS/DSCN MKR DOCD: CPT | Performed by: FAMILY MEDICINE

## 2022-12-13 NOTE — PROGRESS NOTES
Patient was seen by cardiology for weakness and fatigue. She had blood work done which showed microcytic anemia with a hemoglobin of 9.6. She was referred to me for further work-up. Denies any bleeding or bruising. No shortness of breath or chest pain. She did have a cardiac event monitor placed for dizziness and lightheadedness  but has not seen the results yet. O:   Vitals:    12/13/22 1610   BP: 108/60   Pulse: 55   Temp: (!) 96.6 °F (35.9 °C)   SpO2: 99%     No acute distress. Alert and Oriented x 3  HEENT: Atraumatic. Normocephalic. PERRLA, EOMI, Conjunctiva clear  NECK: without thyromegaly, lymphadenopathy, JVD  LUNGS:Clear to ascultation bilaterally. Breathing comfortably  CARDIOVASCULAR:  Regular rate and rhythm, no murmurs, rubs, or gallops    A:    Diagnosis Orders   1. Microcytic anemia  Iron and TIBC      2. Chronic fatigue  Iron and TIBC      3. Lightheadedness          P: Iron panel ordered which she will have drawn tomorrow. If shows iron deficiency will start iron over-the-counter. Follow-up with cardiology for event monitor results.

## 2022-12-14 LAB
IRON SATURATION: 12 % (ref 12–57)
IRON: 52 MCG/DL (ref 35–175)
TOTAL IRON BINDING CAPACITY: 428 MCG/DL (ref 200–450)

## 2022-12-15 DIAGNOSIS — D64.9 NORMOCYTIC ANEMIA: Primary | ICD-10-CM

## 2022-12-16 ENCOUNTER — NURSE ONLY (OUTPATIENT)
Dept: FAMILY MEDICINE CLINIC | Age: 87
End: 2022-12-16
Payer: MEDICARE

## 2022-12-16 DIAGNOSIS — R53.82 CHRONIC FATIGUE: Primary | ICD-10-CM

## 2022-12-16 LAB
FOLATE: 14.9 NG/ML
VITAMIN B-12: 1001 PG/ML (ref 200–1100)

## 2022-12-16 PROCEDURE — 36415 COLL VENOUS BLD VENIPUNCTURE: CPT | Performed by: FAMILY MEDICINE

## 2022-12-16 NOTE — PROGRESS NOTES
Aixa puncture to right hand obtained 1-SST blood stopped flowing. Venipuncture to left had obtained 1- Lavender.  Patient tolerated well

## 2022-12-29 ENCOUNTER — OFFICE VISIT (OUTPATIENT)
Dept: FAMILY MEDICINE CLINIC | Age: 87
End: 2022-12-29
Payer: MEDICARE

## 2022-12-29 VITALS
SYSTOLIC BLOOD PRESSURE: 110 MMHG | HEIGHT: 60 IN | DIASTOLIC BLOOD PRESSURE: 54 MMHG | HEART RATE: 54 BPM | BODY MASS INDEX: 22.73 KG/M2 | TEMPERATURE: 96.6 F | OXYGEN SATURATION: 96 %

## 2022-12-29 DIAGNOSIS — I10 PRIMARY HYPERTENSION: ICD-10-CM

## 2022-12-29 DIAGNOSIS — J44.1 COPD EXACERBATION (HCC): Primary | ICD-10-CM

## 2022-12-29 LAB
INFLUENZA A ANTIBODY: NEGATIVE
INFLUENZA B ANTIBODY: NEGATIVE

## 2022-12-29 PROCEDURE — 99214 OFFICE O/P EST MOD 30 MIN: CPT | Performed by: FAMILY MEDICINE

## 2022-12-29 PROCEDURE — 1123F ACP DISCUSS/DSCN MKR DOCD: CPT | Performed by: FAMILY MEDICINE

## 2022-12-29 PROCEDURE — 87804 INFLUENZA ASSAY W/OPTIC: CPT | Performed by: FAMILY MEDICINE

## 2022-12-29 RX ORDER — LEVOFLOXACIN 750 MG/1
750 TABLET ORAL DAILY
Qty: 10 TABLET | Refills: 0 | Status: SHIPPED | OUTPATIENT
Start: 2022-12-29 | End: 2023-01-08

## 2022-12-29 RX ORDER — DEXTROMETHORPHAN HYDROBROMIDE AND PROMETHAZINE HYDROCHLORIDE 15; 6.25 MG/5ML; MG/5ML
5 SYRUP ORAL 4 TIMES DAILY PRN
Qty: 240 ML | Refills: 0 | Status: SHIPPED | OUTPATIENT
Start: 2022-12-29

## 2022-12-29 NOTE — PROGRESS NOTES
Jud Dunlap is a 80y.o. year old female who complains of moderate chills, nasal blockage, post nasal drip, productive cough, sinus and nasal congestion, and sore throat for 2 days. Symptoms are worsening over that time. She denies a history of chest pain and weakness and has a history of COPD. She has taken nothing for this. The patient is taking hypertensive medications compliantly without side effects. Denies chest pain, dyspnea, edema, or TIA's. Social History     Tobacco Use    Smoking status: Former     Packs/day: 3.00     Years: 5.00     Pack years: 15.00     Types: Cigarettes     Quit date: 1962     Years since quittin.0    Smokeless tobacco: Never   Substance Use Topics    Alcohol use: No     Comment: caffeine: 3 diet coke a day        Current Outpatient Medications   Medication Sig Dispense Refill    methocarbamol (ROBAXIN) 500 MG tablet Take 1 tablet by mouth every evening 30 tablet 5    montelukast (SINGULAIR) 10 MG tablet TAKE 1 TABLET PER NG TUBE NIGHTLY 90 tablet 1    atorvastatin (LIPITOR) 40 MG tablet Take 1 tablet by mouth nightly 90 tablet 1    escitalopram (LEXAPRO) 20 MG tablet Take 1 tablet by mouth daily 90 tablet 1    pantoprazole (PROTONIX) 40 MG tablet TAKE 1 TABLET NIGHTLY 90 tablet 1    ondansetron (ZOFRAN) 4 MG tablet Take 1 tablet by mouth every 8 hours as needed for Nausea or Vomiting 90 tablet 1    lisinopril (PRINIVIL;ZESTRIL) 5 MG tablet Take 1 tablet by mouth daily 90 tablet 3    levothyroxine (SYNTHROID) 75 MCG tablet 1 tablet DAILY (route: oral)      Handicap Placard MISC by Does not apply route Please provide handicap placard for 5 years.     Diagnosis: Orthopedic condition 1 each 0    aspirin (EQ ASPIRIN ADULT LOW DOSE) 81 MG EC tablet Take 1 tablet by mouth daily 30 tablet 0    ipratropium-albuterol (DUONEB) 0.5-2.5 (3) MG/3ML SOLN nebulizer solution Inhale 3 mLs into the lungs every 4 hours (while awake) 360 mL 1    sucralfate (CARAFATE) 1 GM tablet Take 1 tablet by mouth 4 times daily (Patient not taking: Reported on 12/7/2022) 120 tablet 3    albuterol sulfate  (90 Base) MCG/ACT inhaler Inhale 2 puffs into the lungs every 6 hours as needed for Wheezing 1 Inhaler 5    Timolol (TIMOPTIC) 0.5 % (DAILY) SOLN ophthalmic solution Place 1 drop into both eyes daily      vitamin B-12 (CYANOCOBALAMIN) 1000 MCG tablet Take 1,000 mcg by mouth daily OTC      Calcium-Magnesium-Zinc 167-83-8 MG TABS Take 1 tablet by mouth daily OTC      Cholecalciferol (VITAMIN D3) 2000 UNITS CAPS Take 1 capsule by mouth daily OTC       No current facility-administered medications for this visit. Allergies   Allergen Reactions    Morphine Anaphylaxis     Dilaudid OK    Bactrim [Sulfamethoxazole-Trimethoprim]     Influenza Vaccines      Ended in hospital stay for 3 days told by md not to get it anymore     Lactose Intolerance (Gi) Nausea Only    Phenergan [Promethazine Hcl] Other (See Comments)     Makes me jerk all over. Zofran OK    Stadol [Butorphanol Tartrate] Other (See Comments)     Out of body experience. Was told it was a near death experience and was placed in ICU. Dilaudid OK    Tape Ivana Sickle Tape] Other (See Comments)     Plastic cause itching and rash. Paper tape causes my skin to blister. (Tega-derm and Coban OK to use.)          Objective:      BP (!) 110/54 (Site: Left Upper Arm, Position: Sitting, Cuff Size: Medium Adult)   Pulse 54   Temp (!) 96.6 °F (35.9 °C) (Infrared)   Ht 5' (1.524 m)   SpO2 96%   BMI 22.73 kg/m²   General: Alert and oriented, in no acute distress   TM's are normal bilaterally. External ears are normal.  Pharynx mildly erythematous without exudate. Tongue, teeth and lips are normal.  Neck and supraclavicular regions are without adenopathy. Nose is congested. Sinuses non tender. The chest is coarse throughout. No retractions, or use of accessory muscles of respiration noted.     Negative rapid influenza     Assessment:       Diagnosis Orders   1. COPD exacerbation (HCC)  POCT Influenza A/B    levoFLOXacin (LEVAQUIN) 750 MG tablet    promethazine-dextromethorphan (PROMETHAZINE-DM) 6.25-15 MG/5ML syrup      2. Primary hypertension                 Plan:   Levaquin for COPD exacerbation. Promethazine DM for symptoms. Avoid decongestants due to hypertension. Continue all medications. per orders  Symptomatic therapy also suggested: push fluids, rest, and ROV prn if symptoms persist or worsen. Call or return to office prn if these symptoms worsen or fail to improve.

## 2023-01-03 ENCOUNTER — PROCEDURE VISIT (OUTPATIENT)
Dept: CARDIOLOGY CLINIC | Age: 88
End: 2023-01-03
Payer: MEDICARE

## 2023-01-03 ENCOUNTER — APPOINTMENT (OUTPATIENT)
Dept: GENERAL RADIOLOGY | Age: 88
DRG: 645 | End: 2023-01-03
Payer: MEDICARE

## 2023-01-03 ENCOUNTER — HOSPITAL ENCOUNTER (INPATIENT)
Age: 88
LOS: 1 days | Discharge: HOME | DRG: 645 | End: 2023-01-05
Attending: STUDENT IN AN ORGANIZED HEALTH CARE EDUCATION/TRAINING PROGRAM | Admitting: STUDENT IN AN ORGANIZED HEALTH CARE EDUCATION/TRAINING PROGRAM
Payer: MEDICARE

## 2023-01-03 DIAGNOSIS — R53.1 GENERAL WEAKNESS: Primary | ICD-10-CM

## 2023-01-03 DIAGNOSIS — R53.83 OTHER FATIGUE: ICD-10-CM

## 2023-01-03 DIAGNOSIS — R05.9 COUGH, UNSPECIFIED TYPE: ICD-10-CM

## 2023-01-03 DIAGNOSIS — I73.9 PAD (PERIPHERAL ARTERY DISEASE) (HCC): ICD-10-CM

## 2023-01-03 LAB
ADENOVIRUS DETECTION BY PCR: NOT DETECTED
ALBUMIN SERPL-MCNC: 3.6 GM/DL (ref 3.4–5)
ALP BLD-CCNC: 50 IU/L (ref 40–129)
ALT SERPL-CCNC: 83 U/L (ref 10–40)
ANION GAP SERPL CALCULATED.3IONS-SCNC: 10 MMOL/L (ref 4–16)
AST SERPL-CCNC: 87 IU/L (ref 15–37)
BASOPHILS ABSOLUTE: 0 K/CU MM
BASOPHILS RELATIVE PERCENT: 0.6 % (ref 0–1)
BILIRUB SERPL-MCNC: 0.3 MG/DL (ref 0–1)
BILIRUBIN URINE: NEGATIVE MG/DL
BLOOD, URINE: NEGATIVE
BORDETELLA PARAPERTUSSIS BY PCR: NOT DETECTED
BORDETELLA PERTUSSIS PCR: NOT DETECTED
BUN BLDV-MCNC: 13 MG/DL (ref 6–23)
CALCIUM SERPL-MCNC: 8.3 MG/DL (ref 8.3–10.6)
CHLAMYDOPHILA PNEUMONIA PCR: NOT DETECTED
CHLORIDE BLD-SCNC: 101 MMOL/L (ref 99–110)
CLARITY: CLEAR
CO2: 24 MMOL/L (ref 21–32)
COLOR: YELLOW
COMMENT UA: NORMAL
CORONAVIRUS 229E PCR: NOT DETECTED
CORONAVIRUS HKU1 PCR: NOT DETECTED
CORONAVIRUS NL63 PCR: NOT DETECTED
CORONAVIRUS OC43 PCR: NOT DETECTED
CREAT SERPL-MCNC: 0.6 MG/DL (ref 0.6–1.1)
DIFFERENTIAL TYPE: ABNORMAL
EOSINOPHILS ABSOLUTE: 0.4 K/CU MM
EOSINOPHILS RELATIVE PERCENT: 5.3 % (ref 0–3)
GFR SERPL CREATININE-BSD FRML MDRD: >60 ML/MIN/1.73M2
GLUCOSE BLD-MCNC: 87 MG/DL (ref 70–99)
GLUCOSE, URINE: NEGATIVE MG/DL
HCT VFR BLD CALC: 32.4 % (ref 37–47)
HEMOGLOBIN: 9.9 GM/DL (ref 12.5–16)
HUMAN METAPNEUMOVIRUS PCR: NOT DETECTED
IMMATURE NEUTROPHIL %: 0.1 % (ref 0–0.43)
INFLUENZA A BY PCR: NOT DETECTED
INFLUENZA A H1 (2009) PCR: NOT DETECTED
INFLUENZA A H1 PANDEMIC PCR: NOT DETECTED
INFLUENZA A H3 PCR: NOT DETECTED
INFLUENZA B BY PCR: NOT DETECTED
KETONES, URINE: NEGATIVE MG/DL
LACTATE: 1.2 MMOL/L (ref 0.5–1.9)
LEUKOCYTE ESTERASE, URINE: NEGATIVE
LYMPHOCYTES ABSOLUTE: 2.6 K/CU MM
LYMPHOCYTES RELATIVE PERCENT: 37.7 % (ref 24–44)
MAGNESIUM: 2.3 MG/DL (ref 1.8–2.4)
MCH RBC QN AUTO: 25.6 PG (ref 27–31)
MCHC RBC AUTO-ENTMCNC: 30.6 % (ref 32–36)
MCV RBC AUTO: 83.9 FL (ref 78–100)
MONOCYTES ABSOLUTE: 0.6 K/CU MM
MONOCYTES RELATIVE PERCENT: 8.8 % (ref 0–4)
MYCOPLASMA PNEUMONIAE PCR: NOT DETECTED
NITRITE URINE, QUANTITATIVE: NEGATIVE
NUCLEATED RBC %: 0 %
PARAINFLUENZA 1 PCR: NOT DETECTED
PARAINFLUENZA 2 PCR: NOT DETECTED
PARAINFLUENZA 3 PCR: NOT DETECTED
PARAINFLUENZA 4 PCR: NOT DETECTED
PDW BLD-RTO: 17.6 % (ref 11.7–14.9)
PH, URINE: 7 (ref 5–8)
PLATELET # BLD: 276 K/CU MM (ref 140–440)
PMV BLD AUTO: 9.3 FL (ref 7.5–11.1)
POTASSIUM SERPL-SCNC: 4.6 MMOL/L (ref 3.5–5.1)
PROTEIN UA: NEGATIVE MG/DL
RBC # BLD: 3.86 M/CU MM (ref 4.2–5.4)
RHINOVIRUS ENTEROVIRUS PCR: NOT DETECTED
RSV PCR: NOT DETECTED
SARS-COV-2: NOT DETECTED
SEGMENTED NEUTROPHILS ABSOLUTE COUNT: 3.3 K/CU MM
SEGMENTED NEUTROPHILS RELATIVE PERCENT: 47.5 % (ref 36–66)
SODIUM BLD-SCNC: 135 MMOL/L (ref 135–145)
SPECIFIC GRAVITY UA: 1.01 (ref 1–1.03)
T4 FREE: 0.87 NG/DL (ref 0.9–1.8)
TOTAL IMMATURE NEUTOROPHIL: 0.01 K/CU MM
TOTAL NUCLEATED RBC: 0 K/CU MM
TOTAL PROTEIN: 5.9 GM/DL (ref 6.4–8.2)
TROPONIN T: <0.01 NG/ML
TSH HIGH SENSITIVITY: 4.24 UIU/ML (ref 0.27–4.2)
UROBILINOGEN, URINE: 0.2 MG/DL (ref 0.2–1)
WBC # BLD: 6.9 K/CU MM (ref 4–10.5)

## 2023-01-03 PROCEDURE — 85025 COMPLETE CBC W/AUTO DIFF WBC: CPT

## 2023-01-03 PROCEDURE — 83605 ASSAY OF LACTIC ACID: CPT

## 2023-01-03 PROCEDURE — 93005 ELECTROCARDIOGRAM TRACING: CPT | Performed by: EMERGENCY MEDICINE

## 2023-01-03 PROCEDURE — 84439 ASSAY OF FREE THYROXINE: CPT

## 2023-01-03 PROCEDURE — 81003 URINALYSIS AUTO W/O SCOPE: CPT

## 2023-01-03 PROCEDURE — 80053 COMPREHEN METABOLIC PANEL: CPT

## 2023-01-03 PROCEDURE — 93922 UPR/L XTREMITY ART 2 LEVELS: CPT | Performed by: INTERNAL MEDICINE

## 2023-01-03 PROCEDURE — 71045 X-RAY EXAM CHEST 1 VIEW: CPT

## 2023-01-03 PROCEDURE — 99285 EMERGENCY DEPT VISIT HI MDM: CPT

## 2023-01-03 PROCEDURE — 83735 ASSAY OF MAGNESIUM: CPT

## 2023-01-03 PROCEDURE — 84443 ASSAY THYROID STIM HORMONE: CPT

## 2023-01-03 PROCEDURE — 84484 ASSAY OF TROPONIN QUANT: CPT

## 2023-01-03 PROCEDURE — 2580000003 HC RX 258: Performed by: PHYSICIAN ASSISTANT

## 2023-01-03 PROCEDURE — 0202U NFCT DS 22 TRGT SARS-COV-2: CPT

## 2023-01-03 PROCEDURE — 87040 BLOOD CULTURE FOR BACTERIA: CPT

## 2023-01-03 RX ORDER — LEVOTHYROXINE SODIUM 0.03 MG/1
75 TABLET ORAL DAILY
Status: CANCELLED | OUTPATIENT
Start: 2023-01-04

## 2023-01-03 RX ORDER — SODIUM CHLORIDE 9 MG/ML
INJECTION, SOLUTION INTRAVENOUS CONTINUOUS
Status: DISCONTINUED | OUTPATIENT
Start: 2023-01-03 | End: 2023-01-05 | Stop reason: HOSPADM

## 2023-01-03 RX ADMIN — SODIUM CHLORIDE: 9 INJECTION, SOLUTION INTRAVENOUS at 20:27

## 2023-01-03 NOTE — ED TRIAGE NOTES
Has been feeling fatigued for the past week. Was seen by MD in the office and placed on antibiotics 6 days ago. Has not had improvement in symptoms.

## 2023-01-03 NOTE — ED PROVIDER NOTES
12 lead EKG per my interpretation:  Sinus bradycardia at 47  Axis is   Normal  QTc is  within an acceptable range  There is no specific T wave changes appreciated. There is no specific ST wave changes appreciated. No STEMI    Prior EKG to compare with was available and sinus bradycardia seen on past 2 EKGs with no clinically significant change in overall morphology.     MD Shyla Yousif MD  01/03/23 2669

## 2023-01-03 NOTE — ED PROVIDER NOTES
Emergency [address] [place] [address] EMERGENCY DEPARTMENT    Patient: Adithya Ledesma  MRN: [id number]  : 1935  Date of Evaluation: 1/3/2023  ED Provider: Annemarie Ledezma PA-C    Chief Complaint       Chief Complaint   Patient presents with    Fatigue     Fatigue and weakness for the past week. States that she has been on antibiotics and that it is not helping       Brianne Newsome is a 80 y.o. female who presents to the emergency department for generalized fatigue/weakness, cough. Onset was about 2 weeks ago. She saw PCP and had negative flu and COVID tests in office, so she was prescribed Levaquin. This was 6 days ago. She reports compliance with the medication but no improvement. She denies any measured fever. + nasal congestion and sore throat. Cough was initially productive but is now dry. Denies chest pain or SOB. Denies n/v/d. No known sick contacts. Decreased PO intake. Lives alone and can barely ambulate due to the weakness. ROS     CONSTITUTIONAL:  Denies fever.  + generalized weakness. EYES:  Denies visual changes. HEAD:  Denies headache. ENT:  + nasal congestion, sore throat. NECK:  Denies neck pain. RESPIRATORY:  Denies any shortness of breath.  + cough. CARDIOVASCULAR:  Denies chest pain. GI:  Denies nausea or vomiting. :  Denies urinary symptoms. MUSCULOSKELETAL:  Denies extremity pain or swelling. BACK:  Denies back pain. INTEGUMENT:  Denies skin changes. LYMPHATIC:  Denies lymphadenopathy. NEUROLOGIC:  Denies any numbness/tingling. Past History     Past Medical History:   Diagnosis Date    Arthritis     generalized    Asthma     Blood transfusion     No reaction    Broken heart syndrome     Cerebral artery occlusion with cerebral infarction [facility])     Patient \"states she was told she has had three small strokes. \"    Chronic bronchitis ([facility] [address]     COPD (chronic obstructive pulmonary disease) ([facility] [address] summer 2014    Echocardiogram 04/09/2021    EF 55-60%, Mild dilated LA, Mild annular calcification present, Mild mitral stenosis, Mild MR & TR. Fatigue     H/O cardiac catheterization 06/15/2017    mild lad and cx disease    H/O cardiovascular stress test 08/22/2019    H/O Doppler ultrasound 4/7/2016 3/19/12    carotid-4/16 WNL 3/12right mild less than 50%and left wnl    H/O Doppler ultrasound 6/14/017    carotid - mod disease EILEEN, mild disease LICA    H/O echocardiogram 07/23/2010    H/O echocardiogram 04/15/2016    EF 60% sclerotic AV mildly stenosed-recommend yearly echo    H/O echocardiogram 08/22/2019    EF 55-60%, Minimal concentric left ventricular hypertrophy, left atrium is mildly dilated, severe aortic stenosis, Mitral annular calcification is present, Mild AR, Mild-Mod MR, Mild TR, No pericardial effusion     H/O echocardiogram 09/23/2019    H/O exercise stress test 06/14/2017    abnormal    History of cardiac cath 08/28/2019    Severe AS,   TAVR?? History of nuclear stress test 03/14/2017    EF 75%. Normal study.     Holter monitor, abnormal 02/22/2011    infrequent APC are seen    Hyperlipidemia     Hypertension     Normal cardiac stress test 07/23/2010    EF 70%, no ischemia    On home O2     only uses as needed, nightly prn    PONV (postoperative nausea and vomiting)     Syncope and collapse     Tachycardia     HX of tachycardia - had a cardioablation    Thyroid disease      Past Surgical History:   Procedure Laterality Date    AORTIC VALVE REPAIR N/A 09/09/2019    Core Valve EVOLUT 26mm [id number]    AORTIC VALVE REPLACEMENT N/A 09/11/2019    TAVR; [employer] Evlout 26    BREAST SURGERY  2009    reduction    CARDIAC CATHETERIZATION  03/02/2011    mild to moderate disease of diagonal and proximal RCA    [facility]    open choley    COLONOSCOPY      DILATATION, ESOPHAGUS      ENDOSCOPY, COLON, DIAGNOSTIC 2017    s/p gastric bypass otherwise normal    EYE SURGERY Bilateral     cataract    FINGER SURGERY      right middle, thumb and index finger (screw and pin in right index finger)    GASTRIC RESTRICTION SURGERY  1984    stapled    HERNIA REPAIR  unknown    Inc hernia hernia repair    HIP FRACTURE SURGERY Left 2015    Left hip nail    HYSTERECTOMY (CERVIX STATUS UNKNOWN)  1966    Luke w/ BSO    JOINT REPLACEMENT  2004    right knee    LIPECTOMY  's    OTHER SURGICAL HISTORY  2012    Gastrojejunostomy/partial gastrectomy    KALIA-EN-Y GASTRIC BYPASS      SHOULDER ARTHROPLASTY Bilateral 10/2017    TOE SURGERY  Early     hammer toes and bunions    TONSILLECTOMY      UPPER GASTROINTESTINAL ENDOSCOPY N/A 3/30/2022    EGD DIAGNOSTIC ONLY performed by Fatmata Mahajan MD at [address] [place] [address] Road History     Socioeconomic History    Marital status:      Spouse name: None    Number of children: None    Years of education: None    Highest education level: None   Tobacco Use    Smoking status: Former     Packs/day: 3.00     Years: 5.00     Pack years: 15.00     Types: Cigarettes     Quit date: 1962     Years since quittin.0    Smokeless tobacco: Never   Vaping Use    Vaping Use: Never used   Substance and Sexual Activity    Alcohol use: No     Comment: caffeine: 3 diet coke a day    Drug use: Yes     Types: Marijuana Thomas Hartmann)     Comment: Takes for pain. Has medical card     Social Determinants of Health     Financial Resource Strain: Low Risk     Difficulty of Paying Living Expenses: Not very hard   Food Insecurity: No Food Insecurity    Worried About Running Out of Food in the Last Year: Never true    Ran Out of Food in the Last Year: Never true   Transportation Needs: No Transportation Needs    Lack of Transportation (Medical): No    Lack of Transportation (Non-Medical):  No   Physical Activity: Sufficiently Active    Days of Exercise per Week: 7 days    Minutes of Exercise per Session: 30 min   Stress: No Stress Concern Present    Feeling of Stress : Only a little   Housing Stability: Low Risk     Unable to Pay for Housing in the Last Year: No    Number of Places Lived in the Last Year: 1    Unstable Housing in the Last Year: No       Medications/Allergies     Previous Medications    ALBUTEROL SULFATE  (90 BASE) MCG/ACT INHALER    Inhale 2 puffs into the lungs every 6 hours as needed for Wheezing    ASPIRIN (EQ ASPIRIN ADULT LOW DOSE) 81 MG EC TABLET    Take 1 tablet by mouth daily    ATORVASTATIN (LIPITOR) 40 MG TABLET    Take 1 tablet by mouth nightly    CALCIUM-MAGNESIUM-ZINC 167-83-8 MG TABS    Take 1 tablet by mouth daily OTC    CHOLECALCIFEROL (VITAMIN D3) 2000 UNITS CAPS    Take 1 capsule by mouth daily OTC    ESCITALOPRAM (LEXAPRO) 20 MG TABLET    Take 1 tablet by mouth daily    HANDICAP PLACARD MISC    by Does not apply route Please provide handicap placard for 5 years.     Diagnosis: Orthopedic condition    IPRATROPIUM-ALBUTEROL (DUONEB) 0.5-2.5 (3) MG/3ML SOLN NEBULIZER SOLUTION    Inhale 3 mLs into the lungs every 4 hours (while awake)    LEVOFLOXACIN (LEVAQUIN) 750 MG TABLET    Take 1 tablet by mouth daily for 10 days    LEVOTHYROXINE (SYNTHROID) 75 MCG TABLET    1 tablet DAILY (route: oral)    LISINOPRIL (PRINIVIL;ZESTRIL) 5 MG TABLET    Take 1 tablet by mouth daily    METHOCARBAMOL (ROBAXIN) 500 MG TABLET    Take 1 tablet by mouth every evening    MONTELUKAST (SINGULAIR) 10 MG TABLET    TAKE 1 TABLET PER NG TUBE NIGHTLY    ONDANSETRON (ZOFRAN) 4 MG TABLET    Take 1 tablet by mouth every 8 hours as needed for Nausea or Vomiting    PANTOPRAZOLE (PROTONIX) 40 MG TABLET    TAKE 1 TABLET NIGHTLY    PROMETHAZINE-DEXTROMETHORPHAN (PROMETHAZINE-DM) 6.25-15 MG/5ML SYRUP    Take 5 mLs by mouth 4 times daily as needed for Cough    SUCRALFATE (CARAFATE) 1 GM TABLET    Take 1 tablet by mouth 4 times daily    TIMOLOL (TIMOPTIC) 0.5 % (DAILY) SOLN OPHTHALMIC SOLUTION    Place 1 drop into both eyes daily    VITAMIN B-12 (CYANOCOBALAMIN) 1000 MCG TABLET    Take 1,000 mcg by mouth daily OTC     Allergies   Allergen Reactions    Morphine Anaphylaxis     Dilaudid OK    Bactrim [Sulfamethoxazole-Trimethoprim]     Influenza Vaccines      Ended in hospital stay for 3 days told by md not to get it anymore     Lactose Intolerance (Gi) Nausea Only    Phenergan [Promethazine Hcl] Other (See Comments)     Makes me jerk all over. Zofran OK    Stadol [Butorphanol Tartrate] Other (See Comments)     Out of body experience. Was told it was a near death experience and was placed in ICU. Dilaudid OK    Tape Joe Knows Tape] Other (See Comments)     Plastic cause itching and rash. Paper tape causes my skin to blister. (Tega-derm and Coban OK to use.)        Physical Exam       ED Triage Vitals [01/03/23 1459]   BP Temp Temp Source Heart Rate Resp SpO2 Height Weight   (!) 160/50 97.5 °F (36.4 °C) Oral 50 16 100 % 5' (1.524 m) 114 lb (51.7 kg)     GENERAL APPEARANCE:  Well-developed, well-nourished, no acute distress. HEAD:  NC/AT. EYES:  Sclera anicteric. ENT:  Ears, nose, mouth normal.     NECK:  Supple. CARDIO:  Merlin Bentley in the 46s. Regular rhythm. LUNGS:   CTAB. Respirations unlabored. ABDOMEN:  Soft, non-distended, non-tender. BS active. BACK:  No midline thoracic or lumbar spinal tenderness. EXTREMITIES:  No acute deformities. SKIN:  Warm and dry. NEUROLOGICAL:  Alert and oriented. PSYCHIATRIC:  Normal mood.      Diagnostics     Labs:  Results for orders placed or performed during the hospital encounter of 01/03/23   Respiratory Panel, Molecular, with COVID-19 (Restricted: peds pts or suitable admitted adults)    Specimen: Nasopharyngeal   Result Value Ref Range    Adenovirus Detection by PCR NOT DETECTED NOT DETECTED    Coronavirus 229E PCR NOT DETECTED NOT DETECTED    Coronavirus HKU1 PCR NOT DETECTED NOT DETECTED    Coronavirus NL63 PCR NOT DETECTED NOT DETECTED    Coronavirus OC43 PCR NOT DETECTED NOT DETECTED    SARS-CoV-2 NOT DETECTED NOT DETECTED    Human Metapneumovirus PCR NOT DETECTED NOT DETECTED    Rhinovirus Enterovirus PCR NOT DETECTED NOT DETECTED    Influenza A by PCR NOT DETECTED NOT DETECTED    Influenza A H1 Pandemic PCR NOT DETECTED NOT DETECTED    Influenza A H1 (2009) PCR NOT DETECTED NOT DETECTED    Influenza A H3 PCR NOT DETECTED NOT DETECTED    Influenza B by PCR NOT DETECTED NOT DETECTED    Parainfluenza 1 PCR NOT DETECTED NOT DETECTED    Parainfluenza 2 PCR NOT DETECTED NOT DETECTED    Parainfluenza 3 PCR NOT DETECTED NOT DETECTED    Parainfluenza 4 PCR NOT DETECTED NOT DETECTED    RSV PCR NOT DETECTED NOT DETECTED    Bordetella parapertussis by PCR NOT DETECTED NOT DETECTED    B Pertussis by PCR NOT DETECTED NOT DETECTED    Chlamydophila Pneumonia PCR NOT DETECTED NOT DETECTED    Mycoplasma pneumo by PCR NOT DETECTED NOT DETECTED   CBC with Auto Differential   Result Value Ref Range    WBC 6.9 4.0 - 10.5 K/CU MM    RBC 3.86 (L) 4.2 - 5.4 M/CU MM    Hemoglobin 9.9 (L) 12.5 - 16.0 GM/DL    Hematocrit 32.4 (L) 37 - 47 %    MCV 83.9 78 - 100 FL    MCH 25.6 (L) 27 - 31 PG    MCHC 30.6 (L) 32.0 - 36.0 %    RDW 17.6 (H) 11.7 - 14.9 %    Platelets 986 240 - 194 K/CU MM    MPV 9.3 7.5 - 11.1 FL    Differential Type AUTOMATED DIFFERENTIAL     Segs Relative 47.5 36 - 66 %    Lymphocytes % 37.7 24 - 44 %    Monocytes % 8.8 (H) 0 - 4 %    Eosinophils % 5.3 (H) 0 - 3 %    Basophils % 0.6 0 - 1 %    Segs Absolute 3.3 K/CU MM    Lymphocytes Absolute 2.6 K/CU MM    Monocytes Absolute 0.6 K/CU MM    Eosinophils Absolute 0.4 K/CU MM    Basophils Absolute 0.0 K/CU MM    Nucleated RBC % 0.0 %    Total Nucleated RBC 0.0 K/CU MM    Total Immature Neutrophil 0.01 K/CU MM    Immature Neutrophil % 0.1 0 - 0.43 %   Comprehensive Metabolic Panel   Result Value Ref Range    Sodium 135 135 - 145 MMOL/L    Potassium 4.6 3.5 - 5.1 MMOL/L    Chloride 101 99 - 110 mMol/L    CO2 24 21 - 32 MMOL/L    BUN 13 6 - 23 MG/DL    Creatinine 0.6 0.6 - 1.1 MG/DL    Est, Glom Filt Rate >60 >60 mL/min/1.73m2    Glucose 87 70 - 99 MG/DL    Calcium 8.3 8.3 - 10.6 MG/DL    Albumin 3.6 3.4 - 5.0 GM/DL    Total Protein 5.9 (L) 6.4 - 8.2 GM/DL    Total Bilirubin 0.3 0.0 - 1.0 MG/DL    ALT 83 (H) 10 - 40 U/L    AST 87 (H) 15 - 37 IU/L    Alkaline Phosphatase 50 40 - 129 IU/L    Anion Gap 10 4 - 16   Urinalysis   Result Value Ref Range    Color, UA YELLOW YELLOW    Clarity, UA CLEAR CLEAR    Glucose, Urine NEGATIVE NEGATIVE MG/DL    Bilirubin Urine NEGATIVE NEGATIVE MG/DL    Ketones, Urine NEGATIVE NEGATIVE MG/DL    Specific Gravity, UA 1.010 1.001 - 1.035    Blood, Urine NEGATIVE NEGATIVE    pH, Urine 7.0 5.0 - 8.0    Protein, UA NEGATIVE NEGATIVE MG/DL    Urobilinogen, Urine 0.2 0.2 - 1.0 MG/DL    Nitrite Urine, Quantitative NEGATIVE NEGATIVE    Leukocyte Esterase, Urine NEGATIVE NEGATIVE    Urinalysis Comments       Microscopic exam not performed based on chemical results unless requested in original order.    Troponin   Result Value Ref Range    Troponin T <0.010 <0.01 NG/ML   Magnesium   Result Value Ref Range    Magnesium 2.3 1.8 - 2.4 mg/dl   TSH   Result Value Ref Range    TSH, High Sensitivity 4.240 (H) 0.270 - 4.20 uIu/ml   T4, Free   Result Value Ref Range    T4 Free 0.87 (L) 0.9 - 1.8 NG/DL   Lactic Acid   Result Value Ref Range    Lactate 1.2 0.5 - 1.9 mMOL/L   EKG 12 Lead   Result Value Ref Range    Ventricular Rate 47 BPM    Atrial Rate 47 BPM    P-R Interval 170 ms    QRS Duration 72 ms    Q-T Interval 514 ms    QTc Calculation (Bazett) 454 ms    P Axis 82 degrees    R Axis 46 degrees    T Axis 60 degrees    Diagnosis       Sinus bradycardia  Otherwise normal ECG  When compared with ECG of 26-NOV-2021 03:12,  T wave inversion no longer evident in Inferior leads  T wave inversion no longer evident in Anterolateral leads  QT has shortened         Radiographs:  XR CHEST PORTABLE    Result Date: 1/3/2023  EXAMINATION: ONE XRAY VIEW OF THE CHEST 1/3/2023 5:55 pm COMPARISON: 11/18/2021 HISTORY: ORDERING SYSTEM PROVIDED HISTORY: fatigue TECHNOLOGIST PROVIDED HISTORY: Reason for exam:->fatigue Reason for Exam: fatigue Initial evaluation FINDINGS: The trachea is midline. There is atherosclerotic calcification of the aortic knob. There is cardiomegaly. There is a stent in the heart. The lungs are clear. There is no pleural fluid. There are bilateral shoulder replacements. No acute cardiopulmonary process. VL MARLON BILATERAL LIMITED 1-2 LEVELS    Result Date: 1/3/2023  Vascular Lower Arterial Plethysmography Procedure Demographics   Patient Name       Princess Staples     Date of study       01/03/2023   Date of Birth      1935         Gender              Female   Age                80                 Race                   Patient Number     [id number]           Room Number   Visit Number       [id number]          Height   Corporate ID       [id number]           Weight   Accession Number   [id number]   Ordering Physician Jade Grossman 144                                        Physician           MD  Procedure Type of Study:   Extremities Arteries:Lower Arterial Plethysmography, VL ANKLE ART BRACHIAL  INDICES EXTREMITY BILATERAL. Conclusions   Summary   No significant evidence of large vessel arterial occlusive disease of the  lower extremities. Signature   ------------------------------------------------------------------  Electronically signed by Indiana Jang MD (Interpreting  physician) on 01/03/2023 at 04:39 PM  ------------------------------------------------------------------  Impressions Right Impression PTA and DPA are biphasic. Diffuse atherosclerosis. Normal MARLON. Left Impression PTA and DPA are biphasic. Diffuse atherosclerosis. Normal MARLON. Study Location:[facility].  Velocities are measured in cm/s ; Diameters are measured in cm Pressures +--------++--------+-----+----+--------+-----+ ! ! !Right   ! ! Left!        !     ! +--------++--------+-----+----+--------+-----+ ! Location! !Pressure! Ratio! !Pressure! Ratio! +--------++--------+-----+----+--------+-----+ ! Ankle PT!!180     !1.12 !    !170     !1.06 ! +--------++--------+-----+----+--------+-----+ ! Ankle AT!!160     !1    !    !180     !1.12 ! +--------++--------+-----+----+--------+-----+   - Brachial Pressure:Right: 160. Left:160.   - MARLON:Right: 1.12. Left: 1.12. Procedures/EKG:   Please see attending physician's note for interpretation. ED Course and MDM   CC/HPI Summary, DDx, ED Course, and Reassessment: Patient presents to the ED for generalized weakness/fatigue, cough for several weeks. Differential diagnosis includes infectious process such as COVID/flu/UTI, thyroid disorder, electrolyte imbalance, cardiac cause, others. She has a negative respiratory panel, no UTI. White count is not elevated, stable anemia of 9.9. Negative troponin. TSH is just barely out of normal parameters, T4 just under. Lactic is not elevated. CXR is clear. She has stable sinus bradycardia.     History from : Patient    Limitations to history : None    Patient was given the following medications:  Medications   0.9 % sodium chloride infusion ( IntraVENous New Bag 1/3/23 2027)   sodium chloride flush 0.9 % injection 5-40 mL (has no administration in time range)   sodium chloride flush 0.9 % injection 5-40 mL (has no administration in time range)   0.9 % sodium chloride infusion (has no administration in time range)   enoxaparin (LOVENOX) injection 40 mg (has no administration in time range)   ondansetron (ZOFRAN-ODT) disintegrating tablet 4 mg (has no administration in time range)     Or   ondansetron (ZOFRAN) injection 4 mg (has no administration in time range)   polyethylene glycol (GLYCOLAX) packet 17 g (has no administration in time range)   acetaminophen (TYLENOL) tablet 650 mg (has no administration in time range)     Or   acetaminophen (TYLENOL) suppository 650 mg (has no administration in time range)       Independent Imaging Interpretation by me: None    EKG (if obtained): (All EKG's are interpreted by myself in the absence of a cardiologist) Please see attending physician's note for interpretation. Chronic conditions affecting care: Cardiac disease, thyroid disease, COPD, advanced age    Discussion with Other Profesionals : Admitting Team Dr. Vivian Delgadillo, hospitalist, will see and admit. Case Management was also consulted and Sheri Razo met with patient and family. Ultimately, she is interested in inpatient rehab--will admit for PT/OT evaluation. Social Determinants : None    Records Reviewed : Outpatient Notes with PCP and Cardiology for same complaint over the last month    Disposition Considerations (tests considered but not done, Shared Decision Making, Pt Expectation of Test or Tx.):   Patient's work-up is unremarkable but I am concerned about patient safety given her generalized weakness, advanced age, and living alone. I had Case Management meet with her and family to discuss options, including HHC, rehab, SNF placement and she ultimately agreed to admission for evaluation for inpatient rehab. I am the Primary Clinician of Record. Supervising physician was Dr. Maykel Lemus. Patient was seen independently. In light of current events, I did utilize appropriate PPE (including N95 and surgical face mask, safety glasses, and gloves, as recommended by the health facility/national standard best practice, during my bedside interactions with the patient. Final Impression      1. General weakness    2. Other fatigue    3.  Cough, unspecified type          DISPOSITION Admitted 01/04/2023 12:08:54 AM      Mertha Decree, [address], [place]  01/04/23 0116

## 2023-01-04 ENCOUNTER — TELEPHONE (OUTPATIENT)
Dept: CARDIOLOGY CLINIC | Age: 88
End: 2023-01-04

## 2023-01-04 PROBLEM — R53.1 GENERALIZED WEAKNESS: Status: ACTIVE | Noted: 2023-01-04

## 2023-01-04 LAB
EKG ATRIAL RATE: 47 BPM
EKG ATRIAL RATE: 50 BPM
EKG DIAGNOSIS: NORMAL
EKG DIAGNOSIS: NORMAL
EKG P AXIS: 82 DEGREES
EKG P AXIS: 86 DEGREES
EKG P-R INTERVAL: 170 MS
EKG P-R INTERVAL: 176 MS
EKG Q-T INTERVAL: 496 MS
EKG Q-T INTERVAL: 514 MS
EKG QRS DURATION: 62 MS
EKG QRS DURATION: 72 MS
EKG QTC CALCULATION (BAZETT): 452 MS
EKG QTC CALCULATION (BAZETT): 454 MS
EKG R AXIS: 46 DEGREES
EKG R AXIS: 72 DEGREES
EKG T AXIS: 60 DEGREES
EKG T AXIS: 85 DEGREES
EKG VENTRICULAR RATE: 47 BPM
EKG VENTRICULAR RATE: 50 BPM
FOLATE: 17.4 NG/ML (ref 3.1–17.5)
T3 FREE: 1.5 PG/ML (ref 2.3–4.2)
VITAMIN B-12: 924.4 PG/ML (ref 211–911)

## 2023-01-04 PROCEDURE — 1200000000 HC SEMI PRIVATE

## 2023-01-04 PROCEDURE — 96372 THER/PROPH/DIAG INJ SC/IM: CPT

## 2023-01-04 PROCEDURE — 94664 DEMO&/EVAL PT USE INHALER: CPT

## 2023-01-04 PROCEDURE — 6370000000 HC RX 637 (ALT 250 FOR IP): Performed by: STUDENT IN AN ORGANIZED HEALTH CARE EDUCATION/TRAINING PROGRAM

## 2023-01-04 PROCEDURE — 93010 ELECTROCARDIOGRAM REPORT: CPT | Performed by: INTERNAL MEDICINE

## 2023-01-04 PROCEDURE — G0378 HOSPITAL OBSERVATION PER HR: HCPCS

## 2023-01-04 PROCEDURE — 2580000003 HC RX 258: Performed by: PHYSICIAN ASSISTANT

## 2023-01-04 PROCEDURE — 85027 COMPLETE CBC AUTOMATED: CPT

## 2023-01-04 PROCEDURE — 82607 VITAMIN B-12: CPT

## 2023-01-04 PROCEDURE — 97165 OT EVAL LOW COMPLEX 30 MIN: CPT

## 2023-01-04 PROCEDURE — 93005 ELECTROCARDIOGRAM TRACING: CPT | Performed by: STUDENT IN AN ORGANIZED HEALTH CARE EDUCATION/TRAINING PROGRAM

## 2023-01-04 PROCEDURE — 97161 PT EVAL LOW COMPLEX 20 MIN: CPT

## 2023-01-04 PROCEDURE — 2580000003 HC RX 258: Performed by: STUDENT IN AN ORGANIZED HEALTH CARE EDUCATION/TRAINING PROGRAM

## 2023-01-04 PROCEDURE — 6360000002 HC RX W HCPCS: Performed by: STUDENT IN AN ORGANIZED HEALTH CARE EDUCATION/TRAINING PROGRAM

## 2023-01-04 PROCEDURE — 84481 FREE ASSAY (FT-3): CPT

## 2023-01-04 PROCEDURE — 80048 BASIC METABOLIC PNL TOTAL CA: CPT

## 2023-01-04 PROCEDURE — 89220 SPUTUM SPECIMEN COLLECTION: CPT

## 2023-01-04 PROCEDURE — 97116 GAIT TRAINING THERAPY: CPT

## 2023-01-04 PROCEDURE — 94761 N-INVAS EAR/PLS OXIMETRY MLT: CPT

## 2023-01-04 PROCEDURE — 97535 SELF CARE MNGMENT TRAINING: CPT

## 2023-01-04 PROCEDURE — 82746 ASSAY OF FOLIC ACID SERUM: CPT

## 2023-01-04 PROCEDURE — 94640 AIRWAY INHALATION TREATMENT: CPT

## 2023-01-04 RX ORDER — ESCITALOPRAM OXALATE 10 MG/1
20 TABLET ORAL DAILY
Status: DISCONTINUED | OUTPATIENT
Start: 2023-01-04 | End: 2023-01-05 | Stop reason: HOSPADM

## 2023-01-04 RX ORDER — ONDANSETRON 2 MG/ML
4 INJECTION INTRAMUSCULAR; INTRAVENOUS EVERY 6 HOURS PRN
Status: DISCONTINUED | OUTPATIENT
Start: 2023-01-04 | End: 2023-01-05 | Stop reason: HOSPADM

## 2023-01-04 RX ORDER — LISINOPRIL 5 MG/1
5 TABLET ORAL DAILY
Status: DISCONTINUED | OUTPATIENT
Start: 2023-01-04 | End: 2023-01-05 | Stop reason: HOSPADM

## 2023-01-04 RX ORDER — ENOXAPARIN SODIUM 100 MG/ML
40 INJECTION SUBCUTANEOUS DAILY
Status: DISCONTINUED | OUTPATIENT
Start: 2023-01-04 | End: 2023-01-05 | Stop reason: HOSPADM

## 2023-01-04 RX ORDER — SODIUM CHLORIDE 9 MG/ML
INJECTION, SOLUTION INTRAVENOUS PRN
Status: DISCONTINUED | OUTPATIENT
Start: 2023-01-04 | End: 2023-01-05 | Stop reason: HOSPADM

## 2023-01-04 RX ORDER — PANTOPRAZOLE SODIUM 40 MG/1
40 TABLET, DELAYED RELEASE ORAL
Status: DISCONTINUED | OUTPATIENT
Start: 2023-01-04 | End: 2023-01-05 | Stop reason: HOSPADM

## 2023-01-04 RX ORDER — ACETAMINOPHEN 650 MG/1
650 SUPPOSITORY RECTAL EVERY 6 HOURS PRN
Status: DISCONTINUED | OUTPATIENT
Start: 2023-01-04 | End: 2023-01-05 | Stop reason: HOSPADM

## 2023-01-04 RX ORDER — SODIUM CHLORIDE 0.9 % (FLUSH) 0.9 %
5-40 SYRINGE (ML) INJECTION PRN
Status: DISCONTINUED | OUTPATIENT
Start: 2023-01-04 | End: 2023-01-05 | Stop reason: HOSPADM

## 2023-01-04 RX ORDER — ALBUTEROL SULFATE 90 UG/1
2 AEROSOL, METERED RESPIRATORY (INHALATION) EVERY 6 HOURS PRN
Status: DISCONTINUED | OUTPATIENT
Start: 2023-01-04 | End: 2023-01-05 | Stop reason: HOSPADM

## 2023-01-04 RX ORDER — ATORVASTATIN CALCIUM 40 MG/1
40 TABLET, FILM COATED ORAL NIGHTLY
Status: DISCONTINUED | OUTPATIENT
Start: 2023-01-04 | End: 2023-01-05 | Stop reason: HOSPADM

## 2023-01-04 RX ORDER — IPRATROPIUM BROMIDE AND ALBUTEROL SULFATE 2.5; .5 MG/3ML; MG/3ML
3 SOLUTION RESPIRATORY (INHALATION)
Status: DISCONTINUED | OUTPATIENT
Start: 2023-01-04 | End: 2023-01-05 | Stop reason: HOSPADM

## 2023-01-04 RX ORDER — LEVOTHYROXINE SODIUM 0.12 MG/1
125 TABLET ORAL DAILY
Status: DISCONTINUED | OUTPATIENT
Start: 2023-01-04 | End: 2023-01-04

## 2023-01-04 RX ORDER — POLYETHYLENE GLYCOL 3350 17 G/17G
17 POWDER, FOR SOLUTION ORAL DAILY PRN
Status: DISCONTINUED | OUTPATIENT
Start: 2023-01-04 | End: 2023-01-05 | Stop reason: HOSPADM

## 2023-01-04 RX ORDER — ACETAMINOPHEN 325 MG/1
650 TABLET ORAL EVERY 6 HOURS PRN
Status: DISCONTINUED | OUTPATIENT
Start: 2023-01-04 | End: 2023-01-05 | Stop reason: HOSPADM

## 2023-01-04 RX ORDER — ASPIRIN 81 MG/1
81 TABLET ORAL DAILY
Status: DISCONTINUED | OUTPATIENT
Start: 2023-01-04 | End: 2023-01-05 | Stop reason: HOSPADM

## 2023-01-04 RX ORDER — SODIUM CHLORIDE 0.9 % (FLUSH) 0.9 %
5-40 SYRINGE (ML) INJECTION EVERY 12 HOURS SCHEDULED
Status: DISCONTINUED | OUTPATIENT
Start: 2023-01-04 | End: 2023-01-05 | Stop reason: HOSPADM

## 2023-01-04 RX ORDER — LEVOTHYROXINE SODIUM 0.1 MG/1
100 TABLET ORAL DAILY
Status: DISCONTINUED | OUTPATIENT
Start: 2023-01-05 | End: 2023-01-05 | Stop reason: HOSPADM

## 2023-01-04 RX ORDER — ONDANSETRON 4 MG/1
4 TABLET, ORALLY DISINTEGRATING ORAL EVERY 8 HOURS PRN
Status: DISCONTINUED | OUTPATIENT
Start: 2023-01-04 | End: 2023-01-05 | Stop reason: HOSPADM

## 2023-01-04 RX ADMIN — LISINOPRIL 5 MG: 5 TABLET ORAL at 09:50

## 2023-01-04 RX ADMIN — IPRATROPIUM BROMIDE AND ALBUTEROL SULFATE 3 ML: 2.5; .5 SOLUTION RESPIRATORY (INHALATION) at 20:15

## 2023-01-04 RX ADMIN — ESCITALOPRAM OXALATE 20 MG: 10 TABLET ORAL at 09:50

## 2023-01-04 RX ADMIN — IPRATROPIUM BROMIDE AND ALBUTEROL SULFATE 3 ML: 2.5; .5 SOLUTION RESPIRATORY (INHALATION) at 07:22

## 2023-01-04 RX ADMIN — LEVOTHYROXINE SODIUM 125 MCG: 0.12 TABLET ORAL at 02:34

## 2023-01-04 RX ADMIN — ATORVASTATIN CALCIUM 40 MG: 40 TABLET, FILM COATED ORAL at 21:59

## 2023-01-04 RX ADMIN — SODIUM CHLORIDE: 9 INJECTION, SOLUTION INTRAVENOUS at 17:29

## 2023-01-04 RX ADMIN — IPRATROPIUM BROMIDE AND ALBUTEROL SULFATE 3 ML: 2.5; .5 SOLUTION RESPIRATORY (INHALATION) at 15:15

## 2023-01-04 RX ADMIN — PANTOPRAZOLE SODIUM 40 MG: 40 TABLET, DELAYED RELEASE ORAL at 06:18

## 2023-01-04 RX ADMIN — ENOXAPARIN SODIUM 40 MG: 100 INJECTION SUBCUTANEOUS at 09:50

## 2023-01-04 RX ADMIN — IPRATROPIUM BROMIDE AND ALBUTEROL SULFATE 3 ML: 2.5; .5 SOLUTION RESPIRATORY (INHALATION) at 11:33

## 2023-01-04 RX ADMIN — SODIUM CHLORIDE: 9 INJECTION, SOLUTION INTRAVENOUS at 06:22

## 2023-01-04 RX ADMIN — ASPIRIN 81 MG: 81 TABLET, COATED ORAL at 09:51

## 2023-01-04 RX ADMIN — SODIUM CHLORIDE, PRESERVATIVE FREE 10 ML: 5 INJECTION INTRAVENOUS at 21:59

## 2023-01-04 RX ADMIN — SODIUM CHLORIDE, PRESERVATIVE FREE 10 ML: 5 INJECTION INTRAVENOUS at 10:21

## 2023-01-04 ASSESSMENT — ENCOUNTER SYMPTOMS
VOICE CHANGE: 0
SHORTNESS OF BREATH: 0
CONSTIPATION: 1
EYE PAIN: 0
DIARRHEA: 0
CHEST TIGHTNESS: 0
BACK PAIN: 1
BLOOD IN STOOL: 0
SINUS PAIN: 0
NAUSEA: 0
VOMITING: 0
ABDOMINAL PAIN: 0
SINUS PRESSURE: 0
EYE DISCHARGE: 0
COUGH: 0

## 2023-01-04 NOTE — CARE COORDINATION
CM consulted for d/c planning. CM went to see pt. Pt is normally independent in ADL's. Lives alone, has insurance and PCP. Pt states for the past week she has been weak and fatigued. Barely able to walk and care for self. Pt has seen her PCP and had a test on her legs today to check circulation. Pt is feeling unsafe at home and not like her normal self. Currently no help in the home. Pt does say she has had HC in the past and she did not find it helpful. Pt also does not want to go to a nursing home. Pt requested ARU if possible. CM explained that it was 3 hours of therapy a day and that PT/OT would have to evaluate her and make recs. Pt is agreeable to the plan. GAY updated Valeria TRAMMELL. YOLANDE message sent to [facility]. Handoff note placed.

## 2023-01-04 NOTE — H&P
V2.0  History and Physical      Name:  Gilson House /Age/Sex: 1935  (80 y.o. female)   MRN & CSN:  [id number] & [id number] Encounter Date/Time: 2023 11:58 PM EST   Location:  ED[facility]17 PCP: Koffi Cooper MD       Hospital Day: 2    Assessment and Plan:   Gilson House is a 80 y.o. female with a pmh of hypothyroidism, recent COPD exacerbation admission, COPD, history of severe aortic stenosis who presents with Generalized weakness    Hospital Problems             Last Modified POA    * (Principal) Generalized weakness 2023 Yes       Generalized weakness in the setting of hypothyroidism: Takes levothyroxine 75 MCG at home. We will increase the dose to 125 MCG. TSH level is more than 4 with a free T4 level of 0.8. Endocrinology consult has been placed. Continue to monitor. PT OT. On telemetry  Severe aortic stenosis s/p TAVR: Done by Dr. Felicia Orlando. Last cardiac catheterization was 2021 showing no significant coronary artery disease with possible signs of Takotsubo. Most recent echocardiogram showed EF of 55 to 60% with normal functioning prosthetic aortic valve and a mean gradient of 4 mmHg. COPD not on home oxygen: Last hospital admission for COPD exacerbation more than a year ago. Not in exacerbation right now. Continue inhalers. Continue to monitor  Hypothyroidism on levothyroxine at home, dose has been increased on this admission from 76 to 125 MCG  Hyperlipidemia on statin  Benign essential hypertension  Glaucoma? Takes timolol drops. Hold off on timolol drops because of bradycardia  History of CVA    Disposition:   Current Living situation: Home  Expected Disposition: Home  Estimated D/C: 2 to 3 days    Diet ADULT DIET;  Regular   DVT Prophylaxis [x] Lovenox, []  Heparin, [] SCDs, [] Ambulation,  [] Eliquis, [] Xarelto   Code Status Prior   Surrogate Decision Maker/ POA      History from:     patient    History of Present Illness:     Chief Complaint: Generalized weakness  The patient has been having symptoms of weakness for some time now. More than a week ago the patient had admission to the hospital with COPD so patient was discharged home with levofloxacin. Patient is getting better for some time but for the last couple of days fatigue has still been. Is an issue. She is been taking her medication on regular basis. Denies any chest pain, dizziness, lightheadedness, chest pain, shortness of breath, leg swelling, nausea, vomiting, diarrhea, constipation. In the ED patient was found to have a blood pressure of 157/50 with a pulse of 45-50 respiratory to 16 satting 100% on room air afebrile. TSH level was found to be high and free T4 level was found to be low. The patient is admitted to the hospital for the management of fatigue. Endocrinology consult has been placed. Review of Systems: Need 10 Elements   Review of Systems   Constitutional:  Positive for activity change. Negative for appetite change, chills, fever and unexpected weight change. HENT:  Negative for ear pain, hearing loss, sinus pressure, sinus pain and voice change. Eyes:  Negative for pain, discharge and visual disturbance. Respiratory:  Negative for cough, chest tightness and shortness of breath. Cardiovascular:  Positive for palpitations. Negative for chest pain and leg swelling. Gastrointestinal:  Positive for constipation. Negative for abdominal pain, blood in stool, diarrhea, nausea and vomiting. Genitourinary:  Negative for dysuria and frequency. Musculoskeletal:  Positive for arthralgias, back pain and myalgias. Neurological:  Positive for weakness. Negative for dizziness, light-headedness and headaches. Psychiatric/Behavioral:  Negative for sleep disturbance.       Objective:   No intake or output data in the 24 hours ending 01/04/23 0009   Vitals:   Vitals:    01/03/23 1459 01/03/23 2346   BP: (!) 160/50 (!) 156/42   Pulse: 50 52   Resp: 16 14   Temp: 97.5 °F (36.4 °C) 98.1 °F (36.7 °C)   TempSrc: Oral Oral   SpO2: 100% 95%   Weight: 114 lb (51.7 kg)    Height: 5' (1.524 m)        Medications Prior to Admission     Prior to Admission medications    Medication Sig Start Date End Date Taking? Authorizing Provider   levoFLOXacin (LEVAQUIN) 750 MG tablet Take 1 tablet by mouth daily for 10 days 12/29/22 1/8/23  Liv Huynh MD   promethazine-dextromethorphan (PROMETHAZINE-DM) 6.25-15 MG/5ML syrup Take 5 mLs by mouth 4 times daily as needed for Cough 12/29/22   Liv Huynh MD   methocarbamol (ROBAXIN) 500 MG tablet Take 1 tablet by mouth every evening 10/12/22   Liv Huynh MD   montelukast (SINGULAIR) 10 MG tablet TAKE 1 TABLET PER NG TUBE NIGHTLY 8/22/22   Liv Huynh MD   atorvastatin (LIPITOR) 40 MG tablet Take 1 tablet by mouth nightly 8/22/22   Liv Huynh MD   escitalopram (LEXAPRO) 20 MG tablet Take 1 tablet by mouth daily 8/22/22   Liv Huynh MD   pantoprazole (PROTONIX) 40 MG tablet TAKE 1 TABLET NIGHTLY 8/22/22   Liv Huynh MD   ondansetron (ZOFRAN) 4 MG tablet Take 1 tablet by mouth every 8 hours as needed for Nausea or Vomiting 8/22/22   Liv Huynh MD   lisinopril (PRINIVIL;ZESTRIL) 5 MG tablet Take 1 tablet by mouth daily 5/17/22   DERIK Mo - CNP   levothyroxine (SYNTHROID) 75 MCG tablet 1 tablet DAILY (route: oral) 1/27/22   Historical Provider, MD   Handicap Placard MISC by Does not apply route Please provide handicap placard for 5 years.     Diagnosis: Orthopedic condition 2/14/22   Liv Huynh MD   aspirin [facility] ASPIRIN ADULT LOW DOSE) 81 MG EC tablet Take 1 tablet by mouth daily 12/2/21   MELISSA Selby MD   ipratropium-albuterol (DUONEB) 0.5-2.5 (3) MG/3ML SOLN nebulizer solution Inhale 3 mLs into the lungs every 4 hours (while awake) 8/6/21   Liv Huynh MD   sucralfate (CARAFATE) 1 GM tablet Take 1 tablet by mouth 4 times daily  Patient not taking: Reported on 12/7/2022 6/21/21   Alonso Green MD   albuterol sulfate  (90 Base) MCG/ACT inhaler Inhale 2 puffs into the lungs every 6 hours as needed for Wheezing 8/10/20   Katelin Maier MD   Timolol (TIMOPTIC) 0.5 % (DAILY) SOLN ophthalmic solution Place 1 drop into both eyes daily    Historical Provider, MD   vitamin B-12 (CYANOCOBALAMIN) 1000 MCG tablet Take 1,000 mcg by mouth daily OTC    Historical Provider, MD   Calcium-Magnesium-Zinc 935-40-4 MG TABS Take 1 tablet by mouth daily OTC    Historical Provider, MD   Cholecalciferol (VITAMIN D3) 2000 UNITS CAPS Take 1 capsule by mouth daily OTC    Historical Provider, MD       Physical Exam: Need 8 Elements   Physical Exam  Vitals reviewed. Constitutional:       Appearance: Normal appearance. She is normal weight. HENT:      Head: Normocephalic. Nose: Nose normal.      Mouth/Throat:      Mouth: Mucous membranes are dry. Eyes:      Conjunctiva/sclera: Conjunctivae normal.      Pupils: Pupils are equal, round, and reactive to light. Cardiovascular:      Rate and Rhythm: Regular rhythm. Bradycardia present. Pulses: Normal pulses. Heart sounds: Normal heart sounds. No murmur heard. Pulmonary:      Effort: Pulmonary effort is normal.      Breath sounds: Normal breath sounds. No wheezing, rhonchi or rales. Abdominal:      General: Abdomen is flat. Bowel sounds are normal. There is no distension. Palpations: Abdomen is soft. Tenderness: There is no abdominal tenderness. Musculoskeletal:         General: No deformity. Normal range of motion. Cervical back: Normal range of motion and neck supple. Right lower leg: No edema. Left lower leg: No edema. Skin:     General: Skin is dry. Coloration: Skin is not jaundiced or pale. Neurological:      General: No focal deficit present. Mental Status: She is alert and oriented to person, place, and time. Mental status is at baseline.    Psychiatric:         Mood and Affect: Mood normal.         Behavior: Behavior normal.          Past History:   PMHx   Past Medical History:   Diagnosis Date    Arthritis     generalized    Asthma     Blood transfusion 2004    No reaction    Broken heart syndrome     Cerebral artery occlusion with cerebral infarction [facility])     Patient \"states she was told she has had three small strokes. \"    Chronic bronchitis ([facility] [address]     COPD (chronic obstructive pulmonary disease) ([facility] [address]     summer 2014    Echocardiogram 04/09/2021    EF 55-60%, Mild dilated LA, Mild annular calcification present, Mild mitral stenosis, Mild MR & TR. Fatigue     H/O cardiac catheterization 06/15/2017    mild lad and cx disease    H/O cardiovascular stress test 08/22/2019    H/O Doppler ultrasound 4/7/2016 3/19/12    carotid-4/16 WNL 3/12right mild less than 50%and left wnl    H/O Doppler ultrasound 6/14/017    carotid - mod disease EILEEN, mild disease LICA    H/O echocardiogram 07/23/2010    H/O echocardiogram 04/15/2016    EF 60% sclerotic AV mildly stenosed-recommend yearly echo    H/O echocardiogram 08/22/2019    EF 55-60%, Minimal concentric left ventricular hypertrophy, left atrium is mildly dilated, severe aortic stenosis, Mitral annular calcification is present, Mild AR, Mild-Mod MR, Mild TR, No pericardial effusion     H/O echocardiogram 09/23/2019    H/O exercise stress test 06/14/2017    abnormal    History of cardiac cath 08/28/2019    Severe AS,   TAVR?? History of nuclear stress test 03/14/2017    EF 75%. Normal study. Holter monitor, abnormal 02/22/2011    infrequent APC are seen    Hyperlipidemia     Hypertension     Normal cardiac stress test 07/23/2010    EF 70%, no ischemia    On home O2     only uses as needed, nightly prn    PONV (postoperative nausea and vomiting)     Syncope and collapse     Tachycardia     HX of tachycardia - had a cardioablation    Thyroid disease         PSHX:  has a past surgical history that includes joint replacement (2004); Hysterectomy (1966); Cholecystectomy (1961);  Toe Surgery (Early ); Carpal tunnel release (); Finger surgery; Gastric restriction surgery (); hernia repair (unknown); lipectomy (); Dilatation, esophagus; other surgical history (2012); Hip fracture surgery (Left, 2015); Colonoscopy; Endoscopy, colon, diagnostic (2017); Total shoulder arthroplasty (Bilateral, 10/2017); Aortic valve replacement (N/A, 2019); Aortic valve repair (N/A, 2019); Breast surgery (); Cardiac surgery (); Cardiac catheterization (2011); Kathy-en-Y Gastric Bypass; Tonsillectomy; eye surgery (Bilateral); and Upper gastrointestinal endoscopy (N/A, 3/30/2022). Fam HX: family history includes Arthritis in her father; Cancer in her sister; Diabetes in her brother and mother; Early Death in her son; Heart Disease in her father; High Blood Pressure in her daughter and father; High Cholesterol in her father; Other in her father, mother, sister, sister, son, and son; Stroke in her son; Vision Loss in her sister. Soc HX:   Social History     Socioeconomic History    Marital status:      Spouse name: None    Number of children: None    Years of education: None    Highest education level: None   Tobacco Use    Smoking status: Former     Packs/day: 3.00     Years: 5.00     Pack years: 15.00     Types: Cigarettes     Quit date: 1962     Years since quittin.0    Smokeless tobacco: Never   Vaping Use    Vaping Use: Never used   Substance and Sexual Activity    Alcohol use: No     Comment: caffeine: 3 diet coke a day    Drug use: Yes     Types: Marijuana Berneta Dwight)     Comment: Takes for pain.  Has medical card     Social Determinants of Health     Financial Resource Strain: Low Risk     Difficulty of Paying Living Expenses: Not very hard   Food Insecurity: No Food Insecurity    Worried About Running Out of Food in the Last Year: Never true    Ran Out of Food in the Last Year: Never true   Transportation Needs: No Transportation Needs    Lack of Transportation (Medical): No    Lack of Transportation (Non-Medical): No   Physical Activity: Sufficiently Active    Days of Exercise per Week: 7 days    Minutes of Exercise per Session: 30 min   Stress: No Stress Concern Present    Feeling of Stress : Only a little   Housing Stability: Low Risk     Unable to Pay for Housing in the Last Year: No    Number of Places Lived in the Last Year: 1    Unstable Housing in the Last Year: No       Allergies: Allergies: Allergies   Allergen Reactions    Morphine Anaphylaxis     Dilaudid OK    Bactrim [Sulfamethoxazole-Trimethoprim]     Influenza Vaccines      Ended in hospital stay for 3 days told by md not to get it anymore     Lactose Intolerance (Gi) Nausea Only    Phenergan [Promethazine Hcl] Other (See Comments)     Makes me jerk all over. Zofran OK    Stadol [Butorphanol Tartrate] Other (See Comments)     Out of body experience. Was told it was a near death experience and was placed in ICU. Dilaudid OK    Tape Jonne Knows Tape] Other (See Comments)     Plastic cause itching and rash. Paper tape causes my skin to blister.  (Tega-derm and Coban OK to use.)       Medications:   Medications:    sodium chloride flush  5-40 mL IntraVENous 2 times per day    enoxaparin  40 mg SubCUTAneous Daily      Infusions:    sodium chloride      sodium chloride 100 mL/hr at 01/03/23 2027     PRN Meds: sodium chloride flush, 5-40 mL, PRN  sodium chloride, , PRN  ondansetron, 4 mg, Q8H PRN   Or  ondansetron, 4 mg, Q6H PRN  polyethylene glycol, 17 g, Daily PRN  acetaminophen, 650 mg, Q6H PRN   Or  acetaminophen, 650 mg, Q6H PRN      Labs      CBC:   Recent Labs     01/03/23 1926   WBC 6.9   HGB 9.9*        BMP:    Recent Labs     01/03/23 1926      K 4.6      CO2 24   BUN 13   CREATININE 0.6   GLUCOSE 87     Hepatic:   Recent Labs     01/03/23 1926   AST 87*   ALT 83*   BILITOT 0.3   ALKPHOS 50     Lipids:   Lab Results   Component Value Date/Time CHOL 114 08/22/2022 01:19 PM    CHOL 164 02/05/2016 12:00 AM    HDL 71 08/22/2022 01:19 PM    TRIG 77 08/22/2022 01:19 PM     Hemoglobin A1C:   Lab Results   Component Value Date/Time    LABA1C 5.3 08/28/2019 11:35 AM     TSH:   Lab Results   Component Value Date/Time    TSH 2.710 08/06/2021 12:00 PM     Troponin:   Lab Results   Component Value Date/Time    TROPONINT <0.010 01/03/2023 07:26 PM    TROPONINT 0.083 11/26/2021 04:08 AM    TROPONINT 0.269 11/19/2021 12:33 AM     Lactic Acid: No results for input(s): LACTA in the last 72 hours. BNP: No results for input(s): PROBNP in the last 72 hours. UA:  Lab Results   Component Value Date/Time    NITRU NEGATIVE 01/03/2023 08:22 PM    NITRU NEGATIVE 11/13/2012 09:10 AM    COLORU YELLOW 01/03/2023 08:22 PM    PHUR 5.5 04/03/2019 10:18 AM    WBCUA 2 11/14/2021 02:30 PM    RBCUA 2 11/14/2021 02:30 PM    MUCUS RARE 11/14/2021 02:30 PM    TRICHOMONAS NONE SEEN 11/14/2021 02:30 PM    BACTERIA NEGATIVE 11/14/2021 02:30 PM    CLARITYU CLEAR 01/03/2023 08:22 PM    SPECGRAV 1.010 01/03/2023 08:22 PM    LEUKOCYTESUR NEGATIVE 01/03/2023 08:22 PM    UROBILINOGEN 0.2 01/03/2023 08:22 PM    BILIRUBINUR NEGATIVE 01/03/2023 08:22 PM    BILIRUBINUR neg 04/03/2019 10:18 AM    BLOODU NEGATIVE 01/03/2023 08:22 PM    GLUCOSEU neg 04/03/2019 10:18 AM    KETUA NEGATIVE 01/03/2023 08:22 PM     Urine Cultures: No results found for: LABURIN  Blood Cultures: No results found for: BC  No results found for: BLOODCULT2  Organism: No results found for: ORG    Imaging/Diagnostics Last 24 Hours   XR CHEST PORTABLE    Result Date: 1/3/2023  EXAMINATION: ONE XRAY VIEW OF THE CHEST 1/3/2023 5:55 pm COMPARISON: 11/18/2021 HISTORY: ORDERING SYSTEM PROVIDED HISTORY: fatigue TECHNOLOGIST PROVIDED HISTORY: Reason for exam:->fatigue Reason for Exam: fatigue Initial evaluation FINDINGS: The trachea is midline. There is atherosclerotic calcification of the aortic knob. There is cardiomegaly.   There is a stent in the heart. The lungs are clear. There is no pleural fluid. There are bilateral shoulder replacements. No acute cardiopulmonary process. VL MARLON BILATERAL LIMITED 1-2 LEVELS    Result Date: 1/3/2023  Vascular Lower Arterial Plethysmography Procedure Demographics   Patient Name       Imelda Gunter     Date of study       01/03/2023   Date of Birth      1935         Gender              Female   Age                80                 Race                   Patient Number     [id number]           Room Number   Visit Number       [id number]          Height   Corporate ID       [id number]           Weight   Accession Number   [id number]   Ordering Physician Jade Santos 144                                        Physician           MD  Procedure Type of Study:   Extremities Arteries:Lower Arterial Plethysmography, VL ANKLE ART BRACHIAL  INDICES EXTREMITY BILATERAL. Conclusions   Summary   No significant evidence of large vessel arterial occlusive disease of the  lower extremities. Signature   ------------------------------------------------------------------  Electronically signed by Berny Delgadillo MD (Interpreting  physician) on 01/03/2023 at 04:39 PM  ------------------------------------------------------------------  Impressions Right Impression PTA and DPA are biphasic. Diffuse atherosclerosis. Normal MARLON. Left Impression PTA and DPA are biphasic. Diffuse atherosclerosis. Normal MARLON. Study Location:[facility]. Velocities are measured in cm/s ; Diameters are measured in cm Pressures +--------++--------+-----+----+--------+-----+ ! ! !Right   ! ! Left!        !     ! +--------++--------+-----+----+--------+-----+ ! Location! !Pressure! Ratio! !Pressure! Ratio! +--------++--------+-----+----+--------+-----+ ! Ankle PT!!180     !1.12 !    !170     !1.06 ! +--------++--------+-----+----+--------+-----+ ! Ankle AT!!160     !1    !    !180     !1.12 ! +--------++--------+-----+----+--------+-----+   - Brachial Pressure:Right: 160. Left:160.   - MARLON:Right: 1.12. Left: 1.12.       Personally reviewed Lab Studies, Imaging    Electronically signed by Latoya Ascencio MD, MD on 1/4/2023 at 12:09 AM

## 2023-01-04 NOTE — PROGRESS NOTES
4 Eyes Skin Assessment     NAME:  Joanne Gan  YOB: 1935  MEDICAL RECORD NUMBER:  [id number]    The patient is being assessed for  Admission    I agree that One RN have performed a thorough Head to Toe Skin Assessment on the patient. ALL assessment sites listed below have been assessed. Areas assessed by both nurses:    Head, Face, Ears, Shoulders, Back, Chest, Arms, Elbows, Hands, Sacrum. Buttock, Coccyx, Ischium, and Legs. Feet and Heels        Does the Patient have a Wound?  No noted wound(s)       Moses Prevention initiated by RN: Yes   Wound Care Orders initiated by RN: NA    Pressure Injury (Stage 3,4, Unstageable, DTI, NWPT, and Complex wounds) if present place referral order by RN under : NA    New and Established Ostomies, if present place, referral order under : NA      Nurse 1 eSignature: Electronically signed by Yarelis Tamayo RN on 1/4/23 at 2:22 AM EST    **SHARE this note so that the co-signing nurse is able to place an eSignature**    Nurse 2 eSignature: Electronically signed by Bubba López LPN on 4/2/37 at 5:79 AM EST

## 2023-01-04 NOTE — CONSULTS
Endocrinology   Consult Note  1/3/2023  5:25 PM     Primary Care provider: Dulce Maria Lazcano MD     Referring physician:  No admitting provider for patient encounter. Dear Doctor Gabriel Bain for the Consult     Pt. Was Admitted for : Generalized weakness    Reason for Consult: Reassessment of hypothyroidism and      History Obtained From:  Patient/ EMR       HPI    Nelda Sheriff is a 80 y.o. female history of arthritis, asthma, CVA, COPD, had cardiac ablation done for tachycardia, hypothyroidism,, aortic stenosis, TAVA surgery done,, had gastric bypass surgery done 2 hypertension, hyperlipidemia was recently admitted to hospital for's exacerbation of COPD and discharged was brought back to emergency room with this complaining of generalized weakness. In the ER she was found to have elevated TSH levels and her thyroid dose was reduced by her primary care physician several days ago. I was consulted to reevaluate her thyroid function test and adjust the dose if needed        ROS:   Pt's ROS done in detail. Abnormal ROS are noted in Medical and Surgical History Section below: Other Medical History:        Diagnosis Date    Arthritis     generalized    Asthma     Blood transfusion 2004    No reaction    Broken heart syndrome     Cerebral artery occlusion with cerebral infarction [facility])     Patient \"states she was told she has had three small strokes. \"    Chronic bronchitis ([facility] [address]     COPD (chronic obstructive pulmonary disease) ([facility] [address]     summer 2014    Echocardiogram 04/09/2021    EF 55-60%, Mild dilated LA, Mild annular calcification present, Mild mitral stenosis, Mild MR & TR.     Fatigue     H/O cardiac catheterization 06/15/2017    mild lad and cx disease    H/O cardiovascular stress test 08/22/2019    H/O Doppler ultrasound 4/7/2016 3/19/12    carotid-4/16 WNL 3/12right mild less than 50%and left wnl    H/O Doppler ultrasound 6/14/017    carotid - mod disease EILEEN, mild disease LICA    H/O echocardiogram 07/23/2010    H/O echocardiogram 04/15/2016    EF 60% sclerotic AV mildly stenosed-recommend yearly echo    H/O echocardiogram 08/22/2019    EF 55-60%, Minimal concentric left ventricular hypertrophy, left atrium is mildly dilated, severe aortic stenosis, Mitral annular calcification is present, Mild AR, Mild-Mod MR, Mild TR, No pericardial effusion     H/O echocardiogram 09/23/2019    H/O exercise stress test 06/14/2017    abnormal    History of cardiac cath 08/28/2019    Severe AS,   TAVR?? History of nuclear stress test 03/14/2017    EF 75%. Normal study.     Holter monitor, abnormal 02/22/2011    infrequent APC are seen    Hyperlipidemia     Hypertension     Normal cardiac stress test 07/23/2010    EF 70%, no ischemia    On home O2     only uses as needed, nightly prn    PONV (postoperative nausea and vomiting)     Syncope and collapse     Tachycardia     HX of tachycardia - had a cardioablation    Thyroid disease      Surgical History:        Procedure Laterality Date    AORTIC VALVE REPAIR N/A 09/09/2019    Core Valve EVOLUT 26mm [id number]    AORTIC VALVE REPLACEMENT N/A 09/11/2019    TAVR; [employer] Evlout 26    BREAST SURGERY  2009    reduction    CARDIAC CATHETERIZATION  03/02/2011    mild to moderate disease of diagonal and proximal RCA    [address] [facility]    ablation    CARPAL TUNNEL RELEASE  1990's    bilat    CHOLECYSTECTOMY  1961    open [address], ESOPHAGUS      ENDOSCOPY, COLON, DIAGNOSTIC  07/03/2017    s/p gastric bypass otherwise normal    EYE SURGERY Bilateral     cataract    FINGER SURGERY      right middle, thumb and index finger (screw and pin in right index finger)    GASTRIC RESTRICTION SURGERY  1984    stapled    HERNIA REPAIR  unknown    Inc hernia hernia repair    HIP FRACTURE SURGERY Left 11/04/2015    Left hip nail    HYSTERECTOMY (CERVIX STATUS UNKNOWN)  1966    Luke w/ BSO    JOINT REPLACEMENT  2004    right knee    LIPECTOMY  1990's OTHER SURGICAL HISTORY  05/01/2012    Gastrojejunostomy/partial gastrectomy    KALIA-EN-Y GASTRIC BYPASS      SHOULDER ARTHROPLASTY Bilateral 10/2017    TOE SURGERY  Early 2000's    hammer toes and bunions    TONSILLECTOMY      UPPER GASTROINTESTINAL ENDOSCOPY N/A 3/30/2022    EGD DIAGNOSTIC ONLY performed by Yakelin Quiroz MD at [facility] ENDOSCOPY       Allergies:  Morphine, Bactrim [sulfamethoxazole-trimethoprim], Influenza vaccines, Lactose intolerance (gi), Phenergan [promethazine hcl], Stadol [butorphanol tartrate], and Tape [adhesive tape]    Family History:       Problem Relation Age of Onset    Diabetes Mother     Other Mother         thyroid    Heart Disease Father     Arthritis Father     High Blood Pressure Father     High Cholesterol Father     Other Father         glaucoma    Other Sister         thyroid, glaucoma    Diabetes Brother     Other Sister     Vision Loss Sister         lung problems, smoker    Cancer Sister         throat cancer    Other Son         HX of clots    Early Death Son         Hit by car and killed. High Blood Pressure Daughter     Stroke Son         No residual    Other Son         HX myocarditis     REVIEW OF SYSTEMS:  Review of System Done as noted above     PHYSICAL EXAM:      Vitals:    BP (!) 137/58   Pulse 67   Temp 98.4 °F (36.9 °C) (Oral)   Resp 16   Ht 5' (1.524 m)   Wt 114 lb (51.7 kg)   SpO2 98%   BMI 22.26 kg/m²     CONSTITUTIONAL:  awake, alert, cooperative, appears stated age   EYES:  vision intact Fundoscopic Exam not performed   ENT:Normal  NECK:  Supple, No JVD.    Thyroid Exam:Normal   LUNGS:  Has Vesicular Breath Sounds, somewhat diminished breath sounds and some wheezing and rales  CARDIOVASCULAR:  Normal apical impulse, regular rate and rhythm, normal S1 and S2, no S3 or S4, and has no  murmur   ABDOMEN:  No scars, normal bowel sounds, soft, non-distended, non-tender, no masses palpated, no hepatolienomegaly  Musculoskeletal: Normal  Extremities: Normal, peripheral pulses normal, , has no edema   NEUROLOGIC:  Awake, alert, oriented to name, place and time. Cranial nerves II-XII are grossly intact. Motor is  intact. Sensory is intact. ,  and gait is normal.    DATA:    CBC:   Recent Labs     01/03/23 1926   WBC 6.9   HGB 9.9*       CMP:  Recent Labs     01/03/23 1926      K 4.6      CO2 24   BUN 13   CREATININE 0.6   CALCIUM 8.3   PROT 5.9*   LABALBU 3.6   BILITOT 0.3   ALKPHOS 50   AST 87*   ALT 83*     Lipids:   Lab Results   Component Value Date/Time    CHOL 114 08/22/2022 01:19 PM    CHOL 164 02/05/2016 12:00 AM    HDL 71 08/22/2022 01:19 PM    TRIG 77 08/22/2022 01:19 PM     Glucose: No results for input(s): POCGLU in the last 72 hours. Hemoglobin A1C:   Lab Results   Component Value Date/Time    LABA1C 5.3 08/28/2019 11:35 AM     Free T4:   Lab Results   Component Value Date/Time    T4FREE 0.87 01/03/2023 07:26 PM     Free T3:   Lab Results   Component Value Date/Time    FT3 1.5 01/04/2023 02:41 AM     TSH High Sensitivity:   Lab Results   Component Value Date/Time    TSHHS 4.240 01/03/2023 07:26 PM       XR CHEST PORTABLE   Preliminary Result   No acute cardiopulmonary process.                 Scheduled Medicines   Medications:    aspirin  81 mg Oral Daily    atorvastatin  40 mg Oral Nightly    escitalopram  20 mg Oral Daily    ipratropium-albuterol  3 mL Inhalation Q4H WA    lisinopril  5 mg Oral Daily    pantoprazole  40 mg Oral QAM AC    sodium chloride flush  5-40 mL IntraVENous 2 times per day    enoxaparin  40 mg SubCUTAneous Daily    [START ON 1/5/2023] levothyroxine  100 mcg Oral Daily      Infusions:    sodium chloride      sodium chloride 100 mL/hr at 01/04/23 9874         IMPRESSION    Patient Active Problem List   Diagnosis    Closed intertrochanteric fracture of left femur (HCC)    Gait disturbance    Anemia    Dizziness    Acquired hypothyroidism    Murmur    Age-related osteoporosis without current pathological fracture Black tongue    COPD exacerbation (HCC)    Vitamin D deficiency    Vitamin B12 deficiency    Gastroesophageal reflux disease without esophagitis    VHD (valvular heart disease)    Acute respiratory distress    COPD with exacerbation (HCC)    Sepsis due to pneumonia (HCC)    Nodule of lower lobe of right lung    S/P TAVR (transcatheter aortic valve replacement)    Primary hypertension    Acute respiratory failure (HCC)    Other seizures (HCC)    Metabolic encephalopathy    Cerebrovascular accident (CVA) due to embolism of cerebral artery ([address]    Cerebrovascular accident (CVA) ([address]    Ataxia    Dysarthria due to acute stroke ([address]    Dysthymia    Current mild episode of major depressive disorder without prior episode ([address]    Atherosclerosis of aorta (HCC)    Thyroid disease    Mixed hyperlipidemia    Bradycardia    PAD (peripheral artery disease) (HCC)    Other fatigue    Generalized weakness         RECOMMENDATIONS:      Reviewed POC blood glucose . Labs and X ray results   Reviewed Home and Current Medicines   Will increase her Synthroid dose to 100 mcg/day  Monitor Blood glucose frequently   Modify  the dose of other  as needed        Will follow with you  Again thank you for sharing pt's care with me.      Truly yours,       Juana Lara MD

## 2023-01-04 NOTE — CARE COORDINATION
Reviewed chart and spoke with pt, about discharge needs/plans. Pt lives alone, PTA she was independent with ADL's and transportation. Pt has a PCP and insurance that covers medications. We discussed HC and SNU but declined both stating she feels much better today. CM available if any needs arise.

## 2023-01-04 NOTE — ED NOTES
ED TO INPATIENT SBAR HANDOFF    Patient Name: Jeniffer Pink   :  1935  80 y.o. MRN:  [id number]  Preferred Name    ED Room #:  ED17/ED-17  Family/Caregiver Present yes   Restraints no   Sitter no   Sepsis Risk Score Sepsis Risk Score: 1.18    Situation  Code Status: Prior No additional code details. Allergies: Morphine, Bactrim [sulfamethoxazole-trimethoprim], Influenza vaccines, Lactose intolerance (gi), Phenergan [promethazine hcl], Stadol [butorphanol tartrate], and Tape [adhesive tape]  Weight: Patient Vitals for the past 96 hrs (Last 3 readings):   Weight   23 1459 114 lb (51.7 kg)     Arrived from: home  Chief Complaint:   Chief Complaint   Patient presents with    Fatigue     Fatigue and weakness for the past week. States that she has been on antibiotics and that it is not helping     Hospital Problem/Diagnosis:  Active Problems:    * No active hospital problems. *  Resolved Problems:    * No resolved hospital problems. *    Imaging:   XR CHEST PORTABLE   Preliminary Result   No acute cardiopulmonary process. Abnormal labs:   Abnormal Labs Reviewed   CBC WITH AUTO DIFFERENTIAL - Abnormal; Notable for the following components:       Result Value    RBC 3.86 (*)     Hemoglobin 9.9 (*)     Hematocrit 32.4 (*)     MCH 25.6 (*)     MCHC 30.6 (*)     RDW 17.6 (*)     Monocytes % 8.8 (*)     Eosinophils % 5.3 (*)     All other components within normal limits   COMPREHENSIVE METABOLIC PANEL - Abnormal; Notable for the following components:     Total Protein 5.9 (*)     ALT 83 (*)     AST 87 (*)     All other components within normal limits   TSH - Abnormal; Notable for the following components:    TSH, High Sensitivity 4.240 (*)     All other components within normal limits   T4, FREE - Abnormal; Notable for the following components:    T4 Free 0.87 (*)     All other components within normal limits     Critical values: no     Abnormal Assessment Findings: fatigue, generalized weakness, cough    Background  History:   Past Medical History:   Diagnosis Date    Arthritis     generalized    Asthma     Blood transfusion 2004    No reaction    Broken heart syndrome     Cerebral artery occlusion with cerebral infarction [facility])     Patient \"states she was told she has had three small strokes. \"    Chronic bronchitis ([facility] [address]     COPD (chronic obstructive pulmonary disease) ([facility] [address]     summer 2014    Echocardiogram 04/09/2021    EF 55-60%, Mild dilated LA, Mild annular calcification present, Mild mitral stenosis, Mild MR & TR. Fatigue     H/O cardiac catheterization 06/15/2017    mild lad and cx disease    H/O cardiovascular stress test 08/22/2019    H/O Doppler ultrasound 4/7/2016 3/19/12    carotid-4/16 WNL 3/12right mild less than 50%and left wnl    H/O Doppler ultrasound 6/14/017    carotid - mod disease EILEEN, mild disease LICA    H/O echocardiogram 07/23/2010    H/O echocardiogram 04/15/2016    EF 60% sclerotic AV mildly stenosed-recommend yearly echo    H/O echocardiogram 08/22/2019    EF 55-60%, Minimal concentric left ventricular hypertrophy, left atrium is mildly dilated, severe aortic stenosis, Mitral annular calcification is present, Mild AR, Mild-Mod MR, Mild TR, No pericardial effusion     H/O echocardiogram 09/23/2019    H/O exercise stress test 06/14/2017    abnormal    History of cardiac cath 08/28/2019    Severe AS,   TAVR?? History of nuclear stress test 03/14/2017    EF 75%. Normal study.     Holter monitor, abnormal 02/22/2011    infrequent APC are seen    Hyperlipidemia     Hypertension     Normal cardiac stress test 07/23/2010    EF 70%, no ischemia    On home O2     only uses as needed, nightly prn    PONV (postoperative nausea and vomiting)     Syncope and collapse     Tachycardia     HX of tachycardia - had a cardioablation    Thyroid disease        Assessment    Vitals/MEWS: MEWS Score: 0  Level of Consciousness: Alert (0)   Vitals:    01/03/23 1459 01/03/23 2346   BP: (!) 160/50 (!) 156/42   Pulse: 50 52   Resp: 16 14   Temp: 97.5 °F (36.4 °C) 98.1 °F (36.7 °C)   TempSrc: Oral Oral   SpO2: 100% 95%   Weight: 114 lb (51.7 kg)    Height: 5' (1.524 m)      FiO2 (%):   O2 Flow Rate:      Cardiac Rhythm:    Pain Assessment:  [] Verbal [] Brain Victor Hugo Scale  Pain Scale:    Last documented pain score (0-10 scale)    Last documented pain medication administered:   Mental Status: oriented, alert, coherent, logical, thought processes intact, and able to concentrate and follow conversation  NIH Score:    C-SSRS: Risk of Suicide: No Risk  Bedside swallow:    [place] Coma Scale (GCS): Shantelle Coma Scale  Eye Opening: Spontaneous  Best Verbal Response: Oriented  Best Motor Response: Obeys commands  Shantelle Coma Scale Score: 15  Active LDA's:   Peripheral IV 01/03/23 Right Forearm (Active)     PO Status: Nothing by Mouth  Pertinent or High Risk Medications/Drips: no   If Yes, please provide details:   Pending Blood Product Administration: no     You may also review the ED PT Care Timeline found under the Summary Nursing Index tab. Recommendation    Pending orders   Plan for Discharge (if known):    Additional Comments:    If any further questions, please call Sending RN at [phone]    Electronically signed by: Electronically signed by Carmen Velasquez RN on 1/3/2023 at 11:48 PM      Carmen Velasquez RN  01/03/23 0586

## 2023-01-04 NOTE — PROGRESS NOTES
[address] [facility] ACUTE CARE OCCUPATIONAL THERAPY EVALUATION    [address] [facility], 1935, 4105/4105-A, 1/4/2023    Discharge Recommendation: Home with initial assistance PRN, Home Health OT services (S Level 1-for home safety evaluation)      History:  [place]:  The primary encounter diagnosis was General weakness. Diagnoses of Other fatigue and Cough, unspecified type were also pertinent to this visit. Subjective:  Patient states: \"I feel like a new woman today! \"   Pain: Pt denied pain this date  Communication with other providers: PT PARISH Gtz CM  Restrictions: General Precautions, Low Fall Risk, IV, Pocket Telemetry, Bed/chair alarm    Home Setup/Prior level of function:  Social/Functional History  Lives With: Alone (daughter frequently checks on pt)  Type of Home: House  Home Layout: One level  Home Access: Level entry  Bathroom Shower/Tub: Walk-in shower  Bathroom Toilet: Handicap height  Bathroom Equipment: Shower chair, Grab bars in shower, Grab bars around toilet  Bathroom Accessibility: Accessible  Home Equipment: 4 wheeled walker, Cane, [employer] cane, Crutches  ADL Assistance: Independent  Homemaking Assistance: Independent  Homemaking Responsibilities: Yes  Ambulation Assistance: Independent (mod I with quad cane PRN)  Transfer Assistance: Independent  Active : Yes  Occupation: Retired  Leisure & Hobbies: Making jewelry    Examination:  Observation: Supine in bed upon arrival. Pleasant and agreeable to evaluation.   Vision: WFL (Glasses)  Hearing: WFL  Vitals: Stable vitals throughout session on room air    Body Systems and functions:  ROM: WFL all joints in BL UEs  Strength: 4+/5 MMT all major muscle groups BL UEs  Sensation: WFL in BL UEs (See PT evaluation for LE assessment)  Tone: Normal  Coordination: WFL for ADLs  Perception: WNL    Activities of Daily Living (ADLs):  Feeding: Independent   Grooming: Supervision (completed hand hygiene in standing at sink; min cues for safe body positioning during task)  UB bathing: Supervision  LB bathing: SBA (for safety with reaching bottom)  UB dressing: Supervision (donning clean robe seated EOB)  LB dressing: SBA (for safety with clothing mgmt to hips in standing, able to don BL socks seated EOB with supervision by crossing legs into \"figure 4 position\")  Toileting: SBA (pt urinated while seated on regular toilet; able to manage clothing both directions and complete seated viviane care without assist)    Cognitive and Psychosocial Functioning:  Overall cognitive status: WNL  Affect: Normal     Balance:   Sitting: Supervision in unsupported sitting EOB and on toilet  Standing: Supervision static standing and during hand hygiene at sink, SBA for safety with dynamic standing tasks    Functional Mobility:  Bed Mobility: Supervision supine to sitting EOB (HOB flat)  Transfers: SBA to/from bed, recliner, and toilet (min cues for safe hand placement/use of grab bars each direction)  Ambulation: CGA progressing to SBA with no AD to/from bathroom for toileting + 300 ft in hallway      AM-PAC 6 click short form for inpatient daily activity:   How much help from another person does the patient currently need. .. Unable  Dep A Lot  Max A A Lot   Mod A A Little  Min A A Little   CGA  SBA None   Mod I  Indep  Sup   1. Putting on and taking off regular lower body clothing? [] 1    [] 2   [] 2   [] 3   [x] 3   [] 4      2. Bathing (including washing, rinsing, drying)? [] 1   [] 2   [] 2 [] 3 [x] 3 [] 4   3. Toileting, which includes using toilet, bedpan, or urinal? [] 1    [] 2   [] 2   [] 3   [x] 3   [] 4     4. Putting on and taking off regular upper body clothing? [] 1   [] 2   [] 2   [] 3   [] 3    [x] 4      5. Taking care of personal grooming such as brushing teeth? [] 1   [] 2    [] 2 [] 3    [] 3   [x] 4      6. Eating meals?    [] 1   [] 2   [] 2   [] 3   [] 3   [x] 4      Raw Score:  21   [24=0% impaired(CH), 23=1-19%(CI), 20-22=20-39%(CJ), 15-19=40-59%(CK), 10-14=60-79%(CL), 7-9=80-99%(CM), 6=100%(CN)]     Treatment:  Self Care Training:   Cues were given for safety, sequence, UE/LE placement, visual cues, and balance. Activities performed today included UB/LB dressing tasks, toileting, hand hygiene at sink, education on modifying ADLs and energy conservation strategies PRN at home    Safety Measures: Gait belt used, Left in Chair, Alarm in place    Assessment:  Pt is an 80year old female with a past medical history of Arthritis, Asthma, Blood transfusion, Broken heart syndrome, Cerebral artery occlusion with cerebral infarction ([address] Chronic bronchitis ([address] COPD (chronic obstructive pulmonary disease) ([facility] [facility] Echocardiogram, Fatigue, H/O cardiac catheterization, H/O cardiovascular stress test, H/O Doppler ultrasound, H/O Doppler ultrasound, H/O echocardiogram, H/O echocardiogram, H/O echocardiogram, H/O echocardiogram, H/O exercise stress test, History of cardiac cath, History of nuclear stress test, Holter monitor, abnormal, Hyperlipidemia, Hypertension, Normal cardiac stress test, On home O2, PONV (postoperative nausea and vomiting), Syncope and collapse, Tachycardia, and Thyroid disease. Pt admitted with generalized weakness and diagnosed with hypothyroidism. Pt lives at home alone and at baseline is fully independent with ADLs, IADLs, ambulates mod I with a quad cane, and drives. Pt performed well this date, and from a self-care and mobility standpoint, is safe to return home at discharge with initial assist PRN and [facility] OT services recommended for home safety evaluation.     Complexity: Low  Prognosis: Good  Plan: Eval and discharge; no further acute needs    Time:   Time in: 1401  Time out: 1421  Timed treatment minutes: 10  Total time: 20    Electronically signed by:    GENEVIEVE Lin/L, [address] [place] [id number]

## 2023-01-04 NOTE — CONSULTS
[address] [facility], 1935, 4105/4105-A, 1/4/2023    History  Council:  The primary encounter diagnosis was General weakness. Diagnoses of Other fatigue and Cough, unspecified type were also pertinent to this visit. Patient  has a past medical history of Arthritis, Asthma, Blood transfusion, Broken heart syndrome, Cerebral artery occlusion with cerebral infarction ([address] Chronic bronchitis ([address] COPD (chronic obstructive pulmonary disease) ([facility] [address] Echocardiogram, Fatigue, H/O cardiac catheterization, H/O cardiovascular stress test, H/O Doppler ultrasound, H/O Doppler ultrasound, H/O echocardiogram, H/O echocardiogram, H/O echocardiogram, H/O echocardiogram, H/O exercise stress test, History of cardiac cath, History of nuclear stress test, Holter monitor, abnormal, Hyperlipidemia, Hypertension, Normal cardiac stress test, On home O2, PONV (postoperative nausea and vomiting), Syncope and collapse, Tachycardia, and Thyroid disease. Patient  has a past surgical history that includes joint replacement (2004); Hysterectomy (1966); Cholecystectomy (1961); Toe Surgery (Early 2000's); Carpal tunnel release (1990's); Finger surgery; Gastric restriction surgery (1984); hernia repair (unknown); lipectomy (1990's); Dilatation, esophagus; other surgical history (05/01/2012); Hip fracture surgery (Left, 11/04/2015); Colonoscopy; Endoscopy, colon, diagnostic (07/03/2017); Total shoulder arthroplasty (Bilateral, 10/2017); Aortic valve replacement (N/A, 09/11/2019); Aortic valve repair (N/A, 09/09/2019); Breast surgery (2009); Cardiac surgery (1989); Cardiac catheterization (03/02/2011); Kathy-en-Y Gastric Bypass; Tonsillectomy; eye surgery (Bilateral); and Upper gastrointestinal endoscopy (N/A, 3/30/2022). Subjective:  Patient states:  \"I'm feeling like a new woman today. Yesterday my legs felt like logs\". Pain:  Pt without c/o.     Communication with other providers:  Handoff to RN, co-eval with OTJyothi  Restrictions: Fall risk, tele, general    Home Setup/Prior level of function  Social/Functional History  Lives With: Alone (daughter frequently checks on pt)  Type of Home: House  Home Layout: One level  Home Access: Level entry  Bathroom Shower/Tub: Walk-in shower  Bathroom Toilet: Handicap height  Bathroom Equipment: Shower chair, Grab bars in shower, Grab bars around toilet  Bathroom Accessibility: Accessible  Home Equipment: 4 wheeled walker, Cane, [employer] cane, Crutches  ADL Assistance: Independent  Homemaking Assistance: Independent  Homemaking Responsibilities: Yes  Ambulation Assistance: Independent (mod I with quad cane PRN)  Transfer Assistance: Independent  Active : Yes  Occupation: Retired  Leisure & Hobbies: Making jewelry    Examination of body systems (includes body structures/functions, activity/participation limitations):  Observation:  Pt is awake in semi-fowlers upon arrival  Vision:  Glasses, WFL  Hearing:  WFL  Cardiopulmonary:  On RA  Cognition: WFL, see OT/SLP note for further evaluation. Musculoskeletal  ROM R/L:  WFL BLE. Strength R/L:  Generally 4/5, fair in function and endurance. Neuro:  pt with increased postural sway in dynamic balance, sensation intact  Gait pattern: Pt demonstrates variable step width, intermittent L foot crossing midline with intermittent trunk sway, good sunshine and clearance. Mobility:  Supine to sit:  SUP  Transfers: CGA progression to SBA  Sitting balance:  SUP. Standing balance:  CGA-SBA. Gait: CGA-SBA    [facility] 6 Clicks Inpatient Mobility:  AM-PAC Inpatient Mobility Raw Score : 21    Treatment:    Bed mobility: PT encourages sup>sit, provides v/c for sequencing. Pt demonstrates good ability to advance LE, requires SUP only for LE/ hips to EOB and SUP for trunk to upright. PT v/c for scooting to EOB, pt requires SUP.      Sitting balance: Seated EOB pt demonstrates good balance, intermittent UE support required for maintaining upright. Pt with good dynamic sitting balance during donning socks in figure-4 position JINA. Sit<>Stand: Pt performed STS from EOB to upright  without AD, v/c for proper sequencing. Pt demonstrates fair time to upright. Standing: Pt demonstrates fair static standing balance without UE support, pt requests no use of AD. Initial standing PT provides CGA for safety. Pt progressed to SBA. Gait: Pt AMB x400 ft without AD, demonstrates variable step width, intermittent L foot crossing midline with intermittent trunk sway, good sunshine and clearance. Due to balance variability PT provides CGA-SBA. Gait testing: PT tests pt changes in gait speed, and balance with stopping. Pt without LOB, CGA. Education: Discussed safety and recommendation for pt 24/7 use of SPC for safety    End of session pt left in recliner with alarm  with lines managed, call light, phone, exit alarm, tray, all needs, RN aware. Assessment:    Pt is a 81 y/o female admitted 1/3 with c/o  generalized weakness. Patient with significant h/o Arthritis, Asthma, Blood transfusion, Broken heart syndrome, Cerebral artery occlusion with cerebral infarction ([address] Chronic bronchitis ([address] COPD (chronic obstructive pulmonary disease) ([facility] [address] Echocardiogram, Fatigue, H/O cardiac catheterization, H/O cardiovascular stress test, see chart. Per pt report pt has been performing ADLs/IADLs with independence as above. At this time pt appears to be functioning near baseline. Pt is now presenting with impairments in LE strength, functional endurance, safety awareness, balance, gait. Pt would benefit from skilled PT services in order to address impairments and promote return to PLOF. PT to recommend d/c to Home with initial assist and OP PT services for high-level gait training. Complexity: Moderate  Prognosis: Good, no significant barriers to participation at this time.    Plan General Plan: Discharge with evaluation only/week, 1 week,   Discharge Recommendations: Home with assist PRN, Outpatient PT    Recommendations for NURSING mobility: AMB with SBA and belt    Time:   Time in: 14:01  Time out: 14:21  Timed treatment minutes: 10  Total time: 20    Electronically signed by:    Carmen Bautista, PT  5/7/0951, 2:15 PM  PT Lic #: [id number]

## 2023-01-04 NOTE — RT PROTOCOL NOTE

## 2023-01-04 NOTE — CARE COORDINATION
Received referral for ARU. Reviewed patients clinicals. Patients primary insurance is [employer]. Per [employer] guidelines patient does not meet ARU criteria d/t lack of medical complexity. Left VM for Letty Coughlin CM regarding information.

## 2023-01-04 NOTE — PROGRESS NOTES
V2.0  [facility]      Name:  Raji Puckett /Age/Sex: 1935  (80 y.o. female)   MRN & CSN:  [id number] & [id number] Encounter Date/Time: 2023 1:06 PM EST    Location:  [address] [place] [address] PCP: Madeleine Salas MD       Hospital Day: 2    Assessment and Plan:   Raji Puckett is a 80 y.o. female with pmh of  hypothyroidism, recent COPD exacerbation admission, COPD, history of severe aortic stenosis  who presents with Generalized weakness      Plan:  Generalized weakness: can be 2/2 hypothyroidism as recent decrease in the dose of Synthyroid by PCP, used to take 75 mcg and was recently decreased to 50 mcg, Endocrinology on board, 100 mcg synthyroid as per endo and follow up as Outpatient, PT/OT consult, appreciate recs. Severe aortic stenosis s/p TAVR: Done by Dr. Bob Styles. Last cardiac catheterization was 2021 showing no significant coronary artery disease with possible signs of Takotsubo. Most recent echocardiogram showed EF of 55 to 60% with normal functioning prosthetic aortic valve and a mean gradient of 4 mmHg. COPD not on home oxygen: Last hospital admission for COPD exacerbation more than a year ago. Not in exacerbation right now. Continue inhalers. Continue to monitor  Hyperlipidemia on statin  Benign essential hypertension  Glaucoma  Takes timolol drops. Hold off on timolol drops because of bradycardia  History of CVA    Diet ADULT DIET; Regular   DVT Prophylaxis [x] Lovenox, []  Heparin, [] SCDs, [] Ambulation,  [] Eliquis, [] Xarelto  [] Coumadin   Code Status Full Code   Disposition From: home  Expected Disposition: TBD  Estimated Date of Discharge: 1-2 days  Patient requires continued admission due to PT/OT eval, Uncontrolled hypothyroidism   Surrogate Decision Maker/ POA      Subjective:     Chief Complaint: Fatigue (Fatigue and weakness for the past week.  States that she has been on antibiotics and that it is not helping)       Raji Puckett is a 80 y.o. female who presents with weakness. She states that after increase in the dose of her synthyroid feels better. C/o dizziness intermittently, always feels cold, BM are regular, has had 15 lbs weight loss in last year which was intentional.         Review of Systems:    Review of Systems 10 point ROS negative except as noted above      Objective: Intake/Output Summary (Last 24 hours) at 1/4/2023 1306  Last data filed at 1/4/2023 1021  Gross per 24 hour   Intake 10 ml   Output --   Net 10 ml        Vitals:   Vitals:    01/04/23 1133   BP:    Pulse: 62   Resp: 16   Temp:    SpO2: 97%       Physical Exam:   Physical Exam  Constitutional:       General: She is not in acute distress. Appearance: She is normal weight. She is not diaphoretic. HENT:      Head: Normocephalic and atraumatic. Right Ear: Tympanic membrane normal.      Left Ear: Tympanic membrane normal.      Nose: Nose normal.      Mouth/Throat:      Mouth: Mucous membranes are moist.      Pharynx: Oropharynx is clear. Eyes:      Pupils: Pupils are equal, round, and reactive to light. Cardiovascular:      Rate and Rhythm: Regular rhythm. Bradycardia present. Pulses: Normal pulses. Heart sounds: Normal heart sounds. No murmur heard. No friction rub. No gallop. Pulmonary:      Effort: Pulmonary effort is normal. No respiratory distress. Breath sounds: Normal breath sounds. No wheezing or rales. Abdominal:      General: Abdomen is flat. Bowel sounds are normal. There is no distension. Palpations: Abdomen is soft. Tenderness: There is no abdominal tenderness. There is no guarding. Musculoskeletal:         General: No swelling, tenderness or deformity. Normal range of motion. Cervical back: Normal range of motion and neck supple. Right lower leg: No edema. Left lower leg: No edema. Skin:     General: Skin is warm. Coloration: Skin is not jaundiced or pale.       Findings: No bruising, erythema, lesion or rash. Neurological:      General: No focal deficit present. Mental Status: She is alert and oriented to person, place, and time. Mental status is at baseline. Cranial Nerves: No cranial nerve deficit. Sensory: No sensory deficit. Motor: No weakness.    Psychiatric:         Mood and Affect: Mood normal.          Medications:   Medications:    aspirin  81 mg Oral Daily    atorvastatin  40 mg Oral Nightly    escitalopram  20 mg Oral Daily    ipratropium-albuterol  3 mL Inhalation Q4H WA    lisinopril  5 mg Oral Daily    pantoprazole  40 mg Oral QAM AC    sodium chloride flush  5-40 mL IntraVENous 2 times per day    enoxaparin  40 mg SubCUTAneous Daily    [START ON 1/5/2023] levothyroxine  100 mcg Oral Daily      Infusions:    sodium chloride      sodium chloride 100 mL/hr at 01/04/23 0622     PRN Meds: albuterol sulfate HFA, 2 puff, Q6H PRN  sodium chloride flush, 5-40 mL, PRN  sodium chloride, , PRN  ondansetron, 4 mg, Q8H PRN   Or  ondansetron, 4 mg, Q6H PRN  polyethylene glycol, 17 g, Daily PRN  acetaminophen, 650 mg, Q6H PRN   Or  acetaminophen, 650 mg, Q6H PRN        Labs      Recent Results (from the past 24 hour(s))   EKG 12 Lead    Collection Time: 01/03/23  6:28 PM   Result Value Ref Range    Ventricular Rate 47 BPM    Atrial Rate 47 BPM    P-R Interval 170 ms    QRS Duration 72 ms    Q-T Interval 514 ms    QTc Calculation (Bazett) 454 ms    P Axis 82 degrees    R Axis 46 degrees    T Axis 60 degrees    Diagnosis       Sinus bradycardia  Otherwise normal ECG  When compared with ECG of 26-NOV-2021 03:12,  T wave inversion no longer evident in Inferior leads  T wave inversion no longer evident in Anterolateral leads  QT has shortened     Lactic Acid    Collection Time: 01/03/23  7:21 PM   Result Value Ref Range    Lactate 1.2 0.5 - 1.9 mMOL/L   CBC with Auto Differential    Collection Time: 01/03/23  7:26 PM   Result Value Ref Range    WBC 6.9 4.0 - 10.5 K/CU MM    RBC 3.86 (L) 4.2 - 5.4 M/CU MM    Hemoglobin 9.9 (L) 12.5 - 16.0 GM/DL    Hematocrit 32.4 (L) 37 - 47 %    MCV 83.9 78 - 100 FL    MCH 25.6 (L) 27 - 31 PG    MCHC 30.6 (L) 32.0 - 36.0 %    RDW 17.6 (H) 11.7 - 14.9 %    Platelets 118 624 - 463 K/CU MM    MPV 9.3 7.5 - 11.1 FL    Differential Type AUTOMATED DIFFERENTIAL     Segs Relative 47.5 36 - 66 %    Lymphocytes % 37.7 24 - 44 %    Monocytes % 8.8 (H) 0 - 4 %    Eosinophils % 5.3 (H) 0 - 3 %    Basophils % 0.6 0 - 1 %    Segs Absolute 3.3 K/CU MM    Lymphocytes Absolute 2.6 K/CU MM    Monocytes Absolute 0.6 K/CU MM    Eosinophils Absolute 0.4 K/CU MM    Basophils Absolute 0.0 K/CU MM    Nucleated RBC % 0.0 %    Total Nucleated RBC 0.0 K/CU MM    Total Immature Neutrophil 0.01 K/CU MM    Immature Neutrophil % 0.1 0 - 0.43 %   Comprehensive Metabolic Panel    Collection Time: 01/03/23  7:26 PM   Result Value Ref Range    Sodium 135 135 - 145 MMOL/L    Potassium 4.6 3.5 - 5.1 MMOL/L    Chloride 101 99 - 110 mMol/L    CO2 24 21 - 32 MMOL/L    BUN 13 6 - 23 MG/DL    Creatinine 0.6 0.6 - 1.1 MG/DL    Est, Glom Filt Rate >60 >60 mL/min/1.73m2    Glucose 87 70 - 99 MG/DL    Calcium 8.3 8.3 - 10.6 MG/DL    Albumin 3.6 3.4 - 5.0 GM/DL    Total Protein 5.9 (L) 6.4 - 8.2 GM/DL    Total Bilirubin 0.3 0.0 - 1.0 MG/DL    ALT 83 (H) 10 - 40 U/L    AST 87 (H) 15 - 37 IU/L    Alkaline Phosphatase 50 40 - 129 IU/L    Anion Gap 10 4 - 16   Troponin    Collection Time: 01/03/23  7:26 PM   Result Value Ref Range    Troponin T <0.010 <0.01 NG/ML   Magnesium    Collection Time: 01/03/23  7:26 PM   Result Value Ref Range    Magnesium 2.3 1.8 - 2.4 mg/dl   TSH    Collection Time: 01/03/23  7:26 PM   Result Value Ref Range    TSH, High Sensitivity 4.240 (H) 0.270 - 4.20 uIu/ml   T4, Free    Collection Time: 01/03/23  7:26 PM   Result Value Ref Range    T4 Free 0.87 (L) 0.9 - 1.8 NG/DL   Respiratory Panel, Molecular, with COVID-19 (Restricted: peds pts or suitable admitted adults)    Collection Time: 01/03/23 8:18 PM    Specimen: Nasopharyngeal   Result Value Ref Range    Adenovirus Detection by PCR NOT DETECTED NOT DETECTED    Coronavirus 229E PCR NOT DETECTED NOT DETECTED    Coronavirus HKU1 PCR NOT DETECTED NOT DETECTED    Coronavirus NL63 PCR NOT DETECTED NOT DETECTED    Coronavirus OC43 PCR NOT DETECTED NOT DETECTED    SARS-CoV-2 NOT DETECTED NOT DETECTED    Human Metapneumovirus PCR NOT DETECTED NOT DETECTED    Rhinovirus Enterovirus PCR NOT DETECTED NOT DETECTED    Influenza A by PCR NOT DETECTED NOT DETECTED    Influenza A H1 Pandemic PCR NOT DETECTED NOT DETECTED    Influenza A H1 (2009) PCR NOT DETECTED NOT DETECTED    Influenza A H3 PCR NOT DETECTED NOT DETECTED    Influenza B by PCR NOT DETECTED NOT DETECTED    Parainfluenza 1 PCR NOT DETECTED NOT DETECTED    Parainfluenza 2 PCR NOT DETECTED NOT DETECTED    Parainfluenza 3 PCR NOT DETECTED NOT DETECTED    Parainfluenza 4 PCR NOT DETECTED NOT DETECTED    RSV PCR NOT DETECTED NOT DETECTED    Bordetella parapertussis by PCR NOT DETECTED NOT DETECTED    B Pertussis by PCR NOT DETECTED NOT DETECTED    Chlamydophila Pneumonia PCR NOT DETECTED NOT DETECTED    Mycoplasma pneumo by PCR NOT DETECTED NOT DETECTED   Urinalysis    Collection Time: 01/03/23  8:22 PM   Result Value Ref Range    Color, UA YELLOW YELLOW    Clarity, UA CLEAR CLEAR    Glucose, Urine NEGATIVE NEGATIVE MG/DL    Bilirubin Urine NEGATIVE NEGATIVE MG/DL    Ketones, Urine NEGATIVE NEGATIVE MG/DL    Specific Gravity, UA 1.010 1.001 - 1.035    Blood, Urine NEGATIVE NEGATIVE    pH, Urine 7.0 5.0 - 8.0    Protein, UA NEGATIVE NEGATIVE MG/DL    Urobilinogen, Urine 0.2 0.2 - 1.0 MG/DL    Nitrite Urine, Quantitative NEGATIVE NEGATIVE    Leukocyte Esterase, Urine NEGATIVE NEGATIVE    Urinalysis Comments       Microscopic exam not performed based on chemical results unless requested in original order.    T3, Free    Collection Time: 01/04/23  2:41 AM   Result Value Ref Range    T3, Free 1.5 (L) 2.3 - 4.2 PG/ML Vitamin B12 & Folate    Collection Time: 01/04/23  2:41 AM   Result Value Ref Range    Vitamin B-12 924.4 (H) 211 - 911 pg/ml    Folate 17.4 3.1 - 17.5 NG/ML   EKG 12 Lead    Collection Time: 01/04/23  6:25 AM   Result Value Ref Range    Ventricular Rate 50 BPM    Atrial Rate 50 BPM    P-R Interval 176 ms    QRS Duration 62 ms    Q-T Interval 496 ms    QTc Calculation (Bazett) 452 ms    P Axis 86 degrees    R Axis 72 degrees    T Axis 85 degrees    Diagnosis       Sinus bradycardia  Low voltage QRS  Septal infarct , age undetermined  Abnormal ECG  When compared with ECG of 03-JAN-2023 18:28,  No significant change was found          Imaging/Diagnostics Last 24 Hours   XR CHEST PORTABLE    Result Date: 1/3/2023  EXAMINATION: ONE XRAY VIEW OF THE CHEST 1/3/2023 5:55 pm COMPARISON: 11/18/2021 HISTORY: ORDERING SYSTEM PROVIDED HISTORY: fatigue TECHNOLOGIST PROVIDED HISTORY: Reason for exam:->fatigue Reason for Exam: fatigue Initial evaluation FINDINGS: The trachea is midline. There is atherosclerotic calcification of the aortic knob. There is cardiomegaly. There is a stent in the heart. The lungs are clear. There is no pleural fluid. There are bilateral shoulder replacements. No acute cardiopulmonary process.      VL MARLON BILATERAL LIMITED 1-2 LEVELS    Result Date: 1/3/2023  Vascular Lower Arterial Plethysmography Procedure Demographics   Patient Name       Nilda Mcardle     Date of study       01/03/2023   Date of Birth      1935         Gender              Female   Age                80                 Race                   Patient Number     [id number]           Room Number   Visit Number       [id number]          Height   Corporate ID       [id number]           Weight   Accession Number   [id number]   Ordering Physician Jade Trent Physician           MD  Procedure Type of Study:   Extremities Arteries:Lower Arterial Plethysmography, VL ANKLE ART BRACHIAL  INDICES EXTREMITY BILATERAL. Conclusions   Summary   No significant evidence of large vessel arterial occlusive disease of the  lower extremities. Signature   ------------------------------------------------------------------  Electronically signed by Fletcher Givens MD (Interpreting  physician) on 01/03/2023 at 04:39 PM  ------------------------------------------------------------------  Impressions Right Impression PTA and DPA are biphasic. Diffuse atherosclerosis. Normal MARLON. Left Impression PTA and DPA are biphasic. Diffuse atherosclerosis. Normal MARLON. Study Location:[facility]. Velocities are measured in cm/s ; Diameters are measured in cm Pressures +--------++--------+-----+----+--------+-----+ ! ! !Right   ! ! Left!        !     ! +--------++--------+-----+----+--------+-----+ ! Location! !Pressure! Ratio! !Pressure! Ratio! +--------++--------+-----+----+--------+-----+ ! Ankle PT!!180     !1.12 !    !170     !1.06 ! +--------++--------+-----+----+--------+-----+ ! Ankle AT!!160     !1    !    !180     !1.12 ! +--------++--------+-----+----+--------+-----+   - Brachial Pressure:Right: 160. Left:160.   - MARLON:Right: 1.12. Left: 1.12.       Electronically signed by Ariadna Yarbrough MD on 1/4/2023 at 1:06 PM

## 2023-01-05 ENCOUNTER — TELEPHONE (OUTPATIENT)
Dept: FAMILY MEDICINE CLINIC | Age: 88
End: 2023-01-05

## 2023-01-05 VITALS
WEIGHT: 114 LBS | HEIGHT: 60 IN | HEART RATE: 56 BPM | SYSTOLIC BLOOD PRESSURE: 147 MMHG | RESPIRATION RATE: 18 BRPM | DIASTOLIC BLOOD PRESSURE: 52 MMHG | TEMPERATURE: 98.4 F | BODY MASS INDEX: 22.38 KG/M2 | OXYGEN SATURATION: 96 %

## 2023-01-05 LAB
ANION GAP SERPL CALCULATED.3IONS-SCNC: 11 MMOL/L (ref 4–16)
BUN BLDV-MCNC: 11 MG/DL (ref 6–23)
CALCIUM SERPL-MCNC: 8 MG/DL (ref 8.3–10.6)
CHLORIDE BLD-SCNC: 106 MMOL/L (ref 99–110)
CO2: 25 MMOL/L (ref 21–32)
CREAT SERPL-MCNC: 0.6 MG/DL (ref 0.6–1.1)
GFR SERPL CREATININE-BSD FRML MDRD: >60 ML/MIN/1.73M2
GLUCOSE BLD-MCNC: 79 MG/DL (ref 70–99)
HCT VFR BLD CALC: 26.9 % (ref 37–47)
HEMOGLOBIN: 8.6 GM/DL (ref 12.5–16)
MAGNESIUM: 1.7 MG/DL (ref 1.8–2.4)
MCH RBC QN AUTO: 26.5 PG (ref 27–31)
MCHC RBC AUTO-ENTMCNC: 32 % (ref 32–36)
MCV RBC AUTO: 83 FL (ref 78–100)
PDW BLD-RTO: 17.7 % (ref 11.7–14.9)
PHOSPHORUS: 4.7 MG/DL (ref 2.5–4.9)
PLATELET # BLD: 247 K/CU MM (ref 140–440)
PMV BLD AUTO: 9.8 FL (ref 7.5–11.1)
POTASSIUM SERPL-SCNC: 4 MMOL/L (ref 3.5–5.1)
RBC # BLD: 3.24 M/CU MM (ref 4.2–5.4)
SODIUM BLD-SCNC: 142 MMOL/L (ref 135–145)
WBC # BLD: 6.6 K/CU MM (ref 4–10.5)

## 2023-01-05 PROCEDURE — 83735 ASSAY OF MAGNESIUM: CPT

## 2023-01-05 PROCEDURE — 94761 N-INVAS EAR/PLS OXIMETRY MLT: CPT

## 2023-01-05 PROCEDURE — 84100 ASSAY OF PHOSPHORUS: CPT

## 2023-01-05 PROCEDURE — G0378 HOSPITAL OBSERVATION PER HR: HCPCS

## 2023-01-05 PROCEDURE — 6370000000 HC RX 637 (ALT 250 FOR IP): Performed by: INTERNAL MEDICINE

## 2023-01-05 PROCEDURE — 6360000002 HC RX W HCPCS: Performed by: STUDENT IN AN ORGANIZED HEALTH CARE EDUCATION/TRAINING PROGRAM

## 2023-01-05 PROCEDURE — 80048 BASIC METABOLIC PNL TOTAL CA: CPT

## 2023-01-05 PROCEDURE — 36415 COLL VENOUS BLD VENIPUNCTURE: CPT

## 2023-01-05 PROCEDURE — 85027 COMPLETE CBC AUTOMATED: CPT

## 2023-01-05 PROCEDURE — 96372 THER/PROPH/DIAG INJ SC/IM: CPT

## 2023-01-05 PROCEDURE — 6370000000 HC RX 637 (ALT 250 FOR IP): Performed by: STUDENT IN AN ORGANIZED HEALTH CARE EDUCATION/TRAINING PROGRAM

## 2023-01-05 PROCEDURE — 2580000003 HC RX 258: Performed by: PHYSICIAN ASSISTANT

## 2023-01-05 RX ORDER — LEVOTHYROXINE SODIUM 0.1 MG/1
100 TABLET ORAL DAILY
Qty: 30 TABLET | Refills: 3 | Status: SHIPPED | OUTPATIENT
Start: 2023-01-06

## 2023-01-05 RX ADMIN — LEVOTHYROXINE SODIUM 100 MCG: 0.1 TABLET ORAL at 05:47

## 2023-01-05 RX ADMIN — ESCITALOPRAM OXALATE 20 MG: 10 TABLET ORAL at 08:56

## 2023-01-05 RX ADMIN — LISINOPRIL 5 MG: 5 TABLET ORAL at 08:56

## 2023-01-05 RX ADMIN — ASPIRIN 81 MG: 81 TABLET, COATED ORAL at 08:56

## 2023-01-05 RX ADMIN — ENOXAPARIN SODIUM 40 MG: 100 INJECTION SUBCUTANEOUS at 08:56

## 2023-01-05 RX ADMIN — SODIUM CHLORIDE: 9 INJECTION, SOLUTION INTRAVENOUS at 03:50

## 2023-01-05 RX ADMIN — PANTOPRAZOLE SODIUM 40 MG: 40 TABLET, DELAYED RELEASE ORAL at 05:47

## 2023-01-05 NOTE — PLAN OF CARE
Problem: Discharge Planning  Goal: Discharge to home or other facility with appropriate resources  1/5/2023 1007 by Kristina Canseco LPN  Outcome: Completed  1/5/2023 1004 by Kristina Canseco LPN  Outcome: Progressing  Flowsheets (Taken 1/4/2023 2156 by Satish Alvarado LPN)  Discharge to home or other facility with appropriate resources:   Identify barriers to discharge with patient and caregiver   Arrange for needed discharge resources and transportation as appropriate   Identify discharge learning needs (meds, wound care, etc)   Arrange for interpreters to assist at discharge as needed   Refer to discharge planning if patient needs post-hospital services based on physician order or complex needs related to functional status, cognitive ability or social support system

## 2023-01-05 NOTE — TELEPHONE ENCOUNTER
Care Transitions Initial Follow Up Call    Outreach made within 2 business days of discharge: Yes    Patient: Lyssa Chambers Patient : 1935   MRN: [id number]  Reason for Admission: There are no discharge diagnoses documented for the most recent discharge. Discharge Date: 23       Spoke with: [facility]     Discharge department/facility: [facility] Interactive Patient Contact:  Was patient able to fill all prescriptions: Yes  Was patient instructed to bring all medications to the follow-up visit: No: no one informed the patient  Is patient taking all medications as directed in the discharge sumary?  Yes  Does patient understand their discharge instructions: Yes  Does patient have questions or concerns that need addressed prior to 7-14 day follow up office visit: no    Scheduled appointment with PCP within 7-14 days    Follow Up  Future Appointments   Date Time Provider Wero Rowe   2023  9:30 AM Octavia Pillai MD [address] [facility] Teresa Mohr [facility]   2023 11:30 AM Ramon Stewart MD [facility] [place]

## 2023-01-05 NOTE — PLAN OF CARE
Problem: Discharge Planning  Goal: Discharge to home or other facility with appropriate resources  Outcome: Progressing  Flowsheets (Taken 1/4/2023 2156 by Alexandro Mcelroy LPN)  Discharge to home or other facility with appropriate resources:   Identify barriers to discharge with patient and caregiver   Arrange for needed discharge resources and transportation as appropriate   Identify discharge learning needs (meds, wound care, etc)   Arrange for interpreters to assist at discharge as needed   Refer to discharge planning if patient needs post-hospital services based on physician order or complex needs related to functional status, cognitive ability or social support system

## 2023-01-06 ENCOUNTER — TELEPHONE (OUTPATIENT)
Dept: NEUROLOGY | Age: 88
End: 2023-01-06

## 2023-01-06 ENCOUNTER — CARE COORDINATION (OUTPATIENT)
Dept: CASE MANAGEMENT | Age: 88
End: 2023-01-06

## 2023-01-06 DIAGNOSIS — R53.1 GENERALIZED WEAKNESS: Primary | ICD-10-CM

## 2023-01-06 PROCEDURE — 1111F DSCHRG MED/CURRENT MED MERGE: CPT | Performed by: FAMILY MEDICINE

## 2023-01-06 NOTE — CARE COORDINATION
[facility] Care Transitions Initial Follow Up Call    Call within 2 business days of discharge: Yes    Care Transition Nurse contacted the patient by telephone to perform post hospital discharge assessment. Verified name and  with patient as identifiers. Provided introduction to self, and explanation of the Care Transition Nurse role. Patient: Adrien West Patient : 1935   MRN: [id number]  Reason for Admission: Generalized weakness  Discharge Date: 23 RARS: Readmission Risk Score: 14.5      Last Discharge  [address]       Date Complaint Diagnosis Description Type Department Provider    1/3/23 Fatigue General weakness . .. ED to Hosp-Admission (Discharged) ([facility]) Jarod Portillo MD; Venkatesh Castañeda MD            Was this an external facility discharge? No Discharge Facility: [facility]     Challenges to be reviewed by the provider   Additional needs identified to be addressed with provider: No  none               Method of communication with provider: none. Contacted patient for initial care transition follow up. Sabina Ardon states that she is doing really well. Reports she is up and moving around. States her legs do not feel heavy like logs. She is not having to use any device when ambulating. Denies having any falls. Reports she is having sinus drainage which she states is not unusual for her. Denies having any c/o chest pain/discomfort, pressure, tightness, increased shortness of breath, fever, chills, nausea/vomiting. She is eating and drinking fluids w/o issues. States she woke up and 10 this morning and just made herself an omelette. Reviewed medication list.  She is aware that her Levothyroxine was increased to 100 mcg and that she is to stop taking Levaquin and Sucralfate. She has a follow up with PCP on 23. States she attempted to contact the Endo office but was unable to reach them. She plans to call the office to schedule an appointment.   She is aware of when to contact providers with any new or worsening symptoms.  No questions or needs at this time.      Care Transition Nurse reviewed discharge instructions and red flags with patient who verbalized understanding. The patient was given an opportunity to ask questions and does not have any further questions or concerns at this time. Were discharge instructions available to patient? Yes. Reviewed appropriate site of care based on symptoms and resources available to patient including: PCP  Specialist. The patient agrees to contact the PCP office for questions related to their healthcare.     Advance Care Planning:   Does patient have an Advance Directive: not on file.    Medication reconciliation was performed with patient, who verbalizes understanding of administration of home medications. Medications reviewed, 1111F entered: yes    Was patient discharged with a pulse oximeter? no    Scheduled appointment with PCP-has appt on 1/11/23  Obtained and reviewed discharge summary and/or continuity of care documents    Offered patient enrollment in the Remote Patient Monitoring (RPM) program for in-home monitoring:  did not discuss during initial call .    Care Transitions 24 Hour Call    Do you have a copy of your discharge instructions?: Yes  Do you have all of your prescriptions and are they filled?: Yes  Have you been contacted by a [facility] Pharmacist?: No  Have you scheduled your follow up appointment?: Yes  Post Acute Services: Home Health (Comment: [facility] or [facility])  Patient DME: Walker, Quad cane, Shower chair, Other  Other Patient DME: BSC, christine, extended shoe horn  Patient Home Equipment: Oxygen, Nebulizer  Do you have support at home?: Alone  Do you feel like you have everything you need to keep you well at home?: Yes  Are you an active caregiver in your home?: No  Care Transitions Interventions   Home Care Waiver: Completed        DME Assistance: Completed   Disease Association: Completed          Follow Up  Future Appointments   Date Time Provider Wero Soledad   1/11/2023  9:30 AM Nicol Robles MD [address] [facility]   4/25/2023 11:30 AM Nemo Leonard MD [facility]       Care Transition Nurse provided contact information. Plan for follow-up call in 5-7 days based on severity of symptoms and risk factors. Plan for next call: symptom management-weakness, fatigue, falls, cough, sinus drainage, SOB  follow-up appointment-Endo appt?     Shady Bush RN

## 2023-01-06 NOTE — DISCHARGE SUMMARY
V2.0  Discharge Summary    Name:  Walter Garcia /Age/Sex: 1935 (80 y.o. female)   Admit Date: 1/3/2023  Discharge Date: 23    MRN & CSN:  [id number] & [id number] Encounter Date and Time 23 3:43 PM EST    Attending:  No att. providers found Discharging Provider: Vitaliy Heller MD       Hospital Course:     Brief HPI: Walter Garcia is a 80 y.o. female with a pmh of hypothyroidism, recent COPD exacerbation admission, COPD, history of severe aortic stenosis who presents with Generalized weakness    Brief Problem Based Course:   Generalized weakness: can be 2/2 hypothyroidism as recent decrease in the dose of Synthyroid by PCP, used to take 75 mcg and was recently decreased to 50 mcg, Endocrinology on board, 100 mcg synthyroid as per endo and follow up as Outpatient, her energy improved heart rate was better and was discharged with outpatient endocrinology follow-up         The patient expressed appropriate understanding of, and agreement with the discharge recommendations, medications, and plan. Consults this admission:  IP CONSULT TO CASE MANAGEMENT  IP CONSULT TO ENDOCRINOLOGY    Discharge Diagnosis:   Generalized weakness      Discharge Instruction:   Follow up appointments:    Primary care physician: Talia Rodriguez MD within 2 weeks  Diet: cardiac diet   Activity: activity as tolerated  Disposition: Discharged to:   [x]Home, []C, []SNF, []Acute Rehab, []Hospice   Condition on discharge: Stable  Labs and Tests to be Followed up as an outpatient by PCP or Specialist:     Discharge Medications:        Medication List        CHANGE how you take these medications      levothyroxine 100 MCG tablet  Commonly known as: SYNTHROID  Take 1 tablet by mouth Daily  What changed:   medication strength  See the new instructions.             CONTINUE taking these medications      albuterol sulfate  (90 Base) MCG/ACT inhaler  Commonly known as: PROVENTIL;VENTOLIN;PROAIR  Inhale 2 puffs into the lungs every 6 hours as needed for Wheezing     aspirin 81 MG EC tablet  Commonly known as: EQ Aspirin Adult Low Dose  Take 1 tablet by mouth daily     atorvastatin 40 MG tablet  Commonly known as: LIPITOR  Take 1 tablet by mouth nightly     Calcium-Magnesium-Zinc 167-83-8 MG Tabs     escitalopram 20 MG tablet  Commonly known as: Lexapro  Take 1 tablet by mouth daily     Handicap Placard Misc  by Does not apply route Please provide handicap placard for 5 years.     Diagnosis: Orthopedic condition     ipratropium-albuterol 0.5-2.5 (3) MG/3ML Soln nebulizer solution  Commonly known as: DUONEB  Inhale 3 mLs into the lungs every 4 hours (while awake)     lisinopril 5 MG tablet  Commonly known as: PRINIVIL;ZESTRIL  Take 1 tablet by mouth daily     methocarbamol 500 MG tablet  Commonly known as: ROBAXIN  Take 1 tablet by mouth every evening     montelukast 10 MG tablet  Commonly known as: SINGULAIR  TAKE 1 TABLET PER NG TUBE NIGHTLY     ondansetron 4 MG tablet  Commonly known as: ZOFRAN  Take 1 tablet by mouth every 8 hours as needed for Nausea or Vomiting     pantoprazole 40 MG tablet  Commonly known as: PROTONIX  TAKE 1 TABLET NIGHTLY     promethazine-dextromethorphan 6.25-15 MG/5ML syrup  Commonly known as: PROMETHAZINE-DM  Take 5 mLs by mouth 4 times daily as needed for Cough     Timolol 0.5 % Soln ophthalmic solution  Commonly known as: TIMOPTIC     vitamin B-12 1000 MCG tablet  Commonly known as: CYANOCOBALAMIN     Vitamin D3 50 MCG (2000 UT) Caps            STOP taking these medications      levoFLOXacin 750 MG tablet  Commonly known as: Levaquin     sucralfate 1 GM tablet  Commonly known as: Carafate               Where to Get Your Medications        These medications were sent to [address] [place] [address] [phone] F [phone]  [facility] [address], [address]      Phone: [phone]   levothyroxine 100 MCG tablet        Objective Findings at Discharge:   BP (!) 147/52   Pulse 56   Temp 98.4 °F (36.9 °C) (Oral)   Resp 18   Ht 5' (1.524 m)   Wt 114 lb (51.7 kg)   SpO2 96%   BMI 22.26 kg/m²       Physical Exam:   Physical Exam  Constitutional:       General: She is not in acute distress. Appearance: She is normal weight. She is not diaphoretic. HENT:      Head: Normocephalic and atraumatic. Right Ear: Tympanic membrane normal.      Left Ear: Tympanic membrane normal.      Nose: Nose normal.      Mouth/Throat:      Mouth: Mucous membranes are moist.      Pharynx: Oropharynx is clear. Eyes:      Pupils: Pupils are equal, round, and reactive to light. Cardiovascular:      Rate and Rhythm: Normal rate and regular rhythm. Pulses: Normal pulses. Heart sounds: Normal heart sounds. No murmur heard. No friction rub. No gallop. Pulmonary:      Effort: Pulmonary effort is normal. No respiratory distress. Breath sounds: Normal breath sounds. No wheezing or rales. Abdominal:      General: Abdomen is flat. Bowel sounds are normal. There is no distension. Palpations: Abdomen is soft. Tenderness: There is no abdominal tenderness. There is no guarding. Musculoskeletal:         General: No swelling, tenderness or deformity. Normal range of motion. Cervical back: Normal range of motion and neck supple. Right lower leg: No edema. Left lower leg: No edema. Skin:     General: Skin is warm. Coloration: Skin is not jaundiced or pale. Findings: No bruising, erythema, lesion or rash. Neurological:      General: No focal deficit present. Mental Status: She is alert and oriented to person, place, and time. Mental status is at baseline. Cranial Nerves: No cranial nerve deficit. Sensory: No sensory deficit. Motor: No weakness.       Gait: Gait normal.   Psychiatric:         Mood and Affect: Mood normal.            Labs and Imaging   XR CHEST PORTABLE    Result Date: 1/3/2023  EXAMINATION: ONE XRAY VIEW OF THE CHEST 1/3/2023 5:55 pm COMPARISON: 11/18/2021 HISTORY: ORDERING SYSTEM PROVIDED HISTORY: fatigue TECHNOLOGIST PROVIDED HISTORY: Reason for exam:->fatigue Reason for Exam: fatigue Initial evaluation FINDINGS: The trachea is midline. There is atherosclerotic calcification of the aortic knob. There is cardiomegaly. There is a stent in the heart. The lungs are clear. There is no pleural fluid. There are bilateral shoulder replacements. No acute cardiopulmonary process. VL MARLON BILATERAL LIMITED 1-2 LEVELS    Result Date: 1/3/2023  Vascular Lower Arterial Plethysmography Procedure Demographics   Patient Name       Merly Cummings     Date of study       01/03/2023   Date of Birth      1935         Gender              Female   Age                80                 Race                   Patient Number     [id number]           Room Number   Visit Number       [id number]          Height   Corporate ID       [id number]           Weight   Accession Number   [id number]   Ordering Physician Jade Velasquez 144                                        Physician           MD  Procedure Type of Study:   Extremities Arteries:Lower Arterial Plethysmography, VL ANKLE ART BRACHIAL  INDICES EXTREMITY BILATERAL. Conclusions   Summary   No significant evidence of large vessel arterial occlusive disease of the  lower extremities. Signature   ------------------------------------------------------------------  Electronically signed by Luh Narvaez MD (Interpreting  physician) on 01/03/2023 at 04:39 PM  ------------------------------------------------------------------  Impressions Right Impression PTA and DPA are biphasic. Diffuse atherosclerosis. Normal MARLON. Left Impression PTA and DPA are biphasic. Diffuse atherosclerosis. Normal MARLON. Study Location:[facility].  Velocities are measured in cm/s ; Diameters are measured in cm Pressures +--------++--------+-----+----+--------+-----+ ! ! !Right   ! ! Left!        !     ! +--------++--------+-----+----+--------+-----+ ! Location! !Pressure! Ratio! !Pressure! Ratio! +--------++--------+-----+----+--------+-----+ ! Ankle PT!!180     !1.12 !    !170     !1.06 ! +--------++--------+-----+----+--------+-----+ ! Ankle AT!!160     !1    !    !180     !1.12 ! +--------++--------+-----+----+--------+-----+   - Brachial Pressure:Right: 160. Left:160.   - MARLON:Right: 1.12. Left: 1.12. CBC:   Recent Labs     01/03/23 1926 01/05/23 0354   WBC 6.9 6.6   HGB 9.9* 8.6*    247     BMP:    Recent Labs     01/03/23 1926 01/05/23  0354    142   K 4.6 4.0    106   CO2 24 25   BUN 13 11   CREATININE 0.6 0.6   GLUCOSE 87 79     Hepatic:   Recent Labs     01/03/23 1926   AST 87*   ALT 83*   BILITOT 0.3   ALKPHOS 50     Lipids:   Lab Results   Component Value Date/Time    CHOL 114 08/22/2022 01:19 PM    CHOL 164 02/05/2016 12:00 AM    HDL 71 08/22/2022 01:19 PM    TRIG 77 08/22/2022 01:19 PM     Hemoglobin A1C:   Lab Results   Component Value Date/Time    LABA1C 5.3 08/28/2019 11:35 AM     TSH:   Lab Results   Component Value Date/Time    TSH 2.710 08/06/2021 12:00 PM     Troponin:   Lab Results   Component Value Date/Time    TROPONINT <0.010 01/03/2023 07:26 PM    TROPONINT 0.083 11/26/2021 04:08 AM    TROPONINT 0.269 11/19/2021 12:33 AM     Lactic Acid: No results for input(s): LACTA in the last 72 hours. BNP: No results for input(s): PROBNP in the last 72 hours.   UA:  Lab Results   Component Value Date/Time    NITRU NEGATIVE 01/03/2023 08:22 PM    NITRU NEGATIVE 11/13/2012 09:10 AM    COLORU YELLOW 01/03/2023 08:22 PM    PHUR 5.5 04/03/2019 10:18 AM    WBCUA 2 11/14/2021 02:30 PM    RBCUA 2 11/14/2021 02:30 PM    MUCUS RARE 11/14/2021 02:30 PM    TRICHOMONAS NONE SEEN 11/14/2021 02:30 PM    BACTERIA NEGATIVE 11/14/2021 02:30 PM CLARITYU CLEAR 01/03/2023 08:22 PM    SPECGRAV 1.010 01/03/2023 08:22 PM    LEUKOCYTESUR NEGATIVE 01/03/2023 08:22 PM    UROBILINOGEN 0.2 01/03/2023 08:22 PM    [address] 01/03/2023 08:22 PM    Tesschano Quinteros neg 04/03/2019 10:18 AM    BLOODU NEGATIVE 01/03/2023 08:22 PM    GLUCOSEU neg 04/03/2019 10:18 AM    KETUA NEGATIVE 01/03/2023 08:22 PM     Urine Cultures: No results found for: Rosales Austen  Blood Cultures: No results found for: BC  No results found for: BLOODCULT2  Organism: No results found for: ORG    Time Spent Discharging patient 31 minutes    Electronically signed by Vitaliy Heller MD on 1/6/2023 at 3:43 PM

## 2023-01-06 NOTE — TELEPHONE ENCOUNTER
Pt states she would like to follow up with Dr. Joseluis Pillai as she has not seen Dr. Joseluis Pillai since seeing him in the hospital.

## 2023-01-06 NOTE — PROGRESS NOTES
Progress Note( Dr. Young Leader)  1/5/2023  Subjective:   Admit Date: 1/3/2023  PCP: Corrina Allen MD    Admitted For : Generalized weakness    Consulted For: Reassessment of hypothyroidism and ? Dorothea Min Just his Synthroid dose    Interval History: Patient is more awake and alert feels much better than yesterday like to go home    Denies any chest pains,   Denies SOB . Denies nausea or vomiting. Eating well  No new bowel or bladder symptoms. No intake or output data in the 24 hours ending 01/05/23 2101    DATA    CBC:   Recent Labs     01/03/23 1926 01/05/23  0354   WBC 6.9 6.6   HGB 9.9* 8.6*    247    CMP:  Recent Labs     01/03/23 1926 01/05/23  0354    142   K 4.6 4.0    106   CO2 24 25   BUN 13 11   CREATININE 0.6 0.6   CALCIUM 8.3 8.0*   PROT 5.9*  --    LABALBU 3.6  --    BILITOT 0.3  --    ALKPHOS 50  --    AST 87*  --    ALT 83*  --      Lipids:   Lab Results   Component Value Date/Time    CHOL 114 08/22/2022 01:19 PM    CHOL 164 02/05/2016 12:00 AM    HDL 71 08/22/2022 01:19 PM    TRIG 77 08/22/2022 01:19 PM     Glucose:No results for input(s): POCGLU in the last 72 hours. [id number]:  Lab Results   Component Value Date/Time    LABA1C 5.3 08/28/2019 11:35 AM     High Sensitivity TSH:   Lab Results   Component Value Date/Time    TSHHS 4.240 01/03/2023 07:26 PM     Free T3:   Lab Results   Component Value Date/Time    FT3 1.5 01/04/2023 02:41 AM     Free T4:  Lab Results   Component Value Date/Time    T4FREE 0.87 01/03/2023 07:26 PM       XR CHEST PORTABLE   Preliminary Result   No acute cardiopulmonary process.               Scheduled Medicines   Medications:    Infusions:       Objective:   Vitals: BP (!) 147/52   Pulse 56   Temp 98.4 °F (36.9 °C) (Oral)   Resp 18   Ht 5' (1.524 m)   Wt 114 lb (51.7 kg)   SpO2 96%   BMI 22.26 kg/m²   General appearance: alert and cooperative with exam  Neck: no JVD or bruit  Thyroid : Normal lobes   Lungs: Has Vesicular Breath sounds   Heart: regular rate and rhythm  Abdomen: soft, non-tender; bowel sounds normal; no masses,  no organomegaly  Musculoskeletal: Normal  Extremities: extremities normal, , no edema  Neurologic:  Awake, alert, oriented to name, place and time. Cranial nerves II-XII are grossly intact. Motor is  intact. Sensory is intact. ,  and gait is normal.    Assessment:     Patient Active Problem List:     Closed intertrochanteric fracture of left femur (HCC)     Gait disturbance     Anemia     Dizziness     Acquired hypothyroidism     Murmur     Age-related osteoporosis without current pathological fracture     Black tongue     COPD exacerbation (HCC)     Vitamin D deficiency     Vitamin B12 deficiency     Gastroesophageal reflux disease without esophagitis     VHD (valvular heart disease)     Acute respiratory distress     COPD with exacerbation (HCC)     Sepsis due to pneumonia (HCC)     Nodule of lower lobe of right lung     S/P TAVR (transcatheter aortic valve replacement)     Primary hypertension     Acute respiratory failure (HCC)     Other seizures (HCC)     Metabolic encephalopathy     Cerebrovascular accident (CVA) due to embolism of cerebral artery ([address]     Cerebrovascular accident (CVA) ([address]     Ataxia     Dysarthria due to acute stroke ([address]     Dysthymia     Current mild episode of major depressive disorder without prior episode ([address]     Atherosclerosis of aorta (HCC)     Thyroid disease     Mixed hyperlipidemia     Bradycardia     PAD (peripheral artery disease) ([address]     Other fatigue     Generalized weakness      Plan:     Reviewed POC blood glucose . Labs and X ray results   Reviewed Current Medicines   Is the Synthroid dose  Modified  the dose of other medicines as needed   Okay to discharge home from my side  Will follow in the office on discharge    .      Nik Mendosa MD, MD

## 2023-01-08 LAB
CULTURE: NORMAL
CULTURE: NORMAL
Lab: NORMAL
Lab: NORMAL
SPECIMEN: NORMAL
SPECIMEN: NORMAL

## 2023-01-11 ENCOUNTER — OFFICE VISIT (OUTPATIENT)
Dept: FAMILY MEDICINE CLINIC | Age: 88
End: 2023-01-11

## 2023-01-11 VITALS
SYSTOLIC BLOOD PRESSURE: 132 MMHG | DIASTOLIC BLOOD PRESSURE: 60 MMHG | WEIGHT: 120.4 LBS | TEMPERATURE: 97.5 F | BODY MASS INDEX: 23.51 KG/M2 | OXYGEN SATURATION: 97 % | HEART RATE: 62 BPM

## 2023-01-11 DIAGNOSIS — G40.89 OTHER SEIZURES (HCC): ICD-10-CM

## 2023-01-11 DIAGNOSIS — R11.0 NAUSEA: ICD-10-CM

## 2023-01-11 DIAGNOSIS — R53.1 GENERALIZED WEAKNESS: Primary | ICD-10-CM

## 2023-01-11 DIAGNOSIS — E03.9 ACQUIRED HYPOTHYROIDISM: ICD-10-CM

## 2023-01-11 DIAGNOSIS — F32.0 CURRENT MILD EPISODE OF MAJOR DEPRESSIVE DISORDER WITHOUT PRIOR EPISODE (HCC): ICD-10-CM

## 2023-01-11 DIAGNOSIS — J44.9 CHRONIC OBSTRUCTIVE PULMONARY DISEASE, UNSPECIFIED COPD TYPE (HCC): ICD-10-CM

## 2023-01-11 DIAGNOSIS — I73.9 PAD (PERIPHERAL ARTERY DISEASE) (HCC): ICD-10-CM

## 2023-01-11 DIAGNOSIS — Z09 HOSPITAL DISCHARGE FOLLOW-UP: ICD-10-CM

## 2023-01-11 PROBLEM — J44.1 COPD WITH EXACERBATION (HCC): Status: RESOLVED | Noted: 2019-09-06 | Resolved: 2023-01-11

## 2023-01-11 PROBLEM — J44.1 COPD EXACERBATION (HCC): Status: RESOLVED | Noted: 2018-10-31 | Resolved: 2023-01-11

## 2023-01-11 RX ORDER — SUCRALFATE 1 G/1
1 TABLET ORAL 4 TIMES DAILY PRN
Qty: 120 TABLET | Refills: 5 | Status: SHIPPED | OUTPATIENT
Start: 2023-01-11

## 2023-01-11 RX ORDER — ALBUTEROL SULFATE 90 UG/1
2 AEROSOL, METERED RESPIRATORY (INHALATION) EVERY 6 HOURS PRN
Qty: 1 EACH | Refills: 5 | Status: SHIPPED | OUTPATIENT
Start: 2023-01-11

## 2023-01-11 NOTE — PROGRESS NOTES
Post-Discharge Transitional Care  Follow Up      Toshia Narvaez   YOB: 1935    Date of Office Visit:  1/11/2023  Date of Hospital Admission: 1/3/23  Date of Hospital Discharge: 1/5/23  Risk of hospital readmission (high >=14%. Medium >=10%) :Readmission Risk Score: 14.5      Care management risk score Rising risk (score 2-5) and Complex Care (Scores >=6): No Risk Score On File     Non face to face  following discharge, date last encounter closed (first attempt may have been earlier): 01/06/2023    Call initiated 2 business days of discharge: Yes    ASSESSMENT/PLAN:   Generalized weakness  Improving  Acquired hypothyroidism  Improving  Follow-up with endocrinology  Nausea  Needs improvement. Restart Carafate 4 times daily as needed  Hospital discharge follow-up  -     DC DISCHARGE MEDS RECONCILED W/ CURRENT OUTPATIENT MED LIST  COPD ([facility]  Doing well  Continue albuterol as needed. Current mild episode of major depressive disorder without prior episode ([facility] [address] [facility]  Controlled  PAD (peripheral artery disease) ([facility] [address] [facility]  Asymptomatic  Other seizures [facility])      Medical Decision Making: high complexity  Return in 3 months (on 4/11/2023). Subjective:   HPI:  Follow up of Hospital problems/diagnosis(es): Generalized weakness, hypothyroidism    Inpatient course: Discharge summary reviewed- see chart. Interval history/Current status: Improved. Energy level is slowly improving on the levothyroxine 100 mcg daily. Apparently her endocrinologist had retired and a physician assistant who was taking over for him had dropped her levothyroxine dose from 75 mcg due to a TSH of 0.08 down to 50 mcg daily in member 2022. Patient states she gradually became more more tired and her daughter took her to the hospital.  Endocrinology in the hospital increased her to 125 mcg for 1 day and then back to 100 mcg daily. She is due to see endocrinology on 1/23/2023.     Has nausea and dry heaves after eating for the past several months. No vomiting. Protonix and Zofran doesn't help. Had been on Carafate which helped which had been prescribed by Dr. Nick Cosby. She longer has any. .      Patient Active Problem List   Diagnosis    Closed intertrochanteric fracture of left femur (HCC)    Gait disturbance    Anemia    Dizziness    Acquired hypothyroidism    Murmur    Age-related osteoporosis without current pathological fracture    Black tongue    COPD exacerbation (HCC)    Vitamin D deficiency    Vitamin B12 deficiency    Gastroesophageal reflux disease without esophagitis    VHD (valvular heart disease)    Acute respiratory distress    COPD with exacerbation (HCC)    Sepsis due to pneumonia ([address]    Nodule of lower lobe of right lung    S/P TAVR (transcatheter aortic valve replacement)    Primary hypertension    Acute respiratory failure (HCC)    Other seizures (HCC)    Metabolic encephalopathy    Cerebrovascular accident (CVA) due to embolism of cerebral artery ([address]    Cerebrovascular accident (CVA) ([address]    Ataxia    Dysarthria due to acute stroke ([address]    Dysthymia    Current mild episode of major depressive disorder without prior episode ([address]    Atherosclerosis of aorta (HCC)    Thyroid disease    Mixed hyperlipidemia    Bradycardia    PAD (peripheral artery disease) ([address]    Other fatigue    Generalized weakness       Medications listed as ordered at the time of discharge from hospital     Medication List            Accurate as of January 11, 2023  9:43 AM. If you have any questions, ask your nurse or doctor.                 CONTINUE taking these medications      albuterol sulfate  (90 Base) MCG/ACT inhaler  Commonly known as: PROVENTIL;VENTOLIN;PROAIR  Inhale 2 puffs into the lungs every 6 hours as needed for Wheezing     aspirin 81 MG EC tablet  Commonly known as: EQ Aspirin Adult Low Dose  Take 1 tablet by mouth daily     atorvastatin 40 MG tablet  Commonly known as: LIPITOR  Take 1 tablet by mouth nightly Calcium-Magnesium-Zinc 167-83-8 MG Tabs     escitalopram 20 MG tablet  Commonly known as: Lexapro  Take 1 tablet by mouth daily     Handicap Placard Misc  by Does not apply route Please provide handicap placard for 5 years. Diagnosis: Orthopedic condition     ipratropium-albuterol 0.5-2.5 (3) MG/3ML Soln nebulizer solution  Commonly known as: DUONEB  Inhale 3 mLs into the lungs every 4 hours (while awake)     levothyroxine 100 MCG tablet  Commonly known as: SYNTHROID  Take 1 tablet by mouth Daily     lisinopril 5 MG tablet  Commonly known as: PRINIVIL;ZESTRIL  Take 1 tablet by mouth daily     methocarbamol 500 MG tablet  Commonly known as: ROBAXIN  Take 1 tablet by mouth every evening     montelukast 10 MG tablet  Commonly known as: SINGULAIR  TAKE 1 TABLET PER NG TUBE NIGHTLY     ondansetron 4 MG tablet  Commonly known as: ZOFRAN  Take 1 tablet by mouth every 8 hours as needed for Nausea or Vomiting     pantoprazole 40 MG tablet  Commonly known as: PROTONIX  TAKE 1 TABLET NIGHTLY     promethazine-dextromethorphan 6.25-15 MG/5ML syrup  Commonly known as: PROMETHAZINE-DM  Take 5 mLs by mouth 4 times daily as needed for Cough     Timolol 0.5 % Soln ophthalmic solution  Commonly known as: TIMOPTIC     vitamin B-12 1000 MCG tablet  Commonly known as: CYANOCOBALAMIN     Vitamin D3 50 MCG (2000 UT) Caps                Medications marked \"taking\" at this time  No outpatient medications have been marked as taking for the 1/11/23 encounter (Office Visit) with Maliha Bautista MD.        Medications patient taking as of now reconciled against medications ordered at time of hospital discharge: Yes    A comprehensive review of systems was negative except for what was noted in the HPI.     Objective:    /60 (Site: Left Upper Arm, Position: Sitting, Cuff Size: Medium Adult)   Pulse 62   Temp 97.5 °F (36.4 °C) (Infrared)   Wt 120 lb 6.4 oz (54.6 kg)   SpO2 97%   BMI 23.51 kg/m²   General Appearance: alert and oriented to person, place and time, well developed and well- nourished, in no acute distress  Skin: warm and dry, no rash or erythema  Head: normocephalic and atraumatic  Eyes: pupils equal, round, and reactive to light, extraocular eye movements intact, conjunctivae normal  ENT: tympanic membrane, external ear and ear canal normal bilaterally, nose without deformity, nasal mucosa and turbinates normal without polyps  Neck: supple and non-tender without mass, no thyromegaly or thyroid nodules, no cervical lymphadenopathy  Pulmonary/Chest: clear to auscultation bilaterally- no wheezes, rales or rhonchi, normal air movement, no respiratory distress  Cardiovascular: normal rate, regular rhythm, normal S1 and S2, no murmurs, rubs, clicks, or gallops, distal pulses intact, no carotid bruits  Abdomen: soft, non-tender, non-distended, normal bowel sounds, no masses or organomegaly  Extremities: no cyanosis, clubbing or edema  Musculoskeletal: normal range of motion, no joint swelling, deformity or tenderness  Neurologic: reflexes normal and symmetric, no cranial nerve deficit, gait, coordination and speech normal      An electronic signature was used to authenticate this note.  --LAI MOHR MD

## 2023-01-12 ENCOUNTER — CARE COORDINATION (OUTPATIENT)
Dept: CASE MANAGEMENT | Age: 88
End: 2023-01-12

## 2023-01-12 NOTE — CARE COORDINATION
[facility] Care Transitions Follow Up Call    Care Transition Nurse contacted the patient by telephone to follow up after admission on 1/3/23. Verified name and  with patient as identifiers. Patient: Ollie Ha  Patient : 1935   MRN: [id number]  Reason for Admission: Generalized weakness  Discharge Date: 23 RARS: Readmission Risk Score: 14.5      Needs to be reviewed by the provider   Additional needs identified to be addressed with provider: No  none             Method of communication with provider: none. Contacted patient for care transition follow up. Sivan Newman states she seems to be doing pretty good so far. Reports weakness and fatigue has been improving. Energy level slowly improving as well. Denies having any falls. May still have a cough but states has \"gotten better\". Sinus drainage still present. She may become short of breath with exertion at times. No distress. Reports having nausea after eating. Denies vomiting. States she saw her PCP yesterday and he prescribed Carafate. She plans to  the medication from the pharmacy today. She states she is trying to complete the application for financial aid and needs an account number. She does not know her account number and currently looking at her AVS. CTN reviewed most recent AVS and told her to use the MRN #.  CTN unsure if that is the correct number to use and unsure if patient's are assigned a different account number for billing. She will use that number and was advised to contact billing or financial aid with any other questions. She does not have any questions or needs at this time. Addressed changes since last contact: Will be starting on Carafate again  Discussed follow-up appointments. If no appointment was previously scheduled, appointment scheduling offered: Yes. Is follow up appointment scheduled within 7 days of discharge? Yes.     Follow Up  Future Appointments   Date Time Provider Wero Rowe 4/25/2023 11:30 AM Rui Benedict MD [facility]     Non-BS follow up appointment(s): Endocrinology appt on 1/23/23    Care Transition Nurse reviewed red flags with patient and discussed any barriers to care and/or understanding of plan of care after discharge. Discussed appropriate site of care based on symptoms and resources available to patient including: PCP  Specialist. The patient agrees to contact the PCP office for questions related to their healthcare. Patients top risk factors for readmission: medical condition-Generalized weakness, Primary HTN, multiple health system providers, and polypharmacy  Interventions to address risk factors: Scheduled appointment with Specialist-Endo appt on 1/23/23    Offered patient enrollment in the Remote Patient Monitoring (RPM) program for in-home monitoring:  NA .     Care Transitions Subsequent and Final Call    Subsequent and Final Calls  Do you have any ongoing symptoms?: Yes  Patient-reported symptoms: Nausea, Shortness of Breath, Cough, Other, Fatigue, Weakness  Have your medications changed?: Yes  Patient Reports: Carafate ordered and will be picked up today  Do you have any questions related to your medications?: No  Are you currently active with any services?: Home Health  Do you have any needs or concerns that I can assist you with?: No  Care Transitions Interventions   Home Care Waiver: Completed        DME Assistance: Completed   Disease Association: Completed      Other Interventions:             Care Transition Nurse provided contact information for future needs. Plan for follow-up call in 5-7 days based on severity of symptoms and risk factors.   Plan for next call: symptom management-fatigue, weakness, SOB, any new or worsening symptoms    Mayra Bauman RN

## 2023-01-19 ENCOUNTER — CARE COORDINATION (OUTPATIENT)
Dept: CASE MANAGEMENT | Age: 88
End: 2023-01-19

## 2023-01-19 NOTE — CARE COORDINATION
[facility] Care Transitions Follow Up Call    Care Transition Nurse contacted the patient by telephone to follow up after admission on 1/3/23. Verified name and  with patient as identifiers. Patient: Tracy Salcedo  Patient : 1935   MRN: [id number]  Reason for Admission: Generalized weakness  Discharge Date: 23 RARS: Readmission Risk Score: 14.5      Needs to be reviewed by the provider   Additional needs identified to be addressed with provider: No  none             Method of communication with provider: none. Contacted patient for care transition follow up. Neena Gomez states she is doing really well. Energy level has improved as well as her strength. Denies having any falls. Reports respiratory issues have improved. Eating and drinking fluids w/o issues. Reports having nausea every once in a while but not as frequent as she had been. She informed CTN that she mailed her financial aid application but was told she forgot to put a bank statement in therefore they have to send it back to her. We discussed when to contact providers with any new or worsening symptoms. No questions or needs at this time. Addressed changes since last contact:  none    Follow Up  Future Appointments   Date Time Provider Wero Rowe   2023 11:30 AM Jasen Colin MD [facility]       Care Transition Nurse reviewed red flags with patient and discussed any barriers to care and/or understanding of plan of care after discharge. Discussed appropriate site of care based on symptoms and resources available to patient including: PCP  Specialist. The patient agrees to contact the PCP office for questions related to their healthcare.      Patients top risk factors for readmission: medical condition-Generalized weakness, Primary HTN and polypharmacy  Interventions to address risk factors: Education of patient/family/caregiver/guardian to support self-management-when to contact providers    Offered patient enrollment in the Remote Patient Monitoring (RPM) program for in-home monitoring: NA.     Care Transitions Subsequent and Final Call    Subsequent and Final Calls  Do you have any ongoing symptoms?: No  Have your medications changed?: No  Do you have any questions related to your medications?: No  Do you currently have any active services?: No  Are you currently active with any services?: Home Health  Do you have any needs or concerns that I can assist you with?: No  Care Transitions Interventions   Home Care Waiver: Completed        DME Assistance: Completed   Disease Association: Completed      Other Interventions:             Care Transition Nurse provided contact information for future needs. Plan for follow-up call in 10-14 days based on severity of symptoms and risk factors.   Plan for next call: symptom management-any new or worsening symptoms    Denice Tim RN

## 2023-01-30 ENCOUNTER — TELEPHONE (OUTPATIENT)
Dept: NEUROLOGY | Age: 88
End: 2023-01-30

## 2023-01-30 NOTE — TELEPHONE ENCOUNTER
Called patient to schedule a follow up appointment. She did not feel that she needed to return. She is stable and continuing preventative meds that her pcp can manager. She will reach out if anything changes.

## 2023-02-01 ENCOUNTER — CARE COORDINATION (OUTPATIENT)
Dept: CASE MANAGEMENT | Age: 88
End: 2023-02-01

## 2023-02-01 NOTE — CARE COORDINATION
[facility] Care Transitions Follow Up Call    Care Transition Nurse contacted the patient by telephone to follow up after admission on 1/3/23. Verified name and  with patient as identifiers. Patient: Dedra Dunbar  Patient : 1935   MRN: [id number]  Reason for Admission: Generalized weakness  Discharge Date: 23 RARS: Readmission Risk Score: 14.5      Needs to be reviewed by the provider   Additional needs identified to be addressed with provider: No  none             Method of communication with provider: none. Contacted patient for final care transition follow up. Camron Turnre states she is getting along very well. Strength and energy level continue to improve but \"just slower than expected\". No longer having any nausea. Does become short of breath with exertion but at baseline. She is eating and drinking w/o issues. Denies urinary or bowel issues. States she is not having any signs/symptoms currently. Discussed when to contact provider. She verbalized understanding. No questions or needs at this time. No further outreach. Addressed changes since last contact:  none    Follow Up  Future Appointments   Date Time Provider Wero Rowe   2023 11:30 AM Sahil Montgomery MD [facility]       Care Transition Nurse reviewed red flags with patient and discussed any barriers to care and/or understanding of plan of care after discharge. Discussed appropriate site of care based on symptoms and resources available to patient including: PCP. The patient agrees to contact the PCP office for questions related to their healthcare.      Patients top risk factors for readmission: medical condition-Generalized weakness, Primary HTN  Interventions to address risk factors: Education of patient/family/caregiver/guardian to support self-management-when to contact provider    Offered patient enrollment in the Remote Patient Monitoring (RPM) program for in-home monitoring: NA.     Care Transitions Subsequent and Final Call    Subsequent and Final Calls  Do you have any ongoing symptoms?: Yes  Patient-reported symptoms: Shortness of Breath  Have your medications changed?: No  Do you have any questions related to your medications?: No  Do you currently have any active services?: No  Are you currently active with any services?: Home Health  Do you have any needs or concerns that I can assist you with?: No  Care Transitions Interventions   Home Care Waiver: Completed        DME Assistance: Completed   Disease Association: Completed      Other Interventions:             Care Transition Nurse provided contact information for future needs. No further follow-up call indicated based on severity of symptoms and risk factors.     Walter Hermosillo RN

## 2023-02-24 DIAGNOSIS — Z86.73 HISTORY OF CVA (CEREBROVASCULAR ACCIDENT): ICD-10-CM

## 2023-02-27 RX ORDER — ATORVASTATIN CALCIUM 40 MG/1
40 TABLET, FILM COATED ORAL NIGHTLY
Qty: 90 TABLET | Refills: 0 | OUTPATIENT
Start: 2023-02-27

## 2023-03-08 ENCOUNTER — APPOINTMENT (OUTPATIENT)
Dept: CT IMAGING | Age: 88
End: 2023-03-08
Payer: MEDICARE

## 2023-03-08 ENCOUNTER — OFFICE VISIT (OUTPATIENT)
Dept: FAMILY MEDICINE CLINIC | Age: 88
End: 2023-03-08
Payer: MEDICARE

## 2023-03-08 ENCOUNTER — APPOINTMENT (OUTPATIENT)
Dept: GENERAL RADIOLOGY | Age: 88
End: 2023-03-08
Payer: MEDICARE

## 2023-03-08 ENCOUNTER — HOSPITAL ENCOUNTER (OUTPATIENT)
Age: 88
Setting detail: OBSERVATION
Discharge: HOME | End: 2023-03-09
Attending: EMERGENCY MEDICINE | Admitting: INTERNAL MEDICINE
Payer: MEDICARE

## 2023-03-08 VITALS
SYSTOLIC BLOOD PRESSURE: 98 MMHG | WEIGHT: 116.4 LBS | TEMPERATURE: 97.3 F | BODY MASS INDEX: 22.73 KG/M2 | DIASTOLIC BLOOD PRESSURE: 56 MMHG

## 2023-03-08 DIAGNOSIS — R11.0 NAUSEA: ICD-10-CM

## 2023-03-08 DIAGNOSIS — J30.89 NON-SEASONAL ALLERGIC RHINITIS, UNSPECIFIED TRIGGER: ICD-10-CM

## 2023-03-08 DIAGNOSIS — R07.89 CHEST PRESSURE: ICD-10-CM

## 2023-03-08 DIAGNOSIS — R42 LIGHTHEADEDNESS: ICD-10-CM

## 2023-03-08 DIAGNOSIS — Z86.73 HISTORY OF CVA (CEREBROVASCULAR ACCIDENT): ICD-10-CM

## 2023-03-08 DIAGNOSIS — R41.82 ALTERED MENTAL STATUS, UNSPECIFIED ALTERED MENTAL STATUS TYPE: ICD-10-CM

## 2023-03-08 DIAGNOSIS — F32.0 CURRENT MILD EPISODE OF MAJOR DEPRESSIVE DISORDER WITHOUT PRIOR EPISODE (HCC): Primary | ICD-10-CM

## 2023-03-08 DIAGNOSIS — Z13.0 SCREENING, ANEMIA, DEFICIENCY, IRON: ICD-10-CM

## 2023-03-08 DIAGNOSIS — F32.0 CURRENT MILD EPISODE OF MAJOR DEPRESSIVE DISORDER WITHOUT PRIOR EPISODE (HCC): ICD-10-CM

## 2023-03-08 DIAGNOSIS — K21.9 GASTROESOPHAGEAL REFLUX DISEASE WITHOUT ESOPHAGITIS: ICD-10-CM

## 2023-03-08 DIAGNOSIS — R07.9 CHEST PAIN, UNSPECIFIED TYPE: Primary | ICD-10-CM

## 2023-03-08 DIAGNOSIS — I10 PRIMARY HYPERTENSION: ICD-10-CM

## 2023-03-08 PROBLEM — F41.9 ANXIETY DISORDER: Status: ACTIVE | Noted: 2023-03-08

## 2023-03-08 LAB
25(OH)D3 SERPL-MCNC: 53.02 NG/ML
A/G RATIO: 1.9 (ref 1.1–2.2)
ALBUMIN SERPL-MCNC: 4.1 G/DL (ref 3.4–5)
ALBUMIN SERPL-MCNC: 4.5 GM/DL (ref 3.4–5)
ALP BLD-CCNC: 49 U/L (ref 40–129)
ALP BLD-CCNC: 59 IU/L (ref 40–129)
ALT SERPL-CCNC: 24 U/L (ref 10–40)
ALT SERPL-CCNC: 31 U/L (ref 10–40)
ANION GAP SERPL CALCULATED.3IONS-SCNC: 11 MMOL/L (ref 4–16)
ANION GAP SERPL CALCULATED.3IONS-SCNC: 12 MMOL/L (ref 3–16)
AST SERPL-CCNC: 40 IU/L (ref 15–37)
AST SERPL-CCNC: 44 U/L (ref 15–37)
B PARAP IS1001 DNA NPH QL NAA+NON-PROBE: NOT DETECTED
B PERT.PT PRMT NPH QL NAA+NON-PROBE: NOT DETECTED
BASOPHILS ABSOLUTE: 0 K/CU MM
BASOPHILS RELATIVE PERCENT: 0.8 % (ref 0–1)
BILIRUB SERPL-MCNC: 0.4 MG/DL (ref 0–1)
BILIRUB SERPL-MCNC: 0.6 MG/DL (ref 0–1)
BILIRUBIN URINE: NEGATIVE MG/DL
BLOOD, URINE: NEGATIVE
BUN BLDV-MCNC: 9 MG/DL (ref 7–20)
BUN SERPL-MCNC: 9 MG/DL (ref 6–23)
C PNEUM DNA NPH QL NAA+NON-PROBE: NOT DETECTED
CALCIUM SERPL-MCNC: 9 MG/DL (ref 8.3–10.6)
CALCIUM SERPL-MCNC: 9.3 MG/DL (ref 8.3–10.6)
CHLORIDE BLD-SCNC: 100 MMOL/L (ref 99–110)
CHLORIDE BLD-SCNC: 103 MMOL/L (ref 99–110)
CLARITY: CLEAR
CO2: 22 MMOL/L (ref 21–32)
CO2: 25 MMOL/L (ref 21–32)
COLOR: YELLOW
COMMENT UA: NORMAL
CREAT SERPL-MCNC: 0.5 MG/DL (ref 0.6–1.1)
CREAT SERPL-MCNC: 0.6 MG/DL (ref 0.6–1.2)
DIFFERENTIAL TYPE: ABNORMAL
EOSINOPHILS ABSOLUTE: 0.2 K/CU MM
EOSINOPHILS RELATIVE PERCENT: 4.7 % (ref 0–3)
FLUAV H1 2009 PAN RNA NPH NAA+NON-PROBE: NOT DETECTED
FLUAV H1 RNA NPH QL NAA+NON-PROBE: NOT DETECTED
FLUAV H3 RNA NPH QL NAA+NON-PROBE: NOT DETECTED
FLUAV RNA NPH QL NAA+NON-PROBE: NOT DETECTED
FLUBV RNA NPH QL NAA+NON-PROBE: NOT DETECTED
FOLATE SERPL-MCNC: >20 NG/ML (ref 3.1–17.5)
GFR SERPL CREATININE-BSD FRML MDRD: >60 ML/MIN/1.73M2
GFR SERPL CREATININE-BSD FRML MDRD: >60 ML/MIN/{1.73_M2}
GLUCOSE BLD-MCNC: 84 MG/DL (ref 70–99)
GLUCOSE SERPL-MCNC: 105 MG/DL (ref 70–99)
GLUCOSE, URINE: NEGATIVE MG/DL
HADV DNA NPH QL NAA+NON-PROBE: NOT DETECTED
HCOV 229E RNA NPH QL NAA+NON-PROBE: NOT DETECTED
HCOV HKU1 RNA NPH QL NAA+NON-PROBE: NOT DETECTED
HCOV NL63 RNA NPH QL NAA+NON-PROBE: NOT DETECTED
HCOV OC43 RNA NPH QL NAA+NON-PROBE: NOT DETECTED
HCT VFR BLD CALC: 30.6 % (ref 36–48)
HCT VFR BLD CALC: 35.2 % (ref 37–47)
HEMOGLOBIN: 10.4 GM/DL (ref 12.5–16)
HEMOGLOBIN: 9.4 G/DL (ref 12–16)
HMPV RNA NPH QL NAA+NON-PROBE: NOT DETECTED
HPIV1 RNA NPH QL NAA+NON-PROBE: NOT DETECTED
HPIV2 RNA NPH QL NAA+NON-PROBE: NOT DETECTED
HPIV3 RNA NPH QL NAA+NON-PROBE: NOT DETECTED
HPIV4 RNA NPH QL NAA+NON-PROBE: NOT DETECTED
IMMATURE NEUTROPHIL %: 0.2 % (ref 0–0.43)
KETONES, URINE: NEGATIVE MG/DL
LEUKOCYTE ESTERASE, URINE: NEGATIVE
LIPASE: 30 IU/L (ref 13–60)
LYMPHOCYTES ABSOLUTE: 2.8 K/CU MM
LYMPHOCYTES RELATIVE PERCENT: 54.7 % (ref 24–44)
M PNEUMO DNA NPH QL NAA+NON-PROBE: NOT DETECTED
MAGNESIUM: 2.2 MG/DL (ref 1.8–2.4)
MCH RBC QN AUTO: 24.6 PG (ref 27–31)
MCH RBC QN AUTO: 24.9 PG (ref 26–34)
MCHC RBC AUTO-ENTMCNC: 29.5 % (ref 32–36)
MCHC RBC AUTO-ENTMCNC: 30.6 G/DL (ref 31–36)
MCV RBC AUTO: 81.4 FL (ref 80–100)
MCV RBC AUTO: 83.2 FL (ref 78–100)
MONOCYTES ABSOLUTE: 0.5 K/CU MM
MONOCYTES RELATIVE PERCENT: 10.3 % (ref 0–4)
NITRITE URINE, QUANTITATIVE: NEGATIVE
NUCLEATED RBC %: 0 %
PDW BLD-RTO: 16.6 % (ref 11.7–14.9)
PDW BLD-RTO: 18.3 % (ref 12.4–15.4)
PH, URINE: 7 (ref 5–8)
PLATELET # BLD: 339 K/UL (ref 135–450)
PLATELET # BLD: 396 K/CU MM (ref 140–440)
PMV BLD AUTO: 8.8 FL (ref 5–10.5)
PMV BLD AUTO: 9.9 FL (ref 7.5–11.1)
POTASSIUM SERPL-SCNC: 5 MMOL/L (ref 3.5–5.1)
POTASSIUM SERPL-SCNC: 5.6 MMOL/L (ref 3.5–5.1)
PRO-BNP: 809.9 PG/ML
PROTEIN UA: NEGATIVE MG/DL
RBC # BLD: 3.76 M/UL (ref 4–5.2)
RBC # BLD: 4.23 M/CU MM (ref 4.2–5.4)
RSV RNA NPH QL NAA+NON-PROBE: NOT DETECTED
RV+EV RNA NPH QL NAA+NON-PROBE: NOT DETECTED
SARS-COV-2 RNA NPH QL NAA+NON-PROBE: NOT DETECTED
SEGMENTED NEUTROPHILS ABSOLUTE COUNT: 1.5 K/CU MM
SEGMENTED NEUTROPHILS RELATIVE PERCENT: 29.3 % (ref 36–66)
SODIUM BLD-SCNC: 136 MMOL/L (ref 135–145)
SODIUM BLD-SCNC: 137 MMOL/L (ref 136–145)
SPECIFIC GRAVITY UA: <1.005 (ref 1–1.03)
TOTAL IMMATURE NEUTOROPHIL: 0.01 K/CU MM
TOTAL NUCLEATED RBC: 0 K/CU MM
TOTAL PROTEIN: 6.3 G/DL (ref 6.4–8.2)
TOTAL PROTEIN: 7.4 GM/DL (ref 6.4–8.2)
TROPONIN T: <0.01 NG/ML
TSH SERPL DL<=0.005 MIU/L-ACNC: 0.01 UIU/ML (ref 0.27–4.2)
UROBILINOGEN, URINE: 0.2 MG/DL (ref 0.2–1)
VITAMIN B-12: 1291 PG/ML (ref 211–911)
WBC # BLD: 3.7 K/UL (ref 4–11)
WBC # BLD: 5.2 K/CU MM (ref 4–10.5)

## 2023-03-08 PROCEDURE — 70450 CT HEAD/BRAIN W/O DYE: CPT

## 2023-03-08 PROCEDURE — 6360000002 HC RX W HCPCS: Performed by: PHYSICIAN ASSISTANT

## 2023-03-08 PROCEDURE — 82306 VITAMIN D 25 HYDROXY: CPT

## 2023-03-08 PROCEDURE — 85025 COMPLETE CBC W/AUTO DIFF WBC: CPT

## 2023-03-08 PROCEDURE — 36415 COLL VENOUS BLD VENIPUNCTURE: CPT | Performed by: FAMILY MEDICINE

## 2023-03-08 PROCEDURE — 82746 ASSAY OF FOLIC ACID SERUM: CPT

## 2023-03-08 PROCEDURE — 84484 ASSAY OF TROPONIN QUANT: CPT

## 2023-03-08 PROCEDURE — 83690 ASSAY OF LIPASE: CPT

## 2023-03-08 PROCEDURE — 1123F ACP DISCUSS/DSCN MKR DOCD: CPT | Performed by: FAMILY MEDICINE

## 2023-03-08 PROCEDURE — G0378 HOSPITAL OBSERVATION PER HR: HCPCS

## 2023-03-08 PROCEDURE — 84443 ASSAY THYROID STIM HORMONE: CPT

## 2023-03-08 PROCEDURE — 71045 X-RAY EXAM CHEST 1 VIEW: CPT

## 2023-03-08 PROCEDURE — 82607 VITAMIN B-12: CPT

## 2023-03-08 PROCEDURE — 99215 OFFICE O/P EST HI 40 MIN: CPT | Performed by: FAMILY MEDICINE

## 2023-03-08 PROCEDURE — 83880 ASSAY OF NATRIURETIC PEPTIDE: CPT

## 2023-03-08 PROCEDURE — 6370000000 HC RX 637 (ALT 250 FOR IP): Performed by: EMERGENCY MEDICINE

## 2023-03-08 PROCEDURE — 81003 URINALYSIS AUTO W/O SCOPE: CPT

## 2023-03-08 PROCEDURE — 36415 COLL VENOUS BLD VENIPUNCTURE: CPT

## 2023-03-08 PROCEDURE — 83735 ASSAY OF MAGNESIUM: CPT

## 2023-03-08 PROCEDURE — 93005 ELECTROCARDIOGRAM TRACING: CPT | Performed by: EMERGENCY MEDICINE

## 2023-03-08 PROCEDURE — 99285 EMERGENCY DEPT VISIT HI MDM: CPT

## 2023-03-08 PROCEDURE — 6370000000 HC RX 637 (ALT 250 FOR IP): Performed by: PHYSICIAN ASSISTANT

## 2023-03-08 PROCEDURE — 96372 THER/PROPH/DIAG INJ SC/IM: CPT

## 2023-03-08 PROCEDURE — 0202U NFCT DS 22 TRGT SARS-COV-2: CPT

## 2023-03-08 PROCEDURE — 80053 COMPREHEN METABOLIC PANEL: CPT

## 2023-03-08 RX ORDER — LEVOTHYROXINE SODIUM 0.1 MG/1
100 TABLET ORAL DAILY
Status: DISCONTINUED | OUTPATIENT
Start: 2023-03-09 | End: 2023-03-09

## 2023-03-08 RX ORDER — ESCITALOPRAM OXALATE 10 MG/1
20 TABLET ORAL DAILY
Status: DISCONTINUED | OUTPATIENT
Start: 2023-03-09 | End: 2023-03-09 | Stop reason: HOSPADM

## 2023-03-08 RX ORDER — ASPIRIN 81 MG/1
324 TABLET, CHEWABLE ORAL ONCE
Status: COMPLETED | OUTPATIENT
Start: 2023-03-08 | End: 2023-03-08

## 2023-03-08 RX ORDER — POLYETHYLENE GLYCOL 3350 17 G/17G
17 POWDER, FOR SOLUTION ORAL DAILY PRN
Status: DISCONTINUED | OUTPATIENT
Start: 2023-03-08 | End: 2023-03-09 | Stop reason: HOSPADM

## 2023-03-08 RX ORDER — ENOXAPARIN SODIUM 100 MG/ML
40 INJECTION SUBCUTANEOUS DAILY
Status: DISCONTINUED | OUTPATIENT
Start: 2023-03-08 | End: 2023-03-09 | Stop reason: HOSPADM

## 2023-03-08 RX ORDER — TIMOLOL MALEATE 5 MG/ML
1 SOLUTION/ DROPS OPHTHALMIC 2 TIMES DAILY
Status: DISCONTINUED | OUTPATIENT
Start: 2023-03-09 | End: 2023-03-09 | Stop reason: HOSPADM

## 2023-03-08 RX ORDER — SODIUM CHLORIDE 0.9 % (FLUSH) 0.9 %
5-40 SYRINGE (ML) INJECTION EVERY 12 HOURS SCHEDULED
Status: DISCONTINUED | OUTPATIENT
Start: 2023-03-08 | End: 2023-03-09 | Stop reason: HOSPADM

## 2023-03-08 RX ORDER — ESCITALOPRAM OXALATE 20 MG/1
20 TABLET ORAL DAILY
Qty: 90 TABLET | Refills: 1 | Status: SHIPPED | OUTPATIENT
Start: 2023-03-08

## 2023-03-08 RX ORDER — LISINOPRIL 5 MG/1
5 TABLET ORAL DAILY
Status: DISCONTINUED | OUTPATIENT
Start: 2023-03-09 | End: 2023-03-09 | Stop reason: HOSPADM

## 2023-03-08 RX ORDER — ONDANSETRON 2 MG/ML
4 INJECTION INTRAMUSCULAR; INTRAVENOUS EVERY 6 HOURS PRN
Status: DISCONTINUED | OUTPATIENT
Start: 2023-03-08 | End: 2023-03-09 | Stop reason: HOSPADM

## 2023-03-08 RX ORDER — SODIUM CHLORIDE 0.9 % (FLUSH) 0.9 %
5-40 SYRINGE (ML) INJECTION PRN
Status: DISCONTINUED | OUTPATIENT
Start: 2023-03-08 | End: 2023-03-09 | Stop reason: HOSPADM

## 2023-03-08 RX ORDER — SODIUM CHLORIDE 9 MG/ML
INJECTION, SOLUTION INTRAVENOUS PRN
Status: DISCONTINUED | OUTPATIENT
Start: 2023-03-08 | End: 2023-03-09 | Stop reason: HOSPADM

## 2023-03-08 RX ORDER — ALBUTEROL SULFATE 90 UG/1
2 AEROSOL, METERED RESPIRATORY (INHALATION) EVERY 6 HOURS PRN
Status: DISCONTINUED | OUTPATIENT
Start: 2023-03-08 | End: 2023-03-09 | Stop reason: HOSPADM

## 2023-03-08 RX ORDER — MONTELUKAST SODIUM 10 MG/1
TABLET ORAL
Qty: 90 TABLET | Refills: 1 | Status: SHIPPED | OUTPATIENT
Start: 2023-03-08

## 2023-03-08 RX ORDER — ATORVASTATIN CALCIUM 40 MG/1
40 TABLET, FILM COATED ORAL NIGHTLY
Status: DISCONTINUED | OUTPATIENT
Start: 2023-03-08 | End: 2023-03-09 | Stop reason: HOSPADM

## 2023-03-08 RX ORDER — ONDANSETRON 4 MG/1
4 TABLET, FILM COATED ORAL EVERY 8 HOURS PRN
Qty: 90 TABLET | Refills: 1 | Status: SHIPPED | OUTPATIENT
Start: 2023-03-08

## 2023-03-08 RX ORDER — MONTELUKAST SODIUM 10 MG/1
10 TABLET ORAL NIGHTLY
Status: DISCONTINUED | OUTPATIENT
Start: 2023-03-08 | End: 2023-03-09 | Stop reason: HOSPADM

## 2023-03-08 RX ORDER — ONDANSETRON 4 MG/1
4 TABLET, ORALLY DISINTEGRATING ORAL EVERY 8 HOURS PRN
Status: DISCONTINUED | OUTPATIENT
Start: 2023-03-08 | End: 2023-03-09 | Stop reason: HOSPADM

## 2023-03-08 RX ORDER — ATORVASTATIN CALCIUM 40 MG/1
40 TABLET, FILM COATED ORAL NIGHTLY
Qty: 90 TABLET | Refills: 1 | Status: SHIPPED | OUTPATIENT
Start: 2023-03-08

## 2023-03-08 RX ORDER — METHOCARBAMOL 500 MG/1
500 TABLET, FILM COATED ORAL NIGHTLY PRN
Status: DISCONTINUED | OUTPATIENT
Start: 2023-03-08 | End: 2023-03-09 | Stop reason: HOSPADM

## 2023-03-08 RX ORDER — PANTOPRAZOLE SODIUM 40 MG/1
TABLET, DELAYED RELEASE ORAL
Qty: 90 TABLET | Refills: 1 | Status: SHIPPED | OUTPATIENT
Start: 2023-03-08

## 2023-03-08 RX ORDER — METHOCARBAMOL 500 MG/1
500 TABLET, FILM COATED ORAL EVERY EVENING
Qty: 30 TABLET | Refills: 5 | Status: SHIPPED | OUTPATIENT
Start: 2023-03-08

## 2023-03-08 RX ORDER — ACETAMINOPHEN 650 MG/1
650 SUPPOSITORY RECTAL EVERY 6 HOURS PRN
Status: DISCONTINUED | OUTPATIENT
Start: 2023-03-08 | End: 2023-03-09 | Stop reason: HOSPADM

## 2023-03-08 RX ORDER — ASPIRIN 81 MG/1
81 TABLET ORAL DAILY
Status: DISCONTINUED | OUTPATIENT
Start: 2023-03-09 | End: 2023-03-09 | Stop reason: HOSPADM

## 2023-03-08 RX ORDER — PANTOPRAZOLE SODIUM 40 MG/1
40 TABLET, DELAYED RELEASE ORAL NIGHTLY
Status: DISCONTINUED | OUTPATIENT
Start: 2023-03-08 | End: 2023-03-09 | Stop reason: HOSPADM

## 2023-03-08 RX ORDER — ACETAMINOPHEN 325 MG/1
650 TABLET ORAL EVERY 6 HOURS PRN
Status: DISCONTINUED | OUTPATIENT
Start: 2023-03-08 | End: 2023-03-09 | Stop reason: HOSPADM

## 2023-03-08 RX ADMIN — ENOXAPARIN SODIUM 40 MG: 40 INJECTION SUBCUTANEOUS at 17:10

## 2023-03-08 RX ADMIN — PANTOPRAZOLE SODIUM 40 MG: 40 TABLET, DELAYED RELEASE ORAL at 20:16

## 2023-03-08 RX ADMIN — MONTELUKAST 10 MG: 10 TABLET, FILM COATED ORAL at 20:16

## 2023-03-08 RX ADMIN — ASPIRIN 81 MG 324 MG: 81 TABLET ORAL at 16:04

## 2023-03-08 RX ADMIN — ATORVASTATIN CALCIUM 40 MG: 40 TABLET, FILM COATED ORAL at 20:16

## 2023-03-08 RX ADMIN — METHOCARBAMOL 500 MG: 500 TABLET ORAL at 22:07

## 2023-03-08 ASSESSMENT — LIFESTYLE VARIABLES
HOW OFTEN DO YOU HAVE A DRINK CONTAINING ALCOHOL: NEVER
HOW MANY STANDARD DRINKS CONTAINING ALCOHOL DO YOU HAVE ON A TYPICAL DAY: PATIENT DOES NOT DRINK

## 2023-03-08 ASSESSMENT — PATIENT HEALTH QUESTIONNAIRE - PHQ9
3. TROUBLE FALLING OR STAYING ASLEEP: 0
SUM OF ALL RESPONSES TO PHQ QUESTIONS 1-9: 0
SUM OF ALL RESPONSES TO PHQ QUESTIONS 1-9: 0
9. THOUGHTS THAT YOU WOULD BE BETTER OFF DEAD, OR OF HURTING YOURSELF: 0
1. LITTLE INTEREST OR PLEASURE IN DOING THINGS: 0
8. MOVING OR SPEAKING SO SLOWLY THAT OTHER PEOPLE COULD HAVE NOTICED. OR THE OPPOSITE, BEING SO FIGETY OR RESTLESS THAT YOU HAVE BEEN MOVING AROUND A LOT MORE THAN USUAL: 0
4. FEELING TIRED OR HAVING LITTLE ENERGY: 0
10. IF YOU CHECKED OFF ANY PROBLEMS, HOW DIFFICULT HAVE THESE PROBLEMS MADE IT FOR YOU TO DO YOUR WORK, TAKE CARE OF THINGS AT HOME, OR GET ALONG WITH OTHER PEOPLE: 0
5. POOR APPETITE OR OVEREATING: 0
SUM OF ALL RESPONSES TO PHQ QUESTIONS 1-9: 0
SUM OF ALL RESPONSES TO PHQ QUESTIONS 1-9: 0
SUM OF ALL RESPONSES TO PHQ9 QUESTIONS 1 & 2: 0
6. FEELING BAD ABOUT YOURSELF - OR THAT YOU ARE A FAILURE OR HAVE LET YOURSELF OR YOUR FAMILY DOWN: 0
2. FEELING DOWN, DEPRESSED OR HOPELESS: 0
7. TROUBLE CONCENTRATING ON THINGS, SUCH AS READING THE NEWSPAPER OR WATCHING TELEVISION: 0

## 2023-03-08 NOTE — PROGRESS NOTES
Subjective: Jd Moore is a 80 y.o. female who presents for follow up of depression. Current symptoms include depressed mood. Symptoms have been controlled since that time. Patient denies recurrent thoughts of death. Previous treatment includes:  Lexapro 20 mg daily . She complains of the following side effects from the treatment: none. Patient with GERD symptoms. Patient currently on Protonix 40 mg daily with good control. She does have occasional nausea for which she takes Zofran. Patient with a history of CVA January 22. She has occasional symptoms similar to what she had prior to her stroke but they do not last very long. She has not followed up with a neurologist since she was hospitalized. While hospitalized she was diagnosed with Takotsubo cardiomyopathy. She does follow with cardiology regularly. Patient does have chronic allergic rhinitis for which she takes Singulair with good control. Muscle spasms in legs at night. Has used Robaxin in the past with good results. Patient's currently on lisinopril 5 mg daily through cardiology for hypertension and Takotsubo cardiomyopathy. Patient states for the last several weeks, she has been feeling unsteady and lightheaded. She has also had intermittent chest discomfort that occurs in chest and both arms. She has been feeling very weak recently as well. No falls    Patient's medications, allergies, past medical, surgical, social and family histories were reviewed and updated as appropriate. Review of Systems  ROS:  Energy level fair overall, and weight is stable. No chest pain or shortness of breath. Bowels have been normal without constipation or diarrhea. No shakes or tremors. Objective:      BP (!) 98/56 (Site: Left Upper Arm, Position: Sitting, Cuff Size: Medium Adult)   Temp 97.3 °F (36.3 °C) (Infrared)   Wt 116 lb 6.4 oz (52.8 kg)   BMI 22.73 kg/m²    Not orthostatic.   General:  alert, appears stated age and cooperative   Neck: Thyroid not palpable, not enlarged, no nodules detected. Chest: clear with no wheezes or rales. No retractions, or use of accessory muscles noted. Cardiovascular: PMI is not displaced, and no thrill noted. Regular rate and rhythm with no rub, murmur or gallop. No peripheral edema. Pedal pulses are normal.    Affect & Behavior:  full facial expressions, good grooming, good insight, normal perception, normal reasoning, normal speech pattern and content and normal thought patterns        Assessment:      Diagnosis Orders   1. Current mild episode of major depressive disorder without prior episode (HCC)  escitalopram (LEXAPRO) 20 MG tablet   Controlled   2. Gastroesophageal reflux disease without esophagitis  pantoprazole (PROTONIX) 40 MG tablet   Controlled   3. Nausea  ondansetron (ZOFRAN) 4 MG tablet   Controlled   4. History of CVA (cerebrovascular accident)  atorvastatin (LIPITOR) 40 MG tablet   Currently asymptomatic   5. Non-seasonal allergic rhinitis, unspecified trigger  montelukast (SINGULAIR) 10 MG tablet   Controlled   6. Primary hypertension  Comprehensive Metabolic Panel   Overcorrected   7. Lightheadedness  CBC   Unclear etiology   8. Chest pressure     Unclear etiology   9. Screening, anemia, deficiency, iron  CBC               Plan:   Due to new hypotension patient's cardiac history and her current symptoms, she was sent to the emergency room via private vehicle with her daughter. Patient agreed with plan. We will continue all medications for now at their current dose due to effectiveness. I would recommend holding lisinopril until cleared by cardiology     Recommended counseling. Follow up: 6 months. A total of 45 minutes was spent on this visit to include face-to-face time, reviewing records, and writing note.

## 2023-03-08 NOTE — ED NOTES
ED TO INPATIENT SBAR HANDOFF    Patient Name: Shyanne Reyes   :  1935  80 y.o. MRN:  [id number]  Preferred Name  [facility]  ED Room #:  ED14/ED-14  Family/Caregiver Present yes   Restraints no   Sitter no   Sepsis Risk Score Sepsis Risk Score: 1.18    Situation  Code Status: Prior No additional code details. Allergies: Morphine, Bactrim [sulfamethoxazole-trimethoprim], Influenza vaccines, Lactose intolerance (gi), Phenergan [promethazine hcl], Promethazine hcl, Stadol [butorphanol tartrate], and Tape [adhesive tape]  Weight: No data found. Arrived from: home  Chief Complaint:   Chief Complaint   Patient presents with    Other     States she has been \"discombobulated\" for almost 2 months. Doesn't have problems with memory but states \"things aren't clear\". Chest Pain     Ongoing appx 2 weeks, describes as mid sternal 3/10 discomfort. Imaging:   XR CHEST PORTABLE   Final Result   1. No active pulmonary disease. CT HEAD WO CONTRAST   Final Result   No acute intracranial abnormality.            Abnormal labs:   Abnormal Labs Reviewed   COMPREHENSIVE METABOLIC PANEL - Abnormal; Notable for the following components:       Result Value    Creatinine 0.5 (*)     Glucose 105 (*)     AST 40 (*)     All other components within normal limits   CBC WITH AUTO DIFFERENTIAL - Abnormal; Notable for the following components:    Hemoglobin 10.4 (*)     Hematocrit 35.2 (*)     MCH 24.6 (*)     MCHC 29.5 (*)     RDW 16.6 (*)     Segs Relative 29.3 (*)     Lymphocytes % 54.7 (*)     Monocytes % 10.3 (*)     Eosinophils % 4.7 (*)     All other components within normal limits     Critical values: no     Abnormal Assessment Findings: na    Background  History:   Past Medical History:   Diagnosis Date    Arthritis     generalized    Asthma     Blood transfusion     No reaction    Broken heart syndrome     Cerebral artery occlusion with cerebral infarction [facility])     Patient \"states she was told she has had three small strokes. \"    Chronic bronchitis ([facility]     COPD (chronic obstructive pulmonary disease) ([facility]     summer 2014    COPD exacerbation ([facility] 10/31/2018    COPD with exacerbation ([facility] 9/6/2019    Echocardiogram 04/09/2021    EF 55-60%, Mild dilated LA, Mild annular calcification present, Mild mitral stenosis, Mild MR & TR. Fatigue     H/O cardiac catheterization 06/15/2017    mild lad and cx disease    H/O cardiovascular stress test 08/22/2019    H/O Doppler ultrasound 4/7/2016 3/19/12    carotid-4/16 WNL 3/12right mild less than 50%and left wnl    H/O Doppler ultrasound 6/14/017    carotid - mod disease EILEEN, mild disease LICA    H/O echocardiogram 07/23/2010    H/O echocardiogram 04/15/2016    EF 60% sclerotic AV mildly stenosed-recommend yearly echo    H/O echocardiogram 08/22/2019    EF 55-60%, Minimal concentric left ventricular hypertrophy, left atrium is mildly dilated, severe aortic stenosis, Mitral annular calcification is present, Mild AR, Mild-Mod MR, Mild TR, No pericardial effusion     H/O echocardiogram 09/23/2019    H/O exercise stress test 06/14/2017    abnormal    History of cardiac cath 08/28/2019    Severe AS,   TAVR?? History of nuclear stress test 03/14/2017    EF 75%. Normal study.     Holter monitor, abnormal 02/22/2011    infrequent APC are seen    Hyperlipidemia     Hypertension     Normal cardiac stress test 07/23/2010    EF 70%, no ischemia    On home O2     only uses as needed, nightly prn    PONV (postoperative nausea and vomiting)     Syncope and collapse     Tachycardia     HX of tachycardia - had a cardioablation    Thyroid disease        Assessment    Vitals/MEWS: MEWS Score: 2  Level of Consciousness: Alert (0)   Vitals:    03/08/23 1156 03/08/23 1220   BP: (!) 174/34 (!) 169/56   Pulse: 50 50   Resp: 16 18   Temp: 98.1 °F (36.7 °C)    SpO2: 100% 97%     FiO2 (%):   O2 Flow Rate:      Cardiac Rhythm: sinus bradycardia  Pain Assessment:  [] Verbal [] [place] Ronny Scale  Pain Scale:    Last documented pain score (0-10 scale)    Last documented pain medication administered: na  Mental Status: alert  NIH Score: NIH     C-SSRS: Risk of Suicide: No Risk  Bedside swallow:    Shantelle Coma Scale (GCS): Active LDA's:    PO Status: Regular  Pertinent or High Risk Medications/Drips: no   If Yes, please provide details:   Pending Blood Product Administration: no     You may also review the ED PT Care Timeline found under the Summary Nursing Index tab. Recommendation    Pending orders   Plan for Discharge (if known): Additional Comments: daughter present with pt.    If any further questions, please call Sending RN at [phone]    Electronically signed by: Electronically signed by Edelmira Hook RN on 3/8/2023 at 3:34 PM      Edelmira Hook RN  03/08/23 [id number]

## 2023-03-08 NOTE — PROGRESS NOTES
Patient here from ED. Patient settled in bed with monitors attached. First set of vitals being obtained and charted at this time.

## 2023-03-08 NOTE — H&P
V2.0  History and Physical      Name:  Gokul Smith /Age/Sex: 1935  (80 y.o. female)   MRN & CSN:  [id number] & [id number] Encounter Date/Time: 3/8/2023 3:18 PM EST   Location:  ED14/ED-14 PCP: Lilian Sanchez MD       Hospital Day: 1    Assessment and Plan:   Gokul Smith is a 80 y.o. female with a past medical history of asthma, COPD, CVA, hypertension, HLD who presents with chest pain. Chest Pain, r/o ACS  - presented with intermittent substernal chest discomfort with bilateral arm pain x2 weeks  - EKG with sinus bradycardia, HR 49, no acute ST changes   - trop T neg x1  - CXR with no acute process  - [facility] 2021 with normal coronaries, possible Takutsoba syndome  - given full dose ASA in ED, chest pain-free on admission  - continue home ASA, statin   - check Hgb A1c, lipid panel, echo  - continue PRN nitro/morphine/nitro gtt  - trend trop, monitor on tele   - cardiology consulted, appreciate recs      Uncontrolled hypertension   - possibly secondary to anxiety - per PCP note, patient was hypotensive this AM   - continue low dose lisinopril for now    Memory impairment   - reports ~1 month of mental fogginess and progressive issues with memory   -No focal deficits  -CT head with no acute process  -Check TSH, B12/folate, vitamin D  -Recommend outpatient follow-up for formal memory evaluation    History of CVA   - continue ASA, statin    Hypothyroidism   - check TSH   - continue synthroid    COPD   - no signs or symptoms of acute exacerbation   - continue PRN albuterol     Chronic anemia   - Hgb 10.4, stable     Chronic pain  -Has medical marijuana card    This patient was seen and examined in conjunction with Dr. Satya Sanchez. He was agreeable with the assessment and plan as dictated above.        Disposition:   Current Living situation: home  Expected Disposition: same  Estimated D/C: likely 3/9    Diet No diet orders on file   DVT Prophylaxis [x] Lovenox, []  Heparin, [] SCDs, [] Ambulation,  [] Nick, [] Xarelto   Code Status Prior   Surrogate Decision Maker/ POA      History from:     patient      History of Present Illness:     Chief Complaint:   Shyanne Reyes is a 80 y.o. female who presents with chest pain. Patient presented to the emergency department with complaints of 1 to 2 weeks of chest discomfort. Patient states over the last 1 to 2 weeks she has had intermittent chest discomfort on the left side of her chest.  Occasionally she has pain down her bilateral arms that is not always associated with the chest discomfort. She denies any aggravating factors including exertion. She takes cannabis that is prescribed to her for pain which does help. Initially when she presented to the emergency department she was still experiencing discomfort, however on my evaluation her discomfort has resolved. He also admits that over the past several weeks to months she has had mental fogginess and feeling \"discombobulated\". Daughter confirms that she has had some issues with her memory. She has never had a formal dementia evaluation. She denies fever, chills, vomiting, diarrhea, dysuria, numbness/tingling. Review of Systems:   Ten point ROS reviewed and negative, unless otherwise noted above    Objective:   No intake or output data in the 24 hours ending 03/08/23 1518   Vitals:   Vitals:    03/08/23 1156 03/08/23 1220   BP: (!) 174/34 (!) 169/56   Pulse: 50 50   Resp: 16 18   Temp: 98.1 °F (36.7 °C)    SpO2: 100% 97%       Medications Prior to Admission     Prior to Admission medications    Medication Sig Start Date End Date Taking?  Authorizing Provider   ondansetron (ZOFRAN) 4 MG tablet Take 1 tablet by mouth every 8 hours as needed for Nausea or Vomiting 3/8/23   Nikkie Mercado MD   pantoprazole (PROTONIX) 40 MG tablet TAKE 1 TABLET NIGHTLY 3/8/23   Nikkie Mercado MD   escitalopram (LEXAPRO) 20 MG tablet Take 1 tablet by mouth daily 3/8/23   Nikkie Mercado MD   atorvastatin (LIPITOR) 40 MG tablet Take 1 tablet by mouth nightly 3/8/23   Tammi Rees MD   montelukast (SINGULAIR) 10 MG tablet TAKE 1 TABLET PER NG TUBE NIGHTLY 3/8/23   Tammi Rees MD   methocarbamol (ROBAXIN) 500 MG tablet Take 1 tablet by mouth every evening 3/8/23   Tammi Rees MD   albuterol sulfate HFA (PROVENTIL;VENTOLIN;PROAIR) 108 (90 Base) MCG/ACT inhaler Inhale 2 puffs into the lungs every 6 hours as needed for Wheezing 1/11/23   Tammi Rees MD   sucralfate (CARAFATE) 1 GM tablet Take 1 tablet by mouth 4 times daily as needed (nausea) 1/11/23   Tammi Rees MD   levothyroxine (SYNTHROID) 100 MCG tablet Take 1 tablet by mouth Daily 1/6/23   Levi Gomes MD   lisinopril (PRINIVIL;ZESTRIL) 5 MG tablet Take 1 tablet by mouth daily 5/17/22   DERIK Yun - [facility]   Handicap Placard MISC by Does not apply route Please provide handicap placard for 5 years. Diagnosis: Orthopedic condition 2/14/22   Tammi Rees MD   aspirin [facility] ASPIRIN ADULT LOW DOSE) 81 MG EC tablet Take 1 tablet by mouth daily 12/2/21   MELISSA Gotti MD   ipratropium-albuterol (DUONEB) 0.5-2.5 (3) MG/3ML SOLN nebulizer solution Inhale 3 mLs into the lungs every 4 hours (while awake) 8/6/21   Tammi Rees MD   Timolol (TIMOPTIC) 0.5 % (DAILY) SOLN ophthalmic solution Place 1 drop into both eyes daily    Historical Provider, MD   vitamin B-12 (CYANOCOBALAMIN) 1000 MCG tablet Take 1,000 mcg by mouth daily OTC    Historical Provider, MD   Calcium-Magnesium-Zinc 318-79-1 MG TABS Take 1 tablet by mouth daily OTC    Historical Provider, MD   Cholecalciferol (VITAMIN D3) 2000 UNITS CAPS Take 1 capsule by mouth daily OTC    Historical Provider, MD       Physical Exam:   GEN Awake female, sitting upright in bed in no apparent distress. Appears given age. EYES   No scleral erythema, discharge, or conjunctivitis. HENT Mucous membranes are moist.  NECK Supple  RESP Clear to auscultation bilaterally, no wheezes, rales or rhonchi.   Respirations even and unlabored on RA. CARDIO/VASC   S1/S2 auscultated. Regular rate without appreciable murmurs. GI Abdomen is soft without significant tenderness, masses, or guarding.   [place] catheter is not present. MSK No gross joint deformities. SKIN Normal coloration, warm, dry. NEURO Cranial nerves appear grossly intact, normal speech, no lateralizing weakness. PSYCH Awake, alert, oriented x 4. Affect appropriate. Past Medical History:   PMHx   Past Medical History:   Diagnosis Date    Arthritis     generalized    Asthma     Blood transfusion 2004    No reaction    Broken heart syndrome     Cerebral artery occlusion with cerebral infarction [facility])     Patient \"states she was told she has had three small strokes. \"    Chronic bronchitis ([facility]     COPD (chronic obstructive pulmonary disease) ([facility]     summer 2014    COPD exacerbation ([facility] 10/31/2018    COPD with exacerbation ([facility] 9/6/2019    Echocardiogram 04/09/2021    EF 55-60%, Mild dilated LA, Mild annular calcification present, Mild mitral stenosis, Mild MR & TR. Fatigue     H/O cardiac catheterization 06/15/2017    mild lad and cx disease    H/O cardiovascular stress test 08/22/2019    H/O Doppler ultrasound 4/7/2016 3/19/12    carotid-4/16 WNL 3/12right mild less than 50%and left wnl    H/O Doppler ultrasound 6/14/017    carotid - mod disease EILEEN, mild disease LICA    H/O echocardiogram 07/23/2010    H/O echocardiogram 04/15/2016    EF 60% sclerotic AV mildly stenosed-recommend yearly echo    H/O echocardiogram 08/22/2019    EF 55-60%, Minimal concentric left ventricular hypertrophy, left atrium is mildly dilated, severe aortic stenosis, Mitral annular calcification is present, Mild AR, Mild-Mod MR, Mild TR, No pericardial effusion     H/O echocardiogram 09/23/2019    H/O exercise stress test 06/14/2017    abnormal    History of cardiac cath 08/28/2019    Severe AS,   TAVR?? History of nuclear stress test 03/14/2017    EF 75%. Normal study. Holter monitor, abnormal 02/22/2011    infrequent APC are seen    Hyperlipidemia     Hypertension     Normal cardiac stress test 07/23/2010    EF 70%, no ischemia    On home O2     only uses as needed, nightly prn    PONV (postoperative nausea and vomiting)     Syncope and collapse     Tachycardia     HX of tachycardia - had a cardioablation    Thyroid disease      PSHX:  has a past surgical history that includes joint replacement (2004); Hysterectomy (1966); Cholecystectomy (1961); Toe Surgery (Early 2000's); Carpal tunnel release (1990's); Finger surgery; Gastric restriction surgery (1984); hernia repair (unknown); lipectomy (1990's); Dilatation, esophagus; other surgical history (05/01/2012); Hip fracture surgery (Left, 11/04/2015); Colonoscopy; Endoscopy, colon, diagnostic (07/03/2017); Total shoulder arthroplasty (Bilateral, 10/2017); Aortic valve replacement (N/A, 09/11/2019); Aortic valve repair (N/A, 09/09/2019); Breast surgery (2009); Cardiac surgery (1989); Cardiac catheterization (03/02/2011); Kathy-en-Y Gastric Bypass; Tonsillectomy; eye surgery (Bilateral); and Upper gastrointestinal endoscopy (N/A, 3/30/2022). Allergies: Allergies   Allergen Reactions    Morphine Anaphylaxis     Dilaudid OK    Bactrim [Sulfamethoxazole-Trimethoprim]     Influenza Vaccines      Ended in hospital stay for 3 days told by md not to get it anymore     Lactose Intolerance (Gi) Nausea Only    Phenergan [Promethazine Hcl] Other (See Comments)     Makes me jerk all over. Zofran OK    Promethazine Hcl Other (See Comments)    Stadol [Butorphanol Tartrate] Other (See Comments)     Out of body experience. Was told it was a near death experience and was placed in ICU. Dilaudid OK    Tape Romaine Ana M Tape] Other (See Comments)     Plastic cause itching and rash. Paper tape causes my skin to blister.  (Tega-derm and Coban OK to use.)     Fam HX:  family history includes Arthritis in her father; Cancer in her sister; Diabetes in her brother and mother; Early Death in her son; Heart Disease in her father; High Blood Pressure in her daughter and father; High Cholesterol in her father; Other in her father, mother, sister, sister, son, and son; Stroke in her son; Vision Loss in her sister. Soc HX:   Social History     Socioeconomic History    Marital status:      Spouse name: None    Number of children: None    Years of education: None    Highest education level: None   Tobacco Use    Smoking status: Former     Packs/day: 3.00     Years: 5.00     Pack years: 15.00     Types: Cigarettes     Quit date: 1962     Years since quittin.2    Smokeless tobacco: Never   Vaping Use    Vaping Use: Never used   Substance and Sexual Activity    Alcohol use: No     Comment: caffeine: 3 diet coke a day    Drug use: Yes     Types: Marijuana [facility])     Comment: Takes for pain. Has medical card     Social Determinants of Health     Physical Activity: Sufficiently Active    Days of Exercise per Week: 7 days    Minutes of Exercise per Session: 30 min   Stress: No Stress Concern Present    Feeling of Stress :  Only a little       Medications:   Medications:    aspirin  324 mg Oral Once      Infusions:   PRN Meds:      Labs    CBC:   Recent Labs     23  1241   WBC 5.2   HGB 10.4*        BMP:    Recent Labs     23  1241      K 5.0      CO2 25   BUN 9   CREATININE 0.5*   GLUCOSE 105*     Hepatic:   Recent Labs     23  1241   AST 40*   ALT 31   BILITOT 0.6   ALKPHOS 59     Lipids:   Lab Results   Component Value Date/Time    CHOL 114 2022 01:19 PM    CHOL 164 2016 12:00 AM    HDL 71 2022 01:19 PM    TRIG 77 2022 01:19 PM     Hemoglobin A1C:   Lab Results   Component Value Date/Time    LABA1C 5.3 2019 11:35 AM     TSH:   Lab Results   Component Value Date/Time    TSH 2.710 2021 12:00 PM     Troponin:   Lab Results   Component Value Date/Time    TROPONINT <0.010 2023 12:41 PM TROPONINT <0.010 01/03/2023 07:26 PM    TROPONINT 0.083 11/26/2021 04:08 AM     Lactic Acid: No results for input(s): LACTA in the last 72 hours. BNP: No results for input(s): PROBNP in the last 72 hours. UA:  Lab Results   Component Value Date/Time    NITRU NEGATIVE 01/03/2023 08:22 PM    NITRU NEGATIVE 11/13/2012 09:10 AM    COLORU YELLOW 01/03/2023 08:22 PM    PHUR 5.5 04/03/2019 10:18 AM    WBCUA 2 11/14/2021 02:30 PM    RBCUA 2 11/14/2021 02:30 PM    MUCUS RARE 11/14/2021 02:30 PM    TRICHOMONAS NONE SEEN 11/14/2021 02:30 PM    BACTERIA NEGATIVE 11/14/2021 02:30 PM    CLARITYU CLEAR 01/03/2023 08:22 PM    SPECGRAV 1.010 01/03/2023 08:22 PM    LEUKOCYTESUR NEGATIVE 01/03/2023 08:22 PM    UROBILINOGEN 0.2 01/03/2023 08:22 PM    BILIRUBINUR NEGATIVE 01/03/2023 08:22 PM    BILIRUBINUR neg 04/03/2019 10:18 AM    BLOODU NEGATIVE 01/03/2023 08:22 PM    GLUCOSEU neg 04/03/2019 10:18 AM    KETUA NEGATIVE 01/03/2023 08:22 PM     Urine Cultures: No results found for: LABURIN  Blood Cultures: No results found for: BC  No results found for: BLOODCULT2  Organism: No results found for: ORG    Imaging/Diagnostics Last 24 Hours   CT HEAD WO CONTRAST    Result Date: 3/8/2023  EXAMINATION: CT OF THE HEAD WITHOUT CONTRAST  3/8/2023 1:20 pm TECHNIQUE: CT of the head was performed without the administration of intravenous contrast. Automated exposure control, iterative reconstruction, and/or weight based adjustment of the mA/kV was utilized to reduce the radiation dose to as low as reasonably achievable. COMPARISON: None. HISTORY: ORDERING SYSTEM PROVIDED HISTORY: feels disoriented TECHNOLOGIST PROVIDED HISTORY: Reason for exam:->feels disoriented Has a \"code stroke\" or \"stroke alert\" been called? ->No Decision Support Exception - unselect if not a suspected or confirmed emergency medical condition->Emergency Medical Condition (MA) Reason for Exam: feels disoriented FINDINGS: BRAIN/VENTRICLES: There is no acute intracranial hemorrhage, mass effect or midline shift. No abnormal extra-axial fluid collection. The gray-white differentiation is maintained without evidence of an acute infarct. There is no evidence of hydrocephalus. ORBITS: The visualized portion of the orbits demonstrate no acute abnormality. SINUSES: The visualized paranasal sinuses and mastoid air cells demonstrate no acute abnormality. SOFT TISSUES/SKULL:  No acute abnormality of the visualized skull or soft tissues. No acute intracranial abnormality. XR CHEST PORTABLE    Result Date: 3/8/2023  EXAMINATION: ONE XRAY VIEW OF THE CHEST 3/8/2023 12:27 pm COMPARISON: 01/03/2023. HISTORY: ORDERING SYSTEM PROVIDED HISTORY: cp TECHNOLOGIST PROVIDED HISTORY: Reason for exam:->cp Reason for Exam: cp FINDINGS: The heart size is within normal limits. The pulmonary vasculature is also within normal limits. No acute infiltrates are seen. No pneumothoraces are noted. 1. No active pulmonary disease. I discussed this patient with the ED provider and Dr. Tiffanie Pascal. I did a review of patient's medical records, lab results and imaging conducted today. I personally reviewed patient's vital signs including pulse ox and EKG.      Electronically signed by Willis Tejeda PA-C on 3/8/2023 at 3:18 PM

## 2023-03-08 NOTE — ED PROVIDER NOTES
Emergency Department Encounter    Patient: Raji Puckett  MRN: [id number]  : 1935  Date of Evaluation: 3/8/2023  ED Provider:  Tamra Byrd DO    Triage Chief Complaint:   Other (States she has been \"discombobulated\" for almost 2 months. Doesn't have problems with memory but states \"things aren't clear\". ) and Chest Pain (Ongoing appx 2 weeks, describes as mid sternal 3/10 discomfort. )    [place]:  Rjai Puckett is a 80 y.o. female with history of thyroid disease arthritis chronic bronchitis COPD on home oxygen at night, asthma, CVA, respiratory failure, depression, bradycardia hyperlipidemia generalized weakness acid reflux status post aortic valve replacement, hypertension that presents to the emergency department from her primary care physician's office for chest pain. Patient states she has been having chest pain the last couple weeks. She states just across her chest.  Patient states she has been taking marijuana for the chest pain. She states it does help with the pain. Patient states no history of heart stents, positive heart attack. She states she has had aortic valve replacement and she is on a baby aspirin daily. She states last couple days the pain has been going down to bilateral shoulders and arms from her chest and getting worse. States no falls injuries or trauma to the chest.  She states her chest just feels uncomfortable. Patient states that the doctor office the pain was 3 out of 10 on the pain scale. Patient states she not having any chest pain at this time. Patient states while at the [address] [place] [address] office today she her blood pressure was low and she was sent to the emergency department for evaluation along with the chest pain. Patient states she is also not been feeling right in the head for the past month she states she states just feels discombobulated unable to concentrate or focus. She states no falls injuries or head trauma.   She states she did have a stroke and a heart attack 2021. Patient states she has been short of breath lately last couple days as well having to use her oxygen more than usual.  She states she does not usually use it every night but she has had to use it twice this week. States short of breath with exertion. She states nausea no vomiting diarrhea denies any abdominal pain fever chills. Patient that she has had a cough and runny nose no sore throat. No history of cancer. States she does have hypertension but with the low blood pressure she was told to stop her lisinopril today when she left the doctor's office. She states she does have thyroid disease acid reflux on levothyroxine and Protonix. Patient here with daughter for evaluation. ROS - see HPI, below listed is current ROS at time of my eval:  General:  No fevers, no chills, no weakness  Eyes:  No recent vison changes, no discharge  ENT:  No sore throat, no nasal congestion, no hearing changes  Cardiovascular: Positive for chest pain, no palpitations  Respiratory: Positive for shortness of breath, positive for cough, no wheezing  Gastrointestinal:  No pain, positive for nausea, no vomiting, no diarrhea  Musculoskeletal:  No muscle pain, no joint pain  Skin:  No rash, no pruritis, no easy bruising  Neurologic:  No speech problems, no headache, no extremity numbness, no extremity tingling, no extremity weakness  Psychiatric:  No anxiety  Genitourinary:  No dysuria, no hematuria  Endocrine:  No unexpected weight gain, no unexpected weight loss  Extremities:  no edema, no pain    Past Medical History:   Diagnosis Date    Arthritis     generalized    Asthma     Blood transfusion 2004    No reaction    Broken heart syndrome     Cerebral artery occlusion with cerebral infarction [facility])     Patient \"states she was told she has had three small strokes. \"    Chronic bronchitis ([address]     COPD (chronic obstructive pulmonary disease) ([facility]     summer 2014    COPD exacerbation ([facility] 10/31/2018    COPD with exacerbation ([address] 9/6/2019    Echocardiogram 04/09/2021    EF 55-60%, Mild dilated LA, Mild annular calcification present, Mild mitral stenosis, Mild MR & TR. Fatigue     H/O cardiac catheterization 06/15/2017    mild lad and cx disease    H/O cardiovascular stress test 08/22/2019    H/O Doppler ultrasound 4/7/2016 3/19/12    carotid-4/16 WNL 3/12right mild less than 50%and left wnl    H/O Doppler ultrasound 6/14/017    carotid - mod disease EILEEN, mild disease LICA    H/O echocardiogram 07/23/2010    H/O echocardiogram 04/15/2016    EF 60% sclerotic AV mildly stenosed-recommend yearly echo    H/O echocardiogram 08/22/2019    EF 55-60%, Minimal concentric left ventricular hypertrophy, left atrium is mildly dilated, severe aortic stenosis, Mitral annular calcification is present, Mild AR, Mild-Mod MR, Mild TR, No pericardial effusion     H/O echocardiogram 09/23/2019    H/O exercise stress test 06/14/2017    abnormal    History of cardiac cath 08/28/2019    Severe AS,   TAVR?? History of nuclear stress test 03/14/2017    EF 75%. Normal study.     Holter monitor, abnormal 02/22/2011    infrequent APC are seen    Hyperlipidemia     Hypertension     Normal cardiac stress test 07/23/2010    EF 70%, no ischemia    On home O2     only uses as needed, nightly prn    PONV (postoperative nausea and vomiting)     Syncope and collapse     Tachycardia     HX of tachycardia - had a cardioablation    Thyroid disease      Past Surgical History:   Procedure Laterality Date    AORTIC VALVE REPAIR N/A 09/09/2019    Core Valve EVOLUT 26mm [id number]    AORTIC VALVE REPLACEMENT N/A 09/11/2019    TAVR; [employer] Evlout 26    BREAST SURGERY  2009    reduction    CARDIAC CATHETERIZATION  03/02/2011    mild to moderate disease of diagonal and proximal RCA    CARDIAC SURGERY  1989    ablation    CARPAL TUNNEL RELEASE  1990's    [address]    open choley    COLONOSCOPY      DILATATION, ESOPHAGUS      ENDOSCOPY, COLON, DIAGNOSTIC  2017    s/p gastric bypass otherwise normal    EYE SURGERY Bilateral     cataract    FINGER SURGERY      right middle, thumb and index finger (screw and pin in right index finger)    GASTRIC RESTRICTION SURGERY  1984    stapled    HERNIA REPAIR  unknown    Inc hernia hernia repair    HIP FRACTURE SURGERY Left 2015    Left hip nail    HYSTERECTOMY (CERVIX STATUS UNKNOWN)  1966    Luke w/ BSO    JOINT REPLACEMENT  2004    right knee    LIPECTOMY  's    OTHER SURGICAL HISTORY  2012    Gastrojejunostomy/partial gastrectomy    KALIA-EN-Y GASTRIC BYPASS      SHOULDER ARTHROPLASTY Bilateral 10/2017    TOE SURGERY  Early     hammer toes and bunions    TONSILLECTOMY      UPPER GASTROINTESTINAL ENDOSCOPY N/A 3/30/2022    EGD DIAGNOSTIC ONLY performed by Heriberto Soni MD at [facility] ENDOSCOPY     Family History   Problem Relation Age of Onset    Diabetes Mother     Other Mother         thyroid    Heart Disease Father     Arthritis Father     High Blood Pressure Father     High Cholesterol Father     Other Father         glaucoma    Other Sister         thyroid, glaucoma    Diabetes Brother     Other Sister     Vision Loss Sister         lung problems, smoker    Cancer Sister         throat cancer    Other Son         HX of clots    Early Death Son         Hit by car and killed. High Blood Pressure Daughter     Stroke Son         No residual    Other Son         HX myocarditis     Social History     Socioeconomic History    Marital status:       Spouse name: Not on file    Number of children: Not on file    Years of education: Not on file    Highest education level: Not on file   Occupational History    Not on file   Tobacco Use    Smoking status: Former     Packs/day: 3.00     Years: 5.00     Pack years: 15.00     Types: Cigarettes     Quit date: 1962     Years since quittin.2    Smokeless tobacco: Never   Vaping Use    Vaping Use: Never used   Substance and Sexual Activity Alcohol use: No     Comment: caffeine: 3 diet coke a day    Drug use: Yes     Types: Marijuana Stephanie Kos)     Comment: Takes for pain. Has medical card    Sexual activity: Not on file   Other Topics Concern    Not on file   Social History Narrative    Not on file     Social Determinants of Health     Financial Resource Strain: Not on file   Food Insecurity: Not on file   Transportation Needs: Not on file   Physical Activity: Sufficiently Active    Days of Exercise per Week: 7 days    Minutes of Exercise per Session: 30 min   Stress: No Stress Concern Present    Feeling of Stress : Only a little   Social Connections: Not on file   Intimate Partner Violence: Not on file   Housing Stability: Not on file     No current facility-administered medications for this encounter. Current Outpatient Medications   Medication Sig Dispense Refill    ondansetron (ZOFRAN) 4 MG tablet Take 1 tablet by mouth every 8 hours as needed for Nausea or Vomiting 90 tablet 1    pantoprazole (PROTONIX) 40 MG tablet TAKE 1 TABLET NIGHTLY 90 tablet 1    escitalopram (LEXAPRO) 20 MG tablet Take 1 tablet by mouth daily 90 tablet 1    atorvastatin (LIPITOR) 40 MG tablet Take 1 tablet by mouth nightly 90 tablet 1    montelukast (SINGULAIR) 10 MG tablet TAKE 1 TABLET PER NG TUBE NIGHTLY 90 tablet 1    methocarbamol (ROBAXIN) 500 MG tablet Take 1 tablet by mouth every evening 30 tablet 5    albuterol sulfate HFA (PROVENTIL;VENTOLIN;PROAIR) 108 (90 Base) MCG/ACT inhaler Inhale 2 puffs into the lungs every 6 hours as needed for Wheezing 1 each 5    sucralfate (CARAFATE) 1 GM tablet Take 1 tablet by mouth 4 times daily as needed (nausea) 120 tablet 5    levothyroxine (SYNTHROID) 100 MCG tablet Take 1 tablet by mouth Daily 30 tablet 3    lisinopril (PRINIVIL;ZESTRIL) 5 MG tablet Take 1 tablet by mouth daily 90 tablet 3    Handicap Placard MISC by Does not apply route Please provide handicap placard for 5 years.     Diagnosis: Orthopedic condition 1 each 0 aspirin (EQ ASPIRIN ADULT LOW DOSE) 81 MG EC tablet Take 1 tablet by mouth daily 30 tablet 0    ipratropium-albuterol (DUONEB) 0.5-2.5 (3) MG/3ML SOLN nebulizer solution Inhale 3 mLs into the lungs every 4 hours (while awake) 360 mL 1    Timolol (TIMOPTIC) 0.5 % (DAILY) SOLN ophthalmic solution Place 1 drop into both eyes daily      vitamin B-12 (CYANOCOBALAMIN) 1000 MCG tablet Take 1,000 mcg by mouth daily OTC      Calcium-Magnesium-Zinc 167-83-8 MG TABS Take 1 tablet by mouth daily OTC      Cholecalciferol (VITAMIN D3) 2000 UNITS CAPS Take 1 capsule by mouth daily OTC       Allergies   Allergen Reactions    Morphine Anaphylaxis     Dilaudid OK    Bactrim [Sulfamethoxazole-Trimethoprim]     Influenza Vaccines      Ended in hospital stay for 3 days told by md not to get it anymore     Lactose Intolerance (Gi) Nausea Only    Phenergan [Promethazine Hcl] Other (See Comments)     Makes me jerk all over. Zofran OK    Promethazine Hcl Other (See Comments)    Stadol [Butorphanol Tartrate] Other (See Comments)     Out of body experience. Was told it was a near death experience and was placed in ICU. Dilaudid OK    Tape Veronika [place] Tape] Other (See Comments)     Plastic cause itching and rash. Paper tape causes my skin to blister. (Tega-derm and Coban OK to use.)       Nursing Notes Reviewed    Physical Exam:  Triage VS:    ED Triage Vitals [03/08/23 1156]   Enc Vitals Group      BP (!) 174/34      Heart Rate 50      Resp 16      Temp 98.1 °F (36.7 °C)      Temp src       SpO2 100 %      Weight       Height       Head Circumference       Peak Flow       Pain Score       Pain Loc       Pain Edu? Excl. in [address]? BP (!) 169/56   Pulse 50   Temp 98.1 °F (36.7 °C)   Resp 18   SpO2 97%       My pulse ox interpretation is - normal    General appearance:  No acute distress. Skin:  Warm. Dry. Eye:  Extraocular movements intact. Ears, nose, mouth and throat:  Oral mucosa moist   Neck:  Trachea midline. Extremity:  No swelling. Normal ROM     Heart:  Regular rate and rhythm, normal S1 & S2, no extra heart sounds. Perfusion:  intact  Respiratory:  Lungs clear to auscultation bilaterally. Respirations nonlabored. Abdominal:  Normal bowel sounds. Soft. Nontender. Non distended. Back:  No CVA tenderness to palpation     Neurological:  Alert and oriented times 3. No focal neuro deficits.    No nystagmus, no facial droop, no tongue deviation, normal finger-nose test, normal rapid alternating movement, 5 out of 5  strength  , Good strength against resistance of bilateral lower extremities          Psychiatric:  Appropriate    I have reviewed and interpreted all of the currently available lab results from this visit (if applicable):  Results for orders placed or performed during the hospital encounter of 03/08/23   Comprehensive Metabolic Panel   Result Value Ref Range    Sodium 136 135 - 145 MMOL/L    Potassium 5.0 3.5 - 5.1 MMOL/L    Chloride 100 99 - 110 mMol/L    CO2 25 21 - 32 MMOL/L    BUN 9 6 - 23 MG/DL    Creatinine 0.5 (L) 0.6 - 1.1 MG/DL    Est, Glom Filt Rate >60 >60 mL/min/1.73m2    Glucose 105 (H) 70 - 99 MG/DL    Calcium 9.3 8.3 - 10.6 MG/DL    Albumin 4.5 3.4 - 5.0 GM/DL    Total Protein 7.4 6.4 - 8.2 GM/DL    Total Bilirubin 0.6 0.0 - 1.0 MG/DL    ALT 31 10 - 40 U/L    AST 40 (H) 15 - 37 IU/L    Alkaline Phosphatase 59 40 - 129 IU/L    Anion Gap 11 4 - 16   CBC with Auto Differential   Result Value Ref Range    WBC 5.2 4.0 - 10.5 K/CU MM    RBC 4.23 4.2 - 5.4 M/CU MM    Hemoglobin 10.4 (L) 12.5 - 16.0 GM/DL    Hematocrit 35.2 (L) 37 - 47 %    MCV 83.2 78 - 100 FL    MCH 24.6 (L) 27 - 31 PG    MCHC 29.5 (L) 32.0 - 36.0 %    RDW 16.6 (H) 11.7 - 14.9 %    Platelets 171 542 - 138 K/CU MM    MPV 9.9 7.5 - 11.1 FL    Differential Type AUTOMATED DIFFERENTIAL     Segs Relative 29.3 (L) 36 - 66 %    Lymphocytes % 54.7 (H) 24 - 44 %    Monocytes % 10.3 (H) 0 - 4 %    Eosinophils % 4.7 (H) 0 - 3 % Basophils % 0.8 0 - 1 %    Segs Absolute 1.5 K/CU MM    Lymphocytes Absolute 2.8 K/CU MM    Monocytes Absolute 0.5 K/CU MM    Eosinophils Absolute 0.2 K/CU MM    Basophils Absolute 0.0 K/CU MM    Nucleated RBC % 0.0 %    Total Nucleated RBC 0.0 K/CU MM    Total Immature Neutrophil 0.01 K/CU MM    Immature Neutrophil % 0.2 0 - 0.43 %   Troponin   Result Value Ref Range    Troponin T <0.010 <0.01 NG/ML   Lipase   Result Value Ref Range    Lipase 30 13 - 60 IU/L   Magnesium   Result Value Ref Range    Magnesium 2.2 1.8 - 2.4 mg/dl   EKG 12 Lead   Result Value Ref Range    Ventricular Rate 49 BPM    Atrial Rate 49 BPM    P-R Interval 188 ms    QRS Duration 76 ms    Q-T Interval 488 ms    QTc Calculation (Bazett) 440 ms    P Axis 72 degrees    R Axis 52 degrees    T Axis 74 degrees    Diagnosis       Sinus bradycardia  Nonspecific ST and T wave abnormality  Abnormal ECG  When compared with ECG of 04-JAN-2023 06:25,  T wave amplitude has increased in Anterior leads        Radiographs (if obtained):  Radiologist's Report Reviewed:  CT HEAD WO CONTRAST    Result Date: 3/8/2023  EXAMINATION: CT OF THE HEAD WITHOUT CONTRAST  3/8/2023 1:20 pm TECHNIQUE: CT of the head was performed without the administration of intravenous contrast. Automated exposure control, iterative reconstruction, and/or weight based adjustment of the mA/kV was utilized to reduce the radiation dose to as low as reasonably achievable. COMPARISON: None. HISTORY: ORDERING SYSTEM PROVIDED HISTORY: feels disoriented TECHNOLOGIST PROVIDED HISTORY: Reason for exam:->feels disoriented Has a \"code stroke\" or \"stroke alert\" been called? ->No Decision Support Exception - unselect if not a suspected or confirmed emergency medical condition->Emergency Medical Condition (MA) Reason for Exam: feels disoriented FINDINGS: BRAIN/VENTRICLES: There is no acute intracranial hemorrhage, mass effect or midline shift. No abnormal extra-axial fluid collection.   The gray-white differentiation is maintained without evidence of an acute infarct. There is no evidence of hydrocephalus. ORBITS: The visualized portion of the orbits demonstrate no acute abnormality. SINUSES: The visualized paranasal sinuses and mastoid air cells demonstrate no acute abnormality. SOFT TISSUES/SKULL:  No acute abnormality of the visualized skull or soft tissues. No acute intracranial abnormality. XR CHEST PORTABLE    Result Date: 3/8/2023  EXAMINATION: ONE XRAY VIEW OF THE CHEST 3/8/2023 12:27 pm COMPARISON: 01/03/2023. HISTORY: ORDERING SYSTEM PROVIDED HISTORY: cp TECHNOLOGIST PROVIDED HISTORY: Reason for exam:->cp Reason for Exam: cp FINDINGS: The heart size is within normal limits. The pulmonary vasculature is also within normal limits. No acute infiltrates are seen. No pneumothoraces are noted. 1. No active pulmonary disease. EKG (if obtained): (All EKG's are interpreted by myself in the absence of a cardiologist)    Medications   aspirin chewable tablet 324 mg (has no administration in time range)         MDM:  Genevieve Charles is a 80 y.o. female with history of thyroid disease arthritis chronic bronchitis COPD on home oxygen at night, asthma, CVA, respiratory failure, depression, bradycardia hyperlipidemia generalized weakness acid reflux status post aortic valve replacement, hypertension that presents to the emergency department from her primary care physician's office for chest pain. Patient states she has been having chest pain the last couple weeks. She states just across her chest.  Patient states she has been taking marijuana for the chest pain. She states it does help with the pain. Patient states no history of heart stents, positive heart attack. She states she has had aortic valve replacement and she is on a baby aspirin daily. She states last couple days the pain has been going down to bilateral shoulders and arms from her chest and getting worse.   States no falls injuries or trauma to the chest.  She states her chest just feels uncomfortable. Patient states that the doctor office the pain was 3 out of 10 on the pain scale. Patient states she not having any chest pain at this time. Patient states while at the [address] [place] [address] office today she her blood pressure was low and she was sent to the emergency department for evaluation along with the chest pain. Patient states she is also not been feeling right in the head for the past month she states she states just feels discombobulated unable to concentrate or focus. She states no falls injuries or head trauma. She states she did have a stroke and a heart attack 2021. Patient states she has been short of breath lately last couple days as well having to use her oxygen more than usual.  She states she does not usually use it every night but she has had to use it twice this week. States short of breath with exertion. She states nausea no vomiting diarrhea denies any abdominal pain fever chills. Patient that she has had a cough and runny nose no sore throat. No history of cancer. States she does have hypertension but with the low blood pressure she was told to stop her lisinopril today when she left the doctor's office. She states she does have thyroid disease acid reflux on levothyroxine and Protonix. On physical exam patient neurologically intact no focal weakness or deficit on exam alert and oriented x3. Differential diagnosis includes CVA, MI, sepsis, UTI, pneumonia. Patient was ordered cardiac monitor. EKG was obtained which shows sinus bradycardia with nonspecific ST-T wave abnormality, ventricular rate of 49, RI interval 188, QRS duration 76, QT/QTc 488/440, no ST elevation per my interpretation. Patient with normal sodium potassium kidney function calcium. Patient glucose 105. Patient normal liver enzymes have elevated AST which is chronic. Patient normal white blood cell count and platelets.   Patient with anemia hemoglobin 10.4 chronic. Patient with negative troponin normal lipase. Patient normal magnesium. Chest x-ray no acute pulmonary disease. CT of the head no acute intracranial abnormality. Patient updated negative laboratory imaging study findings no acute abnormalities. Patient will need admitted for chest pain rule out. Did order aspirin 324 mg p.o. Hospitalist will be consulted for admission. Clinical Impression:  1. Chest pain, unspecified type    2. Altered mental status, unspecified altered mental status type          ED Provider Disposition Time  DISPOSITION  3:01 PM        Comment: Please note this report has been produced using speech recognition software and may contain errors related to that system including errors in grammar, punctuation, and spelling, as well as words and phrases that may be inappropriate. Efforts were made to edit the dictations.         Joellen Lindsay DO  03/08/23 [id number]

## 2023-03-09 ENCOUNTER — OFFICE VISIT (OUTPATIENT)
Dept: CARDIOLOGY CLINIC | Age: 88
End: 2023-03-09
Payer: MEDICARE

## 2023-03-09 VITALS
DIASTOLIC BLOOD PRESSURE: 60 MMHG | SYSTOLIC BLOOD PRESSURE: 118 MMHG | WEIGHT: 116 LBS | BODY MASS INDEX: 22.78 KG/M2 | HEIGHT: 60 IN | HEART RATE: 60 BPM

## 2023-03-09 VITALS
BODY MASS INDEX: 22.73 KG/M2 | HEART RATE: 52 BPM | RESPIRATION RATE: 14 BRPM | TEMPERATURE: 98.2 F | OXYGEN SATURATION: 96 % | SYSTOLIC BLOOD PRESSURE: 153 MMHG | DIASTOLIC BLOOD PRESSURE: 47 MMHG | HEIGHT: 60 IN

## 2023-03-09 DIAGNOSIS — I10 PRIMARY HYPERTENSION: ICD-10-CM

## 2023-03-09 DIAGNOSIS — I38 VHD (VALVULAR HEART DISEASE): Primary | ICD-10-CM

## 2023-03-09 DIAGNOSIS — R07.9 CHEST PAIN, UNSPECIFIED TYPE: ICD-10-CM

## 2023-03-09 LAB
CHOLEST SERPL-MCNC: 102 MG/DL
EKG ATRIAL RATE: 49 BPM
EKG DIAGNOSIS: NORMAL
EKG P AXIS: 72 DEGREES
EKG P-R INTERVAL: 188 MS
EKG Q-T INTERVAL: 488 MS
EKG QRS DURATION: 76 MS
EKG QTC CALCULATION (BAZETT): 440 MS
EKG R AXIS: 52 DEGREES
EKG T AXIS: 74 DEGREES
EKG VENTRICULAR RATE: 49 BPM
ESTIMATED AVERAGE GLUCOSE: 100 MG/DL
HBA1C MFR BLD: 5.1 % (ref 4.2–6.3)
HCT VFR BLD CALC: 28.1 % (ref 37–47)
HCT VFR BLD CALC: 30.8 % (ref 37–47)
HDLC SERPL-MCNC: 63 MG/DL
HEMOGLOBIN: 8.5 GM/DL (ref 12.5–16)
HEMOGLOBIN: 9.2 GM/DL (ref 12.5–16)
IRON: 23 UG/DL (ref 37–145)
LDLC SERPL CALC-MCNC: 30 MG/DL
MCH RBC QN AUTO: 24.6 PG (ref 27–31)
MCHC RBC AUTO-ENTMCNC: 30.2 % (ref 32–36)
MCV RBC AUTO: 81.4 FL (ref 78–100)
PCT TRANSFERRIN: 6 % (ref 10–44)
PDW BLD-RTO: 16.5 % (ref 11.7–14.9)
PLATELET # BLD: 300 K/CU MM (ref 140–440)
PMV BLD AUTO: 9.3 FL (ref 7.5–11.1)
RBC # BLD: 3.45 M/CU MM (ref 4.2–5.4)
T4 FREE SERPL-MCNC: 1.57 NG/DL (ref 0.9–1.8)
TOTAL IRON BINDING CAPACITY: 402 UG/DL (ref 250–450)
TRIGL SERPL-MCNC: 45 MG/DL
TROPONIN T: <0.01 NG/ML
TROPONIN T: <0.01 NG/ML
UNSATURATED IRON BINDING CAPACITY: 379 UG/DL (ref 110–370)
WBC # BLD: 5.5 K/CU MM (ref 4–10.5)

## 2023-03-09 PROCEDURE — 85018 HEMOGLOBIN: CPT

## 2023-03-09 PROCEDURE — 84439 ASSAY OF FREE THYROXINE: CPT

## 2023-03-09 PROCEDURE — G0378 HOSPITAL OBSERVATION PER HR: HCPCS

## 2023-03-09 PROCEDURE — 83036 HEMOGLOBIN GLYCOSYLATED A1C: CPT

## 2023-03-09 PROCEDURE — 94761 N-INVAS EAR/PLS OXIMETRY MLT: CPT

## 2023-03-09 PROCEDURE — 83540 ASSAY OF IRON: CPT

## 2023-03-09 PROCEDURE — 1123F ACP DISCUSS/DSCN MKR DOCD: CPT | Performed by: NURSE PRACTITIONER

## 2023-03-09 PROCEDURE — 2580000003 HC RX 258: Performed by: PHYSICIAN ASSISTANT

## 2023-03-09 PROCEDURE — 93005 ELECTROCARDIOGRAM TRACING: CPT | Performed by: PHYSICIAN ASSISTANT

## 2023-03-09 PROCEDURE — 85027 COMPLETE CBC AUTOMATED: CPT

## 2023-03-09 PROCEDURE — 36415 COLL VENOUS BLD VENIPUNCTURE: CPT

## 2023-03-09 PROCEDURE — 83550 IRON BINDING TEST: CPT

## 2023-03-09 PROCEDURE — 6370000000 HC RX 637 (ALT 250 FOR IP): Performed by: PHYSICIAN ASSISTANT

## 2023-03-09 PROCEDURE — 80061 LIPID PANEL: CPT

## 2023-03-09 PROCEDURE — 84484 ASSAY OF TROPONIN QUANT: CPT

## 2023-03-09 PROCEDURE — 99214 OFFICE O/P EST MOD 30 MIN: CPT | Performed by: NURSE PRACTITIONER

## 2023-03-09 PROCEDURE — 85014 HEMATOCRIT: CPT

## 2023-03-09 RX ORDER — LISINOPRIL 2.5 MG/1
2.5 TABLET ORAL DAILY
Qty: 30 TABLET | Refills: 4
Start: 2023-03-09

## 2023-03-09 RX ORDER — LEVOTHYROXINE SODIUM 0.07 MG/1
75 TABLET ORAL DAILY
Qty: 30 TABLET | Refills: 1 | Status: SHIPPED | OUTPATIENT
Start: 2023-03-09

## 2023-03-09 RX ORDER — FERROUS SULFATE 325(65) MG
325 TABLET ORAL
Status: DISCONTINUED | OUTPATIENT
Start: 2023-03-10 | End: 2023-03-09 | Stop reason: HOSPADM

## 2023-03-09 RX ORDER — FERROUS SULFATE 325(65) MG
325 TABLET ORAL
Qty: 30 TABLET | Refills: 3 | Status: SHIPPED | OUTPATIENT
Start: 2023-03-10

## 2023-03-09 RX ADMIN — LISINOPRIL 5 MG: 5 TABLET ORAL at 09:07

## 2023-03-09 RX ADMIN — ASPIRIN 81 MG: 81 TABLET, COATED ORAL at 09:07

## 2023-03-09 RX ADMIN — SODIUM CHLORIDE, PRESERVATIVE FREE 10 ML: 5 INJECTION INTRAVENOUS at 09:08

## 2023-03-09 RX ADMIN — SODIUM CHLORIDE, PRESERVATIVE FREE 10 ML: 5 INJECTION INTRAVENOUS at 06:33

## 2023-03-09 ASSESSMENT — ENCOUNTER SYMPTOMS
SHORTNESS OF BREATH: 0
ORTHOPNEA: 0

## 2023-03-09 NOTE — PROGRESS NOTES
3/9/2023  Primary cardiologist: Dr. Siu Rear:   Heather Diego  is an established 80 y.o.  female here for a f/u hospital for chest pain       SUBJECTIVE/OBJECTIVE:  Heather Diego is a 80 y.o. female with a history of  valvular heart disease status post TAVR, hypertension, bradycardia ,Takutsubo, hypothyroid, COPD, and CVA ( ischemic). In September 2019 she underwent aortic valve replacement (26mm Medtronic Evolut PRO). 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 Repeat echo showed left ventricular systolic function normal with EF 55 to 60% with normal functioning prosthetic aortic valve; mean gradient 4 mmHg. HPI :   Heather Diego reports she was recently seen in the emergency room for chest pain. States the pain starts on the elbow then goes across the chest to the other elbow. Pain lasted for about 1 hour. She noticed mild nausea the discomfort. Troponin negative x3  She tells me she feels lightheaded all the time. Review of Systems   Constitutional: Negative for diaphoresis and malaise/fatigue. Cardiovascular:  Positive for chest pain. Negative for claudication, dyspnea on exertion, irregular heartbeat, leg swelling, near-syncope, orthopnea, palpitations and paroxysmal nocturnal dyspnea. Respiratory:  Negative for shortness of breath. Neurological:  Positive for dizziness and light-headedness. Vitals:    03/09/23 1602   BP: 118/60   Pulse: 60   Weight: 116 lb (52.6 kg)   Height: 5' (1.524 m)     No flowsheet data found. Wt Readings from Last 3 Encounters:   03/09/23 116 lb (52.6 kg)   03/08/23 116 lb 6.4 oz (52.8 kg)   01/11/23 120 lb 6.4 oz (54.6 kg)     Body mass index is 22.65 kg/m². Physical Exam  Constitutional:       Appearance: Normal appearance. HENT:      Head: Normocephalic and atraumatic. Eyes:      Extraocular Movements: Extraocular movements intact.       Pupils: Pupils are equal, round, and reactive to light. Neck:      Vascular: No carotid bruit. Cardiovascular:      Rate and Rhythm: Normal rate and regular rhythm. Pulses: Normal pulses. Pulmonary:      Effort: Pulmonary effort is normal.      Breath sounds: Normal breath sounds. No rales. Chest:      Chest wall: No tenderness. Abdominal:      General: There is no distension. Palpations: Abdomen is soft. Tenderness: There is no abdominal tenderness. Musculoskeletal:      Cervical back: No tenderness. Right lower leg: No edema. Left lower leg: No edema. Skin:     General: Skin is warm and dry. Capillary Refill: Capillary refill takes less than 2 seconds. Neurological:      General: No focal deficit present. Mental Status: She is alert and oriented to person, place, and time.    Psychiatric:         Mood and Affect: Mood normal.         Behavior: Behavior normal.              Current Outpatient Medications   Medication Sig Dispense Refill    [START ON 3/10/2023] ferrous sulfate (IRON 325) 325 (65 Fe) MG tablet Take 1 tablet by mouth daily (with breakfast) 30 tablet 3    levothyroxine (SYNTHROID) 75 MCG tablet Take 1 tablet by mouth Daily 30 tablet 1    ondansetron (ZOFRAN) 4 MG tablet Take 1 tablet by mouth every 8 hours as needed for Nausea or Vomiting 90 tablet 1    pantoprazole (PROTONIX) 40 MG tablet TAKE 1 TABLET NIGHTLY 90 tablet 1    escitalopram (LEXAPRO) 20 MG tablet Take 1 tablet by mouth daily 90 tablet 1    atorvastatin (LIPITOR) 40 MG tablet Take 1 tablet by mouth nightly 90 tablet 1    montelukast (SINGULAIR) 10 MG tablet TAKE 1 TABLET PER NG TUBE NIGHTLY 90 tablet 1    methocarbamol (ROBAXIN) 500 MG tablet Take 1 tablet by mouth every evening 30 tablet 5    albuterol sulfate HFA (PROVENTIL;VENTOLIN;PROAIR) 108 (90 Base) MCG/ACT inhaler Inhale 2 puffs into the lungs every 6 hours as needed for Wheezing 1 each 5    sucralfate (CARAFATE) 1 GM tablet Take 1 tablet by mouth 4 times daily as needed (nausea) 120 tablet 5    lisinopril (PRINIVIL;ZESTRIL) 5 MG tablet Take 1 tablet by mouth daily 90 tablet 3    Handicap Placard MISC by Does not apply route Please provide handicap placard for 5 years. Diagnosis: Orthopedic condition 1 each 0    aspirin (EQ ASPIRIN ADULT LOW DOSE) 81 MG EC tablet Take 1 tablet by mouth daily 30 tablet 0    ipratropium-albuterol (DUONEB) 0.5-2.5 (3) MG/3ML SOLN nebulizer solution Inhale 3 mLs into the lungs every 4 hours (while awake) 360 mL 1    Timolol (TIMOPTIC) 0.5 % (DAILY) SOLN ophthalmic solution Place 1 drop into both eyes daily      vitamin B-12 (CYANOCOBALAMIN) 1000 MCG tablet Take 1,000 mcg by mouth daily OTC      Calcium-Magnesium-Zinc 167-83-8 MG TABS Take 1 tablet by mouth daily OTC      Cholecalciferol (VITAMIN D3) 2000 UNITS CAPS Take 1 capsule by mouth daily OTC       No current facility-administered medications for this visit. All pertinent data reviewed and discussed with patient       ASSESSMENT/PLAN:      Chest pain  Kaela Templeton had a cardiac catheterization November 2021 that showed no significant CAD. Was recently hospital with concerns for chest pain. Cardiac work-up was negative. She continues to have ongoing chest discomfort. We will pursue Verle Most states she is not able to walk the treadmill also check echocardiogram to assess for wall motion abnormality and left ventricular systolic function     Valvular heart disease  History of TAVR  Now presents with chest pain and dizziness. Recommend check echocardiogram to reassess for valvular heart disease and aortic valve. Hypertension  Blood pressure soft. Decrease lisinopril 2.5 mg daily. If blood pressure continues to be soft may need to discontinue lisinopril altogether.     Tests ordered: Echocardiogram/Lexiscan  Follow-up 3 months or sooner if needed    Signed:  DERIK Soni CNP, 3/9/2023, 4:06 PM    An electronic signature was used to authenticate this note.    Please note this report has been partially produced using speech recognition software and may contain errors related to that system including errors in grammar, punctuation, and spelling, as well as words and phrases that may be inappropriate. If there are any questions or concerns please feel free to contact the dictating provider for clarification.

## 2023-03-09 NOTE — DISCHARGE INSTRUCTIONS
Check your blood pressure every day in the morning before taking your blood pressure medication. Do not take your lisinopril if your systolic blood pressure (top number) is less than 110. Arrange to have your thyroid function rechecked in 4-6 weeks.

## 2023-03-09 NOTE — PROGRESS NOTES
Patient IV removed without complications, discharge instructions given to patient, no questions noted at this time, waiting for son to  patient.

## 2023-03-09 NOTE — DISCHARGE SUMMARY
V2.0  Discharge Summary    Name:  Lyssa Chambers /Age/Sex: 1935 (80 y.o. female)   Admit Date: 3/8/2023  Discharge Date: 3/9/23    MRN & CSN:  [id number] & [id number] Encounter Date and Time 3/9/23 10:34 AM EST    Attending:  Jose Garcia MD Discharging Provider: Leila Rubio [facility] Course:     Brief HPI: Lyssa Chambers is a 80 y.o. female who presented with chest pain.      Problems addressed during this hospitalization:     Chest pain   - presented with intermittent substernal chest discomfort with bilateral arm pain x2 weeks  - EKG with sinus bradycardia, HR 49, no acute ST changes x2  - trop T neg x3  - CXR with no acute process  - [facility] 2021 with normal coronaries, possible Takutsoba syndrome  - continue home ASA, statin   - cardiology consulted, recommended outpatient stress/echo  - ambulated chest pain free this AM  - also feel chest pain could be acid reflex related, discussed follow-up with GI     Uncontrolled hypertension   - possibly secondary to anxiety - per PCP note, patient was hypotensive in their office 3/8  - continue low dose lisinopril with hold parameters, follow-up with PCP      Memory impairment   - reports ~1 month of mental fogginess and progressive issues with memory   -No focal deficits  -CT head with no acute process  - started iron supplement and titrated Synthroid   -Recommend outpatient follow-up for formal memory evaluation     History of CVA   - continue ASA, statin     Hypothyroidism   - TSH is low, Synthroid dose was recently doubled   - decreased Synthroid to 75mcg, recheck TSH 4-6 weeks, has scheduled follow-up with endocrinology next month     Chronic anemia   - Hgb 10.4 on admit, trended down to 8.5, repeat 9.2  - no signs or symptoms of bleeding   - being followed closely by her PCP   - iron level is low, start iron supplement   - may need repeat colonoscopy, discussed with patient     COPD   - no signs or symptoms of acute exacerbation   - continue PRN albuterol     Chronic pain  -Has medical marijuana card    This patient was discussed in conjunction with Dr. Shruthi Matt. He was agreeable with patient's plan at discharge as dictated above. The patient expressed appropriate understanding of, and agreement with the discharge recommendations, medications, and plan. Consults this admission:  IP CONSULT TO CARDIOLOGY    Discharge Diagnosis:   Chest pain      Discharge Instruction:   Follow up appointments: PCP, cardiology, GI, endocrinology  Primary care physician: Talia Rodriguez MD within 2 weeks  Diet: regular diet   Activity: activity as tolerated  Disposition: Discharged to:   [x]Home, [][facility], []SNF, []Acute Rehab, []Hospice   Condition on discharge: Stable  Labs and Tests to be Followed up as an outpatient by PCP or Specialist: CBC, TSH    Discharge Medications:        Medication List        START taking these medications      ferrous sulfate 325 (65 Fe) MG tablet  Commonly known as: IRON 325  Take 1 tablet by mouth daily (with breakfast)  Start taking on: March 10, 2023            CHANGE how you take these medications      levothyroxine 75 MCG tablet  Commonly known as: SYNTHROID  Take 1 tablet by mouth Daily  What changed:   medication strength  how much to take            CONTINUE taking these medications      albuterol sulfate  (90 Base) MCG/ACT inhaler  Commonly known as: PROVENTIL;VENTOLIN;PROAIR  Inhale 2 puffs into the lungs every 6 hours as needed for Wheezing     aspirin 81 MG EC tablet  Commonly known as: EQ Aspirin Adult Low Dose  Take 1 tablet by mouth daily     atorvastatin 40 MG tablet  Commonly known as: LIPITOR  Take 1 tablet by mouth nightly     Calcium-Magnesium-Zinc 167-83-8 MG Tabs     escitalopram 20 MG tablet  Commonly known as: Lexapro  Take 1 tablet by mouth daily     Handicap Placard Misc  by Does not apply route Please provide handicap placard for 5 years.     Diagnosis: Orthopedic condition ipratropium-albuterol 0.5-2.5 (3) MG/3ML Soln nebulizer solution  Commonly known as: DUONEB  Inhale 3 mLs into the lungs every 4 hours (while awake)     lisinopril 5 MG tablet  Commonly known as: PRINIVIL;ZESTRIL  Take 1 tablet by mouth daily     methocarbamol 500 MG tablet  Commonly known as: ROBAXIN  Take 1 tablet by mouth every evening     montelukast 10 MG tablet  Commonly known as: SINGULAIR  TAKE 1 TABLET PER NG TUBE NIGHTLY     ondansetron 4 MG tablet  Commonly known as: ZOFRAN  Take 1 tablet by mouth every 8 hours as needed for Nausea or Vomiting     pantoprazole 40 MG tablet  Commonly known as: PROTONIX  TAKE 1 TABLET NIGHTLY     sucralfate 1 GM tablet  Commonly known as: Carafate  Take 1 tablet by mouth 4 times daily as needed (nausea)     Timolol 0.5 % Soln ophthalmic solution  Commonly known as: TIMOPTIC     vitamin B-12 1000 MCG tablet  Commonly known as: CYANOCOBALAMIN     Vitamin D3 50 MCG (2000 UT) Caps               Where to Get Your Medications        These medications were sent to [address] [place] [address] [address] -  [phone]  [facility], [address]      Phone: [phone]   atorvastatin 40 MG tablet  escitalopram 20 MG tablet  ferrous sulfate 325 (65 Fe) MG tablet  levothyroxine 75 MCG tablet  methocarbamol 500 MG tablet  montelukast 10 MG tablet  ondansetron 4 MG tablet  pantoprazole 40 MG tablet        Objective Findings at Discharge:   BP (!) 153/47   Pulse 52   Temp 98.2 °F (36.8 °C) (Oral)   Resp 14   Ht 5' (1.524 m)   SpO2 96%   BMI 22.73 kg/m²       Physical Exam:   General: NAD  Eyes: EOMI  ENT: neck supple  Cardiovascular: Regular rate and rhythm   Respiratory: Clear to auscultation bilaterally, respirations even and unlabored on RA  Gastrointestinal: Abdomen soft, non tender  Genitourinary: no suprapubic tenderness  Musculoskeletal: No edema  Skin: warm, dry  Neuro: Alert and oriented x4.    Psych: Mood appropriate. Labs and Imaging   CT HEAD WO CONTRAST    Result Date: 3/8/2023  EXAMINATION: CT OF THE HEAD WITHOUT CONTRAST  3/8/2023 1:20 pm TECHNIQUE: CT of the head was performed without the administration of intravenous contrast. Automated exposure control, iterative reconstruction, and/or weight based adjustment of the mA/kV was utilized to reduce the radiation dose to as low as reasonably achievable. COMPARISON: None. HISTORY: ORDERING SYSTEM PROVIDED HISTORY: feels disoriented TECHNOLOGIST PROVIDED HISTORY: Reason for exam:->feels disoriented Has a \"code stroke\" or \"stroke alert\" been called? ->No Decision Support Exception - unselect if not a suspected or confirmed emergency medical condition->Emergency Medical Condition (MA) Reason for Exam: feels disoriented FINDINGS: BRAIN/VENTRICLES: There is no acute intracranial hemorrhage, mass effect or midline shift. No abnormal extra-axial fluid collection. The gray-white differentiation is maintained without evidence of an acute infarct. There is no evidence of hydrocephalus. ORBITS: The visualized portion of the orbits demonstrate no acute abnormality. SINUSES: The visualized paranasal sinuses and mastoid air cells demonstrate no acute abnormality. SOFT TISSUES/SKULL:  No acute abnormality of the visualized skull or soft tissues. No acute intracranial abnormality. XR CHEST PORTABLE    Result Date: 3/8/2023  EXAMINATION: ONE XRAY VIEW OF THE CHEST 3/8/2023 12:27 pm COMPARISON: 01/03/2023. HISTORY: ORDERING SYSTEM PROVIDED HISTORY: cp TECHNOLOGIST PROVIDED HISTORY: Reason for exam:->cp Reason for Exam: cp FINDINGS: The heart size is within normal limits. The pulmonary vasculature is also within normal limits. No acute infiltrates are seen. No pneumothoraces are noted. 1. No active pulmonary disease.        CBC:   Recent Labs     03/08/23  1100 03/08/23  1241 03/09/23  0537 03/09/23  0954   WBC 3.7* 5.2 5.5  --    HGB 9.4* 10.4* 8.5* 9.2*   PLT 339 396 300  --      BMP:    Recent Labs     03/08/23  1100 03/08/23  1241    136   K 5.6* 5.0    100   CO2 22 25   BUN 9 9   CREATININE 0.6 0.5*   GLUCOSE 84 105*     Hepatic:   Recent Labs     03/08/23  1100 03/08/23  1241   AST 44* 40*   ALT 24 31   BILITOT 0.4 0.6   ALKPHOS 49 59     Lipids:   Lab Results   Component Value Date/Time    CHOL 102 03/09/2023 05:37 AM    CHOL 164 02/05/2016 12:00 AM    HDL 63 03/09/2023 05:37 AM    TRIG 45 03/09/2023 05:37 AM     Hemoglobin A1C:   Lab Results   Component Value Date/Time    LABA1C 5.1 03/09/2023 05:37 AM     TSH:   Lab Results   Component Value Date/Time    TSH 2.710 08/06/2021 12:00 PM     Troponin:   Lab Results   Component Value Date/Time    TROPONINT <0.010 03/09/2023 09:54 AM    TROPONINT <0.010 03/09/2023 05:37 AM    TROPONINT <0.010 03/08/2023 06:45 PM     Lactic Acid: No results for input(s): LACTA in the last 72 hours.   BNP:   Recent Labs     03/08/23  1845   PROBNP 809.9*     UA:  Lab Results   Component Value Date/Time    NITRU NEGATIVE 03/08/2023 04:18 PM    NITRU NEGATIVE 11/13/2012 09:10 AM    COLORU YELLOW 03/08/2023 04:18 PM    PHUR 5.5 04/03/2019 10:18 AM    WBCUA 2 11/14/2021 02:30 PM    RBCUA 2 11/14/2021 02:30 PM    MUCUS RARE 11/14/2021 02:30 PM    TRICHOMONAS NONE SEEN 11/14/2021 02:30 PM    BACTERIA NEGATIVE 11/14/2021 02:30 PM    CLARITYU CLEAR 03/08/2023 04:18 PM    SPECGRAV <1.005 03/08/2023 04:18 PM    LEUKOCYTESUR NEGATIVE 03/08/2023 04:18 PM    UROBILINOGEN 0.2 03/08/2023 04:18 PM    BILIRUBINUR NEGATIVE 03/08/2023 04:18 PM    BILIRUBINUR neg 04/03/2019 10:18 AM    BLOODU NEGATIVE 03/08/2023 04:18 PM    GLUCOSEU neg 04/03/2019 10:18 AM    KETUA NEGATIVE 03/08/2023 04:18 PM     Urine Cultures: No results found for: LABURIN  Blood Cultures: No results found for: BC  No results found for: BLOODCULT2  Organism: No results found for: ORG    Time Spent Discharging patient 35 minutes    Electronically signed by Leila Rubio PA-C on 3/9/2023 at 10:59 AM

## 2023-03-09 NOTE — CONSULTS
CARDIOLOGY CONSULT NOTE    Reason for consultation: Chest pain     Referring physician: Nilay Nance MD      Primary care physician: Rosi Davies MD        Dear Nilay Nance MD   Thanks for the consult. History of present illness:Opal is a 80 y. o.year old who  presents with  chest pain for last few days, happening daily, intermittent for 15 to 20 mins and aggravated with activity substernal also,reproducible with palpation, radiated to shoulder, 6/10, tender to touch,associated with shortness of breath, + sweating, nausea, did not get NTG in ED. No fever, no chills, no nausea no vomiting. Blood pressure, cholesterol, blood glucose and weight are well controlled. Chief Complaint   Patient presents with    Other     States she has been \"discombobulated\" for almost 2 months. Doesn't have problems with memory but states \"things aren't clear\". Chest Pain     Ongoing appx 2 weeks, describes as mid sternal 3/10 discomfort. Past medical history:    has a past medical history of Arthritis, Asthma, Blood transfusion, Broken heart syndrome, Cerebral artery occlusion with cerebral infarction ([address] Chronic bronchitis ([address] COPD (chronic obstructive pulmonary disease) ([address] COPD exacerbation ([address] COPD with exacerbation ([address] Echocardiogram, Fatigue, H/O cardiac catheterization, H/O cardiovascular stress test, H/O Doppler ultrasound, H/O Doppler ultrasound, H/O echocardiogram, H/O echocardiogram, H/O echocardiogram, H/O echocardiogram, H/O exercise stress test, History of cardiac cath, History of nuclear stress test, Holter monitor, abnormal, Hyperlipidemia, Hypertension, Normal cardiac stress test, On home O2, PONV (postoperative nausea and vomiting), Syncope and collapse, Tachycardia, and Thyroid disease. Past surgical history:   has a past surgical history that includes joint replacement (2004); Hysterectomy (1966); Cholecystectomy (1961); Toe Surgery (Early 2000's);  Carpal tunnel release (1990's); Finger surgery; Gastric restriction surgery (1984); hernia repair (unknown); lipectomy (1990's); Dilatation, esophagus; other surgical history (05/01/2012); Hip fracture surgery (Left, 11/04/2015); Colonoscopy; Endoscopy, colon, diagnostic (07/03/2017); Total shoulder arthroplasty (Bilateral, 10/2017); Aortic valve replacement (N/A, 09/11/2019); Aortic valve repair (N/A, 09/09/2019); Breast surgery (2009); Cardiac surgery (1989); Cardiac catheterization (03/02/2011); Kathy-en-Y Gastric Bypass; Tonsillectomy; eye surgery (Bilateral); and Upper gastrointestinal endoscopy (N/A, 3/30/2022). Social History:   reports that she quit smoking about 61 years ago. Her smoking use included cigarettes. She has a 15.00 pack-year smoking history. She has never used smokeless tobacco. She reports current drug use. Drug: Marijuana Galindo Kugel). She reports that she does not drink alcohol.   Family history:   no family history of CAD, STROKE of DM    albuterol sulfate HFA (PROVENTIL;VENTOLIN;PROAIR) 108 (90 Base) MCG/ACT inhaler 2 puff, Q6H PRN  aspirin EC tablet 81 mg, Daily  atorvastatin (LIPITOR) tablet 40 mg, Nightly  escitalopram (LEXAPRO) tablet 20 mg, Daily  levothyroxine (SYNTHROID) tablet 100 mcg, Daily  lisinopril (PRINIVIL;ZESTRIL) tablet 5 mg, Daily  methocarbamol (ROBAXIN) tablet 500 mg, Nightly PRN  montelukast (SINGULAIR) tablet 10 mg, Nightly  pantoprazole (PROTONIX) tablet 40 mg, Nightly  Timolol (TIMOPTIC) 0.5 % ophthalmic solution 1 drop, BID  sodium chloride flush 0.9 % injection 5-40 mL, 2 times per day  sodium chloride flush 0.9 % injection 5-40 mL, PRN  0.9 % sodium chloride infusion, PRN  ondansetron (ZOFRAN-ODT) disintegrating tablet 4 mg, Q8H PRN   Or  ondansetron (ZOFRAN) injection 4 mg, Q6H PRN  acetaminophen (TYLENOL) tablet 650 mg, Q6H PRN   Or  acetaminophen (TYLENOL) suppository 650 mg, Q6H PRN  polyethylene glycol (GLYCOLAX) packet 17 g, Daily PRN  enoxaparin (LOVENOX) injection 40 mg, Daily      Current Facility-Administered Medications   Medication Dose Route Frequency Provider Last Rate Last Admin    albuterol sulfate HFA (PROVENTIL;VENTOLIN;PROAIR) 108 (90 Base) MCG/ACT inhaler 2 puff  2 puff Inhalation Q6H PRN Cosmo Driver PA-C        aspirin EC tablet 81 mg  81 mg Oral Daily Cosmo Driver PA-C        atorvastatin (LIPITOR) tablet 40 mg  40 mg Oral Nightly Cosmo Driver PA-C   40 mg at 03/08/23 2016    escitalopram (LEXAPRO) tablet 20 mg  20 mg Oral Daily Cosmo Driver PA-C        levothyroxine (SYNTHROID) tablet 100 mcg  100 mcg Oral Daily Cosmo Driver PA-C        lisinopril (PRINIVIL;ZESTRIL) tablet 5 mg  5 mg Oral Daily Cosmo Driver PA-C        methocarbamol (ROBAXIN) tablet 500 mg  500 mg Oral Nightly PRN Cosmo Driver PA-C   500 mg at 03/08/23 2207    montelukast (SINGULAIR) tablet 10 mg  10 mg Oral Nightly Cosmo Driver PA-C   10 mg at 03/08/23 2016    pantoprazole (PROTONIX) tablet 40 mg  40 mg Oral Nightly Cosmo Driver PA-C   40 mg at 03/08/23 2016    Timolol (TIMOPTIC) 0.5 % ophthalmic solution 1 drop  1 drop Both Eyes BID Cosmo Driver PA-C        sodium chloride flush 0.9 % injection 5-40 mL  5-40 mL IntraVENous 2 times per day Cosmo Driver PA-C   10 mL at 03/09/23 0604    sodium chloride flush 0.9 % injection 5-40 mL  5-40 mL IntraVENous PRN Cosmo Driver PA-C        0.9 % sodium chloride infusion   IntraVENous PRN Cosmo Driver PA-C        ondansetron (ZOFRAN-ODT) disintegrating tablet 4 mg  4 mg Oral Q8H PRN Cosmo Driver PA-C        Or    ondansetron [facility]) injection 4 mg  4 mg IntraVENous Q6H PRN Cosmo Driver PA-C        acetaminophen (TYLENOL) tablet 650 mg  650 mg Oral Q6H PRN Cosmo Driver PA-C        Or    acetaminophen (TYLENOL) suppository 650 mg  650 mg Rectal Q6H PRN Cosmo Driver PA-C        polyethylene glycol (GLYCOLAX) packet 17 g  17 g Oral Daily PRN Cosmo Driver PA-C        enoxaparin (LOVENOX) injection 40 mg  40 mg SubCUTAneous Daily Cosmo Driver PA-C 40 mg at 03/08/23 1710          Review of Systems:    Constitutional: No Fever or Weight Loss    Eyes: No Decreased Vision  ENT: No Headaches, Hearing Loss or Vertigo  Cardiovascular: + chest pain, dyspnea on exertion, palpitations or loss of consciousness  Respiratory: No cough or wheezing    Gastrointestinal: No abdominal pain, appetite loss, blood in stools, constipation, diarrhea or heartburn  Genitourinary: No dysuria, trouble voiding, or hematuria  Musculoskeletal: No gait disturbance, weakness or joint complaints  Integumentary: No rash or pruritis  Neurological: No TIA or stroke symptoms  Psychiatric: No anxiety or depression  Endocrine: No malaise, fatigue or temperature intolerance  Hematologic/Lymphatic: No bleeding problems, blood clots or swollen lymph nodes  Allergic/Immunologic: No nasal congestion or hives  All systems negative except as marked. Physical Examination:    Vitals:    03/09/23 0228   BP: (!) 137/52   Pulse: 57   Resp: 14   Temp: 97.9 °F (36.6 °C)   SpO2: 99%      Wt Readings from Last 3 Encounters:   03/08/23 116 lb 6.4 oz (52.8 kg)   01/11/23 120 lb 6.4 oz (54.6 kg)   01/03/23 114 lb (51.7 kg)     There is no height or weight on file to calculate BMI. General Appearance: No distress, conversant     Constitutional: Well developed, Well nourished, No acute distress, Non-toxic appearance. HENT:  Normocephalic, Atraumatic, Bilateral external ears normal, Oropharynx moist, No oral exudates, Nose normal. Neck- Normal range of motion, No tenderness, Supple, No stridor,no apical-carotid delay, no carotid bruit  Eyes: PERRL, EOMI, Conjunctiva normal, No discharge. Respiratory:  Normal breath sounds, No respiratory distress, No wheezing, No chest tenderness. ,no use of accessory muscles, diaphragm movement is normal  Cardiovascular: (PMI) apex non displaced,no lifts no thrills, no s3,no s4, Normal heart rate, Normal rhythm, No murmurs, No rubs, No gallops.  Carotid arteries pulse and amplitude are normal no bruit, no abdominal bruit noted ( normal abdominal aorta ausculation), femoral arteries pulse and amplitude are normal no bruit, pedal pulses are normal.  GI: Bowel sounds normal, Soft, No tenderness, No masses, No pulsatile masses, no hepatosplenomegally, no bruits  : External genitalia appear normal, No masses or lesions. No discharge. No CVA tenderness. Musculoskeletal: Intact distal pulses, No edema, No tenderness, No cyanosis, No clubbing. Good range of motion in all major joints. No tenderness to palpation or major deformities noted. Back- No tenderness. Integument: Warm, Dry, No erythema, No rash. Skin: no rash, no ulcers  Lymphatic: No lymphadenopathy noted. Neurologic: Alert & oriented x 3, Normal motor function, Normal sensory function, No focal deficits noted. Psychiatric: Affect normal, Judgment normal, Mood normal.   Lab Review        Recent Labs       09/28/16  0010    WBC  12.3*    HGB  14.4    HCT  43.8    PLT  316            Recent Labs       09/28/16  0010    NA  136    K  3.9    CL  95*    CO2  23    BUN  10    CREATININE  0.8           Recent Labs       09/28/16  0010    AST  12*    ALT  10    BILITOT  0.4    ALKPHOS  124       No results for input(s): TROPONINI in the last 72 hours. No results found for: BNP  No results found for: INR, PROTIME        EKG:nsr     Chest Xray:nad     ECHO:pending  Labs, echo, meds reviewed  Assessment:      Recommendations:     Chest pain:OK FOR Discharge,ruled out for ACS,LHC was normal ion 2021, out patient stress test and echo  Chronic anemia recheck blood count before discharge recommend to follow-up with hematology  COPD\" STABLE  H/p TAVR: STABLE  Health maintenance: exerise and diet  All labs, medications and tests reviewed, continue all other medications of all above medical condition listed as is.           Yovana Lu MD,   Yovana Lu MD, 3/9/2023 7:53 AM

## 2023-03-10 ENCOUNTER — TELEPHONE (OUTPATIENT)
Dept: FAMILY MEDICINE CLINIC | Age: 88
End: 2023-03-10

## 2023-03-10 ENCOUNTER — CARE COORDINATION (OUTPATIENT)
Dept: CASE MANAGEMENT | Age: 88
End: 2023-03-10

## 2023-03-10 DIAGNOSIS — R07.89 OTHER CHEST PAIN: Primary | ICD-10-CM

## 2023-03-10 PROCEDURE — 1111F DSCHRG MED/CURRENT MED MERGE: CPT | Performed by: FAMILY MEDICINE

## 2023-03-10 NOTE — TELEPHONE ENCOUNTER
Care Transitions Initial Follow Up Call    Outreach made within 2 business days of discharge: Yes    Patient: Lyssa Chambers Patient : 1935   MRN: [id number]  Reason for Admission: There are no discharge diagnoses documented for the most recent discharge. Discharge Date: 3/9/23       Spoke with: patient     Discharge department/facility: [facility] Interactive Patient Contact:  Was patient able to fill all prescriptions: Yes  Was patient instructed to bring all medications to the follow-up visit: No: not instructed by nurse  Is patient taking all medications as directed in the discharge summary?  Yes  Does patient understand their discharge instructions: Yes  Does patient have questions or concerns that need addressed prior to 7-14 day follow up office visit: no    Scheduled appointment with PCP within 7-14 days    Follow Up  Future Appointments   Date Time Provider Wero Rowe   3/13/2023  9:30 AM Octavia Pillai MD [address] [facility]   3/21/2023  9:00 AM SCHEDULE, [facility] NUCLEAR [facility]   3/24/2023  1:45 PM SCHEDULE, [facility] ECHO [facility]   2023  2:50 PM Ramon Stewart MD [facility] [place], [place]

## 2023-03-10 NOTE — CARE COORDINATION
[facility] Care Transitions Initial Follow Up Call    Call within 2 business days of discharge: Yes    Patient Current Location:  Home: [address] [address]    Care Transition Nurse contacted the patient by telephone to perform post hospital discharge assessment. Verified name and  with patient as identifiers. Provided introduction to self, and explanation of the Care Transition Nurse role. Patient: Lyssa Chambers Patient : 1935   MRN: [id number]  Reason for Admission: Chest pain  Discharge Date: 3/9/23 RARS: Readmission Risk Score: 14.5      Last Discharge [address]       Date Complaint Diagnosis Description Type Department Provider    3/8/23 Other; Chest Pain Chest pain, unspecified type . .. ED to Hosp-Admission (Discharged) (ADMITTED) Angelina Concepcion MD; Milena Bowman. .. Challenges to be reviewed by the provider   Additional needs identified to be addressed with provider: No  none               Method of communication with provider: none. Spoke with Connor Lopez, says she is feeling pretty good/ Denies chest pain/pressure/heart palpitations/sob/ortega/cough/weight gain, increase in swelling. No N/V/D. B&B WNL, Eating & drinking w/out issues. Using RW to ambulate around the house. Reviewed AVS & DC meds. New med is Ferrous Sulfate, not change in Synthroid, taking all other meds same as PTA. Taking all meds  as prescribed. Emphasized importance of HFU appt needed within 7 days. Had Cardiology f/up appt on 3/9/23, has [address]da [address]irre [address] test with Dr Antolin Ahn scheduled for 3/21/23, Echo 3/24/23. Declines need for senior care or transportation resource. Family will take her to these appts. Discussed importance of reporting worsening s/s to Cardiologist/PCP. V/U. Care Transition Nurse reviewed discharge instructions, medical action plan, and red flags with patient who verbalized understanding.  The patient was given an opportunity to ask questions and does not have any further questions or concerns at this time. Were discharge instructions available to patient? Yes. Reviewed appropriate site of care based on symptoms and resources available to patient including: PCP  Specialist  Urgent care clinics  Provider home visits  When to call [address] The patient agrees to contact the PCP office for questions related to their healthcare. Advance Care Planning:   Does patient have an Advance Directive: not on file; education provided. Medication reconciliation was performed with patient, who verbalizes understanding of administration of home medications. Medications reviewed, 1111F entered: yes    Was patient discharged with a pulse oximeter? no    Non-face-to-face services provided:  Scheduled appointment with Specialist-Cards 3/9/23, 3/21, 3/24/23  Education of patient/family/caregiver/guardian to support self-management-s/s to be alerted to worsening s/s to report to provider  Assessment and support for treatment adherence and medication management-med reviewq    Offered patient enrollment in the Remote Patient Monitoring (RPM) program for in-home monitoring: Yes, but did not enroll at this time. Care Transitions 24 Hour Call    Schedule Follow Up Appointment with PCP: Completed  Do you have a copy of your discharge instructions?: Yes  Do you have all of your prescriptions and are they filled?: Yes  Have you been contacted by a [facility] Pharmacist?: No  Have you scheduled your follow up appointment?: Yes (Comment: 3/16/23 Cards.  3/21/23 ST)  How are you going to get to your appointment?: Car - family or friend to transport  Post Acute Services: [facility] (Comment: [address] [place] [address] or [place] [address])  Patient DME: Citlalli machado, Shower chair, Other  Other Patient DME: BSMELISSA, christine, extended shoe horn  Patient Home Equipment: Oxygen, Nebulizer  Do you have support at home?: Alone  Do you feel like you have everything you need to keep you well at home?: Yes  Are you an active caregiver in your home?: No  Care Transitions Interventions   Home Care Waiver: Completed Physical Therapy: Declined       Transportation Support: Declined      DME Assistance: Completed   Disease Association: Completed               Discussed follow-up appointments. If no appointment was previously scheduled, appointment scheduling offered: Yes. Is follow up appointment scheduled within 7 days of discharge? Yes. 3/9/23    Follow Up  Future Appointments   Date Time Provider Wero Rowe   3/21/2023  9:00 AM SCHEDULE, [facility] NUCLEAR [facility]   3/24/2023  1:45 PM SCHEDULE, [facility] ECHO [facility]   6/19/2023  2:50 PM Jeanna Kasper MD [facility]       Care Transition Nurse provided contact information. Plan for follow-up call in 5-7 days based on severity of symptoms and risk factors. Plan for next call: symptom management-chest pain? N&V? Sob/ortega?  follow-up appointment-3/21/23 & 3/24/23  medication management-any med changes?     Lou Cox RN

## 2023-03-11 LAB
EKG ATRIAL RATE: 53 BPM
EKG DIAGNOSIS: NORMAL
EKG P AXIS: 95 DEGREES
EKG P-R INTERVAL: 168 MS
EKG Q-T INTERVAL: 502 MS
EKG QRS DURATION: 78 MS
EKG QTC CALCULATION (BAZETT): 471 MS
EKG R AXIS: 51 DEGREES
EKG T AXIS: 60 DEGREES
EKG VENTRICULAR RATE: 53 BPM

## 2023-03-13 ENCOUNTER — TELEPHONE (OUTPATIENT)
Dept: GASTROENTEROLOGY | Age: 88
End: 2023-03-13

## 2023-03-13 ENCOUNTER — OFFICE VISIT (OUTPATIENT)
Dept: FAMILY MEDICINE CLINIC | Age: 88
End: 2023-03-13

## 2023-03-13 VITALS
HEIGHT: 60 IN | DIASTOLIC BLOOD PRESSURE: 60 MMHG | WEIGHT: 119.4 LBS | HEART RATE: 62 BPM | BODY MASS INDEX: 23.44 KG/M2 | TEMPERATURE: 97.5 F | SYSTOLIC BLOOD PRESSURE: 120 MMHG

## 2023-03-13 DIAGNOSIS — D64.9 ANEMIA, UNSPECIFIED TYPE: ICD-10-CM

## 2023-03-13 DIAGNOSIS — Z09 HOSPITAL DISCHARGE FOLLOW-UP: ICD-10-CM

## 2023-03-13 DIAGNOSIS — Z86.73 HISTORY OF CVA IN ADULTHOOD: ICD-10-CM

## 2023-03-13 DIAGNOSIS — I10 PRIMARY HYPERTENSION: ICD-10-CM

## 2023-03-13 DIAGNOSIS — E03.9 ACQUIRED HYPOTHYROIDISM: ICD-10-CM

## 2023-03-13 DIAGNOSIS — J44.9 CHRONIC OBSTRUCTIVE PULMONARY DISEASE, UNSPECIFIED COPD TYPE (HCC): ICD-10-CM

## 2023-03-13 DIAGNOSIS — R41.3 MEMORY IMPAIRMENT: ICD-10-CM

## 2023-03-13 DIAGNOSIS — R07.9 CHEST PAIN, UNSPECIFIED TYPE: Primary | ICD-10-CM

## 2023-03-13 NOTE — PROGRESS NOTES
Post-Discharge Transitional Care  Follow Up      Mina Chung   YOB: 1935    Date of Office Visit:  3/13/2023  Date of Hospital Admission: 3/8/23  Date of Hospital Discharge: 3/9/23  Risk of hospital readmission (high >=14%. Medium >=10%) :Readmission Risk Score: 14.5      Care management risk score Rising risk (score 2-5) and Complex Care (Scores >=6): No Risk Score On File     Non face to face  following discharge, date last encounter closed (first attempt may have been earlier): 03/10/2023    Call initiated 2 business days of discharge: Yes    ASSESSMENT/PLAN:   Chest pain, unspecified type  Unclear etiology  Primary hypertension  Controlled  Memory impairment  Stable  History of CVA in adulthood  Asymptomatic  Acquired hypothyroidism  Controlled  Anemia, unspecified type  Controlled  Hospital discharge follow-up  Chronic obstructive pulmonary disease, unspecified COPD type ([address]  Controlled  Medical Decision Making: high complexity  Return in 1 month (on 4/13/2023). Subjective:   HPI:  Follow up of Hospital problems/diagnosis(es): Chest pain, uncontrolled hypertension, memory impairment, history of CVA, hypothyroidism, chronic anemia, COPD    Inpatient course: Discharge summary reviewed- see chart. Interval history/Current status: Currently pain-free. Patient is disappointed that she did not have a stress test or an echocardiogram while in the hospital.  She is scheduled to have them next week as an outpatient. She already follow-up with cardiology on 3/9/2023.   Her blood pressure medication was decreased to 2.5 mg of lisinopril    Patient Active Problem List   Diagnosis    Closed intertrochanteric fracture of left femur (HCC)    Gait disturbance    Anemia    Dizziness    Acquired hypothyroidism    Murmur    Age-related osteoporosis without current pathological fracture    Black tongue    Vitamin D deficiency    Vitamin B12 deficiency    Gastroesophageal reflux disease without esophagitis    VHD (valvular heart disease)    Acute respiratory distress    Sepsis due to pneumonia ([address]    Nodule of lower lobe of right lung    S/P TAVR (transcatheter aortic valve replacement)    Primary hypertension    Acute respiratory failure (HCC)    Other seizures (HCC)    Metabolic encephalopathy    Cerebrovascular accident (CVA) due to embolism of cerebral artery ([address]    Cerebrovascular accident (CVA) ([address]    Ataxia    Dysarthria due to acute stroke ([address]    Dysthymia    Current mild episode of major depressive disorder without prior episode ([address]    Atherosclerosis of aorta (HCC)    Thyroid disease    Mixed hyperlipidemia    Bradycardia    PAD (peripheral artery disease) ([address]    Other fatigue    Generalized weakness    Anxiety disorder    Chest pain       Medications listed as ordered at the time of discharge from hospital     Medication List            Accurate as of March 13, 2023 12:59 PM. If you have any questions, ask your nurse or doctor. CONTINUE taking these medications      albuterol sulfate  (90 Base) MCG/ACT inhaler  Commonly known as: PROVENTIL;VENTOLIN;PROAIR  Inhale 2 puffs into the lungs every 6 hours as needed for Wheezing     aspirin 81 MG EC tablet  Commonly known as: EQ Aspirin Adult Low Dose  Take 1 tablet by mouth daily     atorvastatin 40 MG tablet  Commonly known as: LIPITOR  Take 1 tablet by mouth nightly     Calcium-Magnesium-Zinc 167-83-8 MG Tabs     escitalopram 20 MG tablet  Commonly known as: Lexapro  Take 1 tablet by mouth daily     ferrous sulfate 325 (65 Fe) MG tablet  Commonly known as: IRON 325  Take 1 tablet by mouth daily (with breakfast)     Handicap Placard Misc  by Does not apply route Please provide handicap placard for 5 years.     Diagnosis: Orthopedic condition     ipratropium-albuterol 0.5-2.5 (3) MG/3ML Soln nebulizer solution  Commonly known as: DUONEB  Inhale 3 mLs into the lungs every 4 hours (while awake)     levothyroxine 75 MCG tablet  Commonly known as: SYNTHROID  Take 1 tablet by mouth Daily     lisinopril 2.5 MG tablet  Commonly known as: PRINIVIL;ZESTRIL  Take 1 tablet by mouth daily     methocarbamol 500 MG tablet  Commonly known as: ROBAXIN  Take 1 tablet by mouth every evening     montelukast 10 MG tablet  Commonly known as: SINGULAIR  TAKE 1 TABLET PER NG TUBE NIGHTLY     ondansetron 4 MG tablet  Commonly known as: ZOFRAN  Take 1 tablet by mouth every 8 hours as needed for Nausea or Vomiting     pantoprazole 40 MG tablet  Commonly known as: PROTONIX  TAKE 1 TABLET NIGHTLY     sucralfate 1 GM tablet  Commonly known as: Carafate  Take 1 tablet by mouth 4 times daily as needed (nausea)     Timolol 0.5 % Soln ophthalmic solution  Commonly known as: TIMOPTIC     vitamin B-12 1000 MCG tablet  Commonly known as: CYANOCOBALAMIN     Vitamin D3 50 MCG (2000 UT) Caps                Medications marked \"taking\" at this time  Outpatient Medications Marked as Taking for the 3/13/23 encounter (Office Visit) with Beth Law MD   Medication Sig Dispense Refill    ferrous sulfate (IRON 325) 325 (65 Fe) MG tablet Take 1 tablet by mouth daily (with breakfast) 30 tablet 3        Medications patient taking as of now reconciled against medications ordered at time of hospital discharge: Yes    A comprehensive review of systems was negative except for what was noted in the HPI.     Objective:    /60 (Site: Left Upper Arm, Position: Sitting, Cuff Size: Medium Adult)   Pulse 62   Temp 97.5 °F (36.4 °C) (Infrared)   Ht 5' (1.524 m)   Wt 119 lb 6.4 oz (54.2 kg)   BMI 23.32 kg/m²   General Appearance: alert and oriented to person, place and time, well developed and well- nourished, in no acute distress  Skin: warm and dry, no rash or erythema  Head: normocephalic and atraumatic  Eyes: pupils equal, round, and reactive to light, extraocular eye movements intact, conjunctivae normal  ENT: tympanic membrane, external ear and ear canal normal bilaterally, nose without deformity, nasal mucosa and turbinates normal without polyps  Neck: supple and non-tender without mass, no thyromegaly or thyroid nodules, no cervical lymphadenopathy  Pulmonary/Chest: clear to auscultation bilaterally- no wheezes, rales or rhonchi, normal air movement, no respiratory distress  Cardiovascular: normal rate, regular rhythm, normal S1 and S2, no murmurs, rubs, clicks, or gallops, distal pulses intact, no carotid bruits  Abdomen: soft, non-tender, non-distended, normal bowel sounds, no masses or organomegaly  Extremities: no cyanosis, clubbing or edema  Musculoskeletal: normal range of motion, no joint swelling, deformity or tenderness  Neurologic: reflexes normal and symmetric, no cranial nerve deficit, gait, coordination and speech normal      An electronic signature was used to authenticate this note.   --Rubens Hsu MD

## 2023-03-17 ENCOUNTER — CARE COORDINATION (OUTPATIENT)
Dept: CASE MANAGEMENT | Age: 88
End: 2023-03-17

## 2023-03-17 NOTE — CARE COORDINATION
[facility] Care Transitions Follow Up Call    Patient Current Location:  Home: [address] [address]    Care Transition Nurse contacted the patient by telephone to follow up after admission on 3/8/23. Verified name and  with patient as identifiers. Patient: Ollie Ha  Patient : 1935   MRN: [id number]  Reason for Admission: chest pain  Discharge Date: 3/9/23 RARS: Readmission Risk Score: 14.5      Needs to be reviewed by the provider   Additional needs identified to be addressed with provider: No  none             Method of communication with provider: none. CTN call to [facility] today and she says she is feeling good overall. Says she has chest pain on & off that lasts seconds and then goes away. C/o nausea and dry heaves 1 time last week but no vomiting. It was a couple hours after she ate and not related to the chest pain. Taking Carafate every am.  Denies inc. Sob, dizziness, weakness, fever, chills, v/d, abd pain/distention. GI HFU 23 (last Hgb=9.2 3/9/23)  Stress test 3/21/23 Echo  scheduled. She has transportation to [facility]. GE=335/60   PCP HFU 3/13/23-> no changes, to f/u 1 month. Eating, drinking, sleeping ok. Denies problems w/ urination/bowels. Says she has a lot of gas. No other concerns voiced at this time. Addressed changes since last contact:  none  Discussed follow-up appointments. If no appointment was previously scheduled, appointment scheduling offered: Yes. Is follow up appointment scheduled within 7 days of discharge? Yes.     Follow Up  Future Appointments   Date Time Provider Wero Rowe   3/21/2023  9:00 AM SCHEDULE, [facility] NUCLEAR [facility]   3/24/2023  1:45 PM SCHEDULE, [facility] ECHO [facility]   2023  9:30 AM Reinier Jenkins MD [facility] Gastro [facility]   2023  2:50 PM Elizabeth Turner MD [facility] Heart [facility]     Non-[facility] follow up appointment(s):     Care Transition Nurse reviewed discharge instructions, medical action plan, and red flags with patient and discussed any barriers to care and/or understanding of plan of care after discharge. Discussed appropriate site of care based on symptoms and resources available to patient including: PCP  Specialist  When to call 911. The patient agrees to contact the PCP office for questions related to their healthcare. Advance Care Planning:   not on file. Patients top risk factors for readmission: lack of knowledge about disease, medical condition-chest pain, HTN, h/o CVA, anemia, hypothyroid, memory impairment, multiple health system providers, and polypharmacy  Interventions to address risk factors: Scheduled appointment with PCP-1 month and Scheduled appointment with Specialist-5/4/23 GI    Offered patient enrollment in the Remote Patient Monitoring (RPM) program for in-home monitoring: NA.     Care Transitions Subsequent and Final Call    Schedule Follow Up Appointment with PCP: Completed  Subsequent and Final Calls  Do you have any ongoing symptoms?: Yes  Onset of Patient-reported symptoms: In the past 7 days  Patient-reported symptoms: Chest Pain  Interventions for patient-reported symptoms: Notified PCP/Physician  Have your medications changed?: No  Do you have any questions related to your medications?: No  Do you currently have any active services?: No  Are you currently active with any services?: Home Health  Do you have any needs or concerns that I can assist you with?: No  Identified Barriers: Lack of Education  Care Transitions Interventions   Home Care Waiver: Completed Physical Therapy: Declined       Transportation Support: Declined      DME Assistance: Completed   Disease Specific Clinic: Completed      Disease Association: Completed      Other Interventions:             Care Transition Nurse provided contact information for future needs. Plan for follow-up call in 7-10 days based on severity of symptoms and risk factors.   Plan for next call: symptom management-chest pain, inc. Sob?, abd pain, n/v/d?,BP  follow-up appointment-results of stress/echo?     Alonzo Lamb RN

## 2023-03-21 ENCOUNTER — PROCEDURE VISIT (OUTPATIENT)
Dept: CARDIOLOGY CLINIC | Age: 88
End: 2023-03-21

## 2023-03-21 DIAGNOSIS — R06.02 SOBOE (SHORTNESS OF BREATH ON EXERTION): Primary | ICD-10-CM

## 2023-03-21 DIAGNOSIS — I38 VHD (VALVULAR HEART DISEASE): Primary | ICD-10-CM

## 2023-03-21 DIAGNOSIS — I38 VHD (VALVULAR HEART DISEASE): ICD-10-CM

## 2023-03-21 DIAGNOSIS — R07.9 CHEST PAIN, UNSPECIFIED TYPE: ICD-10-CM

## 2023-03-21 LAB
LV EF: 58 %
LV EF: 60 %
LVEF MODALITY: NORMAL
LVEF MODALITY: NORMAL

## 2023-03-22 ENCOUNTER — TELEPHONE (OUTPATIENT)
Dept: CARDIOLOGY CLINIC | Age: 88
End: 2023-03-22

## 2023-03-24 ENCOUNTER — CARE COORDINATION (OUTPATIENT)
Dept: CASE MANAGEMENT | Age: 88
End: 2023-03-24

## 2023-03-24 NOTE — CARE COORDINATION
[facility] Care Transitions Follow Up Call    Patient Current Location:  Home: [address] [address]    Care Transition Nurse contacted the patient by telephone to follow up after admission on 3/8/23. Verified name and  with patient as identifiers. Patient: Rosey Alcala  Patient : 1935   MRN: [id number]  Reason for Admission: Chest pain  Discharge Date: 3/9/23 RARS: Readmission Risk Score: 14.5      Needs to be reviewed by the provider   Additional needs identified to be addressed with provider: No  none             Method of communication with provider: none. Spoke with Maddy Marroquin, says she is doing fairly well. Currently no c/o chest pain. Did a have a couple c/o very mild chest discomfort last week, but it went away quickly & has not returned. Still feels very gassy. Has f/up with GI on 23. Discussed possible foods that she is eating that may cause gas. Had Echo & Stress test, both came back normal.  Had PCP & Cardio f/up's . No issues to report @ this time. CTN to continue to follow. Addressed changes since last contact:   Echo/NMST normal  Discussed follow-up appointments. If no appointment was previously scheduled, appointment scheduling offered: Yes. Is follow up appointment scheduled within 7 days of discharge? Yes. Follow Up  Future Appointments   Date Time Provider Wero Rowe   2023  9:30 AM Henry Diehl MD [facility] Gastro [facility]   2023  2:50 PM Ghazal Churchill MD [facility] Heart [facility]     Non-[facility] follow up appointment(s): MANUEL Cobos RN [phone]     Care Transition Nurse reviewed discharge instructions, medical action plan, and red flags with patient and discussed any barriers to care and/or understanding of plan of care after discharge. Discussed appropriate site of care based on symptoms and resources available to patient including: PCP  Specialist  Urgent care clinics  When to call [address]  The patient agrees to contact the PCP

## 2023-03-31 ENCOUNTER — CARE COORDINATION (OUTPATIENT)
Dept: CASE MANAGEMENT | Age: 88
End: 2023-03-31

## 2023-03-31 NOTE — CARE COORDINATION
Care Transitions Outreach Attempt    Attempted to reach patient for transitions of care follow up. Unable to reach patient. 1st attempt to reach for Care Transition follow up call unsuccessful. HIPAA compliant message left requesting call back to [phone]     Patient: Hui Yepez Patient : 1935 MRN: [id number]    Last Discharge  Street       Date Complaint Diagnosis Description Type Department Provider    3/8/23 Other; Chest Pain Chest pain, unspecified type . .. ED to Hosp-Admission (Discharged) (ADMITTED) Author MD Daniel; Milena Bowman. ..           Noted following upcoming appointments from discharge chart review:   [facility] follow up appointment(s):   Future Appointments   Date Time Provider Wero Rowe   2023  9:30 AM Anuj Hidalgo MD  [facility]   2023  2:50 PM Ubaldo Berry MD [facility]     [id number] [address] follow up appointment(s): MANUEL Gaming RN [phone]

## 2023-04-06 ENCOUNTER — CARE COORDINATION (OUTPATIENT)
Dept: CASE MANAGEMENT | Age: 88
End: 2023-04-06

## 2023-04-06 NOTE — CARE COORDINATION
Care Transitions Outreach Attempt      Attempted to reach patient for transitions of care follow up. Unable to reach patient. #2    Patient: Sandeep Rape Patient : 1935 MRN: [id number]    2nd unsuccessful attempt to reach for Care Transition discharge call. HIPAA compliant message left requesting call back. Episode closed per protocol, no further outreach scheduled. Unable to reach letter sent via My Chart    Last Discharge  Street       Date Complaint Diagnosis Description Type Department Provider    3/8/23 Other; Chest Pain Chest pain, unspecified type . .. ED to Hosp-Admission (Discharged) (ADMITTED) Juan José Madrigal MD; Milena Bowman. ..           Noted following upcoming appointments from discharge chart review:   [facility] follow up appointment(s):   Future Appointments   Date Time Provider Wero Rowe   2023  9:30 AM Stacy Linares MD [address] [place] [address]   2023  2:50 PM Ortega Mak MD [facility] Heart [facility]         Tyson Marti RN [phone]

## 2023-05-04 ENCOUNTER — HOSPITAL ENCOUNTER (EMERGENCY)
Age: 88
Discharge: HOME | End: 2023-05-04
Attending: EMERGENCY MEDICINE
Payer: MEDICARE

## 2023-05-04 ENCOUNTER — APPOINTMENT (OUTPATIENT)
Dept: CT IMAGING | Age: 88
End: 2023-05-04
Payer: MEDICARE

## 2023-05-04 ENCOUNTER — OFFICE VISIT (OUTPATIENT)
Dept: GASTROENTEROLOGY | Age: 88
End: 2023-05-04
Payer: MEDICARE

## 2023-05-04 ENCOUNTER — APPOINTMENT (OUTPATIENT)
Dept: GENERAL RADIOLOGY | Age: 88
End: 2023-05-04
Payer: MEDICARE

## 2023-05-04 VITALS
SYSTOLIC BLOOD PRESSURE: 124 MMHG | HEART RATE: 52 BPM | WEIGHT: 121.2 LBS | BODY MASS INDEX: 23.8 KG/M2 | HEIGHT: 60 IN | TEMPERATURE: 97.5 F | DIASTOLIC BLOOD PRESSURE: 62 MMHG

## 2023-05-04 VITALS
HEART RATE: 64 BPM | OXYGEN SATURATION: 97 % | RESPIRATION RATE: 16 BRPM | DIASTOLIC BLOOD PRESSURE: 56 MMHG | SYSTOLIC BLOOD PRESSURE: 146 MMHG | TEMPERATURE: 98.1 F

## 2023-05-04 DIAGNOSIS — S62.336A CLOSED DISPLACED FRACTURE OF NECK OF FIFTH METACARPAL BONE OF RIGHT HAND, INITIAL ENCOUNTER: ICD-10-CM

## 2023-05-04 DIAGNOSIS — S62.614A CLOSED DISPLACED FRACTURE OF PROXIMAL PHALANX OF RIGHT RING FINGER, INITIAL ENCOUNTER: ICD-10-CM
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
## 2023-05-04 DIAGNOSIS — S09.90XA CLOSED HEAD INJURY, INITIAL ENCOUNTER: ICD-10-CM

## 2023-05-04 DIAGNOSIS — D50.9 IRON DEFICIENCY ANEMIA, UNSPECIFIED IRON DEFICIENCY ANEMIA TYPE: Primary | ICD-10-CM

## 2023-05-04 DIAGNOSIS — D50.9 IRON DEFICIENCY ANEMIA, UNSPECIFIED IRON DEFICIENCY ANEMIA TYPE: ICD-10-CM

## 2023-05-04 DIAGNOSIS — S00.83XA TRAUMATIC HEMATOMA OF FOREHEAD, INITIAL ENCOUNTER: ICD-10-CM

## 2023-05-04 DIAGNOSIS — K90.9 MALABSORPTION SYNDROME: ICD-10-CM

## 2023-05-04 DIAGNOSIS — S00.83XA CONTUSION OF FACE, INITIAL ENCOUNTER: ICD-10-CM

## 2023-05-04 DIAGNOSIS — S00.81XA FACIAL ABRASION, INITIAL ENCOUNTER: ICD-10-CM

## 2023-05-04 PROCEDURE — 29125 APPL SHORT ARM SPLINT STATIC: CPT

## 2023-05-04 PROCEDURE — 6370000000 HC RX 637 (ALT 250 FOR IP): Performed by: EMERGENCY MEDICINE

## 2023-05-04 PROCEDURE — 99284 EMERGENCY DEPT VISIT MOD MDM: CPT

## 2023-05-04 PROCEDURE — 73130 X-RAY EXAM OF HAND: CPT

## 2023-05-04 PROCEDURE — 99214 OFFICE O/P EST MOD 30 MIN: CPT | Performed by: SPECIALIST

## 2023-05-04 PROCEDURE — 6360000002 HC RX W HCPCS: Performed by: EMERGENCY MEDICINE

## 2023-05-04 PROCEDURE — 70486 CT MAXILLOFACIAL W/O DYE: CPT

## 2023-05-04 PROCEDURE — 70450 CT HEAD/BRAIN W/O DYE: CPT

## 2023-05-04 PROCEDURE — 90715 TDAP VACCINE 7 YRS/> IM: CPT | Performed by: EMERGENCY MEDICINE

## 2023-05-04 PROCEDURE — 90471 IMMUNIZATION ADMIN: CPT | Performed by: EMERGENCY MEDICINE

## 2023-05-04 PROCEDURE — 1123F ACP DISCUSS/DSCN MKR DOCD: CPT | Performed by: SPECIALIST

## 2023-05-04 PROCEDURE — 72125 CT NECK SPINE W/O DYE: CPT

## 2023-05-04 RX ORDER — DIPHENHYDRAMINE HYDROCHLORIDE 50 MG/ML
50 INJECTION INTRAMUSCULAR; INTRAVENOUS
Status: CANCELLED | OUTPATIENT
Start: 2023-05-11

## 2023-05-04 RX ORDER — ALBUTEROL SULFATE 90 UG/1
4 AEROSOL, METERED RESPIRATORY (INHALATION) PRN
Status: CANCELLED | OUTPATIENT
Start: 2023-05-11

## 2023-05-04 RX ORDER — HYDROCODONE BITARTRATE AND ACETAMINOPHEN 5; 325 MG/1; MG/1
1 TABLET ORAL ONCE
Status: COMPLETED | OUTPATIENT
Start: 2023-05-04 | End: 2023-05-04

## 2023-05-04 RX ORDER — ONDANSETRON 2 MG/ML
8 INJECTION INTRAMUSCULAR; INTRAVENOUS
Status: CANCELLED | OUTPATIENT
Start: 2023-05-11

## 2023-05-04 RX ORDER — SODIUM CHLORIDE 0.9 % (FLUSH) 0.9 %
5-40 SYRINGE (ML) INJECTION PRN
Status: CANCELLED | OUTPATIENT
Start: 2023-05-11

## 2023-05-04 RX ORDER — TRAMADOL HYDROCHLORIDE 50 MG/1
50 TABLET ORAL EVERY 6 HOURS PRN
Qty: 12 TABLET | Refills: 0 | Status: SHIPPED | OUTPATIENT
Start: 2023-05-04 | End: 2023-05-07

## 2023-05-04 RX ORDER — SODIUM CHLORIDE 9 MG/ML
INJECTION, SOLUTION INTRAVENOUS CONTINUOUS
Status: CANCELLED | OUTPATIENT
Start: 2023-05-11

## 2023-05-04 RX ORDER — FAMOTIDINE 10 MG/ML
20 INJECTION, SOLUTION INTRAVENOUS
Status: CANCELLED | OUTPATIENT
Start: 2023-05-11

## 2023-05-04 RX ORDER — EPINEPHRINE 1 MG/ML
0.3 INJECTION, SOLUTION, CONCENTRATE INTRAVENOUS PRN
Status: CANCELLED | OUTPATIENT
Start: 2023-05-11

## 2023-05-04 RX ORDER — ACETAMINOPHEN 325 MG/1
650 TABLET ORAL
Status: CANCELLED | OUTPATIENT
Start: 2023-05-11

## 2023-05-04 RX ADMIN — HYDROCODONE BITARTRATE AND ACETAMINOPHEN 1 TABLET: 5; 325 TABLET ORAL at 21:20

## 2023-05-04 RX ADMIN — TETANUS TOXOID, REDUCED DIPHTHERIA TOXOID AND ACELLULAR PERTUSSIS VACCINE, ADSORBED 0.5 ML: 5; 2.5; 8; 8; 2.5 SUSPENSION INTRAMUSCULAR at 21:21

## 2023-05-04 ASSESSMENT — PAIN SCALES - GENERAL
PAINLEVEL_OUTOF10: 6
PAINLEVEL_OUTOF10: 10

## 2023-05-04 NOTE — PROGRESS NOTES
Gastroenterology  Note  Kathy Godoy. Yoan Tipton MD      Subjective:      Patient ID:      Mirella Chauhan                 1935    CC: abd pain and nausea    HPI:   Was in ED 2 months ago with chest pain-- was in obs unit -- cardiac workup OK. Also found to have Hb 9.4 and was 11.7 12/2021. CMP was OK except for AST 40 and LFT  's have been mildly increased before. Iron sat was low  She is S/P gastric bypass-- has not been on iron for years but put on iron after that hospital visit- she could not tolerate it (constipation) so stopped it. Has intermittent central abd tenderness (when dog jumps off her) but no spontaneous pain. No diarrhea, constipation, melena, hematochezia. Gets nausea -- if gets up and drinks anything cold, she gets nauseous but ok with warm water. No vomiting but gets occasional dry heaves    Had negative EGD by me 1 year ago (except for the gastric bypass)  Last colonoscopy over 10 years ago    ROS: see HPI for positives and pertinent negatives.  All other systems reviewed and are negative    Objective:     PHYSICAL EXAM:    Vitals:  /62 (Site: Left Upper Arm, Position: Sitting, Cuff Size: Small Adult)   Pulse 52   Temp 97.5 °F (36.4 °C) (Infrared)   Ht 5' (1.524 m)   Wt 121 lb 3.2 oz (55 kg)   BMI 23.67 kg/m²   CONSTITUTIONAL: alert, cooperative, no apparent distress,   EYES:  pupils equal, round and reactive to light and sclera clear  ENT:  normocepalic, without obvious abnormality  NECK:  supple, symmetrical, trachea midline  HEMATOLOGIC/LYMPHATICS:  no cervical lymphadenopathy and no supraclavicular lymphadenopathy  LUNGS:  clear to auscultation  CARDIOVASCULAR:  regular rate and rhythm and no murmur noted  ABDOMEN:  normal bowel sounds, soft, non-distended, non-tender with no masses or hepatomegaly palpated  NEUROLOGIC: no focal deficit detected  SKIN:  no lesions  EXTREMITIES: no clubbing, cyanosis, or edema     Assessment:     1) iron deficiency anemia- most likely due to

## 2023-05-05 ENCOUNTER — TELEPHONE (OUTPATIENT)
Dept: CARDIOLOGY CLINIC | Age: 88
End: 2023-05-05

## 2023-05-05 NOTE — DISCHARGE INSTRUCTIONS
Follow-up with orthopedic surgeon Dr. Amy Jackson in 1 to 2 days for reevaluation of right hand fractures. Call for an appointment  Continue splint and sling until you see the orthopedic surgeon  Take tramadol as needed for pain. No drinking or driving while taking  Keep the wounds clean and dry  Rest ice elevation to right upper extremity  Return to the emergency department immediately any pain swelling fever chills nausea vomiting redness or any worsening symptoms.

## 2023-05-05 NOTE — ED NOTES
Pt refusing sling at DC, states she has 3 at home, DC home with son, ambulated out of ED with steady gait     Andres Mckinney RN  05/04/23 [id number]

## 2023-05-05 NOTE — ED PROVIDER NOTES
Emergency Department Encounter    Patient: Paula Johnson  MRN: [id number]  : 1935  Date of Evaluation: 2023  ED Provider:  Davin Luo DO    Triage Chief Complaint:   Fall (Right wrist injury, head pain)    [place]:  Paula Johnson is a 80 y.o. female with history of thyroid disease, arthritis, bronchitis, COPD, asthma, CVA, iron deficiency anemia, aortic valve placement, depression, hyperlipidemia, peripheral artery disease, anxiety that presents to the emergency department status post fall. Patient gives the history. Patient brought in by family. Patient states she was walking down a slope at her house. Patient states she had just finished mowing the grass. Patient states she went tumbling down the concrete slope. Patient states she broke her glasses when she fell. Patient states she skinned up the right side of her face. Patient states she tried to brace her fall with her hands. She states she feels like she broke her right hand. She states she is right-hand dominant. Patient states she has had previous fracture and surgery to the right hand in the past.  Patient states no loss of consciousness. Patient states unknown tetanus status. She states she has not taken anything for pain. Patient states she is on 81 mg aspirin daily. Patient denies any seizure activity no bowel bladder incontinence. Patient states she did call her son to bring her to the emergency department. Patient brought in for evaluation.     ROS - see HPI, below listed is current ROS at time of my eval:  General:  No fevers, no chills, no weakness  Eyes:  No recent vison changes, no discharge  ENT:  No sore throat, no nasal congestion, no hearing changes  Cardiovascular:  No chest pain, no palpitations  Respiratory:  No shortness of breath, no cough, no wheezing  Gastrointestinal:  No pain, no nausea, no vomiting, no diarrhea  Musculoskeletal:  No muscle pain, no joint pain  Skin:  No rash, no pruritis, no easy

## 2023-05-05 NOTE — TELEPHONE ENCOUNTER
Cardiologist: Dr. Dex Lakhani  Surgeon: Dr. Florentino Cazares  Surgery: Colonoscopy & EGD  Anesthesia: Propofol  Date: 6/12/2023  FAX# [phone]  # [phone]    Last OV 3/9/2023 w/Mimi         Chest pain  Jo Marquez had a cardiac catheterization November 2021 that showed no significant CAD. Was recently hospital with concerns for chest pain. Cardiac work-up was negative. She continues to have ongoing chest discomfort. We will pursue Lou Meng states she is not able to walk the treadmill also check echocardiogram to assess for wall motion abnormality and left ventricular systolic function      Valvular heart disease  History of TAVR  Now presents with chest pain and dizziness. Recommend check echocardiogram to reassess for valvular heart disease and aortic valve. Hypertension  Blood pressure soft. Decrease lisinopril 2.5 mg daily. If blood pressure continues to be soft may need to discontinue lisinopril altogether. Tests ordered: Echocardiogram/Lexiscan  Follow-up 3 months or sooner if needed          Last EKG- 3/8/2023    NM-3/21/2023   Normal tracer uptake in all segments of myocardium on stress ans rest   images. Normal Lexiscan nuclear scintigraphic study suggestive of normal   myocardial perfusion. Gated images demonstrate normal left ventricular   systolic function with EF of 60 %. Echo- 3/21/2023   Left ventricular function and size is normal, EF is estimated at 55-60%. Mild left ventricular hypertrophy. No evidence of diastolic dysfunction. No regional wall motion abnormalities were detected. Moderately dilated left atrium. Normally functioning prosthetic valve in aortic position with a mean   gradient of 5mmHg, AT 85msec. Mitral annular calcification is present. Moderate mitral and tricuspid regurgitation is present. Moderate to severe Pulmonary hypertension with RVSP of 57mmHg. Inferior vena cava is dilated, measuring at 2.3 cm, and does not collapse   with respiration.    No

## 2023-05-19 ENCOUNTER — HOSPITAL ENCOUNTER (OUTPATIENT)
Dept: INFUSION THERAPY | Age: 88
Setting detail: INFUSION SERIES
Discharge: HOME | End: 2023-05-19
Payer: MEDICARE

## 2023-05-19 VITALS
RESPIRATION RATE: 16 BRPM | HEART RATE: 91 BPM | DIASTOLIC BLOOD PRESSURE: 44 MMHG | TEMPERATURE: 98.2 F | OXYGEN SATURATION: 100 % | SYSTOLIC BLOOD PRESSURE: 146 MMHG

## 2023-05-19 DIAGNOSIS — D50.9 IRON DEFICIENCY ANEMIA, UNSPECIFIED IRON DEFICIENCY ANEMIA TYPE: Primary | ICD-10-CM

## 2023-05-19 DIAGNOSIS — K90.9 MALABSORPTION SYNDROME: ICD-10-CM

## 2023-05-19 PROCEDURE — 6360000002 HC RX W HCPCS: Performed by: SPECIALIST

## 2023-05-19 PROCEDURE — 96366 THER/PROPH/DIAG IV INF ADDON: CPT

## 2023-05-19 PROCEDURE — 96365 THER/PROPH/DIAG IV INF INIT: CPT

## 2023-05-19 PROCEDURE — 2580000003 HC RX 258: Performed by: SPECIALIST

## 2023-05-19 RX ORDER — SODIUM CHLORIDE 9 MG/ML
INJECTION, SOLUTION INTRAVENOUS CONTINUOUS
Status: CANCELLED | OUTPATIENT
Start: 2023-05-26

## 2023-05-19 RX ORDER — SODIUM CHLORIDE 0.9 % (FLUSH) 0.9 %
5-40 SYRINGE (ML) INJECTION PRN
Status: CANCELLED | OUTPATIENT
Start: 2023-05-26

## 2023-05-19 RX ORDER — EPINEPHRINE 1 MG/ML
0.3 INJECTION, SOLUTION, CONCENTRATE INTRAVENOUS PRN
Status: CANCELLED | OUTPATIENT
Start: 2023-05-26

## 2023-05-19 RX ORDER — DIPHENHYDRAMINE HYDROCHLORIDE 50 MG/ML
50 INJECTION INTRAMUSCULAR; INTRAVENOUS
Status: CANCELLED | OUTPATIENT
Start: 2023-05-26

## 2023-05-19 RX ORDER — ONDANSETRON 2 MG/ML
8 INJECTION INTRAMUSCULAR; INTRAVENOUS
Status: CANCELLED | OUTPATIENT
Start: 2023-05-26

## 2023-05-19 RX ORDER — FAMOTIDINE 10 MG/ML
20 INJECTION, SOLUTION INTRAVENOUS
Status: CANCELLED | OUTPATIENT
Start: 2023-05-26

## 2023-05-19 RX ORDER — SODIUM CHLORIDE 0.9 % (FLUSH) 0.9 %
5-40 SYRINGE (ML) INJECTION PRN
Status: DISCONTINUED | OUTPATIENT
Start: 2023-05-19 | End: 2023-05-20 | Stop reason: HOSPADM

## 2023-05-19 RX ORDER — ACETAMINOPHEN 325 MG/1
650 TABLET ORAL
Status: CANCELLED | OUTPATIENT
Start: 2023-05-26

## 2023-05-19 RX ORDER — ALBUTEROL SULFATE 90 UG/1
4 AEROSOL, METERED RESPIRATORY (INHALATION) PRN
Status: CANCELLED | OUTPATIENT
Start: 2023-05-26

## 2023-05-19 RX ADMIN — SODIUM CHLORIDE, PRESERVATIVE FREE 10 ML: 5 INJECTION INTRAVENOUS at 11:49

## 2023-05-19 RX ADMIN — SODIUM CHLORIDE, PRESERVATIVE FREE 10 ML: 5 INJECTION INTRAVENOUS at 13:21

## 2023-05-19 RX ADMIN — IRON SUCROSE 300 MG: 20 INJECTION, SOLUTION INTRAVENOUS at 11:51

## 2023-05-26 ENCOUNTER — HOSPITAL ENCOUNTER (OUTPATIENT)
Dept: INFUSION THERAPY | Age: 88
Setting detail: INFUSION SERIES
Discharge: HOME | End: 2023-05-26
Payer: MEDICARE

## 2023-05-26 VITALS
TEMPERATURE: 97.3 F | OXYGEN SATURATION: 100 % | HEART RATE: 50 BPM | SYSTOLIC BLOOD PRESSURE: 181 MMHG | DIASTOLIC BLOOD PRESSURE: 50 MMHG | RESPIRATION RATE: 18 BRPM

## 2023-05-26 DIAGNOSIS — K90.9 MALABSORPTION SYNDROME: ICD-10-CM

## 2023-05-26 DIAGNOSIS — D50.9 IRON DEFICIENCY ANEMIA, UNSPECIFIED IRON DEFICIENCY ANEMIA TYPE: Primary | ICD-10-CM

## 2023-05-26 PROCEDURE — 2580000003 HC RX 258: Performed by: SPECIALIST

## 2023-05-26 PROCEDURE — 96366 THER/PROPH/DIAG IV INF ADDON: CPT

## 2023-05-26 PROCEDURE — 6360000002 HC RX W HCPCS: Performed by: SPECIALIST

## 2023-05-26 PROCEDURE — 96365 THER/PROPH/DIAG IV INF INIT: CPT

## 2023-05-26 RX ORDER — EPINEPHRINE 1 MG/ML
0.3 INJECTION, SOLUTION, CONCENTRATE INTRAVENOUS PRN
Status: CANCELLED | OUTPATIENT
Start: 2023-06-02

## 2023-05-26 RX ORDER — DIPHENHYDRAMINE HYDROCHLORIDE 50 MG/ML
50 INJECTION INTRAMUSCULAR; INTRAVENOUS
Status: CANCELLED | OUTPATIENT
Start: 2023-06-02

## 2023-05-26 RX ORDER — ALBUTEROL SULFATE 90 UG/1
4 AEROSOL, METERED RESPIRATORY (INHALATION) PRN
Status: CANCELLED | OUTPATIENT
Start: 2023-06-02

## 2023-05-26 RX ORDER — SODIUM CHLORIDE 0.9 % (FLUSH) 0.9 %
5-40 SYRINGE (ML) INJECTION PRN
Status: CANCELLED | OUTPATIENT
Start: 2023-06-02

## 2023-05-26 RX ORDER — SODIUM CHLORIDE 0.9 % (FLUSH) 0.9 %
5-40 SYRINGE (ML) INJECTION PRN
Status: DISCONTINUED | OUTPATIENT
Start: 2023-05-26 | End: 2023-05-27 | Stop reason: HOSPADM

## 2023-05-26 RX ORDER — ONDANSETRON 2 MG/ML
8 INJECTION INTRAMUSCULAR; INTRAVENOUS
Status: CANCELLED | OUTPATIENT
Start: 2023-06-02

## 2023-05-26 RX ORDER — FAMOTIDINE 10 MG/ML
20 INJECTION, SOLUTION INTRAVENOUS
Status: CANCELLED | OUTPATIENT
Start: 2023-06-02

## 2023-05-26 RX ORDER — ACETAMINOPHEN 325 MG/1
650 TABLET ORAL
Status: CANCELLED | OUTPATIENT
Start: 2023-06-02

## 2023-05-26 RX ORDER — SODIUM CHLORIDE 9 MG/ML
INJECTION, SOLUTION INTRAVENOUS CONTINUOUS
Status: CANCELLED | OUTPATIENT
Start: 2023-06-02

## 2023-05-26 RX ADMIN — SODIUM CHLORIDE, PRESERVATIVE FREE 10 ML: 5 INJECTION INTRAVENOUS at 13:30

## 2023-05-26 RX ADMIN — SODIUM CHLORIDE, PRESERVATIVE FREE 10 ML: 5 INJECTION INTRAVENOUS at 11:55

## 2023-05-26 RX ADMIN — IRON SUCROSE 300 MG: 20 INJECTION, SOLUTION INTRAVENOUS at 11:55

## 2023-05-26 NOTE — PROGRESS NOTES
Patient arrived on unit, taken to room 1. Oriented to room, call light, and chair/bed controls, verbalized understanding. Pt is aware of and agreeable to POC.

## 2023-06-02 ENCOUNTER — HOSPITAL ENCOUNTER (OUTPATIENT)
Dept: INFUSION THERAPY | Age: 88
Setting detail: INFUSION SERIES
Discharge: HOME | End: 2023-06-02
Payer: MEDICARE

## 2023-06-02 VITALS
RESPIRATION RATE: 18 BRPM | TEMPERATURE: 97.3 F | DIASTOLIC BLOOD PRESSURE: 45 MMHG | HEART RATE: 50 BPM | OXYGEN SATURATION: 97 % | SYSTOLIC BLOOD PRESSURE: 164 MMHG

## 2023-06-02 DIAGNOSIS — K90.9 MALABSORPTION SYNDROME: ICD-10-CM

## 2023-06-02 DIAGNOSIS — D50.9 IRON DEFICIENCY ANEMIA, UNSPECIFIED IRON DEFICIENCY ANEMIA TYPE: Primary | ICD-10-CM

## 2023-06-02 PROCEDURE — 96366 THER/PROPH/DIAG IV INF ADDON: CPT

## 2023-06-02 PROCEDURE — 96365 THER/PROPH/DIAG IV INF INIT: CPT

## 2023-06-02 PROCEDURE — 2580000003 HC RX 258: Performed by: SPECIALIST

## 2023-06-02 PROCEDURE — 6360000002 HC RX W HCPCS: Performed by: SPECIALIST

## 2023-06-02 RX ORDER — SODIUM CHLORIDE 9 MG/ML
INJECTION, SOLUTION INTRAVENOUS CONTINUOUS
Status: CANCELLED | OUTPATIENT
Start: 2023-06-09

## 2023-06-02 RX ORDER — FAMOTIDINE 10 MG/ML
20 INJECTION, SOLUTION INTRAVENOUS
Status: CANCELLED | OUTPATIENT
Start: 2023-06-09

## 2023-06-02 RX ORDER — ACETAMINOPHEN 325 MG/1
650 TABLET ORAL
Status: CANCELLED | OUTPATIENT
Start: 2023-06-09

## 2023-06-02 RX ORDER — ONDANSETRON 2 MG/ML
8 INJECTION INTRAMUSCULAR; INTRAVENOUS
Status: CANCELLED | OUTPATIENT
Start: 2023-06-09

## 2023-06-02 RX ORDER — SODIUM CHLORIDE 0.9 % (FLUSH) 0.9 %
5-40 SYRINGE (ML) INJECTION PRN
Status: CANCELLED | OUTPATIENT
Start: 2023-06-09

## 2023-06-02 RX ORDER — DIPHENHYDRAMINE HYDROCHLORIDE 50 MG/ML
50 INJECTION INTRAMUSCULAR; INTRAVENOUS
Status: CANCELLED | OUTPATIENT
Start: 2023-06-09

## 2023-06-02 RX ORDER — SODIUM CHLORIDE 0.9 % (FLUSH) 0.9 %
5-40 SYRINGE (ML) INJECTION PRN
Status: DISCONTINUED | OUTPATIENT
Start: 2023-06-02 | End: 2023-06-03 | Stop reason: HOSPADM

## 2023-06-02 RX ORDER — EPINEPHRINE 1 MG/ML
0.3 INJECTION, SOLUTION, CONCENTRATE INTRAVENOUS PRN
Status: CANCELLED | OUTPATIENT
Start: 2023-06-09

## 2023-06-02 RX ORDER — ALBUTEROL SULFATE 90 UG/1
4 AEROSOL, METERED RESPIRATORY (INHALATION) PRN
Status: CANCELLED | OUTPATIENT
Start: 2023-06-09

## 2023-06-02 RX ADMIN — SODIUM CHLORIDE, PRESERVATIVE FREE 10 ML: 5 INJECTION INTRAVENOUS at 11:29

## 2023-06-02 RX ADMIN — SODIUM CHLORIDE, PRESERVATIVE FREE 10 ML: 5 INJECTION INTRAVENOUS at 13:02

## 2023-06-02 RX ADMIN — IRON SUCROSE 300 MG: 20 INJECTION, SOLUTION INTRAVENOUS at 11:30

## 2023-06-02 NOTE — PROGRESS NOTES
Patient arrived on unit, taken to room 3. Oriented to room, call light, and chair/bed controls, verbalized understanding. Pt is aware of and agreeable to POC.

## 2023-06-02 NOTE — PROGRESS NOTES
Prior to discharge, the After Visit Summary Discharge Instructions were reviewed with the patient. She was offered a printed version of the AVS, but declined the offer. Recurrent appointment, pt tolerated infusion well. Pt discharged home. Pt to exit via steady gait.     Orders Placed This Encounter   Medications    iron sucrose (VENOFER) 300 mg in sodium chloride 0.9 % 250 mL IVPB    sodium chloride flush 0.9 % injection 5-40 mL

## 2023-06-07 ENCOUNTER — OFFICE VISIT (OUTPATIENT)
Dept: FAMILY MEDICINE CLINIC | Age: 88
End: 2023-06-07
Payer: MEDICARE

## 2023-06-07 VITALS
TEMPERATURE: 97.1 F | DIASTOLIC BLOOD PRESSURE: 78 MMHG | BODY MASS INDEX: 23.75 KG/M2 | WEIGHT: 121.6 LBS | SYSTOLIC BLOOD PRESSURE: 122 MMHG | OXYGEN SATURATION: 95 % | HEART RATE: 83 BPM

## 2023-06-07 DIAGNOSIS — S61.211A LACERATION OF LEFT INDEX FINGER WITHOUT FOREIGN BODY WITHOUT DAMAGE TO NAIL, INITIAL ENCOUNTER: Primary | ICD-10-CM

## 2023-06-07 PROCEDURE — 1123F ACP DISCUSS/DSCN MKR DOCD: CPT | Performed by: FAMILY MEDICINE

## 2023-06-07 PROCEDURE — 99213 OFFICE O/P EST LOW 20 MIN: CPT | Performed by: FAMILY MEDICINE

## 2023-06-07 PROCEDURE — 12001 RPR S/N/AX/GEN/TRNK 2.5CM/<: CPT | Performed by: FAMILY MEDICINE

## 2023-06-07 RX ORDER — AMOXICILLIN AND CLAVULANATE POTASSIUM 875; 125 MG/1; MG/1
1 TABLET, FILM COATED ORAL 2 TIMES DAILY
Qty: 14 TABLET | Refills: 0 | Status: SHIPPED | OUTPATIENT
Start: 2023-06-07 | End: 2023-06-14

## 2023-06-07 ASSESSMENT — PATIENT HEALTH QUESTIONNAIRE - PHQ9
2. FEELING DOWN, DEPRESSED OR HOPELESS: 0
7. TROUBLE CONCENTRATING ON THINGS, SUCH AS READING THE NEWSPAPER OR WATCHING TELEVISION: 0
9. THOUGHTS THAT YOU WOULD BE BETTER OFF DEAD, OR OF HURTING YOURSELF: 0
8. MOVING OR SPEAKING SO SLOWLY THAT OTHER PEOPLE COULD HAVE NOTICED. OR THE OPPOSITE, BEING SO FIGETY OR RESTLESS THAT YOU HAVE BEEN MOVING AROUND A LOT MORE THAN USUAL: 0
4. FEELING TIRED OR HAVING LITTLE ENERGY: 0
1. LITTLE INTEREST OR PLEASURE IN DOING THINGS: 0
SUM OF ALL RESPONSES TO PHQ QUESTIONS 1-9: 0
6. FEELING BAD ABOUT YOURSELF - OR THAT YOU ARE A FAILURE OR HAVE LET YOURSELF OR YOUR FAMILY DOWN: 0
3. TROUBLE FALLING OR STAYING ASLEEP: 0
SUM OF ALL RESPONSES TO PHQ9 QUESTIONS 1 & 2: 0
SUM OF ALL RESPONSES TO PHQ QUESTIONS 1-9: 0
10. IF YOU CHECKED OFF ANY PROBLEMS, HOW DIFFICULT HAVE THESE PROBLEMS MADE IT FOR YOU TO DO YOUR WORK, TAKE CARE OF THINGS AT HOME, OR GET ALONG WITH OTHER PEOPLE: 0
SUM OF ALL RESPONSES TO PHQ QUESTIONS 1-9: 0
5. POOR APPETITE OR OVEREATING: 0
SUM OF ALL RESPONSES TO PHQ QUESTIONS 1-9: 0

## 2023-06-07 NOTE — PROGRESS NOTES
SUBJECTIVE:   80 y.o. female sustained laceration of finger 1 hours ago. [place] Sis of injury: Was trying to break a branch off a tree and it broke and lacerated her index finger. Tetanus vaccination status reviewed: last tetanus booster within 10 years, tetanus re-vaccination not indicated. OBJECTIVE:   Patient appears well, vitals are normal. Laceration 2 cm noted. Description: clean wound edges, no foreign bodies. Neurovascular and tendon structures are intact. ASSESSMENT:   Laceration as described. PLAN:   Anesthesia with 1% Lidocaine without Epinephrine. Wound cleansed, debrided of visible foreign material and necrotic tissue, and sutured. 5 interrupted sutures placed antibiotic ointment and dressing applied. Wound care instructions provided. Observe for any signs of infection or other problems. Return for suture removal in 7 days. Augmentin 175 mg twice daily for 7 days.

## 2023-06-09 ENCOUNTER — HOSPITAL ENCOUNTER (OUTPATIENT)
Dept: INFUSION THERAPY | Age: 88
Setting detail: INFUSION SERIES
Discharge: HOME | End: 2023-06-09
Payer: MEDICARE

## 2023-06-09 VITALS
OXYGEN SATURATION: 96 % | TEMPERATURE: 98 F | DIASTOLIC BLOOD PRESSURE: 60 MMHG | SYSTOLIC BLOOD PRESSURE: 150 MMHG | RESPIRATION RATE: 16 BRPM | HEART RATE: 59 BPM

## 2023-06-09 DIAGNOSIS — D50.9 IRON DEFICIENCY ANEMIA, UNSPECIFIED IRON DEFICIENCY ANEMIA TYPE: Primary | ICD-10-CM

## 2023-06-09 DIAGNOSIS — K90.9 MALABSORPTION SYNDROME: ICD-10-CM

## 2023-06-09 PROCEDURE — 6360000002 HC RX W HCPCS: Performed by: SPECIALIST

## 2023-06-09 PROCEDURE — 2580000003 HC RX 258: Performed by: SPECIALIST

## 2023-06-09 PROCEDURE — 96366 THER/PROPH/DIAG IV INF ADDON: CPT

## 2023-06-09 PROCEDURE — 96365 THER/PROPH/DIAG IV INF INIT: CPT

## 2023-06-09 RX ORDER — SODIUM CHLORIDE 0.9 % (FLUSH) 0.9 %
5-40 SYRINGE (ML) INJECTION PRN
Status: DISCONTINUED | OUTPATIENT
Start: 2023-06-09 | End: 2023-06-09

## 2023-06-09 RX ORDER — ACETAMINOPHEN 325 MG/1
650 TABLET ORAL
Status: CANCELLED | OUTPATIENT
Start: 2023-06-09

## 2023-06-09 RX ORDER — EPINEPHRINE 1 MG/ML
0.3 INJECTION, SOLUTION, CONCENTRATE INTRAVENOUS PRN
Status: CANCELLED | OUTPATIENT
Start: 2023-06-09

## 2023-06-09 RX ORDER — FAMOTIDINE 10 MG/ML
20 INJECTION, SOLUTION INTRAVENOUS
Status: CANCELLED | OUTPATIENT
Start: 2023-06-09

## 2023-06-09 RX ORDER — DIPHENHYDRAMINE HYDROCHLORIDE 50 MG/ML
50 INJECTION INTRAMUSCULAR; INTRAVENOUS
Status: CANCELLED | OUTPATIENT
Start: 2023-06-09

## 2023-06-09 RX ORDER — ONDANSETRON 2 MG/ML
8 INJECTION INTRAMUSCULAR; INTRAVENOUS
Status: CANCELLED | OUTPATIENT
Start: 2023-06-09

## 2023-06-09 RX ORDER — SODIUM CHLORIDE 0.9 % (FLUSH) 0.9 %
5-40 SYRINGE (ML) INJECTION PRN
Status: CANCELLED | OUTPATIENT
Start: 2023-06-09

## 2023-06-09 RX ORDER — SODIUM CHLORIDE 9 MG/ML
INJECTION, SOLUTION INTRAVENOUS CONTINUOUS
Status: CANCELLED | OUTPATIENT
Start: 2023-06-09

## 2023-06-09 RX ORDER — ALBUTEROL SULFATE 90 UG/1
4 AEROSOL, METERED RESPIRATORY (INHALATION) PRN
Status: CANCELLED | OUTPATIENT
Start: 2023-06-09

## 2023-06-09 RX ADMIN — SODIUM CHLORIDE, PRESERVATIVE FREE 10 ML: 5 INJECTION INTRAVENOUS at 13:30

## 2023-06-09 RX ADMIN — SODIUM CHLORIDE, PRESERVATIVE FREE 10 ML: 5 INJECTION INTRAVENOUS at 11:51

## 2023-06-09 RX ADMIN — IRON SUCROSE 300 MG: 20 INJECTION, SOLUTION INTRAVENOUS at 11:52

## 2023-06-09 NOTE — PROGRESS NOTES
Tolerated INFUSION well. Reviewed discharge instruction, voiced understanding. Copies of AVS given. Pt discharged HOME. Pt to exit via STEADY GAIT.     Orders Placed This Encounter   Medications    iron sucrose (VENOFER) 300 mg in sodium chloride 0.9 % 250 mL IVPB    sodium chloride flush 0.9 % injection 5-40 mL

## 2023-06-12 ENCOUNTER — HOSPITAL ENCOUNTER (OUTPATIENT)
Age: 88
Setting detail: OUTPATIENT SURGERY
Discharge: HOME | DRG: 394 | End: 2023-06-12
Attending: SPECIALIST | Admitting: SPECIALIST
Payer: MEDICARE

## 2023-06-12 VITALS
HEART RATE: 56 BPM | DIASTOLIC BLOOD PRESSURE: 77 MMHG | RESPIRATION RATE: 16 BRPM | WEIGHT: 121 LBS | BODY MASS INDEX: 22.84 KG/M2 | OXYGEN SATURATION: 96 % | TEMPERATURE: 97.2 F | HEIGHT: 61 IN | SYSTOLIC BLOOD PRESSURE: 130 MMHG

## 2023-06-12 DIAGNOSIS — D64.9 ANEMIA, UNSPECIFIED TYPE: ICD-10-CM

## 2023-06-12 PROBLEM — D12.3 BENIGN NEOPLASM OF TRANSVERSE COLON: Status: ACTIVE | Noted: 2023-06-12

## 2023-06-12 PROCEDURE — 45385 COLONOSCOPY W/LESION REMOVAL: CPT | Performed by: SPECIALIST

## 2023-06-12 PROCEDURE — 0DBL8ZZ EXCISION OF TRANSVERSE COLON, VIA NATURAL OR ARTIFICIAL OPENING ENDOSCOPIC: ICD-10-PCS | Performed by: SPECIALIST

## 2023-06-12 PROCEDURE — 2709999900 HC NON-CHARGEABLE SUPPLY: Performed by: SPECIALIST

## 2023-06-12 PROCEDURE — 43235 EGD DIAGNOSTIC BRUSH WASH: CPT | Performed by: SPECIALIST

## 2023-06-12 PROCEDURE — 3700000001 HC ADD 15 MINUTES (ANESTHESIA): Performed by: SPECIALIST

## 2023-06-12 PROCEDURE — 2580000003 HC RX 258: Performed by: ANESTHESIOLOGY

## 2023-06-12 PROCEDURE — 7100000010 HC PHASE II RECOVERY - FIRST 15 MIN: Performed by: SPECIALIST

## 2023-06-12 PROCEDURE — 3609010600 HC COLONOSCOPY POLYPECTOMY SNARE/COLD BIOPSY: Performed by: SPECIALIST

## 2023-06-12 PROCEDURE — 3609017100 HC EGD: Performed by: SPECIALIST

## 2023-06-12 PROCEDURE — 7100000011 HC PHASE II RECOVERY - ADDTL 15 MIN: Performed by: SPECIALIST

## 2023-06-12 PROCEDURE — 3700000000 HC ANESTHESIA ATTENDED CARE: Performed by: SPECIALIST

## 2023-06-12 PROCEDURE — 88305 TISSUE EXAM BY PATHOLOGIST: CPT | Performed by: PATHOLOGY

## 2023-06-12 PROCEDURE — 0DJ08ZZ INSPECTION OF UPPER INTESTINAL TRACT, VIA NATURAL OR ARTIFICIAL OPENING ENDOSCOPIC: ICD-10-PCS | Performed by: SPECIALIST

## 2023-06-12 RX ORDER — SODIUM CHLORIDE, SODIUM LACTATE, POTASSIUM CHLORIDE, CALCIUM CHLORIDE 600; 310; 30; 20 MG/100ML; MG/100ML; MG/100ML; MG/100ML
INJECTION, SOLUTION INTRAVENOUS CONTINUOUS
Status: DISCONTINUED | OUTPATIENT
Start: 2023-06-12 | End: 2023-06-12 | Stop reason: HOSPADM

## 2023-06-12 RX ADMIN — SODIUM CHLORIDE, POTASSIUM CHLORIDE, SODIUM LACTATE AND CALCIUM CHLORIDE: 600; 310; 30; 20 INJECTION, SOLUTION INTRAVENOUS at 11:43

## 2023-06-12 ASSESSMENT — PAIN SCALES - GENERAL
PAINLEVEL_OUTOF10: 0
PAINLEVEL_OUTOF10: 0

## 2023-06-12 ASSESSMENT — PAIN - FUNCTIONAL ASSESSMENT: PAIN_FUNCTIONAL_ASSESSMENT: 0-10

## 2023-06-12 NOTE — DISCHARGE INSTRUCTIONS
[facility]  [phone]    Do not drive, work around [address] [place], [place] [address] St or use equipment. Do not drink any alcoholic beverages. Do not smoke while alone. Avoid making important decisions. Plan to spend a quiet, relaxed evening @ home. Resume normal activities as you begin to feel better. Eat lightly for your first meal, then gradually increase your diet to what is normal for you. In case of nausea, avoid food and drink only clear liquids. Resume food as nausea ceases. Notify your surgeon if you experience fever, chills, large amount of bleeding, difficulty breathing, persistent nausea and vomiting or any other disturbing problem. Call for a follow-up appointment with your surgeon. COLONOSCOPY    DR. Alivia Guzman    OFFICE NUMBER [phone]    FOLLOW UP APPOINTMENT AS NEEDED. REPEAT PROCEDURE IN AS NEEDED. TEST ORDERED: NONE    What to expect at home: Your Recovery   Your doctor will tell you when you can eat and do your other usual activities Your doctor will talk to you about when you will need your next colonoscopy. Your doctor can help you decide how often you need to be checked. This will depend on the results of your test and your risk for colorectal cancer. After the test, you may be bloated or have gas pains. You may need to pass gas. If a biopsy was done or a polyp was removed, you may have streaks of blood in your stool (feces) for a few days. This care sheet gives you a general idea about how long it will take for you to recover. But each person recovers at a different pace. Follow the steps below to get better as quickly as possible. How can you care for yourself at home? Activity  Rest when you feel tired. Diet  Follow your doctor's directions for eating. Unless your doctor has told you not to, drink plenty of fluids. This helps to replace the fluids that were lost during the colon prep. DO NOT DRINK ALCOHOL.   Medicines  Your doctor will tell you if and when

## 2023-06-12 NOTE — PROGRESS NOTES
1059- Patient arrived back to [facility]. Report given to this nurse from [address] [place] [address] Patient A&O, No C/O pain. Beverage of choice offered to patient. Call light in reach and bed in lowest position. 1322- IV removed. Discharge instructions given to patient and her daughter understanding verbalized. Patient sitting on side of bed getting dressed. 1326- Patient escorted to car via wheelchair transported home by daughter.

## 2023-06-13 ENCOUNTER — TELEPHONE (OUTPATIENT)
Dept: GASTROENTEROLOGY | Age: 88
End: 2023-06-13

## 2023-06-13 ENCOUNTER — HOSPITAL ENCOUNTER (INPATIENT)
Age: 88
LOS: 1 days | Discharge: HOME | DRG: 394 | End: 2023-06-14
Attending: EMERGENCY MEDICINE
Payer: MEDICARE

## 2023-06-13 DIAGNOSIS — K92.2 GASTROINTESTINAL HEMORRHAGE, UNSPECIFIED GASTROINTESTINAL HEMORRHAGE TYPE: Primary | ICD-10-CM

## 2023-06-13 DIAGNOSIS — Z98.890 S/P COLONOSCOPY WITH POLYPECTOMY: ICD-10-CM

## 2023-06-13 DIAGNOSIS — I10 ESSENTIAL HYPERTENSION: ICD-10-CM

## 2023-06-13 LAB
ALBUMIN SERPL-MCNC: 4.6 GM/DL (ref 3.4–5)
ALP BLD-CCNC: 52 IU/L (ref 40–129)
ALT SERPL-CCNC: 44 U/L (ref 10–40)
ANION GAP SERPL CALCULATED.3IONS-SCNC: 10 MMOL/L (ref 4–16)
APTT: 33.1 SECONDS (ref 25.1–37.1)
AST SERPL-CCNC: 45 IU/L (ref 15–37)
BASOPHILS ABSOLUTE: 0 K/CU MM
BASOPHILS RELATIVE PERCENT: 0.5 % (ref 0–1)
BILIRUB SERPL-MCNC: 0.6 MG/DL (ref 0–1)
BUN SERPL-MCNC: 7 MG/DL (ref 6–23)
CALCIUM SERPL-MCNC: 8.7 MG/DL (ref 8.3–10.6)
CHLORIDE BLD-SCNC: 100 MMOL/L (ref 99–110)
CO2: 26 MMOL/L (ref 21–32)
CREAT SERPL-MCNC: 0.7 MG/DL (ref 0.6–1.1)
DIFFERENTIAL TYPE: ABNORMAL
EOSINOPHILS ABSOLUTE: 0.2 K/CU MM
EOSINOPHILS RELATIVE PERCENT: 3.9 % (ref 0–3)
GFR SERPL CREATININE-BSD FRML MDRD: >60 ML/MIN/1.73M2
GLUCOSE SERPL-MCNC: 83 MG/DL (ref 70–99)
HCT VFR BLD CALC: 42.5 % (ref 37–47)
HEMOGLOBIN: 12.6 GM/DL (ref 12.5–16)
IMMATURE NEUTROPHIL %: 0.2 % (ref 0–0.43)
INR BLD: 0.87 INDEX
LACTATE: 0.9 MMOL/L (ref 0.5–1.9)
LIPASE: 42 IU/L (ref 13–60)
LYMPHOCYTES ABSOLUTE: 2.6 K/CU MM
LYMPHOCYTES RELATIVE PERCENT: 42.8 % (ref 24–44)
MCH RBC QN AUTO: 26.6 PG (ref 27–31)
MCHC RBC AUTO-ENTMCNC: 29.6 % (ref 32–36)
MCV RBC AUTO: 89.9 FL (ref 78–100)
MONOCYTES ABSOLUTE: 0.6 K/CU MM
MONOCYTES RELATIVE PERCENT: 10.1 % (ref 0–4)
NUCLEATED RBC %: 0 %
PDW BLD-RTO: 22.5 % (ref 11.7–14.9)
PLATELET # BLD: 204 K/CU MM (ref 140–440)
PMV BLD AUTO: 9.2 FL (ref 7.5–11.1)
POTASSIUM SERPL-SCNC: 5 MMOL/L (ref 3.5–5.1)
PROTHROMBIN TIME: 11.1 SECONDS (ref 11.7–14.5)
RBC # BLD: 4.73 M/CU MM (ref 4.2–5.4)
SEGMENTED NEUTROPHILS ABSOLUTE COUNT: 2.6 K/CU MM
SEGMENTED NEUTROPHILS RELATIVE PERCENT: 42.5 % (ref 36–66)
SODIUM BLD-SCNC: 136 MMOL/L (ref 135–145)
TOTAL IMMATURE NEUTOROPHIL: 0.01 K/CU MM
TOTAL NUCLEATED RBC: 0 K/CU MM
TOTAL PROTEIN: 7.3 GM/DL (ref 6.4–8.2)
WBC # BLD: 6.1 K/CU MM (ref 4–10.5)

## 2023-06-13 PROCEDURE — 6370000000 HC RX 637 (ALT 250 FOR IP): Performed by: EMERGENCY MEDICINE

## 2023-06-13 PROCEDURE — 85730 THROMBOPLASTIN TIME PARTIAL: CPT

## 2023-06-13 PROCEDURE — 80053 COMPREHEN METABOLIC PANEL: CPT

## 2023-06-13 PROCEDURE — 85610 PROTHROMBIN TIME: CPT

## 2023-06-13 PROCEDURE — 2580000003 HC RX 258: Performed by: INTERNAL MEDICINE

## 2023-06-13 PROCEDURE — 1200000000 HC SEMI PRIVATE

## 2023-06-13 PROCEDURE — 83605 ASSAY OF LACTIC ACID: CPT

## 2023-06-13 PROCEDURE — 6370000000 HC RX 637 (ALT 250 FOR IP): Performed by: INTERNAL MEDICINE

## 2023-06-13 PROCEDURE — G0378 HOSPITAL OBSERVATION PER HR: HCPCS

## 2023-06-13 PROCEDURE — 85025 COMPLETE CBC W/AUTO DIFF WBC: CPT

## 2023-06-13 PROCEDURE — 83690 ASSAY OF LIPASE: CPT

## 2023-06-13 PROCEDURE — 99285 EMERGENCY DEPT VISIT HI MDM: CPT

## 2023-06-13 RX ORDER — ESCITALOPRAM OXALATE 10 MG/1
20 TABLET ORAL DAILY
Status: DISCONTINUED | OUTPATIENT
Start: 2023-06-14 | End: 2023-06-14 | Stop reason: HOSPADM

## 2023-06-13 RX ORDER — LISINOPRIL 5 MG/1
TABLET ORAL
Status: DISPENSED
Start: 2023-06-13 | End: 2023-06-14

## 2023-06-13 RX ORDER — ALBUTEROL SULFATE 90 UG/1
2 AEROSOL, METERED RESPIRATORY (INHALATION) 4 TIMES DAILY
Status: DISCONTINUED | OUTPATIENT
Start: 2023-06-13 | End: 2023-06-14 | Stop reason: HOSPADM

## 2023-06-13 RX ORDER — IPRATROPIUM BROMIDE AND ALBUTEROL SULFATE 2.5; .5 MG/3ML; MG/3ML
1 SOLUTION RESPIRATORY (INHALATION)
Status: DISCONTINUED | OUTPATIENT
Start: 2023-06-13 | End: 2023-06-13

## 2023-06-13 RX ORDER — LEVOTHYROXINE SODIUM 0.07 MG/1
75 TABLET ORAL DAILY
Status: DISCONTINUED | OUTPATIENT
Start: 2023-06-14 | End: 2023-06-14 | Stop reason: HOSPADM

## 2023-06-13 RX ORDER — LISINOPRIL 5 MG/1
2.5 TABLET ORAL ONCE
Status: COMPLETED | OUTPATIENT
Start: 2023-06-13 | End: 2023-06-13

## 2023-06-13 RX ORDER — ATORVASTATIN CALCIUM 40 MG/1
40 TABLET, FILM COATED ORAL NIGHTLY
Status: DISCONTINUED | OUTPATIENT
Start: 2023-06-13 | End: 2023-06-14 | Stop reason: HOSPADM

## 2023-06-13 RX ORDER — SODIUM CHLORIDE 0.9 % (FLUSH) 0.9 %
5-40 SYRINGE (ML) INJECTION EVERY 12 HOURS SCHEDULED
Status: DISCONTINUED | OUTPATIENT
Start: 2023-06-13 | End: 2023-06-14 | Stop reason: HOSPADM

## 2023-06-13 RX ORDER — SODIUM CHLORIDE 9 MG/ML
INJECTION, SOLUTION INTRAVENOUS PRN
Status: DISCONTINUED | OUTPATIENT
Start: 2023-06-13 | End: 2023-06-14 | Stop reason: HOSPADM

## 2023-06-13 RX ORDER — PANTOPRAZOLE SODIUM 40 MG/1
40 TABLET, DELAYED RELEASE ORAL
Status: DISCONTINUED | OUTPATIENT
Start: 2023-06-14 | End: 2023-06-14 | Stop reason: HOSPADM

## 2023-06-13 RX ORDER — ONDANSETRON 4 MG/1
4 TABLET, ORALLY DISINTEGRATING ORAL EVERY 8 HOURS PRN
Status: DISCONTINUED | OUTPATIENT
Start: 2023-06-13 | End: 2023-06-14 | Stop reason: HOSPADM

## 2023-06-13 RX ORDER — ACETAMINOPHEN 650 MG/1
650 SUPPOSITORY RECTAL EVERY 6 HOURS PRN
Status: DISCONTINUED | OUTPATIENT
Start: 2023-06-13 | End: 2023-06-14 | Stop reason: HOSPADM

## 2023-06-13 RX ORDER — MONTELUKAST SODIUM 10 MG/1
10 TABLET ORAL NIGHTLY
Status: DISCONTINUED | OUTPATIENT
Start: 2023-06-13 | End: 2023-06-14 | Stop reason: HOSPADM

## 2023-06-13 RX ORDER — POLYETHYLENE GLYCOL 3350 17 G/17G
17 POWDER, FOR SOLUTION ORAL DAILY PRN
Status: DISCONTINUED | OUTPATIENT
Start: 2023-06-13 | End: 2023-06-14 | Stop reason: HOSPADM

## 2023-06-13 RX ORDER — ATORVASTATIN CALCIUM 10 MG/1
TABLET, FILM COATED ORAL
Status: DISCONTINUED
Start: 2023-06-13 | End: 2023-06-13 | Stop reason: WASHOUT

## 2023-06-13 RX ORDER — ONDANSETRON 2 MG/ML
4 INJECTION INTRAMUSCULAR; INTRAVENOUS EVERY 6 HOURS PRN
Status: DISCONTINUED | OUTPATIENT
Start: 2023-06-13 | End: 2023-06-14 | Stop reason: HOSPADM

## 2023-06-13 RX ORDER — SODIUM CHLORIDE 0.9 % (FLUSH) 0.9 %
5-40 SYRINGE (ML) INJECTION PRN
Status: DISCONTINUED | OUTPATIENT
Start: 2023-06-13 | End: 2023-06-14 | Stop reason: HOSPADM

## 2023-06-13 RX ORDER — TIMOLOL MALEATE 5 MG/ML
1 SOLUTION/ DROPS OPHTHALMIC DAILY
Status: DISCONTINUED | OUTPATIENT
Start: 2023-06-14 | End: 2023-06-14 | Stop reason: HOSPADM

## 2023-06-13 RX ORDER — ACETAMINOPHEN 325 MG/1
650 TABLET ORAL EVERY 6 HOURS PRN
Status: DISCONTINUED | OUTPATIENT
Start: 2023-06-13 | End: 2023-06-14 | Stop reason: HOSPADM

## 2023-06-13 RX ORDER — AMOXICILLIN AND CLAVULANATE POTASSIUM 875; 125 MG/1; MG/1
1 TABLET, FILM COATED ORAL 2 TIMES DAILY
Status: DISCONTINUED | OUTPATIENT
Start: 2023-06-13 | End: 2023-06-14 | Stop reason: HOSPADM

## 2023-06-13 RX ORDER — ALBUTEROL SULFATE 90 UG/1
2 AEROSOL, METERED RESPIRATORY (INHALATION) EVERY 6 HOURS PRN
Status: DISCONTINUED | OUTPATIENT
Start: 2023-06-13 | End: 2023-06-14 | Stop reason: HOSPADM

## 2023-06-13 RX ADMIN — LISINOPRIL 2.5 MG: 5 TABLET ORAL at 17:22

## 2023-06-13 RX ADMIN — MONTELUKAST 10 MG: 10 TABLET, FILM COATED ORAL at 20:32

## 2023-06-13 RX ADMIN — SODIUM CHLORIDE, PRESERVATIVE FREE 5 ML: 5 INJECTION INTRAVENOUS at 20:33

## 2023-06-13 RX ADMIN — AMOXICILLIN AND CLAVULANATE POTASSIUM 1 TABLET: 875; 125 TABLET, FILM COATED ORAL at 20:32

## 2023-06-13 RX ADMIN — ATORVASTATIN CALCIUM 40 MG: 10 TABLET, FILM COATED ORAL at 20:32

## 2023-06-13 ASSESSMENT — PAIN SCALES - GENERAL
PAINLEVEL_OUTOF10: 0
PAINLEVEL_OUTOF10: 0
PAINLEVEL_OUTOF10: 1

## 2023-06-13 NOTE — ED NOTES
[facility] paged Dr Ham Maldonado  06/13/23 [id number] Dr Jesenia Langley returned call      [facility] [employer]  06/13/23 [phone]

## 2023-06-13 NOTE — ED PROVIDER NOTES
Other Mother         thyroid    Heart Disease Father     Arthritis Father     High Blood Pressure Father     High Cholesterol Father     Other Father         glaucoma    Other Sister         thyroid, glaucoma    Diabetes Brother     Other Sister     Vision Loss Sister         lung problems, smoker    Cancer Sister         throat cancer    Other Son         HX of clots    Early Death Son         Hit by car and killed. High Blood Pressure Daughter     Stroke Son         No residual    Other Son         HX myocarditis     Social History     Socioeconomic History    Marital status:      Spouse name: Not on file    Number of children: Not on file    Years of education: Not on file    Highest education level: Not on file   Occupational History    Not on file   Tobacco Use    Smoking status: Former     Packs/day: 3.00     Years: 5.00     Pack years: 15.00     Types: Cigarettes     Quit date: 1962     Years since quittin.4    Smokeless tobacco: Never   Vaping Use    Vaping Use: Never used   Substance and Sexual Activity    Alcohol use: No     Comment: caffeine: 3 diet coke a day    Drug use: Yes     Types: Marijuana Littie Plump)     Comment: Takes for pain. Has medical card, ask not to use 24-48 hours before procedure. Sexual activity: Not on file   Other Topics Concern    Not on file   Social History Narrative    Not on file     Social Determinants of Health     Financial Resource Strain: Not on file   Food Insecurity: Not on file   Transportation Needs: Not on file   Physical Activity: Sufficiently Active    Days of Exercise per Week: 7 days    Minutes of Exercise per Session: 30 min   Stress: Not on file   Social Connections: Not on file   Intimate Partner Violence: Not on file   Housing Stability: Not on file     No current facility-administered medications for this encounter. Current Outpatient Medications   Medication Sig Dispense Refill    UNABLE TO FIND Cannabis pills.       OXYGEN Inhale into the

## 2023-06-13 NOTE — ED NOTES
ED TO INPATIENT SBAR HANDOFF    Patient Name: Rocky Capser   :  1935  80 y.o. Preferred Name  Clemente Ramirez  Family/Caregiver Present no   Restraints no   C-SSRS: Risk of Suicide: No Risk  Sitter no   Sepsis Risk Score Sepsis Risk Score: 1.05      Situation  Chief Complaint   Patient presents with    Rectal Bleeding     Rectal bleeding and passing large clots. Patient had colonoscopy and EGD with Dr. Donell Mendoza at this facility yesterday. Brief Description of Patient's Condition: patient had colonoscopy yesterday and is still having rectal bleeding  Mental Status: oriented, alert, coherent, logical, thought processes intact, and able to concentrate and follow conversation  Arrived from: home    Imaging:   No orders to display     Abnormal labs:   Abnormal Labs Reviewed   CBC WITH AUTO DIFFERENTIAL - Abnormal; Notable for the following components:       Result Value    MCH 26.6 (*)     MCHC 29.6 (*)     RDW 22.5 (*)     Monocytes % 10.1 (*)     Eosinophils % 3.9 (*)     All other components within normal limits   COMPREHENSIVE METABOLIC PANEL - Abnormal; Notable for the following components:    ALT 44 (*)     AST 45 (*)     All other components within normal limits   PROTIME/INR & PTT - Abnormal; Notable for the following components:    Protime 11.1 (*)     All other components within normal limits       Background  History:   Past Medical History:   Diagnosis Date    Arthritis     generalized    Asthma     Blood transfusion     No reaction    Broken heart syndrome     Cerebral artery occlusion with cerebral infarction [facility])     Patient \"states she was told she has had three small strokes. \"    Chronic bronchitis ([address]     COPD (chronic obstructive pulmonary disease) ([address]     summer 2014    COPD exacerbation ([address] 10/31/2018    COPD with exacerbation ([address] 2019    Echocardiogram 2021    EF 55-60%, Mild dilated LA, Mild annular calcification present, Mild mitral stenosis, Mild MR & TR.     Fatigue

## 2023-06-13 NOTE — CARE COORDINATION
[facility] [place] bleed reviewed. At this time, criteria supports inpatient.

## 2023-06-13 NOTE — H&P
V2.0  History and Physical      Name:  Jaye Bullard /Age/Sex: 1935  (80 y.o. female)   MRN & CSN:  [id number] & [id number] Encounter Date/Time: 2023 5:36 PM EDT   Location:  ED10/ED-10 PCP: Bear Franco MD       Hospital Day: 1    Assessment and Plan:   Jaye Bullard is a 80 y.o. female with a pmh of COPD, bronchitis, severe aortic stenosis s/p TAVR hypertension who presents with Lower GI bleed    Hospital Problems             Last Modified POA    * (Principal) Lower GI bleed 2023 Yes       Bright red blood per rectum  Had endoscopy and colonoscopy 2023 with Dr. Darvin Louis  3 mm polyp found in hepatic flexure was removed with cold snare  Patient had 3 bloody bowel movements at home  Hemoglobin on presentation 12.6 G/DL. Prior hemoglobin in March appears around 8-9 G/DL. Repeat CBC in a.m. Clear liquid diet for now. N.p.o. after midnight in case of need of intervention tomorrow AM.    Chronic anemia  Baseline hemoglobin 8-9 G/DL  Underwent EGD and colonoscopy on 2023 as part of anemia work-up    Severe aortic stenosis  S/p TAVR in 2019  On aspirin. Hold due to GI bleed. History of gastric bypass    History of COPD  Not in exacerbation  Resume home meds    Hypertension  Elevated SBP in 160s to 628H but diastolic 32Z to 87A with elevated pulse pressure  On half of 2.5 mg lisinopril at home, continue    Hypothyroidism  On Synthroid 75 mcg daily, continue    Disposition:   Current Living situation: Home  Expected Disposition:   Estimated D/C:     Diet Diet NPO   DVT Prophylaxis [] Lovenox, []  Heparin, [x] SCDs, [] Ambulation,  [] Eliquis, [] Xarelto, [] Coumadin   Code Status Prior   Surrogate Decision Maker/ POA      History from:     patient    History of Present Illness:     Chief Complaint: Lower GI bleed  Jaye Bullard is a 80 y.o. female with pmh of hypertension, chronic anemia, severe aortic stenosis s/p TAVR, hypothyroidism who presents with bright red blood per rectum.

## 2023-06-13 NOTE — PROGRESS NOTES
4 Eyes Skin Assessment     NAME:  Kate Lemus  YOB: 1935  MEDICAL RECORD NUMBER:  [id number]    The patient is being assessed for  Admission    I agree that at least one RN has performed a thorough Head to Toe Skin Assessment on the patient. ALL assessment sites listed below have been assessed. Areas assessed by both nurses:    Head, Face, Ears, Shoulders, Back, Chest, Arms, Elbows, Hands, Sacrum. Buttock, Coccyx, Ischium, Legs. Feet and Heels, and Under Medical Devices         Does the Patient have a Wound?  No noted wound(s)       Moses Prevention initiated by RN: Yes  Wound Care Orders initiated by RN: No    Pressure Injury (Stage 3,4, Unstageable, DTI, NWPT, and Complex wounds) if present, place Wound referral order by RN under : No    New Ostomies, if present place, Ostomy referral order under : No     Nurse 1 eSignature: Electronically signed by Margie Serna RN on 6/13/23 at 6:53 PM EDT    **SHARE this note so that the co-signing nurse can place an eSignature**    Nurse 2 eSignature: Electronically signed by Florence Casey RN on 6/13/23 at 7:32 PM EDT

## 2023-06-14 VITALS
SYSTOLIC BLOOD PRESSURE: 159 MMHG | OXYGEN SATURATION: 96 % | RESPIRATION RATE: 16 BRPM | WEIGHT: 121.9 LBS | HEART RATE: 52 BPM | TEMPERATURE: 97.5 F | HEIGHT: 60 IN | BODY MASS INDEX: 23.93 KG/M2 | DIASTOLIC BLOOD PRESSURE: 58 MMHG

## 2023-06-14 PROBLEM — K92.2 LOWER GI BLEED: Status: RESOLVED | Noted: 2023-06-13 | Resolved: 2023-06-14

## 2023-06-14 LAB
ANION GAP SERPL CALCULATED.3IONS-SCNC: 10 MMOL/L (ref 4–16)
BASOPHILS ABSOLUTE: 0 K/CU MM
BASOPHILS RELATIVE PERCENT: 0.7 % (ref 0–1)
BUN SERPL-MCNC: 5 MG/DL (ref 6–23)
CALCIUM SERPL-MCNC: 8.5 MG/DL (ref 8.3–10.6)
CHLORIDE BLD-SCNC: 102 MMOL/L (ref 99–110)
CO2: 27 MMOL/L (ref 21–32)
CREAT SERPL-MCNC: 0.5 MG/DL (ref 0.6–1.1)
DIFFERENTIAL TYPE: ABNORMAL
EOSINOPHILS ABSOLUTE: 0.2 K/CU MM
EOSINOPHILS RELATIVE PERCENT: 5.3 % (ref 0–3)
GFR SERPL CREATININE-BSD FRML MDRD: >60 ML/MIN/1.73M2
GLUCOSE SERPL-MCNC: 98 MG/DL (ref 70–99)
HCT VFR BLD CALC: 37.8 % (ref 37–47)
HEMOGLOBIN: 11.7 GM/DL (ref 12.5–16)
IMMATURE NEUTROPHIL %: 0 % (ref 0–0.43)
LYMPHOCYTES ABSOLUTE: 2.3 K/CU MM
LYMPHOCYTES RELATIVE PERCENT: 50.8 % (ref 24–44)
MCH RBC QN AUTO: 26.8 PG (ref 27–31)
MCHC RBC AUTO-ENTMCNC: 31 % (ref 32–36)
MCV RBC AUTO: 86.7 FL (ref 78–100)
MONOCYTES ABSOLUTE: 0.4 K/CU MM
MONOCYTES RELATIVE PERCENT: 9.3 % (ref 0–4)
NUCLEATED RBC %: 0 %
PDW BLD-RTO: 22.5 % (ref 11.7–14.9)
PLATELET # BLD: 201 K/CU MM (ref 140–440)
PMV BLD AUTO: 9.5 FL (ref 7.5–11.1)
POTASSIUM SERPL-SCNC: 4.1 MMOL/L (ref 3.5–5.1)
RBC # BLD: 4.36 M/CU MM (ref 4.2–5.4)
SEGMENTED NEUTROPHILS ABSOLUTE COUNT: 1.5 K/CU MM
SEGMENTED NEUTROPHILS RELATIVE PERCENT: 33.9 % (ref 36–66)
SODIUM BLD-SCNC: 139 MMOL/L (ref 135–145)
TOTAL IMMATURE NEUTOROPHIL: 0 K/CU MM
TOTAL NUCLEATED RBC: 0 K/CU MM
WBC # BLD: 4.5 K/CU MM (ref 4–10.5)

## 2023-06-14 PROCEDURE — 6370000000 HC RX 637 (ALT 250 FOR IP): Performed by: SPECIALIST

## 2023-06-14 PROCEDURE — 6370000000 HC RX 637 (ALT 250 FOR IP): Performed by: INTERNAL MEDICINE

## 2023-06-14 PROCEDURE — G0378 HOSPITAL OBSERVATION PER HR: HCPCS

## 2023-06-14 PROCEDURE — 36415 COLL VENOUS BLD VENIPUNCTURE: CPT

## 2023-06-14 PROCEDURE — 2580000003 HC RX 258: Performed by: INTERNAL MEDICINE

## 2023-06-14 PROCEDURE — 94761 N-INVAS EAR/PLS OXIMETRY MLT: CPT

## 2023-06-14 PROCEDURE — 80048 BASIC METABOLIC PNL TOTAL CA: CPT

## 2023-06-14 PROCEDURE — 85025 COMPLETE CBC W/AUTO DIFF WBC: CPT

## 2023-06-14 PROCEDURE — 94640 AIRWAY INHALATION TREATMENT: CPT

## 2023-06-14 RX ORDER — HYDROCORTISONE ACETATE 25 MG/1
25 SUPPOSITORY RECTAL 2 TIMES DAILY
Qty: 12 SUPPOSITORY | Refills: 0 | Status: SHIPPED | OUTPATIENT
Start: 2023-06-14

## 2023-06-14 RX ORDER — HYDROCORTISONE ACETATE 25 MG/1
25 SUPPOSITORY RECTAL 2 TIMES DAILY
Status: DISCONTINUED | OUTPATIENT
Start: 2023-06-14 | End: 2023-06-14 | Stop reason: HOSPADM

## 2023-06-14 RX ADMIN — ALBUTEROL SULFATE 2 PUFF: 90 AEROSOL, METERED RESPIRATORY (INHALATION) at 08:35

## 2023-06-14 RX ADMIN — AMOXICILLIN AND CLAVULANATE POTASSIUM 1 TABLET: 875; 125 TABLET, FILM COATED ORAL at 09:04

## 2023-06-14 RX ADMIN — PANTOPRAZOLE SODIUM 40 MG: 40 TABLET, DELAYED RELEASE ORAL at 06:25

## 2023-06-14 RX ADMIN — ESCITALOPRAM OXALATE 20 MG: 10 TABLET ORAL at 09:03

## 2023-06-14 RX ADMIN — SODIUM CHLORIDE, PRESERVATIVE FREE 10 ML: 5 INJECTION INTRAVENOUS at 09:04

## 2023-06-14 RX ADMIN — ALBUTEROL SULFATE 2 PUFF: 90 AEROSOL, METERED RESPIRATORY (INHALATION) at 12:16

## 2023-06-14 RX ADMIN — LEVOTHYROXINE SODIUM 75 MCG: 0.07 TABLET ORAL at 06:25

## 2023-06-14 RX ADMIN — IPRATROPIUM BROMIDE 2 PUFF: 17 AEROSOL, METERED RESPIRATORY (INHALATION) at 08:35

## 2023-06-14 RX ADMIN — HYDROCORTISONE ACETATE 25 MG: 25 SUPPOSITORY RECTAL at 09:04

## 2023-06-14 RX ADMIN — IPRATROPIUM BROMIDE 2 PUFF: 17 AEROSOL, METERED RESPIRATORY (INHALATION) at 12:16

## 2023-06-14 NOTE — PROGRESS NOTES
Outpatient Pharmacy Progress Note for Meds-to-Beds    Total number of Prescriptions Filled: 1  The following medications were dispensed to the patient during the discharge process:  Hydrocortisone     Additional Documentation:  Patient picked-up the medication(s) in the OP Pharmacy      Thank you for letting us serve your patients.   [address]    [address], [address] [facility]    Phone: [phone]    Fax: [phone]

## 2023-06-14 NOTE — DISCHARGE SUMMARY
Jim Goins Indications:        -  Unexplained iron deficiency anemia Medications:        -  See the Anesthesia note for documentation of the administered medications Complications:        -  No immediate complications. Estimated Blood Loss:        -  Estimated blood loss was minimal. Procedure:        - The colonoscope was introduced through the anus and advanced to the cecum,           identified by appendiceal orifice and ileocecal valve. -  The colonoscopy was performed without difficulty. -  The patient tolerated the procedure well. -  The quality of the bowel preparation was evaluated using the [facility]           Bowel Preparation Scale) with scores of: Right Colon = 3, Transverse Colon = 3           and Left Colon = 3 (entire mucosa seen well with no residual staining, small           fragments of stool or opaque liquid). The total BBPS score equals 9. Findings:        -  A 3 mm polyp was found in the hepatic flexure. The polyp was sessile. The           polyp was removed with a cold snare. Resection and retrieval were complete. -  Internal hemorrhoids were found. The hemorrhoids were Grade I (internal           hemorrhoids that do not prolapse). -  The exam was otherwise without abnormality on direct and retroflexion           views. Impression:        -  One 3 mm polyp at the hepatic flexure, removed with a cold snare. Resected           and retrieved. -  Internal hemorrhoids. -  The examination was otherwise normal on direct and retroflexion views.  Recommendation:        - Per current established guidelines, repeat screening or surveillance           colonoscopy is not needed Procedure Code(s):        - 07759, Colonoscopy, flexible; with removal of tumor(s), polyp(s), or other           lesion(s) by snare technique Diagnosis Code(s):        - D50.9, Iron deficiency anemia, unspecified        - D12.3, Benign neoplasm of transverse colon

## 2023-06-14 NOTE — CONSULTS
ICU.  Dilaudid OK    Tape Rayo Gosselin Tape] Other (See Comments)     Plastic cause itching and rash. Paper tape causes my skin to blister. (Tega-derm and Coban OK to use.)   . Social History:    TOBACCO:  No  ETOH:  No    Family History: reviewed and positives included in HPI- all other pertinent GI family history negative      REVIEW OF SYSTEMS: see HPI for positives and pertinent negatives. All other systems reviewed and are negative    PHYSICAL EXAM:    Vitals:  BP (!) 149/55   Pulse 54   Temp 97.3 °F (36.3 °C) (Oral)   Resp 17   Ht 5' (1.524 m)   Wt 121 lb 14.4 oz (55.3 kg)   SpO2 95%   BMI 23.81 kg/m²   CONSTITUTIONAL: alert, cooperative, no apparent distress,   EYES:  pupils equal, round and reactive to light and sclera clear  ENT:  normocepalic, without obvious abnormality  NECK:  supple, symmetrical, trachea midline  HEMATOLOGIC/LYMPHATICS:  no cervical lymphadenopathy and no supraclavicular lymphadenopathy  LUNGS:  clear to auscultation  CARDIOVASCULAR:  regular rate and rhythm and no murmur noted  ABDOMEN:  Soft, non-tender with normal bowel sounds. No organomegaly or masses  NEUROLOGIC: no focal deficit detected  SKIN:  no lesions  EXTREMITIES: no clubbing, cyanosis, or edema  RECTAL:  no mass, brown stool -- no visible blood    IMPRESSION:  1) rectal bleeding- likely hemorrhoidal, less likely diverticular- in any event now apparently resolved- unlikely due to the small polyp      RECOMMENDATIONS:  1) reg diet- but avoid roughage x 2 weeks  2) OK with me to discharge  3) sitz baths followed vy cortisone suppos BID x 6 days        Arben Marquez M.D

## 2023-06-14 NOTE — DISCHARGE INSTRUCTIONS
Please follow up with the gastroenterologist on discharge  Please take sitz baths and then use the suppositories for the next 6 days.   Please avoid excess roughage in your meals  You will need to have your hemoglobin monitored outpatient, please follow up with your PCP or gastroenterologist  If bleeding recurs or you develop lightheadedness or dizziness, please reach out to GI or return to the nearest ED

## 2023-06-14 NOTE — CARE COORDINATION
Reviewed chart and discussed in IDR, plan home today. Spoke with pt/daughter, no needs identified. CM available as needed.

## 2023-06-19 ENCOUNTER — OFFICE VISIT (OUTPATIENT)
Dept: CARDIOLOGY CLINIC | Age: 88
End: 2023-06-19
Payer: MEDICARE

## 2023-06-19 VITALS
BODY MASS INDEX: 23.87 KG/M2 | HEIGHT: 60 IN | SYSTOLIC BLOOD PRESSURE: 118 MMHG | WEIGHT: 121.6 LBS | HEART RATE: 52 BPM | DIASTOLIC BLOOD PRESSURE: 68 MMHG

## 2023-06-19 DIAGNOSIS — I38 VHD (VALVULAR HEART DISEASE): Primary | ICD-10-CM

## 2023-06-19 PROCEDURE — 99214 OFFICE O/P EST MOD 30 MIN: CPT | Performed by: INTERNAL MEDICINE

## 2023-06-19 PROCEDURE — 1123F ACP DISCUSS/DSCN MKR DOCD: CPT | Performed by: INTERNAL MEDICINE

## 2023-06-19 RX ORDER — LISINOPRIL 2.5 MG/1
1.25 TABLET ORAL DAILY
Qty: 30 TABLET | Refills: 4 | Status: SHIPPED
Start: 2023-06-19 | End: 2023-06-19 | Stop reason: SDUPTHER

## 2023-06-19 RX ORDER — LISINOPRIL 2.5 MG/1
1.25 TABLET ORAL DAILY
Qty: 30 TABLET | Refills: 4 | Status: SHIPPED | OUTPATIENT
Start: 2023-06-19

## 2023-06-19 NOTE — PATIENT INSTRUCTIONS
**It is YOUR responsibilty to bring medication bottles and/or updated medication list to [address] [place] [address]. This will allow us to better serve you and all your healthcare needs**    Please be informed that if you contact our office outside of normal business hours the physician on call cannot help with any scheduling or rescheduling issues, procedure instruction questions or any type of medication issue. We advise you for any urgent/emergency that you go to the nearest emergency room! PLEASE CALL OUR OFFICE DURING NORMAL BUSINESS HOURS    Monday - Friday   8 am to 5 pm    [facility] [address]: [phone]    [place]:  [phone]    Thank you for allowing us to care for you today! We want to ensure we can follow your treatment plan and we strive to give you the best outcomes and experience possible. If you ever have a life threatening emergency and call 911 - for an ambulance (EMS)   Our providers can only care for you at:   [facility] or [facility]. Even if you have someone take you or you drive yourself we can only care for you in a [facility] facility. Our providers are not setup at the other healthcare locations!

## 2023-06-19 NOTE — PROGRESS NOTES
Justyna Gates  is a  Established patient  ,80 y.o.   female here for evaluation of the following chief complaint(s):    vhd        SUBJECTIVE/OBJECTIVE:  HPI : h/o  Vhd, TAVR now here  Had GI bleed post polypectomy    Review of Systems     no    Vitals:    06/19/23 1506   BP: 118/68   Site: Left Upper Arm   Position: Sitting   Cuff Size: Medium Adult   Pulse: 52   Weight: 121 lb 9.6 oz (55.2 kg)   Height: 5' (1.524 m)         /68 (Site: Left Upper Arm, Position: Sitting, Cuff Size: Medium Adult)   Pulse 52   Ht 5' (1.524 m)   Wt 121 lb 9.6 oz (55.2 kg)   BMI 23.75 kg/m²   No flowsheet data found. Wt Readings from Last 3 Encounters:   06/19/23 121 lb 9.6 oz (55.2 kg)   06/13/23 121 lb 14.4 oz (55.3 kg)   06/12/23 121 lb (54.9 kg)     Body mass index is 23.75 kg/m². Physical Exam     Neck: JVD      Lungs : clear    Cardio : Si and S2 audilble      Ext: edema      All pertinent data reviewed      Meds : reviewed           ASSESSMENT/PLAN:    - VHD had TAVR normal TAVR     - Normal stress cardiolite 3/23    -  LIPID MANAGEMENT:  Importance of lipid levels discussed with patient   and patient was given dietary advice. NCEP- ATP III guidelines reviewed with patient. -   Changes  in medicines made: No     On lipitor   complaint                -Had mild pulmonary hypertension on last echo , CPM      An electronic signature was used to authenticate this note.     --Donis Sauceda MD

## 2023-06-20 ENCOUNTER — OFFICE VISIT (OUTPATIENT)
Dept: FAMILY MEDICINE CLINIC | Age: 88
End: 2023-06-20

## 2023-06-20 VITALS
TEMPERATURE: 97.3 F | SYSTOLIC BLOOD PRESSURE: 138 MMHG | WEIGHT: 126 LBS | OXYGEN SATURATION: 96 % | BODY MASS INDEX: 24.61 KG/M2 | DIASTOLIC BLOOD PRESSURE: 64 MMHG | HEART RATE: 56 BPM

## 2023-06-20 DIAGNOSIS — K62.5 BRIGHT RED BLOOD PER RECTUM: Primary | ICD-10-CM

## 2023-06-20 DIAGNOSIS — D64.9 CHRONIC ANEMIA: ICD-10-CM

## 2023-06-20 DIAGNOSIS — E03.9 ACQUIRED HYPOTHYROIDISM: ICD-10-CM

## 2023-06-20 DIAGNOSIS — I10 PRIMARY HYPERTENSION: ICD-10-CM

## 2023-06-20 DIAGNOSIS — I35.0 AORTIC VALVE STENOSIS, ETIOLOGY OF CARDIAC VALVE DISEASE UNSPECIFIED: ICD-10-CM

## 2023-06-20 DIAGNOSIS — Z09 HOSPITAL DISCHARGE FOLLOW-UP: ICD-10-CM

## 2023-06-20 DIAGNOSIS — J44.9 CHRONIC OBSTRUCTIVE PULMONARY DISEASE, UNSPECIFIED COPD TYPE (HCC): ICD-10-CM

## 2023-06-20 SDOH — ECONOMIC STABILITY: FOOD INSECURITY: WITHIN THE PAST 12 MONTHS, YOU WORRIED THAT YOUR FOOD WOULD RUN OUT BEFORE YOU GOT MONEY TO BUY MORE.: NEVER TRUE

## 2023-06-20 SDOH — ECONOMIC STABILITY: INCOME INSECURITY: HOW HARD IS IT FOR YOU TO PAY FOR THE VERY BASICS LIKE FOOD, HOUSING, MEDICAL CARE, AND HEATING?: SOMEWHAT HARD

## 2023-06-20 SDOH — ECONOMIC STABILITY: FOOD INSECURITY: WITHIN THE PAST 12 MONTHS, THE FOOD YOU BOUGHT JUST DIDN'T LAST AND YOU DIDN'T HAVE MONEY TO GET MORE.: SOMETIMES TRUE

## 2023-06-20 NOTE — PROGRESS NOTES
Post-Discharge Transitional Care  Follow Up      Rebecca Guerra   YOB: 1935    Date of Office Visit:  6/20/2023  Date of Hospital Admission: 6/13/23  Date of Hospital Discharge: 6/14/23  Risk of hospital readmission (high >=14%. Medium >=10%) :Readmission Risk Score: 15.8      Care management risk score Rising risk (score 2-5) and Complex Care (Scores >=6): No Risk Score On File     Non face to face  following discharge, date last encounter closed (first attempt may have been earlier): 06/16/2023    Call initiated 2 business days of discharge: Yes    ASSESSMENT/PLAN:   Bright red blood per rectum  Resolved  Chronic anemia  Improving  Aortic valve stenosis, etiology of cardiac valve disease unspecified  Resolved after TAVR  Chronic obstructive pulmonary disease, unspecified COPD type ([facility] [address]  Controlled  Primary hypertension  Controlled  Acquired hypothyroidism  Controlled  Hospital discharge follow-up      Medical Decision Making: high complexity  Return in 1 month (on 7/20/2023). Subjective:   HPI:  Follow up of Hospital problems/diagnosis(es): Red blood per rectum, chronic anemia, severe aortic stenosis, COPD, hypertension, hypothyroidism, history of gastric bypass    Inpatient course: Discharge summary reviewed- see chart. Interval history/Current status: Much improved. Patient has had no bleeding, fever, weakness, or shortness of breath this discharge. Was given hydrocortisone suppositories and she has 1 dose left. Feels well.     Patient Active Problem List   Diagnosis    Closed intertrochanteric fracture of left femur (HCC)    Gait disturbance    Anemia    Dizziness    Acquired hypothyroidism    Murmur    Age-related osteoporosis without current pathological fracture    Black tongue    Vitamin D deficiency    Vitamin B12 deficiency    Gastroesophageal reflux disease without esophagitis    VHD (valvular heart disease)    Acute respiratory distress    COPD (chronic obstructive

## 2023-06-22 ENCOUNTER — CARE COORDINATION (OUTPATIENT)
Dept: CASE MANAGEMENT | Age: 88
End: 2023-06-22

## 2023-06-22 NOTE — CARE COORDINATION
[address] Care Transitions Follow Up Call    Patient Current Location:  Home: [place] [address]  [address] [address]    Care Transition Nurse contacted the patient by telephone to follow up after admission. Verified name and  with patient as identifiers. Patient: Isa Lorenzana  Patient : 1935   MRN: <[id number]>  Reason for Admission: 2023 - 2023 [address] Lower GIB, Anemia. Discharge Date: 23 RARS: Readmission Risk Score: 15.8    Cardio/F Pritesh 23  Attended  (Office /68 HR 52)  PCP 23  Attended  (Office /64 HR 56)  Pending appt GI/Daniel     Needs to be reviewed by the provider   Additional needs identified to be addressed with provider: No  none             Method of communication with provider: none. Wash Elsie shares she is beeling \"much better\". States she has a fleeting episode of dizziness last night and feels likely d/t heat intolerance. States fatigue is \"much better\". States no N/V and tolerates meals. Feels her appetite is \"good\". She shares she will call today to schedule GI appt. Reports normal stools and denies any black tarry odorous/red/maroon stools and denies any hemorrhoid bleeding. Reviewed 23 H/H 11.7/37.8. Office BP/HR has been stable. Has/taking meds a sdirected and had recent decrease in Lisinopril to 12.5 mg daily. No SOB, edema, N/V, or GI upset/pain/cramp. Pt shares she is unable to tolerate iron supplements d/t GI intolerance/constipation. Reviewed iron rich food options and to drink orange juice for vit C to aide in iron absorption, v/u. In good spirits. Addressed changes since last contact:  none  Discussed follow-up appointments. If no appointment was previously scheduled, appointment scheduling offered: Yes. Is follow up appointment scheduled within 7 days of discharge?    Cardio/F Pritesh 23  Attended  (Office /68 HR 52)  PCP 23  Attended  (Office /64 HR 56)  Pending

## 2023-06-23 ENCOUNTER — TELEPHONE (OUTPATIENT)
Dept: GASTROENTEROLOGY | Age: 88
End: 2023-06-23

## 2023-06-23 NOTE — TELEPHONE ENCOUNTER
Pt. Called in for procedure results and to see if she needed to make a f/u w/ dr. Dulce Mckeon. I informed her that all her biopsy results came back normal only one 3mm hyperplastic polyp and a small hiatal hernia and she would not need a f/u appt unless she started having problems. She verbalized understanding and denied further questions.

## 2023-07-10 ENCOUNTER — OFFICE VISIT (OUTPATIENT)
Dept: FAMILY MEDICINE CLINIC | Age: 88
End: 2023-07-10
Payer: MEDICARE

## 2023-07-10 ENCOUNTER — HOSPITAL ENCOUNTER (OUTPATIENT)
Age: 88
Setting detail: SPECIMEN
Discharge: HOME | End: 2023-07-10
Payer: MEDICARE

## 2023-07-10 ENCOUNTER — CARE COORDINATION (OUTPATIENT)
Dept: CASE MANAGEMENT | Age: 88
End: 2023-07-10

## 2023-07-10 VITALS
BODY MASS INDEX: 23.44 KG/M2 | HEIGHT: 60 IN | HEART RATE: 50 BPM | TEMPERATURE: 97.6 F | WEIGHT: 119.4 LBS | OXYGEN SATURATION: 96 %

## 2023-07-10 DIAGNOSIS — J06.9 VIRAL URI: Primary | ICD-10-CM

## 2023-07-10 DIAGNOSIS — J02.9 SORE THROAT: ICD-10-CM

## 2023-07-10 LAB — S PYO AG THROAT QL: NORMAL

## 2023-07-10 PROCEDURE — 87635 SARS-COV-2 COVID-19 AMP PRB: CPT

## 2023-07-10 PROCEDURE — 99213 OFFICE O/P EST LOW 20 MIN: CPT | Performed by: NURSE PRACTITIONER

## 2023-07-10 PROCEDURE — 1123F ACP DISCUSS/DSCN MKR DOCD: CPT | Performed by: NURSE PRACTITIONER

## 2023-07-10 PROCEDURE — 87880 STREP A ASSAY W/OPTIC: CPT | Performed by: NURSE PRACTITIONER

## 2023-07-10 NOTE — PATIENT INSTRUCTIONS
No services completed today.    Your COVID 19 test can take 1-5 days for the results to come back. We ask that you make a Mychart page and view your test results this way. You are encouraged to Self quarantine until you know your results. If positive, please work on contact tracing. Increase fluids and rest  Saline nasal spray as needed for nasal congestion  Warm salt water gargles as needed for throat discomfort  Monitor temperature twice a day  Tylenol as needed for fevers and/or discomfort. Big deep breaths periodically throughout the day  Regular Mucinex over the counter as needed for chest congestion  If symptoms worsen -Go to the ER. Follow up with your primary care provider      To Whom it May Concern:    Chauncey Crystal was tested for COVID-19 7/10/2023. He/she must stay home until test results are back. If test is negative, ok to return to work/school. If test is positive, isolate for a total of 5 days, starting from day 1 of symptom onset. After 5 days, if fever-free for 24 hours and there has been a gradual improvement in symptoms, may come out of isolation, but must consistently wear a mask when around other people for 5 additional days. (5 days isolation, 5 days mask-wearing). We do not recommend retesting as patients may continue to test positive for months even though no longer contagious. It is suggested you call [facility] or [address] [place] [address] St with any questions regarding isolation timeframe/return to work/school details.

## 2023-07-10 NOTE — CARE COORDINATION
No  Do you have any questions related to your medications?: No  Do you currently have any active services?: Yes  Are you currently active with any services?: Home Health  Do you have any needs or concerns that I can assist you with?: No  Identified Barriers: None  Care Transitions Interventions   Home Care Waiver: Completed Physical Therapy: Declined       Transportation Support: Declined      DME Assistance: Completed   Disease Specific Clinic: Completed      Disease Association: Completed      Other Interventions:             LPN Care Coordinator provided contact information for future needs. No further follow-up call indicated based on severity of symptoms and risk factors.   Plan for next call:       Diane Wynn LPN  Care Coordinator  [phone]

## 2023-07-12 LAB
SARS-COV-2 RNA RESP QL NAA+PROBE: NOT DETECTED
SOURCE: NORMAL

## 2023-07-13 ENCOUNTER — TELEPHONE (OUTPATIENT)
Dept: FAMILY MEDICINE CLINIC | Age: 88
End: 2023-07-13

## 2023-07-17 ENCOUNTER — OFFICE VISIT (OUTPATIENT)
Dept: FAMILY MEDICINE CLINIC | Age: 88
End: 2023-07-17

## 2023-07-17 VITALS
BODY MASS INDEX: 23.94 KG/M2 | OXYGEN SATURATION: 93 % | WEIGHT: 122.6 LBS | DIASTOLIC BLOOD PRESSURE: 92 MMHG | SYSTOLIC BLOOD PRESSURE: 144 MMHG | HEART RATE: 58 BPM

## 2023-07-17 DIAGNOSIS — I10 PRIMARY HYPERTENSION: ICD-10-CM

## 2023-07-17 DIAGNOSIS — B37.0 ORAL THRUSH: Primary | ICD-10-CM

## 2023-07-17 ASSESSMENT — PATIENT HEALTH QUESTIONNAIRE - PHQ9
3. TROUBLE FALLING OR STAYING ASLEEP: 0
1. LITTLE INTEREST OR PLEASURE IN DOING THINGS: 0
SUM OF ALL RESPONSES TO PHQ9 QUESTIONS 1 & 2: 0
7. TROUBLE CONCENTRATING ON THINGS, SUCH AS READING THE NEWSPAPER OR WATCHING TELEVISION: 0
10. IF YOU CHECKED OFF ANY PROBLEMS, HOW DIFFICULT HAVE THESE PROBLEMS MADE IT FOR YOU TO DO YOUR WORK, TAKE CARE OF THINGS AT HOME, OR GET ALONG WITH OTHER PEOPLE: 0
SUM OF ALL RESPONSES TO PHQ QUESTIONS 1-9: 0
9. THOUGHTS THAT YOU WOULD BE BETTER OFF DEAD, OR OF HURTING YOURSELF: 0
8. MOVING OR SPEAKING SO SLOWLY THAT OTHER PEOPLE COULD HAVE NOTICED. OR THE OPPOSITE, BEING SO FIGETY OR RESTLESS THAT YOU HAVE BEEN MOVING AROUND A LOT MORE THAN USUAL: 0
SUM OF ALL RESPONSES TO PHQ QUESTIONS 1-9: 0
4. FEELING TIRED OR HAVING LITTLE ENERGY: 0
6. FEELING BAD ABOUT YOURSELF - OR THAT YOU ARE A FAILURE OR HAVE LET YOURSELF OR YOUR FAMILY DOWN: 0
SUM OF ALL RESPONSES TO PHQ QUESTIONS 1-9: 0
5. POOR APPETITE OR OVEREATING: 0
SUM OF ALL RESPONSES TO PHQ QUESTIONS 1-9: 0
2. FEELING DOWN, DEPRESSED OR HOPELESS: 0

## 2023-07-17 NOTE — PROGRESS NOTES
Patient here complaining of throat and mouth pain for about a week. It began his throat pain and then became mouth and tongue pain. Seems to be slightly improved. Denies any recent illness or new medication. Denies any fevers or chills. O:   Vitals:    07/17/23 1341   BP: (!) 144/92   Pulse: 58   SpO2: 93%     No acute distress. Alert and Oriented x 3  HEENT: PERRLA, EOMI, Conjunctiva clear  Oropharynx erythematous. Tongue slightly erythematous. Nasal mucosa normal  NECK: without thyromegaly, lymphadenopathy, JVD  LUNGS:Clear to ascultation bilaterally. Breathing comfortably  CARDIOVASCULAR:  Regular rate and rhythm, no murmurs, rubs, or gallops  SKIN: Warm, Dry, No rash. A:    Diagnosis Orders   1. Oral thrush  nystatin (MYCOSTATIN) 711101 UNIT/ML suspension      2. Primary hypertension     Elevated today likely due to discomfort     P: Nystatin swish and swallow 4 times daily  Continue all other medications at their current dose due to effectiveness  Patient to call back if symptoms do not improve.

## 2023-07-25 ENCOUNTER — TELEPHONE (OUTPATIENT)
Dept: FAMILY MEDICINE CLINIC | Age: 88
End: 2023-07-25

## 2023-07-25 NOTE — TELEPHONE ENCOUNTER
Patient informed, patient states that she does have hypothyroidism. Would you like her to make an appointment?

## 2023-07-25 NOTE — TELEPHONE ENCOUNTER
Patient called in regarding her thyroid. Patient would like to know if you can take over treating her thyroid as her endocrinologist is too expensive.

## 2023-07-25 NOTE — TELEPHONE ENCOUNTER
Patient informed she stated she seen Dr. Juanita Goddard not long ago and didn't want to see her back til October. Offered to schedule for October she declined as she was in store with nothing to write and will call back.

## 2023-08-02 ENCOUNTER — HOSPITAL ENCOUNTER (OUTPATIENT)
Dept: CT IMAGING | Age: 88
Discharge: HOME | End: 2023-08-02
Attending: ORTHOPAEDIC SURGERY
Payer: MEDICARE

## 2023-08-02 ENCOUNTER — HOSPITAL ENCOUNTER (OUTPATIENT)
Dept: GENERAL RADIOLOGY | Age: 88
Discharge: HOME | End: 2023-08-02
Payer: MEDICARE

## 2023-08-02 DIAGNOSIS — M25.512 LEFT SHOULDER PAIN, UNSPECIFIED CHRONICITY: ICD-10-CM

## 2023-08-02 DIAGNOSIS — S43.422A SPRAIN OF LEFT ROTATOR CUFF CAPSULE, INITIAL ENCOUNTER: ICD-10-CM

## 2023-08-02 DIAGNOSIS — Z96.612 PRESENCE OF LEFT ARTIFICIAL SHOULDER JOINT: ICD-10-CM

## 2023-08-02 PROCEDURE — 23350 INJECTION FOR SHOULDER X-RAY: CPT

## 2023-08-02 PROCEDURE — 6360000004 HC RX CONTRAST MEDICATION: Performed by: ORTHOPAEDIC SURGERY

## 2023-08-02 PROCEDURE — 73201 CT UPPER EXTREMITY W/DYE: CPT

## 2023-08-02 RX ADMIN — IOPAMIDOL 10 ML: 755 INJECTION, SOLUTION INTRAVENOUS at 12:39

## 2023-08-10 DIAGNOSIS — Z86.73 HISTORY OF CVA (CEREBROVASCULAR ACCIDENT): ICD-10-CM

## 2023-08-14 ENCOUNTER — TELEPHONE (OUTPATIENT)
Dept: CARDIOLOGY CLINIC | Age: 88
End: 2023-08-14

## 2023-08-14 RX ORDER — ATORVASTATIN CALCIUM 40 MG/1
TABLET, FILM COATED ORAL
Qty: 90 TABLET | Refills: 0 | Status: SHIPPED | OUTPATIENT
Start: 2023-08-14 | End: 2023-09-12 | Stop reason: SDUPTHER

## 2023-08-14 NOTE — TELEPHONE ENCOUNTER
Patient called she is instructed to cut her Lisinopril in 1/2 the pills are to small to cut , she needs a different strength

## 2023-08-16 ENCOUNTER — OFFICE VISIT (OUTPATIENT)
Dept: FAMILY MEDICINE CLINIC | Age: 88
End: 2023-08-16
Payer: MEDICARE

## 2023-08-16 VITALS
HEART RATE: 50 BPM | OXYGEN SATURATION: 95 % | BODY MASS INDEX: 23.79 KG/M2 | SYSTOLIC BLOOD PRESSURE: 122 MMHG | WEIGHT: 121.8 LBS | DIASTOLIC BLOOD PRESSURE: 58 MMHG | TEMPERATURE: 97.9 F

## 2023-08-16 DIAGNOSIS — H92.02 LEFT EAR PAIN: Primary | ICD-10-CM

## 2023-08-16 PROCEDURE — 1123F ACP DISCUSS/DSCN MKR DOCD: CPT | Performed by: NURSE PRACTITIONER

## 2023-08-16 PROCEDURE — 99213 OFFICE O/P EST LOW 20 MIN: CPT | Performed by: NURSE PRACTITIONER

## 2023-08-16 RX ORDER — TRAMADOL HYDROCHLORIDE 50 MG/1
TABLET ORAL
COMMUNITY
Start: 2023-08-07

## 2023-08-16 ASSESSMENT — ENCOUNTER SYMPTOMS
CHEST TIGHTNESS: 0
RHINORRHEA: 0
SINUS PAIN: 0
VOMITING: 0
ABDOMINAL PAIN: 0
NAUSEA: 0
WHEEZING: 0
SHORTNESS OF BREATH: 0
COUGH: 0
DIARRHEA: 0
SINUS PRESSURE: 0
SORE THROAT: 0

## 2023-08-16 NOTE — PROGRESS NOTES
Gina Blankenship   80 y.o.  female  26      Chief Complaint   Patient presents with    Otalgia        Subjective:  80 y. o.female is here for a follow up. She has the following chronic/acute medical problems:  Patient Active Problem List   Diagnosis    Closed intertrochanteric fracture of left femur (HCC)    Gait disturbance    Anemia    Dizziness    Acquired hypothyroidism    Murmur    Age-related osteoporosis without current pathological fracture    Black tongue    Vitamin D deficiency    Vitamin B12 deficiency    Gastroesophageal reflux disease without esophagitis    VHD (valvular heart disease)    Acute respiratory distress    COPD (chronic obstructive pulmonary disease) (HCC)    Sepsis due to pneumonia (HCC)    Nodule of lower lobe of right lung    S/P TAVR (transcatheter aortic valve replacement)    Primary hypertension    Acute respiratory failure (HCC)    Other seizures (HCC)    Metabolic encephalopathy    Cerebrovascular accident (CVA) due to embolism of cerebral artery ([address])    Cerebrovascular accident (CVA) ([address])    Ataxia    Dysarthria due to acute stroke ([address])    Dysthymia    Current mild episode of major depressive disorder without prior episode (HCC)    Atherosclerosis of aorta (HCC)    Thyroid disease    Mixed hyperlipidemia    Bradycardia    PAD (peripheral artery disease) (HCC)    Other fatigue    Generalized weakness    Anxiety disorder    Chest pain    SOBOE (shortness of breath on exertion)    Iron deficiency anemia, unspecified    Malabsorption syndrome    Benign neoplasm of transverse colon       Otalgia   There is pain in the left ear. This is a new problem. The current episode started yesterday. Episode frequency: intermittantly. The problem has been unchanged. There has been no fever. The pain is at a severity of 3/10. Pertinent negatives include no abdominal pain, coughing, diarrhea, ear discharge, headaches, hearing loss, neck pain, rash, rhinorrhea, sore throat or vomiting.  She has tried

## 2023-08-30 DIAGNOSIS — J30.89 NON-SEASONAL ALLERGIC RHINITIS, UNSPECIFIED TRIGGER: ICD-10-CM

## 2023-08-30 RX ORDER — MONTELUKAST SODIUM 10 MG/1
TABLET ORAL
Qty: 90 TABLET | Refills: 0 | Status: SHIPPED | OUTPATIENT
Start: 2023-08-30

## 2023-09-04 ENCOUNTER — HOSPITAL ENCOUNTER (OUTPATIENT)
Age: 88
Setting detail: OBSERVATION
Discharge: HOME | End: 2023-09-06
Attending: EMERGENCY MEDICINE | Admitting: STUDENT IN AN ORGANIZED HEALTH CARE EDUCATION/TRAINING PROGRAM
Payer: MEDICARE

## 2023-09-04 ENCOUNTER — APPOINTMENT (OUTPATIENT)
Dept: GENERAL RADIOLOGY | Age: 88
End: 2023-09-04
Payer: MEDICARE

## 2023-09-04 ENCOUNTER — APPOINTMENT (OUTPATIENT)
Dept: ULTRASOUND IMAGING | Age: 88
End: 2023-09-04
Payer: MEDICARE

## 2023-09-04 ENCOUNTER — APPOINTMENT (OUTPATIENT)
Dept: CT IMAGING | Age: 88
End: 2023-09-04
Payer: MEDICARE

## 2023-09-04 DIAGNOSIS — R06.00 ACUTE DYSPNEA: ICD-10-CM

## 2023-09-04 DIAGNOSIS — I16.0 HYPERTENSIVE URGENCY: Primary | ICD-10-CM

## 2023-09-04 LAB
ALBUMIN SERPL-MCNC: 3.9 GM/DL (ref 3.4–5)
ALP BLD-CCNC: 63 IU/L (ref 40–129)
ALT SERPL-CCNC: 48 U/L (ref 10–40)
ANION GAP SERPL CALCULATED.3IONS-SCNC: 10 MMOL/L (ref 4–16)
AST SERPL-CCNC: 32 IU/L (ref 15–37)
BASOPHILS ABSOLUTE: 0 K/CU MM
BASOPHILS RELATIVE PERCENT: 0.3 % (ref 0–1)
BILIRUB SERPL-MCNC: 0.7 MG/DL (ref 0–1)
BILIRUBIN URINE: NEGATIVE MG/DL
BLOOD, URINE: NEGATIVE
BUN SERPL-MCNC: 12 MG/DL (ref 6–23)
CALCIUM SERPL-MCNC: 9.2 MG/DL (ref 8.3–10.6)
CHLORIDE BLD-SCNC: 99 MMOL/L (ref 99–110)
CLARITY: CLEAR
CO2: 27 MMOL/L (ref 21–32)
COLOR: YELLOW
COMMENT UA: NORMAL
CREAT SERPL-MCNC: 0.5 MG/DL (ref 0.6–1.1)
DIFFERENTIAL TYPE: ABNORMAL
EOSINOPHILS ABSOLUTE: 0.1 K/CU MM
EOSINOPHILS RELATIVE PERCENT: 1.3 % (ref 0–3)
GFR SERPL CREATININE-BSD FRML MDRD: >60 ML/MIN/1.73M2
GLUCOSE SERPL-MCNC: 93 MG/DL (ref 70–99)
GLUCOSE, URINE: NEGATIVE MG/DL
HCT VFR BLD CALC: 44.2 % (ref 37–47)
HEMOGLOBIN: 14.5 GM/DL (ref 12.5–16)
IMMATURE NEUTROPHIL %: 0.3 % (ref 0–0.43)
INR BLD: 0.9 INDEX
KETONES, URINE: NEGATIVE MG/DL
LEUKOCYTE ESTERASE, URINE: NEGATIVE
LYMPHOCYTES ABSOLUTE: 3.1 K/CU MM
LYMPHOCYTES RELATIVE PERCENT: 30.9 % (ref 24–44)
MAGNESIUM: 2.1 MG/DL (ref 1.8–2.4)
MCH RBC QN AUTO: 31.7 PG (ref 27–31)
MCHC RBC AUTO-ENTMCNC: 32.8 % (ref 32–36)
MCV RBC AUTO: 96.5 FL (ref 78–100)
MONOCYTES ABSOLUTE: 0.7 K/CU MM
MONOCYTES RELATIVE PERCENT: 7.3 % (ref 0–4)
NITRITE URINE, QUANTITATIVE: NEGATIVE
NUCLEATED RBC %: 0 %
PDW BLD-RTO: 15.8 % (ref 11.7–14.9)
PH, URINE: 6.5 (ref 5–8)
PLATELET # BLD: 267 K/CU MM (ref 140–440)
PMV BLD AUTO: 9.8 FL (ref 7.5–11.1)
POTASSIUM SERPL-SCNC: 4.6 MMOL/L (ref 3.5–5.1)
PROTEIN UA: NEGATIVE MG/DL
PROTHROMBIN TIME: 12.1 SECONDS (ref 11.7–14.5)
RBC # BLD: 4.58 M/CU MM (ref 4.2–5.4)
SEGMENTED NEUTROPHILS ABSOLUTE COUNT: 6 K/CU MM
SEGMENTED NEUTROPHILS RELATIVE PERCENT: 59.9 % (ref 36–66)
SODIUM BLD-SCNC: 136 MMOL/L (ref 135–145)
SPECIFIC GRAVITY UA: 1.01 (ref 1–1.03)
TOTAL IMMATURE NEUTOROPHIL: 0.03 K/CU MM
TOTAL NUCLEATED RBC: 0 K/CU MM
TOTAL PROTEIN: 6.1 GM/DL (ref 6.4–8.2)
TROPONIN T: <0.01 NG/ML
TROPONIN T: <0.01 NG/ML
UROBILINOGEN, URINE: 0.2 MG/DL (ref 0.2–1)
WBC # BLD: 10 K/CU MM (ref 4–10.5)

## 2023-09-04 PROCEDURE — 96372 THER/PROPH/DIAG INJ SC/IM: CPT

## 2023-09-04 PROCEDURE — 6370000000 HC RX 637 (ALT 250 FOR IP): Performed by: EMERGENCY MEDICINE

## 2023-09-04 PROCEDURE — 93880 EXTRACRANIAL BILAT STUDY: CPT

## 2023-09-04 PROCEDURE — 80053 COMPREHEN METABOLIC PANEL: CPT

## 2023-09-04 PROCEDURE — 83036 HEMOGLOBIN GLYCOSYLATED A1C: CPT

## 2023-09-04 PROCEDURE — 85610 PROTHROMBIN TIME: CPT

## 2023-09-04 PROCEDURE — 70450 CT HEAD/BRAIN W/O DYE: CPT

## 2023-09-04 PROCEDURE — 6370000000 HC RX 637 (ALT 250 FOR IP): Performed by: FAMILY MEDICINE

## 2023-09-04 PROCEDURE — 6370000000 HC RX 637 (ALT 250 FOR IP): Performed by: STUDENT IN AN ORGANIZED HEALTH CARE EDUCATION/TRAINING PROGRAM

## 2023-09-04 PROCEDURE — 93005 ELECTROCARDIOGRAM TRACING: CPT | Performed by: EMERGENCY MEDICINE

## 2023-09-04 PROCEDURE — 80061 LIPID PANEL: CPT

## 2023-09-04 PROCEDURE — 99285 EMERGENCY DEPT VISIT HI MDM: CPT

## 2023-09-04 PROCEDURE — G0378 HOSPITAL OBSERVATION PER HR: HCPCS

## 2023-09-04 PROCEDURE — 83735 ASSAY OF MAGNESIUM: CPT

## 2023-09-04 PROCEDURE — 71045 X-RAY EXAM CHEST 1 VIEW: CPT

## 2023-09-04 PROCEDURE — 6360000002 HC RX W HCPCS: Performed by: STUDENT IN AN ORGANIZED HEALTH CARE EDUCATION/TRAINING PROGRAM

## 2023-09-04 PROCEDURE — 36415 COLL VENOUS BLD VENIPUNCTURE: CPT

## 2023-09-04 PROCEDURE — 84484 ASSAY OF TROPONIN QUANT: CPT

## 2023-09-04 PROCEDURE — 85025 COMPLETE CBC W/AUTO DIFF WBC: CPT

## 2023-09-04 PROCEDURE — 1200000000 HC SEMI PRIVATE

## 2023-09-04 PROCEDURE — 81003 URINALYSIS AUTO W/O SCOPE: CPT

## 2023-09-04 RX ORDER — PANTOPRAZOLE SODIUM 40 MG/1
40 TABLET, DELAYED RELEASE ORAL
Status: DISCONTINUED | OUTPATIENT
Start: 2023-09-05 | End: 2023-09-06 | Stop reason: HOSPADM

## 2023-09-04 RX ORDER — ASPIRIN 81 MG/1
324 TABLET, CHEWABLE ORAL ONCE
Status: COMPLETED | OUTPATIENT
Start: 2023-09-04 | End: 2023-09-04

## 2023-09-04 RX ORDER — ACETAMINOPHEN 325 MG/1
650 TABLET ORAL EVERY 6 HOURS PRN
Status: DISCONTINUED | OUTPATIENT
Start: 2023-09-04 | End: 2023-09-06 | Stop reason: HOSPADM

## 2023-09-04 RX ORDER — ACETAMINOPHEN 650 MG/1
650 SUPPOSITORY RECTAL EVERY 6 HOURS PRN
Status: DISCONTINUED | OUTPATIENT
Start: 2023-09-04 | End: 2023-09-06 | Stop reason: HOSPADM

## 2023-09-04 RX ORDER — LEVOTHYROXINE SODIUM 0.07 MG/1
75 TABLET ORAL DAILY
Status: DISCONTINUED | OUTPATIENT
Start: 2023-09-04 | End: 2023-09-04

## 2023-09-04 RX ORDER — ESCITALOPRAM OXALATE 10 MG/1
20 TABLET ORAL DAILY
Status: DISCONTINUED | OUTPATIENT
Start: 2023-09-04 | End: 2023-09-06 | Stop reason: HOSPADM

## 2023-09-04 RX ORDER — AMLODIPINE BESYLATE 5 MG/1
5 TABLET ORAL DAILY
Status: DISCONTINUED | OUTPATIENT
Start: 2023-09-04 | End: 2023-09-06

## 2023-09-04 RX ORDER — CLOPIDOGREL BISULFATE 75 MG/1
75 TABLET ORAL DAILY
Status: DISCONTINUED | OUTPATIENT
Start: 2023-09-04 | End: 2023-09-05

## 2023-09-04 RX ORDER — ALBUTEROL SULFATE 90 UG/1
2 AEROSOL, METERED RESPIRATORY (INHALATION) EVERY 6 HOURS PRN
Status: DISCONTINUED | OUTPATIENT
Start: 2023-09-04 | End: 2023-09-06 | Stop reason: HOSPADM

## 2023-09-04 RX ORDER — ONDANSETRON 4 MG/1
4 TABLET, ORALLY DISINTEGRATING ORAL EVERY 8 HOURS PRN
Status: DISCONTINUED | OUTPATIENT
Start: 2023-09-04 | End: 2023-09-06 | Stop reason: HOSPADM

## 2023-09-04 RX ORDER — SODIUM CHLORIDE 0.9 % (FLUSH) 0.9 %
5-40 SYRINGE (ML) INJECTION EVERY 12 HOURS SCHEDULED
Status: DISCONTINUED | OUTPATIENT
Start: 2023-09-04 | End: 2023-09-06 | Stop reason: HOSPADM

## 2023-09-04 RX ORDER — SODIUM CHLORIDE 0.9 % (FLUSH) 0.9 %
5-40 SYRINGE (ML) INJECTION PRN
Status: DISCONTINUED | OUTPATIENT
Start: 2023-09-04 | End: 2023-09-06 | Stop reason: HOSPADM

## 2023-09-04 RX ORDER — LOSARTAN POTASSIUM 25 MG/1
25 TABLET ORAL DAILY
Status: DISCONTINUED | OUTPATIENT
Start: 2023-09-04 | End: 2023-09-06 | Stop reason: HOSPADM

## 2023-09-04 RX ORDER — SODIUM CHLORIDE 9 MG/ML
INJECTION, SOLUTION INTRAVENOUS PRN
Status: DISCONTINUED | OUTPATIENT
Start: 2023-09-04 | End: 2023-09-06 | Stop reason: HOSPADM

## 2023-09-04 RX ORDER — LEVOTHYROXINE SODIUM 0.07 MG/1
75 TABLET ORAL DAILY
Status: DISCONTINUED | OUTPATIENT
Start: 2023-09-05 | End: 2023-09-06 | Stop reason: HOSPADM

## 2023-09-04 RX ORDER — DIAPER,BRIEF,INFANT-TODD,DISP
EACH MISCELLANEOUS EVERY 6 HOURS PRN
Status: DISCONTINUED | OUTPATIENT
Start: 2023-09-04 | End: 2023-09-06 | Stop reason: HOSPADM

## 2023-09-04 RX ORDER — ENOXAPARIN SODIUM 100 MG/ML
40 INJECTION SUBCUTANEOUS EVERY EVENING
Status: DISCONTINUED | OUTPATIENT
Start: 2023-09-04 | End: 2023-09-06 | Stop reason: HOSPADM

## 2023-09-04 RX ORDER — ATORVASTATIN CALCIUM 40 MG/1
40 TABLET, FILM COATED ORAL NIGHTLY
Status: DISCONTINUED | OUTPATIENT
Start: 2023-09-04 | End: 2023-09-06 | Stop reason: HOSPADM

## 2023-09-04 RX ORDER — POLYETHYLENE GLYCOL 3350 17 G/17G
17 POWDER, FOR SOLUTION ORAL DAILY PRN
Status: DISCONTINUED | OUTPATIENT
Start: 2023-09-04 | End: 2023-09-06 | Stop reason: HOSPADM

## 2023-09-04 RX ORDER — ONDANSETRON 2 MG/ML
4 INJECTION INTRAMUSCULAR; INTRAVENOUS EVERY 6 HOURS PRN
Status: DISCONTINUED | OUTPATIENT
Start: 2023-09-04 | End: 2023-09-06 | Stop reason: HOSPADM

## 2023-09-04 RX ORDER — FLUTICASONE PROPIONATE 50 MCG
1 SPRAY, SUSPENSION (ML) NASAL DAILY
Status: DISCONTINUED | OUTPATIENT
Start: 2023-09-04 | End: 2023-09-06 | Stop reason: HOSPADM

## 2023-09-04 RX ORDER — ASPIRIN 81 MG/1
81 TABLET, CHEWABLE ORAL DAILY
Status: DISCONTINUED | OUTPATIENT
Start: 2023-09-05 | End: 2023-09-06 | Stop reason: HOSPADM

## 2023-09-04 RX ORDER — NITROGLYCERIN 0.4 MG/1
0.4 TABLET SUBLINGUAL EVERY 5 MIN PRN
Status: DISCONTINUED | OUTPATIENT
Start: 2023-09-04 | End: 2023-09-06 | Stop reason: HOSPADM

## 2023-09-04 RX ADMIN — NITROGLYCERIN 0.4 MG: 0.4 TABLET, ORALLY DISINTEGRATING SUBLINGUAL at 20:24

## 2023-09-04 RX ADMIN — CLOPIDOGREL BISULFATE 75 MG: 75 TABLET ORAL at 18:49

## 2023-09-04 RX ADMIN — AMLODIPINE BESYLATE 5 MG: 5 TABLET ORAL at 18:49

## 2023-09-04 RX ADMIN — HYDROCORTISONE: 0.01 CREAM TOPICAL at 21:52

## 2023-09-04 RX ADMIN — ESCITALOPRAM OXALATE 20 MG: 10 TABLET ORAL at 18:49

## 2023-09-04 RX ADMIN — LOSARTAN POTASSIUM 25 MG: 25 TABLET, FILM COATED ORAL at 18:49

## 2023-09-04 RX ADMIN — NITROGLYCERIN 0.4 MG: 0.4 TABLET, ORALLY DISINTEGRATING SUBLINGUAL at 14:18

## 2023-09-04 RX ADMIN — ASPIRIN 324 MG: 81 TABLET, CHEWABLE ORAL at 14:18

## 2023-09-04 RX ADMIN — ENOXAPARIN SODIUM 40 MG: 100 INJECTION SUBCUTANEOUS at 18:55

## 2023-09-04 RX ADMIN — ATORVASTATIN CALCIUM 40 MG: 40 TABLET, FILM COATED ORAL at 20:24

## 2023-09-04 ASSESSMENT — PAIN DESCRIPTION - DESCRIPTORS: DESCRIPTORS: PRESSURE

## 2023-09-04 ASSESSMENT — PAIN DESCRIPTION - LOCATION: LOCATION: CHEST

## 2023-09-04 ASSESSMENT — PAIN DESCRIPTION - ORIENTATION: ORIENTATION: RIGHT;UPPER

## 2023-09-04 ASSESSMENT — PAIN SCALES - GENERAL: PAINLEVEL_OUTOF10: 3

## 2023-09-04 NOTE — PROGRESS NOTES
4 Eyes Skin Assessment     NAME:  Susy Mcduffie  YOB: 1935  MEDICAL RECORD NUMBER:  [id number]    The patient is being assessed for  Admission    I agree that at least one RN has performed a thorough Head to Toe Skin Assessment on the patient. ALL assessment sites listed below have been assessed. Areas assessed by both nurses:    Head, Face, Ears, Shoulders, Back, Chest, Arms, Elbows, Hands, Sacrum. Buttock, Coccyx, Ischium, Legs. Feet and Heels, Under Medical Devices , and Other bruise on right lower arm        Does the Patient have a Wound?  No noted wound(s)       Moses Prevention initiated by RN: Yes  Wound Care Orders initiated by RN: No    Pressure Injury (Stage 3,4, Unstageable, DTI, NWPT, and Complex wounds) if present, place Wound referral order by RN under : No    New Ostomies, if present place, Ostomy referral order under : No     Nurse 1 eSignature: Electronically signed by Gaviota Parikh LPN on 0/4/18 at 8:12 PM EDT    **SHARE this note so that the co-signing nurse can place an eSignature**    Nurse 2 eSignature: {Esignature:[id number]}

## 2023-09-04 NOTE — H&P
Reason for Exam: Chest pain FINDINGS: The cardiac silhouette and mediastinal contours are normal.  The lungs are clear. No pleural effusion or pneumothorax. 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 1. No acute cardiopulmonary disease.        Personally reviewed Lab Studies, Imaging    Electronically signed by Carlene Peraza MD on 9/4/2023 at 5:09 PM

## 2023-09-04 NOTE — ED PROVIDER NOTES
escitalopram (LEXAPRO) 20 MG tablet Take 1 tablet by mouth daily 90 tablet 1    methocarbamol (ROBAXIN) 500 MG tablet Take 1 tablet by mouth every evening 30 tablet 5    albuterol sulfate HFA (PROVENTIL;VENTOLIN;PROAIR) 108 (90 Base) MCG/ACT inhaler Inhale 2 puffs into the lungs every 6 hours as needed for Wheezing 1 each 5    sucralfate (CARAFATE) 1 GM tablet Take 1 tablet by mouth 4 times daily as needed (nausea) 120 tablet 5    Handicap Placard MISC by Does not apply route Please provide handicap placard for 5 years. Diagnosis: Orthopedic condition 1 each 0    aspirin (EQ ASPIRIN ADULT LOW DOSE) 81 MG EC tablet Take 1 tablet by mouth daily 30 tablet 0    ipratropium-albuterol (DUONEB) 0.5-2.5 (3) MG/3ML SOLN nebulizer solution Inhale 3 mLs into the lungs every 4 hours (while awake) (Patient not taking: Reported on 6/19/2023) 360 mL 1    Timolol (TIMOPTIC) 0.5 % (DAILY) SOLN ophthalmic solution Place 1 drop into both eyes daily      vitamin B-12 (CYANOCOBALAMIN) 1000 MCG tablet Take 1 tablet by mouth daily OTC      Calcium-Magnesium-Zinc 167-83-8 MG TABS Take 1 tablet by mouth daily OTC      Cholecalciferol (VITAMIN D3) 2000 UNITS CAPS Take 1 capsule by mouth daily OTC       Allergies   Allergen Reactions    Latex     Morphine Anaphylaxis     Dilaudid OK    Bactrim [Sulfamethoxazole-Trimethoprim]     Influenza Vaccines      Ended in hospital stay for 3 days told by md not to get it anymore     Lactose Intolerance (Gi) Nausea Only    Phenergan [Promethazine Hcl] Other (See Comments)     Makes me jerk all over. Zofran OK    Promethazine Hcl Other (See Comments)    Stadol [Butorphanol Tartrate] Other (See Comments)     Out of body experience. Was told it was a near death experience and was placed in ICU. Dilaudid OK    Tape [place] Tape] Other (See Comments)     Plastic cause itching and rash. Paper tape causes my skin to blister.  (Tega-derm and Coban OK to use.)       Nursing Notes Reviewed    Physical administration in time range)   aspirin chewable tablet 324 mg (324 mg Oral Given 9/4/23 1418)       EKG (if obtained): (All EKG's are interpreted by myself in the absence of a cardiologist) sinus bradycardia at 50. Normal axis with good R wave progression. No ST elevation or depression. No acute ischemic change when compared to prior tracing. Repeat EKG obtained and is essentially unchanged. Chronic conditions affecting care: Anemia, COPD, HTN, CVA, HLD      Disposition Considerations (tests considered but not done, Shared Decision Making, Pt Expectation of Test or Tx.):     Given concerns above, will plan to admit for ongoing care. Patient aware and agreeable to proceed. I am the Primary Clinician of Record. Final Impression:  1. Hypertensive urgency    2. Acute dyspnea      DISPOSITION Decision To Admit 09/04/2023 03:53:45 PM      Patient referred to: No follow-up provider specified.   Discharge medications:  New Prescriptions    No medications on file     (Please note that portions of this note may have been completed with a voice recognition program. Efforts were made to edit the dictations but occasionally words are mis-transcribed.)    Camilo Davis, DO Camilo Davis DO  09/04/23 9949

## 2023-09-05 ENCOUNTER — APPOINTMENT (OUTPATIENT)
Dept: MRI IMAGING | Age: 88
End: 2023-09-05
Payer: MEDICARE

## 2023-09-05 LAB
ANION GAP SERPL CALCULATED.3IONS-SCNC: 12 MMOL/L (ref 4–16)
BUN SERPL-MCNC: 12 MG/DL (ref 6–23)
CALCIUM SERPL-MCNC: 8.8 MG/DL (ref 8.3–10.6)
CHLORIDE BLD-SCNC: 100 MMOL/L (ref 99–110)
CHOLEST SERPL-MCNC: 126 MG/DL
CO2: 24 MMOL/L (ref 21–32)
CREAT SERPL-MCNC: 0.6 MG/DL (ref 0.6–1.1)
EKG ATRIAL RATE: 50 BPM
EKG ATRIAL RATE: 52 BPM
EKG DIAGNOSIS: NORMAL
EKG DIAGNOSIS: NORMAL
EKG P AXIS: 75 DEGREES
EKG P AXIS: 82 DEGREES
EKG P-R INTERVAL: 176 MS
EKG P-R INTERVAL: 184 MS
EKG Q-T INTERVAL: 480 MS
EKG Q-T INTERVAL: 484 MS
EKG QRS DURATION: 62 MS
EKG QRS DURATION: 62 MS
EKG QTC CALCULATION (BAZETT): 441 MS
EKG QTC CALCULATION (BAZETT): 446 MS
EKG R AXIS: 52 DEGREES
EKG R AXIS: 63 DEGREES
EKG T AXIS: 71 DEGREES
EKG T AXIS: 79 DEGREES
EKG VENTRICULAR RATE: 50 BPM
EKG VENTRICULAR RATE: 52 BPM
ESTIMATED AVERAGE GLUCOSE: 114 MG/DL
GFR SERPL CREATININE-BSD FRML MDRD: >60 ML/MIN/1.73M2
GLUCOSE SERPL-MCNC: 95 MG/DL (ref 70–99)
HBA1C MFR BLD: 5.6 % (ref 4.2–6.3)
HCT VFR BLD CALC: 43 % (ref 37–47)
HDLC SERPL-MCNC: 75 MG/DL
HEMOGLOBIN: 14.5 GM/DL (ref 12.5–16)
LDLC SERPL CALC-MCNC: 38 MG/DL
MAGNESIUM: 1.9 MG/DL (ref 1.8–2.4)
MCH RBC QN AUTO: 31.6 PG (ref 27–31)
MCHC RBC AUTO-ENTMCNC: 33.7 % (ref 32–36)
MCV RBC AUTO: 93.7 FL (ref 78–100)
PDW BLD-RTO: 15.3 % (ref 11.7–14.9)
PHOSPHORUS: 4.6 MG/DL (ref 2.5–4.9)
PLATELET # BLD: 232 K/CU MM (ref 140–440)
PMV BLD AUTO: 8.8 FL (ref 7.5–11.1)
POTASSIUM SERPL-SCNC: 4.2 MMOL/L (ref 3.5–5.1)
RBC # BLD: 4.59 M/CU MM (ref 4.2–5.4)
SODIUM BLD-SCNC: 136 MMOL/L (ref 135–145)
TRIGL SERPL-MCNC: 63 MG/DL
TROPONIN T: <0.01 NG/ML
TSH SERPL DL<=0.005 MIU/L-ACNC: 0.38 UIU/ML (ref 0.27–4.2)
WBC # BLD: 9.3 K/CU MM (ref 4–10.5)

## 2023-09-05 PROCEDURE — 72148 MRI LUMBAR SPINE W/O DYE: CPT

## 2023-09-05 PROCEDURE — 70551 MRI BRAIN STEM W/O DYE: CPT

## 2023-09-05 PROCEDURE — 99222 1ST HOSP IP/OBS MODERATE 55: CPT | Performed by: INTERNAL MEDICINE

## 2023-09-05 PROCEDURE — G0378 HOSPITAL OBSERVATION PER HR: HCPCS

## 2023-09-05 PROCEDURE — 84484 ASSAY OF TROPONIN QUANT: CPT

## 2023-09-05 PROCEDURE — 84100 ASSAY OF PHOSPHORUS: CPT

## 2023-09-05 PROCEDURE — 93005 ELECTROCARDIOGRAM TRACING: CPT

## 2023-09-05 PROCEDURE — 96372 THER/PROPH/DIAG INJ SC/IM: CPT

## 2023-09-05 PROCEDURE — 97165 OT EVAL LOW COMPLEX 30 MIN: CPT

## 2023-09-05 PROCEDURE — 36415 COLL VENOUS BLD VENIPUNCTURE: CPT

## 2023-09-05 PROCEDURE — 97535 SELF CARE MNGMENT TRAINING: CPT

## 2023-09-05 PROCEDURE — 85027 COMPLETE CBC AUTOMATED: CPT

## 2023-09-05 PROCEDURE — 80048 BASIC METABOLIC PNL TOTAL CA: CPT

## 2023-09-05 PROCEDURE — 6360000002 HC RX W HCPCS: Performed by: STUDENT IN AN ORGANIZED HEALTH CARE EDUCATION/TRAINING PROGRAM

## 2023-09-05 PROCEDURE — 93010 ELECTROCARDIOGRAM REPORT: CPT | Performed by: INTERNAL MEDICINE

## 2023-09-05 PROCEDURE — 92610 EVALUATE SWALLOWING FUNCTION: CPT

## 2023-09-05 PROCEDURE — APPSS45 APP SPLIT SHARED TIME 31-45 MINUTES

## 2023-09-05 PROCEDURE — 94761 N-INVAS EAR/PLS OXIMETRY MLT: CPT

## 2023-09-05 PROCEDURE — 2580000003 HC RX 258: Performed by: STUDENT IN AN ORGANIZED HEALTH CARE EDUCATION/TRAINING PROGRAM

## 2023-09-05 PROCEDURE — 6370000000 HC RX 637 (ALT 250 FOR IP): Performed by: STUDENT IN AN ORGANIZED HEALTH CARE EDUCATION/TRAINING PROGRAM

## 2023-09-05 PROCEDURE — 93308 TTE F-UP OR LMTD: CPT

## 2023-09-05 PROCEDURE — 83735 ASSAY OF MAGNESIUM: CPT

## 2023-09-05 PROCEDURE — 84443 ASSAY THYROID STIM HORMONE: CPT

## 2023-09-05 RX ADMIN — CLOPIDOGREL BISULFATE 75 MG: 75 TABLET ORAL at 09:20

## 2023-09-05 RX ADMIN — SODIUM CHLORIDE, PRESERVATIVE FREE 10 ML: 5 INJECTION INTRAVENOUS at 21:57

## 2023-09-05 RX ADMIN — SODIUM CHLORIDE, PRESERVATIVE FREE 10 ML: 5 INJECTION INTRAVENOUS at 09:20

## 2023-09-05 RX ADMIN — ATORVASTATIN CALCIUM 40 MG: 40 TABLET, FILM COATED ORAL at 21:56

## 2023-09-05 RX ADMIN — HYDROCORTISONE: 0.01 CREAM TOPICAL at 09:24

## 2023-09-05 RX ADMIN — FLUTICASONE PROPIONATE 1 SPRAY: 50 SPRAY, METERED NASAL at 09:24

## 2023-09-05 RX ADMIN — ENOXAPARIN SODIUM 40 MG: 100 INJECTION SUBCUTANEOUS at 18:33

## 2023-09-05 RX ADMIN — ESCITALOPRAM OXALATE 20 MG: 10 TABLET ORAL at 09:21

## 2023-09-05 RX ADMIN — LOSARTAN POTASSIUM 25 MG: 25 TABLET, FILM COATED ORAL at 09:21

## 2023-09-05 RX ADMIN — AMLODIPINE BESYLATE 5 MG: 5 TABLET ORAL at 09:21

## 2023-09-05 RX ADMIN — ASPIRIN 81 MG CHEWABLE TABLET 81 MG: 81 TABLET CHEWABLE at 09:20

## 2023-09-05 RX ADMIN — LEVOTHYROXINE SODIUM 75 MCG: 0.07 TABLET ORAL at 06:23

## 2023-09-05 ASSESSMENT — ENCOUNTER SYMPTOMS
NAUSEA: 1
VOMITING: 0
CHEST TIGHTNESS: 1
SHORTNESS OF BREATH: 1
ABDOMINAL PAIN: 0

## 2023-09-05 ASSESSMENT — PAIN SCALES - GENERAL
PAINLEVEL_OUTOF10: 0
PAINLEVEL_OUTOF10: 0

## 2023-09-05 NOTE — CONSULTS
[facility] PHYSICAL REHABILITATION [address], [facility]  Phone: [phone]    Fax [phone]                  Valencia Peraza MD, Bernice Stephens MD, [address] Delmy Hope MD, MD Pearl Rice MD Arron Paci, MD Oleh Pila, MD Magdaline Rue, APRN      Rosa Marrero, APRN  Shanae Yousif, DERIK Lassiter, APRN  Bess Sanford PA-C    CARDIOLOGY CONSULT NOTE     Reason for consultation: Chest pain    Referring physician:  Nicki Merlin, MD     Primary care physician: Genevieve Rushing MD      Thank you for the consult. Chief Complaints :  Chief Complaint   Patient presents with    Dizziness     And weakness         History of present Elisha Duarte is a 80 y. o.year old  female with a past medical history of Takotsubo cardiomyopathy, TAVR 2021, Hx CVA, COPD, hypertension, hyperlipidemia, hypothyroidism. Cardiology consulted for chest pain. Patient stated that she began to experience chest tightness yesterday that was associated with shortness of breath, nausea and some chills. Patient also states she is having mild lightheadedness when the experience happened. However when she was brought to the emergency department she was noted to have a systolic pressure in the 399V. Since her systolic pressure has improved her chest tightness has also improved. Upon physical examination patient is having some increased chest/upper abdominal discomfort with palpation as well as deep inspiration. This EKG is showing some septal T wave inversions but was in setting of significant hypertension, we will repeat to evaluate any change. Troponin is repeatedly negative has had normal heart cath in the past.    Patient follows with Dr. Emma Qureshi and was being treated for's severe valvular heart disease s/p TAVR as well as Takotsubo cardiomyopathy.   Her most recent echocardiogram showing with improved ejection fraction and she is no longer

## 2023-09-05 NOTE — PROGRESS NOTES
Facility/Department: [facility] 4N   CLINICAL BEDSIDE SWALLOW EVALUATION    NAME: Irina Acosta  : 1935  MRN: [id number]    IMPRESSIONS AND RECOMMENDATIONS: Irina Acosta was referred for a bedside swallow evaluation following admission to [facility] with hypertensive urgency, gait disturbance, concern for CVA/TIA. Per radiologist head CT negative for acute abnormality. MRI pending. Medical hx includes HTN, COPD, hypothyroidism. No known history of dysphagia prior to admission. Pt seen for evaluation seated upright in bed, alert, cooperative. Oral mechanism examination WFL without asymmetry. Pt presented with PO trials of thin liquids via cup/straw, puree, regular solids. Oral stage WFL with intact labial seal, mastication, oral clearance. Pharyngeal stage WFL without s/s aspiration. Speech/language/cognition screened and judged [facility]. Recommend continued regular diet/thin liquids. No further acute SLP needs identified. ADMISSION DATE: 2023  ADMITTING DIAGNOSIS: has Closed intertrochanteric fracture of left femur ([address]); Gait disturbance; Anemia; Dizziness; Acquired hypothyroidism; Murmur; Age-related osteoporosis without current pathological fracture; Black tongue; Vitamin D deficiency; Vitamin B12 deficiency; Gastroesophageal reflux disease without esophagitis; VHD (valvular heart disease); Acute respiratory distress; COPD (chronic obstructive pulmonary disease) ([address]); Sepsis due to pneumonia ([address]); Nodule of lower lobe of right lung; S/P TAVR (transcatheter aortic valve replacement); Primary hypertension; Acute respiratory failure ([address]); Other seizures ([address]); Metabolic encephalopathy; Cerebrovascular accident (CVA) due to embolism of cerebral artery ([address]); Cerebrovascular accident (CVA) ([address]); Ataxia; Dysarthria due to acute stroke ([address]); Dysthymia; Current mild episode of major depressive disorder without prior episode ([address]); Atherosclerosis of aorta ([address]);  Thyroid disease; Mixed hyperlipidemia;

## 2023-09-05 NOTE — PROGRESS NOTES
V2.0  [facility] Hospitalist Progress Note      Name:  Antoine Son /Age/Sex: 1935  (80 y.o. female)   MRN & CSN:  [id number] & [id number] Encounter Date/Time: 2023 10:34 AM EDT    Location:  [id number] PCP: Yen Amaro MD       Hospital Day: 2    Assessment and Plan:   Antoine Son is a 80 y.o. female  who presents with Hypertensive urgency    Hypertensive urgency - Improved from admission. weaned off of ACEI OP ?? SBP >200 on adm, currently 125-160's. Patient on Norvasc 5 and losartan 25 mg daily, further management per Cardiology  Chest pain in setting of above - EKG showing SB with bradycardia on adm with T wave abn, trops neg x3. Hiwot Guardado No prior hx of CAD, LHC showed no sign CAD 2021, normal stress 3/2023. . prior TTE 3/2023 showed EF 55-60% with no RWM abn, +VHD and moderate to severe Pulm HTN. -Cardiology following, limited TTE pending, tsh pending  -continue on asa/statin  -monitor on tele  Dizziness in setting of above - MRI brain pending. CT head non acute, possible acute sphenoid sinusitis.  Carotid Duplex showing KARLA 66-80%, LIC 3-65% stenosis  -f/u MRI brain, MRI lumbar spine to eval lumbar spine stenosis, PT/OT, f/u orthostatic vs, fall precautions  Possible Acute sinusitis - started on flonase, consider augmentin if fever develops      Other chronic medical conditions - resume home medications unless contraindicated  COPD - not in exacerbation, resume home bronchodilators  Acquired hypothyroidism - on levothyroxine, f/u tsh  Hx Takotsuba CMP with recovered EF - volume status stable, not on diuretics, f/u repeat TTE  VHD s/p TAVR, Moderate MR/TR - followed by Cardiology  Moderate to Severe PHTN- f/u repeat TTE      Diet Diet NPO Exceptions are: Ice Chips, Sips of Water with Meds   DVT Prophylaxis [] Lovenox, []  Heparin, [] SCDs, [] Ambulation,  [] Eliquis, [] Xarelto  [] Coumadin   Code Status Full Code   Disposition From: home  Expected Disposition: home  Estimated Date of Discharge:

## 2023-09-05 NOTE — CARE COORDINATION
Met c pt to initiate discharge planning. Pt states she lives at home alone, ind with ADLs including driving. Pt has DME available but does not need or use. Pt has pcp/ active insurance and can afford meds. Pt denied needs at this time, advised CM available if needs arise. 09/05/23 1331   Service Assessment   Patient Orientation Alert and Oriented   Cognition Alert   History Provided By Patient   Primary [address] [place] [address]   PCP Verified by CM Yes   Prior Functional Level Independent in ADLs/IADLs   Current Functional Level Independent in ADLs/IADLs   Can patient return to prior living arrangement Yes   Ability to make needs known: Good   Would you like for me to discuss the discharge plan with any other family members/significant others, and if so, who? No   Social/Functional History   Active  Yes   Discharge Planning   Type of Residence House   Living Arrangements Alone   Current Services Prior To Admission None   Potential Assistance Needed N/A   DME Ordered?  No   Potential Assistance Purchasing Medications No   Type of Home Care Services None   Patient expects to be discharged to: [facility]   Transition of Care Consult (CM Consult) N/A   Services At/After Discharge None   Mode of Transport at Discharge Self

## 2023-09-05 NOTE — PROGRESS NOTES
[id number] ACUTE CARE OCCUPATIONAL THERAPY EVALUATION  [address], 1935, 4119/4119-A, 9/5/2023    Discharge Recommendation:  No further skilled occupational therapy services     History  [place]:  The primary encounter diagnosis was Hypertensive urgency. A diagnosis of Acute dyspnea was also pertinent to this visit. Patient  has a past medical history of Arthritis, Asthma, Blood transfusion, Broken heart syndrome, Cerebral artery occlusion with cerebral infarction ([address]), Chronic bronchitis ([address]), COPD (chronic obstructive pulmonary disease) ([address]), COPD exacerbation ([address]), COPD with exacerbation ([address]), Echocardiogram, Fatigue, H/O cardiac catheterization, H/O cardiovascular stress test, H/O Doppler ultrasound, H/O Doppler ultrasound, H/O echocardiogram, H/O echocardiogram, H/O echocardiogram, H/O echocardiogram, H/O exercise stress test, History of cardiac cath, History of nuclear stress test, Holter monitor, abnormal, Hyperlipidemia, Hypertension, Iron deficiency anemia, unspecified, Malabsorption syndrome, Normal cardiac stress test, On home O2, PONV (postoperative nausea and vomiting), Syncope and collapse, Tachycardia, and Thyroid disease. Patient  has a past surgical history that includes joint replacement (2004); Hysterectomy (1966); Cholecystectomy (1961); Toe Surgery (Early 2000's); Carpal tunnel release (1990's); Finger surgery; Gastric restriction surgery (1984); hernia repair (unknown); lipectomy (1990's); Dilatation, esophagus; other surgical history (05/01/2012); Hip fracture surgery (Left, 11/04/2015); Colonoscopy; Endoscopy, colon, diagnostic (07/03/2017); Total shoulder arthroplasty (Bilateral, 10/2017); Aortic valve replacement (N/A, 09/11/2019); Aortic valve repair (N/A, 09/09/2019); Breast surgery (2009); Cardiac surgery (1989); Cardiac catheterization (03/02/2011); Kathy-en-Y Gastric Bypass; Tonsillectomy; eye surgery (Bilateral);  Upper gastrointestinal endoscopy (N/A, 3/30/2022); Colonoscopy (N/A, 6/12/2023); and Upper gastrointestinal endoscopy (N/A, 6/12/2023). Subjective:  Patient states: \"I go into town everyday and get a cart to walk around with so I get my exercise in\". Pain:  Denied. Communication with other providers: Handoff to RN.   Restrictions: General Precautions, Fall Risk     Home Setup/Prior level of function   Social/Functional History  Lives With: Alone (daughter frequently checks on pt)  Type of Home: House  Home Layout: One level  Home Access: Level entry  Bathroom Shower/Tub: Walk-in shower  Bathroom Toilet: Handicap height  Bathroom Equipment: Shower chair, Grab bars in shower, Grab bars around toilet  Bathroom Accessibility: [address] [place] [address]: 4 wheeled walker, Cane, Quad cane, Crutches (does not use AD but keeps a cane in car)  ADL Assistance: [address] Jenni Responsibilities: Yes  Ambulation Assistance: Independent (mod I with quad cane PRN)  Transfer Assistance: Independent  Active : Yes  Occupation: Retired  Leisure & Hobbies: Making jewelry    The above information was pulled from a previous encounter and verified with the patient     Examination of body systems (includes body structures/functions, activity/participation limitations):  Observation:  Supine in bed upon arrival, agreeable to therapy  Vision: Glasses   Hearing: [facility]  Cardiopulmonary:  On room air, tele, vitals remained stable throughout session    Body Systems and functions:  ROM R/L: WFL    Strength R/L:  BUE 5/5,   4+/5  Sensation: Occasional paresthesia in BUE fingertips   Tone: Normal  Coordination: WFL  Perception: WNL    Activities of Daily Living (ADLs):  Feeding: Independent   Grooming: Supervision (pt performed oral care, face washing, and hair care in stand at sink)   UB bathing: Supervision   LB bathing: Supervision   UB dressing: Independent   LB dressing: Independent   Toileting: Supervision

## 2023-09-06 VITALS
RESPIRATION RATE: 17 BRPM | OXYGEN SATURATION: 96 % | HEIGHT: 60 IN | TEMPERATURE: 97.5 F | HEART RATE: 66 BPM | BODY MASS INDEX: 23.16 KG/M2 | DIASTOLIC BLOOD PRESSURE: 53 MMHG | SYSTOLIC BLOOD PRESSURE: 149 MMHG | WEIGHT: 118 LBS

## 2023-09-06 LAB
ANION GAP SERPL CALCULATED.3IONS-SCNC: 14 MMOL/L (ref 4–16)
BUN SERPL-MCNC: 12 MG/DL (ref 6–23)
CALCIUM SERPL-MCNC: 8.4 MG/DL (ref 8.3–10.6)
CHLORIDE BLD-SCNC: 103 MMOL/L (ref 99–110)
CO2: 21 MMOL/L (ref 21–32)
CREAT SERPL-MCNC: 0.5 MG/DL (ref 0.6–1.1)
EKG ATRIAL RATE: 55 BPM
EKG DIAGNOSIS: NORMAL
EKG P AXIS: 76 DEGREES
EKG P-R INTERVAL: 166 MS
EKG Q-T INTERVAL: 476 MS
EKG QRS DURATION: 72 MS
EKG QTC CALCULATION (BAZETT): 455 MS
EKG R AXIS: 50 DEGREES
EKG T AXIS: 75 DEGREES
EKG VENTRICULAR RATE: 55 BPM
GFR SERPL CREATININE-BSD FRML MDRD: >60 ML/MIN/1.73M2
GLUCOSE SERPL-MCNC: 86 MG/DL (ref 70–99)
HCT VFR BLD CALC: 43.4 % (ref 37–47)
HEMOGLOBIN: 14.3 GM/DL (ref 12.5–16)
MAGNESIUM: 2 MG/DL (ref 1.8–2.4)
MCH RBC QN AUTO: 32.1 PG (ref 27–31)
MCHC RBC AUTO-ENTMCNC: 32.9 % (ref 32–36)
MCV RBC AUTO: 97.5 FL (ref 78–100)
PDW BLD-RTO: 15.2 % (ref 11.7–14.9)
PHOSPHORUS: 4.2 MG/DL (ref 2.5–4.9)
PLATELET # BLD: 224 K/CU MM (ref 140–440)
PMV BLD AUTO: 9 FL (ref 7.5–11.1)
POTASSIUM SERPL-SCNC: 4.3 MMOL/L (ref 3.5–5.1)
RBC # BLD: 4.45 M/CU MM (ref 4.2–5.4)
SODIUM BLD-SCNC: 138 MMOL/L (ref 135–145)
WBC # BLD: 8.8 K/CU MM (ref 4–10.5)

## 2023-09-06 PROCEDURE — 80048 BASIC METABOLIC PNL TOTAL CA: CPT

## 2023-09-06 PROCEDURE — 83735 ASSAY OF MAGNESIUM: CPT

## 2023-09-06 PROCEDURE — 94761 N-INVAS EAR/PLS OXIMETRY MLT: CPT

## 2023-09-06 PROCEDURE — APPSS30 APP SPLIT SHARED TIME 16-30 MINUTES

## 2023-09-06 PROCEDURE — 85027 COMPLETE CBC AUTOMATED: CPT

## 2023-09-06 PROCEDURE — 93010 ELECTROCARDIOGRAM REPORT: CPT | Performed by: INTERNAL MEDICINE

## 2023-09-06 PROCEDURE — 84100 ASSAY OF PHOSPHORUS: CPT

## 2023-09-06 PROCEDURE — 6370000000 HC RX 637 (ALT 250 FOR IP): Performed by: STUDENT IN AN ORGANIZED HEALTH CARE EDUCATION/TRAINING PROGRAM

## 2023-09-06 PROCEDURE — G0378 HOSPITAL OBSERVATION PER HR: HCPCS

## 2023-09-06 PROCEDURE — 2580000003 HC RX 258: Performed by: STUDENT IN AN ORGANIZED HEALTH CARE EDUCATION/TRAINING PROGRAM

## 2023-09-06 PROCEDURE — 36415 COLL VENOUS BLD VENIPUNCTURE: CPT

## 2023-09-06 PROCEDURE — 94010 BREATHING CAPACITY TEST: CPT

## 2023-09-06 RX ORDER — AMLODIPINE BESYLATE 5 MG/1
5 TABLET ORAL DAILY
Qty: 30 TABLET | Refills: 1 | Status: SHIPPED | OUTPATIENT
Start: 2023-09-07 | End: 2023-09-12 | Stop reason: SDUPTHER

## 2023-09-06 RX ORDER — FLUTICASONE PROPIONATE 50 MCG
1 SPRAY, SUSPENSION (ML) NASAL DAILY
Qty: 16 G | Refills: 0 | Status: ON HOLD | OUTPATIENT
Start: 2023-09-07

## 2023-09-06 RX ORDER — AMLODIPINE BESYLATE 5 MG/1
5 TABLET ORAL ONCE
Status: DISCONTINUED | OUTPATIENT
Start: 2023-09-06 | End: 2023-09-06

## 2023-09-06 RX ORDER — AMLODIPINE BESYLATE 5 MG/1
5 TABLET ORAL DAILY
Status: DISCONTINUED | OUTPATIENT
Start: 2023-09-07 | End: 2023-09-06 | Stop reason: HOSPADM

## 2023-09-06 RX ORDER — AMLODIPINE BESYLATE 10 MG/1
10 TABLET ORAL DAILY
Status: DISCONTINUED | OUTPATIENT
Start: 2023-09-07 | End: 2023-09-06

## 2023-09-06 RX ORDER — LOSARTAN POTASSIUM 25 MG/1
25 TABLET ORAL DAILY
Qty: 30 TABLET | Refills: 1 | Status: SHIPPED | OUTPATIENT
Start: 2023-09-07 | End: 2023-09-12 | Stop reason: SDUPTHER

## 2023-09-06 RX ADMIN — LEVOTHYROXINE SODIUM 75 MCG: 0.07 TABLET ORAL at 06:05

## 2023-09-06 RX ADMIN — PANTOPRAZOLE SODIUM 40 MG: 40 TABLET, DELAYED RELEASE ORAL at 06:05

## 2023-09-06 RX ADMIN — ASPIRIN 81 MG CHEWABLE TABLET 81 MG: 81 TABLET CHEWABLE at 11:21

## 2023-09-06 RX ADMIN — FLUTICASONE PROPIONATE 1 SPRAY: 50 SPRAY, METERED NASAL at 11:25

## 2023-09-06 RX ADMIN — AMLODIPINE BESYLATE 5 MG: 5 TABLET ORAL at 11:21

## 2023-09-06 RX ADMIN — LOSARTAN POTASSIUM 25 MG: 25 TABLET, FILM COATED ORAL at 11:21

## 2023-09-06 RX ADMIN — ESCITALOPRAM OXALATE 20 MG: 10 TABLET ORAL at 11:21

## 2023-09-06 RX ADMIN — SODIUM CHLORIDE, PRESERVATIVE FREE 10 ML: 5 INJECTION INTRAVENOUS at 11:22

## 2023-09-06 ASSESSMENT — COPD QUESTIONNAIRES
QUESTION2_CHESTPHLEGM: 2
QUESTION1_COUGHFREQUENCY: 0
QUESTION3_CHESTTIGHTNESS: 0
QUESTION7_SLEEPQUALITY: 1
GOLD_GROUP: GROUP A
QUESTION5_HOMEACTIVITIES: 0
QUESTION8_ENERGYLEVEL: 2
TOTAL_EXACERBATIONS_PASTYEAR: 0
QUESTION6_LEAVINGHOUSE: 0
QUESTION4_WALKINCLINE: 3
CAT_TOTALSCORE: 8

## 2023-09-06 ASSESSMENT — PULMONARY FUNCTION TESTS
PIF_VALUE: 70
POST BRONCHODILATOR FEV1/FVC: 81
FEV1 (%PREDICTED): 91

## 2023-09-06 ASSESSMENT — PAIN SCALES - GENERAL: PAINLEVEL_OUTOF10: 0

## 2023-09-06 NOTE — PROGRESS NOTES
Discharge instructions given to patient. All questions answered at this time. Patient discharging with personal belongings, filled prescriptions, and  discharge instructions. Patient left hospital in wheelchair with daughter transporting home. Patient discharging to home.

## 2023-09-06 NOTE — PLAN OF CARE
Problem: Safety - Adult  Goal: Free from fall injury  9/6/2023 7247 by Kate Mccracken RN  Outcome: Progressing  9/6/2023 3270 by Kate Mccracken RN  Outcome: Progressing     Problem: ABCDS Injury Assessment  Goal: Absence of physical injury  9/6/2023 6359 by Kate Mccracken RN  Outcome: Progressing  9/6/2023 9775 by Kate Mccracken RN  Outcome: Progressing     Problem: Discharge Planning  Goal: Discharge to home or other facility with appropriate resources  9/6/2023 6266 by Kate Mccracken RN  Outcome: Progressing  9/6/2023 0547 by Kate Mccracken RN  Outcome: Progressing  Flowsheets (Taken 9/6/2023 1121)  Discharge to home or other facility with appropriate resources:   Identify barriers to discharge with patient and caregiver   Arrange for needed discharge resources and transportation as appropriate   Identify discharge learning needs (meds, wound care, etc)   Refer to discharge planning if patient needs post-hospital services based on physician order or complex needs related to functional status, cognitive ability or social support system     Problem: Pain  Goal: Verbalizes/displays adequate comfort level or baseline comfort level  9/6/2023 7907 by Kate Mccracken RN  Outcome: Progressing  9/6/2023 4707 by Kate Mccracken RN  Outcome: Progressing  Flowsheets (Taken 9/6/2023 1021)  Verbalizes/displays adequate comfort level or baseline comfort level:   Encourage patient to monitor pain and request assistance   Assess pain using appropriate pain scale   Administer analgesics based on type and severity of pain and evaluate response   Implement non-pharmacological measures as appropriate and evaluate response   Consider cultural and social influences on pain and pain management   Notify Licensed Independent Practitioner if interventions unsuccessful or patient reports new pain

## 2023-09-06 NOTE — PROGRESS NOTES
Outpatient Pharmacy Progress Note for Meds-to-Beds    Total number of Prescriptions Filled: 3  The following medications were dispensed to the patient during the discharge process:  amLODIPine  fluticasone  Losartan (replaces home lisinopril medication)    Additional Documentation:  Medication(s) were delivered to the patient's room prior to discharge      Thank you for letting us serve your patients.   [address]    [address] [place] [address], [address] [place] [address]    Phone: [phone]    Fax: [phone]

## 2023-09-06 NOTE — DISCHARGE SUMMARY
Discharge Summary    Name:  Trent Pool /Age/Sex: 1935  (80 y.o. female)   MRN & CSN:  [id number] & [id number] Admission Date/Time: 2023 12:32 PM   Attending:  Jana Nichols MD Discharging Physician: Reanna Mosley, [address] [place] [address] Course: Trent Pool is a 80 y.o.  female  who presents with Hypertensive urgency    HPI Per H&P 2023  Patient states that patient has been having issues of dizziness lightheadedness with severe myalgias for the last 3 to 4 days. Yesterday she started having some chest pressure sensations associated with dyspnea on exertion to the point that she felt like severe chest tightness and she could not breathe and that was the reason she came to the hospital denies any nausea vomiting diarrhea constipation leg pain. leg weakness is there but more like muscular in origin. All questions and concerns were answered at bedside. It is also noted that patient has episodes of dizziness with balance issues going on for couple of months. Denies any numbness tingling in the legs but does have claudication symptoms every now and then with walking. Gets better with sitting now. The patient was admitted, Cardiology was consulted. Brief problem based POC as noted below:    Hypertensive urgency - Improved from admission. weaned off of ACEI OP. SBP >200 on adm, improved and  controlled with Norvasc 5 mg daily, Losartan 25 mg daily, and continued at DC per Cardiology. Chest pain in setting of above - EKG showing SB with bradycardia on adm with T wave abn, trops neg x3. Abena Plants No prior hx of CAD, LHC showed no sign CAD 2021, normal stress 3/2023. . prior TTE 3/2023 showed EF 55-60% with no RWM abn, +VHD and moderate to severe Pulm HTN. Tsh wnl.  -Cardiology consulted, limited TTE showed EF 55-60% with normal functioning bioprosthetic valve I aortic position, mild MR/TR, no additional cardiac w/u recommended and was stable for DC. Pt had no further chest pain.  She was ONE XRAY VIEW OF THE CHEST 9/4/2023 1:04 pm COMPARISON: 03/08/2023. HISTORY: ORDERING SYSTEM PROVIDED HISTORY: cp TECHNOLOGIST PROVIDED HISTORY: Reason for exam:->cp Reason for Exam: Chest pain FINDINGS: The cardiac silhouette and mediastinal contours are normal.  The lungs are clear. No pleural effusion or pneumothorax. 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 1. No acute cardiopulmonary disease. VL DUP CAROTID BILATERAL    Result Date: 9/4/2023  EXAMINATION: ULTRASOUND EVALUATION OF THE CAROTID ARTERIES 9/4/2023 TECHNIQUE: Duplex ultrasound using B-mode/gray scaled imaging, Doppler spectral analysis and color flow Doppler was obtained of the carotid arteries. COMPARISON: Carotid ultrasound 09/05/2019. HISTORY: ORDERING SYSTEM PROVIDED HISTORY: Dizziness TECHNOLOGIST PROVIDED HISTORY: Reason for exam:->Dizziness FINDINGS: RIGHT: The right common carotid artery demonstrates peak systolic velocities of 41.5 and 70.3 cm/sec in the proximal and distal segments respectively. The right common carotid artery demonstrates end-diastolic velocities of 0 and 10.3 cm/sec in the proximal and distal segments respectively. The right internal carotid artery demonstrates the systolic velocities of 85.6, 96.7, and 187.2 cm/sec in the proximal, mid and distal segments respectively. The right internal carotid artery demonstrates the end-diastolic velocities of 7.6, 16, and 28.8 cm/sec in the proximal, mid and distal segments respectively. The external carotid artery appears patent. The vertebral artery demonstrates normal antegrade flow. Atherosclerotic plaquing is seen in the carotid bulb and throughout the internal carotid artery. ICA/CCA ratio of 2.7 LEFT: The left common carotid artery demonstrates peak systolic velocities of 946.0 cm/sec in the proximal and distal segments respectively.   The left common carotid artery demonstrates end-diastolic velocities of 72.2 and 11.1 cm/sec in the proximal and distal segments

## 2023-09-06 NOTE — PROGRESS NOTES
[facility] [place] [facility]  [address] [place] [address], [address]  Phone: [phone]    Fax [phone]                  Caterina Orta MD, Vu Garcia MD, Jessica Garcia MD, MD Javier Hernandez MD [place], MD Ladd Lair, MD Dr. Merrell Comas MD Alyssa Brown, DERIK Cruz, DREIK Mcghee, DERIK Mascorro, DERIK Saenz PA-C    CARDIOLOGY  NOTE      Name:  Leonard Jones /Age/Sex: 1935  (80 y.o. female)   MRN & CSN:  [id number] & [id number] Admission Date/Time: 2023 12:32 PM   Location:  6956/6777-G PCP: Romeo Lewis [place] [address] Day: 3    - Cardiology consult is for:  Chest pain      ASSESSMENT/ PLAN:  Atypical chest pain  Infrequent PVCs  -Currently not experiencing chest pain  -Chest pain was worse with inspiration as well as palpation, also could be secondary to uncontrolled hypertension  -Troponin repeatedly negative  -Prior left heart cath not revealing any significant CAD  -Stress test 2023 not revealing any ischemia  -Echo :Showing preserved EF without any wall motion abnormalities.     - Continue aspirin and Lipitor 40 mg daily  Severe Aortic stenosis s/p TAVR   Moderate to severe pulmonary hypertension  -Most recent echo revealing normal functioning bioprosthetic aortic valve with mean gradient of 5 mmHg  -Moderate mitral and tricuspid regurgitation noted  -Not appear to be in acute CHF  History of Takotsubo cardiomyopathy with now recovered ejection fraction  -EF has since recovered and is no longer on guideline directed medical therapy  Uncontrolled hypertension  -Systolic pressure in 634R upon admission  -Pressure still elevated in the 150s this morning, however some concern for outpatient hypotension.   -Recently discontinued lisinopril as outpatient  -Continue losartan 25 mg daily and Norvasc 5 mg daily  History of Hysterectomy (1966); Cholecystectomy (1961); Toe Surgery (Early 2000's); Carpal tunnel release (1990's); Finger surgery; Gastric restriction surgery (1984); hernia repair (unknown); lipectomy (1990's); Dilatation, esophagus; other surgical history (05/01/2012); Hip fracture surgery (Left, 11/04/2015); Colonoscopy; Endoscopy, colon, diagnostic (07/03/2017); Total shoulder arthroplasty (Bilateral, 10/2017); Aortic valve replacement (N/A, 09/11/2019); Aortic valve repair (N/A, 09/09/2019); Breast surgery (2009); Cardiac surgery (1989); Cardiac catheterization (03/02/2011); Kathy-en-Y Gastric Bypass; Tonsillectomy; eye surgery (Bilateral); Upper gastrointestinal endoscopy (N/A, 3/30/2022); Colonoscopy (N/A, 6/12/2023); and Upper gastrointestinal endoscopy (N/A, 6/12/2023).     Physical Exam     Medications:    nitroglycerin  1 inch Topical Once    atorvastatin  40 mg Oral Nightly    aspirin  81 mg Oral Daily    escitalopram  20 mg Oral Daily    fluticasone  1 spray Each Nostril Daily    pantoprazole  40 mg Oral QAM AC    sodium chloride flush  5-40 mL IntraVENous 2 times per day    enoxaparin  40 mg SubCUTAneous QPM    amLODIPine  5 mg Oral Daily    losartan  25 mg Oral Daily    levothyroxine  75 mcg Oral Daily      sodium chloride       nitroGLYCERIN, albuterol sulfate HFA, sodium chloride flush, sodium chloride, ondansetron **OR** ondansetron, polyethylene glycol, acetaminophen **OR** acetaminophen, hydrocortisone    Lab Data:  CBC:   Recent Labs     09/04/23  1319 09/05/23  0044 09/06/23  0051   WBC 10.0 9.3 8.8   HGB 14.5 14.5 14.3   HCT 44.2 43.0 43.4   MCV 96.5 93.7 97.5    232 224     BMP:   Recent Labs     09/04/23  1319 09/05/23  0044 09/06/23  0051    136 138   K 4.6 4.2 4.3   CL 99 100 103   CO2 27 24 21   PHOS  --  4.6 4.2   BUN 12 12 12   CREATININE 0.5* 0.6 0.5*     LIVER PROFILE:   Recent Labs     09/04/23  1319   AST 32   ALT 48*   BILITOT 0.7   ALKPHOS 63     PT/INR:   Recent Labs

## 2023-09-06 NOTE — PROGRESS NOTES
Current GOLD classification for Modesto Sparks      GOLD Stage:    Group: Group A  Recorded domestic exacerbations past 12 months: 0  Current recorded COPD Assessment Tool (CAT) score of 8  Current eosinophil count: 0.1     Inhaler Device   Acceptable for Use   Respimat 70 Not Breath Actuated Yes   MDI 70 Not Breath Actuated Yes           DPI  Observed PIF   using  In-Check Meter   Optimal PIF   Acceptable for Use   HANDIHALER 70 >30    Pressair 70 >45    NEOHALER 70 >50    Diskus 70 >60    ELLIPTA 70 >60      Records show Modesto Sparks was not using maintenance therapy prior to admission.           LONG-ACTING (LABA)   Arformoterol (Brovana) NEBULIZER   Indacaterol (Arcapta) NEOHALER   Olodaterol (Striverdi) Respimat   Salmeterol (Serevent) MDI, DISKUS   LONG-ACTING (LAMA)   Aclidinium bromide (Tudorza) PRESSAIR   Glycopyrronium bromide Nabila Hang) NEOHALER   Tiotropium (Spiriva) Respimat, HANDIHALER   Umeclidinium (Incruse) ELLIPTA   (LABA/LAMA)   Formoterol/glycopyrronium (Bevespi) MDI   Indacaterol/glycopyrronium (Utibron) NEOHALER   Vilanterol/umeclidinium (Anoro) ELLIPTA   Olodaterol/tiotropium (Stiolto) Respimat   (LABA/ICS)   Formoterol/budesomide (Symbicort) MDI   Formoterol/mometasone (Dulera) MDI   Salmeterol/fluticasone (Advair) MDI, DISKUS   Vilanterol/fluticasone (Breo) ELLIPTA   (LABA/LAMA/ICS)   Fluticasone/umeclidinium/vilanterol (Trelegy) ELLIPTA   Budesonide/glycopyrrolate/formoterol fumarate (Beztri) aerosphere     Electronically signed by Melissa Calderon RCP on 9/6/23 at 3:05 PM EDT    09/06/23 0189   Spirometry Assessment   FEV1 (%PRED) 91   Post Bronchodilator FEV/FVC 81   COPD Exacerbations in last year 0   PIF 70 L/min   COPD Assessment (CAT Score)   Cough Assessment 0   Phlegm Assessment 2   Chest tightness 0   Walking on an incline 3   Home Activities 0   Confident Leaving The Home 0   Sleeping Soundly 1   Have Energy 2   Assessment Score 8   GOLD Staging   Group Group A

## 2023-09-07 ENCOUNTER — CARE COORDINATION (OUTPATIENT)
Dept: CASE MANAGEMENT | Age: 88
End: 2023-09-07

## 2023-09-07 DIAGNOSIS — I16.0 HYPERTENSIVE URGENCY: Primary | ICD-10-CM

## 2023-09-07 PROCEDURE — 1111F DSCHRG MED/CURRENT MED MERGE: CPT | Performed by: FAMILY MEDICINE

## 2023-09-07 NOTE — CARE COORDINATION
family avail as back up. Care Transition Nurse reviewed discharge instructions, medical action plan, and red flags with patient who verbalized understanding. The patient was given an opportunity to ask questions and does not have any further questions or concerns at this time. Were discharge instructions available to patient? Yes. Reviewed appropriate site of care based on symptoms and resources available to patient including: PCP  Specialist  Benefits related nurse triage line  Urgent care clinics  When to call [phone]. The patient agrees to contact the PCP office for questions related to their healthcare. Advance Care Planning:   Does patient have an Advance Directive: not on file. Medication reconciliation was performed with patient, who verbalizes understanding of administration of home medications. Medications reviewed, 1111F entered: yes    Was patient discharged with a pulse oximeter? yes, discussed and confirmed pulse oximeter discharge instructions and when to notify provider or seek emergency care. Non-face-to-face services provided:  Obtained and reviewed discharge summary and/or continuity of care documents  Education of patient/family/caregiver/guardian to support self-management-HTN, BP log  Assessment and support for treatment adherence and medication management-med review    Offered patient enrollment in the Remote Patient Monitoring (RPM) program for in-home monitoring: Yes, but did not enroll at this time.     Care Transitions 24 Hour Call    Schedule Follow Up Appointment with PCP: Completed  Do you have a copy of your discharge instructions?: Yes  Do you have all of your prescriptions and are they filled?: Yes  Have you been contacted by a [facility] Pharmacist?: No  Have you scheduled your follow up appointment?: No  Post Acute Services: Home Health (Comment: [phone] [facility] or [facility])  Patient DME: Roselia machado, Shower chair, Other  Other Patient DME: christine LEMONS,

## 2023-09-11 ENCOUNTER — CARE COORDINATION (OUTPATIENT)
Dept: CASE MANAGEMENT | Age: 88
End: 2023-09-11

## 2023-09-11 NOTE — CARE COORDINATION
[facility] Care Transitions Follow Up Call    Patient Current Location:  Home: [address] [place] [address]    Care Transition Nurse contacted the patient by telephone to follow up after admission on 23. Verified name and  with patient as identifiers. Patient: Elida Drake  Patient : 1935   MRN: [id number]  Reason for Admission: HTN emergency  Discharge Date: 23 RARS: Readmission Risk Score: 17.2      Needs to be reviewed by the provider   Additional needs identified to be addressed with provider: No  none             Method of communication with provider: none. CTN call to Constanza Jonas today and she says she is feeling better in the last 3 days than she has felt in a long time. Denies chest pain/tightness, sob, n/v/d, weakness, malaise, swelling, fever, chills. C/o a little dizziness at times, she is cautious when walking, uses cane. C/o DYSPNEA ON EXERTION but that has improved since last week. IP=828/84 HR=50's reg  Not having to take Robaxin at night for restless legs. Sleeping well. Eating & drinking ok. Denies problems w/ urination/bowels. We discussed ACP docs, says she has MPOA docs-not interested in ACP. Will take MPOA docs to PCP HFU 23 to be scanned in to her chart. Will ask PCP if Cardio appt. Needs to be sooner than 23. Reports BP daily to daughter. Will continue to follow. Declines RPM benefits-feels not beneficial for her. Addressed changes since last contact:  none  Discussed follow-up appointments. If no appointment was previously scheduled, appointment scheduling offered: Yes. Is follow up appointment scheduled within 7 days of discharge? Yes.     Follow Up  Future Appointments   Date Time Provider [address]   2023  2:30 PM Genevieve Rushing MD [facility]   10/3/2023 10:15 AM Igor Avalos [facility]   10/10/2023 10:30 AM Genevieve Rushing MD [facility]   2023 11:30 AM Valencia Peraza MD [facility]

## 2023-09-12 ENCOUNTER — OFFICE VISIT (OUTPATIENT)
Dept: FAMILY MEDICINE CLINIC | Age: 88
End: 2023-09-12

## 2023-09-12 VITALS
HEART RATE: 58 BPM | TEMPERATURE: 97.1 F | WEIGHT: 119.2 LBS | DIASTOLIC BLOOD PRESSURE: 72 MMHG | SYSTOLIC BLOOD PRESSURE: 138 MMHG | OXYGEN SATURATION: 95 % | BODY MASS INDEX: 23.28 KG/M2

## 2023-09-12 DIAGNOSIS — J01.00 ACUTE NON-RECURRENT MAXILLARY SINUSITIS: ICD-10-CM

## 2023-09-12 DIAGNOSIS — R42 DIZZINESS: ICD-10-CM

## 2023-09-12 DIAGNOSIS — M79.604 PAIN IN BOTH LOWER EXTREMITIES: ICD-10-CM

## 2023-09-12 DIAGNOSIS — I10 PRIMARY HYPERTENSION: ICD-10-CM

## 2023-09-12 DIAGNOSIS — I16.0 HYPERTENSIVE URGENCY: Primary | ICD-10-CM

## 2023-09-12 DIAGNOSIS — R07.9 CHEST PAIN, UNSPECIFIED TYPE: ICD-10-CM

## 2023-09-12 DIAGNOSIS — J44.9 CHRONIC OBSTRUCTIVE PULMONARY DISEASE, UNSPECIFIED COPD TYPE (HCC): ICD-10-CM

## 2023-09-12 DIAGNOSIS — E03.9 ACQUIRED HYPOTHYROIDISM: ICD-10-CM

## 2023-09-12 DIAGNOSIS — J30.89 NON-SEASONAL ALLERGIC RHINITIS, UNSPECIFIED TRIGGER: ICD-10-CM

## 2023-09-12 DIAGNOSIS — F32.0 CURRENT MILD EPISODE OF MAJOR DEPRESSIVE DISORDER WITHOUT PRIOR EPISODE (HCC): ICD-10-CM

## 2023-09-12 DIAGNOSIS — R11.0 NAUSEA: ICD-10-CM

## 2023-09-12 DIAGNOSIS — Z09 HOSPITAL DISCHARGE FOLLOW-UP: ICD-10-CM

## 2023-09-12 DIAGNOSIS — Z86.73 HISTORY OF CVA (CEREBROVASCULAR ACCIDENT): ICD-10-CM

## 2023-09-12 DIAGNOSIS — K21.9 GASTROESOPHAGEAL REFLUX DISEASE WITHOUT ESOPHAGITIS: ICD-10-CM

## 2023-09-12 DIAGNOSIS — M79.605 PAIN IN BOTH LOWER EXTREMITIES: ICD-10-CM

## 2023-09-12 RX ORDER — LEVOTHYROXINE SODIUM 0.07 MG/1
75 TABLET ORAL DAILY
Qty: 30 TABLET | Refills: 1 | Status: ON HOLD | OUTPATIENT
Start: 2023-09-12

## 2023-09-12 RX ORDER — ONDANSETRON 4 MG/1
4 TABLET, FILM COATED ORAL EVERY 8 HOURS PRN
Qty: 90 TABLET | Refills: 1 | Status: ON HOLD | OUTPATIENT
Start: 2023-09-12

## 2023-09-12 RX ORDER — ATORVASTATIN CALCIUM 40 MG/1
40 TABLET, FILM COATED ORAL NIGHTLY
Qty: 90 TABLET | Refills: 1 | Status: ON HOLD | OUTPATIENT
Start: 2023-09-12

## 2023-09-12 RX ORDER — TIMOLOL MALEATE 5 MG/ML
SOLUTION/ DROPS OPHTHALMIC
Status: ON HOLD | COMMUNITY
Start: 2023-09-07

## 2023-09-12 RX ORDER — METHOCARBAMOL 500 MG/1
500 TABLET, FILM COATED ORAL EVERY EVENING
Qty: 30 TABLET | Refills: 5 | Status: ON HOLD | OUTPATIENT
Start: 2023-09-12

## 2023-09-12 RX ORDER — LOSARTAN POTASSIUM 25 MG/1
25 TABLET ORAL DAILY
Qty: 90 TABLET | Refills: 1 | Status: ON HOLD | OUTPATIENT
Start: 2023-09-12

## 2023-09-12 RX ORDER — MONTELUKAST SODIUM 10 MG/1
10 TABLET ORAL NIGHTLY
Qty: 90 TABLET | Refills: 1 | Status: ON HOLD | OUTPATIENT
Start: 2023-09-12

## 2023-09-12 RX ORDER — ESCITALOPRAM OXALATE 20 MG/1
20 TABLET ORAL DAILY
Qty: 90 TABLET | Refills: 1 | Status: ON HOLD | OUTPATIENT
Start: 2023-09-12

## 2023-09-12 RX ORDER — AMLODIPINE BESYLATE 5 MG/1
5 TABLET ORAL DAILY
Qty: 90 TABLET | Refills: 1 | Status: ON HOLD | OUTPATIENT
Start: 2023-09-12

## 2023-09-12 RX ORDER — PANTOPRAZOLE SODIUM 40 MG/1
TABLET, DELAYED RELEASE ORAL
Qty: 90 TABLET | Refills: 1 | Status: ON HOLD | OUTPATIENT
Start: 2023-09-12

## 2023-09-13 NOTE — PROGRESS NOTES
Post-Discharge Transitional Care  Follow Up      Alexandra Webster   YOB: 1935    Date of Office Visit:  9/12/2023  Date of Hospital Admission: 9/4/23  Date of Hospital Discharge: 9/6/23  Risk of hospital readmission (high >=14%. Medium >=10%) :Readmission Risk Score: 17.2      Care management risk score Rising risk (score 2-5) and Complex Care (Scores >=6): No Risk Score On File     Non face to face  following discharge, date last encounter closed (first attempt may have been earlier): 09/07/2023    Call initiated 2 business days of discharge: Yes    ASSESSMENT/PLAN:   Hypertensive urgency  Resolved  Current mild episode of major depressive disorder without prior episode (HCC)  Controlled  -     escitalopram (LEXAPRO) 20 MG tablet; Take 1 tablet by mouth daily, Disp-90 tablet, R-1Normal  Gastroesophageal reflux disease without esophagitis  Controlled  -     pantoprazole (PROTONIX) 40 MG tablet; TAKE 1 TABLET NIGHTLY, Disp-90 tablet, R-1Normal  Nausea  Controlled  -     ondansetron (ZOFRAN) 4 MG tablet; Take 1 tablet by mouth every 8 hours as needed for Nausea or Vomiting, Disp-90 tablet, R-1Normal  History of CVA (cerebrovascular accident)  Stable  -     atorvastatin (LIPITOR) 40 MG tablet; Take 1 tablet by mouth nightly, Disp-90 tablet, R-1Normal  Non-seasonal allergic rhinitis, unspecified trigger  Controlled  -     montelukast (SINGULAIR) 10 MG tablet; Take 1 tablet by mouth nightly, Disp-90 tablet, R-1Normal  Primary hypertension  Controlled  -     losartan (COZAAR) 25 MG tablet; Take 1 tablet by mouth daily Hold FOR SBP <120, Disp-90 tablet, R-1Normal  -     amLODIPine (NORVASC) 5 MG tablet; Take 1 tablet by mouth daily HOLD FOR SBP <120, Disp-90 tablet, R-1Normal  Pain in both lower extremities  Controlled  -     methocarbamol (ROBAXIN) 500 MG tablet;  Take 1 tablet by mouth every evening, Disp-30 tablet, R-5Normal  Chest pain, unspecified type  Resolved  Dizziness  Resolved  Chronic obstructive

## 2023-09-14 ENCOUNTER — HOSPITAL ENCOUNTER (EMERGENCY)
Age: 88
Discharge: HOME | DRG: 309 | End: 2023-09-14
Attending: STUDENT IN AN ORGANIZED HEALTH CARE EDUCATION/TRAINING PROGRAM
Payer: MEDICARE

## 2023-09-14 VITALS
TEMPERATURE: 97.7 F | WEIGHT: 115 LBS | BODY MASS INDEX: 22.58 KG/M2 | DIASTOLIC BLOOD PRESSURE: 78 MMHG | OXYGEN SATURATION: 98 % | HEIGHT: 60 IN | HEART RATE: 63 BPM | RESPIRATION RATE: 15 BRPM | SYSTOLIC BLOOD PRESSURE: 145 MMHG

## 2023-09-14 DIAGNOSIS — I48.91 NEW ONSET ATRIAL FIBRILLATION (HCC): Primary | ICD-10-CM

## 2023-09-14 DIAGNOSIS — R42 LIGHTHEADEDNESS: ICD-10-CM

## 2023-09-14 LAB
ALBUMIN SERPL-MCNC: 3.7 GM/DL (ref 3.4–5)
ALP BLD-CCNC: 53 IU/L (ref 40–129)
ALT SERPL-CCNC: 32 U/L (ref 10–40)
ANION GAP SERPL CALCULATED.3IONS-SCNC: 11 MMOL/L (ref 4–16)
AST SERPL-CCNC: 39 IU/L (ref 15–37)
BASOPHILS ABSOLUTE: 0 K/CU MM
BASOPHILS RELATIVE PERCENT: 0.3 % (ref 0–1)
BILIRUB SERPL-MCNC: 0.5 MG/DL (ref 0–1)
BUN SERPL-MCNC: 12 MG/DL (ref 6–23)
CALCIUM SERPL-MCNC: 8.8 MG/DL (ref 8.3–10.6)
CHLORIDE BLD-SCNC: 103 MMOL/L (ref 99–110)
CO2: 23 MMOL/L (ref 21–32)
CREAT SERPL-MCNC: 0.5 MG/DL (ref 0.6–1.1)
DIFFERENTIAL TYPE: ABNORMAL
EKG ATRIAL RATE: 28 BPM
EKG ATRIAL RATE: 74 BPM
EKG DIAGNOSIS: NORMAL
EKG DIAGNOSIS: NORMAL
EKG Q-T INTERVAL: 464 MS
EKG Q-T INTERVAL: 476 MS
EKG QRS DURATION: 72 MS
EKG QRS DURATION: 84 MS
EKG QTC CALCULATION (BAZETT): 423 MS
EKG QTC CALCULATION (BAZETT): 451 MS
EKG R AXIS: 41 DEGREES
EKG R AXIS: 52 DEGREES
EKG T AXIS: 84 DEGREES
EKG T AXIS: 94 DEGREES
EKG VENTRICULAR RATE: 50 BPM
EKG VENTRICULAR RATE: 54 BPM
EOSINOPHILS ABSOLUTE: 0.2 K/CU MM
EOSINOPHILS RELATIVE PERCENT: 2.1 % (ref 0–3)
GFR SERPL CREATININE-BSD FRML MDRD: >60 ML/MIN/1.73M2
GLUCOSE BLD-MCNC: 83 MG/DL (ref 70–99)
GLUCOSE SERPL-MCNC: 86 MG/DL (ref 70–99)
HCT VFR BLD CALC: 42.5 % (ref 37–47)
HEMOGLOBIN: 14.3 GM/DL (ref 12.5–16)
IMMATURE NEUTROPHIL %: 0.2 % (ref 0–0.43)
LYMPHOCYTES ABSOLUTE: 2.7 K/CU MM
LYMPHOCYTES RELATIVE PERCENT: 29.3 % (ref 24–44)
MCH RBC QN AUTO: 32.3 PG (ref 27–31)
MCHC RBC AUTO-ENTMCNC: 33.6 % (ref 32–36)
MCV RBC AUTO: 95.9 FL (ref 78–100)
MONOCYTES ABSOLUTE: 1.1 K/CU MM
MONOCYTES RELATIVE PERCENT: 11.6 % (ref 0–4)
NUCLEATED RBC %: 0 %
PDW BLD-RTO: 14.6 % (ref 11.7–14.9)
PLATELET # BLD: 219 K/CU MM (ref 140–440)
PMV BLD AUTO: 9.7 FL (ref 7.5–11.1)
POTASSIUM SERPL-SCNC: 4.5 MMOL/L (ref 3.5–5.1)
RBC # BLD: 4.43 M/CU MM (ref 4.2–5.4)
SEGMENTED NEUTROPHILS ABSOLUTE COUNT: 5.2 K/CU MM
SEGMENTED NEUTROPHILS RELATIVE PERCENT: 56.5 % (ref 36–66)
SODIUM BLD-SCNC: 137 MMOL/L (ref 135–145)
TOTAL IMMATURE NEUTOROPHIL: 0.02 K/CU MM
TOTAL NUCLEATED RBC: 0 K/CU MM
TOTAL PROTEIN: 5.9 GM/DL (ref 6.4–8.2)
TROPONIN T: <0.01 NG/ML
WBC # BLD: 9.2 K/CU MM (ref 4–10.5)

## 2023-09-14 PROCEDURE — 93005 ELECTROCARDIOGRAM TRACING: CPT | Performed by: STUDENT IN AN ORGANIZED HEALTH CARE EDUCATION/TRAINING PROGRAM

## 2023-09-14 PROCEDURE — 84484 ASSAY OF TROPONIN QUANT: CPT

## 2023-09-14 PROCEDURE — 93010 ELECTROCARDIOGRAM REPORT: CPT | Performed by: INTERNAL MEDICINE

## 2023-09-14 PROCEDURE — 80053 COMPREHEN METABOLIC PANEL: CPT

## 2023-09-14 PROCEDURE — 85025 COMPLETE CBC W/AUTO DIFF WBC: CPT

## 2023-09-14 PROCEDURE — 82962 GLUCOSE BLOOD TEST: CPT

## 2023-09-14 PROCEDURE — 99284 EMERGENCY DEPT VISIT MOD MDM: CPT

## 2023-09-14 ASSESSMENT — ENCOUNTER SYMPTOMS
SHORTNESS OF BREATH: 0
COUGH: 0
SORE THROAT: 0
BACK PAIN: 0
NAUSEA: 0
ABDOMINAL PAIN: 0
VOMITING: 0

## 2023-09-14 ASSESSMENT — PAIN - FUNCTIONAL ASSESSMENT: PAIN_FUNCTIONAL_ASSESSMENT: NONE - DENIES PAIN

## 2023-09-14 NOTE — DISCHARGE INSTRUCTIONS
You were seen here today for lightheadedness. Your cardiac work-up shows new onset atrial fibrillation which could be the source of your lightheadedness. Atrial fibrillation puts you at risk for stroke. I am prescribing you a medication called Eliquis which is a blood thinner to help prevent a stroke. You will need to call cardiology today to schedule urgent follow-up within the next few days to a week. Please take your Eliquis as prescribed. If you develop any bleeding that is unable to be stopped with pressure, black or bloody stools, fall given minor head trauma you will need to return immediately to the emergency department for evaluation. Additionally return for any chest pain shortness of breath, worsening symptoms, passing out, palpitations, feeling that your heart is racing.

## 2023-09-14 NOTE — ED TRIAGE NOTES
Pt reports having dizziness and weakness off and on yesterday trying to over look it. This morning approx at 0930 began sweating, nausea, weak and dizzy. Sweating subsided after 20 minutes but pt continues to feel dizzy and weak.

## 2023-09-14 NOTE — CARE COORDINATION
Pt noted for possible readmission. Pt was recently admitted 9/4-9/6/23 for hypertensive urgency. Pt is from home alone, has family support, PCP and insurance. Pt uses a walker and is independent in ADL's. Pt returns for dizziness and hypertension. Pt was evaluated and d/c home. No CM needs noted on chart review at this time.

## 2023-09-15 ENCOUNTER — TELEPHONE (OUTPATIENT)
Dept: CARDIOLOGY CLINIC | Age: 87
End: 2023-09-15

## 2023-09-15 ENCOUNTER — OFFICE VISIT (OUTPATIENT)
Dept: CARDIOLOGY CLINIC | Age: 88
End: 2023-09-15
Payer: MEDICARE

## 2023-09-15 ENCOUNTER — TELEPHONE (OUTPATIENT)
Dept: CARDIOLOGY CLINIC | Age: 88
End: 2023-09-15

## 2023-09-15 VITALS
HEIGHT: 60 IN | BODY MASS INDEX: 23.4 KG/M2 | HEART RATE: 85 BPM | DIASTOLIC BLOOD PRESSURE: 80 MMHG | WEIGHT: 119.2 LBS | SYSTOLIC BLOOD PRESSURE: 140 MMHG | OXYGEN SATURATION: 92 %

## 2023-09-15 DIAGNOSIS — I73.9 PAD (PERIPHERAL ARTERY DISEASE) (HCC): Primary | ICD-10-CM

## 2023-09-15 PROCEDURE — 99214 OFFICE O/P EST MOD 30 MIN: CPT | Performed by: NURSE PRACTITIONER

## 2023-09-15 PROCEDURE — 1123F ACP DISCUSS/DSCN MKR DOCD: CPT | Performed by: NURSE PRACTITIONER

## 2023-09-15 PROCEDURE — 93000 ELECTROCARDIOGRAM COMPLETE: CPT | Performed by: NURSE PRACTITIONER

## 2023-09-15 ASSESSMENT — ENCOUNTER SYMPTOMS
SHORTNESS OF BREATH: 0
ORTHOPNEA: 0

## 2023-09-15 NOTE — PROGRESS NOTES
Capillary Refill: Capillary refill takes less than 2 seconds. Current Outpatient Medications   Medication Sig Dispense Refill    apixaban (ELIQUIS) 2.5 MG TABS tablet Take 1 tablet by mouth 2 times daily 60 tablet 0    timolol (TIMOPTIC) 0.5 % ophthalmic solution instill one drop into both eyes in the morning      methocarbamol (ROBAXIN) 500 MG tablet Take 1 tablet by mouth every evening 30 tablet 5    escitalopram (LEXAPRO) 20 MG tablet Take 1 tablet by mouth daily 90 tablet 1    pantoprazole (PROTONIX) 40 MG tablet TAKE 1 TABLET NIGHTLY 90 tablet 1    ondansetron (ZOFRAN) 4 MG tablet Take 1 tablet by mouth every 8 hours as needed for Nausea or Vomiting 90 tablet 1    levothyroxine (SYNTHROID) 75 MCG tablet Take 1 tablet by mouth Daily 30 tablet 1    atorvastatin (LIPITOR) 40 MG tablet Take 1 tablet by mouth nightly 90 tablet 1    losartan (COZAAR) 25 MG tablet Take 1 tablet by mouth daily Hold FOR SBP <120 90 tablet 1    amLODIPine (NORVASC) 5 MG tablet Take 1 tablet by mouth daily HOLD FOR SBP <120 90 tablet 1    montelukast (SINGULAIR) 10 MG tablet Take 1 tablet by mouth nightly 90 tablet 1    fluticasone (FLONASE) 50 MCG/ACT nasal spray 1 spray by Each Nostril route daily 16 g 0    traMADol (ULTRAM) 50 MG tablet TAKE 1 TABLET BY MOUTH EVERY 4 HOURS AS NEEDED FOR 7 DAYS      OXYGEN Inhale into the lungs Patient uses 2 liters nc prn.      albuterol sulfate HFA (PROVENTIL;VENTOLIN;PROAIR) 108 (90 Base) MCG/ACT inhaler Inhale 2 puffs into the lungs every 6 hours as needed for Wheezing 1 each 5    sucralfate (CARAFATE) 1 GM tablet Take 1 tablet by mouth 4 times daily as needed (nausea) 120 tablet 5    Handicap Placard MISC by Does not apply route Please provide handicap placard for 5 years.     Diagnosis: Orthopedic condition 1 each 0    aspirin (EQ ASPIRIN ADULT LOW DOSE) 81 MG EC tablet Take 1 tablet by mouth daily 30 tablet 0    ipratropium-albuterol (DUONEB) 0.5-2.5 (3) MG/3ML SOLN nebulizer

## 2023-09-15 NOTE — TELEPHONE ENCOUNTER
Severiano Scheuermann Dr. Harvie Bail     Cardioversion    Patient Name: Eric Reid   : 1935   MRN# [id number]     Date of Procedure: 23 Time: 12:30 Arrival Time: 11:30   (Arrival time is scheduled for one (1) hour before procedure is scheduled.)     Hospital: [facility])     X   If you have received orders for blood work and or a chest x-ray, please have         them done on assigned date at [facility],         [facility], or [address] [place] [address]. X   Please do not have anything by mouth after midnight prior to or 8 hours before         the procedure. X   You may take your medication with a sip of water unless advised otherwise below. X Please continue to take Eliquis (apixaban) as directed.

## 2023-09-15 NOTE — ED PROVIDER NOTES
12 lead EKG per my interpretation:  Atrial fibrillation  Rate: 54  QTc is  normal  There are no T wave changes  There are no ST concerning segment changes    Prior EKG to compare with was available and is changed now possibly in a fib    (All EKG's are interpreted by myself in the absence of a cardiologist)      12 lead EKG per my interpretation:  Atrial fibrillation  Rate: 50  QTc is  normal  There are no T wave changes  There are no ST concerning segment changes    Prior EKG to compare with was available and is the same as earlier today    (All EKG's are interpreted by myself in the absence of a cardiologist)       Billy Marquez MD  09/14/23 9942       Billy Marquez MD  09/14/23 8744
progressed and was within stable condition from medicine and cardiology team standpoint with follow-up care. The patient's other chronic medical conditions were monitored and stable. The patient expressed appropriate understanding of and agreement with the discharge recommendations, medications, and plan. Consults this admission:  IP CONSULT TO CARDIOLOGY  Labs: ordered. ECG/medicine tests: ordered. Risk  Prescription drug management. ED Course as of 09/14/23 2221   Thu Sep 14, 2023   1223 EKG interpreted by me shows A-fib with slow ventricular response at a rate of 50 bpm, intervals normal, axis normal, no ST elevation or ST depression. RSR prime pattern in V1. When compared to ECG on 9/5/2023 A-fib is new. [CS]   5279 Repeat EKG interpreted by me shows A-fib with slow ventricular response at a rate of 54, intervals normal, axis normal,  less than 1 mm ST elevation in V1 V2 but not consistent be elevated through all QRS complexes. No ST depressions. Interpretation is A-fib with slow ventricular response. [CS]   8801 Patient remains asymptomatic. [CS]      ED Course User Index  [CS] Dhaval Olguin DO       CONSULTS:  None    CRITICAL CARE:  Total critical care time today provided was at least 0 minutes hat required close evaluation and/or intervention with concern for patient decompensation, discussion with patient and family about goals of care in the setting of a critical scenario, discussion with consulting services. This excludes seperately billable procedures and teaching. FINAL IMPRESSION      1. New onset atrial fibrillation ([address])    2.  Lightheadedness          DISPOSITION / PLAN     DISPOSITION Decision To Discharge 09/14/2023 01:45:14 PM      PATIENT REFERRED TO:  Chris Armstrong MD  [address] [place] [address]  [phone]            DISCHARGE MEDICATIONS:  Discharge Medication List as of 9/14/2023  3:02 PM        START taking these medications    Details

## 2023-09-15 NOTE — TELEPHONE ENCOUNTER
Patient given instructions over telephone on 9/19/2023 for Dx: afib. Procedure is scheduled for cardioversion @ 12:30pm, w/arrival @ 11:30 am, @ [facility]. Patient advised to review instructions given. Patient was notified that procedure date or time could be changed due to an emergency. Patient voiced understanding.

## 2023-09-16 ENCOUNTER — APPOINTMENT (OUTPATIENT)
Dept: GENERAL RADIOLOGY | Age: 88
DRG: 309 | End: 2023-09-16
Payer: MEDICARE

## 2023-09-16 ENCOUNTER — HOSPITAL ENCOUNTER (INPATIENT)
Age: 88
LOS: 3 days | Discharge: HOME | DRG: 309 | End: 2023-09-19
Attending: STUDENT IN AN ORGANIZED HEALTH CARE EDUCATION/TRAINING PROGRAM | Admitting: STUDENT IN AN ORGANIZED HEALTH CARE EDUCATION/TRAINING PROGRAM
Payer: MEDICARE

## 2023-09-16 DIAGNOSIS — R11.0 NAUSEA: ICD-10-CM

## 2023-09-16 DIAGNOSIS — R07.9 CHEST PAIN, UNSPECIFIED TYPE: Primary | ICD-10-CM

## 2023-09-16 DIAGNOSIS — I48.91 ATRIAL FIBRILLATION, UNSPECIFIED TYPE (HCC): ICD-10-CM

## 2023-09-16 LAB
ALBUMIN SERPL-MCNC: 4 GM/DL (ref 3.4–5)
ALP BLD-CCNC: 55 IU/L (ref 40–129)
ALT SERPL-CCNC: 29 U/L (ref 10–40)
ANION GAP SERPL CALCULATED.3IONS-SCNC: 8 MMOL/L (ref 4–16)
AST SERPL-CCNC: 29 IU/L (ref 15–37)
BASOPHILS ABSOLUTE: 0 K/CU MM
BASOPHILS RELATIVE PERCENT: 0.1 % (ref 0–1)
BILIRUB SERPL-MCNC: 0.5 MG/DL (ref 0–1)
BUN SERPL-MCNC: 11 MG/DL (ref 6–23)
CALCIUM SERPL-MCNC: 8.5 MG/DL (ref 8.3–10.6)
CHLORIDE BLD-SCNC: 102 MMOL/L (ref 99–110)
CO2: 25 MMOL/L (ref 21–32)
CREAT SERPL-MCNC: 0.6 MG/DL (ref 0.6–1.1)
DIFFERENTIAL TYPE: ABNORMAL
EOSINOPHILS ABSOLUTE: 0.2 K/CU MM
EOSINOPHILS RELATIVE PERCENT: 1.9 % (ref 0–3)
GFR SERPL CREATININE-BSD FRML MDRD: >60 ML/MIN/1.73M2
GLUCOSE SERPL-MCNC: 86 MG/DL (ref 70–99)
HCT VFR BLD CALC: 40.7 % (ref 37–47)
HEMOGLOBIN: 13.4 GM/DL (ref 12.5–16)
IMMATURE NEUTROPHIL %: 0.2 % (ref 0–0.43)
LYMPHOCYTES ABSOLUTE: 2.4 K/CU MM
LYMPHOCYTES RELATIVE PERCENT: 28.1 % (ref 24–44)
MCH RBC QN AUTO: 32 PG (ref 27–31)
MCHC RBC AUTO-ENTMCNC: 32.9 % (ref 32–36)
MCV RBC AUTO: 97.1 FL (ref 78–100)
MONOCYTES ABSOLUTE: 0.9 K/CU MM
MONOCYTES RELATIVE PERCENT: 10.2 % (ref 0–4)
NUCLEATED RBC %: 0 %
PDW BLD-RTO: 14.7 % (ref 11.7–14.9)
PLATELET # BLD: 198 K/CU MM (ref 140–440)
PMV BLD AUTO: 9.5 FL (ref 7.5–11.1)
POTASSIUM SERPL-SCNC: 4.3 MMOL/L (ref 3.5–5.1)
RBC # BLD: 4.19 M/CU MM (ref 4.2–5.4)
SEGMENTED NEUTROPHILS ABSOLUTE COUNT: 5.1 K/CU MM
SEGMENTED NEUTROPHILS RELATIVE PERCENT: 59.5 % (ref 36–66)
SODIUM BLD-SCNC: 135 MMOL/L (ref 135–145)
TOTAL IMMATURE NEUTOROPHIL: 0.02 K/CU MM
TOTAL NUCLEATED RBC: 0 K/CU MM
TOTAL PROTEIN: 5.4 GM/DL (ref 6.4–8.2)
TROPONIN T: <0.01 NG/ML
WBC # BLD: 8.6 K/CU MM (ref 4–10.5)

## 2023-09-16 PROCEDURE — 71045 X-RAY EXAM CHEST 1 VIEW: CPT

## 2023-09-16 PROCEDURE — 84484 ASSAY OF TROPONIN QUANT: CPT

## 2023-09-16 PROCEDURE — 85025 COMPLETE CBC W/AUTO DIFF WBC: CPT

## 2023-09-16 PROCEDURE — 99285 EMERGENCY DEPT VISIT HI MDM: CPT

## 2023-09-16 PROCEDURE — 93005 ELECTROCARDIOGRAM TRACING: CPT | Performed by: NURSE PRACTITIONER

## 2023-09-16 PROCEDURE — 94761 N-INVAS EAR/PLS OXIMETRY MLT: CPT

## 2023-09-16 PROCEDURE — 80053 COMPREHEN METABOLIC PANEL: CPT

## 2023-09-16 PROCEDURE — 1200000000 HC SEMI PRIVATE

## 2023-09-16 PROCEDURE — 2580000003 HC RX 258: Performed by: STUDENT IN AN ORGANIZED HEALTH CARE EDUCATION/TRAINING PROGRAM

## 2023-09-16 PROCEDURE — 6370000000 HC RX 637 (ALT 250 FOR IP): Performed by: STUDENT IN AN ORGANIZED HEALTH CARE EDUCATION/TRAINING PROGRAM

## 2023-09-16 PROCEDURE — 6370000000 HC RX 637 (ALT 250 FOR IP): Performed by: NURSE PRACTITIONER

## 2023-09-16 RX ORDER — TIMOLOL MALEATE 5 MG/ML
1 SOLUTION/ DROPS OPHTHALMIC DAILY
Status: DISCONTINUED | OUTPATIENT
Start: 2023-09-16 | End: 2023-09-19 | Stop reason: HOSPADM

## 2023-09-16 RX ORDER — POLYETHYLENE GLYCOL 3350 17 G/17G
17 POWDER, FOR SOLUTION ORAL DAILY PRN
Status: DISCONTINUED | OUTPATIENT
Start: 2023-09-16 | End: 2023-09-19 | Stop reason: HOSPADM

## 2023-09-16 RX ORDER — LEVOTHYROXINE SODIUM 0.07 MG/1
75 TABLET ORAL DAILY
Status: DISCONTINUED | OUTPATIENT
Start: 2023-09-16 | End: 2023-09-19 | Stop reason: HOSPADM

## 2023-09-16 RX ORDER — ATORVASTATIN CALCIUM 40 MG/1
40 TABLET, FILM COATED ORAL NIGHTLY
Status: DISCONTINUED | OUTPATIENT
Start: 2023-09-16 | End: 2023-09-19 | Stop reason: HOSPADM

## 2023-09-16 RX ORDER — PANTOPRAZOLE SODIUM 40 MG/1
40 TABLET, DELAYED RELEASE ORAL
Status: DISCONTINUED | OUTPATIENT
Start: 2023-09-16 | End: 2023-09-19 | Stop reason: HOSPADM

## 2023-09-16 RX ORDER — ACETAMINOPHEN 325 MG/1
650 TABLET ORAL EVERY 6 HOURS PRN
Status: DISCONTINUED | OUTPATIENT
Start: 2023-09-16 | End: 2023-09-19 | Stop reason: HOSPADM

## 2023-09-16 RX ORDER — ALBUTEROL SULFATE 90 UG/1
2 AEROSOL, METERED RESPIRATORY (INHALATION) EVERY 6 HOURS PRN
Status: DISCONTINUED | OUTPATIENT
Start: 2023-09-16 | End: 2023-09-19 | Stop reason: HOSPADM

## 2023-09-16 RX ORDER — ESCITALOPRAM OXALATE 10 MG/1
20 TABLET ORAL DAILY
Status: DISCONTINUED | OUTPATIENT
Start: 2023-09-16 | End: 2023-09-19 | Stop reason: HOSPADM

## 2023-09-16 RX ORDER — SODIUM CHLORIDE 0.9 % (FLUSH) 0.9 %
5-40 SYRINGE (ML) INJECTION PRN
Status: DISCONTINUED | OUTPATIENT
Start: 2023-09-16 | End: 2023-09-19 | Stop reason: HOSPADM

## 2023-09-16 RX ORDER — AMLODIPINE BESYLATE 5 MG/1
5 TABLET ORAL DAILY
Status: DISCONTINUED | OUTPATIENT
Start: 2023-09-16 | End: 2023-09-19 | Stop reason: HOSPADM

## 2023-09-16 RX ORDER — ASPIRIN 81 MG/1
81 TABLET, CHEWABLE ORAL DAILY
Status: DISCONTINUED | OUTPATIENT
Start: 2023-09-16 | End: 2023-09-19 | Stop reason: HOSPADM

## 2023-09-16 RX ORDER — ONDANSETRON 2 MG/ML
4 INJECTION INTRAMUSCULAR; INTRAVENOUS EVERY 6 HOURS PRN
Status: DISCONTINUED | OUTPATIENT
Start: 2023-09-16 | End: 2023-09-19 | Stop reason: HOSPADM

## 2023-09-16 RX ORDER — ACETAMINOPHEN 650 MG/1
650 SUPPOSITORY RECTAL EVERY 6 HOURS PRN
Status: DISCONTINUED | OUTPATIENT
Start: 2023-09-16 | End: 2023-09-19 | Stop reason: HOSPADM

## 2023-09-16 RX ORDER — ONDANSETRON 4 MG/1
4 TABLET, ORALLY DISINTEGRATING ORAL EVERY 8 HOURS PRN
Status: DISCONTINUED | OUTPATIENT
Start: 2023-09-16 | End: 2023-09-19 | Stop reason: HOSPADM

## 2023-09-16 RX ORDER — ASPIRIN 81 MG/1
162 TABLET, CHEWABLE ORAL ONCE
Status: COMPLETED | OUTPATIENT
Start: 2023-09-16 | End: 2023-09-16

## 2023-09-16 RX ORDER — ENOXAPARIN SODIUM 100 MG/ML
40 INJECTION SUBCUTANEOUS DAILY
Status: DISCONTINUED | OUTPATIENT
Start: 2023-09-16 | End: 2023-09-16

## 2023-09-16 RX ORDER — LOSARTAN POTASSIUM 25 MG/1
25 TABLET ORAL DAILY
Status: DISCONTINUED | OUTPATIENT
Start: 2023-09-16 | End: 2023-09-19 | Stop reason: HOSPADM

## 2023-09-16 RX ORDER — SODIUM CHLORIDE 9 MG/ML
INJECTION, SOLUTION INTRAVENOUS PRN
Status: DISCONTINUED | OUTPATIENT
Start: 2023-09-16 | End: 2023-09-19 | Stop reason: HOSPADM

## 2023-09-16 RX ORDER — SODIUM CHLORIDE 0.9 % (FLUSH) 0.9 %
5-40 SYRINGE (ML) INJECTION EVERY 12 HOURS SCHEDULED
Status: DISCONTINUED | OUTPATIENT
Start: 2023-09-16 | End: 2023-09-19 | Stop reason: HOSPADM

## 2023-09-16 RX ADMIN — ATORVASTATIN CALCIUM 40 MG: 40 TABLET, FILM COATED ORAL at 20:22

## 2023-09-16 RX ADMIN — LEVOTHYROXINE SODIUM 75 MCG: 0.07 TABLET ORAL at 18:15

## 2023-09-16 RX ADMIN — APIXABAN 2.5 MG: 2.5 TABLET, FILM COATED ORAL at 20:22

## 2023-09-16 RX ADMIN — PANTOPRAZOLE SODIUM 40 MG: 40 TABLET, DELAYED RELEASE ORAL at 18:15

## 2023-09-16 RX ADMIN — ASPIRIN 81 MG CHEWABLE TABLET 162 MG: 81 TABLET CHEWABLE at 13:50

## 2023-09-16 RX ADMIN — SODIUM CHLORIDE, PRESERVATIVE FREE 10 ML: 5 INJECTION INTRAVENOUS at 20:23

## 2023-09-16 RX ADMIN — ACETAMINOPHEN 650 MG: 325 TABLET ORAL at 21:08

## 2023-09-16 RX ADMIN — AMLODIPINE BESYLATE 5 MG: 5 TABLET ORAL at 18:15

## 2023-09-16 RX ADMIN — ASPIRIN 81 MG CHEWABLE TABLET 81 MG: 81 TABLET CHEWABLE at 18:15

## 2023-09-16 RX ADMIN — LOSARTAN POTASSIUM 25 MG: 25 TABLET, FILM COATED ORAL at 18:14

## 2023-09-16 RX ADMIN — ESCITALOPRAM OXALATE 20 MG: 10 TABLET ORAL at 18:14

## 2023-09-16 ASSESSMENT — PAIN DESCRIPTION - FREQUENCY: FREQUENCY: INTERMITTENT

## 2023-09-16 ASSESSMENT — PAIN DESCRIPTION - LOCATION
LOCATION: CHEST
LOCATION: SHOULDER

## 2023-09-16 ASSESSMENT — PAIN DESCRIPTION - ORIENTATION
ORIENTATION: MID
ORIENTATION: RIGHT

## 2023-09-16 ASSESSMENT — PAIN SCALES - WONG BAKER
WONGBAKER_NUMERICALRESPONSE: 0
WONGBAKER_NUMERICALRESPONSE: 0

## 2023-09-16 ASSESSMENT — PAIN DESCRIPTION - PAIN TYPE
TYPE: ACUTE PAIN
TYPE: ACUTE PAIN

## 2023-09-16 ASSESSMENT — PAIN DESCRIPTION - DESCRIPTORS
DESCRIPTORS: DULL;DISCOMFORT
DESCRIPTORS: ACHING

## 2023-09-16 ASSESSMENT — PAIN SCALES - GENERAL
PAINLEVEL_OUTOF10: 3
PAINLEVEL_OUTOF10: 6

## 2023-09-16 ASSESSMENT — PAIN - FUNCTIONAL ASSESSMENT: PAIN_FUNCTIONAL_ASSESSMENT: ACTIVITIES ARE NOT PREVENTED

## 2023-09-16 NOTE — ED PROVIDER NOTES
EKG:   -Atrial fibrillation with rapid ventricular response  - Ventricular rate 58  - SC interval *  - QRS duration 70  - QT//429  - P-R-T Axes * 64 103  - No STEMI  - No, Q-T prolongation, WPW, Q waves suggestive of HOCM, V-tach/V-fib, A-flutter, SVT, torsardes or Brugada.      EKG interpreted by me in the absence of a cardiologist        Elier Mejia DO  09/16/23 1497
2032)   albuterol sulfate HFA (PROVENTIL;VENTOLIN;PROAIR) 108 (90 Base) MCG/ACT inhaler 2 puff (has no administration in time range)   amLODIPine (NORVASC) tablet 5 mg (5 mg Oral Given 9/18/23 0901)   apixaban (ELIQUIS) tablet 2.5 mg (2.5 mg Oral Given 9/18/23 2011)   aspirin chewable tablet 81 mg (81 mg Oral Given 9/18/23 0900)   atorvastatin (LIPITOR) tablet 40 mg (40 mg Oral Given 9/18/23 2011)   levothyroxine (SYNTHROID) tablet 75 mcg (75 mcg Oral Given 9/19/23 0544)   losartan (COZAAR) tablet 25 mg (25 mg Oral Given 9/18/23 0900)   pantoprazole (PROTONIX) tablet 40 mg (40 mg Oral Given 9/19/23 0544)   escitalopram (LEXAPRO) tablet 20 mg (20 mg Oral Given 9/18/23 0900)   timolol (TIMOPTIC) 0.5 % ophthalmic solution 1 drop (1 drop Both Eyes Given 9/18/23 0902)   glucose chewable tablet 16 g (16 g Oral Given 9/18/23 2100)   dextrose bolus 10% 125 mL (has no administration in time range)     Or   dextrose bolus 10% 250 mL (has no administration in time range)   glucagon injection 1 mg (has no administration in time range)   dextrose 10 % infusion (has no administration in time range)   aspirin chewable tablet 162 mg (162 mg Oral Given 9/16/23 1350)         Chronic conditions affecting care: A-fib, aortic valve replacement, hyperlipidemia, hypertension, peripheral artery disease, CVA,     Discussion with Other Profesionals : Consultant  Dr. Billy Murphy was consulted and states to admit the patient to the hospitalist and he will consult. Dr. Joselin Covarrubias with hospitalist service consulted for admission     Social Determinants : None    Records Reviewed : Source VS emergency department note reviewed from 2 days ago which shows the patient was evaluated for chest pain and diagnosed with new onset A-fib. Cardiology note from yesterday with Francisco Floyd CNP was reviewed. The patient is scheduled to have a cardioversion on 9/19/2023.     Disposition Considerations (tests considered but not done, Shared Decision Making, Pt

## 2023-09-16 NOTE — H&P
Oral Daily    atorvastatin  40 mg Oral Nightly    levothyroxine  75 mcg Oral Daily    losartan  25 mg Oral Daily    [START ON 9/17/2023] pantoprazole  40 mg Oral QAM AC    escitalopram  20 mg Oral Daily    timolol  1 drop Both Eyes Daily      Infusions:    sodium chloride       PRN Meds: sodium chloride flush, 5-40 mL, PRN  sodium chloride, , PRN  ondansetron, 4 mg, Q8H PRN   Or  ondansetron, 4 mg, Q6H PRN  polyethylene glycol, 17 g, Daily PRN  acetaminophen, 650 mg, Q6H PRN   Or  acetaminophen, 650 mg, Q6H PRN  albuterol sulfate HFA, 2 puff, Q6H PRN        Labs      CBC:   Recent Labs     09/14/23  1210 09/16/23  1351   WBC 9.2 8.6   HGB 14.3 13.4    198     BMP:    Recent Labs     09/14/23 1210 09/16/23  1351    135   K 4.5 4.3    102   CO2 23 25   BUN 12 11   CREATININE 0.5* 0.6   GLUCOSE 86 86     Hepatic:   Recent Labs     09/14/23  1210 09/16/23  1351   AST 39* 29   ALT 32 29   BILITOT 0.5 0.5   ALKPHOS 53 55     Lipids:   Lab Results   Component Value Date/Time    CHOL 126 09/04/2023 10:55 PM    CHOL 164 02/05/2016 12:00 AM    HDL 75 09/04/2023 10:55 PM    TRIG 63 09/04/2023 10:55 PM     Hemoglobin A1C:   Lab Results   Component Value Date/Time    LABA1C 5.6 09/04/2023 10:55 PM     TSH:   Lab Results   Component Value Date/Time    TSH 2.710 08/06/2021 12:00 PM     Troponin:   Lab Results   Component Value Date/Time    TROPONINT <0.010 09/16/2023 01:40 PM    TROPONINT <0.010 09/14/2023 12:10 PM    TROPONINT <0.010 09/05/2023 12:44 AM     Lactic Acid: No results for input(s): \"LACTA\" in the last 72 hours. BNP: No results for input(s): \"PROBNP\" in the last 72 hours.   UA:  Lab Results   Component Value Date/Time    NITRU NEGATIVE 09/04/2023 09:00 PM    NITRU NEGATIVE 11/13/2012 09:10 AM    COLORU YELLOW 09/04/2023 09:00 PM    PHUR 5.5 04/03/2019 10:18 AM    WBCUA 2 11/14/2021 02:30 PM    RBCUA 2 11/14/2021 02:30 PM    MUCUS RARE 11/14/2021 02:30 PM    TRICHOMONAS NONE SEEN 11/14/2021 02:30 PM

## 2023-09-16 NOTE — CARE COORDINATION
CM - Room # ED-TR-1-MCG criteria for Cardiology-(Atrial Fibrillation) reviewed at this time, criteria supports the INPATIENT admission

## 2023-09-17 LAB
ALBUMIN SERPL-MCNC: 3.5 GM/DL (ref 3.4–5)
ALP BLD-CCNC: 49 IU/L (ref 40–128)
ALT SERPL-CCNC: 26 U/L (ref 10–40)
ANION GAP SERPL CALCULATED.3IONS-SCNC: 11 MMOL/L (ref 4–16)
AST SERPL-CCNC: 28 IU/L (ref 15–37)
BILIRUB SERPL-MCNC: 0.7 MG/DL (ref 0–1)
BUN SERPL-MCNC: 11 MG/DL (ref 6–23)
CALCIUM SERPL-MCNC: 8.4 MG/DL (ref 8.3–10.6)
CHLORIDE BLD-SCNC: 105 MMOL/L (ref 99–110)
CO2: 22 MMOL/L (ref 21–32)
CREAT SERPL-MCNC: 0.5 MG/DL (ref 0.6–1.1)
GFR SERPL CREATININE-BSD FRML MDRD: >60 ML/MIN/1.73M2
GLUCOSE SERPL-MCNC: 91 MG/DL (ref 70–99)
POTASSIUM SERPL-SCNC: 4 MMOL/L (ref 3.5–5.1)
SODIUM BLD-SCNC: 138 MMOL/L (ref 135–145)
TOTAL PROTEIN: 5 GM/DL (ref 6.4–8.2)

## 2023-09-17 PROCEDURE — 36415 COLL VENOUS BLD VENIPUNCTURE: CPT

## 2023-09-17 PROCEDURE — 80053 COMPREHEN METABOLIC PANEL: CPT

## 2023-09-17 PROCEDURE — 99223 1ST HOSP IP/OBS HIGH 75: CPT | Performed by: INTERNAL MEDICINE

## 2023-09-17 PROCEDURE — 6370000000 HC RX 637 (ALT 250 FOR IP): Performed by: STUDENT IN AN ORGANIZED HEALTH CARE EDUCATION/TRAINING PROGRAM

## 2023-09-17 PROCEDURE — 94761 N-INVAS EAR/PLS OXIMETRY MLT: CPT

## 2023-09-17 PROCEDURE — 1200000000 HC SEMI PRIVATE

## 2023-09-17 PROCEDURE — 2580000003 HC RX 258: Performed by: STUDENT IN AN ORGANIZED HEALTH CARE EDUCATION/TRAINING PROGRAM

## 2023-09-17 RX ADMIN — TIMOLOL MALEATE 1 DROP: 5 SOLUTION OPHTHALMIC at 08:27

## 2023-09-17 RX ADMIN — APIXABAN 2.5 MG: 2.5 TABLET, FILM COATED ORAL at 08:27

## 2023-09-17 RX ADMIN — AMLODIPINE BESYLATE 5 MG: 5 TABLET ORAL at 08:27

## 2023-09-17 RX ADMIN — SODIUM CHLORIDE, PRESERVATIVE FREE 10 ML: 5 INJECTION INTRAVENOUS at 20:32

## 2023-09-17 RX ADMIN — LOSARTAN POTASSIUM 25 MG: 25 TABLET, FILM COATED ORAL at 08:27

## 2023-09-17 RX ADMIN — ASPIRIN 81 MG CHEWABLE TABLET 81 MG: 81 TABLET CHEWABLE at 08:27

## 2023-09-17 RX ADMIN — ESCITALOPRAM OXALATE 20 MG: 10 TABLET ORAL at 08:27

## 2023-09-17 RX ADMIN — APIXABAN 2.5 MG: 2.5 TABLET, FILM COATED ORAL at 20:32

## 2023-09-17 RX ADMIN — ACETAMINOPHEN 650 MG: 325 TABLET ORAL at 12:44

## 2023-09-17 RX ADMIN — ATORVASTATIN CALCIUM 40 MG: 40 TABLET, FILM COATED ORAL at 20:31

## 2023-09-17 RX ADMIN — SODIUM CHLORIDE, PRESERVATIVE FREE 10 ML: 5 INJECTION INTRAVENOUS at 08:27

## 2023-09-17 RX ADMIN — ACETAMINOPHEN 650 MG: 325 TABLET ORAL at 20:32

## 2023-09-17 ASSESSMENT — PAIN DESCRIPTION - ORIENTATION
ORIENTATION: RIGHT
ORIENTATION: RIGHT

## 2023-09-17 ASSESSMENT — PAIN - FUNCTIONAL ASSESSMENT
PAIN_FUNCTIONAL_ASSESSMENT: ACTIVITIES ARE NOT PREVENTED
PAIN_FUNCTIONAL_ASSESSMENT: ACTIVITIES ARE NOT PREVENTED

## 2023-09-17 ASSESSMENT — PAIN SCALES - GENERAL
PAINLEVEL_OUTOF10: 6
PAINLEVEL_OUTOF10: 6

## 2023-09-17 ASSESSMENT — PAIN DESCRIPTION - DESCRIPTORS
DESCRIPTORS: ACHING
DESCRIPTORS: ACHING

## 2023-09-17 ASSESSMENT — PAIN DESCRIPTION - LOCATION
LOCATION: SHOULDER
LOCATION: SHOULDER

## 2023-09-17 ASSESSMENT — PAIN SCALES - WONG BAKER: WONGBAKER_NUMERICALRESPONSE: 0

## 2023-09-17 NOTE — CONSULTS
ultrasound, H/O Doppler ultrasound, H/O echocardiogram, H/O echocardiogram, H/O echocardiogram, H/O echocardiogram, H/O exercise stress test, History of cardiac cath, History of nuclear stress test, Holter monitor, abnormal, Hyperlipidemia, Hypertension, Iron deficiency anemia, unspecified, Malabsorption syndrome, Normal cardiac stress test, On home O2, PONV (postoperative nausea and vomiting), Syncope and collapse, Tachycardia, and Thyroid disease. Past surgical history:   has a past surgical history that includes joint replacement (2004); Hysterectomy (1966); Cholecystectomy (1961); Toe Surgery (Early 2000's); Carpal tunnel release (1990's); Finger surgery; Gastric restriction surgery (1984); hernia repair (unknown); lipectomy (1990's); Dilatation, esophagus; other surgical history (05/01/2012); Hip fracture surgery (Left, 11/04/2015); Colonoscopy; Endoscopy, colon, diagnostic (07/03/2017); Total shoulder arthroplasty (Bilateral, 10/2017); Aortic valve replacement (N/A, 09/11/2019); Aortic valve repair (N/A, 09/09/2019); Breast surgery (2009); Cardiac surgery (1989); Cardiac catheterization (03/02/2011); Kathy-en-Y Gastric Bypass; Tonsillectomy; eye surgery (Bilateral); Upper gastrointestinal endoscopy (N/A, 3/30/2022); Colonoscopy (N/A, 6/12/2023); and Upper gastrointestinal endoscopy (N/A, 6/12/2023). Social History:   reports that she quit smoking about 61 years ago. Her smoking use included cigarettes. She has a 15.00 pack-year smoking history. She has never used smokeless tobacco. She reports current drug use. Drug: Marijuana [place]l). She reports that she does not drink alcohol.   Family history:   no family history of CAD, STROKE of DM    Allergies   Allergen Reactions    Latex     Morphine Anaphylaxis     Dilaudid OK    Bactrim [Sulfamethoxazole-Trimethoprim]     Influenza Vaccines      Ended in hospital stay for 3 days told by md not to get it anymore     Lactose Intolerance (Gi) Nausea Only    Phenergan

## 2023-09-17 NOTE — PLAN OF CARE
Problem: Discharge Planning  Goal: Discharge to home or other facility with appropriate resources  9/17/2023 1103 by Max Mistry RN  Outcome: Progressing  9/16/2023 2347 by Jhonatan Robles RN  Outcome: Progressing     Problem: Pain  Goal: Verbalizes/displays adequate comfort level or baseline comfort level  9/17/2023 1103 by Max Mistry RN  Outcome: Progressing  9/16/2023 2347 by Jhonatan Robles RN  Outcome: Progressing     Problem: Skin/Tissue Integrity  Goal: Absence of new skin breakdown  Description: 1. Monitor for areas of redness and/or skin breakdown  2. Assess vascular access sites hourly  3. Every 4-6 hours minimum:  Change oxygen saturation probe site  4. Every 4-6 hours:  If on nasal continuous positive airway pressure, respiratory therapy assess nares and determine need for appliance change or resting period.   9/17/2023 1103 by Max Mistry RN  Outcome: Progressing  9/16/2023 2347 by Jhonatan Robles RN  Outcome: Progressing     Problem: ABCDS Injury Assessment  Goal: Absence of physical injury  9/17/2023 1103 by Max Mistry RN  Outcome: Progressing  9/16/2023 2347 by Jhonatan Robles RN  Outcome: Progressing     Problem: Cardiovascular - Adult  Goal: Maintains optimal cardiac output and hemodynamic stability  Outcome: Progressing  Goal: Absence of cardiac dysrhythmias or at baseline  Outcome: Progressing     Problem: Metabolic/Fluid and Electrolytes - Adult  Goal: Electrolytes maintained within normal limits  Outcome: Progressing

## 2023-09-17 NOTE — CARE COORDINATION
History and Physical / Pre-Sedation Assessment      PMHx:   Past Medical History:   Diagnosis Date    Aspiration pneumonia ([facility] [address]     Contracture of joint     Developmental non-verbal disorder     Dysphagia     History of cerebral palsy     Irregular menses     Microcephaly ([facility] [address]     Osteoporosis     Scoliosis     [facility]     Spasticity     Wheelchair dependent        Medications:   Prior to Admission medications    Medication Sig Start Date End Date Taking? Authorizing Provider   baclofen (LIORESAL) 10 MG tablet 10 mg by PEG Tube route in the morning and at bedtime 7/9/22   Historical Provider, MD   calcium-vitamin D (Vira Brandi) 500-200 MG-UNIT per tablet 2 tablets by Tube route daily    Historical Provider, MD   loratadine (CLARITIN) 10 MG tablet 10 mg by Enteral route nightly    Historical Provider, MD   polyethylene glycol (GLYCOLAX) 17 GM/SCOOP powder Take 17 g by mouth daily    Historical Provider, MD   Cholecalciferol 1.25 MG (38246 UT) TABS 2,000 Units by Tube route daily    Historical Provider, MD   acetaminophen (TYLENOL) 325 MG tablet 650 mg by Enteral route every 4 hours as needed    Historical Provider, MD   HYDROcodone-acetaminophen (NORCO) 5-325 MG per tablet Take 5 tablets by mouth every 6 hours as needed. 5/14/22   Historical Provider, MD   diazePAM (DIASTAT) 10 MG GEL Place 10 mg rectally as needed.     Historical Provider, MD   bisacodyl (DULCOLAX) 10 MG suppository Place 10 mg rectally as needed    Historical Provider, MD   polyvinyl alcohol (LIQUIFILM TEARS) 1.4 % ophthalmic solution Apply 1 drop to eye 3 times daily    Historical Provider, MD   diphenhydrAMINE (BENADRYL) 12.5 MG/5ML elixir 25 mg by Tube route every 6 hours as needed    Historical Provider, MD   chlorhexidine (HIBICLENS) 4 % external liquid Apply 1 each topically once a week    Historical Provider, MD   albuterol (PROVENTIL) (2.5 MG/3ML) 0.083% nebulizer solution Inhale 2.5 mg into the lungs every 4 hours as needed LSW reviewed chart; called pt on phone to initiate dc planning. Pt pleasant, reported PTA living indp alone in single story home with one step to enter and ambulates indp w/o AD, but has cane and RW to utilize as needed. Pt confirmed having health/rx insurance via [facility] and receives Extra Help from Medicare Pt D.  Pt confirmed having PCP Christian Bimler and still drives. LSW discussed concern for readmission and encouraged HHC f/u after dc. Pt declined need for [facility] stating she HC in the past and felt is was not helpful. Pt reported she checks her own vitals and fills her med box every 2 wks. Pt reported her dtr helps out when needed. Pt to have cardioversion tomorrow with plans to dc in 1-2 days. LSW advised CM staff is available PRN. Dc plan at this time is home alone with family support.   Electronically signed by CARLOS Crawford on 9/17/2023 at 12:09 PM Historical Provider, MD       Allergies: Allergies   Allergen Reactions    Sulfa Antibiotics        PSHx:   No past surgical history on file. Social Hx:   Social History     Socioeconomic History    Marital status: Single     Spouse name: Not on file    Number of children: Not on file    Years of education: Not on file    Highest education level: Not on file   Occupational History    Not on file   Tobacco Use    Smoking status: Not on file    Smokeless tobacco: Not on file   Substance and Sexual Activity    Alcohol use: Not on file    Drug use: Not on file    Sexual activity: Not on file   Other Topics Concern    Not on file   Social History Narrative    Not on file     Social Determinants of Health     Financial Resource Strain: Not on file   Food Insecurity: Not on file   Transportation Needs: Not on file   Physical Activity: Not on file   Stress: Not on file   Social Connections: Not on file   Intimate Partner Violence: Not on file   Housing Stability: Not on file       Family Hx:   No family history on file. Physical Exam:  Vital Signs: BP (!) 158/75   Pulse (!) 124   Temp 97.5 °F (36.4 °C) (Temporal)   Resp 16   Ht 4' 7\" (1.397 m)   Wt 107 lb (48.5 kg)   SpO2 94%   BMI 24.87 kg/m²    Airway: Mallampati: II (soft palate, uvula, fauces visible)  Pulmonary:Normal  Cardiac:Normal  Abdomen:Normal    Pre-Procedure Assessment / Plan:  ASA: Class 2 - A normal healthy patient with mild systemic disease  Level of Sedation Plan:Deep sedation  Post Procedure plan: Return to same level of care    I assessed the patient and find that the patient is in satisfactory condition to proceed with the planned procedure and sedation plan. I have explained the risk, benefits, and alternatives to the procedure; the patient understands and agrees to proceed.        Whit Holt MD  8/18/2022

## 2023-09-18 LAB
ALBUMIN SERPL-MCNC: 3.5 GM/DL (ref 3.4–5)
ALP BLD-CCNC: 45 IU/L (ref 40–129)
ALT SERPL-CCNC: 22 U/L (ref 10–40)
ANION GAP SERPL CALCULATED.3IONS-SCNC: 7 MMOL/L (ref 4–16)
AST SERPL-CCNC: 22 IU/L (ref 15–37)
BASOPHILS ABSOLUTE: 0 K/CU MM
BASOPHILS RELATIVE PERCENT: 0.4 % (ref 0–1)
BILIRUB SERPL-MCNC: 0.6 MG/DL (ref 0–1)
BUN SERPL-MCNC: 11 MG/DL (ref 6–23)
CALCIUM SERPL-MCNC: 8.2 MG/DL (ref 8.3–10.6)
CHLORIDE BLD-SCNC: 102 MMOL/L (ref 99–110)
CO2: 25 MMOL/L (ref 21–32)
CREAT SERPL-MCNC: 0.6 MG/DL (ref 0.6–1.1)
DIFFERENTIAL TYPE: ABNORMAL
EKG ATRIAL RATE: 50 BPM
EKG DIAGNOSIS: NORMAL
EKG Q-T INTERVAL: 438 MS
EKG QRS DURATION: 70 MS
EKG QTC CALCULATION (BAZETT): 429 MS
EKG R AXIS: 64 DEGREES
EKG T AXIS: 103 DEGREES
EKG VENTRICULAR RATE: 58 BPM
EOSINOPHILS ABSOLUTE: 0.1 K/CU MM
EOSINOPHILS RELATIVE PERCENT: 2.6 % (ref 0–3)
GFR SERPL CREATININE-BSD FRML MDRD: >60 ML/MIN/1.73M2
GLUCOSE BLD-MCNC: 157 MG/DL (ref 70–99)
GLUCOSE BLD-MCNC: 54 MG/DL (ref 70–99)
GLUCOSE SERPL-MCNC: 232 MG/DL (ref 70–99)
HCT VFR BLD CALC: 37 % (ref 37–47)
HEMOGLOBIN: 12.1 GM/DL (ref 12.5–16)
IMMATURE NEUTROPHIL %: 0.2 % (ref 0–0.43)
LYMPHOCYTES ABSOLUTE: 2.4 K/CU MM
LYMPHOCYTES RELATIVE PERCENT: 46.9 % (ref 24–44)
MAGNESIUM: 1.8 MG/DL (ref 1.8–2.4)
MCH RBC QN AUTO: 32.4 PG (ref 27–31)
MCHC RBC AUTO-ENTMCNC: 32.7 % (ref 32–36)
MCV RBC AUTO: 98.9 FL (ref 78–100)
MONOCYTES ABSOLUTE: 0.3 K/CU MM
MONOCYTES RELATIVE PERCENT: 4.9 % (ref 0–4)
NUCLEATED RBC %: 0 %
PDW BLD-RTO: 14.3 % (ref 11.7–14.9)
PHOSPHORUS: 3.9 MG/DL (ref 2.5–4.9)
PLATELET # BLD: 211 K/CU MM (ref 140–440)
PMV BLD AUTO: 9.4 FL (ref 7.5–11.1)
POTASSIUM SERPL-SCNC: 4 MMOL/L (ref 3.5–5.1)
RBC # BLD: 3.74 M/CU MM (ref 4.2–5.4)
SEGMENTED NEUTROPHILS ABSOLUTE COUNT: 2.3 K/CU MM
SEGMENTED NEUTROPHILS RELATIVE PERCENT: 45 % (ref 36–66)
SODIUM BLD-SCNC: 134 MMOL/L (ref 135–145)
TOTAL IMMATURE NEUTOROPHIL: 0.01 K/CU MM
TOTAL NUCLEATED RBC: 0 K/CU MM
TOTAL PROTEIN: 4.8 GM/DL (ref 6.4–8.2)
TOTAL RETICULOCYTE COUNT: 0.06 K/CU MM
WBC # BLD: 5.1 K/CU MM (ref 4–10.5)

## 2023-09-18 PROCEDURE — 6370000000 HC RX 637 (ALT 250 FOR IP): Performed by: STUDENT IN AN ORGANIZED HEALTH CARE EDUCATION/TRAINING PROGRAM

## 2023-09-18 PROCEDURE — 2580000003 HC RX 258: Performed by: STUDENT IN AN ORGANIZED HEALTH CARE EDUCATION/TRAINING PROGRAM

## 2023-09-18 PROCEDURE — 99233 SBSQ HOSP IP/OBS HIGH 50: CPT | Performed by: INTERNAL MEDICINE

## 2023-09-18 PROCEDURE — 93321 DOPPLER ECHO F-UP/LMTD STD: CPT | Performed by: INTERNAL MEDICINE

## 2023-09-18 PROCEDURE — 93312 ECHO TRANSESOPHAGEAL: CPT | Performed by: INTERNAL MEDICINE

## 2023-09-18 PROCEDURE — 83735 ASSAY OF MAGNESIUM: CPT

## 2023-09-18 PROCEDURE — 93312 ECHO TRANSESOPHAGEAL: CPT

## 2023-09-18 PROCEDURE — 6360000002 HC RX W HCPCS: Performed by: STUDENT IN AN ORGANIZED HEALTH CARE EDUCATION/TRAINING PROGRAM

## 2023-09-18 PROCEDURE — 85025 COMPLETE CBC W/AUTO DIFF WBC: CPT

## 2023-09-18 PROCEDURE — 80053 COMPREHEN METABOLIC PANEL: CPT

## 2023-09-18 PROCEDURE — 82962 GLUCOSE BLOOD TEST: CPT

## 2023-09-18 PROCEDURE — B24BZZ4 ULTRASONOGRAPHY OF HEART WITH AORTA, TRANSESOPHAGEAL: ICD-10-PCS | Performed by: INTERNAL MEDICINE

## 2023-09-18 PROCEDURE — 7100000001 HC PACU RECOVERY - ADDTL 15 MIN

## 2023-09-18 PROCEDURE — 36415 COLL VENOUS BLD VENIPUNCTURE: CPT

## 2023-09-18 PROCEDURE — 5A2204Z RESTORATION OF CARDIAC RHYTHM, SINGLE: ICD-10-PCS | Performed by: INTERNAL MEDICINE

## 2023-09-18 PROCEDURE — 92960 CARDIOVERSION ELECTRIC EXT: CPT

## 2023-09-18 PROCEDURE — 92960 CARDIOVERSION ELECTRIC EXT: CPT | Performed by: INTERNAL MEDICINE

## 2023-09-18 PROCEDURE — 93005 ELECTROCARDIOGRAM TRACING: CPT | Performed by: INTERNAL MEDICINE

## 2023-09-18 PROCEDURE — 7100000000 HC PACU RECOVERY - FIRST 15 MIN

## 2023-09-18 PROCEDURE — 1200000000 HC SEMI PRIVATE

## 2023-09-18 PROCEDURE — 94761 N-INVAS EAR/PLS OXIMETRY MLT: CPT

## 2023-09-18 PROCEDURE — 84100 ASSAY OF PHOSPHORUS: CPT

## 2023-09-18 RX ORDER — GLUCAGON 1 MG/ML
1 KIT INJECTION PRN
Status: DISCONTINUED | OUTPATIENT
Start: 2023-09-18 | End: 2023-09-19 | Stop reason: HOSPADM

## 2023-09-18 RX ORDER — DEXTROSE MONOHYDRATE 100 MG/ML
INJECTION, SOLUTION INTRAVENOUS CONTINUOUS PRN
Status: DISCONTINUED | OUTPATIENT
Start: 2023-09-18 | End: 2023-09-19 | Stop reason: HOSPADM

## 2023-09-18 RX ADMIN — TIMOLOL MALEATE 1 DROP: 5 SOLUTION OPHTHALMIC at 09:02

## 2023-09-18 RX ADMIN — LOSARTAN POTASSIUM 25 MG: 25 TABLET, FILM COATED ORAL at 09:00

## 2023-09-18 RX ADMIN — Medication 16 G: at 21:00

## 2023-09-18 RX ADMIN — ASPIRIN 81 MG CHEWABLE TABLET 81 MG: 81 TABLET CHEWABLE at 09:00

## 2023-09-18 RX ADMIN — LEVOTHYROXINE SODIUM 75 MCG: 0.07 TABLET ORAL at 09:01

## 2023-09-18 RX ADMIN — APIXABAN 2.5 MG: 2.5 TABLET, FILM COATED ORAL at 20:11

## 2023-09-18 RX ADMIN — PANTOPRAZOLE SODIUM 40 MG: 40 TABLET, DELAYED RELEASE ORAL at 09:01

## 2023-09-18 RX ADMIN — SODIUM CHLORIDE, PRESERVATIVE FREE 10 ML: 5 INJECTION INTRAVENOUS at 09:00

## 2023-09-18 RX ADMIN — ESCITALOPRAM OXALATE 20 MG: 10 TABLET ORAL at 09:00

## 2023-09-18 RX ADMIN — ATORVASTATIN CALCIUM 40 MG: 40 TABLET, FILM COATED ORAL at 20:11

## 2023-09-18 RX ADMIN — SODIUM CHLORIDE, PRESERVATIVE FREE 10 ML: 5 INJECTION INTRAVENOUS at 20:11

## 2023-09-18 RX ADMIN — ONDANSETRON 4 MG: 2 INJECTION INTRAMUSCULAR; INTRAVENOUS at 21:37

## 2023-09-18 RX ADMIN — APIXABAN 2.5 MG: 2.5 TABLET, FILM COATED ORAL at 09:00

## 2023-09-18 RX ADMIN — AMLODIPINE BESYLATE 5 MG: 5 TABLET ORAL at 09:01

## 2023-09-18 NOTE — PROCEDURES
Procedure:     JAN and Cardioversion    Indication: Atrial fibrillation    ASA/Mallapati score: 2/2    After obtaining the informed consent pt was sedated  With  Versed and  Fentanyl     A  200 J   synchronised  shock was given. Pt converted to  Sinus rythym         Pt remained clinically and hemodynamically stable. JAN before procedure did not show any atrial or appendage clot . Patient was anticoagulated before the procedure. Impression:    JAN guided successful Cardioversion   Significantly enlarged left atrium    Plan:    Continue with oral anticoagulation  Patient is not on amiodarone or another AV indy blocking agents due to baseline bradycardia. Dr. Kirsten Garcia                Conscious sedation:    Consent: Written consent obtained (see nursing note)  Risks and benefits: risks, benefits and alternatives were discussed  Consent given by: patient/family  Patient understanding: states understanding of the procedure being performed  Patient consent: understanding of the procedure matches consent given  Patient identity confirmed: verbally with patient and arm band  Time out: Immediately prior to procedure a \"time out\" was called to verify the correct patient, procedure, equipment, support staff and site/side marked as required. Medication IV:   Received IV Versed and IV fentanyl, please refer to nursing log for dosing  Complication: Tolerated well without complication. Patient was observed for over 30 minutes postprocedure    Time: Total intra-service time with patient was 32 minutes.

## 2023-09-18 NOTE — PLAN OF CARE
Problem: Discharge Planning  Goal: Discharge to home or other facility with appropriate resources  9/18/2023 0019 by Odean Nyhan, RN  Outcome: Progressing  9/17/2023 1103 by Silvia Elizalde RN  Outcome: Progressing     Problem: Pain  Goal: Verbalizes/displays adequate comfort level or baseline comfort level  9/18/2023 0019 by Odean Nyhan, RN  Outcome: Progressing  9/17/2023 1103 by Silvia Elizalde RN  Outcome: Progressing     Problem: Skin/Tissue Integrity  Goal: Absence of new skin breakdown  Description: 1. Monitor for areas of redness and/or skin breakdown  2. Assess vascular access sites hourly  3. Every 4-6 hours minimum:  Change oxygen saturation probe site  4. Every 4-6 hours:  If on nasal continuous positive airway pressure, respiratory therapy assess nares and determine need for appliance change or resting period.   9/18/2023 0019 by Odean Nyhan, RN  Outcome: Progressing  9/17/2023 1103 by Silvia Elizalde RN  Outcome: Progressing     Problem: ABCDS Injury Assessment  Goal: Absence of physical injury  9/18/2023 0019 by Odean Nyhan, RN  Outcome: Progressing  9/17/2023 1103 by Silvia Elizalde RN  Outcome: Progressing     Problem: Cardiovascular - Adult  Goal: Maintains optimal cardiac output and hemodynamic stability  9/18/2023 0019 by Odean Nyhan, RN  Outcome: Progressing  9/17/2023 1103 by Silvia Elizalde RN  Outcome: Progressing  Goal: Absence of cardiac dysrhythmias or at baseline  9/18/2023 0019 by Odean Nyhan, RN  Outcome: Progressing  9/17/2023 1103 by Silvia Elizalde RN  Outcome: Progressing     Problem: Metabolic/Fluid and Electrolytes - Adult  Goal: Electrolytes maintained within normal limits  9/18/2023 0019 by Odean Nyhan, RN  Outcome: Progressing  9/17/2023 1103 by Silvia Elizalde RN  Outcome: Progressing

## 2023-09-19 VITALS
BODY MASS INDEX: 23.91 KG/M2 | DIASTOLIC BLOOD PRESSURE: 44 MMHG | HEIGHT: 60 IN | SYSTOLIC BLOOD PRESSURE: 115 MMHG | TEMPERATURE: 98.1 F | RESPIRATION RATE: 18 BRPM | HEART RATE: 65 BPM | OXYGEN SATURATION: 98 % | WEIGHT: 121.8 LBS

## 2023-09-19 LAB
ALBUMIN SERPL-MCNC: 3.4 GM/DL (ref 3.4–5)
ALP BLD-CCNC: 51 IU/L (ref 40–128)
ALT SERPL-CCNC: 22 U/L (ref 10–40)
ANION GAP SERPL CALCULATED.3IONS-SCNC: 11 MMOL/L (ref 4–16)
AST SERPL-CCNC: 21 IU/L (ref 15–37)
BASOPHILS ABSOLUTE: 0 K/CU MM
BASOPHILS RELATIVE PERCENT: 0.4 % (ref 0–1)
BILIRUB SERPL-MCNC: 0.5 MG/DL (ref 0–1)
BUN SERPL-MCNC: 15 MG/DL (ref 6–23)
CALCIUM SERPL-MCNC: 8.2 MG/DL (ref 8.3–10.6)
CHLORIDE BLD-SCNC: 105 MMOL/L (ref 99–110)
CO2: 22 MMOL/L (ref 21–32)
CREAT SERPL-MCNC: 0.7 MG/DL (ref 0.6–1.1)
DIFFERENTIAL TYPE: ABNORMAL
EOSINOPHILS ABSOLUTE: 0.2 K/CU MM
EOSINOPHILS RELATIVE PERCENT: 2.7 % (ref 0–3)
GFR SERPL CREATININE-BSD FRML MDRD: >60 ML/MIN/1.73M2
GLUCOSE SERPL-MCNC: 91 MG/DL (ref 70–99)
HCT VFR BLD CALC: 37.3 % (ref 37–47)
HEMOGLOBIN: 11.4 GM/DL (ref 12.5–16)
IMMATURE NEUTROPHIL %: 0.3 % (ref 0–0.43)
LYMPHOCYTES ABSOLUTE: 2.8 K/CU MM
LYMPHOCYTES RELATIVE PERCENT: 37.1 % (ref 24–44)
MCH RBC QN AUTO: 32.7 PG (ref 27–31)
MCHC RBC AUTO-ENTMCNC: 30.6 % (ref 32–36)
MCV RBC AUTO: 106.9 FL (ref 78–100)
MONOCYTES ABSOLUTE: 0.6 K/CU MM
MONOCYTES RELATIVE PERCENT: 8.4 % (ref 0–4)
NUCLEATED RBC %: 0 %
PDW BLD-RTO: 14.3 % (ref 11.7–14.9)
PHOSPHORUS: 4.7 MG/DL (ref 2.5–4.9)
PLATELET # BLD: 190 K/CU MM (ref 140–440)
PMV BLD AUTO: 9.4 FL (ref 7.5–11.1)
POTASSIUM SERPL-SCNC: 4.3 MMOL/L (ref 3.5–5.1)
RBC # BLD: 3.49 M/CU MM (ref 4.2–5.4)
SEGMENTED NEUTROPHILS ABSOLUTE COUNT: 3.8 K/CU MM
SEGMENTED NEUTROPHILS RELATIVE PERCENT: 51.1 % (ref 36–66)
SODIUM BLD-SCNC: 138 MMOL/L (ref 135–145)
TOTAL IMMATURE NEUTOROPHIL: 0.02 K/CU MM
TOTAL NUCLEATED RBC: 0 K/CU MM
TOTAL PROTEIN: 5.1 GM/DL (ref 6.4–8.2)
WBC # BLD: 7.5 K/CU MM (ref 4–10.5)

## 2023-09-19 PROCEDURE — 6370000000 HC RX 637 (ALT 250 FOR IP): Performed by: STUDENT IN AN ORGANIZED HEALTH CARE EDUCATION/TRAINING PROGRAM

## 2023-09-19 PROCEDURE — 2580000003 HC RX 258: Performed by: STUDENT IN AN ORGANIZED HEALTH CARE EDUCATION/TRAINING PROGRAM

## 2023-09-19 PROCEDURE — 80053 COMPREHEN METABOLIC PANEL: CPT

## 2023-09-19 PROCEDURE — 84100 ASSAY OF PHOSPHORUS: CPT

## 2023-09-19 PROCEDURE — APPSS30 APP SPLIT SHARED TIME 16-30 MINUTES

## 2023-09-19 PROCEDURE — 85025 COMPLETE CBC W/AUTO DIFF WBC: CPT

## 2023-09-19 PROCEDURE — 36415 COLL VENOUS BLD VENIPUNCTURE: CPT

## 2023-09-19 RX ADMIN — ESCITALOPRAM OXALATE 20 MG: 10 TABLET ORAL at 09:40

## 2023-09-19 RX ADMIN — ASPIRIN 81 MG CHEWABLE TABLET 81 MG: 81 TABLET CHEWABLE at 09:40

## 2023-09-19 RX ADMIN — ACETAMINOPHEN 650 MG: 325 TABLET ORAL at 09:58

## 2023-09-19 RX ADMIN — SODIUM CHLORIDE, PRESERVATIVE FREE 10 ML: 5 INJECTION INTRAVENOUS at 09:42

## 2023-09-19 RX ADMIN — LEVOTHYROXINE SODIUM 75 MCG: 0.07 TABLET ORAL at 05:44

## 2023-09-19 RX ADMIN — TIMOLOL MALEATE 1 DROP: 5 SOLUTION OPHTHALMIC at 09:48

## 2023-09-19 RX ADMIN — PANTOPRAZOLE SODIUM 40 MG: 40 TABLET, DELAYED RELEASE ORAL at 05:44

## 2023-09-19 RX ADMIN — APIXABAN 2.5 MG: 2.5 TABLET, FILM COATED ORAL at 09:41

## 2023-09-19 ASSESSMENT — PAIN DESCRIPTION - LOCATION: LOCATION: SHOULDER

## 2023-09-19 ASSESSMENT — PAIN DESCRIPTION - DESCRIPTORS: DESCRIPTORS: THROBBING;ACHING

## 2023-09-19 ASSESSMENT — PAIN DESCRIPTION - ORIENTATION: ORIENTATION: RIGHT

## 2023-09-19 ASSESSMENT — PAIN SCALES - GENERAL: PAINLEVEL_OUTOF10: 5

## 2023-09-19 NOTE — DISCHARGE SUMMARY
V2.0  Discharge Summary    Name:  Endy Orellana /Age/Sex: 1935 (80 y.o. female)   Admit Date: 2023  Discharge Date: 23    MRN & CSN:  [id number] & [id number] Encounter Date and Time 23 11:57 AM EDT    Attending:  Seema Akers MD Discharging Provider: Seema Akers MD       Hospital Course:     Brief HPI: Endy Orellana is a 80 y.o. female with pmh of new onset A-fib, hypothyroidism, hyperlipidemia, COPD, who presents with chest pressure. Patient states that his symptoms started this morning at about 11 AM with a sudden onset of constant substernal chest pressure across the whole precordium with associated nausea, diaphoresis and bilateral arm numbness. Patient states that she was recently diagnosed with atrial fibrillation by her cardiologist and was started on medication and was scheduled for planned cardioversion on 2023. States that symptoms has resolved since presentation to the ED. Work-up in the ED with labs within normal limits. Troponins negative. EKG showed atrial fibrillation with slow ventricular response. Brief Problem Based Course:     1. New onset atrial fibrillation:  2. Chest Pain  -Recently diagnosed  -Presenting with chest pressure with associated nausea, diaphoresis and bilateral arm numbness. Symptoms currently resolved  -Troponins negative  -No plan for further ischemic w/u per cardio  -Was scheduled for planned cardioversion 2023  -On anticoagulation with Eliquis. Not on rate control medication at this time given   -Cardiology consulted underwent JAN and cardioversion. Chest pain resolved. Was observed overnight after the cardioversion and no episodes of A-fib prior to discharge. Patient to continue anticoagulation as per cardiology recommendations and follow-up in the office. 3.  History of COPD  -Not in acute exacerbation.   -Continue home inhalers     4. Hypothyroidism  -On levothyroxine     5.   History of VHD status post

## 2023-09-19 NOTE — PLAN OF CARE
Problem: Discharge Planning  Goal: Discharge to home or other facility with appropriate resources  Outcome: Progressing     Problem: Pain  Goal: Verbalizes/displays adequate comfort level or baseline comfort level  Outcome: Progressing     Problem: Skin/Tissue Integrity  Goal: Absence of new skin breakdown  Description: 1. Monitor for areas of redness and/or skin breakdown  2. Assess vascular access sites hourly  3. Every 4-6 hours minimum:  Change oxygen saturation probe site  4. Every 4-6 hours:  If on nasal continuous positive airway pressure, respiratory therapy assess nares and determine need for appliance change or resting period.   Outcome: Progressing     Problem: ABCDS Injury Assessment  Goal: Absence of physical injury  Outcome: Progressing     Problem: Cardiovascular - Adult  Goal: Maintains optimal cardiac output and hemodynamic stability  Outcome: Progressing  Goal: Absence of cardiac dysrhythmias or at baseline  Outcome: Progressing     Problem: Metabolic/Fluid and Electrolytes - Adult  Goal: Electrolytes maintained within normal limits  Outcome: Progressing     Problem: Safety - Adult  Goal: Free from fall injury  Outcome: Progressing

## 2023-09-20 ENCOUNTER — ENROLLMENT (OUTPATIENT)
Dept: CASE MANAGEMENT | Age: 88
End: 2023-09-20

## 2023-09-20 ENCOUNTER — CARE COORDINATION (OUTPATIENT)
Dept: CASE MANAGEMENT | Age: 88
End: 2023-09-20

## 2023-09-20 DIAGNOSIS — I48.91 ATRIAL FIBRILLATION, UNSPECIFIED TYPE (HCC): Primary | ICD-10-CM

## 2023-09-20 PROCEDURE — 1111F DSCHRG MED/CURRENT MED MERGE: CPT | Performed by: FAMILY MEDICINE

## 2023-09-20 NOTE — CARE COORDINATION
[facility] Care Transitions Initial Follow Up Call    Call within 2 business days of discharge: Yes    Patient Current Location:  Home: [address] [place] [address]    Care Transition Nurse contacted the patient by telephone to perform post hospital discharge assessment. Verified name and  with patient as identifiers. Provided introduction to self, and explanation of the Care Transition Nurse role. Patient: Elida Drake Patient : 1935   MRN: [id number]  Reason for Admission: Chest pain/AFib  Discharge Date: 23 RARS: Readmission Risk Score: 20.6      Last Discharge [address] [facility] [facility]       Date Complaint Diagnosis Description Type Department Provider    23 Chest Pain Chest pain, unspecified type . .. ED to Hosp-Admission (Discharged) (ADMITTED) Mounika Blakely MD; Tiki Betancourt. .. Discharge Facility: [place]    Challenges to be reviewed by the provider   Additional needs identified to be addressed with provider: No  none               Method of communication with provider: none. Spoke with Constanza Jonas, says she is doing ok. She is tired, She has been trying to take it easy & not overexert herself. Denies chest pain/pressure/palpitations. She had Cardioversion & JAN while in hospital.  Advised her to avoid drinking caffeine. She denies N/V/D, appetite fairly good. She uses home O2 2l/nc mostly @ night. PaO2 93%. /68. Reviewed AVS, DC meds & follow up appts. Taking all meds as prescribed. No med changes @ this time. Emphasized importance of HFU appt needed within 7 days. She said she had appt w/PCP 23 (after previous hospitalization) CTN still stressed importance of seeing PCP post hospitalization. She says she will call Dr Medardo Smith' office. Will also call Cardiologist Dr Kallie Olivas office, will schedule appt, she prefers to do so. Constanza Jonas instructed to continue to monitor s/s, reporting any adverse/worsening s/s to MD immediately for early intervention.  She is

## 2023-09-21 LAB
EKG ATRIAL RATE: 55 BPM
EKG DIAGNOSIS: NORMAL
EKG P AXIS: 82 DEGREES
EKG P-R INTERVAL: 166 MS
EKG Q-T INTERVAL: 486 MS
EKG QRS DURATION: 74 MS
EKG QTC CALCULATION (BAZETT): 464 MS
EKG R AXIS: 53 DEGREES
EKG T AXIS: 87 DEGREES
EKG VENTRICULAR RATE: 55 BPM

## 2023-09-25 ENCOUNTER — TELEPHONE (OUTPATIENT)
Dept: CARDIOLOGY CLINIC | Age: 88
End: 2023-09-25

## 2023-09-25 ENCOUNTER — OFFICE VISIT (OUTPATIENT)
Dept: CARDIOLOGY CLINIC | Age: 88
End: 2023-09-25
Payer: MEDICARE

## 2023-09-25 VITALS
BODY MASS INDEX: 24.42 KG/M2 | HEIGHT: 60 IN | HEART RATE: 48 BPM | DIASTOLIC BLOOD PRESSURE: 58 MMHG | SYSTOLIC BLOOD PRESSURE: 130 MMHG | WEIGHT: 124.4 LBS

## 2023-09-25 DIAGNOSIS — I38 VHD (VALVULAR HEART DISEASE): Primary | ICD-10-CM

## 2023-09-25 PROCEDURE — 99214 OFFICE O/P EST MOD 30 MIN: CPT | Performed by: INTERNAL MEDICINE

## 2023-09-25 PROCEDURE — 1123F ACP DISCUSS/DSCN MKR DOCD: CPT | Performed by: INTERNAL MEDICINE

## 2023-09-25 PROCEDURE — 93000 ELECTROCARDIOGRAM COMPLETE: CPT | Performed by: INTERNAL MEDICINE

## 2023-09-28 ENCOUNTER — CARE COORDINATION (OUTPATIENT)
Dept: CASE MANAGEMENT | Age: 88
End: 2023-09-28

## 2023-09-28 NOTE — CARE COORDINATION
[id number] [address] [facility] Care Transitions Follow Up Call    Patient Current Location:  Home: [address] [place] [address]    Care Transition Nurse contacted the patient by telephone to follow up after admission on 23. Verified name and  with patient as identifiers. Patient: Reji De La Cruz  Patient : 1935   MRN: [id number]  Reason for Admission: AFib  Discharge Date: 23 RARS: Readmission Risk Score: 20.6      Needs to be reviewed by the provider   Additional needs identified to be addressed with provider: No  none             Method of communication with provider: none. Spoke with Spence Meckel, says she is doing ok, she is very tired. At times, feels as if she is going to pass out, although she doesn't. She is resting a lot. Denies chest pain, no sob/ortega/palpitations. She had appt earlier this week with Cardiologist Dr Sera Humphries. Has an appt  tomorrow with EP Dr Denis Roa, PPM vs Watchman? Has a ride. Instructed pt to contact PCP/specialists for any new/worsening s/s. Agreeable to f/up calls. CTN will continue to follow. Addressed changes since last contact:  none  Discussed follow-up appointments. If no appointment was previously scheduled, appointment scheduling offered: Yes. Is follow up appointment scheduled within 7 days of discharge? Yes. Follow Up  Future Appointments   Date Time Provider [address] [place] [address]   2023 11:45 AM Julio Servin MD [facility]   10/10/2023 10:30 AM [facility]   10/31/2023 12:50 PM Gabbi Fair MD [facility]     Non-[facility] follow up appointment(s): NA    Care Transition Nurse reviewed discharge instructions, medical action plan, and red flags with patient and discussed any barriers to care and/or understanding of plan of care after discharge.  Discussed appropriate site of care based on symptoms and resources available to patient including: PCP  Specialist  Benefits related nurse triage line  Urgent care clinics  When to call

## 2023-09-29 ENCOUNTER — INITIAL CONSULT (OUTPATIENT)
Dept: CARDIOLOGY CLINIC | Age: 88
End: 2023-09-29
Payer: MEDICARE

## 2023-09-29 VITALS
HEIGHT: 60 IN | DIASTOLIC BLOOD PRESSURE: 60 MMHG | SYSTOLIC BLOOD PRESSURE: 138 MMHG | BODY MASS INDEX: 24.81 KG/M2 | HEART RATE: 46 BPM | WEIGHT: 126.4 LBS

## 2023-09-29 DIAGNOSIS — I49.5 TACHYCARDIA-BRADYCARDIA (HCC): Primary | ICD-10-CM

## 2023-09-29 DIAGNOSIS — I48.0 PAF (PAROXYSMAL ATRIAL FIBRILLATION) (HCC): ICD-10-CM

## 2023-09-29 DIAGNOSIS — I38 VHD (VALVULAR HEART DISEASE): ICD-10-CM

## 2023-09-29 PROCEDURE — 93000 ELECTROCARDIOGRAM COMPLETE: CPT | Performed by: INTERNAL MEDICINE

## 2023-09-29 PROCEDURE — 99205 OFFICE O/P NEW HI 60 MIN: CPT | Performed by: INTERNAL MEDICINE

## 2023-09-29 PROCEDURE — 1123F ACP DISCUSS/DSCN MKR DOCD: CPT | Performed by: INTERNAL MEDICINE

## 2023-09-29 NOTE — PROGRESS NOTES
Saw patient in the office with Dr Darin Perla. My role as Watchman Coordinator explained. Watchman discussed, Brochure provided and [employer] shared. [employer] utilized and filled out. Patient to take the [employer] with them for shared decision making appointment. Explained to the patient:  Part of the FDA approval of the Watchman procedure in 2015 mandated that each procedure must participate in a national registry, this requires several follow up appointments and testing following the procedure. These expectations Include:      7-10 day follow up with the Nurse practitioner or Dr Darin Perla    45 day follow up lab work and JAN to check positioning of the device. 6 month follow up telephone call     1 year follow up appointment, JAN and lab work     2  year follow up appointment and lab work . Discussed the importance of blood thinners as prescribed and that the patient cannot come off those blood thinners until discontinued by the implanting physician. Patient has no surgery or procedures planned that would disrupt these needed blood thinners. Denies:  [x]  Nickel or metal allergy  [x]  Contrast allergy  [x]  Anesthesia issues  [x]  Difficulty swallowing  [x]  No procedures/surgeries planned that would interrupt Plavix and ASA for next 6 months    All questions and concerns answered.   Will follow    Jennifer De Paz RN  Watchman Coordinator Electronically signed by Tosin Cardona RN on 9/29/2023 at 12:46 PM [address] [facility]
wait for few weeks before I do watchman because lead dislodgement is a possibility so I recommend performing watchman first and then do pacemaker next day if possibe    Patient with risk of stroke with out anticoagulation and is risk of bleeding with anticoagulation so will be BINDU closure candidate    Discussed with patient about left atrial appendage closure device role in detail. We are currently doing only watchman so we discussed about the device in detail. We discussed that this device is only used for patients who are unable to tolerate long term anticoagulation because of bleeding or falls or other reasons where they are high risk with anticoagulation     Patient may still need to be on anticoagulant and aspirin for 45 days post procedure and we do a JAN and if JAN shows there is a good seal of LA appendage with device then we change to aspirin and plavix (or other equivalent) for 6 months and then aspirin only after that. According to the new FDA recommendations patient can be only aspirin and plavix for 6 months post procedure and then aspirin only. We may consider this approach in patient case by case basis provided they do not have any BINDU clot at the time of implantation. The risks including, but not limited to, vascular injury, bleeding, infection, radiation exposure, injury to cardiac and surrounding structures (including esophageal and pulmonary vein injury), injury to the normal conduction system of the heart (possibly requiring a pacemaker), Pericardial effusion or cardiac puncture, stroke, myocardial infarction and death were all discussed. The patient considered the risks, benefits and alternatives; decided to proceed with the procedure.      The risks including, but not limited to, the risks of vascular injury, bleeding, infection, device malfunction, lead dislodgement, radiation exposure, injury to cardiac and surrounding structures (including pneumothorax), stroke, myocardial infarction

## 2023-09-29 NOTE — PATIENT INSTRUCTIONS
Please be informed that if you contact our office outside of normal business hours the physician on call cannot help with any scheduling or rescheduling issues, procedure instruction questions or any type of medication issue. We advise you for any urgent/emergency that you go to the nearest emergency room! PLEASE CALL OUR OFFICE DURING NORMAL BUSINESS HOURS    Monday - Friday   8 am to 5 pm    Dom: [address] [place]: [phone]    [place]:  [phone]    **It is YOUR responsibilty to bring medication bottles and/or updated medication list to [facility]. This will allow us to better serve you and all your healthcare needs**    Thank you for allowing us to care for you today! We want to ensure we can follow your treatment plan and we strive to give you the best outcomes and experience possible. If you ever have a life threatening emergency and call 911 - for an ambulance (EMS)   Our providers can only care for you at:   [facility] or [facility]. Even if you have someone take you or you drive yourself we can only care for you in a [facility] facility. Our providers are not setup at the other healthcare locations! [facility] Laboratory Locations - No appointment necessary. Sites open Monday to Friday. Call your preferred location for test preparation, business   hours and other information you need. [employer]. [address] [place] [address]. 27 . [facility]. Rob, [facility]  Phone: [phone]       We are committed to providing you the best care possible. If you receive a survey after visiting one of our offices, please take time to share your experience concerning your physician office visit. These surveys are confidential and no health information about you is shared. We are eager to improve for you and we are counting on your feedback to help make that happen.

## 2023-10-02 ENCOUNTER — TELEPHONE (OUTPATIENT)
Dept: CARDIOLOGY CLINIC | Age: 88
End: 2023-10-02

## 2023-10-04 ENCOUNTER — INITIAL CONSULT (OUTPATIENT)
Dept: CARDIOLOGY CLINIC | Age: 88
End: 2023-10-04
Payer: MEDICARE

## 2023-10-04 VITALS
HEIGHT: 60 IN | DIASTOLIC BLOOD PRESSURE: 60 MMHG | SYSTOLIC BLOOD PRESSURE: 122 MMHG | BODY MASS INDEX: 24.77 KG/M2 | WEIGHT: 126.2 LBS | HEART RATE: 67 BPM

## 2023-10-04 DIAGNOSIS — I63.50 CEREBRAL ARTERY OCCLUSION WITH CEREBRAL INFARCTION (HCC): ICD-10-CM

## 2023-10-04 DIAGNOSIS — Z91.81 AT MAXIMUM RISK FOR FALL: ICD-10-CM

## 2023-10-04 DIAGNOSIS — D50.0 IRON DEFICIENCY ANEMIA DUE TO CHRONIC BLOOD LOSS: ICD-10-CM

## 2023-10-04 DIAGNOSIS — Z95.2 S/P TAVR (TRANSCATHETER AORTIC VALVE REPLACEMENT): ICD-10-CM

## 2023-10-04 DIAGNOSIS — D68.69 SECONDARY HYPERCOAGULABLE STATE (HCC): ICD-10-CM

## 2023-10-04 DIAGNOSIS — I48.0 PAF (PAROXYSMAL ATRIAL FIBRILLATION) (HCC): Primary | ICD-10-CM

## 2023-10-04 DIAGNOSIS — I63.40 CEREBROVASCULAR ACCIDENT (CVA) DUE TO EMBOLISM OF CEREBRAL ARTERY (HCC): ICD-10-CM

## 2023-10-04 PROCEDURE — 1123F ACP DISCUSS/DSCN MKR DOCD: CPT | Performed by: INTERNAL MEDICINE

## 2023-10-04 PROCEDURE — 99214 OFFICE O/P EST MOD 30 MIN: CPT | Performed by: INTERNAL MEDICINE

## 2023-10-04 NOTE — PATIENT INSTRUCTIONS
We have discussed their unique stroke and bleeding risk both on and off oral-anticoagulation, and the rationale for this referral.  Based on both stroke and bleeding risk, a shared decision has been made to pursue closure of the left atrial appendage as an alternative to oral anticoagulant therapy for stroke prophylaxis and to reduce their long term risk of incidence of bleeding. NICE tool is used during this office visit to come to this conclusion.

## 2023-10-06 ENCOUNTER — CARE COORDINATION (OUTPATIENT)
Dept: CASE MANAGEMENT | Age: 88
End: 2023-10-06

## 2023-10-07 NOTE — CARE COORDINATION
Care Transitions Outreach Attempt    Patient: Eric Reid Patient : 1935 MRN: [id number]    Attempted to reach patient for transitions of care follow up. Unable to reach patient. #1 subsequent. CTN will try again. Last Discharge Facility       Date Complaint Diagnosis Description Type Department Provider    23 Chest Pain Chest pain, unspecified type . .. ED to Hosp-Admission (Discharged) (ADMITTED) Doni Cabrales MD; Erika Castañeda. ..           Noted following upcoming appointments from discharge chart review:     Future Appointments   Date Time Provider [address]   10/9/2023  1:00 PM Kitty Drake CARDIO [facility] Heart [facility]   10/10/2023 10:30 AM Parveen Saavedra MD [facility]   10/12/2023  7:00 AM CATH LAB 03 [facility]   10/13/2023  7:00 AM CATH LAB 04 [facility]   10/31/2023 12:50 PM Samreen Concepcion MD [facility] Heart [facility]           Travis Araujo RN -[phone]

## 2023-10-09 ENCOUNTER — HOSPITAL ENCOUNTER (OUTPATIENT)
Dept: GENERAL RADIOLOGY | Age: 88
Discharge: HOME | End: 2023-10-09
Payer: MEDICARE

## 2023-10-09 ENCOUNTER — HOSPITAL ENCOUNTER (OUTPATIENT)
Age: 88
Discharge: HOME | End: 2023-10-09
Payer: MEDICARE

## 2023-10-09 ENCOUNTER — NURSE ONLY (OUTPATIENT)
Dept: CARDIOLOGY CLINIC | Age: 88
End: 2023-10-09

## 2023-10-09 DIAGNOSIS — Z01.818 PREOP EXAMINATION: ICD-10-CM

## 2023-10-09 DIAGNOSIS — I48.0 PAF (PAROXYSMAL ATRIAL FIBRILLATION) (HCC): Primary | ICD-10-CM

## 2023-10-09 LAB
ANION GAP SERPL CALCULATED.3IONS-SCNC: 14 MMOL/L (ref 4–16)
APTT: 38.1 SECONDS (ref 25.1–37.1)
BUN SERPL-MCNC: 11 MG/DL (ref 6–23)
CALCIUM SERPL-MCNC: 8.9 MG/DL (ref 8.3–10.6)
CHLORIDE BLD-SCNC: 98 MMOL/L (ref 99–110)
CO2: 24 MMOL/L (ref 21–32)
CREAT SERPL-MCNC: 0.6 MG/DL (ref 0.6–1.1)
GFR SERPL CREATININE-BSD FRML MDRD: >60 ML/MIN/1.73M2
GLUCOSE SERPL-MCNC: 79 MG/DL (ref 70–99)
HCT VFR BLD CALC: 41.1 % (ref 37–47)
HEMOGLOBIN: 12.6 GM/DL (ref 12.5–16)
INR BLD: 1.1 INDEX
MAGNESIUM: 2.8 MG/DL (ref 1.8–2.4)
MCH RBC QN AUTO: 32.6 PG (ref 27–31)
MCHC RBC AUTO-ENTMCNC: 30.7 % (ref 32–36)
MCV RBC AUTO: 106.2 FL (ref 78–100)
PDW BLD-RTO: 15.2 % (ref 11.7–14.9)
PHOSPHORUS: 4.3 MG/DL (ref 2.5–4.9)
PLATELET # BLD: 233 K/CU MM (ref 140–440)
PMV BLD AUTO: 9.4 FL (ref 7.5–11.1)
POTASSIUM SERPL-SCNC: 5.6 MMOL/L (ref 3.5–5.1)
PROTHROMBIN TIME: 14.6 SECONDS (ref 11.7–14.5)
RBC # BLD: 3.87 M/CU MM (ref 4.2–5.4)
SODIUM BLD-SCNC: 136 MMOL/L (ref 135–145)
WBC # BLD: 7.8 K/CU MM (ref 4–10.5)

## 2023-10-09 PROCEDURE — 85610 PROTHROMBIN TIME: CPT

## 2023-10-09 PROCEDURE — 85730 THROMBOPLASTIN TIME PARTIAL: CPT

## 2023-10-09 PROCEDURE — 86922 COMPATIBILITY TEST ANTIGLOB: CPT

## 2023-10-09 PROCEDURE — 71046 X-RAY EXAM CHEST 2 VIEWS: CPT

## 2023-10-09 PROCEDURE — 86901 BLOOD TYPING SEROLOGIC RH(D): CPT

## 2023-10-09 PROCEDURE — 84100 ASSAY OF PHOSPHORUS: CPT

## 2023-10-09 PROCEDURE — 86900 BLOOD TYPING SEROLOGIC ABO: CPT

## 2023-10-09 PROCEDURE — 83735 ASSAY OF MAGNESIUM: CPT

## 2023-10-09 PROCEDURE — 80048 BASIC METABOLIC PNL TOTAL CA: CPT

## 2023-10-09 PROCEDURE — 36415 COLL VENOUS BLD VENIPUNCTURE: CPT

## 2023-10-09 PROCEDURE — 85027 COMPLETE CBC AUTOMATED: CPT

## 2023-10-09 PROCEDURE — 86850 RBC ANTIBODY SCREEN: CPT

## 2023-10-09 NOTE — PROGRESS NOTES
Patient here in office and educated on Watchman, schedule for 10/12/23 @ 0700, with arrival @ 0530, @ [facility]; risk explained; and consents signed. Also copy of orders given for labs and CXR due 10/9/23 at [facility]. Instruction given to patient to :  NPO after midnight the night before procedure; call hospital at [phone] to pre-register. May take rest of morning meds of procedure. Hold ALL blood pressure medications morning of procedure. Hold ELiquis the evening dose night before procedure and Morning dose of the procedure. Patient voiced understanding. Copies of consent & info scanned in chart. Patient here in office and educated on Dual PPM Implant, schedule for 10/13/23 @ 0700, with arrival @ 0530, @ [facility]; risk explained; and consents signed. Also copy of orders given for labs and CXR due 10/9/23 at [facility]. Instruction given to patient to :  NPO after midnight the night before procedure; call hospital at [phone] to pre-register. May take rest of morning meds of procedure. Hold Eliquis the evening dose night before procedure and Morning dose of the procedure. Patient voiced understanding. Copies of consent & info scanned in chart.

## 2023-10-10 ENCOUNTER — TELEPHONE (OUTPATIENT)
Dept: CARDIOLOGY CLINIC | Age: 88
End: 2023-10-10

## 2023-10-10 ENCOUNTER — OFFICE VISIT (OUTPATIENT)
Dept: FAMILY MEDICINE CLINIC | Age: 88
End: 2023-10-10
Payer: MEDICARE

## 2023-10-10 ENCOUNTER — TELEPHONE (OUTPATIENT)
Dept: CARDIAC CATH/INVASIVE PROCEDURES | Age: 88
End: 2023-10-10

## 2023-10-10 VITALS
BODY MASS INDEX: 24.53 KG/M2 | WEIGHT: 125.6 LBS | SYSTOLIC BLOOD PRESSURE: 120 MMHG | TEMPERATURE: 97.1 F | OXYGEN SATURATION: 95 % | DIASTOLIC BLOOD PRESSURE: 52 MMHG

## 2023-10-10 DIAGNOSIS — R00.1 BRADYCARDIA: ICD-10-CM

## 2023-10-10 DIAGNOSIS — I48.0 PAF (PAROXYSMAL ATRIAL FIBRILLATION) (HCC): ICD-10-CM

## 2023-10-10 DIAGNOSIS — E03.9 ACQUIRED HYPOTHYROIDISM: Primary | ICD-10-CM

## 2023-10-10 PROCEDURE — 1123F ACP DISCUSS/DSCN MKR DOCD: CPT | Performed by: FAMILY MEDICINE

## 2023-10-10 PROCEDURE — 99214 OFFICE O/P EST MOD 30 MIN: CPT | Performed by: FAMILY MEDICINE

## 2023-10-10 RX ORDER — SODIUM POLYSTYRENE SULFONATE 15 G/60ML
15 SUSPENSION ORAL; RECTAL ONCE
Qty: 60 ML | Refills: 0 | Status: ON HOLD | OUTPATIENT
Start: 2023-10-10 | End: 2023-10-13 | Stop reason: HOSPADM

## 2023-10-10 RX ORDER — CLOPIDOGREL BISULFATE 75 MG/1
75 TABLET ORAL DAILY
Qty: 90 TABLET | Refills: 1 | Status: SHIPPED | OUTPATIENT
Start: 2023-10-10

## 2023-10-10 RX ORDER — LEVOTHYROXINE SODIUM 0.07 MG/1
75 TABLET ORAL DAILY
Qty: 90 TABLET | Refills: 1 | Status: SHIPPED | OUTPATIENT
Start: 2023-10-10

## 2023-10-10 ASSESSMENT — PATIENT HEALTH QUESTIONNAIRE - PHQ9
4. FEELING TIRED OR HAVING LITTLE ENERGY: 0
SUM OF ALL RESPONSES TO PHQ QUESTIONS 1-9: 0
7. TROUBLE CONCENTRATING ON THINGS, SUCH AS READING THE NEWSPAPER OR WATCHING TELEVISION: 0
5. POOR APPETITE OR OVEREATING: 0
SUM OF ALL RESPONSES TO PHQ QUESTIONS 1-9: 0
6. FEELING BAD ABOUT YOURSELF - OR THAT YOU ARE A FAILURE OR HAVE LET YOURSELF OR YOUR FAMILY DOWN: 0
2. FEELING DOWN, DEPRESSED OR HOPELESS: 0
SUM OF ALL RESPONSES TO PHQ9 QUESTIONS 1 & 2: 0
8. MOVING OR SPEAKING SO SLOWLY THAT OTHER PEOPLE COULD HAVE NOTICED. OR THE OPPOSITE, BEING SO FIGETY OR RESTLESS THAT YOU HAVE BEEN MOVING AROUND A LOT MORE THAN USUAL: 0
SUM OF ALL RESPONSES TO PHQ QUESTIONS 1-9: 0
1. LITTLE INTEREST OR PLEASURE IN DOING THINGS: 0
3. TROUBLE FALLING OR STAYING ASLEEP: 0
10. IF YOU CHECKED OFF ANY PROBLEMS, HOW DIFFICULT HAVE THESE PROBLEMS MADE IT FOR YOU TO DO YOUR WORK, TAKE CARE OF THINGS AT HOME, OR GET ALONG WITH OTHER PEOPLE: 0
SUM OF ALL RESPONSES TO PHQ QUESTIONS 1-9: 0
9. THOUGHTS THAT YOU WOULD BE BETTER OFF DEAD, OR OF HURTING YOURSELF: 0

## 2023-10-10 ASSESSMENT — ENCOUNTER SYMPTOMS
DIARRHEA: 0
BACK PAIN: 0
ABDOMINAL PAIN: 0
COLOR CHANGE: 0
CHEST TIGHTNESS: 0
VOMITING: 0
COUGH: 0
PHOTOPHOBIA: 0
SHORTNESS OF BREATH: 1
NAUSEA: 0
CONSTIPATION: 0
BLOOD IN STOOL: 0
WHEEZING: 0
EYE PAIN: 0

## 2023-10-10 NOTE — TELEPHONE ENCOUNTER
[place] pre call done. Patient instructed to start plavix in the pm on 10/11/2023. Will follow all other instructions provided by the office. All questions verbalized answered. Will follow.   Electronically signed by Radha Stacy RN on 10/10/2023 at 3:41 PM [facility]

## 2023-10-10 NOTE — TELEPHONE ENCOUNTER
Patient called and advised that her potassium was high on recent labs and that per Lu Figueroa CNP she called in rx for patient to [address] [place] [address] that patient will take once. Advised that labs will be checked again on day of procedure. Patient voiced understanding with no c/o noted at present.

## 2023-10-12 ENCOUNTER — ANESTHESIA (OUTPATIENT)
Dept: CARDIAC CATH/INVASIVE PROCEDURES | Age: 88
End: 2023-10-12
Payer: MEDICARE

## 2023-10-12 ENCOUNTER — ANESTHESIA EVENT (OUTPATIENT)
Dept: CARDIAC CATH/INVASIVE PROCEDURES | Age: 88
End: 2023-10-12
Payer: MEDICARE

## 2023-10-12 ENCOUNTER — HOSPITAL ENCOUNTER (INPATIENT)
Dept: CARDIAC CATH/INVASIVE PROCEDURES | Age: 88
LOS: 2 days | Discharge: HOME | DRG: 243 | End: 2023-10-14
Attending: INTERNAL MEDICINE | Admitting: INTERNAL MEDICINE
Payer: MEDICARE

## 2023-10-12 DIAGNOSIS — Z95.818 PRESENCE OF WATCHMAN LEFT ATRIAL APPENDAGE CLOSURE DEVICE: ICD-10-CM

## 2023-10-12 DIAGNOSIS — Z95.0 STATUS POST PLACEMENT OF CARDIAC PACEMAKER: Primary | ICD-10-CM

## 2023-10-12 LAB
ACTIVATED CLOTTING TIME, LOW RANGE: 160 SEC
ACTIVATED CLOTTING TIME, LOW RANGE: 163 SEC
ACTIVATED CLOTTING TIME, LOW RANGE: 322 SEC
ACTIVATED CLOTTING TIME, LOW RANGE: 356 SEC
ANION GAP SERPL CALCULATED.3IONS-SCNC: 10 MMOL/L (ref 4–16)
BASE EXCESS MIXED: 4.8 (ref 0–2.3)
BASE EXCESS: ABNORMAL (ref 0–2.4)
BUN SERPL-MCNC: 12 MG/DL (ref 6–23)
CALCIUM SERPL-MCNC: 8.5 MG/DL (ref 8.3–10.6)
CHLORIDE BLD-SCNC: 102 MMOL/L (ref 99–110)
CO2: 26 MMOL/L (ref 21–32)
CO2: 30 MMOL/L (ref 21–32)
CREAT SERPL-MCNC: 0.5 MG/DL (ref 0.6–1.1)
EGFR, POC: >60 ML/MIN/1.73M2
EKG ATRIAL RATE: 68 BPM
EKG DIAGNOSIS: NORMAL
EKG P AXIS: 90 DEGREES
EKG P-R INTERVAL: 208 MS
EKG Q-T INTERVAL: 468 MS
EKG QRS DURATION: 82 MS
EKG QTC CALCULATION (BAZETT): 497 MS
EKG R AXIS: 72 DEGREES
EKG T AXIS: 64 DEGREES
EKG VENTRICULAR RATE: 68 BPM
GFR SERPL CREATININE-BSD FRML MDRD: >60 ML/MIN/1.73M2
GLUCOSE BLD-MCNC: 100 MG/DL (ref 70–99)
GLUCOSE SERPL-MCNC: 172 MG/DL (ref 70–99)
HCO3 ARTERIAL: 28.8 MMOL/L (ref 18–23)
HCT VFR BLD CALC: 38 % (ref 37–47)
HEMOGLOBIN: 12.9 GM/DL (ref 12.5–16)
O2 SATURATION: 100 % (ref 96–97)
PCO2 ARTERIAL: 39.3 MMHG (ref 32–45)
PH BLOOD: 7.47 (ref 7.34–7.45)
PO2 ARTERIAL: 343.4 MMHG (ref 75–100)
POC CALCIUM: 1.11 MMOL/L (ref 1.12–1.32)
POC CHLORIDE: 101 MMOL/L (ref 98–109)
POC CREATININE: 0.6 MG/DL (ref 0.6–1.1)
POTASSIUM SERPL-SCNC: 3.9 MMOL/L (ref 3.5–4.5)
POTASSIUM SERPL-SCNC: 4.3 MMOL/L (ref 3.5–5.1)
REASON FOR REJECTION: NORMAL
REJECTED TEST: NORMAL
SODIUM BLD-SCNC: 138 MMOL/L (ref 135–145)
SODIUM BLD-SCNC: 140 MMOL/L (ref 138–146)
SOURCE, BLOOD GAS: ABNORMAL

## 2023-10-12 PROCEDURE — 82803 BLOOD GASES ANY COMBINATION: CPT

## 2023-10-12 PROCEDURE — 2580000003 HC RX 258

## 2023-10-12 PROCEDURE — 6360000002 HC RX W HCPCS

## 2023-10-12 PROCEDURE — 6370000000 HC RX 637 (ALT 250 FOR IP): Performed by: NURSE PRACTITIONER

## 2023-10-12 PROCEDURE — 82962 GLUCOSE BLOOD TEST: CPT

## 2023-10-12 PROCEDURE — 2140000000 HC CCU INTERMEDIATE R&B

## 2023-10-12 PROCEDURE — 85347 COAGULATION TIME ACTIVATED: CPT

## 2023-10-12 PROCEDURE — 7100000000 HC PACU RECOVERY - FIRST 15 MIN: Performed by: ANESTHESIOLOGY

## 2023-10-12 PROCEDURE — 2580000003 HC RX 258: Performed by: ANESTHESIOLOGY

## 2023-10-12 PROCEDURE — 80048 BASIC METABOLIC PNL TOTAL CA: CPT

## 2023-10-12 PROCEDURE — 03HY32Z INSERTION OF MONITORING DEVICE INTO UPPER ARTERY, PERCUTANEOUS APPROACH: ICD-10-PCS | Performed by: INTERNAL MEDICINE

## 2023-10-12 PROCEDURE — 85014 HEMATOCRIT: CPT

## 2023-10-12 PROCEDURE — C1894 INTRO/SHEATH, NON-LASER: HCPCS

## 2023-10-12 PROCEDURE — B246ZZ4 ULTRASONOGRAPHY OF RIGHT AND LEFT HEART, TRANSESOPHAGEAL: ICD-10-PCS | Performed by: INTERNAL MEDICINE

## 2023-10-12 PROCEDURE — 2700000000 HC OXYGEN THERAPY PER DAY

## 2023-10-12 PROCEDURE — 33340 PERQ CLSR TCAT L ATR APNDGE: CPT | Performed by: INTERNAL MEDICINE

## 2023-10-12 PROCEDURE — 93010 ELECTROCARDIOGRAM REPORT: CPT | Performed by: INTERNAL MEDICINE

## 2023-10-12 PROCEDURE — C1769 GUIDE WIRE: HCPCS

## 2023-10-12 PROCEDURE — 6360000004 HC RX CONTRAST MEDICATION

## 2023-10-12 PROCEDURE — 93308 TTE F-UP OR LMTD: CPT

## 2023-10-12 PROCEDURE — 93312 ECHO TRANSESOPHAGEAL: CPT

## 2023-10-12 PROCEDURE — 36415 COLL VENOUS BLD VENIPUNCTURE: CPT

## 2023-10-12 PROCEDURE — 2720000010 HC SURG SUPPLY STERILE

## 2023-10-12 PROCEDURE — 80051 ELECTROLYTE PANEL: CPT

## 2023-10-12 PROCEDURE — 3700000000 HC ANESTHESIA ATTENDED CARE: Performed by: ANESTHESIOLOGY

## 2023-10-12 PROCEDURE — C1889 IMPLANT/INSERT DEVICE, NOC: HCPCS

## 2023-10-12 PROCEDURE — 02L73DK OCCLUSION OF LEFT ATRIAL APPENDAGE WITH INTRALUMINAL DEVICE, PERCUTANEOUS APPROACH: ICD-10-PCS | Performed by: INTERNAL MEDICINE

## 2023-10-12 PROCEDURE — 33340 PERQ CLSR TCAT L ATR APNDGE: CPT

## 2023-10-12 PROCEDURE — 82565 ASSAY OF CREATININE: CPT

## 2023-10-12 PROCEDURE — 94761 N-INVAS EAR/PLS OXIMETRY MLT: CPT

## 2023-10-12 PROCEDURE — 2500000003 HC RX 250 WO HCPCS

## 2023-10-12 PROCEDURE — C1760 CLOSURE DEV, VASC: HCPCS

## 2023-10-12 PROCEDURE — 7100000001 HC PACU RECOVERY - ADDTL 15 MIN: Performed by: ANESTHESIOLOGY

## 2023-10-12 PROCEDURE — 2709999900 HC NON-CHARGEABLE SUPPLY

## 2023-10-12 PROCEDURE — 3700000001 HC ADD 15 MINUTES (ANESTHESIA): Performed by: ANESTHESIOLOGY

## 2023-10-12 PROCEDURE — 93005 ELECTROCARDIOGRAM TRACING: CPT | Performed by: INTERNAL MEDICINE

## 2023-10-12 PROCEDURE — 2580000003 HC RX 258: Performed by: INTERNAL MEDICINE

## 2023-10-12 PROCEDURE — 85018 HEMOGLOBIN: CPT

## 2023-10-12 RX ORDER — PROPOFOL 10 MG/ML
INJECTION, EMULSION INTRAVENOUS PRN
Status: DISCONTINUED | OUTPATIENT
Start: 2023-10-12 | End: 2023-10-12 | Stop reason: SDUPTHER

## 2023-10-12 RX ORDER — CLOPIDOGREL BISULFATE 75 MG/1
75 TABLET ORAL DAILY
Status: DISCONTINUED | OUTPATIENT
Start: 2023-10-12 | End: 2023-10-14 | Stop reason: HOSPADM

## 2023-10-12 RX ORDER — HYDRALAZINE HYDROCHLORIDE 20 MG/ML
10 INJECTION INTRAMUSCULAR; INTRAVENOUS
Status: DISCONTINUED | OUTPATIENT
Start: 2023-10-12 | End: 2023-10-12

## 2023-10-12 RX ORDER — LABETALOL HYDROCHLORIDE 5 MG/ML
10 INJECTION, SOLUTION INTRAVENOUS
Status: DISCONTINUED | OUTPATIENT
Start: 2023-10-12 | End: 2023-10-12

## 2023-10-12 RX ORDER — ALBUTEROL SULFATE 90 UG/1
2 AEROSOL, METERED RESPIRATORY (INHALATION) EVERY 6 HOURS PRN
Status: DISCONTINUED | OUTPATIENT
Start: 2023-10-12 | End: 2023-10-14 | Stop reason: HOSPADM

## 2023-10-12 RX ORDER — ACETAMINOPHEN 325 MG/1
650 TABLET ORAL EVERY 4 HOURS PRN
Status: DISCONTINUED | OUTPATIENT
Start: 2023-10-12 | End: 2023-10-13 | Stop reason: SDUPTHER

## 2023-10-12 RX ORDER — PROTAMINE SULFATE 10 MG/ML
INJECTION, SOLUTION INTRAVENOUS PRN
Status: DISCONTINUED | OUTPATIENT
Start: 2023-10-12 | End: 2023-10-12 | Stop reason: SDUPTHER

## 2023-10-12 RX ORDER — LIDOCAINE HYDROCHLORIDE 20 MG/ML
INJECTION, SOLUTION INTRAVENOUS PRN
Status: DISCONTINUED | OUTPATIENT
Start: 2023-10-12 | End: 2023-10-12 | Stop reason: SDUPTHER

## 2023-10-12 RX ORDER — SODIUM CHLORIDE, SODIUM LACTATE, POTASSIUM CHLORIDE, CALCIUM CHLORIDE 600; 310; 30; 20 MG/100ML; MG/100ML; MG/100ML; MG/100ML
INJECTION, SOLUTION INTRAVENOUS CONTINUOUS
Status: DISCONTINUED | OUTPATIENT
Start: 2023-10-12 | End: 2023-10-14

## 2023-10-12 RX ORDER — SUCRALFATE 1 G/1
1 TABLET ORAL 4 TIMES DAILY PRN
Status: DISCONTINUED | OUTPATIENT
Start: 2023-10-12 | End: 2023-10-14 | Stop reason: HOSPADM

## 2023-10-12 RX ORDER — FENTANYL CITRATE 50 UG/ML
25 INJECTION, SOLUTION INTRAMUSCULAR; INTRAVENOUS EVERY 5 MIN PRN
Status: DISCONTINUED | OUTPATIENT
Start: 2023-10-12 | End: 2023-10-12

## 2023-10-12 RX ORDER — PANTOPRAZOLE SODIUM 40 MG/1
40 TABLET, DELAYED RELEASE ORAL
Status: DISCONTINUED | OUTPATIENT
Start: 2023-10-13 | End: 2023-10-14 | Stop reason: HOSPADM

## 2023-10-12 RX ORDER — SODIUM CHLORIDE 9 MG/ML
INJECTION, SOLUTION INTRAVENOUS PRN
Status: COMPLETED | OUTPATIENT
Start: 2023-10-12 | End: 2023-10-12

## 2023-10-12 RX ORDER — LABETALOL HYDROCHLORIDE 5 MG/ML
INJECTION, SOLUTION INTRAVENOUS PRN
Status: DISCONTINUED | OUTPATIENT
Start: 2023-10-12 | End: 2023-10-12 | Stop reason: SDUPTHER

## 2023-10-12 RX ORDER — DIPHENHYDRAMINE HYDROCHLORIDE 50 MG/ML
12.5 INJECTION INTRAMUSCULAR; INTRAVENOUS
Status: DISCONTINUED | OUTPATIENT
Start: 2023-10-12 | End: 2023-10-12

## 2023-10-12 RX ORDER — OXYCODONE HYDROCHLORIDE 5 MG/1
5 TABLET ORAL
Status: DISCONTINUED | OUTPATIENT
Start: 2023-10-12 | End: 2023-10-12 | Stop reason: SDUPTHER

## 2023-10-12 RX ORDER — LOSARTAN POTASSIUM 25 MG/1
25 TABLET ORAL DAILY
Status: DISCONTINUED | OUTPATIENT
Start: 2023-10-12 | End: 2023-10-14 | Stop reason: HOSPADM

## 2023-10-12 RX ORDER — ASPIRIN 81 MG/1
81 TABLET, CHEWABLE ORAL DAILY
Status: DISCONTINUED | OUTPATIENT
Start: 2023-10-13 | End: 2023-10-14 | Stop reason: HOSPADM

## 2023-10-12 RX ORDER — CEFAZOLIN SODIUM 1 G/3ML
INJECTION, POWDER, FOR SOLUTION INTRAMUSCULAR; INTRAVENOUS PRN
Status: DISCONTINUED | OUTPATIENT
Start: 2023-10-12 | End: 2023-10-12 | Stop reason: SDUPTHER

## 2023-10-12 RX ORDER — ESCITALOPRAM OXALATE 10 MG/1
20 TABLET ORAL DAILY
Status: DISCONTINUED | OUTPATIENT
Start: 2023-10-12 | End: 2023-10-14 | Stop reason: HOSPADM

## 2023-10-12 RX ORDER — TRAMADOL HYDROCHLORIDE 50 MG/1
50 TABLET ORAL EVERY 4 HOURS PRN
Status: DISCONTINUED | OUTPATIENT
Start: 2023-10-12 | End: 2023-10-14 | Stop reason: HOSPADM

## 2023-10-12 RX ORDER — HYDRALAZINE HYDROCHLORIDE 20 MG/ML
INJECTION INTRAMUSCULAR; INTRAVENOUS PRN
Status: DISCONTINUED | OUTPATIENT
Start: 2023-10-12 | End: 2023-10-12 | Stop reason: SDUPTHER

## 2023-10-12 RX ORDER — ONDANSETRON 2 MG/ML
INJECTION INTRAMUSCULAR; INTRAVENOUS PRN
Status: DISCONTINUED | OUTPATIENT
Start: 2023-10-12 | End: 2023-10-12 | Stop reason: SDUPTHER

## 2023-10-12 RX ORDER — FLUTICASONE PROPIONATE 50 MCG
1 SPRAY, SUSPENSION (ML) NASAL DAILY
Status: DISCONTINUED | OUTPATIENT
Start: 2023-10-12 | End: 2023-10-14 | Stop reason: HOSPADM

## 2023-10-12 RX ORDER — SODIUM CHLORIDE 0.9 % (FLUSH) 0.9 %
5-40 SYRINGE (ML) INJECTION EVERY 12 HOURS SCHEDULED
Status: DISCONTINUED | OUTPATIENT
Start: 2023-10-12 | End: 2023-10-14 | Stop reason: HOSPADM

## 2023-10-12 RX ORDER — SODIUM CHLORIDE 0.9 % (FLUSH) 0.9 %
5-40 SYRINGE (ML) INJECTION PRN
Status: DISCONTINUED | OUTPATIENT
Start: 2023-10-12 | End: 2023-10-14 | Stop reason: HOSPADM

## 2023-10-12 RX ORDER — ONDANSETRON 2 MG/ML
4 INJECTION INTRAMUSCULAR; INTRAVENOUS
Status: DISCONTINUED | OUTPATIENT
Start: 2023-10-12 | End: 2023-10-12

## 2023-10-12 RX ORDER — ATORVASTATIN CALCIUM 40 MG/1
40 TABLET, FILM COATED ORAL NIGHTLY
Status: DISCONTINUED | OUTPATIENT
Start: 2023-10-12 | End: 2023-10-14 | Stop reason: HOSPADM

## 2023-10-12 RX ORDER — DROPERIDOL 2.5 MG/ML
0.62 INJECTION, SOLUTION INTRAMUSCULAR; INTRAVENOUS EVERY 10 MIN PRN
Status: DISCONTINUED | OUTPATIENT
Start: 2023-10-12 | End: 2023-10-12

## 2023-10-12 RX ORDER — AMLODIPINE BESYLATE 5 MG/1
5 TABLET ORAL DAILY
Status: DISCONTINUED | OUTPATIENT
Start: 2023-10-12 | End: 2023-10-13

## 2023-10-12 RX ORDER — ROCURONIUM BROMIDE 10 MG/ML
INJECTION, SOLUTION INTRAVENOUS PRN
Status: DISCONTINUED | OUTPATIENT
Start: 2023-10-12 | End: 2023-10-12 | Stop reason: SDUPTHER

## 2023-10-12 RX ORDER — TIMOLOL MALEATE 5 MG/ML
1 SOLUTION/ DROPS OPHTHALMIC DAILY
Status: DISCONTINUED | OUTPATIENT
Start: 2023-10-12 | End: 2023-10-14 | Stop reason: HOSPADM

## 2023-10-12 RX ORDER — PHENYLEPHRINE HYDROCHLORIDE 10 MG/ML
INJECTION INTRAVENOUS PRN
Status: DISCONTINUED | OUTPATIENT
Start: 2023-10-12 | End: 2023-10-12 | Stop reason: SDUPTHER

## 2023-10-12 RX ORDER — METHOCARBAMOL 500 MG/1
500 TABLET, FILM COATED ORAL EVERY EVENING
Status: DISCONTINUED | OUTPATIENT
Start: 2023-10-12 | End: 2023-10-14 | Stop reason: HOSPADM

## 2023-10-12 RX ORDER — SODIUM CHLORIDE 9 MG/ML
INJECTION, SOLUTION INTRAVENOUS PRN
Status: DISCONTINUED | OUTPATIENT
Start: 2023-10-12 | End: 2023-10-14 | Stop reason: HOSPADM

## 2023-10-12 RX ORDER — DEXAMETHASONE SODIUM PHOSPHATE 4 MG/ML
INJECTION, SOLUTION INTRA-ARTICULAR; INTRALESIONAL; INTRAMUSCULAR; INTRAVENOUS; SOFT TISSUE PRN
Status: DISCONTINUED | OUTPATIENT
Start: 2023-10-12 | End: 2023-10-12 | Stop reason: SDUPTHER

## 2023-10-12 RX ORDER — SODIUM CHLORIDE 0.9 % (FLUSH) 0.9 %
5-40 SYRINGE (ML) INJECTION PRN
Status: DISCONTINUED | OUTPATIENT
Start: 2023-10-12 | End: 2023-10-12

## 2023-10-12 RX ORDER — HEPARIN SODIUM 1000 [USP'U]/ML
INJECTION, SOLUTION INTRAVENOUS; SUBCUTANEOUS PRN
Status: DISCONTINUED | OUTPATIENT
Start: 2023-10-12 | End: 2023-10-12 | Stop reason: SDUPTHER

## 2023-10-12 RX ORDER — MONTELUKAST SODIUM 10 MG/1
10 TABLET ORAL NIGHTLY
Status: DISCONTINUED | OUTPATIENT
Start: 2023-10-12 | End: 2023-10-14 | Stop reason: HOSPADM

## 2023-10-12 RX ORDER — LEVOTHYROXINE SODIUM 0.07 MG/1
75 TABLET ORAL DAILY
Status: DISCONTINUED | OUTPATIENT
Start: 2023-10-12 | End: 2023-10-14 | Stop reason: HOSPADM

## 2023-10-12 RX ADMIN — CLOPIDOGREL BISULFATE 75 MG: 75 TABLET ORAL at 17:35

## 2023-10-12 RX ADMIN — DEXAMETHASONE SODIUM PHOSPHATE 4 MG: 4 INJECTION, SOLUTION INTRAMUSCULAR; INTRAVENOUS at 07:40

## 2023-10-12 RX ADMIN — LEVOTHYROXINE SODIUM 75 MCG: 0.07 TABLET ORAL at 14:49

## 2023-10-12 RX ADMIN — FLUTICASONE PROPIONATE 1 SPRAY: 50 SPRAY, METERED NASAL at 18:12

## 2023-10-12 RX ADMIN — SODIUM CHLORIDE, POTASSIUM CHLORIDE, SODIUM LACTATE AND CALCIUM CHLORIDE: 600; 310; 30; 20 INJECTION, SOLUTION INTRAVENOUS at 14:36

## 2023-10-12 RX ADMIN — PROPOFOL 30 MG: 10 INJECTION, EMULSION INTRAVENOUS at 08:21

## 2023-10-12 RX ADMIN — ROCURONIUM BROMIDE 50 MG: 10 INJECTION INTRAVENOUS at 07:36

## 2023-10-12 RX ADMIN — CEFAZOLIN 2 G: 1 INJECTION, POWDER, FOR SOLUTION INTRAMUSCULAR; INTRAVENOUS; PARENTERAL at 07:43

## 2023-10-12 RX ADMIN — LIDOCAINE HYDROCHLORIDE 40 MG: 20 INJECTION, SOLUTION INTRAVENOUS at 07:36

## 2023-10-12 RX ADMIN — PROTAMINE SULFATE 10 MG: 10 INJECTION, SOLUTION INTRAVENOUS at 08:25

## 2023-10-12 RX ADMIN — SODIUM CHLORIDE: 9 INJECTION, SOLUTION INTRAVENOUS at 07:11

## 2023-10-12 RX ADMIN — ESCITALOPRAM OXALATE 20 MG: 10 TABLET ORAL at 14:49

## 2023-10-12 RX ADMIN — PROTAMINE SULFATE 10 MG: 10 INJECTION, SOLUTION INTRAVENOUS at 08:38

## 2023-10-12 RX ADMIN — LABETALOL HYDROCHLORIDE 5 MG: 5 INJECTION, SOLUTION INTRAVENOUS at 08:33

## 2023-10-12 RX ADMIN — PHENYLEPHRINE HYDROCHLORIDE 50 MCG: 10 INJECTION INTRAVENOUS at 08:00

## 2023-10-12 RX ADMIN — MONTELUKAST 10 MG: 10 TABLET, FILM COATED ORAL at 22:00

## 2023-10-12 RX ADMIN — PHENYLEPHRINE HYDROCHLORIDE 5 MCG/MIN: 10 INJECTION INTRAVENOUS at 08:03

## 2023-10-12 RX ADMIN — PHENYLEPHRINE HYDROCHLORIDE 50 MCG: 10 INJECTION INTRAVENOUS at 07:58

## 2023-10-12 RX ADMIN — HYDRALAZINE HYDROCHLORIDE 10 MG: 20 INJECTION, SOLUTION INTRAMUSCULAR; INTRAVENOUS at 08:28

## 2023-10-12 RX ADMIN — HEPARIN SODIUM 4000 UNITS: 1000 INJECTION, SOLUTION INTRAVENOUS; SUBCUTANEOUS at 08:02

## 2023-10-12 RX ADMIN — SUGAMMADEX 200 MG: 100 INJECTION, SOLUTION INTRAVENOUS at 08:25

## 2023-10-12 RX ADMIN — ATORVASTATIN CALCIUM 40 MG: 40 TABLET, FILM COATED ORAL at 22:00

## 2023-10-12 RX ADMIN — METHOCARBAMOL TABLETS 500 MG: 500 TABLET, COATED ORAL at 17:35

## 2023-10-12 RX ADMIN — PHENYLEPHRINE HYDROCHLORIDE 25 MCG: 10 INJECTION INTRAVENOUS at 08:05

## 2023-10-12 RX ADMIN — ACETAMINOPHEN 650 MG: 325 TABLET ORAL at 23:29

## 2023-10-12 RX ADMIN — PROPOFOL 100 MG: 10 INJECTION, EMULSION INTRAVENOUS at 07:36

## 2023-10-12 RX ADMIN — TIMOLOL MALEATE 1 DROP: 5 SOLUTION OPHTHALMIC at 18:12

## 2023-10-12 RX ADMIN — PHENYLEPHRINE HYDROCHLORIDE 25 MCG: 10 INJECTION INTRAVENOUS at 08:09

## 2023-10-12 RX ADMIN — LABETALOL HYDROCHLORIDE 5 MG: 5 INJECTION, SOLUTION INTRAVENOUS at 08:31

## 2023-10-12 RX ADMIN — HEPARIN SODIUM 4000 UNITS: 1000 INJECTION, SOLUTION INTRAVENOUS; SUBCUTANEOUS at 07:54

## 2023-10-12 RX ADMIN — ONDANSETRON 4 MG: 2 INJECTION INTRAMUSCULAR; INTRAVENOUS at 07:40

## 2023-10-12 ASSESSMENT — PAIN DESCRIPTION - ORIENTATION: ORIENTATION: RIGHT

## 2023-10-12 ASSESSMENT — PAIN SCALES - GENERAL
PAINLEVEL_OUTOF10: 0
PAINLEVEL_OUTOF10: 0
PAINLEVEL_OUTOF10: 4
PAINLEVEL_OUTOF10: 0

## 2023-10-12 ASSESSMENT — ENCOUNTER SYMPTOMS
NAUSEA: 0
SHORTNESS OF BREATH: 1
VOMITING: 0
WHEEZING: 0
EYE PAIN: 0
CHEST TIGHTNESS: 0
BACK PAIN: 0
ABDOMINAL PAIN: 0
COLOR CHANGE: 0
BLOOD IN STOOL: 0
PHOTOPHOBIA: 0
DIARRHEA: 0
CONSTIPATION: 0
COUGH: 0

## 2023-10-12 ASSESSMENT — PAIN - FUNCTIONAL ASSESSMENT: PAIN_FUNCTIONAL_ASSESSMENT: ACTIVITIES ARE NOT PREVENTED

## 2023-10-12 ASSESSMENT — PAIN DESCRIPTION - LOCATION: LOCATION: GROIN

## 2023-10-12 ASSESSMENT — PAIN DESCRIPTION - DESCRIPTORS: DESCRIPTORS: ACHING

## 2023-10-12 ASSESSMENT — PAIN DESCRIPTION - FREQUENCY: FREQUENCY: CONTINUOUS

## 2023-10-12 ASSESSMENT — PAIN DESCRIPTION - PAIN TYPE: TYPE: ACUTE PAIN

## 2023-10-12 ASSESSMENT — PAIN DESCRIPTION - ONSET: ONSET: PROGRESSIVE

## 2023-10-12 NOTE — PROGRESS NOTES
10/12/23 0640   Encounter Summary   Encounter Overview/Reason  Pre-Procedural   Service Provided For: Family   Referral/Consult From: [facility]   Last Encounter  10/12/23  (Companioned with family, daughter and son, who were in waiting room. Blessings given.)   Complexity of Encounter Low   Begin Time 0600   End Time  0605   Total Time Calculated 5 min   Spiritual/Emotional needs   Type Spiritual Support   Assessment/Intervention/Outcome   Assessment Calm;Coping; Hopeful   Intervention Active listening;Nurtured Hope;Prayer (assurance of)/[place];Sustaining Presence/Ministry of presence   Outcome Comfort;Coping;Encouraged;Engaged in conversation;Expressed feelings, needs, and concerns;Expressed Gratitude   Plan and Referrals   Plan/Referrals Developed Care Plan (see consult note); Continue Support (comment)  (Family informed how to contact [address] [place])

## 2023-10-12 NOTE — ANESTHESIA PRE PROCEDURE
Department of Anesthesiology  Preprocedure Note       Name:  Sin Long   Age:  80 y.o.  :  1935                                          MRN:  [id number]         Date:  10/12/2023      Surgeon: * Surgery not found *    Procedure:     Medications prior to admission:   Prior to Admission medications    Medication Sig Start Date End Date Taking?  Authorizing Provider   sodium polystyrene (KAYEXALATE) 15 GM/60ML suspension Take 60 mLs by mouth once for 1 dose 10/10/23 10/12/23  DERIK Basurto CNP   levothyroxine (SYNTHROID) 75 MCG tablet Take 1 tablet by mouth Daily 10/10/23   Elvis Lujan MD   clopidogrel (PLAVIX) 75 MG tablet Take 1 tablet by mouth daily 10/10/23   DERIK Basurto CNP   apixaban (ELIQUIS) 2.5 MG TABS tablet Take 1 tablet by mouth 2 times daily 23   Fredo Ruby DO   timolol (TIMOPTIC) 0.5 % ophthalmic solution instill one drop into both eyes in the morning 23   Provider, MD Delfino   methocarbamol (ROBAXIN) 500 MG tablet Take 1 tablet by mouth every evening 23   Elvis Lujan MD   escitalopram (LEXAPRO) 20 MG tablet Take 1 tablet by mouth daily 23   Elvis Lujan MD   pantoprazole (PROTONIX) 40 MG tablet TAKE 1 TABLET NIGHTLY 23   Elvis Lujan MD   ondansetron (ZOFRAN) 4 MG tablet Take 1 tablet by mouth every 8 hours as needed for Nausea or Vomiting 23   Elvis Lujan MD   atorvastatin (LIPITOR) 40 MG tablet Take 1 tablet by mouth nightly 23   Elvis Lujan MD   losartan (COZAAR) 25 MG tablet Take 1 tablet by mouth daily Hold FOR SBP <120 23   Elvis Lujan MD   amLODIPine (NORVASC) 5 MG tablet Take 1 tablet by mouth daily HOLD FOR SBP <120 23   Elvis Lujan MD   montelukast (SINGULAIR) 10 MG tablet Take 1 tablet by mouth nightly 23   Elvis Lujan MD   fluticasone [place]) 50 MCG/ACT nasal spray 1 spray by Each Nostril route daily 23   Tod Loja, APRN - CNP   traMADol

## 2023-10-12 NOTE — H&P
Electrophysiology H&p  Note      Reason for consultation:  Assess for pacemaker and watchman    Chief complaint : Here for watchman implantation    Referring physician: Breonna Gramajo      Primary care physician: Brunilda Bravo MD      History of Present Illness: Today visit (10/12/23)    Patient here for watchman implantation. No change in H&P noted from previous clinic visit. Previous visit:     Patient is 71-year-old female with a history of COPD, CVA, asthma, hypertension, hyperlipidemia, aortic valve replacement, coronary artery disease referred by Dr. Domenic Mariscal for assessment for watchman and also pacemaker. Patient reports shortness of breath with exertion and feeling very tired and sometimes very fatigued and near syncopal episodes. Patient also is having recurrent falls and concerned about being on anticoagulation. Patient has palpitations. This can start anytime. Patient reports that she had a cardioversion recently and her heart rate was very low. Patient denies any chest pain. Patient did not pass out completely but came to passing out couple of times. Patient gets dizzy. Patient does not drink alcohol or smoke. Pastmedical history:   Past Medical History:   Diagnosis Date    Arthritis     generalized    Asthma     At maximum risk for fall 10/04/2023    Blood transfusion 2004    No reaction    Broken heart syndrome     Cerebral artery occlusion with cerebral infarction [facility]) 2021    Patient \"states she was told she has had three small strokes. \"    Chronic bronchitis ([address])     COPD (chronic obstructive pulmonary disease) ([address])     summer 2014    COPD exacerbation ([address]) 10/31/2018    COPD with exacerbation ([address]) 09/06/2019    Echocardiogram 04/09/2021    EF 55-60%, Mild dilated LA, Mild annular calcification present, Mild mitral stenosis, Mild MR & TR.     Fatigue     H/O cardiac catheterization 06/15/2017    mild lad and cx disease    H/O

## 2023-10-12 NOTE — OP NOTE
[facility]    Procedure Note      Procedure Performed by: Margarette Phoenix MD       Procedures performed:     Percutaneous transcatheter closure of the left atrial appendage with endocardial implant   Transeptal Puncture  JAN      Indication of procedure:      Patient with hx of COPD, CVA, asthma, hypertension, hyperlipidemia, aortic valve replacement, coronary artery disease with CHADS-VAS score of 7 and Has bled score of 4 with high risk of bleeding with anticoagulation and high risk of stroke with out anticoagulation here for BINDU closure device watchman implantation    Patient has been seen by non implanting physician and shared decision making has been made for pursuing BINDU closure. Patient here for BINDU closure with Watchman device. Arterial line placed by me    Sedation : General anesthesia      Details of procedure: The patient was brought to the electrophysiology laboratory in stable condition. The patient was in a fasting and non-sedated state. The risks, benefits and alternatives of the procedure were discussed with the patient. The risks including, but not limited to, the risks of vascular injury, bleeding, infection, device malfunction, lead dislodgement, radiation exposure, injury to cardiac and surrounding structures (including pneumothorax), stroke, myocardial infarction and death were discussed in detail. The patient opted to proceed with the device implantation. Written informed consent was signed and placed in the chart. Prophylactic antibiotic was given. The patient was prepped and draped in a sterile fashion. A timeout protocol was completed to identify the patient and the procedure being performed.  Patient underwent general anesthesia by anesthesiology team.       Transesophageal echocardiography was performed and measurements of left atrial released. Watchman sheath was then removed from the body and then then removed out a 16 [place] sheath and using perclosure device we maintained hemostasis      Patient was extubated and transferred to the floor. No immediate complications noted. Assessment and Summary:    successful deployment of left atrial appendage occlusion device with no complication    WATCHMAN device used 27 mm size     Angiogram revealed good seal and no thrombus     JAN confirmed placement and seal    EBL Less than 50 mL    No complications        Plan:     The patient will be admitted and have usual post care  Continue aspirin and plavix for 6 months and then continue aspirin 81mg daily after that   Echocardiogram today  Patient is participating in the left atrial appendage occlusion/closure registry. [facility] is approved by the [employer] for Medicare and Medicaid Services (CMS) to meet the registry requirements outlined in the national coverage decisions for Percutaneous Left Atrial Appendage Closure     Thank you for letting me participate in this patient's care and please do not hesitate to call me with any questions or concerns.      Britney Kuo MD      Electronically signed by Deshaun Mayo MD on 10/12/2023 at 10:17 AM

## 2023-10-12 NOTE — PROGRESS NOTES
0852-pt. Arrived to pacu via cart from EP lab. Pt. Attached to monitor and alarms are on. Received report form EMMIE DE JESUS and PARISH Le via telephone. Pt. Is drowsy from anesthesia but responds to verbal stimuli and able to follow commands. Pt. Denies pain or n/v. Pt. Is wearing a simple mask at 6L. SR/SB on the monitor. Pt iv infusing into left wrist without any issues. Pt. Right groin site is clean dry and intact. No bleeding drainage or hematoma. Pt. Art line in right radial is clean dry and intact. Pt. Pulses bilaterally pedal and post tib are +1. Pt. Skin warm and dry. 4145- Dr. Francy Nayak at bedside. Pt. BP reading low 733'K systolic. No new orders at this time. [facility] and spoke with Melly Townsend RN in EP lab. Dr. Prabhu Contreras aware  of BP in 34'W systolic and okay to remove pts art line. Pt. Remains unsymptomatic.   9006- right arterial line removed. Manual pressure began  1004- manual pressure discontinued. No bleeding drainage or hematoma noted. Gauze and tegaderm applied to site. 1020- pt. Resting with eyes closed but awakens easily with verbal stimuli. Pt. Denies pain or n/v. Pt. Is on 2L via  NC sating 98%. SR/SB on the monitor. Dressing to right groin remains clean dry and intact. No bleeding drainage or hematoma noted. Right radial site is clean dry and intact. Pt. Is now able to transfer to cath lab holding. Pt updated on plan of care and denies any questions or concerns. 1032- gave bedside report to PARISH guillermo. Pt. Right groin site remains clean dry and intact. Right radial clean dry and intact. No bleeding drainage or hematomas noted in sites.

## 2023-10-12 NOTE — PROGRESS NOTES
Physician Progress Note      Diane Avila  CSN #:                  [id number]  :                       1935  ADMIT DATE:       10/12/2023 5:54 AM  DISCH DATE:  RESPONDING  PROVIDER #:        Jayden De Los Santos MD          QUERY TEXT:    Patient admitted with bradycardia/tachycardia for [address] and possible pacer   placement, noted to have paroxysmal atrial fibrillation and is maintained on   Eliquis. If possible, please document in progress notes and discharge summary   if you are evaluating and/or treating any of the following: The medical record reflects the following:  Risk Factors: PAF, Female, Age< HTN, CVA Hx  Clinical Indicators: CHADSVASC 2 Score 7, Patient has palpitations. This can   start anytime. Patient reports that she had a cardioversion recently and her   heart rate was very low. Treatment: Eliquis, labs, BINDU closure with Watchman device, EPP consult, CARDS    Thank you, Farshad Galindo RN, [place] [phone]  Options provided:  -- Secondary hypercoagulable state in a patient with paroxysmal atrial   fibrillation  -- Other - I will add my own diagnosis  -- Disagree - Not applicable / Not valid  -- Disagree - Clinically unable to determine / Unknown  -- Refer to Clinical Documentation Reviewer    PROVIDER RESPONSE TEXT:    This patient has secondary hypercoagulable state in a patient with atrial   fibrillation.     Query created by: Farshad Galindo on 10/12/2023 11:23 AM      Electronically signed by:  Jayden De Los Santos MD 10/12/2023 12:17 PM

## 2023-10-12 NOTE — PROGRESS NOTES
Figure 8 sutures removed. No active bleeding or hematoma noted. Tegaderm applied. Family member at bedside. Call light in reach.

## 2023-10-12 NOTE — PROGRESS NOTES
[facility] via  because Ptt /36 and MAP 58 on tele and 105/41 manual. Waiting on response. 0708- Received a call from Eli Archer NP and she states to continue monitoring patient and no new orders.

## 2023-10-12 NOTE — PROGRESS NOTES
4 Eyes Skin Assessment     NAME:  Sam Moya  YOB: 1935  MEDICAL RECORD NUMBER:  [id number]    The patient is being assessed for  Admission    I agree that at least one RN has performed a thorough Head to Toe Skin Assessment on the patient. ALL assessment sites listed below have been assessed. Areas assessed by both nurses:    Head, Face, Ears, Shoulders, Back, Chest, Arms, Elbows, Hands, Sacrum. Buttock, Coccyx, Ischium, Legs. Feet and Heels, and Under Medical Devices         Does the Patient have a Wound? No noted wound(s). Puncture site to right groin clean dry and intact. Site is soft.         Moses Prevention initiated by RN: Yes  Wound Care Orders initiated by RN: No    Pressure Injury (Stage 3,4, Unstageable, DTI, NWPT, and Complex wounds) if present, place Wound referral order by RN under : No    New Ostomies, if present place, Ostomy referral order under : No     Nurse 1 eSignature: Electronically signed by Leslye Solo RN on 10/12/23 at 4:24 PM EDT    **SHARE this note so that the co-signing nurse can place an eSignature**    Nurse 2 eSignature: {Esignature:[id number]}

## 2023-10-12 NOTE — ANESTHESIA POSTPROCEDURE EVALUATION
Department of Anesthesiology  Postprocedure Note    Patient: Jeanine Baumann  MRN: [id number]  YOB: 1935  Date of evaluation: 10/12/2023      Procedure Summary     Date: 10/12/23 Room / Location: [address] [place] [address] Compufirst Cath Lab    Anesthesia Start: 0711 Anesthesia Stop: [place]    Procedure: WATCHMAN W/ANESTHESIA Diagnosis:       Unspecified atrial fibrillation      Presence of Watchman left atrial appendage closure device    Scheduled Providers:  Responsible Provider: Behzad Nolasco MD    Anesthesia Type: General ASA Status: 3          Anesthesia Type: General    Emi Phase I: Emi Score: 10    Emi Phase II:        Anesthesia Post Evaluation    Patient location during evaluation: PACU  Patient participation: complete - patient participated  Level of consciousness: awake and alert  Airway patency: patent  Nausea & Vomiting: no nausea and no vomiting  Complications: no  Cardiovascular status: hemodynamically stable  Respiratory status: spontaneous ventilation and nasal cannula  Hydration status: stable  Pain management: adequate

## 2023-10-13 ENCOUNTER — CARE COORDINATION (OUTPATIENT)
Dept: CASE MANAGEMENT | Age: 88
End: 2023-10-13

## 2023-10-13 ENCOUNTER — HOSPITAL ENCOUNTER (OUTPATIENT)
Dept: CARDIAC CATH/INVASIVE PROCEDURES | Age: 88
Discharge: HOME | End: 2023-10-13

## 2023-10-13 ENCOUNTER — APPOINTMENT (OUTPATIENT)
Dept: GENERAL RADIOLOGY | Age: 88
DRG: 243 | End: 2023-10-13
Attending: INTERNAL MEDICINE
Payer: MEDICARE

## 2023-10-13 LAB
ABO/RH: NORMAL
ANTIBODY SCREEN: NEGATIVE
COMMENT: NORMAL
COMPONENT: NORMAL
CROSSMATCH RESULT: NORMAL
STATUS: NORMAL
TRANSFUSION STATUS: NORMAL
UNIT DIVISION: 0
UNIT NUMBER: NORMAL

## 2023-10-13 PROCEDURE — 2700000000 HC OXYGEN THERAPY PER DAY

## 2023-10-13 PROCEDURE — 6360000002 HC RX W HCPCS

## 2023-10-13 PROCEDURE — 2709999900 HC NON-CHARGEABLE SUPPLY

## 2023-10-13 PROCEDURE — 0JH606Z INSERTION OF PACEMAKER, DUAL CHAMBER INTO CHEST SUBCUTANEOUS TISSUE AND FASCIA, OPEN APPROACH: ICD-10-PCS | Performed by: INTERNAL MEDICINE

## 2023-10-13 PROCEDURE — 94761 N-INVAS EAR/PLS OXIMETRY MLT: CPT

## 2023-10-13 PROCEDURE — 33208 INSRT HEART PM ATRIAL & VENT: CPT

## 2023-10-13 PROCEDURE — 6370000000 HC RX 637 (ALT 250 FOR IP): Performed by: NURSE PRACTITIONER

## 2023-10-13 PROCEDURE — 2580000003 HC RX 258: Performed by: ANESTHESIOLOGY

## 2023-10-13 PROCEDURE — 71045 X-RAY EXAM CHEST 1 VIEW: CPT

## 2023-10-13 PROCEDURE — 6360000004 HC RX CONTRAST MEDICATION

## 2023-10-13 PROCEDURE — 2580000003 HC RX 258

## 2023-10-13 PROCEDURE — 94150 VITAL CAPACITY TEST: CPT

## 2023-10-13 PROCEDURE — 6360000002 HC RX W HCPCS: Performed by: NURSE PRACTITIONER

## 2023-10-13 PROCEDURE — 2140000000 HC CCU INTERMEDIATE R&B

## 2023-10-13 PROCEDURE — B51N1ZZ FLUOROSCOPY OF LEFT UPPER EXTREMITY VEINS USING LOW OSMOLAR CONTRAST: ICD-10-PCS | Performed by: INTERNAL MEDICINE

## 2023-10-13 PROCEDURE — 33208 INSRT HEART PM ATRIAL & VENT: CPT | Performed by: INTERNAL MEDICINE

## 2023-10-13 PROCEDURE — C1785 PMKR, DUAL, RATE-RESP: HCPCS

## 2023-10-13 PROCEDURE — 02HK3JZ INSERTION OF PACEMAKER LEAD INTO RIGHT VENTRICLE, PERCUTANEOUS APPROACH: ICD-10-PCS | Performed by: INTERNAL MEDICINE

## 2023-10-13 PROCEDURE — C1898 LEAD, PMKR, OTHER THAN TRANS: HCPCS

## 2023-10-13 PROCEDURE — 76937 US GUIDE VASCULAR ACCESS: CPT

## 2023-10-13 PROCEDURE — C1892 INTRO/SHEATH,FIXED,PEEL-AWAY: HCPCS

## 2023-10-13 PROCEDURE — 2580000003 HC RX 258: Performed by: NURSE PRACTITIONER

## 2023-10-13 PROCEDURE — 02H63JZ INSERTION OF PACEMAKER LEAD INTO RIGHT ATRIUM, PERCUTANEOUS APPROACH: ICD-10-PCS | Performed by: INTERNAL MEDICINE

## 2023-10-13 PROCEDURE — 2500000003 HC RX 250 WO HCPCS

## 2023-10-13 RX ORDER — SODIUM CHLORIDE 0.9 % (FLUSH) 0.9 %
5-40 SYRINGE (ML) INJECTION EVERY 12 HOURS SCHEDULED
Status: DISCONTINUED | OUTPATIENT
Start: 2023-10-13 | End: 2023-10-14 | Stop reason: HOSPADM

## 2023-10-13 RX ORDER — ACETAMINOPHEN 325 MG/1
650 TABLET ORAL EVERY 4 HOURS PRN
Status: DISCONTINUED | OUTPATIENT
Start: 2023-10-13 | End: 2023-10-14 | Stop reason: HOSPADM

## 2023-10-13 RX ORDER — DILTIAZEM HYDROCHLORIDE 120 MG/1
120 CAPSULE, COATED, EXTENDED RELEASE ORAL DAILY
Qty: 30 CAPSULE | Refills: 3 | Status: SHIPPED | OUTPATIENT
Start: 2023-10-14 | End: 2024-01-18 | Stop reason: SDUPTHER

## 2023-10-13 RX ORDER — SODIUM CHLORIDE 0.9 % (FLUSH) 0.9 %
5-40 SYRINGE (ML) INJECTION PRN
Status: DISCONTINUED | OUTPATIENT
Start: 2023-10-13 | End: 2023-10-14 | Stop reason: HOSPADM

## 2023-10-13 RX ORDER — DILTIAZEM HYDROCHLORIDE 120 MG/1
120 CAPSULE, COATED, EXTENDED RELEASE ORAL DAILY
Status: DISCONTINUED | OUTPATIENT
Start: 2023-10-14 | End: 2023-10-14 | Stop reason: HOSPADM

## 2023-10-13 RX ORDER — SODIUM CHLORIDE 9 MG/ML
INJECTION, SOLUTION INTRAVENOUS PRN
Status: DISCONTINUED | OUTPATIENT
Start: 2023-10-13 | End: 2023-10-14 | Stop reason: HOSPADM

## 2023-10-13 RX ADMIN — SODIUM CHLORIDE, POTASSIUM CHLORIDE, SODIUM LACTATE AND CALCIUM CHLORIDE: 600; 310; 30; 20 INJECTION, SOLUTION INTRAVENOUS at 23:10

## 2023-10-13 RX ADMIN — SODIUM CHLORIDE: 9 INJECTION, SOLUTION INTRAVENOUS at 16:24

## 2023-10-13 RX ADMIN — ATORVASTATIN CALCIUM 40 MG: 40 TABLET, FILM COATED ORAL at 23:05

## 2023-10-13 RX ADMIN — AMLODIPINE BESYLATE 5 MG: 5 TABLET ORAL at 08:43

## 2023-10-13 RX ADMIN — TRAMADOL HYDROCHLORIDE 50 MG: 50 TABLET, COATED ORAL at 08:43

## 2023-10-13 RX ADMIN — MONTELUKAST 10 MG: 10 TABLET, FILM COATED ORAL at 23:05

## 2023-10-13 RX ADMIN — ASPIRIN 81 MG CHEWABLE TABLET 81 MG: 81 TABLET CHEWABLE at 08:43

## 2023-10-13 RX ADMIN — SODIUM CHLORIDE, PRESERVATIVE FREE 10 ML: 5 INJECTION INTRAVENOUS at 23:06

## 2023-10-13 RX ADMIN — CLOPIDOGREL BISULFATE 75 MG: 75 TABLET ORAL at 08:44

## 2023-10-13 RX ADMIN — CEFAZOLIN 2000 MG: 2 INJECTION, POWDER, FOR SOLUTION INTRAMUSCULAR; INTRAVENOUS at 16:25

## 2023-10-13 RX ADMIN — FLUTICASONE PROPIONATE 1 SPRAY: 50 SPRAY, METERED NASAL at 08:45

## 2023-10-13 RX ADMIN — TRAMADOL HYDROCHLORIDE 50 MG: 50 TABLET, COATED ORAL at 14:59

## 2023-10-13 RX ADMIN — LOSARTAN POTASSIUM 25 MG: 25 TABLET, FILM COATED ORAL at 08:44

## 2023-10-13 RX ADMIN — TRAMADOL HYDROCHLORIDE 50 MG: 50 TABLET, COATED ORAL at 23:05

## 2023-10-13 RX ADMIN — LEVOTHYROXINE SODIUM 75 MCG: 0.07 TABLET ORAL at 08:43

## 2023-10-13 RX ADMIN — TIMOLOL MALEATE 1 DROP: 5 SOLUTION OPHTHALMIC at 08:45

## 2023-10-13 RX ADMIN — ESCITALOPRAM OXALATE 20 MG: 10 TABLET ORAL at 08:44

## 2023-10-13 RX ADMIN — METHOCARBAMOL TABLETS 500 MG: 500 TABLET, COATED ORAL at 17:47

## 2023-10-13 RX ADMIN — PANTOPRAZOLE SODIUM 40 MG: 40 TABLET, DELAYED RELEASE ORAL at 08:44

## 2023-10-13 ASSESSMENT — PAIN SCALES - GENERAL
PAINLEVEL_OUTOF10: 6
PAINLEVEL_OUTOF10: 5
PAINLEVEL_OUTOF10: 2
PAINLEVEL_OUTOF10: 5
PAINLEVEL_OUTOF10: 5

## 2023-10-13 ASSESSMENT — PAIN DESCRIPTION - ORIENTATION
ORIENTATION: LEFT
ORIENTATION: LEFT;UPPER
ORIENTATION: LEFT
ORIENTATION: LEFT;UPPER

## 2023-10-13 ASSESSMENT — PAIN DESCRIPTION - LOCATION
LOCATION: CHEST

## 2023-10-13 ASSESSMENT — PAIN DESCRIPTION - PAIN TYPE: TYPE: SURGICAL PAIN

## 2023-10-13 ASSESSMENT — PAIN DESCRIPTION - DESCRIPTORS
DESCRIPTORS: ACHING
DESCRIPTORS: ACHING;SORE
DESCRIPTORS: SORE
DESCRIPTORS: ACHING

## 2023-10-13 ASSESSMENT — PAIN SCALES - WONG BAKER
WONGBAKER_NUMERICALRESPONSE: HURTS A LITTLE BIT
WONGBAKER_NUMERICALRESPONSE: 2

## 2023-10-13 ASSESSMENT — PAIN DESCRIPTION - ONSET: ONSET: ON-GOING

## 2023-10-13 ASSESSMENT — PAIN DESCRIPTION - FREQUENCY: FREQUENCY: CONTINUOUS

## 2023-10-13 NOTE — CARE COORDINATION
Upon subsequent CTN outreach attempt, it has been noted Shaylee Roberts was a scheduled admit on 10/12/23 for Watchman implantation & PPM. Per protocol, CTN episode closed. CTN team will follow upon hospital discharge if eligible.        Milton Batista RN [phone]

## 2023-10-13 NOTE — OP NOTE
[facility]     Electrophysiology Procedure Note       Date of Procedure: 10/13/2023  Patient's Name: Modesto Sparks  YOB: 1935   Medical Record Number: [id number]    Procedure Performed by: Kareem Thomason MD    Procedures performed:  Insertion of right ventricular pacing lead under fluoroscopy. Insertion of right atrial lead under fluoroscopy  Insertion of a Dual chamber Pacemaker. Electronic analysis of lead and device. IV sedation. Selective venography of left upper extremity. Sedation : Versed,     Blood loss : 20 cc    ASA - 3  MALLAMPATI -  3    Indication of the procedure: TACHYCARDIA BRADYCARDIA    Brief History:      Modesto Sparks is a 80 y.o. female with symptomatic bradycardia and tacky bradycardia episodes here for pacemaker implantation. Details of procedure: The patient was brought to the electrophysiology laboratory in stable condition. The patient was in a fasting and non-sedated state. The risks, benefits and alternatives of the procedure were discussed with the patient. The risks including, but not limited to, the risks of vascular injury, bleeding, infection, device malfunction, lead dislodgement, radiation exposure, injury to cardiac and surrounding structures (including pneumothorax), stroke, myocardial infarction and death were discussed in detail. The patient opted to proceed with the device implantation. Written informed consent was signed and placed in the chart. Prophylactic antibiotic was given. The patient was prepped and draped in a sterile fashion. A timeout protocol was completed to identify the patient and the procedure being performed. IV sedation was provided with IV Versed, Fentanyl. Left upper extremity venogram was obtained to assess the patency of the subclavian vein and for access under fluoroscopic guidance. An incision was made in the left pectoral area after administration of lidocaine.  Using electrocautery and

## 2023-10-13 NOTE — CONSULTS
Consult completed. Nexiva 20g 1 inch peripheral IV initiated into right forearm x 1 attempt with ultrasound guidance. Brisk blood return, flushes without resistance. Patient tolerated well. Primary nurse Marcela Vázquez notified. Please consult IV/PICC team if patient's needs change, questions, or concerns.

## 2023-10-13 NOTE — PLAN OF CARE
Problem: Chronic Conditions and Co-morbidities  Goal: Patient's chronic conditions and co-morbidity symptoms are monitored and maintained or improved  10/13/2023 0821 by Yessi Katz RN  Outcome: Progressing  10/13/2023 0050 by Stacy Velasquez RN  Outcome: Progressing  Flowsheets (Taken 10/13/2023 0050)  Care Plan - Patient's Chronic Conditions and Co-Morbidity Symptoms are Monitored and Maintained or Improved:   Monitor and assess patient's chronic conditions and comorbid symptoms for stability, deterioration, or improvement   Collaborate with multidisciplinary team to address chronic and comorbid conditions and prevent exacerbation or deterioration   Update acute care plan with appropriate goals if chronic or comorbid symptoms are exacerbated and prevent overall improvement and discharge     Problem: Discharge Planning  Goal: Discharge to home or other facility with appropriate resources  10/13/2023 0821 by Yessi Katz RN  Outcome: Progressing  10/13/2023 0050 by Stacy Velasquez RN  Outcome: Progressing  Flowsheets (Taken 10/13/2023 0050)  Discharge to home or other facility with appropriate resources:   Identify barriers to discharge with patient and caregiver   Arrange for needed discharge resources and transportation as appropriate   Identify discharge learning needs (meds, wound care, etc)     Problem: Safety - Adult  Goal: Free from fall injury  10/13/2023 0821 by Yessi Katz RN  Outcome: Progressing  10/13/2023 0050 by Stacy Velasquez RN  Outcome: Progressing  Flowsheets (Taken 10/13/2023 0050)  Free From Fall Injury: Instruct family/caregiver on patient safety     Problem: ABCDS Injury Assessment  Goal: Absence of physical injury  10/13/2023 0821 by Yessi Katz RN  Outcome: Progressing  10/13/2023 0050 by Stacy Velasquez RN  Outcome: Progressing  Flowsheets (Taken 10/13/2023 0050)  Absence of Physical Injury: Implement safety measures based on patient assessment     Problem: Pain  Goal: Verbalizes/displays adequate comfort level or baseline comfort level  10/13/2023 0821 by Keri Pineda RN  Outcome: Progressing  10/13/2023 0050 by Reshma Samson RN  Outcome: Progressing  Flowsheets (Taken 10/13/2023 0050)  Verbalizes/displays adequate comfort level or baseline comfort level:   Encourage patient to monitor pain and request assistance   Assess pain using appropriate pain scale   Administer analgesics based on type and severity of pain and evaluate response     Problem: Cardiovascular - Adult  Goal: Absence of cardiac dysrhythmias or at baseline  10/13/2023 0821 by Keri Pineda RN  Outcome: Progressing  10/13/2023 0050 by Reshma Samson RN  Outcome: Progressing  Flowsheets (Taken 10/13/2023 0050)  Absence of cardiac dysrhythmias or at baseline:   Monitor cardiac rate and rhythm   Administer antiarrhythmia medication and electrolyte replacement as ordered     Problem: Musculoskeletal - Adult  Goal: Return mobility to safest level of function  10/13/2023 0821 by Keri Pineda RN  Outcome: Progressing  10/13/2023 0050 by Reshma Samson RN  Outcome: Progressing

## 2023-10-13 NOTE — PLAN OF CARE
Problem: Chronic Conditions and Co-morbidities  Goal: Patient's chronic conditions and co-morbidity symptoms are monitored and maintained or improved  Outcome: Progressing  Flowsheets (Taken 10/13/2023 0050)  Care Plan - Patient's Chronic Conditions and Co-Morbidity Symptoms are Monitored and Maintained or Improved:   Monitor and assess patient's chronic conditions and comorbid symptoms for stability, deterioration, or improvement   Collaborate with multidisciplinary team to address chronic and comorbid conditions and prevent exacerbation or deterioration   Update acute care plan with appropriate goals if chronic or comorbid symptoms are exacerbated and prevent overall improvement and discharge     Problem: Discharge Planning  Goal: Discharge to home or other facility with appropriate resources  Outcome: Progressing  Flowsheets (Taken 10/13/2023 0050)  Discharge to home or other facility with appropriate resources:   Identify barriers to discharge with patient and caregiver   Arrange for needed discharge resources and transportation as appropriate   Identify discharge learning needs (meds, wound care, etc)     Problem: Safety - Adult  Goal: Free from fall injury  Outcome: Progressing  Flowsheets (Taken 10/13/2023 0050)  Free From Fall Injury: Instruct family/caregiver on patient safety     Problem: ABCDS Injury Assessment  Goal: Absence of physical injury  Outcome: Progressing  Flowsheets (Taken 10/13/2023 0050)  Absence of Physical Injury: Implement safety measures based on patient assessment     Problem: Pain  Goal: Verbalizes/displays adequate comfort level or baseline comfort level  Outcome: Progressing  Flowsheets (Taken 10/13/2023 0050)  Verbalizes/displays adequate comfort level or baseline comfort level:   Encourage patient to monitor pain and request assistance   Assess pain using appropriate pain scale   Administer analgesics based on type and severity of pain and evaluate response     Problem: Cardiovascular - Adult  Goal: Absence of cardiac dysrhythmias or at baseline  Outcome: Progressing  Flowsheets (Taken 10/13/2023 0050)  Absence of cardiac dysrhythmias or at baseline:   Monitor cardiac rate and rhythm   Administer antiarrhythmia medication and electrolyte replacement as ordered     Problem: Musculoskeletal - Adult  Goal: Return mobility to safest level of function  Outcome: Progressing

## 2023-10-13 NOTE — PROGRESS NOTES
Patient had watchman placed yesterday and pacemaker placed today  Right femoral groin site is soft, nontender, no hematoma. I will not be rounding tomorrow. Cardiology will discharge patient  I did discuss discharge instructions for pacemaker with patient and placed them within the AVS    Additionally I discussed discharge instructions for watchman     Patient has a follow up for PPM and Watchman scheduled for Oct 24th.

## 2023-10-13 NOTE — DISCHARGE INSTRUCTIONS
Pacemaker Care  1. Avoid raising the arm above shoulder for 4 weeks  2. No driving for 10 days  3. No lifting more than 10 lbs with the left hand  4. Do not wet the area of surgery for 10 days  5. Follow up appointment with Doctor for wound check in 10 days  6. Notify Doctor if pain, fever, chills, swelling or bleeding in the surgical area  7. Please avoid sleeping on the surgical side. 8. Please do not allow anyone other than Dr Da Rangel staff to remove or redress the surgical site. If the dressing were to fall off or fall partially off before the 10 day follow up appointment, please call the office ASAP. Watchman Discharge Instructions:    Please follow these instructions carefully and call the [facility] at [phone] with    any questions or concerns. AFTER YOU GO HOME:                 Drink extra fluids for 2 days (Do not exceed fluid restrictions if they are previously prescribed)                 You may resume your normal diet                 No smoking                 Relax and take it easy                 Do NOT make any important or legal decisions 24 hrs because of anesthesia effects                 Do NOT drive or operate machines at home or work 3 days                 Do NOT drink alcohol         CARE OF GROIN SITES:                For the first 24 hours, check the puncture site every 1-2 hours while awake. The nursing staff will also assess while you are in the hospital.                 For 2 days, when you cough, sneeze or laugh hold your hand over the puncture site and press firmly on/above the site. Remove the bandage after 24 hours. If there is minor oozing, apply another bandage and remove it after 12 hours. It is normal to have a small bruise or pea size lump at the site. You may shower after your procedure. Do NOT soak in a tub bath, or use a hot tub or pool for at least 1 week. Do NOT scrub the site.  Do NOT put lotion

## 2023-10-13 NOTE — DISCHARGE SUMMARY
[facility]  Discharge Summary    Phyllistine Gitelman  :  1935  MRN:  [id number]    Admit date:  10/12/2023  Discharge date:      Admitting Physician:  Jes Plascencia MD    Discharge Diagnoses:     1. Sp Watchman device procedure  2.  S/p pacemaker       Patient Active Problem List   Diagnosis    Closed intertrochanteric fracture of left femur (HCC)    Gait disturbance    Anemia    Dizziness    Acquired hypothyroidism    Murmur    Age-related osteoporosis without current pathological fracture    Black tongue    Vitamin D deficiency    Vitamin B12 deficiency    Gastroesophageal reflux disease without esophagitis    VHD (valvular heart disease)    Acute respiratory distress    COPD (chronic obstructive pulmonary disease) (HCC)    Sepsis due to pneumonia (HCC)    Nodule of lower lobe of right lung    S/P TAVR (transcatheter aortic valve replacement)    Primary hypertension    Acute respiratory failure (HCC)    Other seizures (HCC)    Metabolic encephalopathy    Cerebrovascular accident (CVA) due to embolism of cerebral artery (HCC)    Cerebral artery occlusion with cerebral infarction ([address])    Ataxia    Dysarthria due to acute stroke ([address])    Dysthymia    Current mild episode of major depressive disorder without prior episode ([address])    Atherosclerosis of aorta (HCC)    Thyroid disease    Mixed hyperlipidemia    Bradycardia    PAD (peripheral artery disease) (HCC)    Other fatigue    Generalized weakness    Anxiety disorder    Chest pain    SOBOE (shortness of breath on exertion)    Iron deficiency anemia, unspecified    Malabsorption syndrome    Benign neoplasm of transverse colon    Hypertensive urgency    PAF (paroxysmal atrial fibrillation) ([facility])    Secondary hypercoagulable state ([address])    At maximum risk for fall    Presence of Watchman left atrial appendage closure device       Admission Condition:  {condition:78413}    Discharged Condition:  {condition:42425}    Hospital Course:   Patient with hx of *** presented for implantation of ***. Patient tolerated the procedure well. Observed overnight. Patient groin access site was clean, no hematoma and no tenderness, No bruit. Echo showed no pericardial effusion. Patient stable for discharge with out patient follow up. Patient given instructions of continuing anticoagulation and aspirin for 45 days  Then he will get a JAN at 45 days and based on the JAN results his anticoagulation course will be decided    Patient is doing well following WATCHMAN implant. Ambulating without any issues    Hospital Course:   Patient with hx of *** presented for implantation of ***. Patient tolerated the procedure well. Observed overnight. Patient device area was clean, no hematoma and no tenderness. Patient device interrogation was stable. CXR confirmed placement of device with no complications. Patient stable for discharge with out patient follow up.     Discharge Medications:      Current Discharge Medication List             Details   dilTIAZem (CARDIZEM CD) 120 MG extended release capsule Take 1 capsule by mouth daily  Qty: 30 capsule, Refills: 3                Details   levothyroxine (SYNTHROID) 75 MCG tablet Take 1 tablet by mouth Daily  Qty: 90 tablet, Refills: 1      clopidogrel (PLAVIX) 75 MG tablet Take 1 tablet by mouth daily  Qty: 90 tablet, Refills: 1      timolol (TIMOPTIC) 0.5 % ophthalmic solution instill one drop into both eyes in the morning      methocarbamol (ROBAXIN) 500 MG tablet Take 1 tablet by mouth every evening  Qty: 30 tablet, Refills: 5    Associated Diagnoses: Pain in both lower extremities      escitalopram (LEXAPRO) 20 MG tablet Take 1 tablet by mouth daily  Qty: 90 tablet, Refills: 1    Associated Diagnoses: Current mild episode of major depressive disorder without prior episode ([facility])      pantoprazole (PROTONIX) 40 MG tablet TAKE 1 TABLET NIGHTLY  Qty: 90 tablet, Refills: 1    Associated Diagnoses: Gastroesophageal reflux disease without esophagitis

## 2023-10-14 VITALS
SYSTOLIC BLOOD PRESSURE: 157 MMHG | TEMPERATURE: 98.2 F | DIASTOLIC BLOOD PRESSURE: 52 MMHG | RESPIRATION RATE: 18 BRPM | HEART RATE: 54 BPM | WEIGHT: 130.2 LBS | HEIGHT: 60 IN | OXYGEN SATURATION: 95 % | BODY MASS INDEX: 25.56 KG/M2

## 2023-10-14 PROCEDURE — 94761 N-INVAS EAR/PLS OXIMETRY MLT: CPT

## 2023-10-14 PROCEDURE — 2580000003 HC RX 258: Performed by: NURSE PRACTITIONER

## 2023-10-14 PROCEDURE — 6360000002 HC RX W HCPCS: Performed by: NURSE PRACTITIONER

## 2023-10-14 PROCEDURE — 6370000000 HC RX 637 (ALT 250 FOR IP): Performed by: NURSE PRACTITIONER

## 2023-10-14 PROCEDURE — 99238 HOSP IP/OBS DSCHRG MGMT 30/<: CPT | Performed by: NURSE PRACTITIONER

## 2023-10-14 RX ORDER — TRAMADOL HYDROCHLORIDE 50 MG/1
50 TABLET ORAL EVERY 4 HOURS PRN
Qty: 18 TABLET | Refills: 0 | Status: ON HOLD | OUTPATIENT
Start: 2023-10-14 | End: 2023-10-24 | Stop reason: HOSPADM

## 2023-10-14 RX ADMIN — FLUTICASONE PROPIONATE 1 SPRAY: 50 SPRAY, METERED NASAL at 08:52

## 2023-10-14 RX ADMIN — PANTOPRAZOLE SODIUM 40 MG: 40 TABLET, DELAYED RELEASE ORAL at 06:46

## 2023-10-14 RX ADMIN — TIMOLOL MALEATE 1 DROP: 5 SOLUTION OPHTHALMIC at 08:50

## 2023-10-14 RX ADMIN — LEVOTHYROXINE SODIUM 75 MCG: 0.07 TABLET ORAL at 06:46

## 2023-10-14 RX ADMIN — ASPIRIN 81 MG CHEWABLE TABLET 81 MG: 81 TABLET CHEWABLE at 08:51

## 2023-10-14 RX ADMIN — CLOPIDOGREL BISULFATE 75 MG: 75 TABLET ORAL at 08:53

## 2023-10-14 RX ADMIN — LOSARTAN POTASSIUM 25 MG: 25 TABLET, FILM COATED ORAL at 08:55

## 2023-10-14 RX ADMIN — ESCITALOPRAM OXALATE 20 MG: 10 TABLET ORAL at 08:55

## 2023-10-14 RX ADMIN — CEFAZOLIN 2000 MG: 2 INJECTION, POWDER, FOR SOLUTION INTRAMUSCULAR; INTRAVENOUS at 00:42

## 2023-10-14 ASSESSMENT — PAIN DESCRIPTION - PAIN TYPE: TYPE: SURGICAL PAIN

## 2023-10-14 ASSESSMENT — PAIN DESCRIPTION - ORIENTATION: ORIENTATION: LEFT

## 2023-10-14 ASSESSMENT — PAIN DESCRIPTION - LOCATION: LOCATION: CHEST

## 2023-10-14 ASSESSMENT — PAIN SCALES - GENERAL: PAINLEVEL_OUTOF10: 1

## 2023-10-14 NOTE — PLAN OF CARE
Problem: Chronic Conditions and Co-morbidities  Goal: Patient's chronic conditions and co-morbidity symptoms are monitored and maintained or improved  10/14/2023 1006 by Trish Vasquez RN  Outcome: Adequate for Discharge  10/14/2023 0806 by Trish Vasquez RN  Outcome: Progressing     Problem: Discharge Planning  Goal: Discharge to home or other facility with appropriate resources  10/14/2023 1006 by Trish Vasquez RN  Outcome: Adequate for Discharge  10/14/2023 0806 by Trish Vasquez RN  Outcome: Progressing     Problem: Safety - Adult  Goal: Free from fall injury  10/14/2023 1006 by Trish Vasquez RN  Outcome: Adequate for Discharge  10/14/2023 0806 by Trish Vasquez RN  Outcome: Progressing     Problem: ABCDS Injury Assessment  Goal: Absence of physical injury  10/14/2023 1006 by Trish Vasquez RN  Outcome: Adequate for Discharge  10/14/2023 0806 by Trish Vasquez RN  Outcome: Progressing     Problem: Pain  Goal: Verbalizes/displays adequate comfort level or baseline comfort level  10/14/2023 1006 by Trish Vasquez RN  Outcome: Adequate for Discharge  10/14/2023 0806 by Trish Vasquez RN  Outcome: Progressing     Problem: Cardiovascular - Adult  Goal: Absence of cardiac dysrhythmias or at baseline  10/14/2023 1006 by Trish Vasquez RN  Outcome: Adequate for Discharge  10/14/2023 0806 by Trish Vasquez RN  Outcome: Progressing     Problem: Musculoskeletal - Adult  Goal: Return mobility to safest level of function  10/14/2023 1006 by Trish Vasquez RN  Outcome: Adequate for Discharge  10/14/2023 0806 by Trish Vasquez RN  Outcome: Progressing

## 2023-10-14 NOTE — PLAN OF CARE
Problem: Chronic Conditions and Co-morbidities  Goal: Patient's chronic conditions and co-morbidity symptoms are monitored and maintained or improved  Outcome: Progressing     Problem: Discharge Planning  Goal: Discharge to home or other facility with appropriate resources  Outcome: Progressing     Problem: Safety - Adult  Goal: Free from fall injury  Outcome: Progressing     Problem: ABCDS Injury Assessment  Goal: Absence of physical injury  Outcome: Progressing     Problem: Pain  Goal: Verbalizes/displays adequate comfort level or baseline comfort level  Outcome: Progressing     Problem: Cardiovascular - Adult  Goal: Absence of cardiac dysrhythmias or at baseline  Outcome: Progressing     Problem: Musculoskeletal - Adult  Goal: Return mobility to safest level of function  Outcome: Progressing

## 2023-10-14 NOTE — DISCHARGE SUMMARY
[facility]  Discharge Summary    Albino Walls  :  1935  MRN:  [id number]    Admit date:  10/12/2023  Discharge date:      Admitting Physician:  Jaki Hu MD    Discharge Diagnoses:     1. Sp Watchman device procedure  2.  S/p pacemaker       Patient Active Problem List   Diagnosis    Closed intertrochanteric fracture of left femur (HCC)    Gait disturbance    Anemia    Dizziness    Acquired hypothyroidism    Murmur    Age-related osteoporosis without current pathological fracture    Black tongue    Vitamin D deficiency    Vitamin B12 deficiency    Gastroesophageal reflux disease without esophagitis    VHD (valvular heart disease)    Acute respiratory distress    COPD (chronic obstructive pulmonary disease) (HCC)    Sepsis due to pneumonia (HCC)    Nodule of lower lobe of right lung    S/P TAVR (transcatheter aortic valve replacement)    Primary hypertension    Acute respiratory failure (HCC)    Other seizures (HCC)    Metabolic encephalopathy    Cerebrovascular accident (CVA) due to embolism of cerebral artery (HCC)    Cerebral artery occlusion with cerebral infarction ([address])    Ataxia    Dysarthria due to acute stroke ([address])    Dysthymia    Current mild episode of major depressive disorder without prior episode (HCC)    Atherosclerosis of aorta (HCC)    Thyroid disease    Mixed hyperlipidemia    Bradycardia    PAD (peripheral artery disease) (HCC)    Other fatigue    Generalized weakness    Anxiety disorder    Chest pain    SOBOE (shortness of breath on exertion)    Iron deficiency anemia, unspecified    Malabsorption syndrome    Benign neoplasm of transverse colon    Hypertensive urgency    PAF (paroxysmal atrial fibrillation) ([facility])    Secondary hypercoagulable state ([address])    At maximum risk for fall    Presence of Watchman left atrial appendage closure device       Admission Condition:  fair    Discharged Condition:  good    Hospital Course:   Patient with hx of paroxysmal atrial fibrillation,COPD, methocarbamol (ROBAXIN) 500 MG tablet Take 1 tablet by mouth every evening  Qty: 30 tablet, Refills: 5    Associated Diagnoses: Pain in both lower extremities      escitalopram (LEXAPRO) 20 MG tablet Take 1 tablet by mouth daily  Qty: 90 tablet, Refills: 1    Associated Diagnoses: Current mild episode of major depressive disorder without prior episode (HCC)      pantoprazole (PROTONIX) 40 MG tablet TAKE 1 TABLET NIGHTLY  Qty: 90 tablet, Refills: 1    Associated Diagnoses: Gastroesophageal reflux disease without esophagitis      ondansetron (ZOFRAN) 4 MG tablet Take 1 tablet by mouth every 8 hours as needed for Nausea or Vomiting  Qty: 90 tablet, Refills: 1    Associated Diagnoses: Nausea      atorvastatin (LIPITOR) 40 MG tablet Take 1 tablet by mouth nightly  Qty: 90 tablet, Refills: 1    Associated Diagnoses: History of CVA (cerebrovascular accident)      losartan (COZAAR) 25 MG tablet Take 1 tablet by mouth daily Hold FOR SBP <120  Qty: 90 tablet, Refills: 1    Associated Diagnoses: Primary hypertension      montelukast (SINGULAIR) 10 MG tablet Take 1 tablet by mouth nightly  Qty: 90 tablet, Refills: 1    Associated Diagnoses: Non-seasonal allergic rhinitis, unspecified trigger      fluticasone (FLONASE) 50 MCG/ACT nasal spray 1 spray by Each Nostril route daily  Qty: 16 g, Refills: 0      traMADol (ULTRAM) 50 MG tablet TAKE 1 TABLET BY MOUTH EVERY 4 HOURS AS NEEDED FOR 7 DAYS      OXYGEN Inhale into the lungs Patient uses 2 liters nc prn.      albuterol sulfate HFA (PROVENTIL;VENTOLIN;PROAIR) 108 (90 Base) MCG/ACT inhaler Inhale 2 puffs into the lungs every 6 hours as needed for Wheezing  Qty: 1 each, Refills: 5      sucralfate (CARAFATE) 1 GM tablet Take 1 tablet by mouth 4 times daily as needed (nausea)  Qty: 120 tablet, Refills: 5      Handicap Placard MISC by Does not apply route Please provide handicap placard for 5 years.     Diagnosis: Orthopedic condition  Qty: 1 each, Refills: 0      aspirin (EQ ASPIRIN

## 2023-10-16 ENCOUNTER — CARE COORDINATION (OUTPATIENT)
Dept: CASE MANAGEMENT | Age: 88
End: 2023-10-16

## 2023-10-16 ENCOUNTER — CARE COORDINATION (OUTPATIENT)
Dept: CARE COORDINATION | Age: 88
End: 2023-10-16

## 2023-10-16 ENCOUNTER — TELEPHONE (OUTPATIENT)
Dept: CARDIOLOGY CLINIC | Age: 88
End: 2023-10-16

## 2023-10-16 DIAGNOSIS — I10 PRIMARY HYPERTENSION: Primary | ICD-10-CM

## 2023-10-16 DIAGNOSIS — J44.9 CHRONIC OBSTRUCTIVE PULMONARY DISEASE, UNSPECIFIED COPD TYPE (HCC): ICD-10-CM

## 2023-10-16 DIAGNOSIS — Z95.818 PRESENCE OF WATCHMAN LEFT ATRIAL APPENDAGE CLOSURE DEVICE: Primary | ICD-10-CM

## 2023-10-16 DIAGNOSIS — Z95.0 PACEMAKER: ICD-10-CM

## 2023-10-16 PROCEDURE — 1111F DSCHRG MED/CURRENT MED MERGE: CPT | Performed by: FAMILY MEDICINE

## 2023-10-16 NOTE — CARE COORDINATION
Remote Patient Kit Ordering Note      Date/Time:  10/16/2023 1:20 PM      [x] [facility] confirmed patient shipping address  [x] Patient will receive package over the next 1-3 business days. Someone 21 years or older must be present to sign for UPS delivery. [x] HRS will contact patient within 24 hours, an [address] [place] [address] will call the patient directly: If the patient does not answer, HRS will follow up with the clinical team notifying them about the unsuccessful attempt to contact the patient. HRS will make three call attempts to the patient. Provide patient with [facility] Virtual install number is: [phone]. [x] LPN will contact patient once equipment is active to welcome them to the program.                                                         [x] Hours of RPM monitoring - Monday-Friday [id number]; encourage patient to get vitals entered by [facility] each day to have the alert addressed same day. [x][facility] mailed RPM Patient flyer to patient. All questions answered at this time. LPN made aware the RPM kit has been ordered. EMTP notified patient of RPM equipment order.

## 2023-10-16 NOTE — CARE COORDINATION
Care Transitions Initial Follow Up Call    Call within 2 business days of discharge: Yes    Patient Current Location:  Home: [address] [place] [address]    Care Transition Nurse contacted the patient by telephone to perform post hospital discharge assessment. Verified name and  with patient as identifiers. Provided introduction to self, and explanation of the Care Transition Nurse role. Patient: Inez Crabtree Patient : 1935   MRN: [id number]  Reason for Admission: Watchman  Discharge Date: 10/14/23 RARS: Readmission Risk Score: 19.5      Last Discharge Facility       Date Complaint Diagnosis Description Type Department Provider    10/12/23  Status post placement of cardiac pacemaker . .. Admission (Discharged) from [address] [place] [address] 3N Sheryle Dirk, MD            Challenges to be reviewed by the provider   Additional needs identified to be addressed with provider: No  none               Method of communication with provider: none. Spoke with Amrit Netter, said she is feeling well. Denies fever, chest pain, SOB/VELAZQUEZ. Elective Watchman, pacemaker placed. Pacer site with drsg on it, to leave on until seen by cardio next week. Groin site still has drsg from hospital, thinks she has sutures but not sure. Reviewed AVS post discharge instructions. Told her she can remove the groin drsg and if any oozing can put another drsg on, remove in 12 hrs. If there are sutures, suggested to call cardio to find out if should have been removed prior to discharge. Reviewed the rest of AVS instructions. With the pacemaker, she can drive after 10 days so will be able to drive to appt 15/45. Reviewed medications/changes. Appetite and fluid intake is good. She monitors BP every morning. Offered RPM, accepted. Will not see PCP at this time since was an elective procedure. No other issues to report. Denies any other needs. No other questions or concerns at this time. Will continue to follow.     Care

## 2023-10-16 NOTE — PROGRESS NOTES
Remote Patient Monitoring Treatment Plan    Received request from ACM/CAMILLA Delgado RN  to order remote patient monitoring for in home monitoring of COPD and HTN and order completed. Patient will be monitoring   --blood pressure   --pulse ox   --weight Please set alert for ONLY weight gain of 5# in 7 days. Pt has no documented hx of HF.    --survey questions. Patient will engage in Remote Patient Monitoring each day to develop the skills necessary for self management. RPM Care Team Responsibilities:   Alerts will be reviewed daily and addressed within 2-4 hours during operational hours (Monday -Friday 9 am-4 pm)  Alert response and intervention documented in patient medical record  Alert response escalated to PCP per protocol and documented in patient medical record  Patient monitored over approximately  days  Discharge from program based on self-management readiness    See care coordination encounters for additional details.

## 2023-10-17 ENCOUNTER — APPOINTMENT (OUTPATIENT)
Dept: CT IMAGING | Age: 88
DRG: 315 | End: 2023-10-17
Payer: MEDICARE

## 2023-10-17 ENCOUNTER — TELEPHONE (OUTPATIENT)
Dept: CARDIOLOGY CLINIC | Age: 88
End: 2023-10-17

## 2023-10-17 ENCOUNTER — HOSPITAL ENCOUNTER (INPATIENT)
Age: 88
LOS: 6 days | Discharge: HOME | DRG: 315 | End: 2023-10-25
Attending: STUDENT IN AN ORGANIZED HEALTH CARE EDUCATION/TRAINING PROGRAM | Admitting: STUDENT IN AN ORGANIZED HEALTH CARE EDUCATION/TRAINING PROGRAM
Payer: MEDICARE

## 2023-10-17 ENCOUNTER — APPOINTMENT (OUTPATIENT)
Dept: GENERAL RADIOLOGY | Age: 88
DRG: 315 | End: 2023-10-17
Attending: STUDENT IN AN ORGANIZED HEALTH CARE EDUCATION/TRAINING PROGRAM
Payer: MEDICARE

## 2023-10-17 DIAGNOSIS — N17.9 ACUTE RENAL FAILURE, UNSPECIFIED ACUTE RENAL FAILURE TYPE (HCC): ICD-10-CM

## 2023-10-17 DIAGNOSIS — R07.9 CHEST PAIN, UNSPECIFIED TYPE: Primary | ICD-10-CM

## 2023-10-17 DIAGNOSIS — I31.39 PERICARDIAL EFFUSION: ICD-10-CM

## 2023-10-17 PROBLEM — M54.6 ACUTE THORACIC BACK PAIN, UNSPECIFIED BACK PAIN LATERALITY: Status: ACTIVE | Noted: 2023-10-17

## 2023-10-17 LAB
ANION GAP SERPL CALCULATED.3IONS-SCNC: 8 MMOL/L (ref 4–16)
BASOPHILS ABSOLUTE: 0 K/CU MM
BASOPHILS RELATIVE PERCENT: 0.3 % (ref 0–1)
BUN SERPL-MCNC: 11 MG/DL (ref 6–23)
CALCIUM SERPL-MCNC: 8.4 MG/DL (ref 8.3–10.6)
CHLORIDE BLD-SCNC: 98 MMOL/L (ref 99–110)
CO2: 27 MMOL/L (ref 21–32)
CREAT SERPL-MCNC: 0.7 MG/DL (ref 0.6–1.1)
D DIMER: 3.25 UG/ML (FEU)
DIFFERENTIAL TYPE: ABNORMAL
EKG ATRIAL RATE: 50 BPM
EKG DIAGNOSIS: NORMAL
EKG P-R INTERVAL: 254 MS
EKG Q-T INTERVAL: 474 MS
EKG QRS DURATION: 76 MS
EKG QTC CALCULATION (BAZETT): 432 MS
EKG R AXIS: 50 DEGREES
EKG T AXIS: 61 DEGREES
EKG VENTRICULAR RATE: 50 BPM
EOSINOPHILS ABSOLUTE: 0.3 K/CU MM
EOSINOPHILS RELATIVE PERCENT: 2.9 % (ref 0–3)
GFR SERPL CREATININE-BSD FRML MDRD: >60 ML/MIN/1.73M2
GLUCOSE SERPL-MCNC: 105 MG/DL (ref 70–99)
HCT VFR BLD CALC: 32.2 % (ref 37–47)
HEMOGLOBIN: 10.4 GM/DL (ref 12.5–16)
IMMATURE NEUTROPHIL %: 0.3 % (ref 0–0.43)
LYMPHOCYTES ABSOLUTE: 2.3 K/CU MM
LYMPHOCYTES RELATIVE PERCENT: 24 % (ref 24–44)
MCH RBC QN AUTO: 33.5 PG (ref 27–31)
MCHC RBC AUTO-ENTMCNC: 32.3 % (ref 32–36)
MCV RBC AUTO: 103.9 FL (ref 78–100)
MONOCYTES ABSOLUTE: 1.3 K/CU MM
MONOCYTES RELATIVE PERCENT: 12.9 % (ref 0–4)
NUCLEATED RBC %: 0 %
PDW BLD-RTO: 14.8 % (ref 11.7–14.9)
PLATELET # BLD: 196 K/CU MM (ref 140–440)
PMV BLD AUTO: 9.8 FL (ref 7.5–11.1)
POTASSIUM SERPL-SCNC: 5 MMOL/L (ref 3.5–5.1)
RBC # BLD: 3.1 M/CU MM (ref 4.2–5.4)
SEGMENTED NEUTROPHILS ABSOLUTE COUNT: 5.8 K/CU MM
SEGMENTED NEUTROPHILS RELATIVE PERCENT: 59.6 % (ref 36–66)
SODIUM BLD-SCNC: 133 MMOL/L (ref 135–145)
TOTAL IMMATURE NEUTOROPHIL: 0.03 K/CU MM
TOTAL NUCLEATED RBC: 0 K/CU MM
TROPONIN, HIGH SENSITIVITY: 17 NG/L (ref 0–14)
TROPONIN, HIGH SENSITIVITY: 18 NG/L (ref 0–14)
WBC # BLD: 9.7 K/CU MM (ref 4–10.5)

## 2023-10-17 PROCEDURE — 6370000000 HC RX 637 (ALT 250 FOR IP): Performed by: STUDENT IN AN ORGANIZED HEALTH CARE EDUCATION/TRAINING PROGRAM

## 2023-10-17 PROCEDURE — 99285 EMERGENCY DEPT VISIT HI MDM: CPT

## 2023-10-17 PROCEDURE — 85025 COMPLETE CBC W/AUTO DIFF WBC: CPT

## 2023-10-17 PROCEDURE — 71046 X-RAY EXAM CHEST 2 VIEWS: CPT

## 2023-10-17 PROCEDURE — 94761 N-INVAS EAR/PLS OXIMETRY MLT: CPT

## 2023-10-17 PROCEDURE — 96374 THER/PROPH/DIAG INJ IV PUSH: CPT

## 2023-10-17 PROCEDURE — 71260 CT THORAX DX C+: CPT

## 2023-10-17 PROCEDURE — 84484 ASSAY OF TROPONIN QUANT: CPT

## 2023-10-17 PROCEDURE — 6360000002 HC RX W HCPCS: Performed by: STUDENT IN AN ORGANIZED HEALTH CARE EDUCATION/TRAINING PROGRAM

## 2023-10-17 PROCEDURE — 85379 FIBRIN DEGRADATION QUANT: CPT

## 2023-10-17 PROCEDURE — 93005 ELECTROCARDIOGRAM TRACING: CPT | Performed by: STUDENT IN AN ORGANIZED HEALTH CARE EDUCATION/TRAINING PROGRAM

## 2023-10-17 PROCEDURE — G0378 HOSPITAL OBSERVATION PER HR: HCPCS

## 2023-10-17 PROCEDURE — 93010 ELECTROCARDIOGRAM REPORT: CPT | Performed by: INTERNAL MEDICINE

## 2023-10-17 PROCEDURE — 80048 BASIC METABOLIC PNL TOTAL CA: CPT

## 2023-10-17 PROCEDURE — 6360000004 HC RX CONTRAST MEDICATION: Performed by: STUDENT IN AN ORGANIZED HEALTH CARE EDUCATION/TRAINING PROGRAM

## 2023-10-17 RX ORDER — LIDOCAINE 4 G/G
1 PATCH TOPICAL DAILY
Status: DISCONTINUED | OUTPATIENT
Start: 2023-10-18 | End: 2023-10-25 | Stop reason: HOSPADM

## 2023-10-17 RX ORDER — POLYETHYLENE GLYCOL 3350 17 G/17G
17 POWDER, FOR SOLUTION ORAL DAILY PRN
Status: DISCONTINUED | OUTPATIENT
Start: 2023-10-17 | End: 2023-10-25 | Stop reason: HOSPADM

## 2023-10-17 RX ORDER — SODIUM CHLORIDE 9 MG/ML
INJECTION, SOLUTION INTRAVENOUS PRN
Status: DISCONTINUED | OUTPATIENT
Start: 2023-10-17 | End: 2023-10-25 | Stop reason: HOSPADM

## 2023-10-17 RX ORDER — OXYCODONE HYDROCHLORIDE 5 MG/1
5 TABLET ORAL EVERY 6 HOURS PRN
Status: DISCONTINUED | OUTPATIENT
Start: 2023-10-17 | End: 2023-10-25 | Stop reason: HOSPADM

## 2023-10-17 RX ORDER — SODIUM CHLORIDE 0.9 % (FLUSH) 0.9 %
5-40 SYRINGE (ML) INJECTION PRN
Status: DISCONTINUED | OUTPATIENT
Start: 2023-10-17 | End: 2023-10-25 | Stop reason: HOSPADM

## 2023-10-17 RX ORDER — ONDANSETRON 4 MG/1
4 TABLET, ORALLY DISINTEGRATING ORAL EVERY 8 HOURS PRN
Status: DISCONTINUED | OUTPATIENT
Start: 2023-10-17 | End: 2023-10-25 | Stop reason: HOSPADM

## 2023-10-17 RX ORDER — POTASSIUM CHLORIDE 7.45 MG/ML
10 INJECTION INTRAVENOUS PRN
Status: DISCONTINUED | OUTPATIENT
Start: 2023-10-17 | End: 2023-10-25 | Stop reason: HOSPADM

## 2023-10-17 RX ORDER — FENTANYL CITRATE 50 UG/ML
50 INJECTION, SOLUTION INTRAMUSCULAR; INTRAVENOUS ONCE
Status: COMPLETED | OUTPATIENT
Start: 2023-10-17 | End: 2023-10-17

## 2023-10-17 RX ORDER — POTASSIUM CHLORIDE 20 MEQ/1
40 TABLET, EXTENDED RELEASE ORAL PRN
Status: DISCONTINUED | OUTPATIENT
Start: 2023-10-17 | End: 2023-10-25 | Stop reason: HOSPADM

## 2023-10-17 RX ORDER — METHOCARBAMOL 500 MG/1
500 TABLET, FILM COATED ORAL 3 TIMES DAILY
Status: DISCONTINUED | OUTPATIENT
Start: 2023-10-17 | End: 2023-10-25 | Stop reason: HOSPADM

## 2023-10-17 RX ORDER — MAGNESIUM SULFATE IN WATER 40 MG/ML
2000 INJECTION, SOLUTION INTRAVENOUS PRN
Status: DISCONTINUED | OUTPATIENT
Start: 2023-10-17 | End: 2023-10-25 | Stop reason: HOSPADM

## 2023-10-17 RX ORDER — ACETAMINOPHEN 325 MG/1
650 TABLET ORAL EVERY 6 HOURS PRN
Status: DISCONTINUED | OUTPATIENT
Start: 2023-10-17 | End: 2023-10-25 | Stop reason: HOSPADM

## 2023-10-17 RX ORDER — OXYCODONE HYDROCHLORIDE 5 MG/1
5 TABLET ORAL ONCE
Status: COMPLETED | OUTPATIENT
Start: 2023-10-17 | End: 2023-10-17

## 2023-10-17 RX ORDER — SODIUM CHLORIDE 0.9 % (FLUSH) 0.9 %
5-40 SYRINGE (ML) INJECTION EVERY 12 HOURS SCHEDULED
Status: DISCONTINUED | OUTPATIENT
Start: 2023-10-17 | End: 2023-10-25 | Stop reason: HOSPADM

## 2023-10-17 RX ORDER — ENOXAPARIN SODIUM 100 MG/ML
40 INJECTION SUBCUTANEOUS DAILY
Status: DISCONTINUED | OUTPATIENT
Start: 2023-10-18 | End: 2023-10-19

## 2023-10-17 RX ORDER — ACETAMINOPHEN 650 MG/1
650 SUPPOSITORY RECTAL EVERY 6 HOURS PRN
Status: DISCONTINUED | OUTPATIENT
Start: 2023-10-17 | End: 2023-10-25 | Stop reason: HOSPADM

## 2023-10-17 RX ORDER — ONDANSETRON 2 MG/ML
4 INJECTION INTRAMUSCULAR; INTRAVENOUS EVERY 6 HOURS PRN
Status: DISCONTINUED | OUTPATIENT
Start: 2023-10-17 | End: 2023-10-25 | Stop reason: HOSPADM

## 2023-10-17 RX ADMIN — OXYCODONE HYDROCHLORIDE 5 MG: 5 TABLET ORAL at 17:38

## 2023-10-17 RX ADMIN — FENTANYL CITRATE 50 MCG: 50 INJECTION INTRAMUSCULAR; INTRAVENOUS at 19:28

## 2023-10-17 RX ADMIN — OXYCODONE HYDROCHLORIDE 5 MG: 5 TABLET ORAL at 22:22

## 2023-10-17 RX ADMIN — METHOCARBAMOL TABLETS 500 MG: 500 TABLET, COATED ORAL at 23:53

## 2023-10-17 RX ADMIN — IOPAMIDOL 45 ML: 755 INJECTION, SOLUTION INTRAVENOUS at 20:17

## 2023-10-17 ASSESSMENT — PAIN DESCRIPTION - LOCATION
LOCATION: CHEST

## 2023-10-17 ASSESSMENT — ENCOUNTER SYMPTOMS
SHORTNESS OF BREATH: 1
VOMITING: 0
NAUSEA: 0
ABDOMINAL PAIN: 0
COUGH: 0
SORE THROAT: 0

## 2023-10-17 ASSESSMENT — PAIN SCALES - GENERAL
PAINLEVEL_OUTOF10: 8
PAINLEVEL_OUTOF10: 9
PAINLEVEL_OUTOF10: 7
PAINLEVEL_OUTOF10: 5

## 2023-10-17 ASSESSMENT — PAIN DESCRIPTION - ORIENTATION
ORIENTATION: LEFT
ORIENTATION: MID
ORIENTATION: RIGHT

## 2023-10-17 ASSESSMENT — PAIN DESCRIPTION - DESCRIPTORS
DESCRIPTORS: ACHING

## 2023-10-17 ASSESSMENT — PAIN - FUNCTIONAL ASSESSMENT: PAIN_FUNCTIONAL_ASSESSMENT: ACTIVITIES ARE NOT PREVENTED

## 2023-10-17 NOTE — TELEPHONE ENCOUNTER
Patient called back still short of breath per [facility] advised to go to ed patient voiced understanding

## 2023-10-17 NOTE — ED TRIAGE NOTES
Patient brought in by squad with complaint of shortness of breath and pain upon inspiration. Patient symptoms began this morning and worsened over the day. Patient had a pacemaker placed on 10/13 and her cardiologist advised she come be evaluated.

## 2023-10-17 NOTE — ED PROVIDER NOTES
[facility]  Emergency Department Encounter    Pt Warren Lopez  MRN: [id number]  Birthdate 1935  Date of evaluation: 10/17/23  PCP:  Genevieve Rushing MD       CHIEF COMPLAINT       Chief Complaint   Patient presents with    Shortness of Breath       HISTORY OF PRESENT ILLNESS     Elida Drake is a 80 y.o. female who presents with bilateral upper chest pain. Patient underwent a Watchman device and pacemaker implantation last week. She states since that time she has had upper chest pain however it acutely worsened this morning. She states that she is normally been able to pull 2500 cc on incentive spirometer but now is down to 1500 secondary to pain. The pain is achy and present all the time radiates up to her bilateral traps but not mid back. She reports pain is worse with deep inspiration. Reports shortness of breath on exertion as well as chest wall pain. She denies any fever chills no cough. She is currently only on Plavix and a baby aspirin and not currently on Eliquis. Patient has been using Tylenol for pain.     PAST MEDICAL / SURGICAL / SOCIAL / FAMILY HISTORY      has a past medical history of Arthritis, Asthma, At maximum risk for fall, Blood transfusion, Broken heart syndrome, Cerebral artery occlusion with cerebral infarction (HCC), Chronic bronchitis (HCC), COPD (chronic obstructive pulmonary disease) ([address]), COPD exacerbation ([address]), COPD with exacerbation ([address]), Echocardiogram, Fatigue, H/O cardiac catheterization, H/O cardiovascular stress test, H/O Doppler ultrasound, H/O Doppler ultrasound, H/O echocardiogram, H/O echocardiogram, H/O echocardiogram, H/O echocardiogram, H/O exercise stress test, History of cardiac cath, History of nuclear stress test, Holter monitor, abnormal, Hyperlipidemia, Hypertension, Iron deficiency anemia, unspecified, Malabsorption syndrome, Normal cardiac stress test, On home O2, PAF

## 2023-10-18 LAB
ANION GAP SERPL CALCULATED.3IONS-SCNC: 11 MMOL/L (ref 4–16)
BASOPHILS ABSOLUTE: 0 K/CU MM
BASOPHILS RELATIVE PERCENT: 0.3 % (ref 0–1)
BUN SERPL-MCNC: 14 MG/DL (ref 6–23)
CALCIUM SERPL-MCNC: 8.7 MG/DL (ref 8.3–10.6)
CHLORIDE BLD-SCNC: 95 MMOL/L (ref 99–110)
CO2: 24 MMOL/L (ref 21–32)
CREAT SERPL-MCNC: 0.6 MG/DL (ref 0.6–1.1)
DIFFERENTIAL TYPE: ABNORMAL
EKG ATRIAL RATE: 60 BPM
EKG ATRIAL RATE: 60 BPM
EKG DIAGNOSIS: NORMAL
EKG DIAGNOSIS: NORMAL
EKG P AXIS: 53 DEGREES
EKG P AXIS: 53 DEGREES
EKG P-R INTERVAL: 174 MS
EKG P-R INTERVAL: 174 MS
EKG Q-T INTERVAL: 444 MS
EKG Q-T INTERVAL: 444 MS
EKG QRS DURATION: 74 MS
EKG QRS DURATION: 74 MS
EKG QTC CALCULATION (BAZETT): 444 MS
EKG QTC CALCULATION (BAZETT): 444 MS
EKG R AXIS: 43 DEGREES
EKG R AXIS: 43 DEGREES
EKG T AXIS: 48 DEGREES
EKG T AXIS: 48 DEGREES
EKG VENTRICULAR RATE: 60 BPM
EKG VENTRICULAR RATE: 60 BPM
EOSINOPHILS ABSOLUTE: 0.1 K/CU MM
EOSINOPHILS RELATIVE PERCENT: 0.5 % (ref 0–3)
GFR SERPL CREATININE-BSD FRML MDRD: >60 ML/MIN/1.73M2
GLUCOSE SERPL-MCNC: 126 MG/DL (ref 70–99)
HCT VFR BLD CALC: 37.4 % (ref 37–47)
HEMOGLOBIN: 11.4 GM/DL (ref 12.5–16)
IMMATURE NEUTROPHIL %: 0.4 % (ref 0–0.43)
INR BLD: 1 INDEX
LYMPHOCYTES ABSOLUTE: 2.2 K/CU MM
LYMPHOCYTES RELATIVE PERCENT: 18.6 % (ref 24–44)
MCH RBC QN AUTO: 33.5 PG (ref 27–31)
MCHC RBC AUTO-ENTMCNC: 30.5 % (ref 32–36)
MCV RBC AUTO: 110 FL (ref 78–100)
MONOCYTES ABSOLUTE: 1.4 K/CU MM
MONOCYTES RELATIVE PERCENT: 11.9 % (ref 0–4)
NUCLEATED RBC %: 0 %
PDW BLD-RTO: 14.9 % (ref 11.7–14.9)
PLATELET # BLD: 208 K/CU MM (ref 140–440)
PMV BLD AUTO: 9.9 FL (ref 7.5–11.1)
POTASSIUM SERPL-SCNC: 4.8 MMOL/L (ref 3.5–5.1)
PROTHROMBIN TIME: 13.8 SECONDS (ref 11.7–14.5)
RBC # BLD: 3.4 M/CU MM (ref 4.2–5.4)
SEGMENTED NEUTROPHILS ABSOLUTE COUNT: 8.1 K/CU MM
SEGMENTED NEUTROPHILS RELATIVE PERCENT: 68.3 % (ref 36–66)
SODIUM BLD-SCNC: 130 MMOL/L (ref 135–145)
TOTAL IMMATURE NEUTOROPHIL: 0.05 K/CU MM
TOTAL NUCLEATED RBC: 0 K/CU MM
TROPONIN, HIGH SENSITIVITY: 20 NG/L (ref 0–14)
TSH SERPL DL<=0.005 MIU/L-ACNC: 0.42 UIU/ML (ref 0.27–4.2)
WBC # BLD: 11.9 K/CU MM (ref 4–10.5)

## 2023-10-18 PROCEDURE — 99222 1ST HOSP IP/OBS MODERATE 55: CPT | Performed by: INTERNAL MEDICINE

## 2023-10-18 PROCEDURE — 97530 THERAPEUTIC ACTIVITIES: CPT

## 2023-10-18 PROCEDURE — 36415 COLL VENOUS BLD VENIPUNCTURE: CPT

## 2023-10-18 PROCEDURE — 6370000000 HC RX 637 (ALT 250 FOR IP): Performed by: INTERNAL MEDICINE

## 2023-10-18 PROCEDURE — APPNB60 APP NON BILLABLE TIME 46-60 MINS: Performed by: NURSE PRACTITIONER

## 2023-10-18 PROCEDURE — 6360000002 HC RX W HCPCS: Performed by: STUDENT IN AN ORGANIZED HEALTH CARE EDUCATION/TRAINING PROGRAM

## 2023-10-18 PROCEDURE — 96372 THER/PROPH/DIAG INJ SC/IM: CPT

## 2023-10-18 PROCEDURE — 80048 BASIC METABOLIC PNL TOTAL CA: CPT

## 2023-10-18 PROCEDURE — G0378 HOSPITAL OBSERVATION PER HR: HCPCS

## 2023-10-18 PROCEDURE — 84443 ASSAY THYROID STIM HORMONE: CPT

## 2023-10-18 PROCEDURE — 6370000000 HC RX 637 (ALT 250 FOR IP): Performed by: STUDENT IN AN ORGANIZED HEALTH CARE EDUCATION/TRAINING PROGRAM

## 2023-10-18 PROCEDURE — 85025 COMPLETE CBC W/AUTO DIFF WBC: CPT

## 2023-10-18 PROCEDURE — 2580000003 HC RX 258: Performed by: STUDENT IN AN ORGANIZED HEALTH CARE EDUCATION/TRAINING PROGRAM

## 2023-10-18 PROCEDURE — 2580000003 HC RX 258: Performed by: PHYSICIAN ASSISTANT

## 2023-10-18 PROCEDURE — 93005 ELECTROCARDIOGRAM TRACING: CPT | Performed by: STUDENT IN AN ORGANIZED HEALTH CARE EDUCATION/TRAINING PROGRAM

## 2023-10-18 PROCEDURE — 93010 ELECTROCARDIOGRAM REPORT: CPT | Performed by: INTERNAL MEDICINE

## 2023-10-18 PROCEDURE — 96361 HYDRATE IV INFUSION ADD-ON: CPT

## 2023-10-18 PROCEDURE — 94761 N-INVAS EAR/PLS OXIMETRY MLT: CPT

## 2023-10-18 PROCEDURE — 97166 OT EVAL MOD COMPLEX 45 MIN: CPT

## 2023-10-18 PROCEDURE — 84484 ASSAY OF TROPONIN QUANT: CPT

## 2023-10-18 PROCEDURE — 96375 TX/PRO/DX INJ NEW DRUG ADDON: CPT

## 2023-10-18 PROCEDURE — 6370000000 HC RX 637 (ALT 250 FOR IP): Performed by: NURSE PRACTITIONER

## 2023-10-18 PROCEDURE — 94640 AIRWAY INHALATION TREATMENT: CPT

## 2023-10-18 PROCEDURE — 2580000003 HC RX 258: Performed by: NURSE PRACTITIONER

## 2023-10-18 PROCEDURE — 97161 PT EVAL LOW COMPLEX 20 MIN: CPT

## 2023-10-18 PROCEDURE — 99223 1ST HOSP IP/OBS HIGH 75: CPT | Performed by: THORACIC SURGERY (CARDIOTHORACIC VASCULAR SURGERY)

## 2023-10-18 PROCEDURE — 93308 TTE F-UP OR LMTD: CPT

## 2023-10-18 PROCEDURE — 85610 PROTHROMBIN TIME: CPT

## 2023-10-18 RX ORDER — COLCHICINE 0.6 MG/1
0.6 TABLET ORAL 2 TIMES DAILY
Status: DISCONTINUED | OUTPATIENT
Start: 2023-10-18 | End: 2023-10-25 | Stop reason: HOSPADM

## 2023-10-18 RX ORDER — COLCHICINE 0.6 MG/1
0.6 CAPSULE ORAL DAILY
Status: DISCONTINUED | OUTPATIENT
Start: 2023-10-18 | End: 2023-10-18

## 2023-10-18 RX ORDER — SODIUM CHLORIDE 9 MG/ML
INJECTION, SOLUTION INTRAVENOUS CONTINUOUS
Status: DISCONTINUED | OUTPATIENT
Start: 2023-10-18 | End: 2023-10-20

## 2023-10-18 RX ORDER — LEVOTHYROXINE SODIUM 0.07 MG/1
75 TABLET ORAL DAILY
Status: DISCONTINUED | OUTPATIENT
Start: 2023-10-18 | End: 2023-10-25 | Stop reason: HOSPADM

## 2023-10-18 RX ORDER — LOSARTAN POTASSIUM 25 MG/1
25 TABLET ORAL DAILY
Status: DISCONTINUED | OUTPATIENT
Start: 2023-10-18 | End: 2023-10-19

## 2023-10-18 RX ORDER — ALBUTEROL SULFATE 90 UG/1
2 AEROSOL, METERED RESPIRATORY (INHALATION) EVERY 6 HOURS PRN
Status: DISCONTINUED | OUTPATIENT
Start: 2023-10-18 | End: 2023-10-25 | Stop reason: HOSPADM

## 2023-10-18 RX ORDER — TIMOLOL MALEATE 5 MG/ML
1 SOLUTION/ DROPS OPHTHALMIC DAILY
Status: DISCONTINUED | OUTPATIENT
Start: 2023-10-18 | End: 2023-10-25 | Stop reason: HOSPADM

## 2023-10-18 RX ORDER — PANTOPRAZOLE SODIUM 20 MG/1
20 TABLET, DELAYED RELEASE ORAL
Status: DISCONTINUED | OUTPATIENT
Start: 2023-10-19 | End: 2023-10-25 | Stop reason: HOSPADM

## 2023-10-18 RX ORDER — MONTELUKAST SODIUM 10 MG/1
10 TABLET ORAL NIGHTLY
Status: DISCONTINUED | OUTPATIENT
Start: 2023-10-18 | End: 2023-10-25 | Stop reason: HOSPADM

## 2023-10-18 RX ORDER — ATORVASTATIN CALCIUM 40 MG/1
40 TABLET, FILM COATED ORAL NIGHTLY
Status: DISCONTINUED | OUTPATIENT
Start: 2023-10-18 | End: 2023-10-25 | Stop reason: HOSPADM

## 2023-10-18 RX ORDER — DILTIAZEM HYDROCHLORIDE 120 MG/1
120 CAPSULE, COATED, EXTENDED RELEASE ORAL DAILY
Status: DISCONTINUED | OUTPATIENT
Start: 2023-10-18 | End: 2023-10-25 | Stop reason: HOSPADM

## 2023-10-18 RX ORDER — CLOPIDOGREL BISULFATE 75 MG/1
75 TABLET ORAL DAILY
Status: DISCONTINUED | OUTPATIENT
Start: 2023-10-18 | End: 2023-10-25 | Stop reason: HOSPADM

## 2023-10-18 RX ORDER — SUCRALFATE 1 G/1
1 TABLET ORAL 4 TIMES DAILY PRN
Status: DISCONTINUED | OUTPATIENT
Start: 2023-10-18 | End: 2023-10-25 | Stop reason: HOSPADM

## 2023-10-18 RX ORDER — IPRATROPIUM BROMIDE AND ALBUTEROL SULFATE 2.5; .5 MG/3ML; MG/3ML
1 SOLUTION RESPIRATORY (INHALATION) 2 TIMES DAILY
Status: DISCONTINUED | OUTPATIENT
Start: 2023-10-18 | End: 2023-10-25 | Stop reason: HOSPADM

## 2023-10-18 RX ORDER — SODIUM CHLORIDE 9 MG/ML
INJECTION, SOLUTION INTRAVENOUS CONTINUOUS
Status: DISCONTINUED | OUTPATIENT
Start: 2023-10-18 | End: 2023-10-18

## 2023-10-18 RX ORDER — ASPIRIN 81 MG/1
81 TABLET, CHEWABLE ORAL DAILY
Status: DISCONTINUED | OUTPATIENT
Start: 2023-10-18 | End: 2023-10-25 | Stop reason: HOSPADM

## 2023-10-18 RX ORDER — ESCITALOPRAM OXALATE 10 MG/1
20 TABLET ORAL DAILY
Status: DISCONTINUED | OUTPATIENT
Start: 2023-10-18 | End: 2023-10-25 | Stop reason: HOSPADM

## 2023-10-18 RX ADMIN — METHOCARBAMOL TABLETS 500 MG: 500 TABLET, COATED ORAL at 20:52

## 2023-10-18 RX ADMIN — ONDANSETRON 4 MG: 2 INJECTION INTRAMUSCULAR; INTRAVENOUS at 12:02

## 2023-10-18 RX ADMIN — COLCHICINE 0.6 MG: 0.6 TABLET ORAL at 20:53

## 2023-10-18 RX ADMIN — CLOPIDOGREL BISULFATE 75 MG: 75 TABLET ORAL at 08:42

## 2023-10-18 RX ADMIN — ESCITALOPRAM OXALATE 20 MG: 10 TABLET ORAL at 08:42

## 2023-10-18 RX ADMIN — LEVOTHYROXINE SODIUM 75 MCG: 0.07 TABLET ORAL at 19:01

## 2023-10-18 RX ADMIN — SODIUM CHLORIDE: 9 INJECTION, SOLUTION INTRAVENOUS at 18:58

## 2023-10-18 RX ADMIN — TIMOLOL MALEATE 1 DROP: 5 SOLUTION OPHTHALMIC at 10:52

## 2023-10-18 RX ADMIN — ATORVASTATIN CALCIUM 40 MG: 40 TABLET, FILM COATED ORAL at 20:53

## 2023-10-18 RX ADMIN — SODIUM CHLORIDE: 9 INJECTION, SOLUTION INTRAVENOUS at 08:39

## 2023-10-18 RX ADMIN — OXYCODONE HYDROCHLORIDE 5 MG: 5 TABLET ORAL at 05:40

## 2023-10-18 RX ADMIN — IPRATROPIUM BROMIDE AND ALBUTEROL SULFATE 1 DOSE: 2.5; .5 SOLUTION RESPIRATORY (INHALATION) at 20:07

## 2023-10-18 RX ADMIN — SODIUM CHLORIDE, PRESERVATIVE FREE 10 ML: 5 INJECTION INTRAVENOUS at 08:37

## 2023-10-18 RX ADMIN — ONDANSETRON 4 MG: 4 TABLET, ORALLY DISINTEGRATING ORAL at 17:15

## 2023-10-18 RX ADMIN — LOSARTAN POTASSIUM 25 MG: 25 TABLET, FILM COATED ORAL at 08:42

## 2023-10-18 RX ADMIN — MONTELUKAST 10 MG: 10 TABLET, FILM COATED ORAL at 20:53

## 2023-10-18 RX ADMIN — METHOCARBAMOL TABLETS 500 MG: 500 TABLET, COATED ORAL at 08:42

## 2023-10-18 RX ADMIN — METHOCARBAMOL TABLETS 500 MG: 500 TABLET, COATED ORAL at 17:15

## 2023-10-18 RX ADMIN — ASPIRIN 81 MG CHEWABLE TABLET 81 MG: 81 TABLET CHEWABLE at 08:42

## 2023-10-18 RX ADMIN — ENOXAPARIN SODIUM 40 MG: 100 INJECTION SUBCUTANEOUS at 08:42

## 2023-10-18 RX ADMIN — DILTIAZEM HYDROCHLORIDE 120 MG: 120 CAPSULE, COATED, EXTENDED RELEASE ORAL at 08:42

## 2023-10-18 ASSESSMENT — PAIN SCALES - GENERAL
PAINLEVEL_OUTOF10: 3
PAINLEVEL_OUTOF10: 4
PAINLEVEL_OUTOF10: 7

## 2023-10-18 ASSESSMENT — PAIN DESCRIPTION - ORIENTATION
ORIENTATION: LEFT
ORIENTATION: LEFT

## 2023-10-18 ASSESSMENT — PAIN SCALES - WONG BAKER
WONGBAKER_NUMERICALRESPONSE: 2
WONGBAKER_NUMERICALRESPONSE: 2

## 2023-10-18 ASSESSMENT — PAIN DESCRIPTION - PAIN TYPE: TYPE: ACUTE PAIN

## 2023-10-18 ASSESSMENT — PAIN DESCRIPTION - LOCATION
LOCATION: CHEST;SHOULDER
LOCATION: CHEST

## 2023-10-18 ASSESSMENT — PAIN DESCRIPTION - DESCRIPTORS
DESCRIPTORS: ACHING;DISCOMFORT
DESCRIPTORS: ACHING

## 2023-10-18 ASSESSMENT — PAIN DESCRIPTION - ONSET: ONSET: ON-GOING

## 2023-10-18 ASSESSMENT — PAIN DESCRIPTION - FREQUENCY: FREQUENCY: CONTINUOUS

## 2023-10-18 NOTE — CARE COORDINATION
10/18/23 1144   Service Assessment   Patient Orientation Alert and Oriented   Cognition Alert   History Provided By Patient   Primary [address] is: Named in [address]   PCP Verified by CM Yes   Prior Functional Level Independent in ADLs/IADLs   Current Functional Level Independent in ADLs/IADLs   Can patient return to prior living arrangement Unknown at present   Ability to make needs known: Good   Family able to assist with home care needs: Yes   Would you like for me to discuss the discharge plan with any other family members/significant others, and if so, who? No   Financial Resources [employer] None     CM in to see Pt to initiate discharge planning. Pt from home alone with family support. Pt states her son lives on the same property. Pt denies any needs at this time.   CM following

## 2023-10-18 NOTE — CONSULTS
She reports that she does not drink alcohol. Allergies   Allergen Reactions    Latex     Morphine Anaphylaxis     Dilaudid OK    Bactrim [Sulfamethoxazole-Trimethoprim]     Influenza Vaccines      Ended in hospital stay for 3 days told by md not to get it anymore     Lactose Intolerance (Gi) Nausea Only    Phenergan [Promethazine Hcl] Other (See Comments)     Makes me jerk all over. Zofran OK    Promethazine Hcl Other (See Comments)    Stadol [Butorphanol Tartrate] Other (See Comments)     Out of body experience. Was told it was a near death experience and was placed in ICU. Dilaudid OK    Tape Dunia Christensen Tape] Other (See Comments)     Plastic cause itching and rash. Paper tape causes my skin to blister. (Tega-derm and Coban OK to use.)       No current facility-administered medications on file prior to encounter.      Current Outpatient Medications on File Prior to Encounter   Medication Sig Dispense Refill    dilTIAZem (CARDIZEM CD) 120 MG extended release capsule Take 1 capsule by mouth daily 30 capsule 3    levothyroxine (SYNTHROID) 75 MCG tablet Take 1 tablet by mouth Daily 90 tablet 1    clopidogrel (PLAVIX) 75 MG tablet Take 1 tablet by mouth daily 90 tablet 1    timolol (TIMOPTIC) 0.5 % ophthalmic solution instill one drop into both eyes in the morning      methocarbamol (ROBAXIN) 500 MG tablet Take 1 tablet by mouth every evening 30 tablet 5    escitalopram (LEXAPRO) 20 MG tablet Take 1 tablet by mouth daily 90 tablet 1    pantoprazole (PROTONIX) 40 MG tablet TAKE 1 TABLET NIGHTLY 90 tablet 1    ondansetron (ZOFRAN) 4 MG tablet Take 1 tablet by mouth every 8 hours as needed for Nausea or Vomiting 90 tablet 1    atorvastatin (LIPITOR) 40 MG tablet Take 1 tablet by mouth nightly 90 tablet 1    losartan (COZAAR) 25 MG tablet Take 1 tablet by mouth daily Hold FOR SBP <120 90 tablet 1    montelukast (SINGULAIR) 10 MG tablet Take 1 tablet by mouth nightly 90 tablet 1    fluticasone (FLONASE) 50 MCG/ACT nasal

## 2023-10-18 NOTE — CONSULTS
[facility], 1935, 3030/3030-A, 10/18/2023    History  [place]:  The primary encounter diagnosis was Chest pain, unspecified type. A diagnosis of Pericardial effusion was also pertinent to this visit. Patient  has a past medical history of Arthritis, Asthma, At maximum risk for fall, Blood transfusion, Broken heart syndrome, Cerebral artery occlusion with cerebral infarction ([address]), Chronic bronchitis (HCC), COPD (chronic obstructive pulmonary disease) ([address]), COPD exacerbation ([address]), COPD with exacerbation ([address]), Echocardiogram, Fatigue, H/O cardiac catheterization, H/O cardiovascular stress test, H/O Doppler ultrasound, H/O Doppler ultrasound, H/O echocardiogram, H/O echocardiogram, H/O echocardiogram, H/O echocardiogram, H/O exercise stress test, History of cardiac cath, History of nuclear stress test, Holter monitor, abnormal, Hyperlipidemia, Hypertension, Iron deficiency anemia, unspecified, Malabsorption syndrome, Normal cardiac stress test, On home O2, PAF (paroxysmal atrial fibrillation) ([address]), PONV (postoperative nausea and vomiting), Syncope and collapse, Tachycardia, and Thyroid disease. Patient  has a past surgical history that includes joint replacement (2004); Hysterectomy (1966); Cholecystectomy (1961); Toe Surgery (Early 2000's); Carpal tunnel release (1990's); Finger surgery; Gastric restriction surgery (1984); hernia repair (unknown); lipectomy (1990's); Dilatation, esophagus; other surgical history (05/01/2012); Hip fracture surgery (Left, 11/04/2015); Colonoscopy; Endoscopy, colon, diagnostic (07/03/2017); Total shoulder arthroplasty (Bilateral, 10/2017); Aortic valve replacement (N/A, 09/11/2019); Aortic valve repair (N/A, 09/09/2019); Breast surgery (2009); Cardiac surgery (1989); Cardiac catheterization (03/02/2011); Kathy-en-Y Gastric Bypass; Tonsillectomy; eye surgery (Bilateral);  Upper gastrointestinal endoscopy (N/A,

## 2023-10-18 NOTE — ED NOTES
ED TO INPATIENT SBAR HANDOFF    Patient Name: Endy Orellana   :  1935  80 y.o. Preferred Name    Family/Caregiver Present yes   Restraints no   C-SSRS: Risk of Suicide: No Risk  Sitter no   Sepsis Risk Score Sepsis Risk Score: 1.48      Situation  Chief Complaint   Patient presents with    Shortness of Breath     Brief Description of Patient's Condition: Pt came to the ED for worsening chest pain after having a watchman and pacemaker placed 10 days ago. Pt having worsening pain with inspiration, unable to controlled in the ED despite pain meds. Mental Status: oriented, alert, coherent, logical, thought processes intact, and able to concentrate and follow conversation  Arrived from: home    Imaging:   CT CHEST PULMONARY EMBOLISM W CONTRAST   Final Result   1. No pulmonary embolism identified. 2. Trace bilateral pleural effusions and mild bibasilar atelectasis. 3. Small pericardial effusion. XR CHEST (2 VW)   Final Result   No acute abnormality.            Abnormal labs:   Abnormal Labs Reviewed   CBC WITH AUTO DIFFERENTIAL - Abnormal; Notable for the following components:       Result Value    RBC 3.10 (*)     Hemoglobin 10.4 (*)     Hematocrit 32.2 (*)     .9 (*)     MCH 33.5 (*)     Monocytes % 12.9 (*)     All other components within normal limits   BASIC METABOLIC PANEL - Abnormal; Notable for the following components:    Sodium 133 (*)     Chloride 98 (*)     Glucose 105 (*)     All other components within normal limits   D-DIMER, RAPID - Abnormal; Notable for the following components:    D-Dimer, Quant 3.25 (*)     All other components within normal limits   TROPONIN - Abnormal; Notable for the following components:    Troponin, High Sensitivity 17 (*)     All other components within normal limits   TROPONIN - Abnormal; Notable for the following components:    Troponin, High Sensitivity 18 (*)     All other components within normal limits       Background  History:   Past Medical

## 2023-10-18 NOTE — H&P
(ROXICODONE) immediate release tablet 5 mg  5 mg Oral Q6H PRN Vanessa MURPHY MD        lidocaine 4 % external patch 1 patch  1 patch TransDERmal Daily Todd Baez MD        methocarbamol (ROBAXIN) tablet 500 mg  500 mg Oral TID Todd Baez MD   500 mg at 10/17/23 2353         Labs      CBC:   Recent Labs     10/17/23  1730 10/18/23  0318   WBC 9.7 11.9*   HGB 10.4* 11.4*    208     BMP:    Recent Labs     10/17/23  1730 10/18/23  0318   * 130*   K 5.0 4.8   CL 98* 95*   CO2 27 24   BUN 11 14   CREATININE 0.7 0.6   GLUCOSE 105* 126*     Imaging/Diagnostics Last 24 Hours   CT CHEST PULMONARY EMBOLISM W CONTRAST    Result Date: 10/17/2023  EXAMINATION: CTA OF THE CHEST 10/17/2023 8:16 pm TECHNIQUE: CTA of the chest was performed after the administration of intravenous contrast.  Multiplanar reformatted images are provided for review. MIP images are provided for review. Automated exposure control, iterative reconstruction, and/or weight based adjustment of the mA/kV was utilized to reduce the radiation dose to as low as reasonably achievable. COMPARISON: Chest radiograph 10/17/2023. CT chest angiogram 03/04/2022. HISTORY: ORDERING SYSTEM PROVIDED HISTORY: r/o PE.  chest pain. recent surgery - wathcman and pacemaker placement TECHNOLOGIST PROVIDED HISTORY: Reason for exam:->r/o PE.  chest pain. recent surgery - wathcman and pacemaker placement Decision Support Exception - unselect if not a suspected or confirmed emergency medical condition->Emergency Medical Condition (MA) Reason for Exam: r/o PE. chest pain. recent surgery - wathcman and pacemaker placement FINDINGS: Pulmonary Arteries: Pulmonary arteries are adequately opacified for evaluation. No evidence of intraluminal filling defect to suggest pulmonary embolism. Main pulmonary artery is normal in caliber. Mediastinum: The thoracic aorta is normal in caliber. Severe atherosclerosis of the descending thoracic aorta.   Status post

## 2023-10-18 NOTE — CONSULTS
7:19 PM   Result Value Ref Range    Troponin, High Sensitivity 18 (H) 0 - 14 ng/L   Basic Metabolic Panel w/ Reflex to MG    Collection Time: 10/18/23  3:18 AM   Result Value Ref Range    Sodium 130 (L) 135 - 145 MMOL/L    Potassium 4.8 3.5 - 5.1 MMOL/L    Chloride 95 (L) 99 - 110 mMol/L    CO2 24 21 - 32 MMOL/L    Anion Gap 11 4 - 16    Glucose 126 (H) 70 - 99 MG/DL    BUN 14 6 - 23 MG/DL    Creatinine 0.6 0.6 - 1.1 MG/DL    Est, Glom Filt Rate >60 >60 mL/min/1.73m2    Calcium 8.7 8.3 - 10.6 MG/DL   CBC with Auto Differential    Collection Time: 10/18/23  3:18 AM   Result Value Ref Range    WBC 11.9 (H) 4.0 - 10.5 K/CU MM    RBC 3.40 (L) 4.2 - 5.4 M/CU MM    Hemoglobin 11.4 (L) 12.5 - 16.0 GM/DL    Hematocrit 37.4 37 - 47 %    .0 (H) 78 - 100 FL    MCH 33.5 (H) 27 - 31 PG    MCHC 30.5 (L) 32.0 - 36.0 %    RDW 14.9 11.7 - 14.9 %    Platelets 021 375 - 259 K/CU MM    MPV 9.9 7.5 - 11.1 FL    Differential Type AUTOMATED DIFFERENTIAL     Segs Relative 68.3 (H) 36 - 66 %    Lymphocytes % 18.6 (L) 24 - 44 %    Monocytes % 11.9 (H) 0 - 4 %    Eosinophils % 0.5 0 - 3 %    Basophils % 0.3 0 - 1 %    Segs Absolute 8.1 K/CU MM    Lymphocytes Absolute 2.2 K/CU MM    Monocytes Absolute 1.4 K/CU MM    Eosinophils Absolute 0.1 K/CU MM    Basophils Absolute 0.0 K/CU MM    Nucleated RBC % 0.0 %    Total Nucleated RBC 0.0 K/CU MM    Total Immature Neutrophil 0.05 K/CU MM    Immature Neutrophil % 0.4 0 - 0.43 %   Protime-INR    Collection Time: 10/18/23  3:18 AM   Result Value Ref Range    Protime 13.8 11.7 - 14.5 SECONDS    INR 1.0 INDEX   EKG 12 lead    Collection Time: 10/18/23  4:45 AM   Result Value Ref Range    Ventricular Rate 60 BPM    Atrial Rate 60 BPM    P-R Interval 174 ms    QRS Duration 74 ms    Q-T Interval 444 ms    QTc Calculation (Bazett) 444 ms    P Axis 53 degrees    R Axis 43 degrees    T Axis 48 degrees    Diagnosis       Normal sinus rhythm  Normal ECG  When compared with ECG of 17-OCT-2023

## 2023-10-18 NOTE — CONSULTS
Chart reviewed full note to follow                      Name:  Alexx Mancera /Age/Sex: 1935  (80 y.o. female)   MRN & CSN:  [id number] & [id number] Admission Date/Time: 10/17/2023  3:59 PM   [facility] PCP: Claudette Bloch, MD       Hospital Day: 2          Referring physician:  Gino Ellis MD         Reason for consultation: Chest pain        Thanks for referral.    Information source: Patient    CC; chest pain      HPI:   Thank you for involving me in taking  care of Alexx Mancera who  is a 80 y. o.year  Old female  Presents with history of valvular heart disease, history of TAVR, history of permanent pacemaker implantation, hyperlipidemia                    Past medical history:    has a past medical history of Arthritis, Asthma, At maximum risk for fall, Blood transfusion, Broken heart syndrome, Cerebral artery occlusion with cerebral infarction ([address]), Chronic bronchitis ([address]), COPD (chronic obstructive pulmonary disease) ([address]), COPD exacerbation ([address]), COPD with exacerbation ([address]), Echocardiogram, Fatigue, H/O cardiac catheterization, H/O cardiovascular stress test, H/O Doppler ultrasound, H/O Doppler ultrasound, H/O echocardiogram, H/O echocardiogram, H/O echocardiogram, H/O echocardiogram, H/O exercise stress test, History of cardiac cath, History of nuclear stress test, Holter monitor, abnormal, Hyperlipidemia, Hypertension, Iron deficiency anemia, unspecified, Malabsorption syndrome, Normal cardiac stress test, On home O2, PAF (paroxysmal atrial fibrillation) ([address]), PONV (postoperative nausea and vomiting), Syncope and collapse, Tachycardia, and Thyroid disease. Past surgical history:   has a past surgical history that includes joint replacement (); Hysterectomy (); Cholecystectomy (); Toe Surgery (Early ); Carpal tunnel release (); Finger surgery; Gastric restriction surgery (); hernia repair (unknown); lipectomy ();  Dilatation, esophagus; other

## 2023-10-19 ENCOUNTER — APPOINTMENT (OUTPATIENT)
Dept: GENERAL RADIOLOGY | Age: 88
DRG: 315 | End: 2023-10-19
Payer: MEDICARE

## 2023-10-19 LAB
ANION GAP SERPL CALCULATED.3IONS-SCNC: 14 MMOL/L (ref 4–16)
BACTERIA: NEGATIVE /HPF
BASOPHILS ABSOLUTE: 0 K/CU MM
BASOPHILS RELATIVE PERCENT: 0.1 % (ref 0–1)
BILIRUBIN URINE: ABNORMAL MG/DL
BLOOD, URINE: NEGATIVE
BUN SERPL-MCNC: 30 MG/DL (ref 6–23)
CALCIUM SERPL-MCNC: 8.1 MG/DL (ref 8.3–10.6)
CHLORIDE BLD-SCNC: 93 MMOL/L (ref 99–110)
CLARITY: ABNORMAL
CO2: 23 MMOL/L (ref 21–32)
COLOR: YELLOW
CREAT SERPL-MCNC: 1.4 MG/DL (ref 0.6–1.1)
DIFFERENTIAL TYPE: ABNORMAL
EOSINOPHILS ABSOLUTE: 0 K/CU MM
EOSINOPHILS RELATIVE PERCENT: 0 % (ref 0–3)
FOLATE SERPL-MCNC: >20 NG/ML (ref 3.1–17.5)
GFR SERPL CREATININE-BSD FRML MDRD: 36 ML/MIN/1.73M2
GLUCOSE SERPL-MCNC: 178 MG/DL (ref 70–99)
GLUCOSE, URINE: NEGATIVE MG/DL
HCT VFR BLD CALC: 31.9 % (ref 37–47)
HEMOGLOBIN: 10.5 GM/DL (ref 12.5–16)
HYALINE CASTS: >20 /LPF
IMMATURE NEUTROPHIL %: 0.5 % (ref 0–0.43)
KETONES, URINE: NEGATIVE MG/DL
LEUKOCYTE ESTERASE, URINE: NEGATIVE
LYMPHOCYTES ABSOLUTE: 2.4 K/CU MM
LYMPHOCYTES RELATIVE PERCENT: 15.4 % (ref 24–44)
MCH RBC QN AUTO: 33.4 PG (ref 27–31)
MCHC RBC AUTO-ENTMCNC: 32.9 % (ref 32–36)
MCV RBC AUTO: 101.6 FL (ref 78–100)
MONOCYTES ABSOLUTE: 1.8 K/CU MM
MONOCYTES RELATIVE PERCENT: 11.4 % (ref 0–4)
MUCUS: ABNORMAL HPF
NITRITE URINE, QUANTITATIVE: NEGATIVE
NUCLEATED RBC %: 0 %
PDW BLD-RTO: 14.4 % (ref 11.7–14.9)
PH, URINE: 5 (ref 5–8)
PLATELET # BLD: 257 K/CU MM (ref 140–440)
PMV BLD AUTO: 10.5 FL (ref 7.5–11.1)
POTASSIUM SERPL-SCNC: 5 MMOL/L (ref 3.5–5.1)
PROTEIN UA: NEGATIVE MG/DL
RBC # BLD: 3.14 M/CU MM (ref 4.2–5.4)
RBC URINE: 3 /HPF (ref 0–6)
SEGMENTED NEUTROPHILS ABSOLUTE COUNT: 11.3 K/CU MM
SEGMENTED NEUTROPHILS RELATIVE PERCENT: 72.6 % (ref 36–66)
SODIUM BLD-SCNC: 130 MMOL/L (ref 135–145)
SPECIFIC GRAVITY UA: 1.02 (ref 1–1.03)
SQUAMOUS EPITHELIAL: 1 /HPF
TOTAL IMMATURE NEUTOROPHIL: 0.08 K/CU MM
TOTAL NUCLEATED RBC: 0 K/CU MM
TRICHOMONAS: ABNORMAL /HPF
TROPONIN, HIGH SENSITIVITY: 24 NG/L (ref 0–14)
UROBILINOGEN, URINE: 0.2 MG/DL (ref 0.2–1)
VITAMIN B-12: 1695 PG/ML (ref 211–911)
WBC # BLD: 15.6 K/CU MM (ref 4–10.5)
WBC UA: 3 /HPF (ref 0–5)

## 2023-10-19 PROCEDURE — 74018 RADEX ABDOMEN 1 VIEW: CPT

## 2023-10-19 PROCEDURE — 76937 US GUIDE VASCULAR ACCESS: CPT

## 2023-10-19 PROCEDURE — 36415 COLL VENOUS BLD VENIPUNCTURE: CPT

## 2023-10-19 PROCEDURE — 97530 THERAPEUTIC ACTIVITIES: CPT

## 2023-10-19 PROCEDURE — 2580000003 HC RX 258: Performed by: STUDENT IN AN ORGANIZED HEALTH CARE EDUCATION/TRAINING PROGRAM

## 2023-10-19 PROCEDURE — 1200000000 HC SEMI PRIVATE

## 2023-10-19 PROCEDURE — 6370000000 HC RX 637 (ALT 250 FOR IP): Performed by: NURSE PRACTITIONER

## 2023-10-19 PROCEDURE — 99231 SBSQ HOSP IP/OBS SF/LOW 25: CPT | Performed by: INTERNAL MEDICINE

## 2023-10-19 PROCEDURE — 84484 ASSAY OF TROPONIN QUANT: CPT

## 2023-10-19 PROCEDURE — 80048 BASIC METABOLIC PNL TOTAL CA: CPT

## 2023-10-19 PROCEDURE — 85025 COMPLETE CBC W/AUTO DIFF WBC: CPT

## 2023-10-19 PROCEDURE — 93308 TTE F-UP OR LMTD: CPT

## 2023-10-19 PROCEDURE — 6370000000 HC RX 637 (ALT 250 FOR IP): Performed by: STUDENT IN AN ORGANIZED HEALTH CARE EDUCATION/TRAINING PROGRAM

## 2023-10-19 PROCEDURE — 6370000000 HC RX 637 (ALT 250 FOR IP): Performed by: INTERNAL MEDICINE

## 2023-10-19 PROCEDURE — 94640 AIRWAY INHALATION TREATMENT: CPT

## 2023-10-19 PROCEDURE — 97535 SELF CARE MNGMENT TRAINING: CPT

## 2023-10-19 PROCEDURE — 96361 HYDRATE IV INFUSION ADD-ON: CPT

## 2023-10-19 PROCEDURE — G0378 HOSPITAL OBSERVATION PER HR: HCPCS

## 2023-10-19 PROCEDURE — 2580000003 HC RX 258: Performed by: PHYSICIAN ASSISTANT

## 2023-10-19 PROCEDURE — 99232 SBSQ HOSP IP/OBS MODERATE 35: CPT | Performed by: INTERNAL MEDICINE

## 2023-10-19 PROCEDURE — 82746 ASSAY OF FOLIC ACID SERUM: CPT

## 2023-10-19 PROCEDURE — 81001 URINALYSIS AUTO W/SCOPE: CPT

## 2023-10-19 PROCEDURE — 94761 N-INVAS EAR/PLS OXIMETRY MLT: CPT

## 2023-10-19 PROCEDURE — 82607 VITAMIN B-12: CPT

## 2023-10-19 RX ORDER — ENOXAPARIN SODIUM 100 MG/ML
30 INJECTION SUBCUTANEOUS DAILY
Status: DISCONTINUED | OUTPATIENT
Start: 2023-10-20 | End: 2023-10-20

## 2023-10-19 RX ADMIN — SODIUM CHLORIDE: 9 INJECTION, SOLUTION INTRAVENOUS at 20:53

## 2023-10-19 RX ADMIN — OXYCODONE HYDROCHLORIDE 5 MG: 5 TABLET ORAL at 09:35

## 2023-10-19 RX ADMIN — MONTELUKAST 10 MG: 10 TABLET, FILM COATED ORAL at 20:44

## 2023-10-19 RX ADMIN — METHOCARBAMOL TABLETS 500 MG: 500 TABLET, COATED ORAL at 20:44

## 2023-10-19 RX ADMIN — OXYCODONE HYDROCHLORIDE 5 MG: 5 TABLET ORAL at 00:20

## 2023-10-19 RX ADMIN — SODIUM CHLORIDE, PRESERVATIVE FREE 10 ML: 5 INJECTION INTRAVENOUS at 09:35

## 2023-10-19 RX ADMIN — ONDANSETRON 4 MG: 4 TABLET, ORALLY DISINTEGRATING ORAL at 00:20

## 2023-10-19 RX ADMIN — SODIUM CHLORIDE, PRESERVATIVE FREE 10 ML: 5 INJECTION INTRAVENOUS at 20:45

## 2023-10-19 RX ADMIN — COLCHICINE 0.6 MG: 0.6 TABLET ORAL at 20:44

## 2023-10-19 RX ADMIN — METHOCARBAMOL TABLETS 500 MG: 500 TABLET, COATED ORAL at 09:33

## 2023-10-19 RX ADMIN — CLOPIDOGREL BISULFATE 75 MG: 75 TABLET ORAL at 09:33

## 2023-10-19 RX ADMIN — METHOCARBAMOL TABLETS 500 MG: 500 TABLET, COATED ORAL at 15:27

## 2023-10-19 RX ADMIN — ESCITALOPRAM OXALATE 20 MG: 10 TABLET ORAL at 09:33

## 2023-10-19 RX ADMIN — COLCHICINE 0.6 MG: 0.6 TABLET ORAL at 09:33

## 2023-10-19 RX ADMIN — IPRATROPIUM BROMIDE AND ALBUTEROL SULFATE 1 DOSE: 2.5; .5 SOLUTION RESPIRATORY (INHALATION) at 21:52

## 2023-10-19 RX ADMIN — DILTIAZEM HYDROCHLORIDE 120 MG: 120 CAPSULE, COATED, EXTENDED RELEASE ORAL at 09:33

## 2023-10-19 RX ADMIN — TIMOLOL MALEATE 1 DROP: 5 SOLUTION OPHTHALMIC at 13:40

## 2023-10-19 RX ADMIN — OXYCODONE HYDROCHLORIDE 5 MG: 5 TABLET ORAL at 20:45

## 2023-10-19 RX ADMIN — ATORVASTATIN CALCIUM 40 MG: 40 TABLET, FILM COATED ORAL at 20:44

## 2023-10-19 RX ADMIN — ASPIRIN 81 MG CHEWABLE TABLET 81 MG: 81 TABLET CHEWABLE at 09:33

## 2023-10-19 ASSESSMENT — PAIN DESCRIPTION - DESCRIPTORS
DESCRIPTORS: ACHING
DESCRIPTORS: ACHING
DESCRIPTORS: ACHING;DISCOMFORT

## 2023-10-19 ASSESSMENT — PAIN DESCRIPTION - LOCATION
LOCATION: CHEST
LOCATION: BACK;CHEST
LOCATION: CHEST;SHOULDER

## 2023-10-19 ASSESSMENT — PAIN SCALES - GENERAL
PAINLEVEL_OUTOF10: 7
PAINLEVEL_OUTOF10: 5
PAINLEVEL_OUTOF10: 3
PAINLEVEL_OUTOF10: 5
PAINLEVEL_OUTOF10: 3

## 2023-10-19 ASSESSMENT — PAIN DESCRIPTION - ORIENTATION
ORIENTATION: POSTERIOR
ORIENTATION: LEFT
ORIENTATION: RIGHT;LEFT

## 2023-10-19 ASSESSMENT — PAIN DESCRIPTION - PAIN TYPE
TYPE: ACUTE PAIN
TYPE: ACUTE PAIN

## 2023-10-19 ASSESSMENT — PAIN DESCRIPTION - FREQUENCY
FREQUENCY: CONTINUOUS
FREQUENCY: CONTINUOUS

## 2023-10-19 ASSESSMENT — PAIN DESCRIPTION - ONSET
ONSET: ON-GOING
ONSET: ON-GOING

## 2023-10-19 NOTE — CONSULTS
Consult completed. Nexiva 20g 1.75 inch peripheral IV initiated into right forearm x 1 attempt with ultrasound guidance. Brisk blood return, flushes without resistance. Labs drawn x2 times and sent to lab. Patient tolerated well. Primary nurse Vinicio Sanchez RN notified. Please consult IV/PICC team if patient's needs change, questions, or concerns.

## 2023-10-19 NOTE — PLAN OF CARE
Problem: Safety - Adult  Goal: Free from fall injury  Outcome: Progressing     Problem: ABCDS Injury Assessment  Goal: Absence of physical injury  Outcome: Progressing     Problem: Pain  Goal: Verbalizes/displays adequate comfort level or baseline comfort level  Outcome: Not Progressing

## 2023-10-19 NOTE — CARE COORDINATION
CM in to see Pt to follow up on discharge planning. Pt agreeable to therapy recommendation of inpatient rehab.      CM call to [facility] with referral.

## 2023-10-20 LAB
ANION GAP SERPL CALCULATED.3IONS-SCNC: 10 MMOL/L (ref 4–16)
BASOPHILS ABSOLUTE: 0 K/CU MM
BASOPHILS RELATIVE PERCENT: 0.2 % (ref 0–1)
BUN SERPL-MCNC: 28 MG/DL (ref 6–23)
CALCIUM SERPL-MCNC: 7.6 MG/DL (ref 8.3–10.6)
CHLORIDE BLD-SCNC: 99 MMOL/L (ref 99–110)
CO2: 23 MMOL/L (ref 21–32)
CREAT SERPL-MCNC: 0.8 MG/DL (ref 0.6–1.1)
DIFFERENTIAL TYPE: ABNORMAL
EOSINOPHILS ABSOLUTE: 0.1 K/CU MM
EOSINOPHILS RELATIVE PERCENT: 1.1 % (ref 0–3)
GFR SERPL CREATININE-BSD FRML MDRD: >60 ML/MIN/1.73M2
GLUCOSE SERPL-MCNC: 113 MG/DL (ref 70–99)
HCT VFR BLD CALC: 30 % (ref 37–47)
HEMOGLOBIN: 9.5 GM/DL (ref 12.5–16)
IMMATURE NEUTROPHIL %: 0.6 % (ref 0–0.43)
LYMPHOCYTES ABSOLUTE: 1.9 K/CU MM
LYMPHOCYTES RELATIVE PERCENT: 15.6 % (ref 24–44)
MCH RBC QN AUTO: 33.5 PG (ref 27–31)
MCHC RBC AUTO-ENTMCNC: 31.7 % (ref 32–36)
MCV RBC AUTO: 105.6 FL (ref 78–100)
MONOCYTES ABSOLUTE: 2.1 K/CU MM
MONOCYTES RELATIVE PERCENT: 16.9 % (ref 0–4)
NUCLEATED RBC %: 0 %
PDW BLD-RTO: 14.5 % (ref 11.7–14.9)
PLATELET # BLD: 211 K/CU MM (ref 140–440)
PMV BLD AUTO: 10 FL (ref 7.5–11.1)
POTASSIUM SERPL-SCNC: 4.5 MMOL/L (ref 3.5–5.1)
RBC # BLD: 2.84 M/CU MM (ref 4.2–5.4)
SEGMENTED NEUTROPHILS ABSOLUTE COUNT: 8.2 K/CU MM
SEGMENTED NEUTROPHILS RELATIVE PERCENT: 65.6 % (ref 36–66)
SODIUM BLD-SCNC: 132 MMOL/L (ref 135–145)
TOTAL IMMATURE NEUTOROPHIL: 0.08 K/CU MM
TOTAL NUCLEATED RBC: 0 K/CU MM
WBC # BLD: 12.5 K/CU MM (ref 4–10.5)

## 2023-10-20 PROCEDURE — 94640 AIRWAY INHALATION TREATMENT: CPT

## 2023-10-20 PROCEDURE — 80048 BASIC METABOLIC PNL TOTAL CA: CPT

## 2023-10-20 PROCEDURE — 6360000002 HC RX W HCPCS: Performed by: INTERNAL MEDICINE

## 2023-10-20 PROCEDURE — 99233 SBSQ HOSP IP/OBS HIGH 50: CPT | Performed by: INTERNAL MEDICINE

## 2023-10-20 PROCEDURE — 2580000003 HC RX 258: Performed by: STUDENT IN AN ORGANIZED HEALTH CARE EDUCATION/TRAINING PROGRAM

## 2023-10-20 PROCEDURE — 2580000003 HC RX 258: Performed by: PHYSICIAN ASSISTANT

## 2023-10-20 PROCEDURE — 1200000000 HC SEMI PRIVATE

## 2023-10-20 PROCEDURE — 6370000000 HC RX 637 (ALT 250 FOR IP): Performed by: INTERNAL MEDICINE

## 2023-10-20 PROCEDURE — 6370000000 HC RX 637 (ALT 250 FOR IP): Performed by: NURSE PRACTITIONER

## 2023-10-20 PROCEDURE — 83036 HEMOGLOBIN GLYCOSYLATED A1C: CPT

## 2023-10-20 PROCEDURE — 94761 N-INVAS EAR/PLS OXIMETRY MLT: CPT

## 2023-10-20 PROCEDURE — 36415 COLL VENOUS BLD VENIPUNCTURE: CPT

## 2023-10-20 PROCEDURE — 6370000000 HC RX 637 (ALT 250 FOR IP): Performed by: STUDENT IN AN ORGANIZED HEALTH CARE EDUCATION/TRAINING PROGRAM

## 2023-10-20 PROCEDURE — 94664 DEMO&/EVAL PT USE INHALER: CPT

## 2023-10-20 PROCEDURE — 84145 PROCALCITONIN (PCT): CPT

## 2023-10-20 PROCEDURE — 99232 SBSQ HOSP IP/OBS MODERATE 35: CPT | Performed by: NURSE PRACTITIONER

## 2023-10-20 PROCEDURE — 93308 TTE F-UP OR LMTD: CPT

## 2023-10-20 PROCEDURE — 85025 COMPLETE CBC W/AUTO DIFF WBC: CPT

## 2023-10-20 RX ORDER — ENOXAPARIN SODIUM 100 MG/ML
40 INJECTION SUBCUTANEOUS DAILY
Status: DISCONTINUED | OUTPATIENT
Start: 2023-10-21 | End: 2023-10-25 | Stop reason: HOSPADM

## 2023-10-20 RX ADMIN — METHOCARBAMOL TABLETS 500 MG: 500 TABLET, COATED ORAL at 21:12

## 2023-10-20 RX ADMIN — TIMOLOL MALEATE 1 DROP: 5 SOLUTION OPHTHALMIC at 08:56

## 2023-10-20 RX ADMIN — SODIUM CHLORIDE, PRESERVATIVE FREE 10 ML: 5 INJECTION INTRAVENOUS at 21:16

## 2023-10-20 RX ADMIN — ENOXAPARIN SODIUM 30 MG: 100 INJECTION SUBCUTANEOUS at 08:50

## 2023-10-20 RX ADMIN — ESCITALOPRAM OXALATE 20 MG: 10 TABLET ORAL at 08:49

## 2023-10-20 RX ADMIN — IPRATROPIUM BROMIDE AND ALBUTEROL SULFATE 1 DOSE: 2.5; .5 SOLUTION RESPIRATORY (INHALATION) at 08:38

## 2023-10-20 RX ADMIN — ALBUTEROL SULFATE 2 PUFF: 90 AEROSOL, METERED RESPIRATORY (INHALATION) at 14:23

## 2023-10-20 RX ADMIN — POLYETHYLENE GLYCOL (3350) 17 G: 17 POWDER, FOR SOLUTION ORAL at 12:16

## 2023-10-20 RX ADMIN — CLOPIDOGREL BISULFATE 75 MG: 75 TABLET ORAL at 08:49

## 2023-10-20 RX ADMIN — COLCHICINE 0.6 MG: 0.6 TABLET ORAL at 21:13

## 2023-10-20 RX ADMIN — SODIUM CHLORIDE: 9 INJECTION, SOLUTION INTRAVENOUS at 07:13

## 2023-10-20 RX ADMIN — PANTOPRAZOLE SODIUM 20 MG: 20 TABLET, DELAYED RELEASE ORAL at 06:09

## 2023-10-20 RX ADMIN — METHOCARBAMOL TABLETS 500 MG: 500 TABLET, COATED ORAL at 15:04

## 2023-10-20 RX ADMIN — METHOCARBAMOL TABLETS 500 MG: 500 TABLET, COATED ORAL at 08:49

## 2023-10-20 RX ADMIN — MONTELUKAST 10 MG: 10 TABLET, FILM COATED ORAL at 21:13

## 2023-10-20 RX ADMIN — LEVOTHYROXINE SODIUM 75 MCG: 0.07 TABLET ORAL at 06:09

## 2023-10-20 RX ADMIN — COLCHICINE 0.6 MG: 0.6 TABLET ORAL at 09:42

## 2023-10-20 RX ADMIN — ATORVASTATIN CALCIUM 40 MG: 40 TABLET, FILM COATED ORAL at 21:13

## 2023-10-20 RX ADMIN — ASPIRIN 81 MG CHEWABLE TABLET 81 MG: 81 TABLET CHEWABLE at 08:50

## 2023-10-20 ASSESSMENT — PAIN SCALES - GENERAL
PAINLEVEL_OUTOF10: 5
PAINLEVEL_OUTOF10: 8

## 2023-10-20 ASSESSMENT — PAIN DESCRIPTION - LOCATION
LOCATION: CHEST
LOCATION: BACK;CHEST

## 2023-10-20 ASSESSMENT — PAIN DESCRIPTION - ORIENTATION: ORIENTATION: MID

## 2023-10-20 ASSESSMENT — PAIN DESCRIPTION - PAIN TYPE: TYPE: ACUTE PAIN

## 2023-10-20 NOTE — CARE COORDINATION
Pt's dght requested LSW make a  referral to [facility] for acute rehab. LSW made referral to Kyra at [facility].

## 2023-10-20 NOTE — CARE COORDINATION
Reviewed patients clinicals and PT/OT notes. Patients primary insurance is [facility]. Per [facility] guidelines patient does not meet ARU criteria d/t lack of medical complexity. Discussed with Ivonne Gomez.

## 2023-10-20 NOTE — CONSULTS
Nephrology Service Consultation      [address], [address], [address] [place] [address]  Phone: [phone]  Office Hours: 8:30AM - 4:30PM  Monday - Friday        MEDICAL DECISION MAKING and Recommendations     -SOPHIE likely from hypoperfusion; cr 1.4 and now down to wnl at 0.8 which is her baseline//UA with >20 hyaline casts, 3rbcs and 3wbcs  -Hyponatremia; better at 132  -Chest pain/back pain/pericardial effusion   -s/p watchman procedure    Suggest;  -SOPHIE has resolved  -Sodium level close to normal now, let her eat a regular diet and avoid overdrinking fluids  -Ok to resume losartan on dc since SOPHIE has resolved  -Signing off today, call me back if needed    Thank you          Patient Active Problem List    Diagnosis Date Noted    SOBOE (shortness of breath on exertion) 03/21/2023    Anxiety disorder 03/08/2023    Chest pain 03/08/2023    Generalized weakness 01/04/2023    Bradycardia 12/07/2022    PAD (peripheral artery disease) ([address]) 12/07/2022    Other fatigue 12/07/2022    Mixed hyperlipidemia 09/02/2022    Acute thoracic back pain, unspecified back pain laterality 10/17/2023    Presence of Watchman left atrial appendage closure device 10/12/2023    Secondary hypercoagulable state ([address]) 10/04/2023    At maximum risk for fall 10/04/2023    PAF (paroxysmal atrial fibrillation) ([address]) 09/16/2023    Hypertensive urgency 09/04/2023    Benign neoplasm of transverse colon 06/12/2023    Iron deficiency anemia, unspecified 05/04/2023    Malabsorption syndrome 05/04/2023    Thyroid disease     Current mild episode of major depressive disorder without prior episode ([address]) 01/12/2022    Atherosclerosis of aorta ([address]) 01/12/2022    Ataxia     Dysarthria due to acute stroke [facility])     Dysthymia     Cerebrovascular accident (CVA) due to embolism of cerebral artery ([address])     Other seizures ([address])     Metabolic encephalopathy     Acute respiratory failure ([address]) 11/14/2021    Cerebral artery occlusion with cerebral

## 2023-10-20 NOTE — CARE COORDINATION
LSW spoke with [facility] with [facility]. Pt does not meet the medical criteria for gong to ARU per [employer] guidelines, due to lack of medical complexity. LSW will talk with pt regarding this and talk with her about SNF placement.

## 2023-10-20 NOTE — CARE COORDINATION
LSW spoke with pt regarding [facility]UR and that tp ortega snot meet the medical criteria to go to ARU. Pt requested that this LSW call her dght. LSW called pt dght Tommie Weller and gave her the above info. LSW spoke with Tommie Weller about our [facility] in [facility]. Dght stated that she lives in [place] and her  is in the [facility] rehab in [place] and would like her mother to come to the [facility]. LSW explained that to dght that [facility] is a acute rehab and pt is nt meeting the medical criteria. Tommie Weller stated she would like to try. LSw called admissions with [facility] and spoke with Nayan Wilson [phone] or [phone]. Alyssa stated she will look over pt info and if she feels pt will meet she will start the precert. LSW faxed pt info to [phone].

## 2023-10-21 LAB — GLUCOSE BLD-MCNC: 111 MG/DL (ref 70–99)

## 2023-10-21 PROCEDURE — APPSS30 APP SPLIT SHARED TIME 16-30 MINUTES

## 2023-10-21 PROCEDURE — 94761 N-INVAS EAR/PLS OXIMETRY MLT: CPT

## 2023-10-21 PROCEDURE — 97535 SELF CARE MNGMENT TRAINING: CPT

## 2023-10-21 PROCEDURE — 99232 SBSQ HOSP IP/OBS MODERATE 35: CPT | Performed by: INTERNAL MEDICINE

## 2023-10-21 PROCEDURE — 6370000000 HC RX 637 (ALT 250 FOR IP): Performed by: NURSE PRACTITIONER

## 2023-10-21 PROCEDURE — 97530 THERAPEUTIC ACTIVITIES: CPT

## 2023-10-21 PROCEDURE — 6370000000 HC RX 637 (ALT 250 FOR IP): Performed by: STUDENT IN AN ORGANIZED HEALTH CARE EDUCATION/TRAINING PROGRAM

## 2023-10-21 PROCEDURE — 6370000000 HC RX 637 (ALT 250 FOR IP): Performed by: INTERNAL MEDICINE

## 2023-10-21 PROCEDURE — 82962 GLUCOSE BLOOD TEST: CPT

## 2023-10-21 PROCEDURE — 2580000003 HC RX 258: Performed by: STUDENT IN AN ORGANIZED HEALTH CARE EDUCATION/TRAINING PROGRAM

## 2023-10-21 PROCEDURE — 6360000002 HC RX W HCPCS

## 2023-10-21 PROCEDURE — 94640 AIRWAY INHALATION TREATMENT: CPT

## 2023-10-21 PROCEDURE — 1200000000 HC SEMI PRIVATE

## 2023-10-21 PROCEDURE — 6370000000 HC RX 637 (ALT 250 FOR IP)

## 2023-10-21 PROCEDURE — 97116 GAIT TRAINING THERAPY: CPT

## 2023-10-21 RX ORDER — FUROSEMIDE 20 MG/1
20 TABLET ORAL DAILY
Status: DISCONTINUED | OUTPATIENT
Start: 2023-10-21 | End: 2023-10-25 | Stop reason: HOSPADM

## 2023-10-21 RX ADMIN — OXYCODONE HYDROCHLORIDE 5 MG: 5 TABLET ORAL at 14:03

## 2023-10-21 RX ADMIN — ENOXAPARIN SODIUM 40 MG: 100 INJECTION SUBCUTANEOUS at 10:09

## 2023-10-21 RX ADMIN — PANTOPRAZOLE SODIUM 20 MG: 20 TABLET, DELAYED RELEASE ORAL at 06:07

## 2023-10-21 RX ADMIN — COLCHICINE 0.6 MG: 0.6 TABLET ORAL at 20:20

## 2023-10-21 RX ADMIN — TIMOLOL MALEATE 1 DROP: 5 SOLUTION OPHTHALMIC at 14:02

## 2023-10-21 RX ADMIN — COLCHICINE 0.6 MG: 0.6 TABLET ORAL at 10:08

## 2023-10-21 RX ADMIN — DILTIAZEM HYDROCHLORIDE 120 MG: 120 CAPSULE, COATED, EXTENDED RELEASE ORAL at 10:10

## 2023-10-21 RX ADMIN — LEVOTHYROXINE SODIUM 75 MCG: 0.07 TABLET ORAL at 06:07

## 2023-10-21 RX ADMIN — SODIUM CHLORIDE, PRESERVATIVE FREE 5 ML: 5 INJECTION INTRAVENOUS at 10:08

## 2023-10-21 RX ADMIN — CLOPIDOGREL BISULFATE 75 MG: 75 TABLET ORAL at 10:08

## 2023-10-21 RX ADMIN — OXYCODONE HYDROCHLORIDE 5 MG: 5 TABLET ORAL at 22:15

## 2023-10-21 RX ADMIN — OXYCODONE HYDROCHLORIDE 5 MG: 5 TABLET ORAL at 01:27

## 2023-10-21 RX ADMIN — METHOCARBAMOL TABLETS 500 MG: 500 TABLET, COATED ORAL at 10:08

## 2023-10-21 RX ADMIN — FUROSEMIDE 20 MG: 20 TABLET ORAL at 10:08

## 2023-10-21 RX ADMIN — ATORVASTATIN CALCIUM 40 MG: 40 TABLET, FILM COATED ORAL at 20:17

## 2023-10-21 RX ADMIN — SODIUM CHLORIDE, PRESERVATIVE FREE 10 ML: 5 INJECTION INTRAVENOUS at 20:18

## 2023-10-21 RX ADMIN — IPRATROPIUM BROMIDE AND ALBUTEROL SULFATE 1 DOSE: 2.5; .5 SOLUTION RESPIRATORY (INHALATION) at 09:15

## 2023-10-21 RX ADMIN — ASPIRIN 81 MG CHEWABLE TABLET 81 MG: 81 TABLET CHEWABLE at 10:08

## 2023-10-21 RX ADMIN — ESCITALOPRAM OXALATE 20 MG: 10 TABLET ORAL at 10:07

## 2023-10-21 RX ADMIN — MONTELUKAST 10 MG: 10 TABLET, FILM COATED ORAL at 20:18

## 2023-10-21 ASSESSMENT — PAIN DESCRIPTION - ORIENTATION
ORIENTATION: MID

## 2023-10-21 ASSESSMENT — PAIN SCALES - GENERAL
PAINLEVEL_OUTOF10: 1
PAINLEVEL_OUTOF10: 2
PAINLEVEL_OUTOF10: 4
PAINLEVEL_OUTOF10: 5
PAINLEVEL_OUTOF10: 2
PAINLEVEL_OUTOF10: 7

## 2023-10-21 ASSESSMENT — PAIN DESCRIPTION - PAIN TYPE
TYPE: ACUTE PAIN

## 2023-10-21 ASSESSMENT — PAIN DESCRIPTION - LOCATION
LOCATION: CHEST

## 2023-10-21 ASSESSMENT — PAIN DESCRIPTION - DESCRIPTORS
DESCRIPTORS: ACHING;DISCOMFORT
DESCRIPTORS: HEAVINESS
DESCRIPTORS: ACHING
DESCRIPTORS: ACHING

## 2023-10-21 ASSESSMENT — PAIN SCALES - WONG BAKER
WONGBAKER_NUMERICALRESPONSE: 2
WONGBAKER_NUMERICALRESPONSE: 0

## 2023-10-21 ASSESSMENT — PAIN DESCRIPTION - FREQUENCY: FREQUENCY: INTERMITTENT

## 2023-10-21 NOTE — FLOWSHEET NOTE
Physical Therapy Treatment Note  Name: Reji De La Cruz MRN: [id number] :   1935   Date:  10/21/2023   Admission Date: 10/17/2023 [facility]   Restrictions/Precautions:          General precautions; fall risk     Communication with other providers:  PT ok per nsg Adiel Loco); Spoke with [place] regarding pt's a-fib, HR, O2 sats and symptoms. Subjective:  Patient states:  \"I am feeling better today. \"   Pain:   Location, Type, Intensity (0/10 to 10/10):  0/10     Objective:    Observation:  pt seen supine in bed at beginning of treatment. Agreeable to therapy. Pt left in semi-nolasco's position in bed with call light and bed alarm at end of treatment. Objective Measures:  HR 62 bpm; pt remained in a-fib while ambulating; O2 sats were 95%; O2 sats dropped to 85% at times during gait, then recovered to 95% after pursed lip breathing. Treatment, including education/measures:    Transfers:  Supine->sit: Mod I with bed rail and bed features  Sit->supine: mod I with bed rail and bed features     Gait: pt amb 20'+20' CGA with RW. Pt wanted to walk a 3rd time, but reported that her chest felt heavy. Returned patient to bed and notified [facility]. Education: importance of easing back slowly into activity and paying attention to her symptoms for safety. Assessment / Impression:    Patient's tolerance of treatment:  Fair    Adverse Reaction: no  Significant change in status and impact:  no  Barriers to improvement:  Decreased overall endurance    Plan for Next Session:    Gait (to patient's tolerance) (monitor vitals and symptoms);    Time in:  1324  Time out:  1355  Timed treatment minutes:  31  Total treatment time:  31    Previously filed items:  Social/Functional History  Lives With: Alone  Type of Home: Mobile home  Home Layout: One level  Home Access: Level entry (threshold)  Bathroom Shower/Tub: Walk-in shower, Shower chair without back  Bathroom Toilet: Handicap height  Bathroom Equipment: Grab

## 2023-10-21 NOTE — CARE COORDINATION
CM attempted to call Alyssa at both numbers listed in previous CM note. No answer on either one. Left secure VM requesting call back with referral status today. Pt is medically ready per latest NP progress note. Waiting on placement.

## 2023-10-22 LAB
ANION GAP SERPL CALCULATED.3IONS-SCNC: 10 MMOL/L (ref 4–16)
BUN SERPL-MCNC: 11 MG/DL (ref 6–23)
CALCIUM SERPL-MCNC: 8 MG/DL (ref 8.3–10.6)
CHLORIDE BLD-SCNC: 100 MMOL/L (ref 99–110)
CO2: 27 MMOL/L (ref 21–32)
CREAT SERPL-MCNC: 0.5 MG/DL (ref 0.6–1.1)
GFR SERPL CREATININE-BSD FRML MDRD: >60 ML/MIN/1.73M2
GLUCOSE BLD-MCNC: 159 MG/DL (ref 70–99)
GLUCOSE SERPL-MCNC: 97 MG/DL (ref 70–99)
POTASSIUM SERPL-SCNC: 4.4 MMOL/L (ref 3.5–5.1)
SODIUM BLD-SCNC: 137 MMOL/L (ref 135–145)

## 2023-10-22 PROCEDURE — 94640 AIRWAY INHALATION TREATMENT: CPT

## 2023-10-22 PROCEDURE — 6370000000 HC RX 637 (ALT 250 FOR IP): Performed by: NURSE PRACTITIONER

## 2023-10-22 PROCEDURE — 80048 BASIC METABOLIC PNL TOTAL CA: CPT

## 2023-10-22 PROCEDURE — 6370000000 HC RX 637 (ALT 250 FOR IP): Performed by: STUDENT IN AN ORGANIZED HEALTH CARE EDUCATION/TRAINING PROGRAM

## 2023-10-22 PROCEDURE — 36415 COLL VENOUS BLD VENIPUNCTURE: CPT

## 2023-10-22 PROCEDURE — 1200000000 HC SEMI PRIVATE

## 2023-10-22 PROCEDURE — 6360000002 HC RX W HCPCS

## 2023-10-22 PROCEDURE — 6360000002 HC RX W HCPCS: Performed by: STUDENT IN AN ORGANIZED HEALTH CARE EDUCATION/TRAINING PROGRAM

## 2023-10-22 PROCEDURE — 6370000000 HC RX 637 (ALT 250 FOR IP)

## 2023-10-22 PROCEDURE — 94761 N-INVAS EAR/PLS OXIMETRY MLT: CPT

## 2023-10-22 PROCEDURE — 6370000000 HC RX 637 (ALT 250 FOR IP): Performed by: INTERNAL MEDICINE

## 2023-10-22 PROCEDURE — 82962 GLUCOSE BLOOD TEST: CPT

## 2023-10-22 PROCEDURE — 2580000003 HC RX 258: Performed by: STUDENT IN AN ORGANIZED HEALTH CARE EDUCATION/TRAINING PROGRAM

## 2023-10-22 RX ADMIN — COLCHICINE 0.6 MG: 0.6 TABLET ORAL at 11:18

## 2023-10-22 RX ADMIN — IPRATROPIUM BROMIDE AND ALBUTEROL SULFATE 1 DOSE: 2.5; .5 SOLUTION RESPIRATORY (INHALATION) at 03:36

## 2023-10-22 RX ADMIN — ASPIRIN 81 MG CHEWABLE TABLET 81 MG: 81 TABLET CHEWABLE at 11:16

## 2023-10-22 RX ADMIN — COLCHICINE 0.6 MG: 0.6 TABLET ORAL at 20:57

## 2023-10-22 RX ADMIN — ONDANSETRON 4 MG: 2 INJECTION INTRAMUSCULAR; INTRAVENOUS at 19:27

## 2023-10-22 RX ADMIN — PANTOPRAZOLE SODIUM 20 MG: 20 TABLET, DELAYED RELEASE ORAL at 06:12

## 2023-10-22 RX ADMIN — SODIUM CHLORIDE, PRESERVATIVE FREE 10 ML: 5 INJECTION INTRAVENOUS at 11:27

## 2023-10-22 RX ADMIN — ENOXAPARIN SODIUM 40 MG: 100 INJECTION SUBCUTANEOUS at 11:18

## 2023-10-22 RX ADMIN — TIMOLOL MALEATE 1 DROP: 5 SOLUTION OPHTHALMIC at 11:28

## 2023-10-22 RX ADMIN — METHOCARBAMOL TABLETS 500 MG: 500 TABLET, COATED ORAL at 11:16

## 2023-10-22 RX ADMIN — ESCITALOPRAM OXALATE 20 MG: 10 TABLET ORAL at 11:16

## 2023-10-22 RX ADMIN — MONTELUKAST 10 MG: 10 TABLET, FILM COATED ORAL at 20:56

## 2023-10-22 RX ADMIN — SODIUM CHLORIDE, PRESERVATIVE FREE 10 ML: 5 INJECTION INTRAVENOUS at 20:58

## 2023-10-22 RX ADMIN — CLOPIDOGREL BISULFATE 75 MG: 75 TABLET ORAL at 11:17

## 2023-10-22 RX ADMIN — ATORVASTATIN CALCIUM 40 MG: 40 TABLET, FILM COATED ORAL at 20:56

## 2023-10-22 RX ADMIN — METHOCARBAMOL TABLETS 500 MG: 500 TABLET, COATED ORAL at 14:42

## 2023-10-22 RX ADMIN — LEVOTHYROXINE SODIUM 75 MCG: 0.07 TABLET ORAL at 06:12

## 2023-10-22 RX ADMIN — OXYCODONE HYDROCHLORIDE 5 MG: 5 TABLET ORAL at 19:27

## 2023-10-22 RX ADMIN — FUROSEMIDE 20 MG: 20 TABLET ORAL at 11:16

## 2023-10-22 ASSESSMENT — PAIN DESCRIPTION - LOCATION: LOCATION: OTHER (COMMENT)

## 2023-10-22 ASSESSMENT — PAIN SCALES - GENERAL
PAINLEVEL_OUTOF10: 0
PAINLEVEL_OUTOF10: 5
PAINLEVEL_OUTOF10: 0

## 2023-10-22 NOTE — PLAN OF CARE
Problem: Discharge Planning  Goal: Discharge to home or other facility with appropriate resources  10/22/2023 1133 by Alfredo Wong RN  Outcome: Progressing  10/21/2023 2226 by Yomaira Sanford RN  Outcome: Progressing     Problem: Pain  Goal: Verbalizes/displays adequate comfort level or baseline comfort level  10/22/2023 1133 by Alfredo Wong RN  Outcome: Progressing  10/21/2023 2226 by Yomaira Sanford RN  Outcome: Progressing  Flowsheets (Taken 10/21/2023 0951 by Cricket Alexander RN)  Verbalizes/displays adequate comfort level or baseline comfort level:   Assess pain using appropriate pain scale   Encourage patient to monitor pain and request assistance     Problem: Safety - Adult  Goal: Free from fall injury  10/22/2023 1133 by Alfredo Wong RN  Outcome: Progressing  10/21/2023 2226 by Yomaira Sanford RN  Outcome: Progressing     Problem: ABCDS Injury Assessment  Goal: Absence of physical injury  10/22/2023 1133 by Alfredo Wong RN  Outcome: Progressing  10/21/2023 2226 by Yomaira Sanford RN  Outcome: Progressing     Problem: Chronic Conditions and Co-morbidities  Goal: Patient's chronic conditions and co-morbidity symptoms are monitored and maintained or improved  10/22/2023 1133 by Alfredo Wong RN  Outcome: Progressing  10/21/2023 2226 by Yomaira Sanford RN  Outcome: Progressing

## 2023-10-22 NOTE — PLAN OF CARE
Problem: Discharge Planning  Goal: Discharge to home or other facility with appropriate resources  Outcome: Progressing     Problem: Pain  Goal: Verbalizes/displays adequate comfort level or baseline comfort level  Outcome: Progressing  Flowsheets (Taken 10/21/2023 0951 by Fili Landon RN)  Verbalizes/displays adequate comfort level or baseline comfort level:   Assess pain using appropriate pain scale   Encourage patient to monitor pain and request assistance     Problem: Safety - Adult  Goal: Free from fall injury  Outcome: Progressing     Problem: ABCDS Injury Assessment  Goal: Absence of physical injury  Outcome: Progressing     Problem: Chronic Conditions and Co-morbidities  Goal: Patient's chronic conditions and co-morbidity symptoms are monitored and maintained or improved  Outcome: Progressing

## 2023-10-23 PROCEDURE — 2580000003 HC RX 258: Performed by: STUDENT IN AN ORGANIZED HEALTH CARE EDUCATION/TRAINING PROGRAM

## 2023-10-23 PROCEDURE — 6370000000 HC RX 637 (ALT 250 FOR IP)

## 2023-10-23 PROCEDURE — 6370000000 HC RX 637 (ALT 250 FOR IP): Performed by: INTERNAL MEDICINE

## 2023-10-23 PROCEDURE — 1200000000 HC SEMI PRIVATE

## 2023-10-23 PROCEDURE — 6370000000 HC RX 637 (ALT 250 FOR IP): Performed by: NURSE PRACTITIONER

## 2023-10-23 PROCEDURE — 94761 N-INVAS EAR/PLS OXIMETRY MLT: CPT

## 2023-10-23 PROCEDURE — 6370000000 HC RX 637 (ALT 250 FOR IP): Performed by: STUDENT IN AN ORGANIZED HEALTH CARE EDUCATION/TRAINING PROGRAM

## 2023-10-23 PROCEDURE — 6360000002 HC RX W HCPCS: Performed by: STUDENT IN AN ORGANIZED HEALTH CARE EDUCATION/TRAINING PROGRAM

## 2023-10-23 PROCEDURE — 94640 AIRWAY INHALATION TREATMENT: CPT

## 2023-10-23 PROCEDURE — 6360000002 HC RX W HCPCS

## 2023-10-23 PROCEDURE — 97530 THERAPEUTIC ACTIVITIES: CPT

## 2023-10-23 PROCEDURE — 97116 GAIT TRAINING THERAPY: CPT

## 2023-10-23 RX ADMIN — COLCHICINE 0.6 MG: 0.6 TABLET ORAL at 07:30

## 2023-10-23 RX ADMIN — METHOCARBAMOL TABLETS 500 MG: 500 TABLET, COATED ORAL at 20:05

## 2023-10-23 RX ADMIN — IPRATROPIUM BROMIDE AND ALBUTEROL SULFATE 1 DOSE: 2.5; .5 SOLUTION RESPIRATORY (INHALATION) at 04:52

## 2023-10-23 RX ADMIN — SODIUM CHLORIDE, PRESERVATIVE FREE 10 ML: 5 INJECTION INTRAVENOUS at 20:05

## 2023-10-23 RX ADMIN — ATORVASTATIN CALCIUM 40 MG: 40 TABLET, FILM COATED ORAL at 20:05

## 2023-10-23 RX ADMIN — TIMOLOL MALEATE 1 DROP: 5 SOLUTION OPHTHALMIC at 07:50

## 2023-10-23 RX ADMIN — MONTELUKAST 10 MG: 10 TABLET, FILM COATED ORAL at 20:05

## 2023-10-23 RX ADMIN — CLOPIDOGREL BISULFATE 75 MG: 75 TABLET ORAL at 07:27

## 2023-10-23 RX ADMIN — COLCHICINE 0.6 MG: 0.6 TABLET ORAL at 20:05

## 2023-10-23 RX ADMIN — ONDANSETRON 4 MG: 2 INJECTION INTRAMUSCULAR; INTRAVENOUS at 20:05

## 2023-10-23 RX ADMIN — ASPIRIN 81 MG CHEWABLE TABLET 81 MG: 81 TABLET CHEWABLE at 07:30

## 2023-10-23 RX ADMIN — IPRATROPIUM BROMIDE AND ALBUTEROL SULFATE 1 DOSE: 2.5; .5 SOLUTION RESPIRATORY (INHALATION) at 08:02

## 2023-10-23 RX ADMIN — OXYCODONE HYDROCHLORIDE 5 MG: 5 TABLET ORAL at 20:05

## 2023-10-23 RX ADMIN — SODIUM CHLORIDE, PRESERVATIVE FREE 10 ML: 5 INJECTION INTRAVENOUS at 15:30

## 2023-10-23 RX ADMIN — DILTIAZEM HYDROCHLORIDE 120 MG: 120 CAPSULE, COATED, EXTENDED RELEASE ORAL at 07:30

## 2023-10-23 RX ADMIN — OXYCODONE HYDROCHLORIDE 5 MG: 5 TABLET ORAL at 05:14

## 2023-10-23 RX ADMIN — ENOXAPARIN SODIUM 40 MG: 100 INJECTION SUBCUTANEOUS at 07:30

## 2023-10-23 RX ADMIN — PANTOPRAZOLE SODIUM 20 MG: 20 TABLET, DELAYED RELEASE ORAL at 05:14

## 2023-10-23 RX ADMIN — LEVOTHYROXINE SODIUM 75 MCG: 0.07 TABLET ORAL at 05:14

## 2023-10-23 RX ADMIN — FUROSEMIDE 20 MG: 20 TABLET ORAL at 07:30

## 2023-10-23 RX ADMIN — ESCITALOPRAM OXALATE 20 MG: 10 TABLET ORAL at 07:30

## 2023-10-23 ASSESSMENT — PAIN DESCRIPTION - DESCRIPTORS
DESCRIPTORS: ACHING
DESCRIPTORS: ACHING

## 2023-10-23 ASSESSMENT — PAIN SCALES - GENERAL
PAINLEVEL_OUTOF10: 7
PAINLEVEL_OUTOF10: 6
PAINLEVEL_OUTOF10: 0

## 2023-10-23 ASSESSMENT — PAIN DESCRIPTION - LOCATION
LOCATION: ABDOMEN;RIB CAGE
LOCATION: ABDOMEN

## 2023-10-23 ASSESSMENT — PAIN DESCRIPTION - PAIN TYPE
TYPE: CHRONIC PAIN
TYPE: ACUTE PAIN

## 2023-10-23 ASSESSMENT — PAIN DESCRIPTION - FREQUENCY
FREQUENCY: INTERMITTENT
FREQUENCY: INTERMITTENT

## 2023-10-23 ASSESSMENT — PAIN DESCRIPTION - ORIENTATION
ORIENTATION: RIGHT;LEFT;MID
ORIENTATION: RIGHT;LEFT

## 2023-10-23 ASSESSMENT — PAIN SCALES - WONG BAKER: WONGBAKER_NUMERICALRESPONSE: 2

## 2023-10-23 ASSESSMENT — PAIN DESCRIPTION - ONSET
ONSET: ON-GOING
ONSET: ON-GOING

## 2023-10-23 NOTE — PLAN OF CARE
Problem: Discharge Planning  Goal: Discharge to home or other facility with appropriate resources  10/23/2023 0001 by Jaun Mcallister RN  Outcome: Progressing  10/22/2023 1133 by Dinora Gardiner RN  Outcome: Progressing     Problem: Pain  Goal: Verbalizes/displays adequate comfort level or baseline comfort level  10/23/2023 0001 by Jaun Mcallister RN  Outcome: Progressing  10/22/2023 1133 by Dinora Gardiner RN  Outcome: Progressing     Problem: Safety - Adult  Goal: Free from fall injury  10/23/2023 0001 by Jaun Mcallister RN  Outcome: Progressing  10/22/2023 1133 by Dinora Gardiner RN  Outcome: Progressing

## 2023-10-23 NOTE — CARE COORDINATION
CM call to Yehuda to follow up on referral, left  for return call     1:30 PM   CM contacted by Jhonatan/[facility], Pt pending precert at this time.      Updated therapy notes faxed to [phone]

## 2023-10-24 LAB
ANION GAP SERPL CALCULATED.3IONS-SCNC: 9 MMOL/L (ref 4–16)
BASOPHILS ABSOLUTE: 0 K/CU MM
BASOPHILS RELATIVE PERCENT: 0.3 % (ref 0–1)
BUN SERPL-MCNC: 9 MG/DL (ref 6–23)
CALCIUM SERPL-MCNC: 8.5 MG/DL (ref 8.3–10.6)
CHLORIDE BLD-SCNC: 96 MMOL/L (ref 99–110)
CO2: 28 MMOL/L (ref 21–32)
CREAT SERPL-MCNC: 0.6 MG/DL (ref 0.6–1.1)
DIFFERENTIAL TYPE: ABNORMAL
EOSINOPHILS ABSOLUTE: 0.4 K/CU MM
EOSINOPHILS RELATIVE PERCENT: 4.6 % (ref 0–3)
ESTIMATED AVERAGE GLUCOSE: 91 MG/DL
GFR SERPL CREATININE-BSD FRML MDRD: >60 ML/MIN/1.73M2
GLUCOSE BLD-MCNC: 214 MG/DL (ref 70–99)
GLUCOSE SERPL-MCNC: 92 MG/DL (ref 70–99)
HBA1C MFR BLD: 4.8 % (ref 4.2–6.3)
HCT VFR BLD CALC: 32.5 % (ref 37–47)
HEMOGLOBIN: 10.3 GM/DL (ref 12.5–16)
IMMATURE NEUTROPHIL %: 0.6 % (ref 0–0.43)
LYMPHOCYTES ABSOLUTE: 2.1 K/CU MM
LYMPHOCYTES RELATIVE PERCENT: 22 % (ref 24–44)
MCH RBC QN AUTO: 34 PG (ref 27–31)
MCHC RBC AUTO-ENTMCNC: 31.7 % (ref 32–36)
MCV RBC AUTO: 107.3 FL (ref 78–100)
MONOCYTES ABSOLUTE: 1.1 K/CU MM
MONOCYTES RELATIVE PERCENT: 12.2 % (ref 0–4)
NUCLEATED RBC %: 0 %
PDW BLD-RTO: 14.5 % (ref 11.7–14.9)
PLATELET # BLD: 348 K/CU MM (ref 140–440)
PMV BLD AUTO: 9.5 FL (ref 7.5–11.1)
POTASSIUM SERPL-SCNC: 4.1 MMOL/L (ref 3.5–5.1)
RBC # BLD: 3.03 M/CU MM (ref 4.2–5.4)
SEGMENTED NEUTROPHILS ABSOLUTE COUNT: 5.6 K/CU MM
SEGMENTED NEUTROPHILS RELATIVE PERCENT: 60.3 % (ref 36–66)
SODIUM BLD-SCNC: 133 MMOL/L (ref 135–145)
TOTAL IMMATURE NEUTOROPHIL: 0.06 K/CU MM
TOTAL NUCLEATED RBC: 0 K/CU MM
WBC # BLD: 9.3 K/CU MM (ref 4–10.5)

## 2023-10-24 PROCEDURE — 6360000002 HC RX W HCPCS

## 2023-10-24 PROCEDURE — 6370000000 HC RX 637 (ALT 250 FOR IP): Performed by: NURSE PRACTITIONER

## 2023-10-24 PROCEDURE — 6360000002 HC RX W HCPCS: Performed by: STUDENT IN AN ORGANIZED HEALTH CARE EDUCATION/TRAINING PROGRAM

## 2023-10-24 PROCEDURE — 6370000000 HC RX 637 (ALT 250 FOR IP)

## 2023-10-24 PROCEDURE — 80048 BASIC METABOLIC PNL TOTAL CA: CPT

## 2023-10-24 PROCEDURE — 97535 SELF CARE MNGMENT TRAINING: CPT

## 2023-10-24 PROCEDURE — 1200000000 HC SEMI PRIVATE

## 2023-10-24 PROCEDURE — 6370000000 HC RX 637 (ALT 250 FOR IP): Performed by: STUDENT IN AN ORGANIZED HEALTH CARE EDUCATION/TRAINING PROGRAM

## 2023-10-24 PROCEDURE — 97530 THERAPEUTIC ACTIVITIES: CPT

## 2023-10-24 PROCEDURE — 82962 GLUCOSE BLOOD TEST: CPT

## 2023-10-24 PROCEDURE — 94761 N-INVAS EAR/PLS OXIMETRY MLT: CPT

## 2023-10-24 PROCEDURE — 85025 COMPLETE CBC W/AUTO DIFF WBC: CPT

## 2023-10-24 PROCEDURE — 2580000003 HC RX 258: Performed by: STUDENT IN AN ORGANIZED HEALTH CARE EDUCATION/TRAINING PROGRAM

## 2023-10-24 PROCEDURE — 36415 COLL VENOUS BLD VENIPUNCTURE: CPT

## 2023-10-24 PROCEDURE — 94640 AIRWAY INHALATION TREATMENT: CPT

## 2023-10-24 PROCEDURE — 6370000000 HC RX 637 (ALT 250 FOR IP): Performed by: INTERNAL MEDICINE

## 2023-10-24 RX ORDER — COLCHICINE 0.6 MG/1
0.6 TABLET ORAL 2 TIMES DAILY
Qty: 30 TABLET | Refills: 1 | Status: SHIPPED | OUTPATIENT
Start: 2023-10-24

## 2023-10-24 RX ORDER — FUROSEMIDE 20 MG/1
20 TABLET ORAL DAILY
Qty: 60 TABLET | Refills: 1 | Status: SHIPPED | OUTPATIENT
Start: 2023-10-25

## 2023-10-24 RX ADMIN — ATORVASTATIN CALCIUM 40 MG: 40 TABLET, FILM COATED ORAL at 20:18

## 2023-10-24 RX ADMIN — ESCITALOPRAM OXALATE 20 MG: 10 TABLET ORAL at 10:08

## 2023-10-24 RX ADMIN — IPRATROPIUM BROMIDE AND ALBUTEROL SULFATE 1 DOSE: 2.5; .5 SOLUTION RESPIRATORY (INHALATION) at 08:19

## 2023-10-24 RX ADMIN — SODIUM CHLORIDE, PRESERVATIVE FREE 10 ML: 5 INJECTION INTRAVENOUS at 20:22

## 2023-10-24 RX ADMIN — COLCHICINE 0.6 MG: 0.6 TABLET ORAL at 10:09

## 2023-10-24 RX ADMIN — MONTELUKAST 10 MG: 10 TABLET, FILM COATED ORAL at 20:18

## 2023-10-24 RX ADMIN — PANTOPRAZOLE SODIUM 20 MG: 20 TABLET, DELAYED RELEASE ORAL at 06:10

## 2023-10-24 RX ADMIN — DILTIAZEM HYDROCHLORIDE 120 MG: 120 CAPSULE, COATED, EXTENDED RELEASE ORAL at 10:08

## 2023-10-24 RX ADMIN — CLOPIDOGREL BISULFATE 75 MG: 75 TABLET ORAL at 10:08

## 2023-10-24 RX ADMIN — COLCHICINE 0.6 MG: 0.6 TABLET ORAL at 20:19

## 2023-10-24 RX ADMIN — IPRATROPIUM BROMIDE AND ALBUTEROL SULFATE 1 DOSE: 2.5; .5 SOLUTION RESPIRATORY (INHALATION) at 21:01

## 2023-10-24 RX ADMIN — LEVOTHYROXINE SODIUM 75 MCG: 0.07 TABLET ORAL at 06:10

## 2023-10-24 RX ADMIN — METHOCARBAMOL TABLETS 500 MG: 500 TABLET, COATED ORAL at 10:08

## 2023-10-24 RX ADMIN — TIMOLOL MALEATE 1 DROP: 5 SOLUTION OPHTHALMIC at 10:07

## 2023-10-24 RX ADMIN — ENOXAPARIN SODIUM 40 MG: 100 INJECTION SUBCUTANEOUS at 10:09

## 2023-10-24 RX ADMIN — OXYCODONE HYDROCHLORIDE 5 MG: 5 TABLET ORAL at 20:17

## 2023-10-24 RX ADMIN — ONDANSETRON 4 MG: 2 INJECTION INTRAMUSCULAR; INTRAVENOUS at 09:34

## 2023-10-24 RX ADMIN — ASPIRIN 81 MG CHEWABLE TABLET 81 MG: 81 TABLET CHEWABLE at 10:09

## 2023-10-24 RX ADMIN — METHOCARBAMOL TABLETS 500 MG: 500 TABLET, COATED ORAL at 14:09

## 2023-10-24 RX ADMIN — SODIUM CHLORIDE, PRESERVATIVE FREE 10 ML: 5 INJECTION INTRAVENOUS at 10:08

## 2023-10-24 RX ADMIN — FUROSEMIDE 20 MG: 20 TABLET ORAL at 10:08

## 2023-10-24 RX ADMIN — METHOCARBAMOL TABLETS 500 MG: 500 TABLET, COATED ORAL at 20:18

## 2023-10-24 ASSESSMENT — PAIN DESCRIPTION - PAIN TYPE: TYPE: CHRONIC PAIN

## 2023-10-24 ASSESSMENT — PAIN SCALES - GENERAL
PAINLEVEL_OUTOF10: 7
PAINLEVEL_OUTOF10: 0
PAINLEVEL_OUTOF10: 4

## 2023-10-24 ASSESSMENT — PAIN - FUNCTIONAL ASSESSMENT: PAIN_FUNCTIONAL_ASSESSMENT: ACTIVITIES ARE NOT PREVENTED

## 2023-10-24 ASSESSMENT — PAIN DESCRIPTION - DESCRIPTORS: DESCRIPTORS: ACHING

## 2023-10-24 ASSESSMENT — PAIN DESCRIPTION - ONSET: ONSET: ON-GOING

## 2023-10-24 ASSESSMENT — PAIN DESCRIPTION - FREQUENCY: FREQUENCY: INTERMITTENT

## 2023-10-24 ASSESSMENT — PAIN DESCRIPTION - ORIENTATION: ORIENTATION: RIGHT;LEFT

## 2023-10-24 ASSESSMENT — PAIN DESCRIPTION - LOCATION: LOCATION: ABDOMEN

## 2023-10-24 NOTE — CARE COORDINATION
CM contacted by Wilian Pt pending precert at this time. 3:18 PM   CM updated by Pt RN Yeyo Ortiz, Pt no longer wishes to wait for insurance approval for [facility]. CM in to see Pt. Pt states she will be staying with her daughter at discharge. Pt denies the need for home care at this time. Pt states her daughter will provide transportation at discharge. CM call to [facility], requested precert be canceled.

## 2023-10-24 NOTE — CONSULTS
Consult completed. Pt c/o discomfort during IV flushes and assess for infiltration. IV flushed under US visualization. No infiltration noted, IV catheter is within vessel. Recommended slower IV flushes when flushing the line to avoid discomfort. Primary RN Kamilah Lundbergsins notified. Please consult IV/PICC team for questions, concerns, or patient's needs change.

## 2023-10-24 NOTE — PLAN OF CARE
Problem: Discharge Planning  Goal: Discharge to home or other facility with appropriate resources  Outcome: Progressing     Problem: Pain  Goal: Verbalizes/displays adequate comfort level or baseline comfort level  Outcome: Progressing     Problem: Safety - Adult  Goal: Free from fall injury  Outcome: Progressing     Problem: ABCDS Injury Assessment  Goal: Absence of physical injury  Outcome: Progressing     Problem: Chronic Conditions and Co-morbidities  Goal: Patient's chronic conditions and co-morbidity symptoms are monitored and maintained or improved  Outcome: Progressing     Problem: Nutrition Deficit:  Goal: Optimize nutritional status  10/24/2023 1126 by Carmela Nguyen  Outcome: Progressing

## 2023-10-25 ENCOUNTER — TELEPHONE (OUTPATIENT)
Dept: CARDIOLOGY CLINIC | Age: 88
End: 2023-10-25

## 2023-10-25 VITALS
RESPIRATION RATE: 18 BRPM | HEIGHT: 60 IN | HEART RATE: 60 BPM | WEIGHT: 126.4 LBS | BODY MASS INDEX: 24.81 KG/M2 | OXYGEN SATURATION: 96 % | DIASTOLIC BLOOD PRESSURE: 69 MMHG | TEMPERATURE: 96.9 F | SYSTOLIC BLOOD PRESSURE: 129 MMHG

## 2023-10-25 PROBLEM — N17.9 ACUTE KIDNEY FAILURE (HCC): Status: ACTIVE | Noted: 2023-10-25

## 2023-10-25 PROCEDURE — 6370000000 HC RX 637 (ALT 250 FOR IP): Performed by: STUDENT IN AN ORGANIZED HEALTH CARE EDUCATION/TRAINING PROGRAM

## 2023-10-25 PROCEDURE — 94640 AIRWAY INHALATION TREATMENT: CPT

## 2023-10-25 PROCEDURE — 6370000000 HC RX 637 (ALT 250 FOR IP)

## 2023-10-25 PROCEDURE — 6370000000 HC RX 637 (ALT 250 FOR IP): Performed by: INTERNAL MEDICINE

## 2023-10-25 PROCEDURE — 6360000002 HC RX W HCPCS

## 2023-10-25 PROCEDURE — 2580000003 HC RX 258: Performed by: STUDENT IN AN ORGANIZED HEALTH CARE EDUCATION/TRAINING PROGRAM

## 2023-10-25 PROCEDURE — 94761 N-INVAS EAR/PLS OXIMETRY MLT: CPT

## 2023-10-25 PROCEDURE — 6370000000 HC RX 637 (ALT 250 FOR IP): Performed by: NURSE PRACTITIONER

## 2023-10-25 RX ADMIN — ENOXAPARIN SODIUM 40 MG: 100 INJECTION SUBCUTANEOUS at 09:04

## 2023-10-25 RX ADMIN — METHOCARBAMOL TABLETS 500 MG: 500 TABLET, COATED ORAL at 09:00

## 2023-10-25 RX ADMIN — DILTIAZEM HYDROCHLORIDE 120 MG: 120 CAPSULE, COATED, EXTENDED RELEASE ORAL at 09:03

## 2023-10-25 RX ADMIN — LEVOTHYROXINE SODIUM 75 MCG: 0.07 TABLET ORAL at 06:24

## 2023-10-25 RX ADMIN — PANTOPRAZOLE SODIUM 20 MG: 20 TABLET, DELAYED RELEASE ORAL at 06:24

## 2023-10-25 RX ADMIN — TIMOLOL MALEATE 1 DROP: 5 SOLUTION OPHTHALMIC at 09:13

## 2023-10-25 RX ADMIN — SODIUM CHLORIDE, PRESERVATIVE FREE 10 ML: 5 INJECTION INTRAVENOUS at 09:07

## 2023-10-25 RX ADMIN — ASPIRIN 81 MG CHEWABLE TABLET 81 MG: 81 TABLET CHEWABLE at 09:03

## 2023-10-25 RX ADMIN — CLOPIDOGREL BISULFATE 75 MG: 75 TABLET ORAL at 09:04

## 2023-10-25 RX ADMIN — IPRATROPIUM BROMIDE AND ALBUTEROL SULFATE 1 DOSE: 2.5; .5 SOLUTION RESPIRATORY (INHALATION) at 08:39

## 2023-10-25 RX ADMIN — COLCHICINE 0.6 MG: 0.6 TABLET ORAL at 09:00

## 2023-10-25 RX ADMIN — ESCITALOPRAM OXALATE 20 MG: 10 TABLET ORAL at 09:00

## 2023-10-25 RX ADMIN — FUROSEMIDE 20 MG: 20 TABLET ORAL at 09:03

## 2023-10-25 NOTE — FLOWSHEET NOTE
Patient given discharge instructions, voices understanding. Denies questions/concerns. Patient awaiting daughter for transportation home.

## 2023-10-25 NOTE — PLAN OF CARE
Problem: Discharge Planning  Goal: Discharge to home or other facility with appropriate resources  10/25/2023 1013 by Fili Goodson RN  Outcome: Adequate for Discharge  10/25/2023 0228 by Sindhu Rodriguez RN  Outcome: Progressing     Problem: Pain  Goal: Verbalizes/displays adequate comfort level or baseline comfort level  10/25/2023 1013 by Fili Goodson RN  Outcome: Adequate for Discharge  10/25/2023 0228 by Sindhu Rodriguez RN  Outcome: Progressing     Problem: Safety - Adult  Goal: Free from fall injury  10/25/2023 1013 by Fili Goodson RN  Outcome: Adequate for Discharge  10/25/2023 0228 by Sindhu Rodriguez RN  Outcome: Progressing     Problem: ABCDS Injury Assessment  Goal: Absence of physical injury  10/25/2023 1013 by Fili Goodson RN  Outcome: Adequate for Discharge  10/25/2023 0228 by Sindhu Rodriguez RN  Outcome: Progressing     Problem: Chronic Conditions and Co-morbidities  Goal: Patient's chronic conditions and co-morbidity symptoms are monitored and maintained or improved  10/25/2023 1013 by Fili Goodson RN  Outcome: Adequate for Discharge  10/25/2023 0228 by Sindhu Rodriguez RN  Outcome: Progressing     Problem: Nutrition Deficit:  Goal: Optimize nutritional status  10/25/2023 1013 by Fili Goodson RN  Outcome: Adequate for Discharge  10/25/2023 0228 by Sindhu Rodriguez RN  Outcome: Progressing

## 2023-10-25 NOTE — FLOWSHEET NOTE
Patient states that her daughter was not able to get anyone to stay with her  at home (he cannot be left alone) and now patients son will be providing transportation and she is unsure when he will be arriving.

## 2023-10-25 NOTE — DISCHARGE SUMMARY
Discharge Summary    Name:  Trent Pool /Age/Sex: 1935  (80 y.o. female)   MRN & CSN:  [id number] & [id number] Admission Date/Time: 10/17/2023  3:59 PM   Attending:  No att. providers found Discharging Physician: Reanna Mosley, [address] [place] [address] Course: Trent Pool is a 80 y.o.  female  who presents with Acute thoracic back pain, unspecified back pain laterality      HPI per H&P 10/17/23: Trent Pool is a 80 y.o. female with hypothyroidism, paroxysmal A-fib, GERD, hypertension, hyperlipidemia presented with upper back and chest pain. Patient states that this morning when she got out of bed started having sudden onset of upper back mid sternal chest pain describes that the back pain radiates to the chest wall. Associated with SOB. Worsened with abrupt movements of upper body and deep inspiration. No alleviating factors identified. Denies LOC/dizziness, headache, nausea/vomiting. Patient required multiple doses of fentanyl and Roxicodone in ER without any significant alleviation in pain or improvement in functional status hence decision was made to admit for observation. The patient was admitted, Cardiology, EP consulted brief problem based POC as noted below    Chest wall and back pain/elevated HS trop- EKG showed paced rhythm. CT chest negative for PE. No documented history of CAD, [facility] in  showed no significant CAD. Cardiac stress testing 3/2023 negative for ischemia. Cardiology/EP/CTS consulted, TTE showing preserved EF, moderate pericardial effusion, no evidence of tamponade at this time, TTE monitored daily and remained stable thus far. Colchicine added to continue x 10 days, then daily (10/29/23) per EP, repeat TTE in 10 days 10/29/23. Continued on asa, statin, ccb, pain management. Pericardial effusion - felt possibly due to reactive effusion post recent pacemaker placement. EP/CTS/Cardiology followed as noted above     Acute kidney injury - Resolved with IVF's.
CREATININE 0.5* 0.6   GLUCOSE 97 92     Hepatic: No results for input(s): \"AST\", \"ALT\", \"ALB\", \"BILITOT\", \"ALKPHOS\" in the last 72 hours. Lipids:   Lab Results   Component Value Date/Time    CHOL 126 09/04/2023 10:55 PM    CHOL 164 02/05/2016 12:00 AM    HDL 75 09/04/2023 10:55 PM    TRIG 63 09/04/2023 10:55 PM     Hemoglobin A1C:   Lab Results   Component Value Date/Time    LABA1C 4.8 10/20/2023 04:36 AM     TSH:   Lab Results   Component Value Date/Time    TSH 2.710 08/06/2021 12:00 PM     Troponin:   Lab Results   Component Value Date/Time    TROPONINT <0.010 09/16/2023 01:40 PM    TROPONINT <0.010 09/14/2023 12:10 PM    TROPONINT <0.010 09/05/2023 12:44 AM     Lactic Acid: No results for input(s): \"LACTA\" in the last 72 hours. BNP: No results for input(s): \"PROBNP\" in the last 72 hours.   UA:  Lab Results   Component Value Date/Time    NITRU NEGATIVE 10/19/2023 02:24 PM    NITRU NEGATIVE 11/13/2012 09:10 AM    COLORU YELLOW 10/19/2023 02:24 PM    PHUR 5.5 04/03/2019 10:18 AM    WBCUA 3 10/19/2023 02:24 PM    RBCUA 3 10/19/2023 02:24 PM    MUCUS RARE 10/19/2023 02:24 PM    TRICHOMONAS NONE SEEN 10/19/2023 02:24 PM    BACTERIA NEGATIVE 10/19/2023 02:24 PM    CLARITYU SLIGHTLY CLOUDY 10/19/2023 02:24 PM    SPECGRAV 1.025 10/19/2023 02:24 PM    LEUKOCYTESUR NEGATIVE 10/19/2023 02:24 PM    UROBILINOGEN 0.2 10/19/2023 02:24 PM    BILIRUBINUR SMALL NUMBER OR AMOUNT OBSERVED 10/19/2023 02:24 PM    Trent John Paul neg 04/03/2019 10:18 AM    BLOODU NEGATIVE 10/19/2023 02:24 PM    GLUCOSEU neg 04/03/2019 10:18 AM    KETUA NEGATIVE 10/19/2023 02:24 PM     Urine Cultures: No results found for: \"LABURIN\"  Blood Cultures: No results found for: \"BC\"  No results found for: \"BLOODCULT2\"  Organism: No results found for: \"ORG\"    Time Spent Discharging patient 38 minutes    Electronically signed by DERIK Aldrich CNP on 10/24/2023 at 6:41 PM

## 2023-10-25 NOTE — TELEPHONE ENCOUNTER
Patient has orders for PT/OT. [facility] care is who she will be doing her OT/PT with. They need the order sent to them. [facility]- [phone] is phone number.

## 2023-10-26 ENCOUNTER — CARE COORDINATION (OUTPATIENT)
Dept: CASE MANAGEMENT | Age: 88
End: 2023-10-26

## 2023-10-26 ENCOUNTER — TELEPHONE (OUTPATIENT)
Dept: CARDIOLOGY CLINIC | Age: 88
End: 2023-10-26

## 2023-10-26 DIAGNOSIS — N17.9 ACUTE RENAL FAILURE, UNSPECIFIED ACUTE RENAL FAILURE TYPE (HCC): Primary | ICD-10-CM

## 2023-10-26 DIAGNOSIS — J90 PLEURAL EFFUSION: Primary | ICD-10-CM

## 2023-10-26 PROCEDURE — 1111F DSCHRG MED/CURRENT MED MERGE: CPT | Performed by: FAMILY MEDICINE

## 2023-10-26 ASSESSMENT — ENCOUNTER SYMPTOMS
VOMITING: 0
EYE PAIN: 0
DIARRHEA: 0
BLOOD IN STOOL: 0
ABDOMINAL PAIN: 0
CONSTIPATION: 0
SHORTNESS OF BREATH: 1
NAUSEA: 0
BACK PAIN: 0
COUGH: 0
COLOR CHANGE: 0
PHOTOPHOBIA: 0
CHEST TIGHTNESS: 0
WHEEZING: 0

## 2023-10-26 NOTE — CARE COORDINATION
reviewed discharge summary and/or continuity of care documents  Education of patient/family/caregiver/guardian to support self-management-COPD, Pl Effusion  Assessment and support for treatment adherence and medication management-med review    Offered patient enrollment in the Remote Patient Monitoring (RPM) program for in-home monitoring: had enrolled 10/17/23 (pre-activated), now wants to disenroll. CTN will inform RPM    Care Transitions 24 Hour Call    Schedule Follow Up Appointment with PCP: Completed  Do you have a copy of your discharge instructions?: Yes  Do you have all of your prescriptions and are they filled?: Yes  Have you been contacted by a [address]?: No  Have you scheduled your follow up appointment?: Yes (Comment: Has Cards, needs PCP)  How are you going to get to your appointment?: Car - family or friend to transport  Post Acute Services: Nestor Lobato (Comment: [address] or [facility] [address])  Patient DME: Yo machado, Shower chair, Other  Other Patient DME: BSC, christine, extended shoe horn  Patient Home Equipment: Oxygen, Nebulizer  Do you have support at home?: Alone  Do you feel like you have everything you need to keep you well at home?: Yes  Are you an active caregiver in your home?: No  Care Transitions Interventions   Home Care Waiver: Completed Physical Therapy: Declined       Transportation Support: Declined      DME Assistance: Completed     Senior Services: Declined          Disease Specific Clinic: Completed  Social Work: Declined    Disease Association: Completed               Discussed follow-up appointments. If no appointment was previously scheduled, appointment scheduling offered: Yes. Is follow up appointment scheduled within 7 days of discharge? Yes.     Follow Up  Future Appointments   Date Time Provider [address] [place] [address] Ct   10/31/2023 12:50 PM Rui Calderón MD [facility]   12/8/2023  1:40 PM DERIK Abernathy - CNP [facility]   3/4/2024 11:15 AM Thania Dowling

## 2023-10-26 NOTE — TELEPHONE ENCOUNTER
Maggie Maloney, patients MPOA and advised we did not order PT/OT for patient and that she would need to call patients FMD. Grayson Simmonds voiced understanding no other c/o noted.

## 2023-10-31 ENCOUNTER — PROCEDURE VISIT (OUTPATIENT)
Dept: CARDIOLOGY CLINIC | Age: 88
End: 2023-10-31
Payer: MEDICARE

## 2023-10-31 ENCOUNTER — TELEPHONE (OUTPATIENT)
Dept: FAMILY MEDICINE CLINIC | Age: 88
End: 2023-10-31

## 2023-10-31 ENCOUNTER — OFFICE VISIT (OUTPATIENT)
Dept: CARDIOLOGY CLINIC | Age: 88
End: 2023-10-31

## 2023-10-31 VITALS
HEIGHT: 60 IN | HEART RATE: 63 BPM | SYSTOLIC BLOOD PRESSURE: 128 MMHG | DIASTOLIC BLOOD PRESSURE: 60 MMHG | BODY MASS INDEX: 24.69 KG/M2 | OXYGEN SATURATION: 96 %

## 2023-10-31 DIAGNOSIS — R06.02 SHORTNESS OF BREATH: ICD-10-CM

## 2023-10-31 DIAGNOSIS — I31.39 PERICARDIAL EFFUSION WITHOUT CARDIAC TAMPONADE: Primary | ICD-10-CM

## 2023-10-31 DIAGNOSIS — I10 PRIMARY HYPERTENSION: ICD-10-CM

## 2023-10-31 DIAGNOSIS — I48.0 PAF (PAROXYSMAL ATRIAL FIBRILLATION) (HCC): ICD-10-CM

## 2023-10-31 DIAGNOSIS — Z95.818 PRESENCE OF WATCHMAN LEFT ATRIAL APPENDAGE CLOSURE DEVICE: ICD-10-CM

## 2023-10-31 PROCEDURE — 93308 TTE F-UP OR LMTD: CPT | Performed by: INTERNAL MEDICINE

## 2023-10-31 RX ORDER — COLCHICINE 0.6 MG/1
0.6 TABLET ORAL DAILY
Qty: 30 TABLET | Refills: 1
Start: 2023-10-31

## 2023-10-31 NOTE — TELEPHONE ENCOUNTER
Tyrell Moya with [facility] called asking if Dr. Suyapa Johnson would sign orders for nursing, PT and OP. Patient was in hospital for pacemaker placement and watchman.   She was admitted 2 days later a pleural effusion

## 2023-10-31 NOTE — PATIENT INSTRUCTIONS
Please be informed that if you contact our office outside of normal business hours the physician on call cannot help with any scheduling or rescheduling issues, procedure instruction questions or any type of medication issue. We advise you for any urgent/emergency that you go to the nearest emergency room! PLEASE CALL OUR OFFICE DURING NORMAL BUSINESS HOURS    Monday - Friday   8 am to 5 pm    Dom: [address] [place]: [phone]    [place]:  [phone]    **It is YOUR responsibilty to bring medication bottles and/or updated medication list to [facility]. This will allow us to better serve you and all your healthcare needs**    Thank you for allowing us to care for you today! We want to ensure we can follow your treatment plan and we strive to give you the best outcomes and experience possible. If you ever have a life threatening emergency and call 911 - for an ambulance (EMS)   Our providers can only care for you at:   [facility] or [facility]. Even if you have someone take you or you drive yourself we can only care for you in a [facility] facility. Our providers are not setup at the other healthcare locations! We are committed to providing you the best care possible. If you receive a survey after visiting one of our offices, please take time to share your experience concerning your physician office visit. These surveys are confidential and no health information about you is shared. We are eager to improve for you and we are counting on your feedback to help make that happen.

## 2023-11-01 ENCOUNTER — OFFICE VISIT (OUTPATIENT)
Dept: FAMILY MEDICINE CLINIC | Age: 88
End: 2023-11-01

## 2023-11-01 VITALS
TEMPERATURE: 97.2 F | WEIGHT: 124.4 LBS | OXYGEN SATURATION: 94 % | HEART RATE: 56 BPM | DIASTOLIC BLOOD PRESSURE: 68 MMHG | BODY MASS INDEX: 24.3 KG/M2 | SYSTOLIC BLOOD PRESSURE: 122 MMHG

## 2023-11-01 DIAGNOSIS — I38 VHD (VALVULAR HEART DISEASE): ICD-10-CM

## 2023-11-01 DIAGNOSIS — I10 PRIMARY HYPERTENSION: ICD-10-CM

## 2023-11-01 DIAGNOSIS — R11.0 NAUSEA: Primary | ICD-10-CM

## 2023-11-01 DIAGNOSIS — Z09 HOSPITAL DISCHARGE FOLLOW-UP: ICD-10-CM

## 2023-11-01 DIAGNOSIS — I48.0 PAF (PAROXYSMAL ATRIAL FIBRILLATION) (HCC): ICD-10-CM

## 2023-11-01 DIAGNOSIS — Z95.818 PRESENCE OF WATCHMAN LEFT ATRIAL APPENDAGE CLOSURE DEVICE: ICD-10-CM

## 2023-11-01 DIAGNOSIS — E78.2 MIXED HYPERLIPIDEMIA: ICD-10-CM

## 2023-11-01 DIAGNOSIS — I31.39 PERICARDIAL EFFUSION: ICD-10-CM

## 2023-11-01 DIAGNOSIS — N17.9 ACUTE RENAL FAILURE, UNSPECIFIED ACUTE RENAL FAILURE TYPE (HCC): ICD-10-CM

## 2023-11-01 PROBLEM — J90 PLEURAL EFFUSION: Status: ACTIVE | Noted: 2023-10-26

## 2023-11-01 RX ORDER — SCOLOPAMINE TRANSDERMAL SYSTEM 1 MG/1
1 PATCH, EXTENDED RELEASE TRANSDERMAL
Qty: 10 PATCH | Refills: 0 | Status: SHIPPED | OUTPATIENT
Start: 2023-11-01

## 2023-11-02 ASSESSMENT — ENCOUNTER SYMPTOMS
BLOOD IN STOOL: 0
ABDOMINAL DISTENTION: 0
COUGH: 0
SHORTNESS OF BREATH: 0
SINUS PAIN: 0
CHEST TIGHTNESS: 0
DIARRHEA: 0
PHOTOPHOBIA: 0
ABDOMINAL PAIN: 0
VOMITING: 1
WHEEZING: 0
SINUS PRESSURE: 0
NAUSEA: 1
CONSTIPATION: 0
BACK PAIN: 0
COLOR CHANGE: 0

## 2023-11-02 NOTE — PROGRESS NOTES
Electrophysiology Follow up note Note      Reason for consultation:  Sp watchman and dual chamber pacemaker    Chief complaint : follow up on pericardial effusion    Referring physician: Davon Hutson      Primary care physician: Satish Portillo MD      History of Present Illness: This visit 10/31/2023  Patient is here today for follow up on watchman, pacemaker and pericardial effusion  Patient had watchman and pacemaker placed 10/13/2023  Post procedure she was readmitted with pericardial effusion. She did not require pericenteisis. She was started on colchicine. Serial echo's during the hospital admission and did not show need for intervention. She presents today with complaints of nausea and vomiting. She denies chest pain,  palpitations, shortness of breath, edema, dizziness, or syncope. Previous visit  Jorge Luis Knox is an 80year old female with a history of asthma, broken heart syndrome, CVA, COPD, PAD, hypothyroidism, VHD, s/p TAVR, atrial fibrillation, HTN, anemia and high fall risk presents with complaints of shortness of breath and chest pain with inspiration. She reports that the symptoms where of onset yesterday morning. She states that she felt short of breath at rest and worse with exertion. She states when she tried to take a deep breath she could have chest pain. She denies lightheadedness, dizziness, palpitations, edema or syncope. She had watchman procedure last Thursday and then a dual chamber pacemaker placed on Friday. She states that she felt well after the procedure but then yesterday the symptoms started.    On admission , K 5.0, creatinine 0.7, WBC 9.7, Hgb 10.4, D dimer 3.25,   ECHO shows moderate pericardial effusion       Pastmedical history:   Past Medical History:   Diagnosis Date    Arthritis     generalized    Asthma     At maximum risk for fall 10/04/2023    Blood transfusion 2004    No reaction    Broken heart

## 2023-11-03 ENCOUNTER — CARE COORDINATION (OUTPATIENT)
Dept: CASE MANAGEMENT | Age: 88
End: 2023-11-03

## 2023-11-03 NOTE — CARE COORDINATION
Care Transitions Follow Up Call    Patient Current Location:  Home: [address] [place], [place] [id number]    Care Transition Nurse contacted the patient by telephone to follow up after admission on 10/17/23-10/25/23. Verified name and  with patient as identifiers. Patient: Leonard Jones  Patient : 1935   MRN: [id number]  Reason for Admission: Pericardial Effusion, SOPHIE  Discharge Date: 10/25/23 RARS: Readmission Risk Score: 28.6      Needs to be reviewed by the provider   Additional needs identified to be addressed with provider: No  none             Method of communication with provider: none. Addressed changes since last contact:  none  Discussed follow-up appointments. If no appointment was previously scheduled, appointment scheduling offered: Yes. Is follow up appointment scheduled within 7 days of discharge? Yes. Follow Up  Future Appointments   Date Time Provider [address]   2023  3:00 PM DERIK Smith CNP [facility]   2023  1:40 PM DERIK Smith CNP [facility]   3/4/2024 11:15 AM Jian Crowley MD [facility]   2024 12:00 PM DERIK Smith CNP [facility]     External follow up appointment(s): MANUEL    Care Transition Nurse reviewed discharge instructions, medical action plan, and red flags with patient and discussed any barriers to care and/or understanding of plan of care after discharge. Discussed appropriate site of care based on symptoms and resources available to patient including: PCP  Specialist  Benefits related nurse triage line  Urgent care clinics  [facility]  When to call [phone]. The patient agrees to contact the PCP office for questions related to their healthcare. Advance Care Planning:   reviewed and current.      Patients top risk factors for readmission:  CVA, Afib, s/p Watchman, COPD, HTN  Interventions to address risk factors: Communication with home health agencies or other

## 2023-11-08 ENCOUNTER — OFFICE VISIT (OUTPATIENT)
Dept: CARDIOLOGY CLINIC | Age: 88
End: 2023-11-08

## 2023-11-08 VITALS
HEART RATE: 52 BPM | SYSTOLIC BLOOD PRESSURE: 136 MMHG | HEIGHT: 60 IN | WEIGHT: 119.2 LBS | BODY MASS INDEX: 23.4 KG/M2 | DIASTOLIC BLOOD PRESSURE: 78 MMHG

## 2023-11-08 DIAGNOSIS — I31.39 PERICARDIAL EFFUSION: Primary | ICD-10-CM

## 2023-11-08 PROCEDURE — 99024 POSTOP FOLLOW-UP VISIT: CPT | Performed by: NURSE PRACTITIONER

## 2023-11-08 NOTE — PROGRESS NOTES
Patient is here today for 1 month follow up on dual chamber pacemaker and pericardial effusion  She states she is feeling much better since last office visit 1 week ago  She denies chest pain,  palpitations, shortness of breath, edema, dizziness, or syncope. She is taking colchicine daily  Her nausea has improved significantly and basically has resolved since decreasing the dose of colchicine to daily  Will get stat echo to assess status of pericardial effusion    Her pacemaker site has healed well  It is well approximated  No redness, swelling or hematoma  Device Assessment:  The device is Medtronic pacemaker - Dual Chamber chamber      MRI Compatible : yes    Device interrogation was performed. Mode: AAIR --- DDDR     Sensing is normal. Impedence is normal.  Threshold is normal.     There has not been interval changes. Estimated battery life is 13.9 years     The underlying rhythm is AS,VS.  21.2% atrial paced; 5.1 % ventricular paced. Atrial Arrhythmia : No    Non sustained VT episodes : No    Sustained VT episodes : No    Patient activity reported 0.0 hrs/day    The patient is partial atrial pacemaker dependent.       Device is functioning appropriately  No further restrictions at this time    We will follow up JAN 45-59 days s/p tomeka on October 12, 2023

## 2023-11-08 NOTE — PATIENT INSTRUCTIONS
We are committed to providing you the best care possible. If you receive a survey after visiting one of our offices, please take time to share your experience concerning your physician office visit. These surveys are confidential and no health information about you is shared. We are eager to improve for you and we are counting on your feedback to help make that happen. **It is YOUR responsibilty to bring medication bottles and/or updated medication list to [facility]. This will allow us to better serve you and all your healthcare needs**    Thank you for allowing us to care for you today! We want to ensure we can follow your treatment plan and we strive to give you the best outcomes and experience possible. If you ever have a life threatening emergency and call 911 - for an ambulance (EMS)   Our providers can only care for you at:   [facility] or [facility]. Even if you have someone take you or you drive yourself we can only care for you in a [facility]. Our providers are not setup at the other healthcare locations! Please be informed that if you contact our office outside of normal business hours the physician on call cannot help with any scheduling or rescheduling issues, procedure instruction questions or any type of medication issue. We advise you for any urgent/emergency that you go to the nearest emergency room!     PLEASE CALL OUR OFFICE DURING NORMAL BUSINESS HOURS    Monday - Friday   8 am to 5 pm    Dom: [address]: [phone]    [place]:  [phone]

## 2023-11-09 ENCOUNTER — TELEPHONE (OUTPATIENT)
Dept: CARDIOLOGY CLINIC | Age: 88
End: 2023-11-09

## 2023-11-09 NOTE — TELEPHONE ENCOUNTER
Patient called and given JAN post watchman date and time 11/27/2023 at 1030 and PPW scheduled for 11/22 at 230.

## 2023-11-10 ENCOUNTER — CARE COORDINATION (OUTPATIENT)
Dept: CASE MANAGEMENT | Age: 88
End: 2023-11-10

## 2023-11-10 NOTE — CARE COORDINATION
action plan, and red flags with patient and discussed any barriers to care and/or understanding of plan of care after discharge. Discussed appropriate site of care based on symptoms and resources available to patient including: PCP  Specialist  Benefits related nurse triage line  Urgent care clinics  [facility]  When to call [phone]. The patient agrees to contact the PCP office for questions related to their healthcare. Advance Care Planning:   reviewed and current. Patients top risk factors for readmission:  CVA, Afib, presence of watchmen, COPD HTN  Interventions to address risk factors: Obtained and reviewed discharge summary and/or continuity of care documents, Education of patient/family/caregiver/guardian to support self-management-s/s to watch out for, and Assessment and support for treatment adherence and medication management-med review    Offered patient enrollment in the Remote Patient Monitoring (RPM) program for in-home monitoring: Patient declined. Has returned equip     Care Transitions Subsequent and Final Call    Schedule Follow Up Appointment with PCP: Completed  Subsequent and Final Calls  Do you have any ongoing symptoms?: No  Have your medications changed?: No  Do you have any questions related to your medications?: No  Do you currently have any active services?: Yes  Are you currently active with any services?: Home Health  Do you have any needs or concerns that I can assist you with?: No  Identified Barriers: Medication Side Effects, Impairment, Stress  Care Transitions Interventions   Home Care Waiver: Completed Physical Therapy: Declined       Transportation Support: Declined      DME Assistance: Completed     Senior Services: Declined          Disease Specific Clinic: Completed  Social Work: Declined    Disease Association: Completed      Other Interventions:             Care Transition Nurse provided contact information for future needs.  Plan for follow-up call in

## 2023-11-17 ENCOUNTER — CARE COORDINATION (OUTPATIENT)
Dept: CASE MANAGEMENT | Age: 88
End: 2023-11-17

## 2023-11-17 ENCOUNTER — TELEPHONE (OUTPATIENT)
Dept: CARDIOLOGY CLINIC | Age: 88
End: 2023-11-17

## 2023-11-17 NOTE — CARE COORDINATION
acute stroke ([address])    Dysthymia    Current mild episode of major depressive disorder without prior episode ([address])    Atherosclerosis of aorta (HCC)    Thyroid disease    Mixed hyperlipidemia    Bradycardia    PAD (peripheral artery disease) (HCC)    Other fatigue    Generalized weakness    Anxiety disorder    Chest pain    SOBOE (shortness of breath on exertion)    Iron deficiency anemia, unspecified    Malabsorption syndrome    Benign neoplasm of transverse colon    Hypertensive urgency    PAF (paroxysmal atrial fibrillation) (HCC)    Secondary hypercoagulable state ([address])    At maximum risk for fall    Presence of Watchman left atrial appendage closure device    Acute thoracic back pain, unspecified back pain laterality    Acute kidney failure (HCC)    Pleural effusion       Interventions to address risk factors: Assessment and support for treatment adherence and medication management-. Offered patient enrollment in the Remote Patient Monitoring (RPM) program for in-home monitoring: Patient declined. Care Transitions Subsequent and Final Call    Subsequent and Final Calls  Care Transitions Interventions                          Other Interventions:             LPN Care Coordinator provided contact information for future needs. Plan for follow-up call in 10-14 days based on severity of symptoms and risk factors.   Plan for next call: symptom management-,..  self management-.    Bruce Polk LPN 09-Jan-2017 21:02

## 2023-11-21 ENCOUNTER — NURSE ONLY (OUTPATIENT)
Dept: CARDIOLOGY CLINIC | Age: 88
End: 2023-11-21

## 2023-11-21 DIAGNOSIS — Z95.818 PRESENCE OF WATCHMAN LEFT ATRIAL APPENDAGE CLOSURE DEVICE: Primary | ICD-10-CM

## 2023-11-21 NOTE — PROGRESS NOTES
Patient here in office and educated on JAN, schedule for 11/27/23 @ 1030, with arrival @ 0930, @ [facility]; risk explained; and consents signed. Also copy of orders given for labs and CXR due STAT at [facility]. Instruction given to patient to :  NPO after midnight the night before procedure; call hospital at [phone] to pre-register. May take rest of morning meds of procedure except Lasix. Patient voiced understanding. Copies of consent & info scanned in chart.

## 2023-11-24 ENCOUNTER — CARE COORDINATION (OUTPATIENT)
Dept: CASE MANAGEMENT | Age: 88
End: 2023-11-24

## 2023-11-24 NOTE — CARE COORDINATION
Care Transitions Follow Up Call    Patient Current Location:  Home: [address] [place] [id number]    Care Transition Nurse contacted the patient by telephone to follow up after admission on 10/17/23. Verified name and  with patient as identifiers. Patient: Johnson Cevallos  Patient : 1935   MRN: [id number]  Reason for Admission:  Pericardial Effusion, SOPHIE, s/p Watchmen 10/12/23. Discharge Date: 10/25/23 RARS: Readmission Risk Score: 28.6      Needs to be reviewed by the provider   Additional needs identified to be addressed with provider: No  none             Method of communication with provider: none. Spoke with Teresa Renee, says feeling good. Denies sob, cough, dizziness, headache, n/v, diarrhea, abdominal pains, fever, or chills. Teresa Renee instructed to continue to monitor s/s, reporting any that may present to MD immediately for early intervention. No issues to report @ this time. No med questions. She is agreeable to f/u calls. Reminded her of upcoming appt-23  for JAN @ [address] [place] [address] . Has a ride. Addressed changes since last contact:  none  Discussed follow-up appointments. If no appointment was previously scheduled, appointment scheduling offered: Yes. Is follow up appointment scheduled within 7 days of discharge? Yes. Follow Up  Future Appointments   Date Time Provider [address] [place] [address]   2023 10:30 AM SRM ECHO 1 SR[facility]   2023  1:40 PM DERIK Suarez CNP [facility]   3/4/2024 11:15 AM Ara Rivera MD [facility]   2024 12:00 PM DERIK Suarez CNP [facility]     External follow up appointment(s): MANUEL    Care Transition Nurse reviewed discharge instructions, medical action plan, and red flags with patient and discussed any barriers to care and/or understanding of plan of care after discharge.  Discussed appropriate site of care based on symptoms and resources available to patient including:

## 2023-11-27 ENCOUNTER — HOSPITAL ENCOUNTER (OUTPATIENT)
Dept: NON INVASIVE DIAGNOSTICS | Age: 88
Discharge: HOME | End: 2023-11-29
Attending: INTERNAL MEDICINE
Payer: MEDICARE

## 2023-11-27 VITALS
DIASTOLIC BLOOD PRESSURE: 44 MMHG | OXYGEN SATURATION: 99 % | RESPIRATION RATE: 14 BRPM | HEART RATE: 57 BPM | HEIGHT: 60 IN | SYSTOLIC BLOOD PRESSURE: 137 MMHG | WEIGHT: 119 LBS | BODY MASS INDEX: 23.36 KG/M2

## 2023-11-27 DIAGNOSIS — I48.91 ATRIAL FIBRILLATION, UNSPECIFIED TYPE (HCC): ICD-10-CM

## 2023-11-27 LAB — ECHO BSA: 1.51 M2

## 2023-11-27 PROCEDURE — 99152 MOD SED SAME PHYS/QHP 5/>YRS: CPT | Performed by: INTERNAL MEDICINE

## 2023-11-27 PROCEDURE — 93325 DOPPLER ECHO COLOR FLOW MAPG: CPT

## 2023-11-27 PROCEDURE — 99153 MOD SED SAME PHYS/QHP EA: CPT | Performed by: INTERNAL MEDICINE

## 2023-11-27 PROCEDURE — 7100000010 HC PHASE II RECOVERY - FIRST 15 MIN: Performed by: INTERNAL MEDICINE

## 2023-11-27 PROCEDURE — 7100000011 HC PHASE II RECOVERY - ADDTL 15 MIN: Performed by: INTERNAL MEDICINE

## 2023-11-27 PROCEDURE — 6370000000 HC RX 637 (ALT 250 FOR IP): Performed by: INTERNAL MEDICINE

## 2023-11-27 PROCEDURE — 6360000002 HC RX W HCPCS: Performed by: INTERNAL MEDICINE

## 2023-11-27 RX ORDER — LIDOCAINE HYDROCHLORIDE 20 MG/ML
SOLUTION OROPHARYNGEAL PRN
Status: COMPLETED | OUTPATIENT
Start: 2023-11-27 | End: 2023-11-27

## 2023-11-27 RX ORDER — MIDAZOLAM HYDROCHLORIDE 1 MG/ML
INJECTION INTRAMUSCULAR; INTRAVENOUS PRN
Status: COMPLETED | OUTPATIENT
Start: 2023-11-27 | End: 2023-11-27

## 2023-11-27 RX ADMIN — MIDAZOLAM 1 MG: 1 INJECTION INTRAMUSCULAR; INTRAVENOUS at 11:52

## 2023-11-27 RX ADMIN — LIDOCAINE HYDROCHLORIDE 15 ML: 20 SOLUTION ORAL; TOPICAL at 11:42

## 2023-11-27 ASSESSMENT — ENCOUNTER SYMPTOMS
DIARRHEA: 0
ABDOMINAL PAIN: 0
CHEST TIGHTNESS: 0
NAUSEA: 0
EYE PAIN: 0
COUGH: 0
BLOOD IN STOOL: 0
BACK PAIN: 0
VOMITING: 0
CONSTIPATION: 0
PHOTOPHOBIA: 0
SHORTNESS OF BREATH: 0
COLOR CHANGE: 0
WHEEZING: 0

## 2023-11-27 NOTE — H&P
Electrophysiology H&p  Note      Reason for consultation:  Assess for pacemaker and watchman    Chief complaint : Here for post watchman 45 day JAN    Referring physician: Jewels Gramajo      Primary care physician: Yen Amaro MD      History of Present Illness: Today visit (11/27/23)    Patient here for 45days post watchman. No change in H&P noted from previous clinic visit. Previous visit:     Patient here for watchman implantation. No change in H&P noted from previous clinic visit. Previous visit:     Patient is 19-year-old female with a history of COPD, CVA, asthma, hypertension, hyperlipidemia, aortic valve replacement, coronary artery disease referred by Dr. Laura Russell for assessment for watchman and also pacemaker. Patient reports shortness of breath with exertion and feeling very tired and sometimes very fatigued and near syncopal episodes. Patient also is having recurrent falls and concerned about being on anticoagulation. Patient has palpitations. This can start anytime. Patient reports that she had a cardioversion recently and her heart rate was very low. Patient denies any chest pain. Patient did not pass out completely but came to passing out couple of times. Patient gets dizzy. Patient does not drink alcohol or smoke. Pastmedical history:   Past Medical History:   Diagnosis Date    Arthritis     generalized    Asthma     At maximum risk for fall 10/04/2023    Blood transfusion 2004    No reaction    Broken heart syndrome     Cerebral artery occlusion with cerebral infarction [facility]) 2021    Patient \"states she was told she has had three small strokes. \"    Chronic bronchitis ([address])     COPD (chronic obstructive pulmonary disease) ([address])     summer 2014    COPD exacerbation ([address]) 10/31/2018    COPD with exacerbation ([address]) 09/06/2019    Echocardiogram 04/09/2021    EF 55-60%, Mild dilated LA, Mild annular calcification present, Mild

## 2023-11-28 NOTE — ADDENDUM NOTE
Addended by: Jennifer Camargo on: 10/17/2019 03:20 PM     Modules accepted: Orders Is This A New Presentation, Or A Follow-Up?: Cyst

## 2023-12-05 ENCOUNTER — TELEPHONE (OUTPATIENT)
Dept: CARDIOLOGY CLINIC | Age: 88
End: 2023-12-05

## 2023-12-06 ENCOUNTER — CARE COORDINATION (OUTPATIENT)
Dept: CASE MANAGEMENT | Age: 88
End: 2023-12-06

## 2023-12-06 NOTE — CARE COORDINATION
Care Transitions Follow Up Call    Patient Current Location:  Home: [address] [place] [address]    Care Transition Nurse contacted the patient by telephone to follow up after admission on 10/17/23-10/25/23. Verified name and  with patient as identifiers. Patient: Lyssa Jarvis  Patient : 1935   MRN: [id number]  Reason for Admission: Pericardial Effusion  Discharge Date: 10/25/23 RARS: Readmission Risk Score: 28.6      Needs to be reviewed by the provider   Additional needs identified to be addressed with provider: No  none             Method of communication with provider: none. Spoke ortega Pratt, says she is feeling good, she has been more active, feels better than she has in a while. No fallls, no sob, no chest pain, no palps. No weight gain. She had JAN last week post Watchman (45 days) Per EP Dr Guzman Epstein, Pericardial effusion resolved . She remains on ASA & Plavix. Reminded her off  upcoming appt with EP NP Kadeem Taylor this Fri, declines need for ride, dtr will take her. She is logging her BP, HR & weights. HR on the low side, is bradycardic, running in the 55-60's, but feels good, no dizziness. /68, wt- 118-120. She will share with Cards/EP ov. [facility] is making weekly visits. Discussed when to contact providers - ie any new/worsening s/s. Informed pt this is final call. Offered continued care coordination follow up, (ACM ) but she declines, she feels since she had Watchman, she is doing better. She initially was interested in RPM, but decided against it as she felt it was not necessary & her conditions were being managed well. CTN episode will be closed. Addressed changes since last contact:   improvement. JAN done. Discussed follow-up appointments. If no appointment was previously scheduled, appointment scheduling offered: Yes. Is follow up appointment scheduled within 7 days of discharge? Yes.     Follow Up  Future Appointments   Date Time Provider [address] [facility]

## 2023-12-08 ENCOUNTER — OFFICE VISIT (OUTPATIENT)
Dept: CARDIOLOGY CLINIC | Age: 88
End: 2023-12-08

## 2023-12-08 VITALS
OXYGEN SATURATION: 100 % | HEART RATE: 52 BPM | WEIGHT: 120 LBS | SYSTOLIC BLOOD PRESSURE: 138 MMHG | HEIGHT: 60 IN | DIASTOLIC BLOOD PRESSURE: 60 MMHG | BODY MASS INDEX: 23.56 KG/M2

## 2023-12-08 DIAGNOSIS — I48.0 PAF (PAROXYSMAL ATRIAL FIBRILLATION) (HCC): ICD-10-CM

## 2023-12-08 DIAGNOSIS — Z95.818 PRESENCE OF WATCHMAN LEFT ATRIAL APPENDAGE CLOSURE DEVICE: Primary | ICD-10-CM

## 2023-12-08 DIAGNOSIS — I10 PRIMARY HYPERTENSION: ICD-10-CM

## 2023-12-08 DIAGNOSIS — D68.69 SECONDARY HYPERCOAGULABLE STATE (HCC): ICD-10-CM

## 2023-12-08 ASSESSMENT — ENCOUNTER SYMPTOMS
SINUS PAIN: 0
VOMITING: 0
BACK PAIN: 0
CONSTIPATION: 0
ABDOMINAL DISTENTION: 0
PHOTOPHOBIA: 0
SINUS PRESSURE: 0
SHORTNESS OF BREATH: 0
WHEEZING: 0
DIARRHEA: 0
COLOR CHANGE: 0
BLOOD IN STOOL: 0
NAUSEA: 0
ABDOMINAL PAIN: 0

## 2023-12-08 NOTE — PATIENT INSTRUCTIONS
**It is YOUR responsibilty to bring medication bottles and/or updated medication list to [address] [place] [address]. This will allow us to better serve you and all your healthcare needs**   [facility] Locations - No appointment necessary. Sites open Monday to Friday. Call your preferred location for test preparation, business   hours and other information you need. [employer] accepts 's. [address] [place] [address]. [address], [address] [facility]  Phone: [phone]    Thank you for allowing us to care for you today! We want to ensure we can follow your treatment plan and we strive to give you the best outcomes and experience possible. If you ever have a life threatening emergency and call 911 - for an ambulance (EMS)   Our providers can only care for you at:   [facility] or [facility]. Even if you have someone take you or you drive yourself we can only care for you in a [address] [place] [address] facility. Our providers are not setup at the other healthcare locations! Please be informed that if you contact our office outside of normal business hours the physician on call cannot help with any scheduling or rescheduling issues, procedure instruction questions or any type of medication issue. We advise you for any urgent/emergency that you go to the nearest emergency room! PLEASE CALL OUR OFFICE DURING NORMAL BUSINESS HOURS    Monday - Friday   8 am to 5 pm    [place]: [address] [place]: [phone]    [place]:  [phone]  We are committed to providing you the best care possible. If you receive a survey after visiting one of our offices, please take time to share your experience concerning your physician office visit. These surveys are confidential and no health information about you is shared. We are eager to improve for you and we are counting on your feedback to help make that happen.

## 2023-12-08 NOTE — PROGRESS NOTES
Mouth: Mucous membranes are moist.   Eyes:      General:         Right eye: No discharge. Left eye: No discharge. Conjunctiva/sclera: Conjunctivae normal.   Cardiovascular:      Rate and Rhythm: Normal rate and regular rhythm. Heart sounds: Murmur (grade 2/6 systolic murmur) heard. Comments:     Pulmonary:      Effort: Pulmonary effort is normal.      Breath sounds: Normal breath sounds. No wheezing or rhonchi. Abdominal:      General: Abdomen is flat. Bowel sounds are normal.      Palpations: Abdomen is soft. Musculoskeletal:      Cervical back: Normal range of motion. Right lower leg: No edema. Left lower leg: No edema. Skin:     General: Skin is warm. Neurological:      General: No focal deficit present. Mental Status: She is alert and oriented to person, place, and time. Psychiatric:         Mood and Affect: Mood normal.         Thought Content:  Thought content normal.       CBC:   Lab Results   Component Value Date/Time    WBC 9.3 10/24/2023 05:21 PM    HGB 10.3 10/24/2023 05:21 PM    HCT 32.5 10/24/2023 05:21 PM     10/24/2023 05:21 PM     Lipids:  Lab Results   Component Value Date    CHOL 126 09/04/2023    TRIG 63 09/04/2023    HDL 75 09/04/2023    LDLCALC 38 09/04/2023    LDLDIRECT 91 06/13/2017     PT/INR:   Lab Results   Component Value Date/Time    INR 1.0 10/18/2023 03:18 AM        BMP:    Lab Results   Component Value Date     (L) 10/24/2023    K 4.1 10/24/2023    CL 96 (L) 10/24/2023    CO2 28 10/24/2023    BUN 9 10/24/2023     CMP:   Lab Results   Component Value Date    AST 21 09/19/2023    ALT 22 09/19/2023    PROT 5.1 (L) 09/19/2023    BILITOT 0.5 09/19/2023    ALKPHOS 51 09/19/2023     TSH:    Lab Results   Component Value Date/Time    TSH 2.710 08/06/2021 12:00 PM       EKGINTERPRETATION - EKG Interpretation:  atrial paced      Vitals:    12/08/23 1330   BP: 138/60   Site: Left Upper Arm   Position: Sitting   Cuff Size: Medium Adult

## 2023-12-27 ENCOUNTER — INPATIENT HOSPITAL (OUTPATIENT)
Dept: URBAN - METROPOLITAN AREA HOSPITAL 56 | Facility: HOSPITAL | Age: 88
End: 2023-12-27
Payer: MEDICARE

## 2023-12-27 DIAGNOSIS — Z98.84 BARIATRIC SURGERY STATUS: ICD-10-CM

## 2023-12-27 DIAGNOSIS — K21.9 GASTRO-ESOPHAGEAL REFLUX DISEASE WITHOUT ESOPHAGITIS: ICD-10-CM

## 2023-12-27 DIAGNOSIS — R07.9 CHEST PAIN, UNSPECIFIED: ICD-10-CM

## 2023-12-27 DIAGNOSIS — K44.9 DIAPHRAGMATIC HERNIA WITHOUT OBSTRUCTION OR GANGRENE: ICD-10-CM

## 2023-12-27 PROCEDURE — 99222 1ST HOSP IP/OBS MODERATE 55: CPT | Mod: FS | Performed by: NURSE PRACTITIONER

## 2023-12-28 ENCOUNTER — INPATIENT HOSPITAL (OUTPATIENT)
Dept: URBAN - METROPOLITAN AREA HOSPITAL 56 | Facility: HOSPITAL | Age: 88
End: 2023-12-28
Payer: MEDICARE

## 2023-12-28 DIAGNOSIS — K44.9 DIAPHRAGMATIC HERNIA WITHOUT OBSTRUCTION OR GANGRENE: ICD-10-CM

## 2023-12-28 DIAGNOSIS — Z98.84 BARIATRIC SURGERY STATUS: ICD-10-CM

## 2023-12-28 DIAGNOSIS — R07.9 CHEST PAIN, UNSPECIFIED: ICD-10-CM

## 2023-12-28 PROCEDURE — 43235 EGD DIAGNOSTIC BRUSH WASH: CPT | Performed by: INTERNAL MEDICINE

## 2024-01-03 NOTE — PROGRESS NOTES
Behavioral Health Crisis Assessment      Chief Complaint: Patient reported that he had a suicide attempt between Christmas and New Years.    Mental Status Exam: Patient alert/oriented X3. Pt had flat affect. Pt soft-spoken, requires hearing aids. Pt was calm and cooperative, no psychomotor agitation. Pt kept appropriate eye contact. Patient noted having memory impairment, unable to recall some history. Pt denied having any hallucinations. Pt acknowledged having SI, no current plan-recent OD. Pt denied having HI.      Legal Status:  [] Voluntary:  [x] Involuntary, Issued by:    Gender:  [x] Male [] Female [] Transgender  [] Other    Sexual Orientation:  [x] Heterosexual [] Homosexual [] Bisexual [] Other    Brief Clinical Summary:    Patient is a 73 year old male who lives with wife who was brought to the ED via ambulance. Patient told ED doctor that he had a conversation with [facility] today, patient admitted to having a recent suicide attempt. SW met with patient for assessment. Patient talked about the conversation he had with his [facility] psychiatrist. Patient admitted to taking an overdose of Lithium \"the week between Christmas and New Years.\" Patient stated his SI has been intensify lately. Patient still with SI, no current plan. Patient noted having \"a lot\" of previous attempts, including drinking antifreeze. Patient denied having HI.     Patient denied having any AOD use/treatment history. Patient denied having a legal guardian or POA.    Collateral Information: None    Risk Factors:  MH diagnosis: MDD  Several MH hospitalizations  Gender risk of suicide (male over 60)  Family hx of MH and alcohol abuse   Suicide attempts  Conflict w wife    Protective Factors:  Reports having support system  positive rapport with MH providers  Steady income  Good communication skills  No access to weapons    Suicidal Ideations:  [x] Reports:    [x] Past [x] Present   [] Denies    Suicide Attempts:  [x] Reports:   []  round, and reactive to light, extraocular eye movements intact, conjunctivae normal  ENT: tympanic membrane, external ear and ear canal normal bilaterally, nose without deformity, nasal mucosa and turbinates normal without polyps  Neck: supple and non-tender without mass, no thyromegaly or thyroid nodules, no cervical lymphadenopathy  Pulmonary/Chest: clear to auscultation bilaterally- no wheezes, rales or rhonchi, normal air movement, no respiratory distress  Chest wall with significant bruising consistent with recent pacemaker placement. Cardiovascular: normal rate, regular rhythm, normal S1 and S2, no murmurs, rubs, clicks, or gallops, distal pulses intact, no carotid bruits  Abdomen: soft, non-tender, non-distended, normal bowel sounds, no masses or organomegaly  Extremities: no cyanosis, clubbing or edema  Musculoskeletal: normal range of motion, no joint swelling, deformity or tenderness  Neurologic: reflexes normal and symmetric, no cranial nerve deficit, gait, coordination and speech normal      An electronic signature was used to authenticate this note.   --Satish Portillo MD

## 2024-01-04 ENCOUNTER — OFFICE VISIT (OUTPATIENT)
Dept: FAMILY MEDICINE CLINIC | Age: 89
End: 2024-01-04
Payer: MEDICARE

## 2024-01-04 VITALS
OXYGEN SATURATION: 98 % | TEMPERATURE: 96.9 F | HEART RATE: 56 BPM | BODY MASS INDEX: 24.53 KG/M2 | DIASTOLIC BLOOD PRESSURE: 52 MMHG | WEIGHT: 125.6 LBS | SYSTOLIC BLOOD PRESSURE: 92 MMHG

## 2024-01-04 DIAGNOSIS — R06.09 DOE (DYSPNEA ON EXERTION): Primary | ICD-10-CM

## 2024-01-04 DIAGNOSIS — Z95.0 PRESENCE OF CARDIAC PACEMAKER: ICD-10-CM

## 2024-01-04 DIAGNOSIS — I48.0 PAF (PAROXYSMAL ATRIAL FIBRILLATION) (HCC): ICD-10-CM

## 2024-01-04 PROCEDURE — 99214 OFFICE O/P EST MOD 30 MIN: CPT | Performed by: FAMILY MEDICINE

## 2024-01-04 PROCEDURE — 1123F ACP DISCUSS/DSCN MKR DOCD: CPT | Performed by: FAMILY MEDICINE

## 2024-01-04 ASSESSMENT — PATIENT HEALTH QUESTIONNAIRE - PHQ9
6. FEELING BAD ABOUT YOURSELF - OR THAT YOU ARE A FAILURE OR HAVE LET YOURSELF OR YOUR FAMILY DOWN: 0
SUM OF ALL RESPONSES TO PHQ9 QUESTIONS 1 & 2: 0
SUM OF ALL RESPONSES TO PHQ QUESTIONS 1-9: 0
SUM OF ALL RESPONSES TO PHQ QUESTIONS 1-9: 0
4. FEELING TIRED OR HAVING LITTLE ENERGY: 0
9. THOUGHTS THAT YOU WOULD BE BETTER OFF DEAD, OR OF HURTING YOURSELF: 0
SUM OF ALL RESPONSES TO PHQ QUESTIONS 1-9: 0
5. POOR APPETITE OR OVEREATING: 0
2. FEELING DOWN, DEPRESSED OR HOPELESS: 0
8. MOVING OR SPEAKING SO SLOWLY THAT OTHER PEOPLE COULD HAVE NOTICED. OR THE OPPOSITE, BEING SO FIGETY OR RESTLESS THAT YOU HAVE BEEN MOVING AROUND A LOT MORE THAN USUAL: 0
SUM OF ALL RESPONSES TO PHQ QUESTIONS 1-9: 0
10. IF YOU CHECKED OFF ANY PROBLEMS, HOW DIFFICULT HAVE THESE PROBLEMS MADE IT FOR YOU TO DO YOUR WORK, TAKE CARE OF THINGS AT HOME, OR GET ALONG WITH OTHER PEOPLE: 0
1. LITTLE INTEREST OR PLEASURE IN DOING THINGS: 0
3. TROUBLE FALLING OR STAYING ASLEEP: 0
7. TROUBLE CONCENTRATING ON THINGS, SUCH AS READING THE NEWSPAPER OR WATCHING TELEVISION: 0

## 2024-01-04 NOTE — PROGRESS NOTES
38 minutes was spent on this visit to include face-to-face time, reviewing record, and writing note.

## 2024-01-08 ENCOUNTER — OFFICE VISIT (OUTPATIENT)
Dept: CARDIOLOGY CLINIC | Age: 89
End: 2024-01-08
Payer: MEDICARE

## 2024-01-08 VITALS
SYSTOLIC BLOOD PRESSURE: 130 MMHG | HEIGHT: 60 IN | HEART RATE: 56 BPM | WEIGHT: 126 LBS | BODY MASS INDEX: 24.74 KG/M2 | DIASTOLIC BLOOD PRESSURE: 60 MMHG

## 2024-01-08 DIAGNOSIS — I38 VHD (VALVULAR HEART DISEASE): ICD-10-CM

## 2024-01-08 DIAGNOSIS — I48.0 PAF (PAROXYSMAL ATRIAL FIBRILLATION) (HCC): Primary | ICD-10-CM

## 2024-01-08 PROBLEM — I16.0 HYPERTENSIVE URGENCY: Status: RESOLVED | Noted: 2023-09-04 | Resolved: 2024-01-08

## 2024-01-08 PROCEDURE — 99214 OFFICE O/P EST MOD 30 MIN: CPT | Performed by: NURSE PRACTITIONER

## 2024-01-08 PROCEDURE — 1123F ACP DISCUSS/DSCN MKR DOCD: CPT | Performed by: NURSE PRACTITIONER

## 2024-01-08 ASSESSMENT — ENCOUNTER SYMPTOMS
SHORTNESS OF BREATH: 1
ORTHOPNEA: 0

## 2024-01-08 NOTE — PROGRESS NOTES
pulses.      Heart sounds: Murmur heard.   Pulmonary:      Effort: Pulmonary effort is normal.      Breath sounds: Normal breath sounds. No rales.   Chest:      Chest wall: No tenderness.      Comments: Left-sided pacer pocket intact  Musculoskeletal:      Cervical back: No tenderness.      Right lower leg: No edema.      Left lower leg: No edema.   Skin:     General: Skin is warm and dry.      Capillary Refill: Capillary refill takes less than 2 seconds.   Neurological:      Mental Status: She is alert and oriented to person, place, and time.                Current Outpatient Medications   Medication Sig Dispense Refill    scopolamine (TRANSDERM-SCOP) transdermal patch Place 1 patch onto the skin every 72 hours 10 patch 0    furosemide (LASIX) 20 MG tablet Take 1 tablet by mouth daily 60 tablet 1    dilTIAZem (CARDIZEM CD) 120 MG extended release capsule Take 1 capsule by mouth daily 30 capsule 3    levothyroxine (SYNTHROID) 75 MCG tablet Take 1 tablet by mouth Daily 90 tablet 1    clopidogrel (PLAVIX) 75 MG tablet Take 1 tablet by mouth daily 90 tablet 1    timolol (TIMOPTIC) 0.5 % ophthalmic solution instill one drop into both eyes in the morning      methocarbamol (ROBAXIN) 500 MG tablet Take 1 tablet by mouth every evening 30 tablet 5    escitalopram (LEXAPRO) 20 MG tablet Take 1 tablet by mouth daily 90 tablet 1    pantoprazole (PROTONIX) 40 MG tablet TAKE 1 TABLET NIGHTLY 90 tablet 1    ondansetron (ZOFRAN) 4 MG tablet Take 1 tablet by mouth every 8 hours as needed for Nausea or Vomiting 90 tablet 1    atorvastatin (LIPITOR) 40 MG tablet Take 1 tablet by mouth nightly 90 tablet 1    losartan (COZAAR) 25 MG tablet Take 1 tablet by mouth daily Hold FOR SBP <120 90 tablet 1    montelukast (SINGULAIR) 10 MG tablet Take 1 tablet by mouth nightly 90 tablet 1    fluticasone (FLONASE) 50 MCG/ACT nasal spray 1 spray by Each Nostril route daily 16 g 0    OXYGEN Inhale into the lungs Patient uses 2 liters nc prn.

## 2024-01-08 NOTE — PATIENT INSTRUCTIONS
We are committed to providing you the best care possible.    If you receive a survey after visiting one of our offices, please take time to share your experience concerning your physician office visit.  These surveys are confidential and no health information about you is shared.    We are eager to improve for you and we are counting on your feedback to help make that happen.      Please be informed that if you contact our office outside of normal business hours the physician on call cannot help with any scheduling or rescheduling issues, procedure instruction questions or any type of medication issue.    We advise you for any urgent/emergency that you go to the nearest emergency room!    PLEASE CALL OUR OFFICE DURING NORMAL BUSINESS HOURS    Monday - Friday   8 am to 5 pm    [place]: [phone]    [place]: [phone]    [place]:  [phone]  Thank you for allowing us to care for you today!   We want to ensure we can follow your treatment plan and we strive to give you the best outcomes and experience possible.   If you ever have a life threatening emergency and call 911 - for an ambulance (EMS)   Our providers can only care for you at:   [facility] or [facility].   Even if you have someone take you or you drive yourself we can only care for you in a [facility] facility. Our providers are not setup at the other healthcare locations!   **It is YOUR responsibilty to bring medication bottles and/or updated medication list to EACH APPOINTMENT. This will allow us to better serve you and all your healthcare needs**

## 2024-01-10 ENCOUNTER — TELEPHONE (OUTPATIENT)
Dept: CARDIOLOGY CLINIC | Age: 89
End: 2024-01-10

## 2024-01-10 NOTE — TELEPHONE ENCOUNTER
Per physician request. I called patient to schedule her for 1/12/24 to come in for appt to have settings changed on her pacemaker. Patient said that she needed to speak with her daughter to see if she would be able to bring her in for appt and would call me back this afternoon.    Patient returned call and has been scheduled for Friday 1/12/24.

## 2024-01-12 ENCOUNTER — NURSE ONLY (OUTPATIENT)
Dept: CARDIOLOGY CLINIC | Age: 89
End: 2024-01-12

## 2024-01-12 DIAGNOSIS — Z95.0 CARDIAC PACEMAKER IN SITU: Primary | ICD-10-CM

## 2024-01-13 PROCEDURE — 93296 REM INTERROG EVL PM/IDS: CPT | Performed by: INTERNAL MEDICINE

## 2024-01-13 PROCEDURE — 93294 REM INTERROG EVL PM/LDLS PM: CPT | Performed by: INTERNAL MEDICINE

## 2024-01-15 ENCOUNTER — PROCEDURE VISIT (OUTPATIENT)
Dept: CARDIOLOGY CLINIC | Age: 89
End: 2024-01-15
Payer: MEDICARE

## 2024-01-15 DIAGNOSIS — Z95.0 CARDIAC PACEMAKER IN SITU: Primary | ICD-10-CM

## 2024-01-19 ENCOUNTER — TELEPHONE (OUTPATIENT)
Dept: CARDIOLOGY CLINIC | Age: 89
End: 2024-01-19

## 2024-01-19 RX ORDER — DILTIAZEM HYDROCHLORIDE 120 MG/1
120 CAPSULE, COATED, EXTENDED RELEASE ORAL DAILY
Qty: 30 CAPSULE | Refills: 3 | Status: SHIPPED | OUTPATIENT
Start: 2024-01-19

## 2024-02-15 RX ORDER — SUCRALFATE 1 G/1
1 TABLET ORAL 4 TIMES DAILY PRN
Qty: 120 TABLET | Refills: 5 | Status: SHIPPED | OUTPATIENT
Start: 2024-02-15

## 2024-02-15 RX ORDER — FUROSEMIDE 20 MG/1
20 TABLET ORAL DAILY
Qty: 60 TABLET | Refills: 1 | Status: SHIPPED | OUTPATIENT
Start: 2024-02-15

## 2024-02-15 RX ORDER — FUROSEMIDE 20 MG/1
20 TABLET ORAL DAILY
Qty: 60 TABLET | Refills: 1 | OUTPATIENT
Start: 2024-02-15

## 2024-02-15 NOTE — TELEPHONE ENCOUNTER
90 day this was written when she was discharged 10/23  She will be out appt 7/24 she will be out 2/16

## 2024-02-15 NOTE — TELEPHONE ENCOUNTER
Patient informed.   
RLE cellulitis , patient states that he has been on po clindamycin for the past few days redness is getting worse

## 2024-02-28 ENCOUNTER — TELEPHONE (OUTPATIENT)
Dept: CARDIOLOGY CLINIC | Age: 89
End: 2024-02-28

## 2024-02-28 NOTE — TELEPHONE ENCOUNTER
Patient has been taking Lisinopril and Losartin.  Someone should call patient to let her know that the Lisinopril maybe have been discontinued and to stop taking it. Losartin may be needing a refill or new script.

## 2024-02-28 NOTE — TELEPHONE ENCOUNTER
Patient states she received call from , she was sleeping at the time and misspoke. Confirms that she is not taking lisinopril. Losartan only

## 2024-03-04 ENCOUNTER — OFFICE VISIT (OUTPATIENT)
Dept: FAMILY MEDICINE CLINIC | Age: 89
End: 2024-03-04
Payer: MEDICARE

## 2024-03-04 VITALS
OXYGEN SATURATION: 99 % | BODY MASS INDEX: 25.04 KG/M2 | DIASTOLIC BLOOD PRESSURE: 64 MMHG | SYSTOLIC BLOOD PRESSURE: 128 MMHG | WEIGHT: 128.2 LBS | HEART RATE: 61 BPM

## 2024-03-04 DIAGNOSIS — Z86.73 HISTORY OF CVA (CEREBROVASCULAR ACCIDENT): ICD-10-CM

## 2024-03-04 DIAGNOSIS — G40.89 OTHER SEIZURES (HCC): ICD-10-CM

## 2024-03-04 DIAGNOSIS — J44.9 CHRONIC OBSTRUCTIVE PULMONARY DISEASE, UNSPECIFIED COPD TYPE (HCC): ICD-10-CM

## 2024-03-04 DIAGNOSIS — K21.9 GASTROESOPHAGEAL REFLUX DISEASE WITHOUT ESOPHAGITIS: ICD-10-CM

## 2024-03-04 DIAGNOSIS — I10 PRIMARY HYPERTENSION: ICD-10-CM

## 2024-03-04 DIAGNOSIS — M79.605 PAIN IN BOTH LOWER EXTREMITIES: ICD-10-CM

## 2024-03-04 DIAGNOSIS — R41.3 MEMORY DEFICIT: ICD-10-CM

## 2024-03-04 DIAGNOSIS — E78.2 MIXED HYPERLIPIDEMIA: ICD-10-CM

## 2024-03-04 DIAGNOSIS — M79.604 PAIN IN BOTH LOWER EXTREMITIES: ICD-10-CM

## 2024-03-04 DIAGNOSIS — F32.0 CURRENT MILD EPISODE OF MAJOR DEPRESSIVE DISORDER WITHOUT PRIOR EPISODE (HCC): ICD-10-CM

## 2024-03-04 DIAGNOSIS — J30.89 NON-SEASONAL ALLERGIC RHINITIS, UNSPECIFIED TRIGGER: ICD-10-CM

## 2024-03-04 DIAGNOSIS — R11.0 NAUSEA: Primary | ICD-10-CM

## 2024-03-04 DIAGNOSIS — I73.9 PAD (PERIPHERAL ARTERY DISEASE) (HCC): ICD-10-CM

## 2024-03-04 PROBLEM — J96.00 ACUTE RESPIRATORY FAILURE (HCC): Status: RESOLVED | Noted: 2021-11-14 | Resolved: 2024-03-04

## 2024-03-04 PROBLEM — D68.69 SECONDARY HYPERCOAGULABLE STATE (HCC): Status: RESOLVED | Noted: 2023-10-04 | Resolved: 2024-03-04

## 2024-03-04 PROCEDURE — 99215 OFFICE O/P EST HI 40 MIN: CPT | Performed by: FAMILY MEDICINE

## 2024-03-04 PROCEDURE — 36415 COLL VENOUS BLD VENIPUNCTURE: CPT | Performed by: FAMILY MEDICINE

## 2024-03-04 PROCEDURE — 1123F ACP DISCUSS/DSCN MKR DOCD: CPT | Performed by: FAMILY MEDICINE

## 2024-03-04 RX ORDER — METHOCARBAMOL 500 MG/1
500 TABLET, FILM COATED ORAL EVERY EVENING
Qty: 30 TABLET | Refills: 5 | Status: SHIPPED | OUTPATIENT
Start: 2024-03-04

## 2024-03-04 RX ORDER — PANTOPRAZOLE SODIUM 40 MG/1
TABLET, DELAYED RELEASE ORAL
Qty: 90 TABLET | Refills: 1 | Status: SHIPPED | OUTPATIENT
Start: 2024-03-04

## 2024-03-04 RX ORDER — LOSARTAN POTASSIUM 25 MG/1
25 TABLET ORAL DAILY
Qty: 90 TABLET | Refills: 1 | Status: SHIPPED | OUTPATIENT
Start: 2024-03-04

## 2024-03-04 RX ORDER — DILTIAZEM HYDROCHLORIDE 120 MG/1
120 CAPSULE, COATED, EXTENDED RELEASE ORAL DAILY
Qty: 30 CAPSULE | Refills: 3 | Status: SHIPPED | OUTPATIENT
Start: 2024-03-04

## 2024-03-04 RX ORDER — LEVOTHYROXINE SODIUM 0.07 MG/1
75 TABLET ORAL DAILY
Qty: 90 TABLET | Refills: 1 | Status: SHIPPED | OUTPATIENT
Start: 2024-03-04

## 2024-03-04 RX ORDER — MONTELUKAST SODIUM 10 MG/1
10 TABLET ORAL NIGHTLY
Qty: 90 TABLET | Refills: 1 | Status: SHIPPED | OUTPATIENT
Start: 2024-03-04

## 2024-03-04 RX ORDER — ATORVASTATIN CALCIUM 40 MG/1
40 TABLET, FILM COATED ORAL NIGHTLY
Qty: 90 TABLET | Refills: 1 | Status: SHIPPED | OUTPATIENT
Start: 2024-03-04

## 2024-03-04 RX ORDER — ESCITALOPRAM OXALATE 20 MG/1
20 TABLET ORAL DAILY
Qty: 90 TABLET | Refills: 1 | Status: SHIPPED | OUTPATIENT
Start: 2024-03-04

## 2024-03-04 ASSESSMENT — PATIENT HEALTH QUESTIONNAIRE - PHQ9
10. IF YOU CHECKED OFF ANY PROBLEMS, HOW DIFFICULT HAVE THESE PROBLEMS MADE IT FOR YOU TO DO YOUR WORK, TAKE CARE OF THINGS AT HOME, OR GET ALONG WITH OTHER PEOPLE: 0
5. POOR APPETITE OR OVEREATING: 0
7. TROUBLE CONCENTRATING ON THINGS, SUCH AS READING THE NEWSPAPER OR WATCHING TELEVISION: 0
SUM OF ALL RESPONSES TO PHQ QUESTIONS 1-9: 0
9. THOUGHTS THAT YOU WOULD BE BETTER OFF DEAD, OR OF HURTING YOURSELF: 0
SUM OF ALL RESPONSES TO PHQ QUESTIONS 1-9: 0
8. MOVING OR SPEAKING SO SLOWLY THAT OTHER PEOPLE COULD HAVE NOTICED. OR THE OPPOSITE, BEING SO FIGETY OR RESTLESS THAT YOU HAVE BEEN MOVING AROUND A LOT MORE THAN USUAL: 0
6. FEELING BAD ABOUT YOURSELF - OR THAT YOU ARE A FAILURE OR HAVE LET YOURSELF OR YOUR FAMILY DOWN: 0
SUM OF ALL RESPONSES TO PHQ QUESTIONS 1-9: 0
3. TROUBLE FALLING OR STAYING ASLEEP: 0
SUM OF ALL RESPONSES TO PHQ9 QUESTIONS 1 & 2: 0
4. FEELING TIRED OR HAVING LITTLE ENERGY: 0
SUM OF ALL RESPONSES TO PHQ QUESTIONS 1-9: 0
1. LITTLE INTEREST OR PLEASURE IN DOING THINGS: 0
2. FEELING DOWN, DEPRESSED OR HOPELESS: 0

## 2024-03-04 NOTE — PROGRESS NOTES
Subjective:       Opal Moreno is a 88 y.o. female who presents for follow up of depression. Current symptoms include depressed mood. Symptoms have been controlled since that time. Patient denies recurrent thoughts of death. Previous treatment includes:  Lexapro 20 mg daily . She complains of the following side effects from the treatment: none.    Patient with GERD symptoms.  Patient currently on Protonix 40 mg daily with good control.  She does have frequently nausea for which she takes Zofran.    Patient with a history of CVA January 22.  She has occasional symptoms similar to what she had prior to her stroke but they do not last very long.  She has not followed up with a neurologist since she was hospitalized.  While hospitalized she was diagnosed with Takotsubo cardiomyopathy.  She does follow with cardiology regularly.    Patient does have chronic allergic rhinitis for which she takes Singulair with good control.    Muscle spasms in legs at night.  Has used Robaxin in the past with good results.      Hypertension: Patient here for follow-up of hypertension and hyperlipidemia. She is not exercising and is not adherent to low salt diet.  Blood pressure is well controlled at home. Cardiac symptoms none. Patient denies chest pain and dyspnea.  Cardiovascular risk factors: advanced age (older than 55 for men, 65 for women) and hypertension. Use of agents associated with hypertension: none. History of target organ damage: none.    Patient and daughter states she has difficulty with memory recent.  Forgets to let the dogs back and sometimes.  She takes her all meds and does not forget.  She does not wander or get lost.    Patient's medications, allergies, past medical, surgical, social and family histories were reviewed and updated as appropriate.    Review of Systems  ROS:  Energy level fair overall, and weight is stable.  No chest pain or shortness of breath.  Bowels have been normal without constipation or

## 2024-03-05 ENCOUNTER — NURSE ONLY (OUTPATIENT)
Dept: CARDIOLOGY CLINIC | Age: 89
End: 2024-03-05

## 2024-03-05 DIAGNOSIS — Z95.0 CARDIAC PACEMAKER IN SITU: Primary | ICD-10-CM

## 2024-03-05 LAB
ALBUMIN SERPL-MCNC: 4.3 G/DL (ref 3.4–5)
ALBUMIN/GLOB SERPL: 2.3 {RATIO} (ref 1.1–2.2)
ALP SERPL-CCNC: 74 U/L (ref 40–129)
ALT SERPL-CCNC: 31 U/L (ref 10–40)
ANION GAP SERPL CALCULATED.3IONS-SCNC: 14 MMOL/L (ref 3–16)
AST SERPL-CCNC: 28 U/L (ref 15–37)
BILIRUB SERPL-MCNC: 0.8 MG/DL (ref 0–1)
BUN SERPL-MCNC: 10 MG/DL (ref 7–20)
CALCIUM SERPL-MCNC: 9 MG/DL (ref 8.3–10.6)
CHLORIDE SERPL-SCNC: 100 MMOL/L (ref 99–110)
CHOLEST SERPL-MCNC: 139 MG/DL (ref 0–199)
CO2 SERPL-SCNC: 26 MMOL/L (ref 21–32)
CREAT SERPL-MCNC: 0.5 MG/DL (ref 0.6–1.2)
GFR SERPLBLD CREATININE-BSD FMLA CKD-EPI: >60 ML/MIN/{1.73_M2}
GLUCOSE SERPL-MCNC: 87 MG/DL (ref 70–99)
HDLC SERPL-MCNC: 78 MG/DL (ref 40–60)
LDLC SERPL CALC-MCNC: 48 MG/DL
LIPASE SERPL-CCNC: 43 U/L (ref 13–60)
POTASSIUM SERPL-SCNC: 4 MMOL/L (ref 3.5–5.1)
PROT SERPL-MCNC: 6.2 G/DL (ref 6.4–8.2)
SODIUM SERPL-SCNC: 140 MMOL/L (ref 136–145)
TRIGL SERPL-MCNC: 67 MG/DL (ref 0–150)
VLDLC SERPL CALC-MCNC: 13 MG/DL

## 2024-03-05 NOTE — PROGRESS NOTES
Patient came in today because she changed phones and could not set up phone Timmy for [employer] monitor. Patient said that she had a lot of trouble with the Timmy on her previous phone and thought this new phone might be better. I set patient up with a bedside monitor and gave her instructions on how to use the monitor. Patient voiced understanding.

## 2024-03-10 ENCOUNTER — APPOINTMENT (OUTPATIENT)
Dept: CT IMAGING | Age: 89
End: 2024-03-10
Payer: MEDICARE

## 2024-03-10 ENCOUNTER — APPOINTMENT (OUTPATIENT)
Dept: GENERAL RADIOLOGY | Age: 89
End: 2024-03-10
Payer: MEDICARE

## 2024-03-10 ENCOUNTER — HOSPITAL ENCOUNTER (EMERGENCY)
Age: 89
Discharge: HOME | End: 2024-03-10
Attending: EMERGENCY MEDICINE
Payer: MEDICARE

## 2024-03-10 VITALS
OXYGEN SATURATION: 96 % | TEMPERATURE: 97.9 F | HEART RATE: 60 BPM | SYSTOLIC BLOOD PRESSURE: 166 MMHG | DIASTOLIC BLOOD PRESSURE: 56 MMHG | RESPIRATION RATE: 15 BRPM
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
## 2024-03-10 DIAGNOSIS — S00.511A ABRASION OF LIP, INITIAL ENCOUNTER: ICD-10-CM

## 2024-03-10 DIAGNOSIS — S09.90XA CLOSED HEAD INJURY, INITIAL ENCOUNTER: ICD-10-CM

## 2024-03-10 DIAGNOSIS — S62.101A CLOSED FRACTURE OF RIGHT WRIST, INITIAL ENCOUNTER: ICD-10-CM

## 2024-03-10 LAB
ALBUMIN SERPL-MCNC: 4 GM/DL (ref 3.4–5)
ALP BLD-CCNC: 56 IU/L (ref 40–129)
ALT SERPL-CCNC: 32 U/L (ref 10–40)
ANION GAP SERPL CALCULATED.3IONS-SCNC: 12 MMOL/L (ref 7–16)
APTT: 30.2 SECONDS (ref 25.1–37.1)
AST SERPL-CCNC: 36 IU/L (ref 15–37)
BASOPHILS ABSOLUTE: 0 K/CU MM
BASOPHILS RELATIVE PERCENT: 0.4 % (ref 0–1)
BILIRUB SERPL-MCNC: 1.3 MG/DL (ref 0–1)
BUN SERPL-MCNC: 12 MG/DL (ref 6–23)
CALCIUM SERPL-MCNC: 9 MG/DL (ref 8.3–10.6)
CHLORIDE BLD-SCNC: 100 MMOL/L (ref 99–110)
CO2: 27 MMOL/L (ref 21–32)
CREAT SERPL-MCNC: 0.4 MG/DL (ref 0.6–1.1)
DIFFERENTIAL TYPE: ABNORMAL
EOSINOPHILS ABSOLUTE: 0.3 K/CU MM
EOSINOPHILS RELATIVE PERCENT: 4 % (ref 0–3)
GFR SERPL CREATININE-BSD FRML MDRD: >60 ML/MIN/1.73M2
GLUCOSE SERPL-MCNC: 106 MG/DL (ref 70–99)
HCT VFR BLD CALC: 43.7 % (ref 37–47)
HEMOGLOBIN: 14.1 GM/DL (ref 12.5–16)
IMMATURE NEUTROPHIL %: 0.1 % (ref 0–0.43)
INR BLD: 1 INDEX
LYMPHOCYTES ABSOLUTE: 3.3 K/CU MM
LYMPHOCYTES RELATIVE PERCENT: 42.7 % (ref 24–44)
MCH RBC QN AUTO: 32.5 PG (ref 27–31)
MCHC RBC AUTO-ENTMCNC: 32.3 % (ref 32–36)
MCV RBC AUTO: 100.7 FL (ref 78–100)
MONOCYTES ABSOLUTE: 0.8 K/CU MM
MONOCYTES RELATIVE PERCENT: 9.8 % (ref 0–4)
NUCLEATED RBC %: 0 %
PDW BLD-RTO: 12.9 % (ref 11.7–14.9)
PLATELET # BLD: 251 K/CU MM (ref 140–440)
PMV BLD AUTO: 9.4 FL (ref 7.5–11.1)
POTASSIUM SERPL-SCNC: 4.5 MMOL/L (ref 3.5–5.1)
PROTHROMBIN TIME: 13 SECONDS (ref 11.7–14.5)
RBC # BLD: 4.34 M/CU MM (ref 4.2–5.4)
SEGMENTED NEUTROPHILS ABSOLUTE COUNT: 3.3 K/CU MM
SEGMENTED NEUTROPHILS RELATIVE PERCENT: 43 % (ref 36–66)
SODIUM BLD-SCNC: 139 MMOL/L (ref 135–145)
TOTAL IMMATURE NEUTOROPHIL: 0.01 K/CU MM
TOTAL NUCLEATED RBC: 0 K/CU MM
TOTAL PROTEIN: 6.7 GM/DL (ref 6.4–8.2)
WBC # BLD: 7.7 K/CU MM (ref 4–10.5)

## 2024-03-10 PROCEDURE — 73110 X-RAY EXAM OF WRIST: CPT

## 2024-03-10 PROCEDURE — 6360000002 HC RX W HCPCS: Performed by: PHYSICIAN ASSISTANT

## 2024-03-10 PROCEDURE — 72125 CT NECK SPINE W/O DYE: CPT

## 2024-03-10 PROCEDURE — 96375 TX/PRO/DX INJ NEW DRUG ADDON: CPT

## 2024-03-10 PROCEDURE — 70486 CT MAXILLOFACIAL W/O DYE: CPT

## 2024-03-10 PROCEDURE — 99284 EMERGENCY DEPT VISIT MOD MDM: CPT

## 2024-03-10 PROCEDURE — 85610 PROTHROMBIN TIME: CPT

## 2024-03-10 PROCEDURE — 70450 CT HEAD/BRAIN W/O DYE: CPT

## 2024-03-10 PROCEDURE — 25605 CLTX DST RDL FX/EPHYS SEP W/: CPT

## 2024-03-10 PROCEDURE — 85730 THROMBOPLASTIN TIME PARTIAL: CPT

## 2024-03-10 PROCEDURE — 6360000002 HC RX W HCPCS: Performed by: EMERGENCY MEDICINE

## 2024-03-10 PROCEDURE — 80053 COMPREHEN METABOLIC PANEL: CPT

## 2024-03-10 PROCEDURE — 96374 THER/PROPH/DIAG INJ IV PUSH: CPT

## 2024-03-10 PROCEDURE — 85025 COMPLETE CBC W/AUTO DIFF WBC: CPT

## 2024-03-10 PROCEDURE — 96376 TX/PRO/DX INJ SAME DRUG ADON: CPT

## 2024-03-10 PROCEDURE — 6370000000 HC RX 637 (ALT 250 FOR IP): Performed by: PHYSICIAN ASSISTANT

## 2024-03-10 RX ORDER — HYDROCODONE BITARTRATE AND ACETAMINOPHEN 5; 325 MG/1; MG/1
1 TABLET ORAL EVERY 6 HOURS PRN
Qty: 6 TABLET | Refills: 0 | Status: SHIPPED | OUTPATIENT
Start: 2024-03-10 | End: 2024-03-12

## 2024-03-10 RX ORDER — ONDANSETRON 2 MG/ML
4 INJECTION INTRAMUSCULAR; INTRAVENOUS EVERY 30 MIN PRN
Status: DISCONTINUED | OUTPATIENT
Start: 2024-03-10 | End: 2024-03-10 | Stop reason: HOSPADM

## 2024-03-10 RX ORDER — FENTANYL CITRATE 50 UG/ML
50 INJECTION, SOLUTION INTRAMUSCULAR; INTRAVENOUS ONCE
Status: COMPLETED | OUTPATIENT
Start: 2024-03-10 | End: 2024-03-10

## 2024-03-10 RX ORDER — GINSENG 100 MG
CAPSULE ORAL ONCE
Status: COMPLETED | OUTPATIENT
Start: 2024-03-10 | End: 2024-03-10

## 2024-03-10 RX ORDER — HYDROCODONE BITARTRATE AND ACETAMINOPHEN 5; 325 MG/1; MG/1
1 TABLET ORAL ONCE
Status: COMPLETED | OUTPATIENT
Start: 2024-03-10 | End: 2024-03-10

## 2024-03-10 RX ORDER — FENTANYL CITRATE 50 UG/ML
25 INJECTION, SOLUTION INTRAMUSCULAR; INTRAVENOUS ONCE
Status: COMPLETED | OUTPATIENT
Start: 2024-03-10 | End: 2024-03-10

## 2024-03-10 RX ADMIN — ONDANSETRON 4 MG: 2 INJECTION INTRAMUSCULAR; INTRAVENOUS at 15:07

## 2024-03-10 RX ADMIN — BACITRACIN: 500 OINTMENT TOPICAL at 16:08

## 2024-03-10 RX ADMIN — HYDROCODONE BITARTRATE AND ACETAMINOPHEN 1 TABLET: 5; 325 TABLET ORAL at 16:51

## 2024-03-10 RX ADMIN — FENTANYL CITRATE 25 MCG: 50 INJECTION, SOLUTION INTRAMUSCULAR; INTRAVENOUS at 15:07

## 2024-03-10 RX ADMIN — FENTANYL CITRATE 50 MCG: 50 INJECTION, SOLUTION INTRAMUSCULAR; INTRAVENOUS at 16:08

## 2024-03-10 ASSESSMENT — PAIN SCALES - GENERAL
PAINLEVEL_OUTOF10: 10
PAINLEVEL_OUTOF10: 8
PAINLEVEL_OUTOF10: 10

## 2024-03-10 NOTE — ED PROVIDER NOTES
[facility] EMERGENCY DEPARTMENT  EMERGENCY DEPARTMENT ENCOUNTER        Pt Name: Opal Moreno  MRN: [id number]  Birthdate 1935  Date of evaluation: 3/10/2024  Provider: Brian Gordon PA-C  PCP: Marcin Montano MD      FATIMAH. I have evaluated this patient.        CHIEF COMPLAINT      Chief Complaint   Patient presents with    Fall    Trauma       HISTORY OF PRESENT ILLNESS:     History from : Patient and family daughter     Limitations to history : None    Opal Moreno is a 88 y.o. female who presents complaint of injuries from a mechanical fall.  This occurred just prior to arrival in her home.  Patient tells me she tripped and fell on a rug.  Main complaints are injury to right wrist, facial injury.  Daughter at side side but also providing some history.  Daughter mentioning that she had been repeating questions.  She also mentions recent injury falling down resulting in fractured knuckles of the right hand, and a more remote left wrist fracture.  Patient does describe some chronic shoulder pains sees Dr. Villalobos for that.  They deny any prolonged downtime, loss of consciousness, chest pain, shortness of breath, neck pain, back pain, lower extremity pain      Nursing Notes were all reviewed and agreed with or any disagreements were addressed in the HPI.    REVIEW OF SYSTEMS :     Review of Systems   Constitutional:  Negative for chills and fever.   Gastrointestinal:  Negative for nausea and vomiting.   Musculoskeletal:  Negative for back pain.   Neurological:  Negative for speech difficulty, light-headedness and numbness.   Psychiatric/Behavioral:  Negative for confusion.        Pertinent positives and negatives are stated within HPI    PAST HISTORY   has a past medical history of Acute respiratory failure (HCC), Arthritis, Asthma, At maximum risk for fall, Blood transfusion, Broken heart syndrome, Cerebral artery occlusion with cerebral infarction (HCC),  Gastrojejunostomy/partial gastrectomy    OTHER SURGICAL HISTORY Left 10/12/2023    Watchman: 27mm Left atrial appendage closure device    KALIA-EN-Y GASTRIC BYPASS      SHOULDER ARTHROPLASTY Bilateral 10/2017    TOE SURGERY  Early     hammer toes and bunions    TONSILLECTOMY      UPPER GASTROINTESTINAL ENDOSCOPY N/A 2022    EGD DIAGNOSTIC ONLY performed by Arben Sanchez MD at [facility] ENDOSCOPY    UPPER GASTROINTESTINAL ENDOSCOPY N/A 2023    EGD DIAGNOSTIC ONLY performed by Arben Sanchez MD at [facility] ENDOSCOPY       Family History   Problem Relation Age of Onset    Diabetes Mother     Other Mother         thyroid    Heart Disease Father     Arthritis Father     High Blood Pressure Father     High Cholesterol Father     Other Father         glaucoma    Other Sister         thyroid, glaucoma    Diabetes Brother     Other Sister     Vision Loss Sister         lung problems, smoker    Cancer Sister         throat cancer    Other Son         HX of clots    Early Death Son         Hit by car and killed.    High Blood Pressure Daughter     Stroke Son         No residual    Other Son         HX myocarditis       Social History     Tobacco Use    Smoking status: Former     Current packs/day: 0.00     Average packs/day: 3.0 packs/day for 5.0 years (15.0 ttl pk-yrs)     Types: Cigarettes     Start date: 1957     Quit date: 1962     Years since quittin.2    Smokeless tobacco: Never   Vaping Use    Vaping Use: Never used   Substance Use Topics    Alcohol use: No     Comment: Caffiene - 1 coffee daily    Drug use: Yes     Types: Marijuana (Weed)     Comment: Takes for pain. Has medical card, ask not to use 24-48 hours before procedure.       Latex, Morphine, Bactrim [sulfamethoxazole-trimethoprim], Influenza vaccines, Lactose intolerance (gi), Phenergan [promethazine hcl], Promethazine hcl, Stadol [butorphanol tartrate], and Tape [adhesive tape]    The patient’s home medications have been

## 2024-03-10 NOTE — ED PROVIDER NOTES
sounds.  BACK:  No cervical thoracic or lumbar midline tenderness to palpation, step-offs or deformities.  EXTREMITIES: Upper and lower extremities have no acute deformities  and they are non-tender to palpation, except at the right wrist. Does have deformity and tenderness at right wrist, pulse intact. Good ROM otherwise.  SKIN: Warm and dry. No acute wounds  NEUROLOGICAL: Alert and oriented. No gross facial drooping. Strength 5/5 in upper and lower extremities Light touch sensation intact throughout.  Perfusion:  pulses intact and equal in all extremities      I have reviewed and interpreted all of the currently available lab results from this visit (if applicable):  No results found for this visit on 03/10/24.   Radiographs (if obtained):  Radiologist's Report Reviewed:  No results found.      MDM:    CC/HPI Summary, DDx, ED Course, and Reassessment: Patient presents with concern for fall, has right wrist deformity, also on Plavix, imaging as ordered.  She is alert, oriented, neuro intact      History from : Patient    Limitations to history : None    Patient was given the following medications:  Medications   fentaNYL (SUBLIMAZE) injection 25 mcg (has no administration in time range)   ondansetron (ZOFRAN) injection 4 mg (has no administration in time range)       Independent Imaging Interpretation by me: CT head without evidence of intracranial hemorrhage.  Right wrist with fracture and displacement    Chronic conditions affecting care: Chronic anticoagulation.  Hypertension    Social Determinants : None    Records Reviewed : Outpatient Notes patient seen by Dr. Montano on 3/4/2024 for follow-up depression, GERD, does have a history of CVA in January 2022.      Disposition Considerations (tests considered but not done, Shared Decision Making, Pt Expectation of Test or Tx.):     Imaging unremarkable except for wrist deformity, will place in fingertraps and reduce with this, then splint and f/u with ortho. Patient  and family agreeable.   Appropriate for outpatient management      ED Course as of 03/10/24 145   Sun Mar 10, 2024   1438 Colles' fracture on wrist x-ray.  We will place in finger traps that she does not have a lot of muscle spasm, and very thin woman without a lot of muscle belly honestly and I think we can easily reduce her with pain control and the finger traps instead of having to sedate which would be ideal at her age [KA]      ED Course User Index  [KA] Dunia Cota MD         Discharge condition: stable    I am the Primary Clinician of Record.        Total critical care time today provided was 32 minutes. This excludes seperately billable procedure. Critical care time provided for trauma that required close evaluation and/or intervention with concern for patient decompensation.    Clinical Impression:  1. Fall, initial encounter    2. Closed head injury, initial encounter    3. Abrasion of lip, initial encounter    4. Closed fracture of right wrist, initial encounter      Disposition referral (if applicable):  No follow-up provider specified.  Disposition medications (if applicable):  New Prescriptions    No medications on file       Comment: Please note this report has been produced using speech recognition software and may contain errors related to that system including errors in grammar, punctuation, and spelling, as well as words and phrases that may be inappropriate.  Efforts were made to edit the dictations.        Dunia Cota MD  03/10/24 9847

## 2024-03-10 NOTE — DISCHARGE INSTRUCTIONS
As discussed with you today, follow-up with your orthopedics office tomorrow morning.  You can call their office to confirm appointment let them know that we spoke with Dr. Villalobos today who advised that you can be seen there tomorrow at 10 AM.    Follow-up with your primary care provider for reevaluation of your head injury and discuss your visit to emergency department.    Return for any difficulty breathing, confusion, passing out, vomiting, abdominal pain, worsening symptoms or any new concerns.

## 2024-03-10 NOTE — ED TRIAGE NOTES
Pt states she is in the process of moving her belongings in with her daughter when she tripped over a rolled up rug. Pt face has some abrasions and her right wrist has a deformity. Pt c/o right shoulder pain.

## 2024-03-11 ENCOUNTER — TELEPHONE (OUTPATIENT)
Dept: CARDIOLOGY CLINIC | Age: 89
End: 2024-03-11

## 2024-03-11 NOTE — TELEPHONE ENCOUNTER
Traci served Dr Grace, advised of patients upcoming surgery with Dr Villalobos, he stated that ideally patient needs to stay on plavix till April. Mimi Fung CNP verbally advised.

## 2024-03-11 NOTE — TELEPHONE ENCOUNTER
Patient fell has a fractured wrist Dr Villalobos  Wants to do surgery 3/14 the office is sending  Over a request for clearance

## 2024-03-15 NOTE — PROGRESS NOTES
[facility] Patient Age: 43 year old  MESSAGE: Interpreting service used: No    Insurance on file confirmed with caller: Yes    IM/FP- Patient calling to speak to clinical in regards to Nurse Triage message from yesterday 3/14. States did not go to ER as directed. States pain is still present, asking for Dr. Lowe to order test.   Transferred to clinical staff     Message read back to caller for accuracy: Yes       ALLERGIES:  Acetaminophen  Current Outpatient Medications   Medication Sig Dispense Refill    cyclobenzaprine (FLEXERIL) 10 MG tablet Take 1 tablet by mouth 3 times daily as needed for Muscle spasms. Quantity fifteen 15 tablet 0    fexofenadine (ALLEGRA) 180 MG tablet Take 1 tablet by mouth daily. 30 tablet 1    azelastine (OPTIVAR) 0.05 % ophthalmic solution Place 1 drop into both eyes 2 times daily. 6 mL 0    polyethylene glycol (MIRALAX) 17 g packet Take 17 g by mouth daily. Stir and dissolve powder in any 4 to 8 ounces of beverage, then drink. 30 each 0    tadalafil (CIALIS) 20 MG tablet Take 1 tablet by mouth as needed for Erectile Dysfunction. 15 tablet 5    ketorolac (TORADOL) 10 MG tablet TAKE 1 TABLET BY MOUTH THREE TIMES DAILY FOR UP TO 3 DAYS AS NEEDED FOR PAIN      levocetirizine (XYZAL) 5 MG tablet Take 5 mg by mouth daily.      Dupilumab (Dupixent) 300 MG/2ML Solution Pen-injector Inject 300 mg into the skin.      fluticasone (FLONASE) 50 MCG/ACT nasal spray Spray 2 sprays in each nostril daily. 16 g 3    Multiple Vitamins-Minerals (vitamin - therapeutic multivitamins w/minerals) tablet Take 1 tablet by mouth every morning.       ZINC SULFATE PO Take 50 mg by mouth every morning.       No current facility-administered medications for this visit.     PHARMACY to use: See Below           Pharmacy preference(s) on file:   [id number] - [place], [place] - [address] [address]  [address] [address]  Phone: [phone] Fax:  Speech Language Pathology  Facility/Department: [facility] ARU  Initial Speech/Language/Cognitive Assessment    NAME: Raji Puckett  : 1935   MRN: [id number]  ADMISSION DATE: 2021  ADMITTING DIAGNOSIS: has Closed intertrochanteric fracture of left femur ([address] Gait disturbance; Anemia; Dizziness; Acquired hypothyroidism; Murmur; Age-related osteoporosis without current pathological fracture; Black tongue; COPD exacerbation ([address] Vitamin D deficiency; Vitamin B12 deficiency; Gastroesophageal reflux disease without esophagitis; VHD (valvular heart disease); Aortic stenosis, severe; Acute respiratory distress; COPD with exacerbation ([address] Nodule of lower lobe of right lung; S/P TAVR (transcatheter aortic valve replacement); Primary hypertension; Acute respiratory failure ([address] Other seizures ([address] Metabolic encephalopathy; Cerebrovascular accident (CVA) due to embolism of cerebral artery ([address] Cerebrovascular accident (CVA) ([address] and Acute CVA (cerebrovascular accident) [facility]) on their problem list.  DATE ONSET:     Date of Eval: 2021   Evaluating Therapist: Jesica Martinez, RADHA-SLP    RECENT RESULTS  CT OF HEAD/MRI:  MRI of Brain: Impression 1. There appear to be 3 punctate acute to subacute infarcts involving the right frontal lobe, left temporal lobe as well as the left occipital lobe. No significant mass effect or midline shift. 2 Otherwise, no acute intracranial abnormality. 3. Mild global parenchymal volume loss with mild chronic microvascular ischemic changes. Primary Complaint:    Pt is an 79 yo woman with a PMH significant for COPD, asthma, and thyroid disease who was recently admitted to ARU following admission to [facility] ED on  for respiratory distress and unresponsiveness. Pt was intubated upon admission d/t acute respiratory failure 2/2 COPD exacerbation, vent /. MRI indicated 3 punctate infarcts in R frontal lobe, L temporal lobe, L occipital lobe. [phone]      CALL BACK INFO: Ok to leave response (including medical information) on answering machine      PCP: Valeria Lowe MD         INS: Payor: [facility] / Plan: [facility] CHOICE [id number] / Product Type: PPO MISC   PATIENT ADDRESS:  [address] [address]     Pain:  Pain Assessment  Pain Assessment: 0-10  Pain Level: 0  Patient's Stated Pain Goal: No pain    Assessment:  Cognitive Diagnosis: Cognition WFL      The Brief Cognitive Assessment Tool (BCAT®) was administered 11/27/2021 to assess the following cognitive domains: orientation, verbal recall, visual recognition, visual recall, attention, abstraction, language, executive functions, and visuo-spatial processing. The emphasis of the tool is assessing contextual memory, executive functions, and attentional capacity. Cognitive Impairment Scale:  NORMAL:    46-50   NO FUNCTIONAL DEFICIT; INDEPENDENT LIVING; MAY BE  SUBJECTIVE MEMORY COMPLAINTS BUT LITTLE TO NO EVIDENCE  MILD COGNITIVE  IMPAIRMENT; 34-46   GENERALLY FUNCTIONAL WITH EARLY SPECIFIC FX DECLINE (IADL)   LOWER SCORES MORE SUGGESTIVE OF ADDITIONAL SUPPORT NEEDS   BOTTOM RANGE SCORES CONSIDER MED MGMT AND SUPPORT FOR COMMUNITY REINTEGRATION    MILD DEMENTIA 26-34   IADL DEFICITS; CLEARLY OBJECTIVE EVIDENCE OF MEMORY AND OTHER COGNITIVE DECLINE; MED MGMT AND COMMUNITY REINTEGRATION SUPPORT NEEDED    MODERATE TO   SEVERE DEMENTIA 0-25   MODERATE (UPPER END OF RANGE)PERVASIVE FX DEFICITS (IADLS) BUT ADLS GENERALLY INTACT   MARKED DEFICITS IN MEMORY AND EXECUTIVE FX; BEHAVIORAL AND PSYCH SYMPTOMS ARE COMMON      Dementia Impairment Scale:  Mild Cognitive Impairment  38  Mild Dementia  28  Moderate Dementia  18      BCAT score for Arleth Moore Clos: 46/50 indicating no cognitive impairment. Strengths:  \"Command and control\" cognitive activities  Awareness of self, time, place, and situation  Ability to immediately recall a word list of 4 items: banana/justice/Ana María/bridge.   Pt able to complete immediate recall 3/4 and delayed recall 4/4  Recall previously presented words over a time delay and recall elements of a story and previously presented pictures/storyAbility to put names to objects  Ability to concentrate and focus  Determine how objects are similar to one another  Understand and express speech  Understand visual processes and relationships    Story specifics included:  Concepción Presybeterian / borrowed / $9 / from her brother / Linda Reaves / last week. / She couldn't pay him back / because she bought /a delicious / ice cream cone / at the circus instead. Pts immediate response was 9/11 and delayed response: 9/11    Weaknesses:  Need repetition for complex tasks, MI      Strategies that improved performance included:   [x] repetition   [] practical application    [] spaced retrieval    [] choice cues  Recommendations:  Requires SLP Intervention: No  Duration/Frequency of Treatment: N/A  D/C Recommendations: To be determined       Plan:   Goals:      Patient/family involved in developing goals and treatment plan: Yes    Subjective:   Previous level of function and limitations: I for all IADLs  General  Chart Reviewed: Yes  Additional Pertinent Hx: hx of ST services, could not recall for swallowing, cog, etc. Does endorse dry mouth, needing to drink liquids with solids occasionally. Family / Caregiver Present: No  Subjective  Subjective: Pleasant and cooperative.   Social/Functional History  Lives With: Alone  Type of Home: House  Home Layout: One level  Home Access: Level entry  Bathroom Shower/Tub: Walk-in shower  Bathroom Toilet: Handicap height  Bathroom Equipment: Shower chair; Grab bars in shower; Grab bars around toilet  Bathroom Accessibility: Nestor Ramos: 4 wheeled walker; Cane; Quad cane; Crutches; Oxygen  ADL Assistance: Independent  Homemaking Assistance: Independent  Homemaking Responsibilities: Yes  Meal Prep Responsibility: Primary  Laundry Responsibility: Primary  Cleaning Responsibility: Primary  Bill Paying/Finance Responsibility: Primary  Shopping Responsibility: Primary  Dependent Care Responsibility: Primary  Health Care Management: Primary  Ambulation Assistance: Independent  Transfer Assistance: Independent  Active : Yes  Mode of Transportation: Car  Education: [employer]  Occupation: Retired  Leisure & Hobbies: Cutting grass, Making jewelry  Vision  Vision: Impaired  Vision Exceptions: Wears glasses for reading  Hearing  Hearing: Within functional limits           Objective:     Oral/Motor  Oral Motor: Within functional limits    Auditory Comprehension  Comprehension: Within Functional Limits         Expression  Primary Mode of Expression: Verbal    Verbal Expression  Verbal Expression: Within functional limits    Written Expression  Written Expression: Within Functional Limits    Motor Speech  Motor Speech: Within Functional Limits         Cognition:      Orientation  Overall Orientation Status: Within Normal Limits  Attention  Attention: Within Functional Limits  Memory  Memory: Within Funtional Limits  Problem Solving  Problem Solving: Within Functional Limits  Numeric Reasoning  Numeric Reasoning: Within Functional Limits  Abstract Reasoning  Abstract Reasoning: Within Functional Limits  Safety/Judgement  Safety/Judgement: Within Functional Limits    Additional Assessments:             Prognosis:  Individuals consulted  Consulted and agree with results and recommendations: Patient    Education:  Patient Education: results/recommendations  Patient Education Response: Verbalizes understanding; Demonstrated understanding  Safety Devices in place: Yes  Type of devices: All fall risk precautions in place    Therapy Time:   Individual Concurrent Group Co-treatment   Time In 0840         Time Out 0910         Minutes 30            Timed Code Treatment Minutes: 30 Minutes  Total Treatment Time: [address], [address] [facility]  2021 12:33 PM                         Speech Language Pathology  Facility/Department: [address] [place] [address]   CLINICAL BEDSIDE SWALLOW EVALUATION    NAME: Albert Hanna  : 1935  MRN: [id number]    ADMISSION DATE: 2021  ADMITTING DIAGNOSIS: has Closed intertrochanteric fracture of left femur ([address]  Gait disturbance; Anemia; Dizziness; Acquired hypothyroidism; Murmur; Age-related osteoporosis without current pathological fracture; Black tongue; COPD exacerbation ([address] Vitamin D deficiency; Vitamin B12 deficiency; Gastroesophageal reflux disease without esophagitis; VHD (valvular heart disease); Aortic stenosis, severe; Acute respiratory distress; COPD with exacerbation ([address] Nodule of lower lobe of right lung; S/P TAVR (transcatheter aortic valve replacement); Primary hypertension; Acute respiratory failure ([address] Other seizures ([address] Metabolic encephalopathy; Cerebrovascular accident (CVA) due to embolism of cerebral artery ([address] Cerebrovascular accident (CVA) ([address] and Acute CVA (cerebrovascular accident) ([address] on their problem list.  ONSET DATE:  11/14/21      Date of Eval: 11/27/2021  Evaluating Therapist: Grace Martinez, CCC-SLP    Current Diet level:  Current Diet : Regular  Current Liquid Diet : Thin      Primary Complaint  Patient Complaint: reports some difficulty with dry mouth, needing alt liquids/solids    Pain:  Pain Assessment  Pain Assessment: 0-10  Pain Level: 0  Patient's Stated Pain Goal: No pain    Reason for Referral  Keaton Clemons was referred for a bedside swallow evaluation to assess the efficiency of her swallow function, identify signs and symptoms of aspiration and make recommendations regarding safe dietary consistencies, effective compensatory strategies, and safe eating environment. Impression  Dysphagia Diagnosis: Swallow function appears grossly intact  Dysphagia Outcome Severity Scale: Level 6: Within functional limits/Modified independence     Treatment Plan  Requires SLP Intervention: No  Duration/Frequency of Treatment: N/A  D/C Recommendations: To be determined       Recommended Diet and Intervention  Diet Solids Recommendation: Regular  Liquid Consistency Recommendation:  Thin  Recommended Form of Meds: PO          Compensatory Swallowing Strategies  Compensatory Swallowing Strategies: Alternate solids and liquids    Treatment/Goals       General  Chart Reviewed: Yes  Behavior/Cognition: Alert; Cooperative; Pleasant mood  Respiratory Status: O2 via nasual cannula  O2 Device: Nasal cannula  Communication Observation: Functional  Follows Directions: Complex  Dentition: Dentures bottom; Dentures top; Edentulous  Baseline Vocal Quality: Normal  Prior Dysphagia History: none known prior to admission, does report hx of seeing SLP, cannot recall reasoning  Consistencies Administered: Reg solid; Thin - straw; Thin - cup           Vision/Hearing  Vision  Vision: Impaired  Vision Exceptions: Wears glasses for reading  Hearing  Hearing: Within functional limits    Oral Motor Deficits  Oral/Motor  Oral Motor: Within functional limits    Oral Phase Dysfunction  Oral Phase  Oral Phase: WNL     Indicators of Pharyngeal Phase Dysfunction   Pharyngeal Phase  Pharyngeal Phase: WNL    Prognosis  Prognosis  Barriers/Prognosis Comment: N/A  Individuals consulted  Consulted and agree with results and recommendations: Patient    Education  Patient Education: results/recommendations  Patient Education Response: Verbalizes understanding; Demonstrated understanding  Safety Devices in place: Yes  Type of devices:  All fall risk precautions in place       Therapy Time  SLP Individual Minutes  Time In: 0840  Time Out: 4222

## 2024-03-18 ENCOUNTER — TELEPHONE (OUTPATIENT)
Dept: CARDIOLOGY CLINIC | Age: 89
End: 2024-03-18

## 2024-03-28 ENCOUNTER — OFFICE VISIT (OUTPATIENT)
Dept: FAMILY MEDICINE CLINIC | Age: 89
End: 2024-03-28
Payer: MEDICARE

## 2024-03-28 VITALS
WEIGHT: 127 LBS | DIASTOLIC BLOOD PRESSURE: 84 MMHG | OXYGEN SATURATION: 96 % | BODY MASS INDEX: 24.8 KG/M2 | HEART RATE: 60 BPM | SYSTOLIC BLOOD PRESSURE: 136 MMHG

## 2024-03-28 DIAGNOSIS — Z87.81 S/P ORIF (OPEN REDUCTION INTERNAL FIXATION) FRACTURE: ICD-10-CM

## 2024-03-28 DIAGNOSIS — R10.9 ABDOMINAL CRAMPING: Primary | ICD-10-CM

## 2024-03-28 DIAGNOSIS — S52.531S CLOSED COLLES' FRACTURE OF RIGHT RADIUS, SEQUELA: ICD-10-CM

## 2024-03-28 DIAGNOSIS — Z98.890 S/P ORIF (OPEN REDUCTION INTERNAL FIXATION) FRACTURE: ICD-10-CM

## 2024-03-28 PROBLEM — S52.531A FRACTURE, COLLES, RIGHT, CLOSED: Status: ACTIVE | Noted: 2024-03-28

## 2024-03-28 PROCEDURE — 1123F ACP DISCUSS/DSCN MKR DOCD: CPT | Performed by: FAMILY MEDICINE

## 2024-03-28 PROCEDURE — 99214 OFFICE O/P EST MOD 30 MIN: CPT | Performed by: FAMILY MEDICINE

## 2024-03-28 RX ORDER — DICYCLOMINE HYDROCHLORIDE 10 MG/1
10 CAPSULE ORAL
Qty: 120 CAPSULE | Refills: 0 | Status: SHIPPED | OUTPATIENT
Start: 2024-03-28

## 2024-03-28 SDOH — ECONOMIC STABILITY: FOOD INSECURITY: WITHIN THE PAST 12 MONTHS, THE FOOD YOU BOUGHT JUST DIDN'T LAST AND YOU DIDN'T HAVE MONEY TO GET MORE.: NEVER TRUE

## 2024-03-28 SDOH — ECONOMIC STABILITY: FOOD INSECURITY: WITHIN THE PAST 12 MONTHS, YOU WORRIED THAT YOUR FOOD WOULD RUN OUT BEFORE YOU GOT MONEY TO BUY MORE.: NEVER TRUE

## 2024-03-28 SDOH — ECONOMIC STABILITY: INCOME INSECURITY: HOW HARD IS IT FOR YOU TO PAY FOR THE VERY BASICS LIKE FOOD, HOUSING, MEDICAL CARE, AND HEATING?: NOT HARD AT ALL

## 2024-03-28 NOTE — PROGRESS NOTES
Patient here complaining of abdominal cramping for the past week or so.  Happens 2 or 3 times a day without associated diarrhea.  It lasts 2 to 3 hours at a time, sometimes wakes her from sleep.  Denies any exacerbating or alleviating factors.  She denies any change in medication, diet, or activities.  No nausea or vomiting.  Between episodes she feels completely normal    Patient fell 2 weeks ago, tripping over a rolled up rug.  She suffered a Colles' fracture for which she had to have ORIF on 3/14/2024.  She is doing much better now.  She is currently in a wrist brace.  Saw orthopedics yesterday    O:   Vitals:    03/28/24 1114   BP: 136/84   Pulse: 60   SpO2: 96%     No acute distress.  Alert and Oriented x 3  HEENT: PERRLA, EOMI, Conjunctiva clear  NECK: without thyromegaly, lymphadenopathy, JVD  LUNGS:Clear to ascultation bilaterally.  Breathing comfortably  CARDIOVASCULAR:  Regular rate and rhythm, no murmurs, rubs, or gallops  ABDOMEN: Soft, nontender, nondistended.  No hepatosplenomegaly.  EXTREMITY: Full range of motion. No clubbing/cyanosis/edema.  Right wrist in brace    A:    Diagnosis Orders   1. Abdominal cramping  dicyclomine (BENTYL) 10 MG capsule      2. Closed Colles' fracture of right radius, sequela        3. S/P ORIF (open reduction internal fixation) fracture          P: Will begin trial of Bentyl for abdominal cramping.  Patient should keep a food and symptom diary.  Follow-up if symptoms persist.  Follow-up with orthopedics.

## 2024-04-12 ENCOUNTER — OFFICE VISIT (OUTPATIENT)
Dept: CARDIOLOGY CLINIC | Age: 89
End: 2024-04-12

## 2024-04-12 VITALS
HEART RATE: 68 BPM | WEIGHT: 128.01 LBS | DIASTOLIC BLOOD PRESSURE: 60 MMHG | OXYGEN SATURATION: 97 % | BODY MASS INDEX: 25.13 KG/M2 | HEIGHT: 60 IN | SYSTOLIC BLOOD PRESSURE: 138 MMHG

## 2024-04-12 DIAGNOSIS — D68.69 SECONDARY HYPERCOAGULABLE STATE (HCC): ICD-10-CM

## 2024-04-12 DIAGNOSIS — I10 PRIMARY HYPERTENSION: ICD-10-CM

## 2024-04-12 DIAGNOSIS — Z95.818 PRESENCE OF WATCHMAN LEFT ATRIAL APPENDAGE CLOSURE DEVICE: Primary | ICD-10-CM

## 2024-04-12 DIAGNOSIS — I48.0 PAF (PAROXYSMAL ATRIAL FIBRILLATION) (HCC): ICD-10-CM

## 2024-04-12 ASSESSMENT — ENCOUNTER SYMPTOMS
ABDOMINAL PAIN: 0
PHOTOPHOBIA: 0
VOMITING: 0
ABDOMINAL DISTENTION: 0
BLOOD IN STOOL: 0
SHORTNESS OF BREATH: 0
BACK PAIN: 0
CONSTIPATION: 0
DIARRHEA: 0
WHEEZING: 0
SINUS PRESSURE: 0
SINUS PAIN: 0
NAUSEA: 0
COLOR CHANGE: 0

## 2024-04-12 NOTE — PROGRESS NOTES
Upper Arm   Position: Sitting   Cuff Size: Medium Adult   Pulse: 68   SpO2: 97%   Weight: 58.1 kg (128 lb 0.2 oz)   Height: 1.524 m (5')        IMPRESSION / RECOMMENDATIONS:     S/p watchman  Recent pacemaker for sick sinus syndrome  PAF  Hypercoagulable state   Frequent Falls  HTN  Iron def anemia       Patient is 6 months status post watchman  We will discontinue Plavix at this time  Patient to continue aspirin 81 mg daily  Patient complaining of fatigue.  This could be from Plavix.  I did review pacemaker and she is V paced only 10.4%  Patient had recent CBC and hemoglobin was stable    Vitals:    04/12/24 1228   BP: 138/60   Pulse: 68   SpO2: 97%     BP is stable continue lasix 20 mg daily, cardizem cd 120 mg daily, losartan 25 mg daily,     Device Assessment:    The device is Medtronic pacemaker - Dual Chamber chamber      MRI Compatible : yes    Device interrogation was performed.    Mode: AAIR --- DDDR     Sensing is normal. Impedence is normal.  Threshold is normal.     There has not been interval changes.     Estimated battery life is 13.3 years     The underlying rhythm is AP,VS.  61.2 % atrial paced; 10.4 % ventricular paced.      Atrial Arrhythmia : No    Non sustained VT episodes : 1    Sustained VT episodes : No    Patient activity reported 0.0 hrs/day    The patient is pacemaker dependent.      No A-fib noted on pacemaker report  Patient to follow-up with cardiology in July  Patient to follow-up with the EP in 1 year or sooner if needed      Thanks again for allowing me to participate in care of this patient. Please call me if you have any questions.    With best regards.      Fatmata Corrales, DERIK - CNP, 4/12/2024 12:31 PM     Please note this report has been partially produced using speech recognition software and may contain errors related to that system including errors in grammar, punctuation, and spelling, as well as words and phrases that may be inappropriate. If there are any questions or

## 2024-04-21 DIAGNOSIS — R10.9 ABDOMINAL CRAMPING: ICD-10-CM

## 2024-04-22 RX ORDER — DICYCLOMINE HYDROCHLORIDE 10 MG/1
CAPSULE ORAL
Qty: 120 CAPSULE | Refills: 5 | Status: SHIPPED | OUTPATIENT
Start: 2024-04-22

## 2024-04-23 ENCOUNTER — TELEPHONE (OUTPATIENT)
Dept: CARDIOLOGY CLINIC | Age: 89
End: 2024-04-23

## 2024-04-23 PROCEDURE — 93294 REM INTERROG EVL PM/LDLS PM: CPT | Performed by: INTERNAL MEDICINE

## 2024-04-23 PROCEDURE — 93296 REM INTERROG EVL PM/IDS: CPT | Performed by: INTERNAL MEDICINE

## 2024-04-23 NOTE — TELEPHONE ENCOUNTER
Called patient because I did not get her scheduled transmission. Patient is currently out of town.

## 2024-04-24 ENCOUNTER — PROCEDURE VISIT (OUTPATIENT)
Dept: CARDIOLOGY CLINIC | Age: 89
End: 2024-04-24
Payer: MEDICARE

## 2024-04-24 DIAGNOSIS — Z95.0 CARDIAC PACEMAKER IN SITU: Primary | ICD-10-CM

## 2024-05-20 ENCOUNTER — OFFICE VISIT (OUTPATIENT)
Dept: FAMILY MEDICINE CLINIC | Age: 89
End: 2024-05-20

## 2024-05-20 VITALS
HEART RATE: 64 BPM | SYSTOLIC BLOOD PRESSURE: 122 MMHG | DIASTOLIC BLOOD PRESSURE: 64 MMHG | WEIGHT: 128.6 LBS | BODY MASS INDEX: 25.12 KG/M2 | OXYGEN SATURATION: 94 %

## 2024-05-20 DIAGNOSIS — L91.8 INFLAMED SKIN TAG: ICD-10-CM

## 2024-05-20 DIAGNOSIS — R22.9 LOCALIZED SKIN MASS, LUMP, OR SWELLING: ICD-10-CM

## 2024-05-20 DIAGNOSIS — R41.3 MEMORY CHANGE: Primary | ICD-10-CM

## 2024-05-20 SDOH — ECONOMIC STABILITY: FOOD INSECURITY: WITHIN THE PAST 12 MONTHS, YOU WORRIED THAT YOUR FOOD WOULD RUN OUT BEFORE YOU GOT MONEY TO BUY MORE.: NEVER TRUE

## 2024-05-20 SDOH — ECONOMIC STABILITY: FOOD INSECURITY: WITHIN THE PAST 12 MONTHS, THE FOOD YOU BOUGHT JUST DIDN'T LAST AND YOU DIDN'T HAVE MONEY TO GET MORE.: NEVER TRUE

## 2024-05-20 SDOH — ECONOMIC STABILITY: INCOME INSECURITY: HOW HARD IS IT FOR YOU TO PAY FOR THE VERY BASICS LIKE FOOD, HOUSING, MEDICAL CARE, AND HEATING?: NOT HARD AT ALL

## 2024-05-20 ASSESSMENT — PATIENT HEALTH QUESTIONNAIRE - PHQ9
10. IF YOU CHECKED OFF ANY PROBLEMS, HOW DIFFICULT HAVE THESE PROBLEMS MADE IT FOR YOU TO DO YOUR WORK, TAKE CARE OF THINGS AT HOME, OR GET ALONG WITH OTHER PEOPLE: NOT DIFFICULT AT ALL
2. FEELING DOWN, DEPRESSED OR HOPELESS: NOT AT ALL
SUM OF ALL RESPONSES TO PHQ QUESTIONS 1-9: 0
6. FEELING BAD ABOUT YOURSELF - OR THAT YOU ARE A FAILURE OR HAVE LET YOURSELF OR YOUR FAMILY DOWN: NOT AT ALL
5. POOR APPETITE OR OVEREATING: NOT AT ALL
SUM OF ALL RESPONSES TO PHQ QUESTIONS 1-9: 0
9. THOUGHTS THAT YOU WOULD BE BETTER OFF DEAD, OR OF HURTING YOURSELF: NOT AT ALL
SUM OF ALL RESPONSES TO PHQ QUESTIONS 1-9: 0
7. TROUBLE CONCENTRATING ON THINGS, SUCH AS READING THE NEWSPAPER OR WATCHING TELEVISION: NOT AT ALL
4. FEELING TIRED OR HAVING LITTLE ENERGY: NOT AT ALL
1. LITTLE INTEREST OR PLEASURE IN DOING THINGS: NOT AT ALL
8. MOVING OR SPEAKING SO SLOWLY THAT OTHER PEOPLE COULD HAVE NOTICED. OR THE OPPOSITE, BEING SO FIGETY OR RESTLESS THAT YOU HAVE BEEN MOVING AROUND A LOT MORE THAN USUAL: NOT AT ALL
SUM OF ALL RESPONSES TO PHQ9 QUESTIONS 1 & 2: 0
SUM OF ALL RESPONSES TO PHQ QUESTIONS 1-9: 0
3. TROUBLE FALLING OR STAYING ASLEEP: NOT AT ALL

## 2024-05-20 NOTE — PROGRESS NOTES
Patient with daughter with concerns over memory changes.  Patient states she frequently forgets words when she is writing.  Daughter states that she asked questions repetitively sometimes.  She does not wander.  She does not drive anymore.  She does not put things in the wrong places.  She self medicates and does not forget her medication.  There is no family history of dementia    Patient does have a couple of skin tags on her neck which bother her.  They are irritated by clothing    O:   Vitals:    05/20/24 1136   BP: 122/64   Pulse: 64   SpO2: 94%     No acute distress.  Alert and Oriented x 3  HEENT: PERRLA, EOMI, Conjunctiva clear  NECK: without thyromegaly, lymphadenopathy, JVD  LUNGS:Clear to ascultation bilaterally.  Breathing comfortably  CARDIOVASCULAR:  Regular rate and rhythm, no murmurs, rubs, or gallops  SKIN: Warm, Dry, No rash.  2 small skin tags right side of neck  PSYCH: Mood and Affect normal.  MMSE 28/30    A:    Diagnosis Orders   1. Memory change     Will observe for now   2. Inflamed skin tag  66391 - [place] REMOVAL OF SKIN TAGS, UP TO 15   Needs improvement   3. Localized skin mass, lump, or swelling  48010 - [place] REMOVAL OF SKIN TAGS, UP TO 15          P: No indication for medication at this time.  Will repeat MMSE in 6 months.    Liquid nitrogen was applied for 10-12 seconds to the 2 skin lesions and the expected blistering or scabbing reaction explained. Do not pick at the areas. Patient reminded to expect hypopigmented scars from the procedure. Return if lesions fail to fully resolve.    A total of 33 minutes was spent on this visit to include face-to-face time, reviewing records, and writing note

## 2024-06-07 RX ORDER — FUROSEMIDE 20 MG/1
20 TABLET ORAL DAILY
Qty: 60 TABLET | Refills: 5 | Status: SHIPPED | OUTPATIENT
Start: 2024-06-07

## 2024-06-25 NOTE — CARE COORDINATION
Patient called letting me know she was able to fax me her paperwork for assistance. I am going into the office today so I will double check I received the paperwork then submit it to the company.         [address] [facility]   Medication Assistance  [address] [facility] and [place]    (x) [phone] (g) [phone] Talked with Nursing supervisor due to patients central line needing to be retracted 4-5 cm. Was informed IR will be called in. This information given to BOBBY Anglin during handoff report.

## 2024-07-08 ENCOUNTER — OFFICE VISIT (OUTPATIENT)
Dept: CARDIOLOGY CLINIC | Age: 89
End: 2024-07-08
Payer: MEDICARE

## 2024-07-08 VITALS
HEIGHT: 60 IN | DIASTOLIC BLOOD PRESSURE: 70 MMHG | SYSTOLIC BLOOD PRESSURE: 130 MMHG | HEART RATE: 67 BPM | WEIGHT: 135 LBS | BODY MASS INDEX: 26.5 KG/M2 | OXYGEN SATURATION: 95 %

## 2024-07-08 DIAGNOSIS — R06.02 SHORTNESS OF BREATH: ICD-10-CM

## 2024-07-08 DIAGNOSIS — I38 VHD (VALVULAR HEART DISEASE): Primary | ICD-10-CM

## 2024-07-08 PROCEDURE — 99214 OFFICE O/P EST MOD 30 MIN: CPT | Performed by: INTERNAL MEDICINE

## 2024-07-08 PROCEDURE — 1123F ACP DISCUSS/DSCN MKR DOCD: CPT | Performed by: INTERNAL MEDICINE

## 2024-07-08 NOTE — PROGRESS NOTES
Opal  is a  Established patient  ,89 y.o.   female here for evaluation of the following chief complaint(s):    vhd        SUBJECTIVE/OBJECTIVE:  HPI : h/o  Vhd, TAVR , PPM, watchman now here   for FU        Vitals:    07/08/24 1503   BP: 130/70   Site: Left Upper Arm   Position: Sitting   Cuff Size: Medium Adult   Pulse: 67   SpO2: 95%   Weight: 61.2 kg (135 lb)   Height: 1.524 m (5')             /70 (Site: Left Upper Arm, Position: Sitting, Cuff Size: Medium Adult)   Pulse 67   Ht 1.524 m (5')   Wt 61.2 kg (135 lb)   SpO2 95%   BMI 26.37 kg/m²        No data to display              Wt Readings from Last 3 Encounters:   07/08/24 61.2 kg (135 lb)   05/20/24 58.3 kg (128 lb 9.6 oz)   04/12/24 58.1 kg (128 lb 0.2 oz)     Body mass index is 26.37 kg/m².    Physical Exam     Neck: JVD      Lungs : clear    Cardio : Si and S2 audilble      Ext: edema      All pertinent data reviewed      Meds : reviewed           ASSESSMENT/PLAN:    -Patient is a watchman now and a dual-chamber pacemaker l               PACER: REMOTE INTERROGATE: Patient Communication     Edit Comments   Add Notifications     Device Assessment:        The device is Medtronic pacemaker - Dual Chamber chamber       MRI Compatible : yes     Device interrogation was performed.     Mode: AAIr --- DDDr      Sensing is normal. Impedence is normal.  Threshold is normal.      There has not been interval changes.     Estimated battery life is 13.6 years      The underlying rhythm is AP, VS.  69.9 % atrial paced; 19.1 % ventricular paced.       Atrial Arrhythmia : No     Non sustained VT episodes : No     Sustained VT episodes : No     Patient activity reported 0 hrs/day     The patient is PARTIAL pacemaker dependent.            - VHD had TAVR normal TAVR   Reecho as pt fatigued    - Normal stress cardiolite 3/23    -  LIPID MANAGEMENT:  Importance of lipid levels discussed with patient   and patient was given dietary advice. NCEP- ATP III guidelines

## 2024-07-08 NOTE — PATIENT INSTRUCTIONS
We are committed to providing you the best care possible.    If you receive a survey after visiting one of our offices, please take time to share your experience concerning your physician office visit.  These surveys are confidential and no health information about you is shared.    We are eager to improve for you and we are counting on your feedback to help make that happen.      **It is YOUR responsibilty to bring medication bottles and/or updated medication list to EACH APPOINTMENT. This will allow us to better serve you and all your healthcare needs**    Thank you for allowing us to care for you today!   We want to ensure we can follow your treatment plan and we strive to give you the best outcomes and experience possible.   If you ever have a life threatening emergency and call 911 - for an ambulance (EMS)   Our providers can only care for you at:   [facility] or [facility].   Even if you have someone take you or you drive yourself we can only care for you in a [facility] facility. Our providers are not setup at the other healthcare locations!     Please be informed that if you contact our office outside of normal business hours the physician on call cannot help with any scheduling or rescheduling issues, procedure instruction questions or any type of medication issue.    We advise you for any urgent/emergency that you go to the nearest emergency room!    PLEASE CALL OUR OFFICE DURING NORMAL BUSINESS HOURS    Monday - Friday   8 am to 5 pm    [place]: [phone]    [place]: [phone]    [place]:  [phone]

## 2024-07-18 ENCOUNTER — TELEPHONE (OUTPATIENT)
Dept: CARDIOLOGY CLINIC | Age: 89
End: 2024-07-18

## 2024-07-18 NOTE — TELEPHONE ENCOUNTER
Left Ventricle: Normal left ventricular systolic function with a visually estimated EF of 55 - 60%. Left ventricle size is normal. Normal wall thickness. Normal wall motion. Normal diastolic function.    Right Ventricle: Right ventricle size is normal. PPM wire noted on the right side.    Aortic Valve: Evolt Pro (inserted 9/2019) transcatheter bioprosthetic valve that is well-seated with a size of 26 mm. AV mean gradient is 3 mmHg. AT 69ms.    Mitral Valve: Moderate annular calcification. Moderate regurgitation.    Tricuspid Valve: Mild regurgitation. Mild Pulmonary Hypertension with a  RVSP of 39 mmHg.    Left Atrium: Left atrium is moderately dilated.    Pericardium: No pericardial effusion.    Image quality is good.    Results given to the patient. Patient advised and voices understanding.

## 2024-07-22 RX ORDER — DILTIAZEM HYDROCHLORIDE 120 MG/1
120 CAPSULE, COATED, EXTENDED RELEASE ORAL DAILY
Qty: 30 CAPSULE | Refills: 5 | Status: SHIPPED | OUTPATIENT
Start: 2024-07-22

## 2024-07-25 ENCOUNTER — TELEPHONE (OUTPATIENT)
Dept: PHARMACY | Facility: CLINIC | Age: 89
End: 2024-07-25

## 2024-07-25 NOTE — TELEPHONE ENCOUNTER
[facility] CLINICAL PHARMACY: ADHERENCE REVIEW  Identified care gap per [employer]: fills at [facility]: ACE/ARB adherence    Patient also appears to be prescribed: Statin    ASSESSMENT    ACE/ARB ADHERENCE    Insurance Records claims through 24 (Prior Year PDC = not reported; YTD PDC = 73%; Potential Fail Date: 24):   LOSARTAN POT TAB 25MG  last filled on 24 for 90 day supply. Next refill due: 24    Prescribed si tablet/capsule daily    Per Insurer Portal: last filled on same      BP Readings from Last 3 Encounters:   24 130/70   24 130/70   24 122/64     Estimated Creatinine Clearance: 78 mL/min (A) (based on SCr of 0.4 mg/dL (L)).  Lab Results   Component Value Date    CREATININE 0.4 (L) 03/10/2024     Lab Results   Component Value Date    K 4.5 03/10/2024       PLAN  Per insurer report, LIS-1 - may be able to receive up to a 100-day supply for the same cost as a 30-day supply.      The following are interventions that have been identified:   Patient overdue refilling Losartan. Unsure if patient is still prescribed this medication.   24 pt. Reported not taking    Attempting to reach patient to review.  Left message asking for return call. Letter sent to patient.      Last Visit: 24  Next Visit: 24        Nydia Palacio CPhT  [facility] Clinical   [facility]  Toll Free: [phone] Option 1    For Pharmacy Admin Tracking Only    Program: [facility] [employer]  CPA in place:  No  Gap Closed?: No   Time Spent (min): 10

## 2024-07-29 ENCOUNTER — PROCEDURE VISIT (OUTPATIENT)
Dept: CARDIOLOGY CLINIC | Age: 89
End: 2024-07-29

## 2024-07-29 DIAGNOSIS — Z95.0 CARDIAC PACEMAKER IN SITU: Primary | ICD-10-CM

## 2024-08-23 ENCOUNTER — OFFICE VISIT (OUTPATIENT)
Dept: FAMILY MEDICINE CLINIC | Age: 89
End: 2024-08-23

## 2024-08-23 VITALS
HEART RATE: 79 BPM | BODY MASS INDEX: 26.48 KG/M2 | SYSTOLIC BLOOD PRESSURE: 120 MMHG | TEMPERATURE: 96.9 F | DIASTOLIC BLOOD PRESSURE: 68 MMHG | WEIGHT: 135.6 LBS | OXYGEN SATURATION: 98 %

## 2024-08-23 DIAGNOSIS — R51.9 RIGHT SIDED FACIAL PAIN: Primary | ICD-10-CM

## 2024-08-23 RX ORDER — PREDNISONE 20 MG/1
40 TABLET ORAL DAILY
Qty: 10 TABLET | Refills: 0 | Status: SHIPPED | OUTPATIENT
Start: 2024-08-23

## 2024-08-23 RX ORDER — TRAMADOL HYDROCHLORIDE 50 MG/1
50 TABLET ORAL EVERY 4 HOURS PRN
Qty: 42 TABLET | Refills: 0 | Status: SHIPPED | OUTPATIENT
Start: 2024-08-23 | End: 2024-08-30

## 2024-08-23 NOTE — PROGRESS NOTES
Patient here complaining of facial pain on her right side of her face for 1 week.  Patient states she has pain in the entire right side of her face and in her ear.  It is actually worse when she takes her false teeth out.  Denies any rash.  Denies any trauma, new activities, prior episodes.  Denies fevers or chills.    O:   Vitals:    08/23/24 0850   BP: 120/68   Pulse: 79   Temp: 96.9 °F (36.1 °C)   SpO2: 98%     No acute distress.  Alert and Oriented x 3  HEENT:  PERRLA, EOMI, Conjunctiva clear  Oropharynx clear, mucosa moist.  Nasal mucosa normal.  TMs pearly gray bilaterally with normal bony landmarks.  External auditory canals normal  NECK: without thyromegaly, lymphadenopathy, JVD  Increased pain with palpating in front of right ear.  LUNGS:Clear to ascultation bilaterally.  Breathing comfortably  CARDIOVASCULAR:  Regular rate and rhythm, no murmurs, rubs, or gallops  EXTREMITY: Full range of motion. No clubbing/cyanosis/edema  NEURO: Cranial nerves II-XII grossly intact.  Strength 5/5, DTR 2/4.  SKIN: Warm, Dry, No rash.  PSYCH: Mood and Affect normal.      A: .   Diagnosis Orders   1. Right sided facial pain  predniSONE (DELTASONE) 20 MG tablet    traMADol (ULTRAM) 50 MG tablet   No clear etiology, but suspect inflammation around facial nerve versus early shingles     P: Will treat symptomatically for now with tramadol and will use prednisone to relieve inflammation around facial nerve  Follow-up as needed

## 2024-08-26 ENCOUNTER — TELEPHONE (OUTPATIENT)
Dept: FAMILY MEDICINE CLINIC | Age: 89
End: 2024-08-26

## 2024-08-28 ENCOUNTER — OFFICE VISIT (OUTPATIENT)
Dept: FAMILY MEDICINE CLINIC | Age: 89
End: 2024-08-28

## 2024-08-28 VITALS
DIASTOLIC BLOOD PRESSURE: 82 MMHG | OXYGEN SATURATION: 98 % | HEART RATE: 70 BPM | SYSTOLIC BLOOD PRESSURE: 126 MMHG | TEMPERATURE: 97.7 F

## 2024-08-28 DIAGNOSIS — Z97.4 HEARING AID WORN: ICD-10-CM

## 2024-08-28 DIAGNOSIS — R42 VERTIGO: ICD-10-CM

## 2024-08-28 DIAGNOSIS — R51.9 RIGHT SIDED FACIAL PAIN: Primary | ICD-10-CM

## 2024-08-28 NOTE — PROGRESS NOTES
Patient here to follow-up on right facial pain.  Patient was seen last week for this.  Pain was fairly sharp and involving entire right side of her face.  She was placed on prednisone 40 mg daily for 5 days.  Patient had called earlier in the week stating pain was still there and was told to follow-up for further evaluation.  Apparently her pain resolved 2 days ago.  She has no pain at all at this time.  Feels back to her normal self    Patient states she has mild vertigo when she wears her hearing aids but has not had any vertigo when she removes them.  She has not followed up with the audiologist    O:   Vitals:    08/28/24 1007   BP: 126/82   Pulse: 70   Temp: 97.7 °F (36.5 °C)   SpO2: 98%     No acute distress.  Alert and Oriented x 3  HEENT: Atraumatic. Normocephalic. PERRLA, EOMI, Conjunctiva clear  Oropharynx clear, mucosa moist.  Nasal mucosa normal  NECK: without thyromegaly, lymphadenopathy, JVD  LUNGS:Clear to ascultation bilaterally.  Breathing comfortably  CARDIOVASCULAR:  Regular rate and rhythm, no murmurs, rubs, or gallops  NEURO: Cranial nerves II-XII grossly intact.  Strength 5/5, DTR 2/4.  SKIN: Warm, Dry, No rash.  PSYCH: Mood and Affect normal.    A:    Diagnosis Orders   1. Right sided facial pain     Resolved   2. Vertigo     Currently absent   3. Hearing aid worn          P: No need for further treatment due to absence of pain at this time.  Strongly recommend patient consult her audiologist regarding vertigo from her hearing aids to see if adjustments can be made.  Follow-up with me as needed

## 2024-09-04 ENCOUNTER — TELEPHONE (OUTPATIENT)
Dept: FAMILY MEDICINE CLINIC | Age: 89
End: 2024-09-04

## 2024-09-04 DIAGNOSIS — Z13.0 SCREENING, ANEMIA, DEFICIENCY, IRON: Primary | ICD-10-CM

## 2024-09-04 PROCEDURE — 36415 COLL VENOUS BLD VENIPUNCTURE: CPT | Performed by: FAMILY MEDICINE

## 2024-09-06 ENCOUNTER — LAB (OUTPATIENT)
Dept: FAMILY MEDICINE CLINIC | Age: 89
End: 2024-09-06

## 2024-09-06 DIAGNOSIS — D50.0 IRON DEFICIENCY ANEMIA DUE TO CHRONIC BLOOD LOSS: Primary | ICD-10-CM

## 2024-09-06 NOTE — PROGRESS NOTES
Patient came in for blood work today. I drew up one lavender from the patient right hand using a butterfly needle. Pt tolerated well. I had one failed attempt in the patients right arm.

## 2024-09-07 LAB
DEPRECATED RDW RBC AUTO: 14.1 % (ref 12.4–15.4)
HCT VFR BLD AUTO: 38.1 % (ref 36–48)
HGB BLD-MCNC: 12.9 G/DL (ref 12–16)
MCH RBC QN AUTO: 33.3 PG (ref 26–34)
MCHC RBC AUTO-ENTMCNC: 34 G/DL (ref 31–36)
MCV RBC AUTO: 98 FL (ref 80–100)
PLATELET # BLD AUTO: 220 K/UL (ref 135–450)
PMV BLD AUTO: 8.6 FL (ref 5–10.5)
RBC # BLD AUTO: 3.89 M/UL (ref 4–5.2)
WBC # BLD AUTO: 8.5 K/UL (ref 4–11)

## 2024-09-27 DIAGNOSIS — K21.9 GASTROESOPHAGEAL REFLUX DISEASE WITHOUT ESOPHAGITIS: ICD-10-CM

## 2024-09-30 DIAGNOSIS — J30.89 NON-SEASONAL ALLERGIC RHINITIS, UNSPECIFIED TRIGGER: ICD-10-CM

## 2024-09-30 DIAGNOSIS — Z86.73 HISTORY OF CVA (CEREBROVASCULAR ACCIDENT): ICD-10-CM

## 2024-09-30 RX ORDER — PANTOPRAZOLE SODIUM 40 MG/1
TABLET, DELAYED RELEASE ORAL
Qty: 90 TABLET | Refills: 0 | Status: SHIPPED | OUTPATIENT
Start: 2024-09-30

## 2024-09-30 RX ORDER — ATORVASTATIN CALCIUM 40 MG/1
40 TABLET, FILM COATED ORAL NIGHTLY
Qty: 90 TABLET | Refills: 1 | Status: SHIPPED | OUTPATIENT
Start: 2024-09-30

## 2024-09-30 RX ORDER — ALBUTEROL SULFATE 90 UG/1
INHALANT RESPIRATORY (INHALATION)
Qty: 8.5 G | Refills: 0 | Status: SHIPPED | OUTPATIENT
Start: 2024-09-30

## 2024-09-30 RX ORDER — MONTELUKAST SODIUM 10 MG/1
10 TABLET ORAL NIGHTLY
Qty: 90 TABLET | Refills: 1 | Status: SHIPPED | OUTPATIENT
Start: 2024-09-30

## 2024-10-31 ENCOUNTER — TELEPHONE (OUTPATIENT)
Dept: CARDIOLOGY CLINIC | Age: 89
End: 2024-10-31

## 2024-10-31 NOTE — TELEPHONE ENCOUNTER
Pt called, got a bill for 125.00 for DOS 7/17/24- per Martine, they said 200.00 was the Co-pay.  I suggested she call the insurance co.

## 2024-11-04 ENCOUNTER — TELEPHONE (OUTPATIENT)
Dept: FAMILY MEDICINE CLINIC | Age: 89
End: 2024-11-04

## 2024-11-04 NOTE — TELEPHONE ENCOUNTER
Patient called stated she received a letter from her insurance that she needs to have her thyroid checked.   Can test be ordered     Please advise

## 2024-11-12 PROCEDURE — 93296 REM INTERROG EVL PM/IDS: CPT | Performed by: INTERNAL MEDICINE

## 2024-11-12 PROCEDURE — 93294 REM INTERROG EVL PM/LDLS PM: CPT | Performed by: INTERNAL MEDICINE

## 2024-11-14 ENCOUNTER — OFFICE VISIT (OUTPATIENT)
Dept: FAMILY MEDICINE CLINIC | Age: 89
End: 2024-11-14

## 2024-11-14 VITALS
OXYGEN SATURATION: 97 % | BODY MASS INDEX: 27.85 KG/M2 | WEIGHT: 142.6 LBS | SYSTOLIC BLOOD PRESSURE: 150 MMHG | DIASTOLIC BLOOD PRESSURE: 66 MMHG | HEART RATE: 68 BPM

## 2024-11-14 DIAGNOSIS — M79.605 PAIN IN BOTH LOWER EXTREMITIES: ICD-10-CM

## 2024-11-14 DIAGNOSIS — I10 PRIMARY HYPERTENSION: ICD-10-CM

## 2024-11-14 DIAGNOSIS — Z86.73 HISTORY OF CVA (CEREBROVASCULAR ACCIDENT): ICD-10-CM

## 2024-11-14 DIAGNOSIS — K21.9 GASTROESOPHAGEAL REFLUX DISEASE WITHOUT ESOPHAGITIS: ICD-10-CM

## 2024-11-14 DIAGNOSIS — E03.9 ACQUIRED HYPOTHYROIDISM: Primary | ICD-10-CM

## 2024-11-14 DIAGNOSIS — F32.0 CURRENT MILD EPISODE OF MAJOR DEPRESSIVE DISORDER WITHOUT PRIOR EPISODE (HCC): ICD-10-CM

## 2024-11-14 DIAGNOSIS — J30.89 NON-SEASONAL ALLERGIC RHINITIS, UNSPECIFIED TRIGGER: ICD-10-CM

## 2024-11-14 DIAGNOSIS — M79.604 PAIN IN BOTH LOWER EXTREMITIES: ICD-10-CM

## 2024-11-14 DIAGNOSIS — R53.82 CHRONIC FATIGUE: ICD-10-CM

## 2024-11-14 DIAGNOSIS — R10.9 ABDOMINAL CRAMPING: ICD-10-CM

## 2024-11-14 DIAGNOSIS — R11.0 NAUSEA: ICD-10-CM

## 2024-11-14 RX ORDER — LOSARTAN POTASSIUM 25 MG/1
25 TABLET ORAL DAILY
Qty: 90 TABLET | Refills: 1 | Status: SHIPPED | OUTPATIENT
Start: 2024-11-14

## 2024-11-14 RX ORDER — ONDANSETRON 4 MG/1
4 TABLET, FILM COATED ORAL EVERY 8 HOURS PRN
Qty: 90 TABLET | Refills: 1 | Status: SHIPPED | OUTPATIENT
Start: 2024-11-14

## 2024-11-14 RX ORDER — ESCITALOPRAM OXALATE 20 MG/1
20 TABLET ORAL DAILY
Qty: 90 TABLET | Refills: 1 | Status: SHIPPED | OUTPATIENT
Start: 2024-11-14

## 2024-11-14 RX ORDER — ATORVASTATIN CALCIUM 40 MG/1
40 TABLET, FILM COATED ORAL NIGHTLY
Qty: 90 TABLET | Refills: 1 | Status: SHIPPED | OUTPATIENT
Start: 2024-11-14

## 2024-11-14 RX ORDER — DICYCLOMINE HYDROCHLORIDE 10 MG/1
10 CAPSULE ORAL 3 TIMES DAILY
Qty: 90 CAPSULE | Refills: 5 | Status: SHIPPED | OUTPATIENT
Start: 2024-11-14

## 2024-11-14 RX ORDER — MONTELUKAST SODIUM 10 MG/1
10 TABLET ORAL NIGHTLY
Qty: 90 TABLET | Refills: 1 | Status: SHIPPED | OUTPATIENT
Start: 2024-11-14

## 2024-11-14 RX ORDER — PANTOPRAZOLE SODIUM 40 MG/1
TABLET, DELAYED RELEASE ORAL
Qty: 90 TABLET | Refills: 0 | Status: SHIPPED | OUTPATIENT
Start: 2024-11-14

## 2024-11-14 RX ORDER — SUCRALFATE 1 G/1
1 TABLET ORAL 4 TIMES DAILY PRN
Qty: 120 TABLET | Refills: 5 | Status: SHIPPED | OUTPATIENT
Start: 2024-11-14

## 2024-11-14 RX ORDER — LEVOTHYROXINE SODIUM 75 UG/1
75 TABLET ORAL DAILY
Qty: 90 TABLET | Refills: 1 | Status: SHIPPED | OUTPATIENT
Start: 2024-11-14

## 2024-11-14 RX ORDER — METHOCARBAMOL 500 MG/1
500 TABLET, FILM COATED ORAL EVERY EVENING
Qty: 30 TABLET | Refills: 5 | Status: SHIPPED | OUTPATIENT
Start: 2024-11-14

## 2024-11-14 NOTE — PROGRESS NOTES
History of Present Illness  The patient is an 89-year-old female who presents for evaluation of multiple medical concerns. She is accompanied by an adult male.    She received a letter from her insurance company advising her to have her thyroid checked, as it has been some time since her last check. She is currently on levothyroxine 75 mcg for her thyroid condition. She reports feeling weak and fatigued, with a lack of energy. Despite sleeping for approximately 10 hours a day, she does not feel refreshed upon waking. She also mentions that she becomes exhausted after walking for extended periods and does not engage in much exercise.    She has not been taking her prescribed blood pressure medication, losartan 40 mg,  as her blood pressure readings have been consistently in the 110s. She monitors her blood pressure daily at home. She is also on atorvastatin for cholesterol management and reports no issues with this medication.    She is experiencing shortness of breath and a persistent cough, which produces yellow phlegm. She uses albuterol sulfate for these symptoms, but reports that it is not very effective. She also experiences occasional chest pain, but does not endorse any wheezing or chest tightness. She has a nebulizer at home but does not use it. She has not received the influenza vaccine due to a previous adverse reaction.    She is taking Protonix for heartburn and has no problems with that. She is still taking Lexapro and her mood is okay. Her anxiety is well controlled on that. She is taking Singulair for allergies all the time. She takes Zofran at least 3 times a week when she gets nauseous. She is still taking sucralfate. She is taking Bentyl twice a day for her stomach. Sometimes her stomach feels fluttery and she takes that. She is not taking Robaxin every night, but sometimes when she tries to get up, she can have a terrible cramp in the leg. She gets restless legs every now and then and that works

## 2024-11-15 LAB
ALBUMIN SERPL-MCNC: 4.5 G/DL (ref 3.4–5)
ALBUMIN/GLOB SERPL: 2.1 {RATIO} (ref 1.1–2.2)
ALP SERPL-CCNC: 71 U/L (ref 40–129)
ALT SERPL-CCNC: 39 U/L (ref 10–40)
ANION GAP SERPL CALCULATED.3IONS-SCNC: 12 MMOL/L (ref 3–16)
AST SERPL-CCNC: 38 U/L (ref 15–37)
BILIRUB SERPL-MCNC: 0.5 MG/DL (ref 0–1)
BUN SERPL-MCNC: 12 MG/DL (ref 7–20)
CALCIUM SERPL-MCNC: 9 MG/DL (ref 8.3–10.6)
CHLORIDE SERPL-SCNC: 102 MMOL/L (ref 99–110)
CO2 SERPL-SCNC: 25 MMOL/L (ref 21–32)
CREAT SERPL-MCNC: 0.7 MG/DL (ref 0.6–1.2)
GFR SERPLBLD CREATININE-BSD FMLA CKD-EPI: 82 ML/MIN/{1.73_M2}
GLUCOSE SERPL-MCNC: 75 MG/DL (ref 70–99)
POTASSIUM SERPL-SCNC: 4.2 MMOL/L (ref 3.5–5.1)
PROT SERPL-MCNC: 6.6 G/DL (ref 6.4–8.2)
SODIUM SERPL-SCNC: 139 MMOL/L (ref 136–145)
TSH SERPL DL<=0.005 MIU/L-ACNC: 0.11 UIU/ML (ref 0.27–4.2)
VIT B12 SERPL-MCNC: 290 PG/ML (ref 211–911)

## 2024-12-03 ENCOUNTER — OFFICE VISIT (OUTPATIENT)
Dept: CARDIOLOGY CLINIC | Age: 88
End: 2024-12-03

## 2024-12-03 VITALS
DIASTOLIC BLOOD PRESSURE: 64 MMHG | WEIGHT: 145.2 LBS | BODY MASS INDEX: 28.51 KG/M2 | HEIGHT: 60 IN | SYSTOLIC BLOOD PRESSURE: 124 MMHG | HEART RATE: 62 BPM

## 2024-12-03 DIAGNOSIS — I38 VHD (VALVULAR HEART DISEASE): Primary | ICD-10-CM

## 2024-12-03 DIAGNOSIS — I10 PRIMARY HYPERTENSION: ICD-10-CM

## 2024-12-03 DIAGNOSIS — I48.0 PAF (PAROXYSMAL ATRIAL FIBRILLATION) (HCC): ICD-10-CM

## 2024-12-03 ASSESSMENT — ENCOUNTER SYMPTOMS
ORTHOPNEA: 0
SHORTNESS OF BREATH: 0

## 2024-12-03 NOTE — PATIENT INSTRUCTIONS
Please be informed that if you contact our office outside of normal business hours the physician on call cannot help with any scheduling or rescheduling issues, procedure instruction questions or any type of medication issue.    We advise you for any urgent/emergency that you go to the nearest emergency room!    PLEASE CALL OUR OFFICE DURING NORMAL BUSINESS HOURS    Monday - Friday   8 am to 5 pm    [place]: [phone]    [place]: [phone]    [place]:  [phone]    **It is YOUR responsibilty to bring medication bottles and/or updated medication list to EACH APPOINTMENT. This will allow us to better serve you and all your healthcare needs**    Thank you for allowing us to care for you today!   We want to ensure we can follow your treatment plan and we strive to give you the best outcomes and experience possible.   If you ever have a life threatening emergency and call 911 - for an ambulance (EMS)   Our providers can only care for you at:   [facility] or [facility].   Even if you have someone take you or you drive yourself we can only care for you in a [facility] facility. Our providers are not setup at the other healthcare locations!

## 2024-12-03 NOTE — PROGRESS NOTES
aspirin (EQ ASPIRIN ADULT LOW DOSE) 81 MG EC tablet Take 1 tablet by mouth daily 30 tablet 0     No current facility-administered medications for this visit.          All pertinent data reviewed and discussed with patient       ASSESSMENT/PLAN:    PAF s/p watchman October 2023  Doing well - off NOAC-on ASA alone   Brief episodes of A-fib noted on PPM.  Today's EKG shows V paced rhythm  Will continue with aspirin  Continue Cardizem for rate control     VHD s/p TAVR  Echocardiogram July 2024 shows well-seated aortic valve with a mean gradient of 3.  Continue with ongoing surveillance no further testing needed at this time.    Dual-chamber PPM  Stable leads and stable battery  Device pocket is intact.   Continue with every 3-month monitoring    HTN  Controlled with losartan - continue     Tests ordered:  none  Follow-up  6 mo     Signed:  DERIK Nicole CNP, 12/3/2024, 3:52 PM    An electronic signature was used to authenticate this note.    Please note this report has been partially produced using speech recognition software and may contain errors related to that system including errors in grammar, punctuation, and spelling, as well as words and phrases that may be inappropriate. If there are any questions or concerns please feel free to contact the dictating provider for clarification.

## 2024-12-11 ENCOUNTER — TELEPHONE (OUTPATIENT)
Dept: FAMILY MEDICINE CLINIC | Age: 88
End: 2024-12-11

## 2024-12-11 NOTE — TELEPHONE ENCOUNTER
Patient stated they albuterol inhaler isn't working as well anymore so she is needing to use it more often. She wants to know if you can change the inhaler.

## 2024-12-12 ENCOUNTER — OFFICE VISIT (OUTPATIENT)
Dept: FAMILY MEDICINE CLINIC | Age: 88
End: 2024-12-12

## 2024-12-12 VITALS
WEIGHT: 147.2 LBS | BODY MASS INDEX: 28.75 KG/M2 | HEART RATE: 67 BPM | OXYGEN SATURATION: 98 % | DIASTOLIC BLOOD PRESSURE: 68 MMHG | TEMPERATURE: 98.1 F | SYSTOLIC BLOOD PRESSURE: 126 MMHG

## 2024-12-12 DIAGNOSIS — I27.20 PULMONARY HYPERTENSION (HCC): ICD-10-CM

## 2024-12-12 DIAGNOSIS — J44.1 COPD EXACERBATION (HCC): Primary | ICD-10-CM

## 2024-12-12 NOTE — PROGRESS NOTES
History of Present Illness  The patient presents for evaluation of shortness of breath.    She has been experiencing progressive dyspnea over the past 4 to 5 days, necessitating frequent rest periods during ambulation. She reports no febrile episodes but has a productive cough with thick yellow sputum. She was evaluated in the emergency department last night due to severe symptoms. Her BNP levels were mildly elevated. She received intravenous Lasix and Solu-Medrol 125 mg, along with a double breathing treatment, which significantly improved her condition. She was able to ambulate without experiencing dyspnea and was discharged home. She reports no jitteriness from the breathing treatments. She also reports no ankle edema, although there was some fluid accumulation noted on examination. She has been wheezing audibly for the past 4 days. She had a restful sleep last night, a marked improvement from previous nights when she would cough throughout the night without expectoration. She is currently on a daily regimen of Lasix prescribed by Dr. Valencia for fluid management related to her heart condition. She has a history of pulmonary hypertension and pleural effusion. Prior to the ER visit, she was using albuterol 4 times daily without relief.    MEDICATIONS  Current: Lasix, albuterol, Atrovent, Combivent    O:   Vitals:    12/12/24 1041   BP: 126/68   Pulse: 67   Temp: 98.1 °F (36.7 °C)   SpO2: 98%     No acute distress.  Alert and Oriented x 3  HEENT:  PERRLA, EOMI, Conjunctiva clear  Oropharynx clear, mucosa moist.  Nasal mucosa normal  NECK: without thyromegaly, lymphadenopathy, JVD  LUNGS: Slight crackles left lower lung field.  No wheezing.  Breathing comfortably  CARDIOVASCULAR:  Regular rate and rhythm, no murmurs, rubs, or gallops  EXTREMITY: Full range of motion. No clubbing/cyanosis/edema  NEURO: Cranial nerves II-XII grossly intact.  Strength 5/5, DTR 2/4.  SKIN: Warm, Dry, No rash.  PSYCH: Mood and Affect

## 2024-12-18 ENCOUNTER — OFFICE VISIT (OUTPATIENT)
Dept: FAMILY MEDICINE CLINIC | Age: 88
End: 2024-12-18
Payer: MEDICARE

## 2024-12-18 VITALS
OXYGEN SATURATION: 99 % | HEART RATE: 69 BPM | SYSTOLIC BLOOD PRESSURE: 128 MMHG | TEMPERATURE: 98 F | DIASTOLIC BLOOD PRESSURE: 60 MMHG

## 2024-12-18 DIAGNOSIS — Z71.89 ACP (ADVANCE CARE PLANNING): ICD-10-CM

## 2024-12-18 DIAGNOSIS — J44.1 COPD EXACERBATION (HCC): Primary | ICD-10-CM

## 2024-12-18 PROCEDURE — 1123F ACP DISCUSS/DSCN MKR DOCD: CPT | Performed by: FAMILY MEDICINE

## 2024-12-18 PROCEDURE — 1159F MED LIST DOCD IN RCRD: CPT | Performed by: FAMILY MEDICINE

## 2024-12-18 PROCEDURE — 99214 OFFICE O/P EST MOD 30 MIN: CPT | Performed by: FAMILY MEDICINE

## 2024-12-18 RX ORDER — LEVOFLOXACIN 750 MG/1
750 TABLET, FILM COATED ORAL DAILY
Qty: 7 TABLET | Refills: 0 | Status: SHIPPED | OUTPATIENT
Start: 2024-12-18 | End: 2024-12-25

## 2024-12-18 RX ORDER — GUAIFENESIN/DEXTROMETHORPHAN 100-10MG/5
5 SYRUP ORAL 3 TIMES DAILY PRN
Qty: 120 ML | Refills: 0 | Status: SHIPPED | OUTPATIENT
Start: 2024-12-18 | End: 2024-12-28

## 2024-12-18 NOTE — PROGRESS NOTES
Opal Moreno is a 89 y.o. year old female who complains of moderate productive cough, shortness of breath, and wheezing for 10 + days. Symptoms are worsening over that time. She denies a history of chills and fevers and has a history of COPD.  She has taken nothing for this.    Patient request to be DNR comfort care.  This was discussed with the patient and her daughter who is power of .  Paperwork was signed and entered into the chart.    Social History     Tobacco Use    Smoking status: Former     Current packs/day: 0.00     Average packs/day: 3.0 packs/day for 5.0 years (15.0 ttl pk-yrs)     Types: Cigarettes     Start date: 1957     Quit date: 1962     Years since quittin.0    Smokeless tobacco: Never   Substance Use Topics    Alcohol use: No     Comment: Caffiene - 1 coffee daily        Current Outpatient Medications   Medication Sig Dispense Refill    levoFLOXacin (LEVAQUIN) 750 MG tablet Take 1 tablet by mouth daily for 7 days 7 tablet 0    guaiFENesin-dextromethorphan (ROBITUSSIN DM) 100-10 MG/5ML syrup Take 5 mLs by mouth 3 times daily as needed for Cough 120 mL 0    albuterol-ipratropium (COMBIVENT RESPIMAT)  MCG/ACT AERS inhaler Inhale 1 puff into the lungs every 6 hours as needed for Wheezing 1 each 5    dicyclomine (BENTYL) 10 MG capsule Take 1 capsule by mouth in the morning, at noon, and at bedtime 90 capsule 5    escitalopram (LEXAPRO) 20 MG tablet Take 1 tablet by mouth daily 90 tablet 1    levothyroxine (SYNTHROID) 75 MCG tablet Take 1 tablet by mouth Daily 90 tablet 1    montelukast (SINGULAIR) 10 MG tablet Take 1 tablet by mouth nightly 90 tablet 1    ondansetron (ZOFRAN) 4 MG tablet Take 1 tablet by mouth every 8 hours as needed for Nausea or Vomiting 90 tablet 1    pantoprazole (PROTONIX) 40 MG tablet TAKE 1 TABLET BY MOUTH IN THE EVENING 90 tablet 0    sucralfate (CARAFATE) 1 GM tablet Take 1 tablet by mouth 4 times daily as needed (nausea) 120 tablet 5

## 2024-12-18 NOTE — PATIENT INSTRUCTIONS

## 2024-12-26 ENCOUNTER — OFFICE VISIT (OUTPATIENT)
Dept: FAMILY MEDICINE CLINIC | Age: 88
End: 2024-12-26

## 2024-12-26 VITALS
OXYGEN SATURATION: 96 % | HEART RATE: 64 BPM | DIASTOLIC BLOOD PRESSURE: 72 MMHG | BODY MASS INDEX: 29.41 KG/M2 | WEIGHT: 150.6 LBS | SYSTOLIC BLOOD PRESSURE: 114 MMHG | TEMPERATURE: 96.6 F

## 2024-12-26 DIAGNOSIS — Z09 HOSPITAL DISCHARGE FOLLOW-UP: ICD-10-CM

## 2024-12-26 DIAGNOSIS — F32.0 CURRENT MILD EPISODE OF MAJOR DEPRESSIVE DISORDER WITHOUT PRIOR EPISODE (HCC): ICD-10-CM

## 2024-12-26 DIAGNOSIS — J44.1 COPD EXACERBATION (HCC): Primary | ICD-10-CM

## 2024-12-26 DIAGNOSIS — K21.9 GASTROESOPHAGEAL REFLUX DISEASE WITHOUT ESOPHAGITIS: ICD-10-CM

## 2024-12-26 DIAGNOSIS — E03.9 ACQUIRED HYPOTHYROIDISM: ICD-10-CM

## 2024-12-26 DIAGNOSIS — I10 PRIMARY HYPERTENSION: ICD-10-CM

## 2024-12-26 RX ORDER — IPRATROPIUM BROMIDE AND ALBUTEROL SULFATE 2.5; .5 MG/3ML; MG/3ML
SOLUTION RESPIRATORY (INHALATION)
COMMUNITY
Start: 2024-12-20

## 2024-12-26 RX ORDER — PANTOPRAZOLE SODIUM 40 MG/1
TABLET, DELAYED RELEASE ORAL
Qty: 90 TABLET | Refills: 0 | Status: SHIPPED | OUTPATIENT
Start: 2024-12-26

## 2024-12-26 NOTE — PROGRESS NOTES
Her respiratory function has improved, with no recent episodes of fever. She has completed her prescribed courses of steroids and antibiotics. The cough does not disrupt her sleep. She has transitioned from using an inhaler to a nebulizer, which she finds more effective. She carries a Combivent inhaler for use as needed every 4 hours when she is outside and unable to use the nebulizer.    During her recent hospital stay, her troponin levels were elevated, peaking in the upper 50s, which necessitated overnight observation. The troponin levels subsequently decreased to normal levels. She was advised to follow up with this office post-discharge. She is uncertain if she had a cardiology consultation during her hospital stay.    She requests a refill of Protonix.    MEDICATIONS  Current: Protonix, Combivent inhaler (as needed)        Patient Active Problem List   Diagnosis    Closed intertrochanteric fracture of left femur (HCC)    Gait disturbance    Anemia    Dizziness    Acquired hypothyroidism    Murmur    Age-related osteoporosis without current pathological fracture    Black tongue    Vitamin D deficiency    Vitamin B12 deficiency    Gastroesophageal reflux disease without esophagitis    VHD (valvular heart disease)    Acute respiratory distress    COPD (chronic obstructive pulmonary disease) ([facility])    Sepsis due to pneumonia ([facility])    Nodule of lower lobe of right lung    S/P TAVR (transcatheter aortic valve replacement)    Primary hypertension    Other seizures ([facility])    Metabolic encephalopathy    Cerebral artery occlusion with cerebral infarction ([facility])    Ataxia    Dysarthria due to acute stroke ([facility])    Dysthymia    Current mild episode of major depressive disorder without prior episode ([facility])    Atherosclerosis of aorta (HCC)    Thyroid disease    Mixed hyperlipidemia    Bradycardia    PAD (peripheral artery disease) ([facility])    Other fatigue    Generalized weakness    Anxiety disorder    Chest pain    SOBOE

## 2024-12-30 ENCOUNTER — LAB (OUTPATIENT)
Dept: FAMILY MEDICINE CLINIC | Age: 88
End: 2024-12-30

## 2024-12-30 ENCOUNTER — TELEPHONE (OUTPATIENT)
Dept: FAMILY MEDICINE CLINIC | Age: 88
End: 2024-12-30

## 2024-12-30 DIAGNOSIS — E03.9 ACQUIRED HYPOTHYROIDISM: Primary | ICD-10-CM

## 2024-12-30 NOTE — TELEPHONE ENCOUNTER
Patient is wanting to get thyroid labs done at [facility]. Please place orders. On 11/14/2024 you recommended repeat labs in 6 weeks.

## 2025-01-02 LAB — TSH ULTRASENSITIVE: 2.09 MCIU/ML (ref 0.4–4.5)

## 2025-01-09 ENCOUNTER — TELEPHONE (OUTPATIENT)
Dept: CARDIOLOGY CLINIC | Age: 89
End: 2025-01-09

## 2025-01-09 NOTE — TELEPHONE ENCOUNTER
Called Patient and rescheduled office visit with Fatmata Corrales NP on 4/14/25, to 4/15/25 @ 1115am; Provider is not in the office on Mondays.    Patient advised understanding.   Pt seen and examined, and I agree with Dr. Sargent's assessment and plan. Will continue current care outlined in the H&P with the following addition:    Pt with + blood culture with G+ cocci in clusters, will give another dose of Vancomycin and resume Cefepime. Repeat blood culture tomorrow to assess for clearance. Appreciate Nephrology input. Plan to consult ID tomorrow given immunocompromised state.    MARK Maharaj M.D.   Hospitalist   [facility]   Department of Hospital Medicine   Pager: [phone]

## 2025-02-04 RX ORDER — DILTIAZEM HYDROCHLORIDE 120 MG/1
120 CAPSULE, COATED, EXTENDED RELEASE ORAL DAILY
Qty: 30 CAPSULE | Refills: 5 | Status: SHIPPED | OUTPATIENT
Start: 2025-02-04

## 2025-02-18 ENCOUNTER — TELEPHONE (OUTPATIENT)
Dept: FAMILY MEDICINE CLINIC | Age: 89
End: 2025-02-18

## 2025-02-18 ENCOUNTER — TRANSCRIBE ORDERS (OUTPATIENT)
Dept: ADMINISTRATIVE | Age: 89
End: 2025-02-18

## 2025-02-18 DIAGNOSIS — M80.08XA AGE-RELATED OSTEOPOROSIS WITH CURRENT PATHOLOGICAL FRACTURE OF VERTEBRA, INITIAL ENCOUNTER (HCC): Primary | ICD-10-CM

## 2025-02-18 NOTE — TELEPHONE ENCOUNTER
Patient's daughter called stated mother is coughing up chunky stuff and is wanting an steroid.  Gave the resource number and advised to call there

## 2025-02-25 ENCOUNTER — OFFICE VISIT (OUTPATIENT)
Dept: FAMILY MEDICINE CLINIC | Age: 89
End: 2025-02-25

## 2025-02-25 VITALS
HEART RATE: 77 BPM | DIASTOLIC BLOOD PRESSURE: 68 MMHG | OXYGEN SATURATION: 94 % | TEMPERATURE: 96.6 F | BODY MASS INDEX: 29.72 KG/M2 | WEIGHT: 152.2 LBS | SYSTOLIC BLOOD PRESSURE: 118 MMHG

## 2025-02-25 DIAGNOSIS — J44.1 COPD EXACERBATION (HCC): ICD-10-CM

## 2025-02-25 DIAGNOSIS — I48.0 PAF (PAROXYSMAL ATRIAL FIBRILLATION) (HCC): ICD-10-CM

## 2025-02-25 DIAGNOSIS — G40.89 OTHER SEIZURES (HCC): ICD-10-CM

## 2025-02-25 DIAGNOSIS — Z09 HOSPITAL DISCHARGE FOLLOW-UP: ICD-10-CM

## 2025-02-25 DIAGNOSIS — I27.20 PULMONARY HYPERTENSION (HCC): ICD-10-CM

## 2025-02-25 DIAGNOSIS — F32.0 CURRENT MILD EPISODE OF MAJOR DEPRESSIVE DISORDER WITHOUT PRIOR EPISODE: ICD-10-CM

## 2025-02-25 DIAGNOSIS — Z71.89 ACP (ADVANCE CARE PLANNING): ICD-10-CM

## 2025-02-25 DIAGNOSIS — E87.6 HYPOKALEMIA: Primary | ICD-10-CM

## 2025-02-25 RX ORDER — BUPROPION HYDROCHLORIDE 150 MG/1
150 TABLET ORAL EVERY MORNING
Qty: 30 TABLET | Refills: 0 | Status: SHIPPED | OUTPATIENT
Start: 2025-02-25

## 2025-02-25 RX ORDER — POTASSIUM CHLORIDE 750 MG/1
10 TABLET, EXTENDED RELEASE ORAL DAILY
Qty: 30 TABLET | Refills: 0 | Status: SHIPPED | OUTPATIENT
Start: 2025-02-25 | End: 2025-02-28 | Stop reason: SDUPTHER

## 2025-02-25 NOTE — PROGRESS NOTES
lymphadenopathy  Pulmonary/Chest: clear to auscultation bilaterally- no wheezes, rales or rhonchi, normal air movement, no respiratory distress  Cardiovascular: normal rate, regular rhythm, normal S1 and S2, no murmurs, rubs, clicks, or gallops, distal pulses intact, no carotid bruits  Abdomen: soft, non-tender, non-distended, normal bowel sounds, no masses or organomegaly  Extremities: no cyanosis, clubbing or edema  Musculoskeletal: normal range of motion, no joint swelling, deformity or tenderness  Neurologic: reflexes normal and symmetric, no cranial nerve deficit, gait, coordination and speech normal    An electronic signature was used to authenticate this note.  --LAI MOHR MD    Advance Care Planning     Advance Care Planning (ACP) Physician/NP/PA Conversation    Date of Conversation: 2/25/2025  Conducted with: Patient with Decision Making Capacity and Healthcare Decision Maker  Other persons present: Daughter Yvonne Gonzalez    Healthcare Decision Maker:   Primary Decision Maker: Yvonne Gonzalez MPOA - Child - [phone]     Today we documented Decision Maker(s) consistent with ACP documents on file.    Care Preferences:    Hospitalization:  \"If your health worsens and it becomes clear that your chance of recovery is unlikely, what would be your preference regarding hospitalization?\"  The patient would prefer comfort-focused treatment without hospitalization.    Ventilation:  \"If you were unable to breath on your own and your chance of recovery was unlikely, what would be your preference about the use of a ventilator (breathing machine) if it was available to you?\"  The patient would NOT desire the use of a ventilator.    Resuscitation:  \"In the event your heart stopped as a result of an underlying serious health condition, would you want attempts made to restart your heart, or would you prefer a natural death?\"  No, do NOT attempt to resuscitate.    benefit/burden of treatment options, artificial

## 2025-02-25 NOTE — PATIENT INSTRUCTIONS

## 2025-02-26 ENCOUNTER — HOSPITAL ENCOUNTER (OUTPATIENT)
Dept: MRI IMAGING | Age: 89
Discharge: HOME | End: 2025-02-26
Attending: ORTHOPAEDIC SURGERY
Payer: MEDICARE

## 2025-02-26 DIAGNOSIS — M80.08XA AGE-RELATED OSTEOPOROSIS WITH CURRENT PATHOLOGICAL FRACTURE OF VERTEBRA, INITIAL ENCOUNTER (HCC): ICD-10-CM

## 2025-02-26 PROCEDURE — 72148 MRI LUMBAR SPINE W/O DYE: CPT

## 2025-02-27 ENCOUNTER — OFFICE VISIT (OUTPATIENT)
Dept: CARDIOLOGY CLINIC | Age: 89
End: 2025-02-27
Payer: MEDICARE

## 2025-02-27 VITALS
DIASTOLIC BLOOD PRESSURE: 62 MMHG | HEART RATE: 80 BPM | WEIGHT: 153 LBS | OXYGEN SATURATION: 96 % | HEIGHT: 60 IN | BODY MASS INDEX: 30.04 KG/M2 | SYSTOLIC BLOOD PRESSURE: 122 MMHG

## 2025-02-27 DIAGNOSIS — I50.33 ACUTE ON CHRONIC DIASTOLIC CONGESTIVE HEART FAILURE (HCC): ICD-10-CM

## 2025-02-27 DIAGNOSIS — I48.0 PAF (PAROXYSMAL ATRIAL FIBRILLATION) (HCC): Primary | ICD-10-CM

## 2025-02-27 PROCEDURE — 99214 OFFICE O/P EST MOD 30 MIN: CPT | Performed by: NURSE PRACTITIONER

## 2025-02-27 PROCEDURE — 1123F ACP DISCUSS/DSCN MKR DOCD: CPT | Performed by: NURSE PRACTITIONER

## 2025-02-27 PROCEDURE — 1160F RVW MEDS BY RX/DR IN RCRD: CPT | Performed by: NURSE PRACTITIONER

## 2025-02-27 PROCEDURE — 1159F MED LIST DOCD IN RCRD: CPT | Performed by: NURSE PRACTITIONER

## 2025-02-27 ASSESSMENT — ENCOUNTER SYMPTOMS
SHORTNESS OF BREATH: 0
ORTHOPNEA: 0

## 2025-02-27 NOTE — PROGRESS NOTES
2/27/2025  Primary cardiologist: Dr. Jonas    CC:   Opal  is an established 89 y.o.  female here for a follow up on A-fib      SUBJECTIVE/OBJECTIVE:  Opal is a 89 y.o. female with a history of HFpEF,  valvular heart disease s/p TAVR's, hypertension, bradycardia s/p PPM, hypothyroid, COPD, CVA and atrial fibrillation s/p Watchman (October/2023)    HPI:  Opal was recently in the hospital with increased shortness of breath.  Chest x-ray was consistent with fluid overload/pulmonary edema with pleural effusions.  She was given IV Lasix.  Symptoms improved. Echocardiogram showed EF 50-55% mild to moderate TR, PA peak pressure 50 mmHg.   She is here today with her daughter.  She states her shortness of breath has improved somewhat although she still continues to be short of breath with walking.    Review of Systems   Constitutional: Negative for diaphoresis and malaise/fatigue.   Cardiovascular:  Positive for dyspnea on exertion. Negative for chest pain, claudication, irregular heartbeat, leg swelling, near-syncope, orthopnea, palpitations and paroxysmal nocturnal dyspnea.   Respiratory:  Negative for shortness of breath.    Neurological:  Positive for dizziness. Negative for light-headedness.       Vitals:    02/27/25 1123   BP: 122/62   Site: Left Upper Arm   Position: Sitting   Cuff Size: Medium Adult   Pulse: 80   SpO2: 96%   Weight: 69.4 kg (153 lb)   Height: 1.524 m (5')       Wt Readings from Last 3 Encounters:   02/27/25 69.4 kg (153 lb)   02/25/25 69 kg (152 lb 3.2 oz)   12/26/24 68.3 kg (150 lb 9.6 oz)      Body mass index is 29.88 kg/m².     Physical Exam  Vitals reviewed.   Eyes:      Pupils: Pupils are equal, round, and reactive to light.   Neck:      Vascular: No carotid bruit.   Cardiovascular:      Rate and Rhythm: Normal rate and regular rhythm.      Pulses: Normal pulses.   Pulmonary:      Effort: Pulmonary effort is normal.      Breath sounds: Normal breath sounds. No rales.   Chest:

## 2025-02-28 ENCOUNTER — OFFICE VISIT (OUTPATIENT)
Age: 89
End: 2025-02-28
Payer: MEDICARE

## 2025-02-28 ENCOUNTER — TELEPHONE (OUTPATIENT)
Dept: CARDIOLOGY CLINIC | Age: 89
End: 2025-02-28

## 2025-02-28 ENCOUNTER — HOSPITAL ENCOUNTER (OUTPATIENT)
Age: 89
Discharge: HOME | End: 2025-02-28
Payer: MEDICARE

## 2025-02-28 VITALS
BODY MASS INDEX: 29.64 KG/M2 | WEIGHT: 151 LBS | HEIGHT: 60 IN | HEART RATE: 81 BPM | DIASTOLIC BLOOD PRESSURE: 70 MMHG | SYSTOLIC BLOOD PRESSURE: 132 MMHG

## 2025-02-28 DIAGNOSIS — I48.0 PAROXYSMAL ATRIAL FIBRILLATION (HCC): Primary | ICD-10-CM

## 2025-02-28 DIAGNOSIS — E87.6 HYPOKALEMIA: ICD-10-CM

## 2025-02-28 DIAGNOSIS — R00.1 BRADYCARDIA: ICD-10-CM

## 2025-02-28 DIAGNOSIS — I48.19 PERSISTENT ATRIAL FIBRILLATION (HCC): Primary | ICD-10-CM

## 2025-02-28 DIAGNOSIS — I48.0 PAROXYSMAL ATRIAL FIBRILLATION (HCC): ICD-10-CM

## 2025-02-28 LAB
ANION GAP SERPL CALCULATED.3IONS-SCNC: 12 MMOL/L (ref 9–17)
BUN SERPL-MCNC: 12 MG/DL (ref 7–20)
CALCIUM SERPL-MCNC: 9.2 MG/DL (ref 8.3–10.6)
CHLORIDE SERPL-SCNC: 98 MMOL/L (ref 99–110)
CO2 SERPL-SCNC: 27 MMOL/L (ref 21–32)
CREAT SERPL-MCNC: 0.7 MG/DL (ref 0.6–1.2)
ERYTHROCYTE [DISTWIDTH] IN BLOOD BY AUTOMATED COUNT: 13.5 % (ref 11.7–14.9)
GFR, ESTIMATED: 73 ML/MIN/1.73M2
GLUCOSE SERPL-MCNC: 101 MG/DL (ref 74–99)
HCT VFR BLD AUTO: 38.4 % (ref 37–47)
HGB BLD-MCNC: 12.2 G/DL (ref 12.5–16)
INR PPP: 0.9
MAGNESIUM SERPL-MCNC: 2 MG/DL (ref 1.8–2.4)
MCH RBC QN AUTO: 30.5 PG (ref 27–31)
MCHC RBC AUTO-ENTMCNC: 31.8 G/DL (ref 32–36)
MCV RBC AUTO: 96 FL (ref 78–100)
PARTIAL THROMBOPLASTIN TIME: 34.1 SEC (ref 25.1–37.1)
PHOSPHATE SERPL-MCNC: 4.6 MG/DL (ref 2.5–4.9)
PLATELET # BLD AUTO: 348 K/UL (ref 140–440)
PMV BLD AUTO: 9.3 FL (ref 7.5–11.1)
POTASSIUM SERPL-SCNC: 4.2 MMOL/L (ref 3.5–5.1)
PROTHROMBIN TIME: 12.5 SEC (ref 11.7–14.5)
RBC # BLD AUTO: 4 M/UL (ref 4.2–5.4)
SODIUM SERPL-SCNC: 137 MMOL/L (ref 136–145)
WBC OTHER # BLD: 6.6 K/UL (ref 4–10.5)

## 2025-02-28 PROCEDURE — 84100 ASSAY OF PHOSPHORUS: CPT

## 2025-02-28 PROCEDURE — 83735 ASSAY OF MAGNESIUM: CPT

## 2025-02-28 PROCEDURE — 85730 THROMBOPLASTIN TIME PARTIAL: CPT

## 2025-02-28 PROCEDURE — 85027 COMPLETE CBC AUTOMATED: CPT

## 2025-02-28 PROCEDURE — 93000 ELECTROCARDIOGRAM COMPLETE: CPT | Performed by: INTERNAL MEDICINE

## 2025-02-28 PROCEDURE — 80048 BASIC METABOLIC PNL TOTAL CA: CPT

## 2025-02-28 PROCEDURE — 85610 PROTHROMBIN TIME: CPT

## 2025-02-28 PROCEDURE — 1123F ACP DISCUSS/DSCN MKR DOCD: CPT | Performed by: INTERNAL MEDICINE

## 2025-02-28 PROCEDURE — 99213 OFFICE O/P EST LOW 20 MIN: CPT | Performed by: INTERNAL MEDICINE

## 2025-02-28 RX ORDER — FUROSEMIDE 40 MG/1
40 TABLET ORAL DAILY
Qty: 30 TABLET | Refills: 3 | Status: SHIPPED | OUTPATIENT
Start: 2025-02-28

## 2025-02-28 RX ORDER — POTASSIUM CHLORIDE 1500 MG/1
20 TABLET, EXTENDED RELEASE ORAL DAILY
Qty: 30 TABLET | Refills: 3 | Status: SHIPPED | OUTPATIENT
Start: 2025-02-28

## 2025-02-28 NOTE — TELEPHONE ENCOUNTER
Test Ordered:  JAN?CV    /  Insurance: [employer]  / Jesse  /   Authorization Status: No Auth Required

## 2025-02-28 NOTE — PROGRESS NOTES
its and under sensed A-fib and they were not able to detect tach on the pacemaker cardioversion may be reasonable on the patient.    Patient agreeable with the plan and will follow-up      Thanks again for allowing me to participate in care of this patient. Please call me if you have any questions.    With best regards.      Julio Grace MD, 2/28/2025 1:30 PM     Please note this report has been partially produced using speech recognition software and may contain errors related to that system including errors in grammar, punctuation, and spelling, as well as words and phrases that may be inappropriate. If there are any questions or concerns please feel free to contact the dictating provider for clarification.

## 2025-03-03 ENCOUNTER — HOSPITAL ENCOUNTER (OUTPATIENT)
Dept: NON INVASIVE DIAGNOSTICS | Age: 89
Discharge: HOME | End: 2025-03-05
Attending: INTERNAL MEDICINE
Payer: MEDICARE

## 2025-03-03 ENCOUNTER — ANESTHESIA EVENT (OUTPATIENT)
Dept: NON INVASIVE DIAGNOSTICS | Age: 89
End: 2025-03-03
Payer: MEDICARE

## 2025-03-03 ENCOUNTER — ANESTHESIA (OUTPATIENT)
Dept: NON INVASIVE DIAGNOSTICS | Age: 89
End: 2025-03-03
Payer: MEDICARE

## 2025-03-03 VITALS
SYSTOLIC BLOOD PRESSURE: 132 MMHG | BODY MASS INDEX: 29.64 KG/M2 | OXYGEN SATURATION: 94 % | HEART RATE: 65 BPM | RESPIRATION RATE: 17 BRPM | WEIGHT: 151 LBS | HEIGHT: 60 IN | DIASTOLIC BLOOD PRESSURE: 45 MMHG

## 2025-03-03 DIAGNOSIS — I48.0 PAROXYSMAL ATRIAL FIBRILLATION (HCC): ICD-10-CM

## 2025-03-03 PROBLEM — I48.19 PERSISTENT ATRIAL FIBRILLATION (HCC): Status: ACTIVE | Noted: 2025-03-03

## 2025-03-03 LAB — ECHO BSA: 1.7 M2

## 2025-03-03 PROCEDURE — 93312 ECHO TRANSESOPHAGEAL: CPT

## 2025-03-03 PROCEDURE — 6360000002 HC RX W HCPCS

## 2025-03-03 PROCEDURE — 93005 ELECTROCARDIOGRAM TRACING: CPT | Performed by: INTERNAL MEDICINE

## 2025-03-03 PROCEDURE — 6360000002 HC RX W HCPCS: Performed by: NURSE ANESTHETIST, CERTIFIED REGISTERED

## 2025-03-03 PROCEDURE — 2580000003 HC RX 258: Performed by: NURSE ANESTHETIST, CERTIFIED REGISTERED

## 2025-03-03 PROCEDURE — 92960 CARDIOVERSION ELECTRIC EXT: CPT | Performed by: INTERNAL MEDICINE

## 2025-03-03 PROCEDURE — 93312 ECHO TRANSESOPHAGEAL: CPT | Performed by: INTERNAL MEDICINE

## 2025-03-03 PROCEDURE — 3700000001 HC ADD 15 MINUTES (ANESTHESIA): Performed by: INTERNAL MEDICINE

## 2025-03-03 PROCEDURE — 3700000000 HC ANESTHESIA ATTENDED CARE: Performed by: INTERNAL MEDICINE

## 2025-03-03 PROCEDURE — 7100000010 HC PHASE II RECOVERY - FIRST 15 MIN: Performed by: INTERNAL MEDICINE

## 2025-03-03 PROCEDURE — 93325 DOPPLER ECHO COLOR FLOW MAPG: CPT | Performed by: INTERNAL MEDICINE

## 2025-03-03 PROCEDURE — 7100000011 HC PHASE II RECOVERY - ADDTL 15 MIN: Performed by: INTERNAL MEDICINE

## 2025-03-03 RX ORDER — PROPOFOL 10 MG/ML
INJECTION, EMULSION INTRAVENOUS
Status: DISCONTINUED | OUTPATIENT
Start: 2025-03-03 | End: 2025-03-03 | Stop reason: SDUPTHER

## 2025-03-03 RX ORDER — SODIUM CHLORIDE 9 MG/ML
INJECTION, SOLUTION INTRAVENOUS
Status: DISCONTINUED | OUTPATIENT
Start: 2025-03-03 | End: 2025-03-03 | Stop reason: SDUPTHER

## 2025-03-03 RX ORDER — LIDOCAINE HYDROCHLORIDE 20 MG/ML
INJECTION, SOLUTION INFILTRATION; PERINEURAL
Status: DISCONTINUED | OUTPATIENT
Start: 2025-03-03 | End: 2025-03-03 | Stop reason: SDUPTHER

## 2025-03-03 RX ADMIN — LIDOCAINE HYDROCHLORIDE 40 MG: 20 INJECTION, SOLUTION INFILTRATION; PERINEURAL at 10:19

## 2025-03-03 RX ADMIN — SODIUM CHLORIDE: 9 INJECTION, SOLUTION INTRAVENOUS at 10:08

## 2025-03-03 RX ADMIN — PROPOFOL 80 MG: 10 INJECTION, EMULSION INTRAVENOUS at 10:19

## 2025-03-03 ASSESSMENT — ENCOUNTER SYMPTOMS
CONSTIPATION: 0
VOMITING: 0
COUGH: 0
EYE PAIN: 0
COLOR CHANGE: 0
PHOTOPHOBIA: 0
ABDOMINAL PAIN: 0
BACK PAIN: 0
WHEEZING: 0
DIARRHEA: 0
NAUSEA: 0
BLOOD IN STOOL: 0
SHORTNESS OF BREATH: 1
CHEST TIGHTNESS: 0

## 2025-03-03 NOTE — ANESTHESIA PRE PROCEDURE
Department of Anesthesiology  Preprocedure Note       Name:  Opal Moreno   Age:  89 y.o.  :  1935                                          MRN:  [id number]         Date:  3/3/2025      Surgeon: Surgeon(s):  Julio Grace MD    Procedure: * No procedures listed *    Medications prior to admission:   Prior to Admission medications    Medication Sig Start Date End Date Taking? Authorizing Provider   furosemide (LASIX) 40 MG tablet Take 1 tablet by mouth daily 25  Yes Julio Grace MD   potassium chloride (KLOR-CON M) 20 MEQ extended release tablet Take 1 tablet by mouth daily To be taken only with lasix 25  Yes Julio Grace MD   buPROPion (WELLBUTRIN XL) 150 MG extended release tablet Take 1 tablet by mouth every morning 25  Yes Marcin Montano MD   dilTIAZem (CARDIZEM CD) 120 MG extended release capsule Take 1 capsule by mouth daily 25  Yes Katie Fung APRN - CNP   pantoprazole (PROTONIX) 40 MG tablet TAKE 1 TABLET BY MOUTH IN THE EVENING 24  Yes Marcin Montano MD   ipratropium 0.5 mg-albuterol 2.5 mg (DUONEB) 0.5-2.5 (3) MG/3ML SOLN nebulizer solution Inhale 1 vial via nebulizer every 4 hours as needed 24  Yes ProviderDelfino MD   albuterol-ipratropium (COMBIVENT RESPIMAT)  MCG/ACT AERS inhaler Inhale 1 puff into the lungs every 6 hours as needed for Wheezing 24  Yes Marcin Montano MD   dicyclomine (BENTYL) 10 MG capsule Take 1 capsule by mouth in the morning, at noon, and at bedtime 24  Yes Marcin Montano MD   escitalopram (LEXAPRO) 20 MG tablet Take 1 tablet by mouth daily 24  Yes Marcin Montano MD   levothyroxine (SYNTHROID) 75 MCG tablet Take 1 tablet by mouth Daily 24  Yes Marcin Montano MD   montelukast (SINGULAIR) 10 MG tablet Take 1 tablet by mouth nightly 24  Yes Marcin Montano MD   ondansetron (ZOFRAN) 4 MG tablet Take 1 tablet by mouth every 8 hours as needed for Nausea or Vomiting

## 2025-03-03 NOTE — H&P
pacemaker cardioversion may be reasonable on the patient.    Patient agreeable with the plan and will follow-up    Risks discussed with procedure and patient agreeable with plan      Thanks again for allowing me to participate in care of this patient. Please call me if you have any questions.    With best regards.      Julio Grace MD, 3/3/2025 9:40 AM     Please note this report has been partially produced using speech recognition software and may contain errors related to that system including errors in grammar, punctuation, and spelling, as well as words and phrases that may be inappropriate. If there are any questions or concerns please feel free to contact the dictating provider for clarification.

## 2025-03-03 NOTE — ANESTHESIA POSTPROCEDURE EVALUATION
Department of Anesthesiology  Postprocedure Note    Patient: Opal Moreno  MRN: [id number]  YOB: 1935  Date of evaluation: 3/3/2025    Procedure Summary       Date: 03/03/25 Room / Location: [facility] Noninvasive Cardiology    Anesthesia Start: 1008 Anesthesia Stop: 1031    Procedure: JAN W/ POSSIBLE CARDIOVERSION (PRN CONTRAST/BUBBLE/3D) Diagnosis: Paroxysmal atrial fibrillation (HCC)    Scheduled Providers: Julio Grace MD Responsible Provider: Leigh Ann Larry MD    Anesthesia Type: MAC ASA Status: 4            Anesthesia Type: No value filed.    Emi Phase I: Emi Score: 10    Emi Phase II:      Anesthesia Post Evaluation    Patient location during evaluation: bedside  Patient participation: complete - patient participated  Level of consciousness: awake and alert  Pain score: 0  Airway patency: patent  Nausea & Vomiting: no vomiting and no nausea  Cardiovascular status: blood pressure returned to baseline and hemodynamically stable  Respiratory status: acceptable, room air, spontaneous ventilation and nonlabored ventilation  Hydration status: stable  Pain management: adequate    No notable events documented.

## 2025-03-04 LAB
EKG ATRIAL RATE: 326 BPM
EKG ATRIAL RATE: 71 BPM
EKG DIAGNOSIS: NORMAL
EKG DIAGNOSIS: NORMAL
EKG P AXIS: 88 DEGREES
EKG P-R INTERVAL: 184 MS
EKG Q-T INTERVAL: 410 MS
EKG Q-T INTERVAL: 432 MS
EKG QRS DURATION: 78 MS
EKG QRS DURATION: 84 MS
EKG QTC CALCULATION (BAZETT): 445 MS
EKG QTC CALCULATION (BAZETT): 469 MS
EKG R AXIS: 135 DEGREES
EKG R AXIS: 57 DEGREES
EKG T AXIS: 171 DEGREES
EKG T AXIS: 2 DEGREES
EKG VENTRICULAR RATE: 71 BPM
EKG VENTRICULAR RATE: 71 BPM

## 2025-03-04 PROCEDURE — 93010 ELECTROCARDIOGRAM REPORT: CPT | Performed by: INTERNAL MEDICINE

## 2025-03-19 ENCOUNTER — OFFICE VISIT (OUTPATIENT)
Dept: CARDIOLOGY CLINIC | Age: 89
End: 2025-03-19
Payer: MEDICARE

## 2025-03-19 VITALS
WEIGHT: 141.4 LBS | HEART RATE: 76 BPM | BODY MASS INDEX: 27.76 KG/M2 | HEIGHT: 60 IN | SYSTOLIC BLOOD PRESSURE: 140 MMHG | DIASTOLIC BLOOD PRESSURE: 70 MMHG

## 2025-03-19 DIAGNOSIS — I48.0 PAF (PAROXYSMAL ATRIAL FIBRILLATION) (HCC): Primary | ICD-10-CM

## 2025-03-19 DIAGNOSIS — I10 PRIMARY HYPERTENSION: ICD-10-CM

## 2025-03-19 PROCEDURE — 99214 OFFICE O/P EST MOD 30 MIN: CPT | Performed by: NURSE PRACTITIONER

## 2025-03-19 PROCEDURE — 1159F MED LIST DOCD IN RCRD: CPT | Performed by: NURSE PRACTITIONER

## 2025-03-19 PROCEDURE — 1123F ACP DISCUSS/DSCN MKR DOCD: CPT | Performed by: NURSE PRACTITIONER

## 2025-03-19 ASSESSMENT — ENCOUNTER SYMPTOMS
SHORTNESS OF BREATH: 0
ORTHOPNEA: 0

## 2025-03-19 NOTE — PATIENT INSTRUCTIONS
Thank you for allowing us to care for you today!   We want to ensure we can follow your treatment plan and we strive to give you the best outcomes and experience possible.   If you ever have a life threatening emergency and call 911 - for an ambulance (EMS)  REMEMBER  Our providers can only care for you at:   [facility] or [facility]   Even if you have someone take you or you drive yourself we can only care for you in a [facility] facility. Our providers are not setup at the other healthcare locations!    PLEASE CALL OUR OFFICE DURING NORMAL BUSINESS HOURS  Monday through Friday 8 am to 5 pm  AFTER HOURS the physician on-call cannot help with scheduling, rescheduling, procedure instruction questions or any type of medication need or issue.  [facility] P:[phone] - [facility] P:[phone] - [facility] P:[phone]      If you receive a survey:  We would appreciate you taking the time to share your experience concerning your provider visit in the office.    These surveys are confidential!  We are eager to improve and are counting on you to share your feedback so we can ensure you get the best care possible.

## 2025-03-19 NOTE — PROGRESS NOTES
CLINICAL STAFF DOCUMENTATION    Katie Fung, RAMAN     Opal TREVINO Tiffanie  6/12/1935  460    Have you had any Chest Pain recently? -  just a little bit   If Yes DO EKG   What type of pain is it? - Sharp Pain   Tender to palpate (touch)? No  When did the pain begin? - Day's   How long does the pain last? - 2 seconds    How Severe is the pain? - 8  Is there anything that aggravates or triggers the pain?  Just comes on   Did you take any medication? Pain Medications   And did it relieve the pain - Yes  Is there anything that relieves it? Lying down  Have you had any Shortness of Breath - No  Have you had any dizziness - Yes nothing new or worsened   Have you had any palpitations recently? - No  Do you have any edema - swelling in No    When did you have your last labs drawn 2/28/25  What doctor ordered Gujja  Do we have the labs in their chart Yes  Do you have a surgery or procedure scheduled in the near future - No  Do use tobacco products? - No  Do you drink alcohol? - No  Do you use any illicit drugs? - No  Caffeine? -  no  Check medication list thoroughly!!! AND RECONCILE OUTSIDE MEDICATIONS  If dose has changed change the entire order not just the MG  BE SURE TO ASK PATIENT IF THEY NEED MEDICATION REFILLS  Verify Pharmacy and update if incorrect    Add to every patient's \"wrap up\" the following dot phrase AFTERVISITCARDIOHEARTHOUSE and ensure we explain this to our patients    
words and phrases that may be inappropriate. If there are any questions or concerns please feel free to contact the dictating provider for clarification.

## 2025-03-26 ENCOUNTER — OFFICE VISIT (OUTPATIENT)
Dept: FAMILY MEDICINE CLINIC | Age: 89
End: 2025-03-26
Payer: MEDICARE

## 2025-03-26 VITALS
OXYGEN SATURATION: 98 % | HEART RATE: 63 BPM | WEIGHT: 146.4 LBS | SYSTOLIC BLOOD PRESSURE: 160 MMHG | BODY MASS INDEX: 28.59 KG/M2 | TEMPERATURE: 97.5 F | DIASTOLIC BLOOD PRESSURE: 70 MMHG

## 2025-03-26 DIAGNOSIS — R11.0 NAUSEA: ICD-10-CM

## 2025-03-26 DIAGNOSIS — R10.31 RLQ ABDOMINAL PAIN: ICD-10-CM

## 2025-03-26 DIAGNOSIS — E03.9 ACQUIRED HYPOTHYROIDISM: ICD-10-CM

## 2025-03-26 DIAGNOSIS — I10 PRIMARY HYPERTENSION: ICD-10-CM

## 2025-03-26 DIAGNOSIS — K21.9 GASTROESOPHAGEAL REFLUX DISEASE WITHOUT ESOPHAGITIS: ICD-10-CM

## 2025-03-26 DIAGNOSIS — R10.9 ABDOMINAL CRAMPING: ICD-10-CM

## 2025-03-26 DIAGNOSIS — M79.605 PAIN IN BOTH LOWER EXTREMITIES: ICD-10-CM

## 2025-03-26 DIAGNOSIS — F32.0 CURRENT MILD EPISODE OF MAJOR DEPRESSIVE DISORDER WITHOUT PRIOR EPISODE: ICD-10-CM

## 2025-03-26 DIAGNOSIS — Z86.73 HISTORY OF CVA (CEREBROVASCULAR ACCIDENT): ICD-10-CM

## 2025-03-26 DIAGNOSIS — M79.604 PAIN IN BOTH LOWER EXTREMITIES: ICD-10-CM

## 2025-03-26 DIAGNOSIS — Z00.00 MEDICARE ANNUAL WELLNESS VISIT, SUBSEQUENT: Primary | ICD-10-CM

## 2025-03-26 DIAGNOSIS — J30.89 NON-SEASONAL ALLERGIC RHINITIS, UNSPECIFIED TRIGGER: ICD-10-CM

## 2025-03-26 PROBLEM — Z96.611 STATUS POST REPLACEMENT OF RIGHT SHOULDER JOINT: Status: ACTIVE | Noted: 2025-03-26

## 2025-03-26 PROBLEM — M43.16 SPONDYLOLISTHESIS, LUMBAR REGION: Status: ACTIVE | Noted: 2025-03-26

## 2025-03-26 PROCEDURE — 1159F MED LIST DOCD IN RCRD: CPT | Performed by: FAMILY MEDICINE

## 2025-03-26 PROCEDURE — 1123F ACP DISCUSS/DSCN MKR DOCD: CPT | Performed by: FAMILY MEDICINE

## 2025-03-26 PROCEDURE — G0439 PPPS, SUBSEQ VISIT: HCPCS | Performed by: FAMILY MEDICINE

## 2025-03-26 PROCEDURE — 99214 OFFICE O/P EST MOD 30 MIN: CPT | Performed by: FAMILY MEDICINE

## 2025-03-26 RX ORDER — BUPROPION HYDROCHLORIDE 150 MG/1
150 TABLET ORAL EVERY MORNING
Qty: 30 TABLET | Refills: 0 | Status: SHIPPED | OUTPATIENT
Start: 2025-03-26

## 2025-03-26 RX ORDER — LEVOTHYROXINE SODIUM 75 UG/1
75 TABLET ORAL DAILY
Qty: 90 TABLET | Refills: 1 | Status: SHIPPED | OUTPATIENT
Start: 2025-03-26

## 2025-03-26 RX ORDER — SUCRALFATE 1 G/1
1 TABLET ORAL 4 TIMES DAILY PRN
Qty: 120 TABLET | Refills: 5 | Status: SHIPPED | OUTPATIENT
Start: 2025-03-26

## 2025-03-26 RX ORDER — ATORVASTATIN CALCIUM 40 MG/1
40 TABLET, FILM COATED ORAL NIGHTLY
Qty: 90 TABLET | Refills: 1 | Status: SHIPPED | OUTPATIENT
Start: 2025-03-26

## 2025-03-26 RX ORDER — METHOCARBAMOL 500 MG/1
500 TABLET, FILM COATED ORAL EVERY EVENING
Qty: 30 TABLET | Refills: 5 | Status: SHIPPED | OUTPATIENT
Start: 2025-03-26

## 2025-03-26 RX ORDER — PANTOPRAZOLE SODIUM 40 MG/1
TABLET, DELAYED RELEASE ORAL
Qty: 90 TABLET | Refills: 0 | Status: SHIPPED | OUTPATIENT
Start: 2025-03-26

## 2025-03-26 RX ORDER — DICYCLOMINE HYDROCHLORIDE 10 MG/1
10 CAPSULE ORAL 3 TIMES DAILY
Qty: 90 CAPSULE | Refills: 5 | Status: SHIPPED | OUTPATIENT
Start: 2025-03-26

## 2025-03-26 RX ORDER — LOSARTAN POTASSIUM 25 MG/1
25 TABLET ORAL DAILY
Qty: 90 TABLET | Refills: 1 | Status: SHIPPED | OUTPATIENT
Start: 2025-03-26

## 2025-03-26 RX ORDER — MONTELUKAST SODIUM 10 MG/1
10 TABLET ORAL NIGHTLY
Qty: 90 TABLET | Refills: 1 | Status: SHIPPED | OUTPATIENT
Start: 2025-03-26

## 2025-03-26 RX ORDER — ONDANSETRON 4 MG/1
4 TABLET, FILM COATED ORAL EVERY 8 HOURS PRN
Qty: 90 TABLET | Refills: 1 | Status: SHIPPED | OUTPATIENT
Start: 2025-03-26

## 2025-03-26 RX ORDER — ESCITALOPRAM OXALATE 20 MG/1
20 TABLET ORAL DAILY
Qty: 90 TABLET | Refills: 1 | Status: SHIPPED | OUTPATIENT
Start: 2025-03-26

## 2025-03-26 SDOH — ECONOMIC STABILITY: FOOD INSECURITY: WITHIN THE PAST 12 MONTHS, YOU WORRIED THAT YOUR FOOD WOULD RUN OUT BEFORE YOU GOT MONEY TO BUY MORE.: NEVER TRUE

## 2025-03-26 SDOH — ECONOMIC STABILITY: FOOD INSECURITY: WITHIN THE PAST 12 MONTHS, THE FOOD YOU BOUGHT JUST DIDN'T LAST AND YOU DIDN'T HAVE MONEY TO GET MORE.: NEVER TRUE

## 2025-03-26 ASSESSMENT — PATIENT HEALTH QUESTIONNAIRE - PHQ9
SUM OF ALL RESPONSES TO PHQ QUESTIONS 1-9: 3
3. TROUBLE FALLING OR STAYING ASLEEP: NOT AT ALL
4. FEELING TIRED OR HAVING LITTLE ENERGY: NEARLY EVERY DAY
2. FEELING DOWN, DEPRESSED OR HOPELESS: NOT AT ALL
1. LITTLE INTEREST OR PLEASURE IN DOING THINGS: NOT AT ALL
6. FEELING BAD ABOUT YOURSELF - OR THAT YOU ARE A FAILURE OR HAVE LET YOURSELF OR YOUR FAMILY DOWN: NOT AT ALL
8. MOVING OR SPEAKING SO SLOWLY THAT OTHER PEOPLE COULD HAVE NOTICED. OR THE OPPOSITE, BEING SO FIGETY OR RESTLESS THAT YOU HAVE BEEN MOVING AROUND A LOT MORE THAN USUAL: NOT AT ALL
SUM OF ALL RESPONSES TO PHQ QUESTIONS 1-9: 3
10. IF YOU CHECKED OFF ANY PROBLEMS, HOW DIFFICULT HAVE THESE PROBLEMS MADE IT FOR YOU TO DO YOUR WORK, TAKE CARE OF THINGS AT HOME, OR GET ALONG WITH OTHER PEOPLE: NOT DIFFICULT AT ALL
5. POOR APPETITE OR OVEREATING: NOT AT ALL
9. THOUGHTS THAT YOU WOULD BE BETTER OFF DEAD, OR OF HURTING YOURSELF: NOT AT ALL
7. TROUBLE CONCENTRATING ON THINGS, SUCH AS READING THE NEWSPAPER OR WATCHING TELEVISION: NOT AT ALL

## 2025-03-26 ASSESSMENT — VISUAL ACUITY
OS_CC: 20/40
OD_CC: 20/40

## 2025-03-26 NOTE — PROGRESS NOTES
Medicare Annual Wellness Visit    Opal Moreno is here for 1 Month Follow-Up (depression), Abdominal Pain (Sharp pain in abdomin for 2-3 weeks ), and Medicare AWV    Assessment & Plan   RLQ abdominal pain  History of CVA (cerebrovascular accident)  -     atorvastatin (LIPITOR) 40 MG tablet; Take 1 tablet by mouth nightly, Disp-90 tablet, R-1Normal  -     Lipid Panel  -     Hepatic Function Panel  Current mild episode of major depressive disorder without prior episode  -     buPROPion (WELLBUTRIN XL) 150 MG extended release tablet; Take 1 tablet by mouth every morning, Disp-30 tablet, R-0Normal  -     escitalopram (LEXAPRO) 20 MG tablet; Take 1 tablet by mouth daily, Disp-90 tablet, R-1Normal  Acquired hypothyroidism  -     levothyroxine (SYNTHROID) 75 MCG tablet; Take 1 tablet by mouth Daily, Disp-90 tablet, R-1Normal  Primary hypertension  -     losartan (COZAAR) 25 MG tablet; Take 1 tablet by mouth daily Hold FOR SBP <120, Disp-90 tablet, R-1Normal  Abdominal cramping  -     dicyclomine (BENTYL) 10 MG capsule; Take 1 capsule by mouth in the morning, at noon, and at bedtime, Disp-90 capsule, R-5Normal  Non-seasonal allergic rhinitis, unspecified trigger  -     montelukast (SINGULAIR) 10 MG tablet; Take 1 tablet by mouth nightly, Disp-90 tablet, R-1Normal  Nausea  -     ondansetron (ZOFRAN) 4 MG tablet; Take 1 tablet by mouth every 8 hours as needed for Nausea or Vomiting, Disp-90 tablet, R-1Normal  Gastroesophageal reflux disease without esophagitis  -     pantoprazole (PROTONIX) 40 MG tablet; TAKE 1 TABLET BY MOUTH IN THE EVENING, Disp-90 tablet, R-0Normal  Pain in both lower extremities  -     methocarbamol (ROBAXIN) 500 MG tablet; Take 1 tablet by mouth every evening, Disp-30 tablet, R-5Normal  Medicare annual wellness visit, subsequent       No follow-ups on file.     Subjective       Patient's complete Health Risk Assessment and screening values have been reviewed and are found in Flowsheets. The following 
conducted today showed minor issues with the clock-drawing test but good recall of words.  - No immediate concerns were identified.    13. Right-Sided Abdominal Pain.  - Reports right-sided pain for a couple of weeks, with some tenderness on examination.  - Advised to take Colace twice daily and increase water intake to 50-60 ounces per day.  - If pain persists after 1-2 weeks, she should notify the clinic.      Follow-up 6 months or as needed    This note is intended for the physician writing it, as well as to communicate findings to other healthcare professionals.  Progress notes use the medical lexicon that may be misunderstood by non-medical persons. Therefore, interpretations of medical notes and terminology should be approached with caution

## 2025-03-27 ENCOUNTER — RESULTS FOLLOW-UP (OUTPATIENT)
Dept: FAMILY MEDICINE CLINIC | Age: 89
End: 2025-03-27

## 2025-03-27 LAB
ALBUMIN SERPL-MCNC: 4.2 G/DL (ref 3.4–5)
ALP SERPL-CCNC: 63 U/L (ref 40–129)
ALT SERPL-CCNC: 33 U/L (ref 10–40)
AST SERPL-CCNC: 30 U/L (ref 15–37)
BILIRUB DIRECT SERPL-MCNC: 0.4 MG/DL (ref 0–0.3)
BILIRUB INDIRECT SERPL-MCNC: 0.3 MG/DL (ref 0–1)
BILIRUB SERPL-MCNC: 0.7 MG/DL (ref 0–1)
CHOLEST SERPL-MCNC: 131 MG/DL (ref 0–199)
HDLC SERPL-MCNC: 78 MG/DL (ref 40–60)
LDLC SERPL CALC-MCNC: 41 MG/DL
PROT SERPL-MCNC: 6.2 G/DL (ref 6.4–8.2)
TRIGL SERPL-MCNC: 62 MG/DL (ref 0–150)
VLDLC SERPL CALC-MCNC: 12 MG/DL

## 2025-04-13 DIAGNOSIS — F32.0 CURRENT MILD EPISODE OF MAJOR DEPRESSIVE DISORDER WITHOUT PRIOR EPISODE: ICD-10-CM

## 2025-04-14 PROCEDURE — 93296 REM INTERROG EVL PM/IDS: CPT | Performed by: INTERNAL MEDICINE

## 2025-04-14 PROCEDURE — 93294 REM INTERROG EVL PM/LDLS PM: CPT | Performed by: INTERNAL MEDICINE

## 2025-04-14 RX ORDER — BUPROPION HYDROCHLORIDE 150 MG/1
150 TABLET ORAL EVERY MORNING
Qty: 30 TABLET | Refills: 0 | OUTPATIENT
Start: 2025-04-14

## 2025-04-15 ENCOUNTER — OFFICE VISIT (OUTPATIENT)
Age: 89
End: 2025-04-15

## 2025-04-15 VITALS
DIASTOLIC BLOOD PRESSURE: 60 MMHG | HEIGHT: 60 IN | BODY MASS INDEX: 30.43 KG/M2 | SYSTOLIC BLOOD PRESSURE: 126 MMHG | HEART RATE: 61 BPM | WEIGHT: 155 LBS

## 2025-04-15 DIAGNOSIS — I10 PRIMARY HYPERTENSION: ICD-10-CM

## 2025-04-15 DIAGNOSIS — Z95.818 PRESENCE OF WATCHMAN LEFT ATRIAL APPENDAGE CLOSURE DEVICE: ICD-10-CM

## 2025-04-15 DIAGNOSIS — I48.19 PERSISTENT ATRIAL FIBRILLATION (HCC): Primary | ICD-10-CM

## 2025-04-15 NOTE — PROGRESS NOTES
Electrophysiology Follow up note Note      Reason for consultation:  Sp watchman and dual chamber pacemaker    Chief complaint : Follow-up on atrial fibrillation as she had recent cardioversion    Referring physician:       Primary care physician: Marcin Montano MD      History of Present Illness:     This visit 4/15/2025  Patient here today for follow-up on recent cardioversion.  Patient reports that she has some intermittent episodes of dizziness.  She denies aggravating or alleviating factors.  Patient had successful cardioversion on March 3.  She is taking her medications as prescribed.  She does not drink alcohol or smoke.  She does drink caffeine occasionally    Previous visit 4/12/2024  Patient is here today for follow-up on watchman.  Patient states she is feeling fatigued.  She denies chest pain, palpitations, shortness of breath, lightheadedness, dizziness, edema or syncope    Previous visit 12/8/2023  Patient is here today for follow-up on JAN status post watchman.  Patient reports that since the JAN she has been feeling well.  She states that she is maintaining sinus rhythm.  She denies issues with bleeding.  She denies issues with swallowing or neck pain.  She denies chest pain, palpitations, shortness of breath, lightheadedness, dizziness, edema or syncope    Previous visit 10/31/2023  Patient is here today for follow up on watchman, pacemaker and pericardial effusion  Patient had watchman and pacemaker placed 10/13/2023  Post procedure she was readmitted with pericardial effusion. She did not require pericenteisis. She was started on colchicine. Serial echo's during the hospital admission and did not show need for intervention.   She presents today with complaints of nausea and vomiting.   She denies chest pain,  palpitations, shortness of breath, edema, dizziness, or syncope.        Previous visit  Opal Moreno is an 88 year old female with a 
       BMP:    Lab Results   Component Value Date     02/28/2025    K 4.2 02/28/2025    CL 98 (L) 02/28/2025    CO2 27 02/28/2025    BUN 12 02/28/2025     CMP:   Lab Results   Component Value Date    AST 30 03/26/2025    ALT 33 03/26/2025    BILITOT 0.7 03/26/2025    ALKPHOS 63 03/26/2025     TSH:    Lab Results   Component Value Date/Time    TSH 2.090 01/02/2025 04:05 PM    TSH 0.11 11/14/2024 12:06 PM       EKGINTERPRETATION - EKG Interpretation:  atrial paced      Vitals:    04/15/25 1114   BP: 126/60   BP Site: Left Upper Arm   Patient Position: Sitting   BP Cuff Size: Medium Adult   Pulse: 61   Weight: 70.3 kg (155 lb)   Height: 1.524 m (5')        IMPRESSION / RECOMMENDATIONS:     Persistent atrial fibrillation-now atrial paced  Status post watchman 10/2023  Recent pacemaker for sick sinus syndrome  PAF  Hypercoagulable state   [place]  HTN  Iron def anemia       Patient had recent cardioversion March 3  Cardioversion was successful  Per pacemaker report patient appears to reverted back to atrial fibrillation however today is atrial paced  We will continue Cardizem  mg daily  Patient is not a candidate for antiarrhythmic therapy    Patient denies issues with bleeding.    Will continue aspirin 81 mg daily    Vitals:    04/15/25 1114   BP: 126/60   Pulse: 61     BP is stable continue lasix 20 mg daily, cardizem cd 120 mg daily, losartan 25 mg daily,     Device Assessment:    The device is Medtronic pacemaker - Dual Chamber chamber      MRI Compatible : yes    Device interrogation was performed.    Mode: AAIR --- DDDR     Sensing is normal. Impedence is normal.  Threshold is normal.     There has not been interval changes.     Estimated battery life is 11.5 years     The underlying rhythm is AP,VS.  15.3 % atrial paced; 54.0 % ventricular paced.      Atrial Arrhythmia : 82.4% burden.  Now atrial paced    Non sustained VT episodes : no    Sustained VT episodes : No    Patient activity reported

## 2025-04-15 NOTE — PATIENT INSTRUCTIONS
Thank you for allowing us to care for you today!   We want to ensure we can follow your treatment plan and we strive to give you the best outcomes and experience possible.   If you ever have a life threatening emergency and call 911 - for an ambulance (EMS)  REMEMBER  Our providers can only care for you at:   [facility] or [facility]   Even if you have someone take you or you drive yourself we can only care for you in a [facility] facility. Our providers are not setup at the other healthcare locations!    PLEASE CALL OUR OFFICE DURING NORMAL BUSINESS HOURS  Monday through Friday 8 am to 5 pm  AFTER HOURS the physician on-call cannot help with scheduling, rescheduling, procedure instruction questions or any type of medication need or issue.  [facility] P:[phone] - [facility] P:[phone] - [facility] P:[phone]      If you receive a survey:  We would appreciate you taking the time to share your experience concerning your provider visit in the office.    These surveys are confidential!  We are eager to improve and are counting on you to share your feedback so we can ensure you get the best care possible.

## 2025-04-17 ENCOUNTER — TELEPHONE (OUTPATIENT)
Dept: FAMILY MEDICINE CLINIC | Age: 89
End: 2025-04-17

## 2025-04-17 DIAGNOSIS — K59.00 CONSTIPATION, UNSPECIFIED CONSTIPATION TYPE: Primary | ICD-10-CM

## 2025-04-17 NOTE — TELEPHONE ENCOUNTER
Patient's daughter called stated patient was advised by Dr. Montano to take 1 colace in am and 1 in pm. She has been taking 1-am, 2-pm and magnesium with no results.  Also stated she is eating vegetables and fruits and drinking plenty of water.  Daughter asking for liness to be called in for patient.    Please advise

## 2025-05-07 DIAGNOSIS — K59.00 CONSTIPATION, UNSPECIFIED CONSTIPATION TYPE: ICD-10-CM

## 2025-05-12 DIAGNOSIS — F32.0 CURRENT MILD EPISODE OF MAJOR DEPRESSIVE DISORDER WITHOUT PRIOR EPISODE: ICD-10-CM

## 2025-05-12 RX ORDER — BUPROPION HYDROCHLORIDE 150 MG/1
150 TABLET ORAL EVERY MORNING
Qty: 90 TABLET | Refills: 1 | Status: SHIPPED | OUTPATIENT
Start: 2025-05-12

## 2025-05-28 ENCOUNTER — OFFICE VISIT (OUTPATIENT)
Dept: CARDIOLOGY CLINIC | Age: 89
End: 2025-05-28
Payer: MEDICARE

## 2025-05-28 VITALS
HEIGHT: 60 IN | WEIGHT: 148.8 LBS | HEART RATE: 60 BPM | BODY MASS INDEX: 29.21 KG/M2 | SYSTOLIC BLOOD PRESSURE: 136 MMHG | DIASTOLIC BLOOD PRESSURE: 60 MMHG

## 2025-05-28 DIAGNOSIS — I38 VHD (VALVULAR HEART DISEASE): Primary | ICD-10-CM

## 2025-05-28 DIAGNOSIS — I10 PRIMARY HYPERTENSION: ICD-10-CM

## 2025-05-28 DIAGNOSIS — I65.21 STENOSIS OF RIGHT CAROTID ARTERY: ICD-10-CM

## 2025-05-28 DIAGNOSIS — I50.32 CHRONIC DIASTOLIC CONGESTIVE HEART FAILURE (HCC): ICD-10-CM

## 2025-05-28 PROCEDURE — 1160F RVW MEDS BY RX/DR IN RCRD: CPT | Performed by: NURSE PRACTITIONER

## 2025-05-28 PROCEDURE — 99214 OFFICE O/P EST MOD 30 MIN: CPT | Performed by: NURSE PRACTITIONER

## 2025-05-28 PROCEDURE — 1159F MED LIST DOCD IN RCRD: CPT | Performed by: NURSE PRACTITIONER

## 2025-05-28 PROCEDURE — 1123F ACP DISCUSS/DSCN MKR DOCD: CPT | Performed by: NURSE PRACTITIONER

## 2025-05-28 ASSESSMENT — ENCOUNTER SYMPTOMS
SHORTNESS OF BREATH: 0
ORTHOPNEA: 0

## 2025-05-28 NOTE — PATIENT INSTRUCTIONS
Thank you for allowing us to care for you today!   We want to ensure we can follow your treatment plan and we strive to give you the best outcomes and experience possible.   If you ever have a life threatening emergency and call 911 - for an ambulance (EMS)  REMEMBER  Our providers can only care for you at:   [facility] or [facility]   Even if you have someone take you or you drive yourself we can only care for you in a [facility] facility. Our providers are not setup at the other healthcare locations!    PLEASE CALL OUR OFFICE DURING NORMAL BUSINESS HOURS  Monday through Friday 8 am to 5 pm  AFTER HOURS the physician on-call cannot help with scheduling, rescheduling, procedure instruction questions or any type of medication need or issue.  [facility] P:[phone] - [facility] P:[phone] - [facility] P:[phone]      If you receive a survey:  We would appreciate you taking the time to share your experience concerning your provider visit in the office.    These surveys are confidential!  We are eager to improve and are counting on you to share your feedback so we can ensure you get the best care possible.

## 2025-05-28 NOTE — PROGRESS NOTES
5/28/2025  Primary cardiologist: Dr. Jonas    CC:   Opal  is an established 89 y.o.  female here for a follow up on VHD      SUBJECTIVE/OBJECTIVE:  Opal is a 89 y.o. female with a history of HFpEF,  valvular heart disease s/p TAVR, hypertension, bradycardia s/p PPM, hypothyroid, COPD, CVA and persistent atrial fibrillation s/p Watchman (October/2023)       HPI:  Opal is here today with her daughter.  She states she has overall feeling fairly well.  She has no chest pain.  She does note intermittent episodes of dizziness when standing too fast.  She denies episodes of palpitations.    Review of Systems   Constitutional: Negative for diaphoresis and malaise/fatigue.   Cardiovascular:  Negative for chest pain, claudication, dyspnea on exertion, irregular heartbeat, leg swelling, near-syncope, orthopnea, palpitations and paroxysmal nocturnal dyspnea.   Respiratory:  Negative for shortness of breath.    Neurological:  Positive for dizziness (standing fast). Negative for light-headedness.       Vitals:    05/28/25 1014 05/28/25 1019   BP: (!) 140/60 136/60   BP Site: Left Upper Arm Left Upper Arm   Patient Position: Sitting Sitting   BP Cuff Size: Medium Adult Medium Adult   Pulse: 60    Weight: 67.5 kg (148 lb 12.8 oz)    Height: 1.524 m (5')        Wt Readings from Last 3 Encounters:   05/28/25 67.5 kg (148 lb 12.8 oz)   04/15/25 70.3 kg (155 lb)   03/26/25 66.4 kg (146 lb 6.4 oz)      Body mass index is 29.06 kg/m².     Physical Exam  Vitals reviewed.   Constitutional:       Appearance: She is not ill-appearing.   Neck:      Vascular: Carotid bruit (right) present.   Cardiovascular:      Rate and Rhythm: Normal rate and regular rhythm.      Heart sounds: Murmur heard.      Systolic murmur is present with a grade of 2/6.   Pulmonary:      Effort: Pulmonary effort is normal.      Breath sounds: No rales.   Chest:      Comments: Left sided device pocket intact   Musculoskeletal:      Right lower leg: No edema.

## 2025-05-28 NOTE — PROGRESS NOTES
CLINICAL STAFF DOCUMENTATION    Katie Fung, RAMAN Pichardoara BELINDA Moreno  6/12/1935  460    Have you had any Chest Pain recently? - No        Have you had any Shortness of Breath - No    Have you had any dizziness - No      Do you have any edema - swelling in No        When did you have your last labs drawn 3/2025  What doctor ordered mellissa   Do we have the labs in their chart Yes    Do you need any prescriptions refilled? - No    Do you have a surgery or procedure scheduled in the near future - No  Do use tobacco products? - No  Do you drink alcohol? - No  Do you use any illicit drugs? - No  Caffeine? - No    Check medication list thoroughly!!! AND RECONCILE OUTSIDE MEDICATIONS  If dose has changed change the entire order not just the MG  BE SURE TO ASK PATIENT IF THEY NEED MEDICATION REFILLS  Verify Pharmacy and update if incorrect    Add to every patient's \"wrap up\" the following dot phrase AFTERVISITCARDIOHEARTHOUSE and ensure we explain this to our patients

## 2025-06-11 ENCOUNTER — OFFICE VISIT (OUTPATIENT)
Dept: FAMILY MEDICINE CLINIC | Age: 89
End: 2025-06-11

## 2025-06-11 VITALS
OXYGEN SATURATION: 96 % | WEIGHT: 150 LBS | SYSTOLIC BLOOD PRESSURE: 120 MMHG | BODY MASS INDEX: 29.29 KG/M2 | HEART RATE: 63 BPM | DIASTOLIC BLOOD PRESSURE: 82 MMHG | TEMPERATURE: 96.6 F

## 2025-06-11 DIAGNOSIS — L82.0 SEBORRHEIC KERATOSES, INFLAMED: ICD-10-CM

## 2025-06-11 DIAGNOSIS — N60.02 BREAST CYST, LEFT: Primary | ICD-10-CM

## 2025-06-11 NOTE — PROGRESS NOTES
refill for escitalopram was provided. The patient was advised to check if the current bottle is old and to contact the office if there are any issues with the prescription.    PROCEDURE  Liquid nitrogen was applied to the seborrheic keratosis on the right breast and right shoulder..    Follow-up as needed.  This note is intended for the physician writing it, as well as to communicate findings to other healthcare professionals.  Progress notes use the medical lexicon that may be misunderstood by non-medical persons. Therefore, interpretations of medical notes and terminology should be approached with caution

## 2025-06-12 ENCOUNTER — TRANSCRIBE ORDERS (OUTPATIENT)
Dept: ADMINISTRATIVE | Age: 89
End: 2025-06-12

## 2025-06-12 DIAGNOSIS — M54.14 RADICULITIS, THORACIC: ICD-10-CM

## 2025-06-12 DIAGNOSIS — M54.12 RADICULOPATHY, CERVICAL: Primary | ICD-10-CM

## 2025-06-28 DIAGNOSIS — K21.9 GASTROESOPHAGEAL REFLUX DISEASE WITHOUT ESOPHAGITIS: ICD-10-CM

## 2025-06-30 RX ORDER — PANTOPRAZOLE SODIUM 40 MG/1
40 TABLET, DELAYED RELEASE ORAL EVERY EVENING
Qty: 90 TABLET | Refills: 1 | Status: SHIPPED | OUTPATIENT
Start: 2025-06-30

## 2025-07-02 ENCOUNTER — HOSPITAL ENCOUNTER (OUTPATIENT)
Dept: MRI IMAGING | Age: 89
Discharge: HOME | End: 2025-07-02
Attending: ORTHOPAEDIC SURGERY
Payer: MEDICARE

## 2025-07-02 DIAGNOSIS — M54.14 RADICULITIS, THORACIC: ICD-10-CM

## 2025-07-02 DIAGNOSIS — M54.12 RADICULOPATHY, CERVICAL: ICD-10-CM

## 2025-07-02 PROCEDURE — 72146 MRI CHEST SPINE W/O DYE: CPT

## 2025-07-02 PROCEDURE — 72141 MRI NECK SPINE W/O DYE: CPT

## 2025-07-05 DIAGNOSIS — E87.6 HYPOKALEMIA: ICD-10-CM

## 2025-07-07 RX ORDER — FUROSEMIDE 40 MG/1
40 TABLET ORAL DAILY
Qty: 30 TABLET | Refills: 0 | Status: SHIPPED | OUTPATIENT
Start: 2025-07-07

## 2025-07-07 RX ORDER — POTASSIUM CHLORIDE 1500 MG/1
20 TABLET, EXTENDED RELEASE ORAL DAILY
Qty: 30 TABLET | Refills: 0 | Status: SHIPPED | OUTPATIENT
Start: 2025-07-07

## 2025-07-23 ENCOUNTER — TELEPHONE (OUTPATIENT)
Dept: FAMILY MEDICINE CLINIC | Age: 89
End: 2025-07-23

## 2025-07-23 DIAGNOSIS — M79.605 PAIN IN BOTH LOWER EXTREMITIES: ICD-10-CM

## 2025-07-23 DIAGNOSIS — M79.604 PAIN IN BOTH LOWER EXTREMITIES: ICD-10-CM

## 2025-07-23 RX ORDER — METHOCARBAMOL 500 MG/1
500 TABLET, FILM COATED ORAL 2 TIMES DAILY
Qty: 60 TABLET | Refills: 3 | Status: SHIPPED | OUTPATIENT
Start: 2025-07-23

## 2025-07-23 NOTE — TELEPHONE ENCOUNTER
Pt would like refill on methocarbamol 500mg. Pt states that she has been needing to take med twice a day.

## 2025-07-23 NOTE — TELEPHONE ENCOUNTER
I sent a prescription for a twice a day methocarbamol.  She has to be very careful because it can increase her risk of falling.

## 2025-07-23 NOTE — TELEPHONE ENCOUNTER
Daughter stated she started doing that a couple weeks ago. Daughter stated it doesn't make her too drowsy but she has been taking it in the evening and night.

## 2025-08-11 DIAGNOSIS — E87.6 HYPOKALEMIA: ICD-10-CM

## 2025-08-11 RX ORDER — FUROSEMIDE 40 MG/1
40 TABLET ORAL DAILY
Qty: 30 TABLET | Refills: 1 | Status: SHIPPED | OUTPATIENT
Start: 2025-08-11

## 2025-08-11 RX ORDER — DILTIAZEM HYDROCHLORIDE 120 MG/1
120 CAPSULE, COATED, EXTENDED RELEASE ORAL DAILY
Qty: 60 CAPSULE | Refills: 2 | Status: SHIPPED | OUTPATIENT
Start: 2025-08-11

## 2025-08-11 RX ORDER — POTASSIUM CHLORIDE 1500 MG/1
TABLET, EXTENDED RELEASE ORAL
Qty: 30 TABLET | Refills: 1 | Status: SHIPPED | OUTPATIENT
Start: 2025-08-11

## 2025-08-15 ENCOUNTER — CARE COORDINATION (OUTPATIENT)
Dept: CARE COORDINATION | Age: 89
End: 2025-08-15

## 2025-08-18 ENCOUNTER — OFFICE VISIT (OUTPATIENT)
Dept: FAMILY MEDICINE CLINIC | Age: 89
End: 2025-08-18
Payer: MEDICARE

## 2025-08-18 VITALS
DIASTOLIC BLOOD PRESSURE: 62 MMHG | BODY MASS INDEX: 29.29 KG/M2 | WEIGHT: 150 LBS | OXYGEN SATURATION: 99 % | SYSTOLIC BLOOD PRESSURE: 142 MMHG | HEART RATE: 73 BPM | TEMPERATURE: 97.2 F

## 2025-08-18 DIAGNOSIS — H69.92 DYSFUNCTION OF LEFT EUSTACHIAN TUBE: ICD-10-CM

## 2025-08-18 DIAGNOSIS — T14.8XXA ABRASION OF SKIN: ICD-10-CM

## 2025-08-18 DIAGNOSIS — R21 SKIN RASH: Primary | ICD-10-CM
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
## 2025-08-18 PROCEDURE — 99214 OFFICE O/P EST MOD 30 MIN: CPT | Performed by: FAMILY MEDICINE

## 2025-08-18 PROCEDURE — 1123F ACP DISCUSS/DSCN MKR DOCD: CPT | Performed by: FAMILY MEDICINE

## 2025-08-18 RX ORDER — TRIAMCINOLONE ACETONIDE 0.25 MG/G
CREAM TOPICAL 2 TIMES DAILY
Qty: 80 G | Refills: 0 | Status: SHIPPED | OUTPATIENT
Start: 2025-08-18

## 2025-08-21 RX ORDER — PREDNISONE 20 MG/1
20 TABLET ORAL 2 TIMES DAILY
Qty: 10 TABLET | Refills: 0 | Status: SHIPPED | OUTPATIENT
Start: 2025-08-21 | End: 2025-08-26

## 2025-08-28 ENCOUNTER — CARE COORDINATION (OUTPATIENT)
Dept: CARE COORDINATION | Age: 89
End: 2025-08-28

## 2025-08-28 SDOH — ECONOMIC STABILITY: INCOME INSECURITY: IN THE LAST 12 MONTHS, WAS THERE A TIME WHEN YOU WERE NOT ABLE TO PAY THE MORTGAGE OR RENT ON TIME?: NO

## 2025-08-28 ASSESSMENT — SOCIAL DETERMINANTS OF HEALTH (SDOH)
DO YOU BELONG TO ANY CLUBS OR ORGANIZATIONS SUCH AS CHURCH GROUPS UNIONS, FRATERNAL OR ATHLETIC GROUPS, OR SCHOOL GROUPS?: NO
HOW OFTEN DO YOU GET TOGETHER WITH FRIENDS OR RELATIVES?: MORE THAN THREE TIMES A WEEK
IN A TYPICAL WEEK, HOW MANY TIMES DO YOU TALK ON THE PHONE WITH FAMILY, FRIENDS, OR NEIGHBORS?: MORE THAN THREE TIMES A WEEK
HOW OFTEN DO YOU ATTENT MEETINGS OF THE CLUB OR ORGANIZATION YOU BELONG TO?: NEVER
HOW OFTEN DO YOU ATTEND CHURCH OR RELIGIOUS SERVICES?: MORE THAN 4 TIMES PER YEAR

## (undated) DEVICE — Z DISCONTINUED (USE MFG CAT MVABO)  TUBING GAS SAMPLING STD 6.5 FT FEMALE CONN SMRT CAPNOLINE

## (undated) DEVICE — ENDOSCOPY KIT: Brand: MEDLINE INDUSTRIES, INC.

## (undated) DEVICE — SNARE VASC L240CM LOOP W10MM SHTH DIA2.4MM RND STIFF CLD

## (undated) DEVICE — FORCEPS BX L240CM JAW DIA2.8MM L CAP W/ NDL MIC MESH TOOTH